# Patient Record
Sex: FEMALE | Race: WHITE | NOT HISPANIC OR LATINO | Employment: UNEMPLOYED | URBAN - METROPOLITAN AREA
[De-identification: names, ages, dates, MRNs, and addresses within clinical notes are randomized per-mention and may not be internally consistent; named-entity substitution may affect disease eponyms.]

---

## 2017-02-07 ENCOUNTER — ALLSCRIPTS OFFICE VISIT (OUTPATIENT)
Dept: OTHER | Facility: OTHER | Age: 32
End: 2017-02-07

## 2017-02-10 ENCOUNTER — GENERIC CONVERSION - ENCOUNTER (OUTPATIENT)
Dept: OTHER | Facility: OTHER | Age: 32
End: 2017-02-10

## 2017-02-10 LAB — AMBIG ABBREV CMP14 DEFAULT (HISTORICAL): NORMAL

## 2017-02-11 LAB
A/G RATIO (HISTORICAL): 1.8 (ref 1.1–2.5)
ALBUMIN SERPL BCP-MCNC: 4.2 G/DL (ref 3.5–5.5)
ALP SERPL-CCNC: 84 IU/L (ref 39–117)
ALT SERPL W P-5'-P-CCNC: 18 IU/L (ref 0–32)
AST SERPL W P-5'-P-CCNC: 18 IU/L (ref 0–40)
BILIRUB SERPL-MCNC: <0.2 MG/DL (ref 0–1.2)
BUN SERPL-MCNC: 13 MG/DL (ref 6–20)
BUN/CREA RATIO (HISTORICAL): 15 (ref 8–20)
CALCIUM SERPL-MCNC: 9.2 MG/DL (ref 8.7–10.2)
CHLORIDE SERPL-SCNC: 100 MMOL/L (ref 96–106)
CO2 SERPL-SCNC: 21 MMOL/L (ref 18–29)
CREAT SERPL-MCNC: 0.89 MG/DL (ref 0.57–1)
EGFR AFRICAN AMERICAN (HISTORICAL): 101 ML/MIN/1.73
EGFR-AMERICAN CALC (HISTORICAL): 87 ML/MIN/1.73
GLUCOSE SERPL-MCNC: 95 MG/DL (ref 65–99)
POTASSIUM SERPL-SCNC: 4.5 MMOL/L (ref 3.5–5.2)
SODIUM SERPL-SCNC: 140 MMOL/L (ref 134–144)
TOT. GLOBULIN, SERUM (HISTORICAL): 2.3 G/DL (ref 1.5–4.5)
TOTAL PROTEIN (HISTORICAL): 6.5 G/DL (ref 6–8.5)

## 2017-02-13 ENCOUNTER — GENERIC CONVERSION - ENCOUNTER (OUTPATIENT)
Dept: OTHER | Facility: OTHER | Age: 32
End: 2017-02-13

## 2017-02-14 ENCOUNTER — GENERIC CONVERSION - ENCOUNTER (OUTPATIENT)
Dept: OTHER | Facility: OTHER | Age: 32
End: 2017-02-14

## 2017-02-14 LAB
HEPATITIS C ANTIBODY (HISTORICAL): >11 S/CO RATIO (ref 0–0.9)
WRITTEN AUTHORIZATION (HISTORICAL): NORMAL

## 2017-02-22 ENCOUNTER — GENERIC CONVERSION - ENCOUNTER (OUTPATIENT)
Dept: OTHER | Facility: OTHER | Age: 32
End: 2017-02-22

## 2017-03-21 ENCOUNTER — GENERIC CONVERSION - ENCOUNTER (OUTPATIENT)
Dept: OTHER | Facility: OTHER | Age: 32
End: 2017-03-21

## 2017-04-23 ENCOUNTER — HOSPITAL ENCOUNTER (EMERGENCY)
Facility: HOSPITAL | Age: 32
Discharge: HOME | End: 2017-04-23
Attending: EMERGENCY MEDICINE
Payer: COMMERCIAL

## 2017-04-23 VITALS
OXYGEN SATURATION: 97 % | SYSTOLIC BLOOD PRESSURE: 124 MMHG | HEART RATE: 89 BPM | TEMPERATURE: 98.5 F | BODY MASS INDEX: 34.15 KG/M2 | RESPIRATION RATE: 20 BRPM | DIASTOLIC BLOOD PRESSURE: 68 MMHG | WEIGHT: 200 LBS | HEIGHT: 64 IN

## 2017-04-23 DIAGNOSIS — F41.9 ANXIETY: Primary | ICD-10-CM

## 2017-04-23 PROCEDURE — 99281 EMR DPT VST MAYX REQ PHY/QHP: CPT

## 2017-04-23 RX ORDER — PHENTERMINE HYDROCHLORIDE 37.5 MG/1
CAPSULE ORAL
COMMUNITY
Start: 2016-06-17 | End: 2018-05-14 | Stop reason: SDUPTHER

## 2017-04-23 RX ORDER — ALPRAZOLAM 1 MG/1
1 TABLET ORAL
COMMUNITY
End: 2018-02-01

## 2017-04-23 RX ORDER — ALPRAZOLAM 0.25 MG/1
1 TABLET ORAL ONCE
Status: COMPLETED | OUTPATIENT
Start: 2017-04-23 | End: 2017-04-23

## 2017-04-23 RX ORDER — RISPERIDONE 0.5 MG/1
0.5 TABLET, FILM COATED ORAL 2 TIMES DAILY
COMMUNITY
End: 2018-02-01

## 2017-04-23 RX ORDER — TRAZODONE HYDROCHLORIDE 50 MG/1
TABLET ORAL
COMMUNITY
Start: 2017-03-21 | End: 2017-04-23 | Stop reason: SDUPTHER

## 2017-04-23 RX ADMIN — ALPRAZOLAM 1 MG: 0.25 TABLET ORAL at 13:19

## 2017-04-25 ENCOUNTER — ALLSCRIPTS OFFICE VISIT (OUTPATIENT)
Dept: OTHER | Facility: OTHER | Age: 32
End: 2017-04-25

## 2017-05-09 ENCOUNTER — GENERIC CONVERSION - ENCOUNTER (OUTPATIENT)
Dept: OTHER | Facility: OTHER | Age: 32
End: 2017-05-09

## 2017-05-09 ENCOUNTER — ALLSCRIPTS OFFICE VISIT (OUTPATIENT)
Dept: OTHER | Facility: OTHER | Age: 32
End: 2017-05-09

## 2017-05-09 LAB — ERYTHROCYTE SEDIMENTATION RATE (HISTORICAL): 12 MM/HR (ref 0–32)

## 2017-05-10 ENCOUNTER — GENERIC CONVERSION - ENCOUNTER (OUTPATIENT)
Dept: OTHER | Facility: OTHER | Age: 32
End: 2017-05-10

## 2017-05-10 LAB
ANTINUCLEAR ANTIBODIES, IFA (HISTORICAL): NEGATIVE
RA LATEX TURBID (HISTORICAL): <10 IU/ML (ref 0–13.9)

## 2017-05-11 ENCOUNTER — GENERIC CONVERSION - ENCOUNTER (OUTPATIENT)
Dept: OTHER | Facility: OTHER | Age: 32
End: 2017-05-11

## 2017-06-05 ENCOUNTER — ALLSCRIPTS OFFICE VISIT (OUTPATIENT)
Dept: OTHER | Facility: OTHER | Age: 32
End: 2017-06-05

## 2017-06-09 ENCOUNTER — ALLSCRIPTS OFFICE VISIT (OUTPATIENT)
Dept: OTHER | Facility: OTHER | Age: 32
End: 2017-06-09

## 2017-06-09 DIAGNOSIS — R10.9 ABDOMINAL PAIN: ICD-10-CM

## 2017-09-21 ENCOUNTER — ALLSCRIPTS OFFICE VISIT (OUTPATIENT)
Dept: OTHER | Facility: OTHER | Age: 32
End: 2017-09-21

## 2017-09-21 ENCOUNTER — TRANSCRIBE ORDERS (OUTPATIENT)
Dept: ADMINISTRATIVE | Facility: HOSPITAL | Age: 32
End: 2017-09-21

## 2017-09-21 DIAGNOSIS — R76.8 HEPATITIS C ANTIBODY POSITIVE IN BLOOD: Primary | ICD-10-CM

## 2017-10-02 ENCOUNTER — HOSPITAL ENCOUNTER (OUTPATIENT)
Dept: ULTRASOUND IMAGING | Facility: HOSPITAL | Age: 32
Discharge: HOME | End: 2017-10-02
Attending: INTERNAL MEDICINE
Payer: COMMERCIAL

## 2017-10-02 DIAGNOSIS — R76.8 HEPATITIS C ANTIBODY POSITIVE IN BLOOD: ICD-10-CM

## 2017-10-02 PROCEDURE — 76705 ECHO EXAM OF ABDOMEN: CPT

## 2017-10-04 ENCOUNTER — ALLSCRIPTS OFFICE VISIT (OUTPATIENT)
Dept: OTHER | Facility: OTHER | Age: 32
End: 2017-10-04

## 2017-11-12 ENCOUNTER — OFFICE VISIT (OUTPATIENT)
Dept: URGENT CARE | Facility: CLINIC | Age: 32
End: 2017-11-12
Payer: COMMERCIAL

## 2017-11-12 PROCEDURE — G0382 LEV 3 HOSP TYPE B ED VISIT: HCPCS

## 2017-11-12 PROCEDURE — 99283 EMERGENCY DEPT VISIT LOW MDM: CPT

## 2017-11-13 ENCOUNTER — GENERIC CONVERSION - ENCOUNTER (OUTPATIENT)
Dept: OTHER | Facility: OTHER | Age: 32
End: 2017-11-13

## 2017-11-15 NOTE — PROGRESS NOTES
Assessment    1  Migraine headache (346 90) (G43 909)    Plan  Migraine headache    · Promethazine HCl - 12 5 MG Oral Tablet; TAKE 1 TABLET EVERY 4 HOURS ASNEEDED   · Ketorolac Tromethamine 60 MG/2ML Injection Solution   · MethylPREDNISolone Sodium Succ 125 MG Injection SolutionReconstituted  Unlinked    · ALPRAZolam 0 5 MG Oral Tablet    Discussion/Summary  Discussion Summary:   Ketoralac and methylprednisone given during office visit  fluid intake and get plenty of rest promethazine as needed for nausea  Follow up with Dr Giuseppe Post as planned  Medication Side Effects Reviewed: Possible side effects of new medications were reviewed with the patient/guardian today  Understands and agrees with treatment plan: The treatment plan was reviewed with the patient/guardian  The patient/guardian understands and agrees with the treatment plan   Counseling Documentation With Imm: The patient was counseled regarding instructions for management,-- risk factor reductions,-- prognosis,-- impressions  Chief Complaint    1  Headache  Chief Complaint Free Text Note Form: Tension headache for 1 week  Meds switched from Tanvir Stockton a few medication  Under a lot of stress  Has a hx of headaches  History of Present Illness  HPI: 29 yo female presents with complaint of severe headache x 1 week  Pt complains of photophobia, phonophobia, nausea, decreased appetite, and fatigue  Pt reports her anxiety medications were recently changed, and she has had a hard time managing stress  Mild lightheadedness  No improvement with ibuprofen 800 mg or excedrin  [facility] Based Practices Required Assessment:   Prefered Language is  english  Primary Language is  english  Headache: Dede Govea presents with complaints of headache    Associated symptoms include typical headache features,-- nausea,-- photophobia-- and-- phonophobia, but-- no new onset headache,-- no vomiting,-- no aura,-- no scotoma,-- no numbness,-- no tingling,-- no weakness,-- no fever,-- no chills,-- no scalp tenderness,-- no vertigo,-- no ataxia,-- no dysarthria,-- no aphasia,-- no diplopia,-- no vision loss,-- no confusion-- and-- no tinnitus  Review of Systems  Focused-Female:  Constitutional: feeling poorly-- and-- feeling tired  ENT: as noted in HPI  Cardiovascular: no complaints of slow or fast heart rate, no chest pain, no palpitations, no leg claudication or lower extremity edema  Respiratory: no complaints of shortness of breath, no wheezing, no dyspnea on exertion, no orthopnea or PND  Gastrointestinal: as noted in HPI  Musculoskeletal: no complaints of arthralgia, no myalgia, no joint swelling or stiffness, no limb pain or swelling  Integumentary: no complaints of skin rash or lesion, no itching or dry skin, no skin wounds  Neurological: as noted in HPI  Active Problems     1  Abdominal hernia (553 9) (K46 9)   2  Abdominal pain of multiple sites (789 09) (R10 9)   3  Adult hypothyroidism (244 9) (E03 9)   4  Arthritis of hand (716 94) (M19 049)   5  Asthma, mild (493 90) (J45 998)   6  Back pain (724 5) (M54 9)   7  Birth control (V25 9) (Z30 9)   8  Dependent edema (782 3) (R60 9)   9  Hand swelling (729 81) (M79 89)   10  History of drug abuse in remission (305 93) (Z87 898)   11  Insomnia (780 52) (G47 00)   12  Malaise and fatigue (780 79) (R53 81,R53 83)   13  Methadone use (304 00) (F11 20)   14  Neuropathy (355 9) (G62 9)   15  Obesity (278 00) (E66 9)   16  Oral contraceptive prescribed (V25 01) (Z30 011)   17  Pain at surgical site (338 18) (G89 18)   18  Paranoia (psychosis) (297 1) (F22)   19  Postop check (V67 00) (Z09)   20  PTSD (post-traumatic stress disorder) (309 81) (F43 10)   21  Skin infection, bacterial (686 9,041 9) (L08 9,B96 89)   22  Smoking addiction (305 1) (F17 200)   23  Viral hepatitis C without hepatic coma (070 70) (B19 20)  Anxiety (300 00) (F41 9)       Past Medical History  1   History of body piercing (V45 89) (Z98 890)   2  History of drug dependence/abuse (304 63) (F19 21)   3  History of vaginal delivery (V13 29)   4  History of Umbilical hernia (009 1) (K42 9)    Family History  Mother    1  No pertinent family history  Family History    2  Family history of mental disorder (V17 0) (Z81 8)   3  Family history of substance abuse (V17 0) (Z81 4)    Social History   · Current every day smoker (305 1) (F17 200)   · Smoking addiction (305 1) (F17 200)    Surgical History    1  History of Umbilical Hernia Repair - Over Age 6    Current Meds   1  ALPRAZolam 0 5 MG Oral Tablet; Therapy: 93WSX6112 to Recorded   2  ClonazePAM 1 MG Oral Tablet; 1 tid; Therapy: 69Zze6038 to (Last Rx:04Oct2017) Ordered   3  Furosemide 20 MG Oral Tablet; TAKE 1 TABLET DAILY; Therapy: 90RLC9691 to (243 5423)  Requested for: 06MBQ9701; Last Rx:05Jun2017 Ordered   4  Gabapentin 100 MG Oral Capsule; take 1 capsule by mouth three times a day; Therapy: 51PVB9920 to (Evaluate:00Jts8157)  Requested for: 01FQL2467; Last Rx:04Oct2017 Ordered   5  Levothyroxine Sodium 25 MCG Oral Tablet; take one tablet by mouth every day; Therapy: 01Apr2016 to (Evaluate:69Ass5387)  Requested for: 24Oct2017; Last Rx:24Oct2017; Status: ACTIVE - Retrospective By Protocol Authorization Ordered   6  Methadone HCl - 10 MG Oral Tablet; 75MG daily; Therapy: (Recorded:08Apr2016) to Recorded   7  Phentermine HCl - 37 5 MG Oral Tablet; take one tablet by mouth every day; Therapy: 32Vfh9502 to ([phone])  Requested for: 28Jir5532; Last Rx:80Pyl7043 Ordered   8  Potassium Chloride Heaven ER 10 MEQ Oral Tablet Extended Release; TAKE 1 TABLET DAILY; Therapy: 26AOY0845 to (123 2332)  Requested for: 51EOP9717; Last Rx:05Jun2017 Ordered   9  Topiramate 100 MG Oral Tablet; TAKE 1 TABLET 3 TIMES DAILY; Therapy: 01Apr2016 to (Evaluate:05Qwt9523)  Requested for: 14Zxa3386; Last Rx:16Sir0489; Status: ACTIVE - Retrospective By Protocol Authorization Ordered   10  Ventolin  (90 Base) MCG/ACT Inhalation Aerosol Solution; INHALE 1 PUFF  EVERY 4 HOURS AS NEEDED; Therapy: 57VXR2558 to (Last Donis Espinal)  Requested for: 73Uav6960 Ordered   11  Ziprasidone HCl - 40 MG Oral Capsule; TAKE 1 CAPSULE TWICE DAILY; Therapy: 72PSG3188 to [phone])  Requested for: 04Oct2017; Last  Rx:04Oct2017 Ordered    Allergies    1  No Known Drug Allergies    Vitals  Signs   Recorded: 82PZR6194 12:49PM   Temperature: 97 2 F  Heart Rate: 77  Respiration: 16  Systolic: 838  Diastolic: 72  Height: 5 ft 4 in  O2 Saturation: 99    Physical Exam   Constitutional  General appearance: Abnormal  -- (Kept eyes closed during entire office visit except when instructed to open for exam  ) acutely ill,-- uncomfortable,-- disheveled clothing,-- unkempt appearance,-- appears tired-- and-- acutely exhausted  Eyes  Conjunctiva and lids: No swelling, erythema or discharge  Pupils and irises: Equal, round and reactive to light  Ears, Nose, Mouth, and Throat  External inspection of ears and nose: Normal    Otoscopic examination: Tympanic membranes translucent with normal light reflex  Canals patent without erythema  Nasal mucosa, septum, and turbinates: Normal without edema or erythema  Oropharynx: Normal with no erythema, edema, exudate or lesions  Pulmonary  Respiratory effort: No increased work of breathing or signs of respiratory distress  Auscultation of lungs: Clear to auscultation  Cardiovascular  Auscultation of heart: Normal rate and rhythm, normal S1 and S2, without murmurs  Neurologic  Cranial nerves: Cranial nerves 2-12 intact  Reflexes: 2+ and symmetric  Sensation: No sensory loss  Psychiatric  Orientation to person, place, and time: Normal    Mood and affect: Abnormal   Mood and Affect: depressed,-- despairing,-- frustrated-- and-- in pain        Future Appointments    Date/Time Provider Specialty Site   12/04/2017 10:15 AM Christine Augustin MD Family Medicine [facility] [address]       Signatures   Electronically signed by : Piero [address]resp[address]; Nov 12 2017  7:01PM EST                       (Author)    Electronically signed by : Leonel Jackson DO; Nov 14 2017  8:17AM EST                       (Co-author)

## 2017-12-04 ENCOUNTER — GENERIC CONVERSION - ENCOUNTER (OUTPATIENT)
Dept: OTHER | Facility: OTHER | Age: 32
End: 2017-12-04

## 2018-01-10 NOTE — MISCELLANEOUS
Message  Due to a diagnosis of Anxiety, it is hard for our patient to complete a supervised urine test  Please make other arrangements to collect sample from her  Thank you  Active Problems    1  Abdominal hernia (553 9) (K46 9)   2  Adult hypothyroidism (244 9) (E03 9)   3  Anxiety (300 00) (F41 9)   4  Back pain (724 5) (M54 9)   5  Birth control (V25 9) (Z30 9)   6  History of drug abuse in remission (305 93) (O12 898)   7  Insomnia (780 52) (G47 00)   8  Methadone use (304 00) (F11 20)   9  Neuropathy (355 9) (G62 9)   10  Obesity (278 00) (E66 9)   11  Oral contraceptive prescribed (V25 01) (Z30 011)   12  Pain at surgical site (338 18) (G89 18)   13  Postop check (V67 00) (Z09)   14  PTSD (post-traumatic stress disorder) (309 81) (F43 10)   15  Smoking addiction (305 1) (F17 200)   16  Viral hepatitis C without hepatic coma (070 70) (B19 20)    Current Meds   1  Flexeril TABS (Cyclobenzaprine HCl); Therapy: (Recorded:07Apr2016) to Recorded   2  Gabapentin 300 MG Oral Capsule (Neurontin); TAKE 1 CAPSULE 3 TIMES DAILY; Therapy: 03EZA0094 to (Evaluate:51Jku1805)  Requested for: 45PXP1721; Last   Rx:15Ywp8700 Ordered   3  Ibuprofen 600 MG Oral Tablet; TAKE ONE TABLET BY MOUTH THREE TIMES DAILY   WITH FOOD; Therapy: 98UOR9365 to (Evaluate:52Tlz3012)  Requested for: 60Wbt0098; Last   Rx:98Kzn6762 Ordered   4  Levothyroxine Sodium 25 MCG Oral Tablet (Synthroid); take one tablet by mouth every   day; Therapy: 01Apr2016 to (Evaluate:72Ebs7837)  Requested for: 09VLQ3326; Last   Rx:04Dmg5495 Ordered   5  Methadone HCl - 10 MG Oral Tablet; 75MG daily; Therapy: (Recorded:08Apr2016) to Recorded   6  Norgestim-Eth Estrad Triphasic 0 18/0 215/0 25 MG-35 MCG Oral Tablet (Ortho   Tri-Cyclen (28)); take 1 tablet every day; Therapy: 01Apr2016 to (Last Rx:01Apr2016)  Requested for: 01Apr2016 Ordered   7  Phentermine HCl - 37 5 MG Oral Tablet; take one tablet by mouth every day;    Therapy: 08Aug2016 to (BSJNVJWN:46LXN3398)  Requested for: 32QGN0879; Last   Rx:07Feb2017 Ordered   8  Topiramate 100 MG Oral Tablet; TAKE 1 TABLET 3 TIMES DAILY; Therapy: 01Apr2016 to (Evaluate:07Jun2017)  Requested for: 85MDQ7239; Last   Rx:07Feb2017 Ordered   9  TraZODone HCl - 50 MG Oral Tablet; TAKE ONE TABLET BY MOUTH AT BEDTIME AS   NEEDED FOR SLEEP; Therapy: 42VYJ4036 to (Evaluate:22Mar2017)  Requested for: 68Opy4692; Last   Rx:20Feb2017 Ordered   10  Tri-Linyah 0 18/0 215/0 25 MG-35 MCG Oral Tablet; take 1 tablet every day; Therapy: 60Bqs7188 to (Evaluate:23Sep2016)  Requested for: 98Bjz0369; Last    Rx:09Lma2272 Ordered   11  Xanax 1 MG Oral Tablet (ALPRAZolam); TAKE 1 TABLET 3 TIMES DAILY; Therapy: 36QOU4942 to Recorded    Allergies    1   No Known Drug Allergies    Signatures   Electronically signed by : Anastasiya Pretty, ; Mar 21 2017 10:23AM EST                       (Author)

## 2018-01-11 NOTE — RESULT NOTES
Verified Results  ([facility]) Antinuclear Antibodies, IFA 30KHZ0033 10:20AM Jose Win     Test Name Result Flag Reference   Antinuclear Antibodies, IFA Negative     Negative   <1:80                                                      Borderline  1:80                                                      Positive   >1:80

## 2018-01-12 VITALS
DIASTOLIC BLOOD PRESSURE: 70 MMHG | HEART RATE: 96 BPM | TEMPERATURE: 98.9 F | BODY MASS INDEX: 35.52 KG/M2 | RESPIRATION RATE: 20 BRPM | HEIGHT: 64 IN | SYSTOLIC BLOOD PRESSURE: 112 MMHG | WEIGHT: 208.03 LBS

## 2018-01-12 VITALS
WEIGHT: 193 LBS | BODY MASS INDEX: 32.95 KG/M2 | HEIGHT: 64 IN | OXYGEN SATURATION: 98 % | SYSTOLIC BLOOD PRESSURE: 126 MMHG | HEART RATE: 88 BPM | DIASTOLIC BLOOD PRESSURE: 76 MMHG

## 2018-01-12 VITALS
HEART RATE: 88 BPM | OXYGEN SATURATION: 96 % | HEIGHT: 64 IN | SYSTOLIC BLOOD PRESSURE: 126 MMHG | WEIGHT: 200 LBS | DIASTOLIC BLOOD PRESSURE: 84 MMHG | BODY MASS INDEX: 34.15 KG/M2

## 2018-01-12 NOTE — RESULT NOTES
Verified Results  [facility]) CMP14+eGFR 05Apr2016 08:58AM [facility]     Test Name Result Flag Reference   Glucose, Serum 94 mg/dL  65-99   BUN 20 mg/dL  6-20   Creatinine, Serum 0 93 mg/dL  0 57-1 00   eGFR If NonAfricn Am 83 mL/min/1 73  >59   eGFR If Africn Am 96 mL/min/1 73  >59   BUN/Creatinine Ratio 22 H 8-20   Sodium, Serum 144 mmol/L  134-144   Potassium, Serum 3 9 mmol/L  3 5-5 2   Chloride, Serum 108 mmol/L     Carbon Dioxide, Total 20 mmol/L  18-29   Calcium, Serum 9 1 mg/dL  8 7-10 2   Protein, Total, Serum 6 5 g/dL  6 0-8 5   Albumin, Serum 4 0 g/dL  3 5-5 5   Globulin, Total 2 5 g/dL  1 5-4 5   A/G Ratio 1 6  1 1-2 5   Bilirubin, Total <0 2 mg/dL  0 0-1 2   Alkaline Phosphatase, S 68 IU/L     AST (SGOT) 15 IU/L  0-40   ALT (SGPT) 19 IU/L  0-32     (1) TSH 05Apr2016 08:58AM [facility]     Test Name Result Flag Reference   TSH 2 130 uIU/mL  0 450-4 500

## 2018-01-13 VITALS
HEART RATE: 98 BPM | DIASTOLIC BLOOD PRESSURE: 80 MMHG | OXYGEN SATURATION: 98 % | WEIGHT: 204 LBS | BODY MASS INDEX: 34.83 KG/M2 | SYSTOLIC BLOOD PRESSURE: 122 MMHG | HEIGHT: 64 IN

## 2018-01-13 NOTE — RESULT NOTES
Verified Results  ([facility]) Thyroid Panel With TSH 11KSJ9081 12:22PM [facility]     Test Name Result Flag Reference   TSH 1 720 uIU/mL  0 450-4 500   Thyroxine (T4) 5 7 ug/dL  4 5-12 0   T3 Uptake 29 %  24-39   Free Thyroxine Index 1 7  1 2-4 9     (1) COMPREHENSIVE METABOLIC PANEL 33PIM6346 73:24MB [facility]     Test Name Result Flag Reference   Glucose, Serum 95 mg/dL  65-99   BUN 13 mg/dL  6-20   Creatinine, Serum 0 89 mg/dL  0 57-1 00   eGFR If NonAfricn Am 87 mL/min/1 73  >59   eGFR If Africn Am 101 mL/min/1 73  >59   BUN/Creatinine Ratio 15  8-20   Sodium, Serum 140 mmol/L  134-144   Potassium, Serum 4 5 mmol/L  3 5-5 2   Chloride, Serum 100 mmol/L     Carbon Dioxide, Total 21 mmol/L  18-29   Calcium, Serum 9 2 mg/dL  8 7-10 2   Protein, Total, Serum 6 5 g/dL  6 0-8 5   Albumin, Serum 4 2 g/dL  3 5-5 5   Globulin, Total 2 3 g/dL  1 5-4 5   A/G Ratio 1 8  1 1-2 5   Bilirubin, Total <0 2 mg/dL  0 0-1 2   Alkaline Phosphatase, S 84 IU/L     AST (SGOT) 18 IU/L  0-40   ALT (SGPT) 18 IU/L  0-32     Ira Rowland CMP14 Default [id number] 12:22PM [facility]     Test Name Result Flag Reference   Keenan Sicard [facility]14 Default Comment     A hand-written panel/profile was received from your office  In  accordance with the [facility] Ambiguous Test Code Policy dated July 6869, we have completed your order by using the closest currently  or formerly recognized AMA panel  We have assigned Comprehensive  Metabolic Panel (14), Test Code #786133 to this request   If this  is not the testing you wished to receive on this specimen, please  contact the [address] [place] [address] Client Inquiry/Technical Services Department  to clarify the test order  We appreciate your business

## 2018-01-13 NOTE — PROGRESS NOTES
Assessment    1  Encounter for preventive health examination (V70 0) (Z00 00)   2  Anxiety (300 00) (F41 9)   3  Hand swelling (729 81) (M79 89)   4  Paranoia (psychosis) (297 1) (F22)    Plan  Anxiety    · Ziprasidone HCl - 40 MG Oral Capsule; TAKE 1 CAPSULE TWICE DAILY   · ClonazePAM 1 MG Oral Tablet; 1 tid  Insomnia    · TraZODone HCl - 50 MG Oral Tablet  Malaise and fatigue    · (1) CBC/PLT/DIFF; Status:Active - Retrospective Authorization; Requested  for:04Oct2017;   Neuropathy    · Gabapentin 100 MG Oral Capsule; take 1 capsule by mouth three times a day  Skin infection, bacterial    · Sulfamethoxazole-Trimethoprim 800-160 MG Oral Tablet (Bactrim DS)  Unlinked    · CloNIDine HCl - 0 1 MG Oral Tablet    Discussion/Summary  Advice and education were given regarding nutrition, aerobic exercise and weight loss  History of Present Illness  HM, Adult Female: The last health maintenance visit was 1 year(s) ago  Social History: Household members include son(s)  She is unmarried  Work status: not currently employed  The patient is a current cigarette smoker  She reports occasional alcohol use  The patient has no concerns about alcohol abuse  She has never used illicit drugs  She reports the use of marijuana  General Health: The patient's health since the last visit is described as good  She has regular dental visits  She denies vision problems  She denies hearing loss  Immunizations status: up to date  Lifestyle:  She consumes a diverse and healthy diet  She has weight concerns  She does not exercise regularly  She uses tobacco  She denies alcohol use  She denies drug use  Reproductive health: the patient is premenopausal    Screening: Active Problems    1  Abdominal hernia (553 9) (K46 9)   2  Abdominal pain of multiple sites (789 09) (R10 9)   3  Adult hypothyroidism (244 9) (E03 9)   4  Anxiety (300 00) (F41 9)   5  Arthritis of hand (716 94) (M19 049)   6  Asthma, mild (868 90) (J45 998)   7  Back pain (724 5) (M54 9)   8  Birth control (V25 9) (Z30 9)   9  Dependent edema (782 3) (R60 9)   10  Hand swelling (729 81) (M79 89)   11  History of drug abuse in remission (305 93) (C86 753)   12  Insomnia (780 52) (G47 00)   13  Malaise and fatigue (780 79) (R53 81,R53 83)   14  Methadone use (304 00) (F11 20)   15  Neuropathy (355 9) (G62 9)   16  Obesity (278 00) (E66 9)   17  Oral contraceptive prescribed (V25 01) (Z30 011)   18  Pain at surgical site (338 18) (G89 18)   19  Postop check (V67 00) (Z09)   20  PTSD (post-traumatic stress disorder) (309 81) (F43 10)   21  Skin infection, bacterial (686 9,041 9) (L08 9,B96 89)   22  Smoking addiction (305 1) (F17 200)   23  Viral hepatitis C without hepatic coma (070 70) (B19 20)    Past Medical History    · History of body piercing (V45 89) (Z98 890)   · History of drug dependence/abuse (304 63) (F19 21)   · History of vaginal delivery (V13 29)   · History of Umbilical hernia (746 9) (K42 9)    Surgical History    · History of Umbilical Hernia Repair - Over Age 12    Family History  Mother    · No pertinent family history  Family History    · Family history of mental disorder (V17 0) (Z81 8)   · Family history of substance abuse (V17 0) (Z81 4)    Social History    · Current every day smoker (305 1) (F17 200)   · Smoking addiction (305 1) (F17 200)    Current Meds   1  ALPRAZolam 0 5 MG Oral Tablet; Therapy: 35IND4413 to Recorded   2  ClonazePAM 1 MG Oral Tablet; 1 tid; Therapy: 21Udo7040 to (Last Rx:25Apr2017) Ordered   3  CloNIDine HCl - 0 1 MG Oral Tablet; Therapy: 40DWP4619 to Recorded   4  Furosemide 20 MG Oral Tablet; TAKE 1 TABLET DAILY; Therapy: 42RRW3050 to (Danii Watters)  Requested for: 46GBD1100; Last   Rx:05Jun2017 Ordered   5  Gabapentin 300 MG Oral Capsule; take one capsule by mouth three times per day; Therapy: 40PGM4770 to (03 17 74 30 53)  Requested for: 29Cnw8261; Last   Rx:52Cij6967 Ordered   6   Levothyroxine Sodium 25 MCG Oral Tablet; take one tablet by mouth every day; Therapy: 01Apr2016 to (03 17 74 30 53)  Requested for: 70Cey7403; Last   Rx:70Xvn3513 Ordered   7  Methadone HCl - 10 MG Oral Tablet; 75MG daily; Therapy: (Recorded:08Apr2016) to Recorded   8  Phentermine HCl - 37 5 MG Oral Tablet; take one tablet by mouth every day; Therapy: 08Aug2016 to (03 17 74 30 53)  Requested for: 36Jdf5379; Last   Rx:33Udf6091 Ordered   9  Potassium Chloride Heaven ER 10 MEQ Oral Tablet Extended Release; TAKE 1 TABLET   DAILY; Therapy: 58ZQE7447 to (937 9950)  Requested for: 18ISD4744; Last   Rx:05Jun2017 Ordered   10  Sulfamethoxazole-Trimethoprim 800-160 MG Oral Tablet; TAKE 1 TABLET TWICE DAILY    UNTIL FINISHED; Therapy: 41HKT0251 to (Annette Tipton)  Requested for: 57Aoo1263; Last    Rx:97Xdg1503 Ordered   11  Topiramate 100 MG Oral Tablet; TAKE 1 TABLET 3 TIMES DAILY; Therapy: 01Apr2016 to (Evaluate:35Aey0869)  Requested for: 79Pqa8900; Last    Rx:80Vde7437; Status: ACTIVE - Retrospective By Protocol Authorization Ordered   12  TraZODone HCl - 50 MG Oral Tablet; TAKE ONE TABLET BY MOUTH AT BEDTIME AS    NEEDED FOR SLEEP; Therapy: 78QUP7876 to (03 17 74 30 53)  Requested for: 40Sjs7778; Last    Rx:84Erl0734 Ordered   13  Ventolin  (90 Base) MCG/ACT Inhalation Aerosol Solution; INHALE 1 PUFF    EVERY 4 HOURS AS NEEDED; Therapy: 81NHA2101 to (Scott Sigala)  Requested for: 31Ezl2026 Ordered    Allergies    1  No Known Drug Allergies    Physical Exam    Constitutional   General appearance: No acute distress, well appearing and well nourished  Neck   Neck: Supple, symmetric, trachea midline, no masses  Thyroid: Normal, no thyromegaly  Pulmonary   Auscultation of lungs: Clear to auscultation  Cardiovascular   Auscultation of heart: Normal rate and rhythm, normal S1 and S2, no murmurs  Pedal pulses: 2+ bilaterally  Abdomen   Abdomen: Non-tender, no masses      Liver and spleen: No hepatomegaly or splenomegaly         Future Appointments    Date/Time Provider Specialty Site   10/12/2017 10:00 AM CHRISTOPHER Gallegos Family Medicine [facility]   10/20/2017 10:00 AM Page Hua MD [address] [place] [address]     Signatures   Electronically signed by : Raman Cortes MD; Oct  4 2017 12:41PM EST                       (Author)

## 2018-01-14 VITALS
HEIGHT: 64 IN | WEIGHT: 189 LBS | RESPIRATION RATE: 16 BRPM | OXYGEN SATURATION: 99 % | SYSTOLIC BLOOD PRESSURE: 120 MMHG | BODY MASS INDEX: 32.27 KG/M2 | HEART RATE: 104 BPM | DIASTOLIC BLOOD PRESSURE: 70 MMHG

## 2018-01-14 VITALS
DIASTOLIC BLOOD PRESSURE: 70 MMHG | SYSTOLIC BLOOD PRESSURE: 98 MMHG | WEIGHT: 199 LBS | BODY MASS INDEX: 33.97 KG/M2 | HEIGHT: 64 IN | HEART RATE: 76 BPM | RESPIRATION RATE: 16 BRPM

## 2018-01-14 NOTE — RESULT NOTES
Verified Results  ([address] [facility]) HCV Antibody 85GUY4044 12:22PM [facility]     Test Name Result Flag Reference   Hep C Virus Ab >11 0 s/co ratio H 0 0-0 9   Negative:     < 0 8                                              Indeterminate: 0 8 - 0 9                                                   Positive:     > 0 9                  The CDC recommends that a positive HCV antibody result                  be followed up with a HCV Nucleic Acid Amplification                  test ([id number])

## 2018-01-14 NOTE — PROGRESS NOTES
Assessment   1  Anxiety (300 00) (F41 9)  2  Viral hepatitis C without hepatic coma (070 70) (B19 20)  3  Encounter for preventive health examination (V70 0) (Z00 00)  4  Obesity (278 00) (E66 9)  5  Adult hypothyroidism (244 9) (E03 9)    Discussion/Summary    Will start phentermine in 3mos1        1 Amended By: Den Cano; Oct 17 2016 9:00 AM EST    Chief Complaint  annual pe      History of Present Illness  HM, Adult Female: The patient is being seen for a health maintenance evaluation  General Health: The patient's health since the last visit is described as good  She denies vision problems  She denies hearing loss  Immunizations status: up to date  Lifestyle:  She consumes a diverse and healthy diet  She has weight concerns  She does not exercise regularly  She uses tobacco    Screening: cancer screening reviewed and current  metabolic screening reviewed and current  risk screening reviewed and current  Active Problems   1  Abdominal hernia (553 9) (K46 9)  2  Adult hypothyroidism (244 9) (E03 9)  3  Anxiety (300 00) (F41 9)  4  Back pain (724 5) (M54 9)  5  Birth control (V25 9) (Z30 9)  6  History of drug abuse in remission (305 93) (R28 441)  7  Insomnia (780 52) (G47 00)  8  Methadone use (304 00) (F11 20)  9  Neuropathy (355 9) (G62 9)  10  Obesity (278 00) (E66 9)  11  Oral contraceptive prescribed (V25 01) (Z30 011)  12  Pain at surgical site (338 18) (G89 18)  13  Postop check (V67 00) (Z09)  14  PTSD (post-traumatic stress disorder) (309 81) (F43 10)  15  Smoking addiction (305 1) (F17 200)  16   Viral hepatitis C without hepatic coma (070 70) (B19 20)    Past Medical History    · History of body piercing (V45 89) (Z98 890)   · History of drug dependence/abuse (304 63) (F19 21)   · History of vaginal delivery (V13 29)   · History of Umbilical hernia (675 5) (K42 9)    Surgical History    · History of Umbilical Hernia Repair - Over Age 12    Family History  Mother    · No pertinent family history    Social History    · Current every day smoker (305 1) (F17 200)   · Smoking addiction (305 1) (F17 200)    Current Meds  1  Flexeril TABS; Therapy: (Recorded:07Apr2016) to Recorded  2  Gabapentin 300 MG Oral Capsule; TAKE ONE TABLET BY MOUTH AT BEDTIME FOR   THREE DAYS THEN ONE TWICE DAILY FOR THREE DAYS THEN THREE TIMES   DAILY; Therapy: 28UOQ2254 to ([facility])  Requested for: 21Sep2016; Last   Rx:21Sep2016 Ordered  3  Ibuprofen 600 MG Oral Tablet; TAKE ONE TABLET BY MOUTH THREE TIMES DAILY   WITH FOOD; Therapy: 39THF5714 to (Evaluate:07Sep2016)  Requested for: 76Qmc4178; Last   Rx:55Iup5552 Ordered  4  Levothyroxine Sodium 25 MCG Oral Tablet; take one tablet by mouth every day; Therapy: 01Apr2016 to (Evaluate:03Jan2017)  Requested for: 05Oct2016; Last   Rx:05Oct2016; Status: ACTIVE - Retrospective By Protocol Authorization Ordered  5  Methadone HCl - 10 MG Oral Tablet; 75MG daily; Therapy: (Recorded:08Apr2016) to Recorded  6  Norgestim-Eth Estrad Triphasic 0 18/0 215/0 25 MG-35 MCG Oral Tablet; take 1 tablet   every day; Therapy: 01Apr2016 to (Last Rx:01Apr2016)  Requested for: 01Apr2016 Ordered  7  Phentermine HCl - 37 5 MG Oral Capsule; TAKE 1 CAPSULE DAILY; Therapy: 63ISP5249 to (Evaluate:05Nov2016); Last Rx:33Plz2169 Ordered  8  Phentermine HCl - 37 5 MG Oral Tablet; take one tablet by mouth every day; Therapy: 85Txj3076 to (Evaluate:20Oct2016)  Requested for: 05Oct2016; Last   Rx:05Oct2016; Status: ACTIVE - Retrospective By Protocol Authorization Ordered  9  Topiramate 100 MG Oral Tablet; 1 BID; Therapy: 01Apr2016 to (Last Rx:36Sys7020)  Requested for: 63DPF6742 Ordered  10  TraZODone HCl - 50 MG Oral Tablet; TAKE ONE TABLET BY MOUTH AT BEDTIME AS    NEEDED FOR SLEEP; Therapy: 50NAE5105 to ([facility])  Requested for: 05Oct2016; Last    Rx:05Oct2016; Status: ACTIVE - Retrospective By Protocol Authorization Ordered  11   Tri-Linyah 0 18/0 215/0 25 MG-35 MCG Oral Tablet; take 1 tablet every day; Therapy: 87Lna7154 to (Evaluate:53Ohi5631)  Requested for: 52Igc9077; Last    Rx:10Efn0864 Ordered  12  Xanax 1 MG Oral Tablet; TAKE 1 TABLET 3 TIMES DAILY; Therapy: 32YQX3338 to Recorded    Allergies   1  No Known Drug Allergies    Vitals   Recorded: 47BQU0860 16:32SN   Systolic 175   Diastolic 78   Heart Rate 001   Height 5 ft 4 in   Weight 182 lb    BMI Calculated 31 24   BSA Calculated 1 88     Physical Exam    Constitutional   General appearance: No acute distress, well appearing and well nourished  Neck   Neck: Supple, symmetric, trachea midline, no masses  Thyroid: Normal, no thyromegaly  Cardiovascular   Auscultation of heart: Normal rate and rhythm, normal S1 and S2, no murmurs  Pedal pulses: 2+ bilaterally         Signatures   Electronically signed by : Nathalia Garcia MD; Oct 17 2016  9:00AM EST                       (Author)

## 2018-01-16 ENCOUNTER — HOSPITAL ENCOUNTER (INPATIENT)
Facility: HOSPITAL | Age: 33
LOS: 7 days | Discharge: HOME | DRG: 982 | End: 2018-01-23
Attending: EMERGENCY MEDICINE | Admitting: SURGERY
Payer: COMMERCIAL

## 2018-01-16 ENCOUNTER — APPOINTMENT (INPATIENT)
Dept: RADIOLOGY | Facility: HOSPITAL | Age: 33
DRG: 982 | End: 2018-01-16
Payer: COMMERCIAL

## 2018-01-16 ENCOUNTER — APPOINTMENT (EMERGENCY)
Dept: RADIOLOGY | Facility: HOSPITAL | Age: 33
DRG: 982 | End: 2018-01-16
Payer: COMMERCIAL

## 2018-01-16 ENCOUNTER — APPOINTMENT (EMERGENCY)
Dept: RADIOLOGY | Facility: HOSPITAL | Age: 33
DRG: 982 | End: 2018-01-16
Attending: EMERGENCY MEDICINE
Payer: COMMERCIAL

## 2018-01-16 DIAGNOSIS — S36.039A: ICD-10-CM

## 2018-01-16 DIAGNOSIS — S82.041C: Primary | ICD-10-CM

## 2018-01-16 DIAGNOSIS — R45.851 PASSIVE SUICIDAL IDEATIONS: ICD-10-CM

## 2018-01-16 DIAGNOSIS — S22.43XA MULTIPLE FRACTURES OF RIBS, BILATERAL, INITIAL ENCOUNTER FOR CLOSED FRACTURE: ICD-10-CM

## 2018-01-16 PROBLEM — F11.90 METHADONE USE: Status: ACTIVE | Noted: 2018-01-16
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
## 2018-01-16 PROBLEM — K66.1 HEMOPERITONEUM: Status: ACTIVE | Noted: 2018-01-16

## 2018-01-16 PROBLEM — R91.1 PULMONARY NODULE: Status: ACTIVE | Noted: 2018-01-16

## 2018-01-16 PROBLEM — S82.044C: Status: ACTIVE | Noted: 2018-01-16

## 2018-01-16 PROBLEM — F11.10 HEROIN ABUSE (HCC): Status: ACTIVE | Noted: 2018-01-16

## 2018-01-16 LAB
ABO GROUP BLD: NORMAL
ANION GAP SERPL CALCULATED.3IONS-SCNC: 7 MMOL/L (ref 4–13)
BASE EX.OXY STD BLDV CALC-SCNC: 78.4 % (ref 60–80)
BASE EXCESS BLDA CALC-SCNC: -4 MMOL/L (ref -2–3)
BASE EXCESS BLDV CALC-SCNC: -4.8 MMOL/L
BASOPHILS # BLD AUTO: 0.02 THOUSANDS/ΜL (ref 0–0.1)
BASOPHILS NFR BLD AUTO: 0 % (ref 0–1)
BLD GP AB SCN SERPL QL: NEGATIVE
BUN SERPL-MCNC: 15 MG/DL (ref 5–25)
CA-I BLD-SCNC: 1.15 MMOL/L (ref 1.12–1.32)
CALCIUM SERPL-MCNC: 8.1 MG/DL (ref 8.3–10.1)
CHLORIDE SERPL-SCNC: 112 MMOL/L (ref 100–108)
CK MB SERPL-MCNC: 1.1 NG/ML (ref 0–5)
CK MB SERPL-MCNC: <1 % (ref 0–2.5)
CK SERPL-CCNC: 147 U/L (ref 26–192)
CO2 SERPL-SCNC: 24 MMOL/L (ref 21–32)
CREAT SERPL-MCNC: 0.94 MG/DL (ref 0.6–1.3)
EOSINOPHIL # BLD AUTO: 0.15 THOUSAND/ΜL (ref 0–0.61)
EOSINOPHIL NFR BLD AUTO: 1 % (ref 0–6)
ERYTHROCYTE [DISTWIDTH] IN BLOOD BY AUTOMATED COUNT: 14.4 % (ref 11.6–15.1)
ETHANOL SERPL-MCNC: <3 MG/DL (ref 0–3)
GFR SERPL CREATININE-BSD FRML MDRD: 81 ML/MIN/1.73SQ M
GLUCOSE SERPL-MCNC: 143 MG/DL (ref 65–140)
GLUCOSE SERPL-MCNC: 143 MG/DL (ref 65–140)
GLUCOSE SERPL-MCNC: 99 MG/DL (ref 65–140)
HCO3 BLDA-SCNC: 22 MMOL/L (ref 24–30)
HCO3 BLDV-SCNC: 21.5 MMOL/L (ref 24–30)
HCT VFR BLD AUTO: 31.2 % (ref 34.8–46.1)
HCT VFR BLD AUTO: 31.4 % (ref 34.8–46.1)
HCT VFR BLD CALC: 28 % (ref 34.8–46.1)
HGB BLD-MCNC: 10.4 G/DL (ref 11.5–15.4)
HGB BLD-MCNC: 10.6 G/DL (ref 11.5–15.4)
HGB BLDA-MCNC: 9.5 G/DL (ref 11.5–15.4)
LACTATE SERPL-SCNC: 1.6 MMOL/L (ref 0.5–2)
LACTATE SERPL-SCNC: 2.4 MMOL/L (ref 0.5–2)
LYMPHOCYTES # BLD AUTO: 3.01 THOUSANDS/ΜL (ref 0.6–4.47)
LYMPHOCYTES NFR BLD AUTO: 27 % (ref 14–44)
MCH RBC QN AUTO: 30.5 PG (ref 26.8–34.3)
MCHC RBC AUTO-ENTMCNC: 33.3 G/DL (ref 31.4–37.4)
MCV RBC AUTO: 92 FL (ref 82–98)
MONOCYTES # BLD AUTO: 0.67 THOUSAND/ΜL (ref 0.17–1.22)
MONOCYTES NFR BLD AUTO: 6 % (ref 4–12)
NEUTROPHILS # BLD AUTO: 7.09 THOUSANDS/ΜL (ref 1.85–7.62)
NEUTS SEG NFR BLD AUTO: 66 % (ref 43–75)
NRBC BLD AUTO-RTO: 0 /100 WBCS
O2 CT BLDV-SCNC: 12.3 ML/DL
PCO2 BLD: 23 MMOL/L (ref 21–32)
PCO2 BLD: 45.4 MM HG (ref 42–50)
PCO2 BLDV: 44.5 MM HG (ref 42–50)
PH BLD: 7.29 [PH] (ref 7.3–7.4)
PH BLDV: 7.3 [PH] (ref 7.3–7.4)
PLATELET # BLD AUTO: 234 THOUSANDS/UL (ref 149–390)
PMV BLD AUTO: 10.4 FL (ref 8.9–12.7)
PO2 BLD: 33 MM HG (ref 35–45)
PO2 BLDV: 44.7 MM HG (ref 35–45)
POTASSIUM BLD-SCNC: 3.4 MMOL/L (ref 3.5–5.3)
POTASSIUM SERPL-SCNC: 3.2 MMOL/L (ref 3.5–5.3)
RBC # BLD AUTO: 3.41 MILLION/UL (ref 3.81–5.12)
RH BLD: POSITIVE
SAO2 % BLD FROM PO2: 56 % (ref 95–98)
SODIUM BLD-SCNC: 142 MMOL/L (ref 136–145)
SODIUM SERPL-SCNC: 143 MMOL/L (ref 136–145)
SPECIMEN EXPIRATION DATE: NORMAL
SPECIMEN SOURCE: ABNORMAL
WBC # BLD AUTO: 11.03 THOUSAND/UL (ref 4.31–10.16)

## 2018-01-16 PROCEDURE — 86920 COMPATIBILITY TEST SPIN: CPT

## 2018-01-16 PROCEDURE — 36247 INS CATH ABD/L-EXT ART 3RD: CPT

## 2018-01-16 PROCEDURE — 73080 X-RAY EXAM OF ELBOW: CPT

## 2018-01-16 PROCEDURE — 74177 CT ABD & PELVIS W/CONTRAST: CPT

## 2018-01-16 PROCEDURE — 84295 ASSAY OF SERUM SODIUM: CPT

## 2018-01-16 PROCEDURE — 37244 VASC EMBOLIZE/OCCLUDE BLEED: CPT

## 2018-01-16 PROCEDURE — 71260 CT THORAX DX C+: CPT

## 2018-01-16 PROCEDURE — 86850 RBC ANTIBODY SCREEN: CPT | Performed by: EMERGENCY MEDICINE

## 2018-01-16 PROCEDURE — 84132 ASSAY OF SERUM POTASSIUM: CPT

## 2018-01-16 PROCEDURE — 82550 ASSAY OF CK (CPK): CPT | Performed by: EMERGENCY MEDICINE

## 2018-01-16 PROCEDURE — 82330 ASSAY OF CALCIUM: CPT

## 2018-01-16 PROCEDURE — 90715 TDAP VACCINE 7 YRS/> IM: CPT | Performed by: EMERGENCY MEDICINE

## 2018-01-16 PROCEDURE — 99285 EMERGENCY DEPT VISIT HI MDM: CPT

## 2018-01-16 PROCEDURE — 80320 DRUG SCREEN QUANTALCOHOLS: CPT | Performed by: EMERGENCY MEDICINE

## 2018-01-16 PROCEDURE — C1874 STENT, COATED/COV W/DEL SYS: HCPCS

## 2018-01-16 PROCEDURE — 85018 HEMOGLOBIN: CPT | Performed by: NURSE PRACTITIONER

## 2018-01-16 PROCEDURE — 73060 X-RAY EXAM OF HUMERUS: CPT

## 2018-01-16 PROCEDURE — 77001 FLUOROGUIDE FOR VEIN DEVICE: CPT

## 2018-01-16 PROCEDURE — C1887 CATHETER, GUIDING: HCPCS

## 2018-01-16 PROCEDURE — 70450 CT HEAD/BRAIN W/O DYE: CPT

## 2018-01-16 PROCEDURE — 85025 COMPLETE CBC W/AUTO DIFF WBC: CPT | Performed by: EMERGENCY MEDICINE

## 2018-01-16 PROCEDURE — 76937 US GUIDE VASCULAR ACCESS: CPT

## 2018-01-16 PROCEDURE — 36556 INSERT NON-TUNNEL CV CATH: CPT

## 2018-01-16 PROCEDURE — 83605 ASSAY OF LACTIC ACID: CPT | Performed by: EMERGENCY MEDICINE

## 2018-01-16 PROCEDURE — 73130 X-RAY EXAM OF HAND: CPT

## 2018-01-16 PROCEDURE — 80048 BASIC METABOLIC PNL TOTAL CA: CPT | Performed by: EMERGENCY MEDICINE

## 2018-01-16 PROCEDURE — 73552 X-RAY EXAM OF FEMUR 2/>: CPT

## 2018-01-16 PROCEDURE — 86900 BLOOD TYPING SEROLOGIC ABO: CPT | Performed by: EMERGENCY MEDICINE

## 2018-01-16 PROCEDURE — 82947 ASSAY GLUCOSE BLOOD QUANT: CPT

## 2018-01-16 PROCEDURE — 71045 X-RAY EXAM CHEST 1 VIEW: CPT

## 2018-01-16 PROCEDURE — 82948 REAGENT STRIP/BLOOD GLUCOSE: CPT

## 2018-01-16 PROCEDURE — 94760 N-INVAS EAR/PLS OXIMETRY 1: CPT

## 2018-01-16 PROCEDURE — 99152 MOD SED SAME PHYS/QHP 5/>YRS: CPT

## 2018-01-16 PROCEDURE — C1894 INTRO/SHEATH, NON-LASER: HCPCS

## 2018-01-16 PROCEDURE — 82553 CREATINE MB FRACTION: CPT | Performed by: EMERGENCY MEDICINE

## 2018-01-16 PROCEDURE — 85014 HEMATOCRIT: CPT

## 2018-01-16 PROCEDURE — C1760 CLOSURE DEV, VASC: HCPCS

## 2018-01-16 PROCEDURE — C1769 GUIDE WIRE: HCPCS

## 2018-01-16 PROCEDURE — 86901 BLOOD TYPING SEROLOGIC RH(D): CPT | Performed by: EMERGENCY MEDICINE

## 2018-01-16 PROCEDURE — 75726 ARTERY X-RAYS ABDOMEN: CPT

## 2018-01-16 PROCEDURE — C1751 CATH, INF, PER/CENT/MIDLINE: HCPCS

## 2018-01-16 PROCEDURE — 30233N1 TRANSFUSION OF NONAUTOLOGOUS RED BLOOD CELLS INTO PERIPHERAL VEIN, PERCUTANEOUS APPROACH: ICD-10-PCS | Performed by: SURGERY

## 2018-01-16 PROCEDURE — 73110 X-RAY EXAM OF WRIST: CPT

## 2018-01-16 PROCEDURE — 83605 ASSAY OF LACTIC ACID: CPT | Performed by: NURSE PRACTITIONER

## 2018-01-16 PROCEDURE — 99153 MOD SED SAME PHYS/QHP EA: CPT

## 2018-01-16 PROCEDURE — 96365 THER/PROPH/DIAG IV INF INIT: CPT

## 2018-01-16 PROCEDURE — 82805 BLOOD GASES W/O2 SATURATION: CPT | Performed by: NURSE PRACTITIONER

## 2018-01-16 PROCEDURE — 96375 TX/PRO/DX INJ NEW DRUG ADDON: CPT

## 2018-01-16 PROCEDURE — 04L43DZ OCCLUSION OF SPLENIC ARTERY WITH INTRALUMINAL DEVICE, PERCUTANEOUS APPROACH: ICD-10-PCS | Performed by: RADIOLOGY

## 2018-01-16 PROCEDURE — 36415 COLL VENOUS BLD VENIPUNCTURE: CPT | Performed by: EMERGENCY MEDICINE

## 2018-01-16 PROCEDURE — 85014 HEMATOCRIT: CPT | Performed by: NURSE PRACTITIONER

## 2018-01-16 PROCEDURE — 72125 CT NECK SPINE W/O DYE: CPT

## 2018-01-16 PROCEDURE — P9016 RBC LEUKOCYTES REDUCED: HCPCS

## 2018-01-16 PROCEDURE — 90471 IMMUNIZATION ADMIN: CPT

## 2018-01-16 PROCEDURE — 02HV33Z INSERTION OF INFUSION DEVICE INTO SUPERIOR VENA CAVA, PERCUTANEOUS APPROACH: ICD-10-PCS | Performed by: SURGERY

## 2018-01-16 PROCEDURE — 73590 X-RAY EXAM OF LOWER LEG: CPT

## 2018-01-16 PROCEDURE — 82803 BLOOD GASES ANY COMBINATION: CPT

## 2018-01-16 RX ORDER — LIDOCAINE 50 MG/G
2 PATCH TOPICAL DAILY
Status: DISCONTINUED | OUTPATIENT
Start: 2018-01-17 | End: 2018-01-23 | Stop reason: HOSPADM

## 2018-01-16 RX ORDER — FENTANYL CITRATE 50 UG/ML
INJECTION, SOLUTION INTRAMUSCULAR; INTRAVENOUS CODE/TRAUMA/SEDATION MEDICATION
Status: COMPLETED | OUTPATIENT
Start: 2018-01-16 | End: 2018-01-16

## 2018-01-16 RX ORDER — FENTANYL CITRATE 50 UG/ML
100 INJECTION, SOLUTION INTRAMUSCULAR; INTRAVENOUS ONCE
Status: COMPLETED | OUTPATIENT
Start: 2018-01-16 | End: 2018-01-16

## 2018-01-16 RX ORDER — SODIUM CHLORIDE, SODIUM GLUCONATE, SODIUM ACETATE, POTASSIUM CHLORIDE, MAGNESIUM CHLORIDE, SODIUM PHOSPHATE, DIBASIC, AND POTASSIUM PHOSPHATE .53; .5; .37; .037; .03; .012; .00082 G/100ML; G/100ML; G/100ML; G/100ML; G/100ML; G/100ML; G/100ML
75 INJECTION, SOLUTION INTRAVENOUS CONTINUOUS
Status: DISCONTINUED | OUTPATIENT
Start: 2018-01-16 | End: 2018-01-18

## 2018-01-16 RX ORDER — POTASSIUM CHLORIDE 29.8 MG/ML
40 INJECTION INTRAVENOUS
Status: COMPLETED | OUTPATIENT
Start: 2018-01-16 | End: 2018-01-17

## 2018-01-16 RX ORDER — MIDAZOLAM HYDROCHLORIDE 1 MG/ML
INJECTION INTRAMUSCULAR; INTRAVENOUS CODE/TRAUMA/SEDATION MEDICATION
Status: COMPLETED | OUTPATIENT
Start: 2018-01-16 | End: 2018-01-16

## 2018-01-16 RX ORDER — LIDOCAINE HYDROCHLORIDE 10 MG/ML
INJECTION, SOLUTION EPIDURAL; INFILTRATION; INTRACAUDAL; PERINEURAL
Status: DISPENSED
Start: 2018-01-16 | End: 2018-01-17

## 2018-01-16 RX ORDER — ALBUTEROL SULFATE 2.5 MG/3ML
2.5 SOLUTION RESPIRATORY (INHALATION) EVERY 4 HOURS PRN
Status: DISCONTINUED | OUTPATIENT
Start: 2018-01-16 | End: 2018-01-22

## 2018-01-16 RX ORDER — LIDOCAINE HYDROCHLORIDE 10 MG/ML
20 INJECTION, SOLUTION EPIDURAL; INFILTRATION; INTRACAUDAL; PERINEURAL ONCE
Status: DISCONTINUED | OUTPATIENT
Start: 2018-01-16 | End: 2018-01-17

## 2018-01-16 RX ORDER — HYDROMORPHONE HYDROCHLORIDE 4 MG/ML
INJECTION, SOLUTION INTRAMUSCULAR; INTRAVENOUS; SUBCUTANEOUS CODE/TRAUMA/SEDATION MEDICATION
Status: COMPLETED | OUTPATIENT
Start: 2018-01-16 | End: 2018-01-16

## 2018-01-16 RX ADMIN — POTASSIUM CHLORIDE 40 MEQ: 400 INJECTION, SOLUTION INTRAVENOUS at 19:51

## 2018-01-16 RX ADMIN — SODIUM CHLORIDE 1000 ML: 0.9 INJECTION, SOLUTION INTRAVENOUS at 18:12

## 2018-01-16 RX ADMIN — MIDAZOLAM 1 MG: 1 INJECTION INTRAMUSCULAR; INTRAVENOUS at 17:13

## 2018-01-16 RX ADMIN — IODIXANOL 130 ML: 320 INJECTION, SOLUTION INTRAVASCULAR at 19:04

## 2018-01-16 RX ADMIN — HYDROMORPHONE HYDROCHLORIDE 1 MG: 1 INJECTION, SOLUTION INTRAMUSCULAR; INTRAVENOUS; SUBCUTANEOUS at 19:07

## 2018-01-16 RX ADMIN — HYDROMORPHONE HYDROCHLORIDE 1 MG: 4 INJECTION, SOLUTION INTRAMUSCULAR; INTRAVENOUS; SUBCUTANEOUS at 18:36

## 2018-01-16 RX ADMIN — FENTANYL CITRATE 50 MCG: 50 INJECTION, SOLUTION INTRAMUSCULAR; INTRAVENOUS at 17:04

## 2018-01-16 RX ADMIN — MIDAZOLAM 1 MG: 1 INJECTION INTRAMUSCULAR; INTRAVENOUS at 16:55

## 2018-01-16 RX ADMIN — HYDROMORPHONE HYDROCHLORIDE 1 MG: 1 INJECTION, SOLUTION INTRAMUSCULAR; INTRAVENOUS; SUBCUTANEOUS at 22:26

## 2018-01-16 RX ADMIN — FENTANYL CITRATE 50 MCG: 50 INJECTION, SOLUTION INTRAMUSCULAR; INTRAVENOUS at 17:50

## 2018-01-16 RX ADMIN — MIDAZOLAM 1 MG: 1 INJECTION INTRAMUSCULAR; INTRAVENOUS at 17:05

## 2018-01-16 RX ADMIN — MIDAZOLAM 1 MG: 1 INJECTION INTRAMUSCULAR; INTRAVENOUS at 17:56

## 2018-01-16 RX ADMIN — FENTANYL CITRATE 50 MCG: 50 INJECTION, SOLUTION INTRAMUSCULAR; INTRAVENOUS at 16:52

## 2018-01-16 RX ADMIN — FENTANYL CITRATE 50 MCG: 50 INJECTION, SOLUTION INTRAMUSCULAR; INTRAVENOUS at 17:15

## 2018-01-16 RX ADMIN — CEFAZOLIN SODIUM 2000 MG: 1 SOLUTION INTRAVENOUS at 15:44

## 2018-01-16 RX ADMIN — IOHEXOL 100 ML: 350 INJECTION, SOLUTION INTRAVENOUS at 16:07

## 2018-01-16 RX ADMIN — TETANUS TOXOID, REDUCED DIPHTHERIA TOXOID AND ACELLULAR PERTUSSIS VACCINE, ADSORBED 0.5 ML: 5; 2.5; 8; 8; 2.5 SUSPENSION INTRAMUSCULAR at 15:46

## 2018-01-16 RX ADMIN — SODIUM CHLORIDE 1000 ML: 0.9 INJECTION, SOLUTION INTRAVENOUS at 16:50

## 2018-01-16 RX ADMIN — POTASSIUM CHLORIDE 40 MEQ: 400 INJECTION, SOLUTION INTRAVENOUS at 22:28

## 2018-01-16 RX ADMIN — MIDAZOLAM 1 MG: 1 INJECTION INTRAMUSCULAR; INTRAVENOUS at 17:04

## 2018-01-16 RX ADMIN — MIDAZOLAM 1 MG: 1 INJECTION INTRAMUSCULAR; INTRAVENOUS at 17:33

## 2018-01-16 RX ADMIN — FENTANYL CITRATE 50 MCG: 50 INJECTION, SOLUTION INTRAMUSCULAR; INTRAVENOUS at 16:45

## 2018-01-16 RX ADMIN — FENTANYL CITRATE 50 MCG: 50 INJECTION, SOLUTION INTRAMUSCULAR; INTRAVENOUS at 18:12

## 2018-01-16 RX ADMIN — FENTANYL CITRATE 100 MCG: 50 INJECTION INTRAMUSCULAR; INTRAVENOUS at 19:40
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
## 2018-01-16 NOTE — TRAUMA DOCUMENTATION
Patient crying out upon arrival to the [facility],  "put me to sleep, I don't want this pain"   Patient having difficulty keeping her eyes open

## 2018-01-16 NOTE — BRIEF OP NOTE (RAD/CATH)
Splenic angiography and embolization and triple lumen central venous catheter Procedure Note    PATIENT NAME: Ana Cruz  : 1985  MRN: [id number]     Pre-op Diagnosis:   1   Splenic laceration  Post-op Diagnosis: Splenic laceration      Surgeon:   Jami Castillo DO  Assistants:     No qualified resident was available, Resident is only observing    Estimated Blood Loss: minimal  Findings: no overt active extravasation; main splenic artery embolization    Specimens: none    Complications:  None apparent    Anesthesia: Conscious sedation and Local    Jami Castillo DO     Date: 2018  Time: 6:46 PM

## 2018-01-16 NOTE — PROGRESS NOTES
Patient arrived to IR for splenic angiography  The procedure and risks were discussed with the patient  All questions were answered  Informed consent was obtained

## 2018-01-16 NOTE — RESULT NOTES
Verified Results  [facility]) Rheumatoid Arthritis Factor [id number] 10:20AM Sergio Laureano     Test Name Result Flag Reference   RA Latex Turbid   <10 0 IU/mL  0 0-13 9     () Sedimentation Rate-Westergren 62ZFG0974 10:20AM Sergio Laureano     Test Name Result Flag Reference   Sedimentation Rate-Westergren 12 mm/hr  0-32
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
## 2018-01-17 ENCOUNTER — APPOINTMENT (INPATIENT)
Dept: RADIOLOGY | Facility: HOSPITAL | Age: 33
DRG: 982 | End: 2018-01-17
Payer: COMMERCIAL

## 2018-01-17 ENCOUNTER — ANESTHESIA (INPATIENT)
Dept: PERIOP | Facility: HOSPITAL | Age: 33
DRG: 982 | End: 2018-01-17
Payer: COMMERCIAL

## 2018-01-17 ENCOUNTER — ANESTHESIA EVENT (INPATIENT)
Dept: PERIOP | Facility: HOSPITAL | Age: 33
DRG: 982 | End: 2018-01-17
Payer: COMMERCIAL

## 2018-01-17 PROBLEM — R45.851 PASSIVE SUICIDAL IDEATIONS: Status: ACTIVE | Noted: 2018-01-17

## 2018-01-17 LAB
ANION GAP SERPL CALCULATED.3IONS-SCNC: 6 MMOL/L (ref 4–13)
ANION GAP SERPL CALCULATED.3IONS-SCNC: 8 MMOL/L (ref 4–13)
APTT PPP: 28 SECONDS (ref 23–35)
BASE EX.OXY STD BLDV CALC-SCNC: 70.4 % (ref 60–80)
BASE EXCESS BLDV CALC-SCNC: -3.8 MMOL/L
BASOPHILS # BLD AUTO: 0.01 THOUSANDS/ΜL (ref 0–0.1)
BASOPHILS # BLD AUTO: 0.01 THOUSANDS/ΜL (ref 0–0.1)
BASOPHILS NFR BLD AUTO: 0 % (ref 0–1)
BASOPHILS NFR BLD AUTO: 0 % (ref 0–1)
BUN SERPL-MCNC: 12 MG/DL (ref 5–25)
BUN SERPL-MCNC: 9 MG/DL (ref 5–25)
CALCIUM SERPL-MCNC: 7.6 MG/DL (ref 8.3–10.1)
CALCIUM SERPL-MCNC: 8 MG/DL (ref 8.3–10.1)
CHLORIDE SERPL-SCNC: 108 MMOL/L (ref 100–108)
CHLORIDE SERPL-SCNC: 111 MMOL/L (ref 100–108)
CO2 SERPL-SCNC: 22 MMOL/L (ref 21–32)
CO2 SERPL-SCNC: 24 MMOL/L (ref 21–32)
CREAT SERPL-MCNC: 0.72 MG/DL (ref 0.6–1.3)
CREAT SERPL-MCNC: 0.74 MG/DL (ref 0.6–1.3)
EOSINOPHIL # BLD AUTO: 0.02 THOUSAND/ΜL (ref 0–0.61)
EOSINOPHIL # BLD AUTO: 0.13 THOUSAND/ΜL (ref 0–0.61)
EOSINOPHIL NFR BLD AUTO: 0 % (ref 0–6)
EOSINOPHIL NFR BLD AUTO: 2 % (ref 0–6)
ERYTHROCYTE [DISTWIDTH] IN BLOOD BY AUTOMATED COUNT: 14.5 % (ref 11.6–15.1)
ERYTHROCYTE [DISTWIDTH] IN BLOOD BY AUTOMATED COUNT: 14.6 % (ref 11.6–15.1)
GFR SERPL CREATININE-BSD FRML MDRD: 108 ML/MIN/1.73SQ M
GFR SERPL CREATININE-BSD FRML MDRD: 112 ML/MIN/1.73SQ M
GLUCOSE SERPL-MCNC: 102 MG/DL (ref 65–140)
GLUCOSE SERPL-MCNC: 114 MG/DL (ref 65–140)
GLUCOSE SERPL-MCNC: 131 MG/DL (ref 65–140)
GLUCOSE SERPL-MCNC: 97 MG/DL (ref 65–140)
HCG SERPL QL: NEGATIVE
HCO3 BLDV-SCNC: 22.2 MMOL/L (ref 24–30)
HCT VFR BLD AUTO: 31.5 % (ref 34.8–46.1)
HCT VFR BLD AUTO: 31.6 % (ref 34.8–46.1)
HCT VFR BLD AUTO: 31.8 % (ref 34.8–46.1)
HCT VFR BLD AUTO: 32.3 % (ref 34.8–46.1)
HCT VFR BLD AUTO: 33.2 % (ref 34.8–46.1)
HGB BLD-MCNC: 10.2 G/DL (ref 11.5–15.4)
HGB BLD-MCNC: 10.7 G/DL (ref 11.5–15.4)
HGB BLD-MCNC: 10.7 G/DL (ref 11.5–15.4)
HGB BLD-MCNC: 10.8 G/DL (ref 11.5–15.4)
HGB BLD-MCNC: 11.1 G/DL (ref 11.5–15.4)
INR PPP: 1.12 (ref 0.86–1.16)
LACTATE SERPL-SCNC: 1.2 MMOL/L (ref 0.5–2)
LYMPHOCYTES # BLD AUTO: 1.08 THOUSANDS/ΜL (ref 0.6–4.47)
LYMPHOCYTES # BLD AUTO: 1.89 THOUSANDS/ΜL (ref 0.6–4.47)
LYMPHOCYTES NFR BLD AUTO: 23 % (ref 14–44)
LYMPHOCYTES NFR BLD AUTO: 9 % (ref 14–44)
MAGNESIUM SERPL-MCNC: 2.3 MG/DL (ref 1.6–2.6)
MCH RBC QN AUTO: 30.3 PG (ref 26.8–34.3)
MCH RBC QN AUTO: 30.7 PG (ref 26.8–34.3)
MCHC RBC AUTO-ENTMCNC: 33.4 G/DL (ref 31.4–37.4)
MCHC RBC AUTO-ENTMCNC: 33.6 G/DL (ref 31.4–37.4)
MCV RBC AUTO: 91 FL (ref 82–98)
MCV RBC AUTO: 91 FL (ref 82–98)
MONOCYTES # BLD AUTO: 0.51 THOUSAND/ΜL (ref 0.17–1.22)
MONOCYTES # BLD AUTO: 0.63 THOUSAND/ΜL (ref 0.17–1.22)
MONOCYTES NFR BLD AUTO: 4 % (ref 4–12)
MONOCYTES NFR BLD AUTO: 8 % (ref 4–12)
NASAL CANNULA: 2
NEUTROPHILS # BLD AUTO: 10.32 THOUSANDS/ΜL (ref 1.85–7.62)
NEUTROPHILS # BLD AUTO: 5.58 THOUSANDS/ΜL (ref 1.85–7.62)
NEUTS SEG NFR BLD AUTO: 67 % (ref 43–75)
NEUTS SEG NFR BLD AUTO: 87 % (ref 43–75)
NRBC BLD AUTO-RTO: 0 /100 WBCS
NRBC BLD AUTO-RTO: 0 /100 WBCS
O2 CT BLDV-SCNC: 11.3 ML/DL
PCO2 BLDV: 43.9 MM HG (ref 42–50)
PH BLDV: 7.32 [PH] (ref 7.3–7.4)
PHOSPHATE SERPL-MCNC: 3.3 MG/DL (ref 2.7–4.5)
PLATELET # BLD AUTO: 274 THOUSANDS/UL (ref 149–390)
PLATELET # BLD AUTO: 285 THOUSANDS/UL (ref 149–390)
PMV BLD AUTO: 10.4 FL (ref 8.9–12.7)
PMV BLD AUTO: 10.6 FL (ref 8.9–12.7)
PO2 BLDV: 38.1 MM HG (ref 35–45)
POTASSIUM SERPL-SCNC: 3.8 MMOL/L (ref 3.5–5.3)
POTASSIUM SERPL-SCNC: 4 MMOL/L (ref 3.5–5.3)
PROTHROMBIN TIME: 14.4 SECONDS (ref 12.1–14.4)
RBC # BLD AUTO: 3.48 MILLION/UL (ref 3.81–5.12)
RBC # BLD AUTO: 3.66 MILLION/UL (ref 3.81–5.12)
SODIUM SERPL-SCNC: 138 MMOL/L (ref 136–145)
SODIUM SERPL-SCNC: 141 MMOL/L (ref 136–145)
WBC # BLD AUTO: 12.02 THOUSAND/UL (ref 4.31–10.16)
WBC # BLD AUTO: 8.3 THOUSAND/UL (ref 4.31–10.16)

## 2018-01-17 PROCEDURE — 85014 HEMATOCRIT: CPT | Performed by: NURSE PRACTITIONER

## 2018-01-17 PROCEDURE — 85018 HEMOGLOBIN: CPT | Performed by: NURSE PRACTITIONER

## 2018-01-17 PROCEDURE — 85014 HEMATOCRIT: CPT | Performed by: PHYSICIAN ASSISTANT

## 2018-01-17 PROCEDURE — 0JQN0ZZ REPAIR RIGHT LOWER LEG SUBCUTANEOUS TISSUE AND FASCIA, OPEN APPROACH: ICD-10-PCS | Performed by: ORTHOPAEDIC SURGERY

## 2018-01-17 PROCEDURE — 85610 PROTHROMBIN TIME: CPT | Performed by: ORTHOPAEDIC SURGERY

## 2018-01-17 PROCEDURE — 82805 BLOOD GASES W/O2 SATURATION: CPT | Performed by: EMERGENCY MEDICINE

## 2018-01-17 PROCEDURE — 80048 BASIC METABOLIC PNL TOTAL CA: CPT | Performed by: NURSE PRACTITIONER

## 2018-01-17 PROCEDURE — 83605 ASSAY OF LACTIC ACID: CPT | Performed by: NURSE PRACTITIONER

## 2018-01-17 PROCEDURE — 0JDN0ZZ EXTRACTION OF RIGHT LOWER LEG SUBCUTANEOUS TISSUE AND FASCIA, OPEN APPROACH: ICD-10-PCS | Performed by: ORTHOPAEDIC SURGERY

## 2018-01-17 PROCEDURE — 82948 REAGENT STRIP/BLOOD GLUCOSE: CPT

## 2018-01-17 PROCEDURE — 85018 HEMOGLOBIN: CPT | Performed by: PHYSICIAN ASSISTANT

## 2018-01-17 PROCEDURE — 85730 THROMBOPLASTIN TIME PARTIAL: CPT | Performed by: ORTHOPAEDIC SURGERY

## 2018-01-17 PROCEDURE — 84100 ASSAY OF PHOSPHORUS: CPT | Performed by: NURSE PRACTITIONER

## 2018-01-17 PROCEDURE — 85025 COMPLETE CBC W/AUTO DIFF WBC: CPT | Performed by: NURSE PRACTITIONER

## 2018-01-17 PROCEDURE — 83735 ASSAY OF MAGNESIUM: CPT | Performed by: NURSE PRACTITIONER

## 2018-01-17 PROCEDURE — 71045 X-RAY EXAM CHEST 1 VIEW: CPT

## 2018-01-17 PROCEDURE — 0SJC0ZZ INSPECTION OF RIGHT KNEE JOINT, OPEN APPROACH: ICD-10-PCS | Performed by: ORTHOPAEDIC SURGERY

## 2018-01-17 PROCEDURE — 94760 N-INVAS EAR/PLS OXIMETRY 1: CPT

## 2018-01-17 PROCEDURE — 94640 AIRWAY INHALATION TREATMENT: CPT

## 2018-01-17 PROCEDURE — 84703 CHORIONIC GONADOTROPIN ASSAY: CPT | Performed by: EMERGENCY MEDICINE

## 2018-01-17 RX ORDER — DIAZEPAM 5 MG/1
5 TABLET ORAL DAILY
Status: DISCONTINUED | OUTPATIENT
Start: 2018-01-18 | End: 2018-01-23 | Stop reason: HOSPADM

## 2018-01-17 RX ORDER — OXYCODONE HYDROCHLORIDE 5 MG/1
TABLET ORAL
Qty: 30 TABLET | Refills: 0 | Status: SHIPPED | OUTPATIENT
Start: 2018-01-17 | End: 2018-01-30 | Stop reason: SDUPTHER

## 2018-01-17 RX ORDER — FENTANYL CITRATE 50 UG/ML
INJECTION, SOLUTION INTRAMUSCULAR; INTRAVENOUS AS NEEDED
Status: DISCONTINUED | OUTPATIENT
Start: 2018-01-17 | End: 2018-01-17 | Stop reason: SURG

## 2018-01-17 RX ORDER — LORAZEPAM 2 MG/ML
INJECTION INTRAMUSCULAR
Status: COMPLETED
Start: 2018-01-17 | End: 2018-01-17

## 2018-01-17 RX ORDER — KETAMINE HYDROCHLORIDE 50 MG/ML
INJECTION, SOLUTION, CONCENTRATE INTRAMUSCULAR; INTRAVENOUS AS NEEDED
Status: DISCONTINUED | OUTPATIENT
Start: 2018-01-17 | End: 2018-01-17 | Stop reason: SURG

## 2018-01-17 RX ORDER — LIDOCAINE HYDROCHLORIDE 10 MG/ML
INJECTION, SOLUTION INFILTRATION; PERINEURAL AS NEEDED
Status: DISCONTINUED | OUTPATIENT
Start: 2018-01-17 | End: 2018-01-17 | Stop reason: SURG

## 2018-01-17 RX ORDER — POTASSIUM CHLORIDE 20MEQ/15ML
40 LIQUID (ML) ORAL ONCE
Status: COMPLETED | OUTPATIENT
Start: 2018-01-17 | End: 2018-01-17

## 2018-01-17 RX ORDER — ACETAMINOPHEN 325 MG/1
975 TABLET ORAL EVERY 8 HOURS PRN
Status: DISCONTINUED | OUTPATIENT
Start: 2018-01-17 | End: 2018-01-23 | Stop reason: HOSPADM

## 2018-01-17 RX ORDER — ONDANSETRON 2 MG/ML
4 INJECTION INTRAMUSCULAR; INTRAVENOUS ONCE
Status: DISCONTINUED | OUTPATIENT
Start: 2018-01-17 | End: 2018-01-17 | Stop reason: HOSPADM

## 2018-01-17 RX ORDER — MIDAZOLAM HYDROCHLORIDE 1 MG/ML
INJECTION INTRAMUSCULAR; INTRAVENOUS AS NEEDED
Status: DISCONTINUED | OUTPATIENT
Start: 2018-01-17 | End: 2018-01-17 | Stop reason: SURG

## 2018-01-17 RX ORDER — MAGNESIUM HYDROXIDE 1200 MG/15ML
LIQUID ORAL AS NEEDED
Status: DISCONTINUED | OUTPATIENT
Start: 2018-01-17 | End: 2018-01-17 | Stop reason: HOSPADM

## 2018-01-17 RX ORDER — PROPOFOL 10 MG/ML
INJECTION, EMULSION INTRAVENOUS AS NEEDED
Status: DISCONTINUED | OUTPATIENT
Start: 2018-01-17 | End: 2018-01-17 | Stop reason: SURG

## 2018-01-17 RX ORDER — LORAZEPAM 2 MG/ML
0.5 INJECTION INTRAMUSCULAR
Status: COMPLETED | OUTPATIENT
Start: 2018-01-17 | End: 2018-01-17

## 2018-01-17 RX ORDER — ONDANSETRON 2 MG/ML
INJECTION INTRAMUSCULAR; INTRAVENOUS AS NEEDED
Status: DISCONTINUED | OUTPATIENT
Start: 2018-01-17 | End: 2018-01-17 | Stop reason: SURG

## 2018-01-17 RX ORDER — HYDROMORPHONE HYDROCHLORIDE 2 MG/ML
INJECTION, SOLUTION INTRAMUSCULAR; INTRAVENOUS; SUBCUTANEOUS AS NEEDED
Status: DISCONTINUED | OUTPATIENT
Start: 2018-01-17 | End: 2018-01-17 | Stop reason: SURG

## 2018-01-17 RX ORDER — OXYCODONE HYDROCHLORIDE 10 MG/1
10 TABLET ORAL EVERY 4 HOURS PRN
Status: DISCONTINUED | OUTPATIENT
Start: 2018-01-17 | End: 2018-01-23 | Stop reason: HOSPADM

## 2018-01-17 RX ORDER — CLONAZEPAM 0.5 MG/1
1 TABLET ORAL 3 TIMES DAILY
Status: DISCONTINUED | OUTPATIENT
Start: 2018-01-17 | End: 2018-01-23 | Stop reason: HOSPADM

## 2018-01-17 RX ORDER — POTASSIUM CHLORIDE 20 MEQ/1
40 TABLET, EXTENDED RELEASE ORAL ONCE
Status: DISCONTINUED | OUTPATIENT
Start: 2018-01-17 | End: 2018-01-18

## 2018-01-17 RX ORDER — FENTANYL CITRATE/PF 50 MCG/ML
50 SYRINGE (ML) INJECTION
Status: COMPLETED | OUTPATIENT
Start: 2018-01-17 | End: 2018-01-17

## 2018-01-17 RX ORDER — SUCRALFATE ORAL 1 G/10ML
1000 SUSPENSION ORAL EVERY 6 HOURS PRN
Status: DISCONTINUED | OUTPATIENT
Start: 2018-01-17 | End: 2018-01-23 | Stop reason: HOSPADM

## 2018-01-17 RX ORDER — NICOTINE 21 MG/24HR
1 PATCH, TRANSDERMAL 24 HOURS TRANSDERMAL DAILY
Status: DISCONTINUED | OUTPATIENT
Start: 2018-01-17 | End: 2018-01-23 | Stop reason: HOSPADM

## 2018-01-17 RX ORDER — SODIUM CHLORIDE, SODIUM LACTATE, POTASSIUM CHLORIDE, CALCIUM CHLORIDE 600; 310; 30; 20 MG/100ML; MG/100ML; MG/100ML; MG/100ML
INJECTION, SOLUTION INTRAVENOUS CONTINUOUS PRN
Status: DISCONTINUED | OUTPATIENT
Start: 2018-01-17 | End: 2018-01-17 | Stop reason: SURG

## 2018-01-17 RX ORDER — SUCCINYLCHOLINE CHLORIDE 20 MG/ML
INJECTION INTRAMUSCULAR; INTRAVENOUS AS NEEDED
Status: DISCONTINUED | OUTPATIENT
Start: 2018-01-17 | End: 2018-01-17 | Stop reason: SURG

## 2018-01-17 RX ORDER — GLYCOPYRROLATE 0.2 MG/ML
INJECTION INTRAMUSCULAR; INTRAVENOUS AS NEEDED
Status: DISCONTINUED | OUTPATIENT
Start: 2018-01-17 | End: 2018-01-17 | Stop reason: SURG

## 2018-01-17 RX ORDER — LORAZEPAM 2 MG/ML
1 INJECTION INTRAMUSCULAR
Status: COMPLETED | OUTPATIENT
Start: 2018-01-17 | End: 2018-01-18

## 2018-01-17 RX ORDER — CALCIUM CARBONATE 200(500)MG
500 TABLET,CHEWABLE ORAL DAILY PRN
Status: DISCONTINUED | OUTPATIENT
Start: 2018-01-17 | End: 2018-01-23 | Stop reason: HOSPADM

## 2018-01-17 RX ORDER — DIAZEPAM 5 MG/ML
5 INJECTION, SOLUTION INTRAMUSCULAR; INTRAVENOUS EVERY 6 HOURS PRN
Status: DISCONTINUED | OUTPATIENT
Start: 2018-01-17 | End: 2018-01-17

## 2018-01-17 RX ORDER — GLYCOPYRROLATE 0.2 MG/ML
0.2 INJECTION INTRAMUSCULAR; INTRAVENOUS EVERY 4 HOURS PRN
Status: DISCONTINUED | OUTPATIENT
Start: 2018-01-17 | End: 2018-01-20

## 2018-01-17 RX ADMIN — POTASSIUM CHLORIDE 40 MEQ: 20 SOLUTION ORAL at 20:39

## 2018-01-17 RX ADMIN — LIDOCAINE 2 PATCH: 50 PATCH TOPICAL at 09:05

## 2018-01-17 RX ADMIN — ONDANSETRON 4 MG: 2 INJECTION INTRAMUSCULAR; INTRAVENOUS at 14:09

## 2018-01-17 RX ADMIN — ALBUTEROL SULFATE 2.5 MG: 2.5 SOLUTION RESPIRATORY (INHALATION) at 07:27

## 2018-01-17 RX ADMIN — DEXAMETHASONE SODIUM PHOSPHATE 10 MG: 10 INJECTION INTRAMUSCULAR; INTRAVENOUS at 14:55

## 2018-01-17 RX ADMIN — KETAMINE HYDROCHLORIDE 10 MG: 50 INJECTION, SOLUTION INTRAMUSCULAR; INTRAVENOUS at 15:40

## 2018-01-17 RX ADMIN — KETAMINE HYDROCHLORIDE 10 MG: 50 INJECTION, SOLUTION INTRAMUSCULAR; INTRAVENOUS at 15:50

## 2018-01-17 RX ADMIN — GLYCOPYRROLATE 0.1 MG: 0.2 INJECTION, SOLUTION INTRAMUSCULAR; INTRAVENOUS at 14:09

## 2018-01-17 RX ADMIN — SUCCINYLCHOLINE CHLORIDE 100 MG: 20 INJECTION, SOLUTION INTRAMUSCULAR; INTRAVENOUS at 14:31

## 2018-01-17 RX ADMIN — FENTANYL CITRATE 50 MCG: 50 INJECTION, SOLUTION INTRAMUSCULAR; INTRAVENOUS at 14:07

## 2018-01-17 RX ADMIN — HYDROMORPHONE HYDROCHLORIDE 1 MG: 1 INJECTION, SOLUTION INTRAMUSCULAR; INTRAVENOUS; SUBCUTANEOUS at 09:15

## 2018-01-17 RX ADMIN — KETAMINE HYDROCHLORIDE 0.1 MG/KG/HR: 50 INJECTION INTRAMUSCULAR; INTRAVENOUS at 12:05

## 2018-01-17 RX ADMIN — FENTANYL CITRATE 50 MCG: 50 INJECTION INTRAMUSCULAR; INTRAVENOUS at 16:40

## 2018-01-17 RX ADMIN — HYDROMORPHONE HYDROCHLORIDE 0.5 MG: 1 INJECTION, SOLUTION INTRAMUSCULAR; INTRAVENOUS; SUBCUTANEOUS at 07:21

## 2018-01-17 RX ADMIN — KETAMINE HYDROCHLORIDE 10 MG: 50 INJECTION, SOLUTION INTRAMUSCULAR; INTRAVENOUS at 15:35

## 2018-01-17 RX ADMIN — FENTANYL CITRATE 50 MCG: 50 INJECTION INTRAMUSCULAR; INTRAVENOUS at 16:30

## 2018-01-17 RX ADMIN — ACETAMINOPHEN 975 MG: 325 TABLET, FILM COATED ORAL at 20:39

## 2018-01-17 RX ADMIN — KETAMINE HYDROCHLORIDE 10 MG: 50 INJECTION, SOLUTION INTRAMUSCULAR; INTRAVENOUS at 15:03

## 2018-01-17 RX ADMIN — FENTANYL CITRATE 50 MCG: 50 INJECTION, SOLUTION INTRAMUSCULAR; INTRAVENOUS at 14:20

## 2018-01-17 RX ADMIN — HYDROMORPHONE HYDROCHLORIDE 1 MG: 1 INJECTION, SOLUTION INTRAMUSCULAR; INTRAVENOUS; SUBCUTANEOUS at 01:02

## 2018-01-17 RX ADMIN — FENTANYL CITRATE 50 MCG: 50 INJECTION INTRAMUSCULAR; INTRAVENOUS at 16:20

## 2018-01-17 RX ADMIN — HYDROMORPHONE HYDROCHLORIDE 1 MG: 1 INJECTION, SOLUTION INTRAMUSCULAR; INTRAVENOUS; SUBCUTANEOUS at 20:37

## 2018-01-17 RX ADMIN — LORAZEPAM 1 MG: 2 INJECTION INTRAMUSCULAR; INTRAVENOUS at 11:29

## 2018-01-17 RX ADMIN — FENTANYL CITRATE 50 MCG: 50 INJECTION, SOLUTION INTRAMUSCULAR; INTRAVENOUS at 14:10

## 2018-01-17 RX ADMIN — Medication 500 MG: at 20:39

## 2018-01-17 RX ADMIN — OXYCODONE HYDROCHLORIDE 10 MG: 5 TABLET ORAL at 22:44

## 2018-01-17 RX ADMIN — CLONAZEPAM 1 MG: 1 TABLET ORAL at 22:23

## 2018-01-17 RX ADMIN — FENTANYL CITRATE 50 MCG: 50 INJECTION, SOLUTION INTRAMUSCULAR; INTRAVENOUS at 14:14

## 2018-01-17 RX ADMIN — SODIUM CHLORIDE, SODIUM LACTATE, POTASSIUM CHLORIDE, AND CALCIUM CHLORIDE: .6; .31; .03; .02 INJECTION, SOLUTION INTRAVENOUS at 14:05

## 2018-01-17 RX ADMIN — SODIUM CHLORIDE, SODIUM GLUCONATE, SODIUM ACETATE, POTASSIUM CHLORIDE, MAGNESIUM CHLORIDE, SODIUM PHOSPHATE, DIBASIC, AND POTASSIUM PHOSPHATE 125 ML/HR: .53; .5; .37; .037; .03; .012; .00082 INJECTION, SOLUTION INTRAVENOUS at 12:07

## 2018-01-17 RX ADMIN — MIDAZOLAM HYDROCHLORIDE 2 MG: 1 INJECTION, SOLUTION INTRAMUSCULAR; INTRAVENOUS at 14:09

## 2018-01-17 RX ADMIN — LORAZEPAM 0.5 MG: 2 INJECTION INTRAMUSCULAR; INTRAVENOUS at 16:40

## 2018-01-17 RX ADMIN — HYDROMORPHONE HYDROCHLORIDE 0.4 MG: 1 INJECTION, SOLUTION INTRAMUSCULAR; INTRAVENOUS; SUBCUTANEOUS at 16:50

## 2018-01-17 RX ADMIN — SUCRALFATE 1000 MG: 1 SUSPENSION ORAL at 22:54

## 2018-01-17 RX ADMIN — CEFAZOLIN SODIUM 2000 MG: 2 SOLUTION INTRAVENOUS at 09:06

## 2018-01-17 RX ADMIN — PROPOFOL 200 MG: 10 INJECTION, EMULSION INTRAVENOUS at 14:31

## 2018-01-17 RX ADMIN — HYDROMORPHONE HYDROCHLORIDE 1 MG: 1 INJECTION, SOLUTION INTRAMUSCULAR; INTRAVENOUS; SUBCUTANEOUS at 04:13

## 2018-01-17 RX ADMIN — HYDROMORPHONE HYDROCHLORIDE 1 MG: 2 INJECTION, SOLUTION INTRAMUSCULAR; INTRAVENOUS; SUBCUTANEOUS at 15:00

## 2018-01-17 RX ADMIN — FENTANYL CITRATE 50 MCG: 50 INJECTION INTRAMUSCULAR; INTRAVENOUS at 16:12

## 2018-01-17 RX ADMIN — CEFAZOLIN SODIUM 2000 MG: 2 SOLUTION INTRAVENOUS at 00:14

## 2018-01-17 RX ADMIN — LIDOCAINE HYDROCHLORIDE 100 MG: 10 INJECTION, SOLUTION INFILTRATION; PERINEURAL at 14:31

## 2018-01-17 RX ADMIN — DIAZEPAM 5 MG: 5 INJECTION, SOLUTION INTRAMUSCULAR; INTRAVENOUS at 07:38

## 2018-01-17 RX ADMIN — OXYCODONE HYDROCHLORIDE 10 MG: 5 TABLET ORAL at 17:35

## 2018-01-17 RX ADMIN — KETAMINE HYDROCHLORIDE 10 MG: 50 INJECTION, SOLUTION INTRAMUSCULAR; INTRAVENOUS at 15:44

## 2018-01-17 RX ADMIN — CLONAZEPAM 1 MG: 1 TABLET ORAL at 17:35

## 2018-01-17 RX ADMIN — LORAZEPAM 0.5 MG: 2 INJECTION INTRAMUSCULAR; INTRAVENOUS at 16:30

## 2018-01-17 RX ADMIN — SODIUM CHLORIDE, SODIUM GLUCONATE, SODIUM ACETATE, POTASSIUM CHLORIDE, MAGNESIUM CHLORIDE, SODIUM PHOSPHATE, DIBASIC, AND POTASSIUM PHOSPHATE 125 ML/HR: .53; .5; .37; .037; .03; .012; .00082 INJECTION, SOLUTION INTRAVENOUS at 04:23
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
## 2018-01-17 NOTE — PROGRESS NOTES
Patient unsatisfied with pain, demands more medication  States "If I don't get more medication, just kill me"  CCS notified, patient will be a 1:1 do to concern for suicidal intentions

## 2018-01-17 NOTE — INCIDENTAL FINDINGS
The following findings require follow up:  Radiographic finding   Finding: multiple pulmonary nodules    Follow up required: repeat CT scan recommended in 6-12 months for surveillance and PCP follow-up post hospitalization    Follow up should be done within with PCP and repeat imaging within 6-12 months     Please notify the following clinician to assist with the follow up:   Dr Tito Helton (PCP)  Pt aware of follow-up recommendations and repat imaging recommendations
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
## 2018-01-17 NOTE — ANESTHESIA POSTPROCEDURE EVALUATION
Post-Op Assessment Note      CV Status:  Stable    Mental Status:  Awake    Hydration Status:  Stable    PONV Controlled:  None    Airway Patency:  Patent    Post Op Vitals Reviewed: Yes          Staff: CRNA       Comments: Pt  to Pac[facility]  Report given  Course uneventful  VSS  Ketamine infusion continued thru OR to Pacu at 0 2 mg/kg/hr            /95 (01/17/18 1549)    Temp 98 8 °F (37 1 °C) (01/17/18 1549)    Pulse (!) 127 (01/17/18 1549)   Resp 20 (01/17/18 1549)    SpO2 98 % (01/17/18 1549)
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
## 2018-01-17 NOTE — ANESTHESIA PREPROCEDURE EVALUATION
Review of Systems/Medical History  Patient summary reviewed        Cardiovascular  Negative cardio ROS    Pulmonary  Negative pulmonary ROS        GI/Hepatic  Negative GI/hepatic ROS          Negative  ROS        Endo/Other  Negative endo/other ROS      GYN       Hematology   Musculoskeletal  Negative musculoskeletal ROS        Neurology  Negative neurology ROS      Psychology           Physical Exam    Airway    Mallampati score: II  TM Distance: <3 FB  Neck ROM: full     Dental   upper dentures and lower dentures,     Cardiovascular  Comment: Negative ROS, Rhythm: regular, Rate: normal, Cardiovascular exam normal    Pulmonary  Comment: Pt  Demonstrates inability to take deep breaths due rib/ chest trauma ,     Other Findings        Anesthesia Plan  ASA Score- 2     Anesthesia Type- general with ASA Monitors  Additional Monitors:   Airway Plan: ETT  Comment: NPO > 8 hrs  33 yowf presents for Surgery upon her right knee  PLAN: GETA with statandard ASA monitors  Consent obtained        Plan Factors- Patient instructed to abstain from smoking on day of procedure  Patient did not smoke on day of surgery  Induction- intravenous  Postoperative Plan- Plan for postoperative opioid use  Informed Consent- Anesthetic plan and risks discussed with patient
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
## 2018-01-17 NOTE — SOCIAL WORK
KATHRINE spoke to [facility] [phone] from [facility] and [facility]  They obtained the court order and has custody of the pt's child  [facility] allowed the pt's child to go home with Krysta Serafin last evening until the court order was obtained bc [facility] feels the pt's child will be placed for an extended period of time  Vernell faxed KATHRINE the court order, which CM will need to provide to pt  Medical Team requesting CM serve the paper to pt post-op  Pt and her fiancee also have a court hearing on Friday, in which they will participate via phone  This hearing is regarding long term placement of their child
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
## 2018-01-18 ENCOUNTER — APPOINTMENT (INPATIENT)
Dept: RADIOLOGY | Facility: HOSPITAL | Age: 33
DRG: 982 | End: 2018-01-18
Payer: COMMERCIAL

## 2018-01-18 LAB
ANION GAP SERPL CALCULATED.3IONS-SCNC: 7 MMOL/L (ref 4–13)
ANION GAP SERPL CALCULATED.3IONS-SCNC: 9 MMOL/L (ref 4–13)
BASOPHILS # BLD AUTO: 0.02 THOUSANDS/ΜL (ref 0–0.1)
BASOPHILS NFR BLD AUTO: 0 % (ref 0–1)
BUN SERPL-MCNC: 14 MG/DL (ref 5–25)
BUN SERPL-MCNC: 9 MG/DL (ref 5–25)
CALCIUM SERPL-MCNC: 8.3 MG/DL (ref 8.3–10.1)
CALCIUM SERPL-MCNC: 8.3 MG/DL (ref 8.3–10.1)
CHLORIDE SERPL-SCNC: 107 MMOL/L (ref 100–108)
CHLORIDE SERPL-SCNC: 108 MMOL/L (ref 100–108)
CO2 SERPL-SCNC: 22 MMOL/L (ref 21–32)
CO2 SERPL-SCNC: 23 MMOL/L (ref 21–32)
CREAT SERPL-MCNC: 0.64 MG/DL (ref 0.6–1.3)
CREAT SERPL-MCNC: 0.68 MG/DL (ref 0.6–1.3)
EOSINOPHIL # BLD AUTO: 0.09 THOUSAND/ΜL (ref 0–0.61)
EOSINOPHIL NFR BLD AUTO: 1 % (ref 0–6)
ERYTHROCYTE [DISTWIDTH] IN BLOOD BY AUTOMATED COUNT: 14.3 % (ref 11.6–15.1)
GFR SERPL CREATININE-BSD FRML MDRD: 117 ML/MIN/1.73SQ M
GFR SERPL CREATININE-BSD FRML MDRD: 119 ML/MIN/1.73SQ M
GLUCOSE SERPL-MCNC: 102 MG/DL (ref 65–140)
GLUCOSE SERPL-MCNC: 111 MG/DL (ref 65–140)
GLUCOSE SERPL-MCNC: 111 MG/DL (ref 65–140)
GLUCOSE SERPL-MCNC: 125 MG/DL (ref 65–140)
HCT VFR BLD AUTO: 31.6 % (ref 34.8–46.1)
HCT VFR BLD AUTO: 31.8 % (ref 34.8–46.1)
HCT VFR BLD AUTO: 32.5 % (ref 34.8–46.1)
HGB BLD-MCNC: 10.3 G/DL (ref 11.5–15.4)
HGB BLD-MCNC: 10.6 G/DL (ref 11.5–15.4)
HGB BLD-MCNC: 10.8 G/DL (ref 11.5–15.4)
LYMPHOCYTES # BLD AUTO: 1.85 THOUSANDS/ΜL (ref 0.6–4.47)
LYMPHOCYTES NFR BLD AUTO: 15 % (ref 14–44)
MAGNESIUM SERPL-MCNC: 2.4 MG/DL (ref 1.6–2.6)
MAGNESIUM SERPL-MCNC: 2.4 MG/DL (ref 1.6–2.6)
MCH RBC QN AUTO: 29.8 PG (ref 26.8–34.3)
MCHC RBC AUTO-ENTMCNC: 32.6 G/DL (ref 31.4–37.4)
MCV RBC AUTO: 91 FL (ref 82–98)
MONOCYTES # BLD AUTO: 1.2 THOUSAND/ΜL (ref 0.17–1.22)
MONOCYTES NFR BLD AUTO: 10 % (ref 4–12)
NEUTROPHILS # BLD AUTO: 9.02 THOUSANDS/ΜL (ref 1.85–7.62)
NEUTS SEG NFR BLD AUTO: 74 % (ref 43–75)
NRBC BLD AUTO-RTO: 0 /100 WBCS
PLATELET # BLD AUTO: 267 THOUSANDS/UL (ref 149–390)
PMV BLD AUTO: 10.5 FL (ref 8.9–12.7)
POTASSIUM SERPL-SCNC: 4 MMOL/L (ref 3.5–5.3)
POTASSIUM SERPL-SCNC: 4.5 MMOL/L (ref 3.5–5.3)
RBC # BLD AUTO: 3.46 MILLION/UL (ref 3.81–5.12)
SODIUM SERPL-SCNC: 137 MMOL/L (ref 136–145)
SODIUM SERPL-SCNC: 139 MMOL/L (ref 136–145)
WBC # BLD AUTO: 12.27 THOUSAND/UL (ref 4.31–10.16)

## 2018-01-18 PROCEDURE — 83735 ASSAY OF MAGNESIUM: CPT | Performed by: PHYSICIAN ASSISTANT

## 2018-01-18 PROCEDURE — 85018 HEMOGLOBIN: CPT | Performed by: PHYSICIAN ASSISTANT

## 2018-01-18 PROCEDURE — 82948 REAGENT STRIP/BLOOD GLUCOSE: CPT

## 2018-01-18 PROCEDURE — 85025 COMPLETE CBC W/AUTO DIFF WBC: CPT | Performed by: NURSE PRACTITIONER

## 2018-01-18 PROCEDURE — 80048 BASIC METABOLIC PNL TOTAL CA: CPT | Performed by: PHYSICIAN ASSISTANT

## 2018-01-18 PROCEDURE — 85014 HEMATOCRIT: CPT | Performed by: PHYSICIAN ASSISTANT

## 2018-01-18 PROCEDURE — 71045 X-RAY EXAM CHEST 1 VIEW: CPT

## 2018-01-18 PROCEDURE — 80048 BASIC METABOLIC PNL TOTAL CA: CPT | Performed by: NURSE PRACTITIONER

## 2018-01-18 RX ORDER — AMOXICILLIN 250 MG
1 CAPSULE ORAL
Status: DISCONTINUED | OUTPATIENT
Start: 2018-01-18 | End: 2018-01-23 | Stop reason: HOSPADM

## 2018-01-18 RX ORDER — METHADONE HYDROCHLORIDE 10 MG/ML
67 CONCENTRATE ORAL DAILY
Status: DISCONTINUED | OUTPATIENT
Start: 2018-01-18 | End: 2018-01-23 | Stop reason: HOSPADM

## 2018-01-18 RX ORDER — DIAZEPAM 5 MG/1
5 TABLET ORAL DAILY
COMMUNITY
End: 2018-02-07 | Stop reason: ALTCHOICE

## 2018-01-18 RX ORDER — FUROSEMIDE 10 MG/ML
20 INJECTION INTRAMUSCULAR; INTRAVENOUS ONCE
Status: COMPLETED | OUTPATIENT
Start: 2018-01-18 | End: 2018-01-18

## 2018-01-18 RX ORDER — FUROSEMIDE 10 MG/ML
20 INJECTION INTRAMUSCULAR; INTRAVENOUS ONCE
Status: DISCONTINUED | OUTPATIENT
Start: 2018-01-18 | End: 2018-01-18

## 2018-01-18 RX ORDER — CLONAZEPAM 1 MG/1
1 TABLET ORAL 2 TIMES DAILY
COMMUNITY
End: 2018-02-07 | Stop reason: SDUPTHER

## 2018-01-18 RX ORDER — ONDANSETRON 2 MG/ML
INJECTION INTRAMUSCULAR; INTRAVENOUS
Status: DISPENSED
Start: 2018-01-18 | End: 2018-01-19

## 2018-01-18 RX ORDER — ONDANSETRON 2 MG/ML
4 INJECTION INTRAMUSCULAR; INTRAVENOUS EVERY 4 HOURS PRN
Status: DISCONTINUED | OUTPATIENT
Start: 2018-01-18 | End: 2018-01-23 | Stop reason: HOSPADM

## 2018-01-18 RX ADMIN — LORAZEPAM 1 MG: 2 INJECTION INTRAMUSCULAR; INTRAVENOUS at 00:05

## 2018-01-18 RX ADMIN — CEFAZOLIN SODIUM 2000 MG: 2 SOLUTION INTRAVENOUS at 08:08

## 2018-01-18 RX ADMIN — ENOXAPARIN SODIUM 30 MG: 30 INJECTION SUBCUTANEOUS at 21:07

## 2018-01-18 RX ADMIN — LIDOCAINE 2 PATCH: 50 PATCH TOPICAL at 08:08

## 2018-01-18 RX ADMIN — KETAMINE HYDROCHLORIDE 0.2 MG/KG/HR: 50 INJECTION INTRAMUSCULAR; INTRAVENOUS at 05:45

## 2018-01-18 RX ADMIN — ONDANSETRON 4 MG: 2 INJECTION INTRAMUSCULAR; INTRAVENOUS at 15:25

## 2018-01-18 RX ADMIN — HYDROMORPHONE HYDROCHLORIDE 1 MG: 1 INJECTION, SOLUTION INTRAMUSCULAR; INTRAVENOUS; SUBCUTANEOUS at 02:11

## 2018-01-18 RX ADMIN — CLONAZEPAM 1 MG: 1 TABLET ORAL at 15:59

## 2018-01-18 RX ADMIN — OXYCODONE HYDROCHLORIDE 10 MG: 5 TABLET ORAL at 21:14

## 2018-01-18 RX ADMIN — ONDANSETRON 4 MG: 2 INJECTION INTRAMUSCULAR; INTRAVENOUS at 13:05

## 2018-01-18 RX ADMIN — CLONAZEPAM 1 MG: 1 TABLET ORAL at 21:07

## 2018-01-18 RX ADMIN — OXYCODONE HYDROCHLORIDE 10 MG: 5 TABLET ORAL at 03:30

## 2018-01-18 RX ADMIN — DIAZEPAM 5 MG: 5 TABLET ORAL at 08:08

## 2018-01-18 RX ADMIN — HYDROMORPHONE HYDROCHLORIDE 1 MG: 1 INJECTION, SOLUTION INTRAMUSCULAR; INTRAVENOUS; SUBCUTANEOUS at 10:05

## 2018-01-18 RX ADMIN — CEFAZOLIN SODIUM 2000 MG: 2 SOLUTION INTRAVENOUS at 00:06

## 2018-01-18 RX ADMIN — ENOXAPARIN SODIUM 30 MG: 30 INJECTION SUBCUTANEOUS at 11:32

## 2018-01-18 RX ADMIN — SODIUM CHLORIDE, SODIUM GLUCONATE, SODIUM ACETATE, POTASSIUM CHLORIDE, MAGNESIUM CHLORIDE, SODIUM PHOSPHATE, DIBASIC, AND POTASSIUM PHOSPHATE 75 ML/HR: .53; .5; .37; .037; .03; .012; .00082 INJECTION, SOLUTION INTRAVENOUS at 08:21

## 2018-01-18 RX ADMIN — ACETAMINOPHEN 975 MG: 325 TABLET, FILM COATED ORAL at 21:51

## 2018-01-18 RX ADMIN — FUROSEMIDE 20 MG: 10 INJECTION, SOLUTION INTRAMUSCULAR; INTRAVENOUS at 11:32

## 2018-01-18 RX ADMIN — METHADONE HYDROCHLORIDE 67 MG: 10 CONCENTRATE ORAL at 11:32

## 2018-01-18 RX ADMIN — CLONAZEPAM 1 MG: 1 TABLET ORAL at 08:08

## 2018-01-18 RX ADMIN — OXYCODONE HYDROCHLORIDE 10 MG: 5 TABLET ORAL at 08:07

## 2018-01-18 RX ADMIN — CEFAZOLIN SODIUM 2000 MG: 2 SOLUTION INTRAVENOUS at 20:55

## 2018-01-18 RX ADMIN — ONDANSETRON 4 MG: 2 INJECTION INTRAMUSCULAR; INTRAVENOUS at 21:50

## 2018-01-18 RX ADMIN — Medication 1 TABLET: at 21:07

## 2018-01-18 RX ADMIN — HYDROMORPHONE HYDROCHLORIDE 1 MG: 1 INJECTION, SOLUTION INTRAMUSCULAR; INTRAVENOUS; SUBCUTANEOUS at 06:19
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
## 2018-01-18 NOTE — OP NOTE
OPERATIVE REPORT  PATIENT NAMESeth Deal    :  1985  MRN: [id number]  Pt Location: BE OR ROOM 15    SURGERY DATE: 2018    Surgeon(s) and Role:     * Samreen Nassar MD - Mandi Aleman MD - Primary    Preop Diagnosis:  Type III open displaced comminuted fracture of right patella, initial encounter [S82 075C]    Post-Op Diagnosis Codes: * Type III open displaced comminuted fracture of right patella, initial encounter [S83 775Z]    Procedure(s) (LRB):  RIGHT KNEE DEBRIDEMENT; [address] OUT; AND LACERATION REPAIR  INTRAOPERATIVE ASSESSMENT OF PATELLA TENDON (Right)    Specimen(s):  * No specimens in log *    Estimated Blood Loss:   25 mL    Drains:  Urethral Catheter Temperature probe 16 Fr  (Active)   Amt returned on insertion(mL) 100 mL 2018  6:40 PM   Reasons to continue Urinary Catheter  Accurate I&O assessment in critically ill patients (48 hr  max) 2018 10:02 AM   Site Assessment Clean;Skin intact 2018  5:00 PM   Collection Container Standard drainage bag 2018  5:00 PM   Securement Method Securing device (Describe) 2018  5:00 PM   Output (mL) 100 mL 2018  5:00 PM   Number of days: 1       Anesthesia Type:   General    Operative Indications:  Right knee traumatic arthrotomy    Operative Findings:  Traumatic arthrotomy of the right knee with intact patella tendon  Complications:   None    Procedure and Technique:  After sterile preparation and draping of the right lower extremity in normal fashion identification of the 4 cm long by 4 cm wide laceration noted over the anterior medial aspect of the knee was performed laceration was noted to traverse down through the medial parapatellar retinaculum into the joint  Identification of necrotic tissue was then performed in the suprapatellar bursa and all necrotic tissue was removed   Following laceration into the articular surface was then slightly expanded so that better visualization any irrigation be possible  At this point identification and evaluation of the patella tendon and all other possible structures were identified and evaluated  The patella tendon was noted to be in normal fashion without any damage noted  With strong anchor points at both the tibial tubercle and the inferior pole the patella same goes for the MCL and medial meniscus  Following this copious irrigation with Pulsavac was performed using 3 L  No gross contaminant was noted to be removed during this process  After copious irrigation a 2nd visualization inspection was done on all structures including the patella tendon as well as necrotic tissue removal   Following 1  PDS was then used to close the joint capsule and traumatic arthrotomy  Following 2 0 PDS was used for subcutaneous inverted sutures  2 0 nylon were then placed in skin  Adaptic 4 x 4 ABD Webril and Ace with an awl used for dressings    Dr Nydia Simpson was available for the entire the case    Patient Disposition:  PACU     SIGNATURE: Rita Arias MD  DATE: January 17, 2018  TIME: 7:25 PM
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
## 2018-01-18 NOTE — SOCIAL WORK
CM met with pt this morning to inform her that [address] has custody of her child  I explained that there will be a courthearing tomorrow in which she can participate via phone

## 2018-01-18 NOTE — SOCIAL WORK
CM spoke to [facility] [phone] from [facility] and [place] o[place]   She would like the pt to participate via phone conference regarding the custody hearing
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
## 2018-01-18 NOTE — PROGRESS NOTES
Patient given to P9 RN and patient transferred with all of belongings  Patient mother updated in person at bedside prior to transfer   Ketamine drip continues, verified order with P9 RN Hemant Balderrama
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
## 2018-01-18 NOTE — PROGRESS NOTES
Orthopedics   Allen Demedio 33 y o  female MRN: [id number]  Unit/Bed#: ICU 6-01      Subjective:  33 y  o female post operative day 1 right right traumatic arthrotomy I and D complaining of continued pain in right knee  Complaining of chest pain this morning  Denies numbness or tingling      Labs:    0  Lab Value Date/Time   HCT 31 6 (L) 01/18/2018 0502   HCT 31 8 (L) 01/18/2018 0017   HCT 33 2 (L) 01/17/2018 1831   HGB 10 3 (L) 01/18/2018 0502   HGB 10 6 (L) 01/18/2018 0017   HGB 11 1 (L) 01/17/2018 1831   INR 1 12 01/17/2018 0420   WBC 12 27 (H) 01/18/2018 0502   WBC 12 02 (H) 01/17/2018 1831   WBC 8 30 01/17/2018 0448       Meds:    Current Facility-Administered Medications:     acetaminophen (TYLENOL) tablet 975 mg, 975 mg, Oral, Q8H PRN, Gwen Cabral MD, 975 mg at 01/17/18 2039    albuterol inhalation solution 2 5 mg, 2 5 mg, Nebulization, Q4H PRN, Geronimo Srivastava MD, 2 5 mg at 01/17/18 4320    calcium carbonate (TUMS) chewable tablet 500 mg, 500 mg, Oral, Daily PRN, Wiliam Stephen MD, 500 mg at 01/17/18 2039    ceFAZolin (ANCEF) IVPB (premix) 2,000 mg, 2,000 mg, Intravenous, Q8H, CHRISTOPHER Wiggins, Stopped at 01/18/18 0036    clonazePAM (KlonoPIN) tablet 1 mg, 1 mg, Oral, TID, Gwen Cabral MD, 1 mg at 01/17/18 2223    diazepam (VALIUM) tablet 5 mg, 5 mg, Oral, Daily, Gwen Cabral MD    glycopyrrolate (ROBINUL) injection 0 2 mg, 0 2 mg, Intravenous, Q4H PRN, Gary Boothe MD    HYDROmorphone (DILAUDID) injection 1 mg, 1 mg, Intravenous, Q4H PRN, Gwen Cabral MD, 1 mg at 01/18/18 1187    ketamine 250 mg (STANDARD CONCENTRATION) IV in sodium chloride 0 9% 250 mL, 0 2 mg/kg/hr (Adjusted), Intravenous, Continuous, Ashia Chang MD, Last Rate: 13 6 mL/hr at 01/18/18 0545, 0 2 mg/kg/hr at 01/18/18 0545    lidocaine (LIDODERM) 5 % patch 2 patch, 2 patch, Transdermal, Daily, CHRISTOPHER Wiggins, 2 patch at 01/17/18 0905    multi-electrolyte (ISOLYTE-S PH 7 4 equivalent) IV solution, 125 mL/hr, Intravenous, Continuous, CHRISTOPHER Galvez, Last Rate: 125 mL/hr at 01/17/18 1207, 125 mL/hr at 01/17/18 1207    nicotine (NICODERM CQ) 21 mg/24 hr TD 24 hr patch 1 patch, 1 patch, Transdermal, Daily, Tra Castillo MD    oxyCODONE (ROXICODONE) IR tablet 10 mg, 10 mg, Oral, Q4H PRN, Jackie Cerna MD, 10 mg at 01/18/18 0330    potassium chloride (K-DUR,KLOR-CON) CR tablet 40 mEq, 40 mEq, Oral, Once, Michael Love MD    sucralfate (CARAFATE) oral suspension 1,000 mg, 1,000 mg, Oral, Q6H PRN, Michael Love MD, 1,000 mg at 01/17/18 2254    Blood Culture:   No results found for: BLOODCX    Wound Culture:   No results found for: WOUNDCULT    Ins and Outs:  I/O last 24 hours: In: 6990 3 [P O :480; I V :5810 3; Blood:350; IV Piggyback:350]  Out: 8600 [Urine:4600; Blood:25]          Physical Exam:  Vitals:    01/18/18 0600   BP: 150/81   Pulse: (!) 110   Resp: (!) 24   Temp: 100 4 °F (38 °C)   SpO2: 94%     right Upper Extremity extremity:  · Dressings C/D/I  · SILT s/s/sp/dp/t distally  · Motor intact ehl/fhl, ankle df/pfj  · Patient refusing to straight leg raise 2/2 to rib pain  · Toes WWP    _*_*_*_*_*_*_*_*_*_*_*_*_*_*_*_*_*_*_*_*_*_*_*_*_*_*_*_*_*_*_*_*_*_*_*_*_*_*_*_*_*    Assessment: 33 y  o female post operative day 1 right traumatic arthrotomy I an D with continued pain    Plan:  · WBAT RLE  · Antibiotics for 24 hrs post op  · DVT prophylaxis per primary  · Analgesics  · PT/OT  · Dispo: Ortho will follow  · Will continue to assess for acute blood loss anemia    Bony Castillo MD

## 2018-01-18 NOTE — PLAN OF CARE
Problem: DISCHARGE PLANNING - CARE MANAGEMENT  Goal: Discharge to post-acute care or home with appropriate resources  INTERVENTIONS:  - Conduct assessment to determine patient/family and health care team treatment goals, and need for post-acute services based on payer coverage, community resources, and patient preferences, and barriers to discharge  - Address psychosocial, clinical, and financial barriers to discharge as identified in assessment in conjunction with the patient/family and health care team  - Arrange appropriate level of post-acute services according to patient's   needs and preference and payer coverage in collaboration with the physician and health care team  - Communicate with and update the patient/family, physician, and health care team regarding progress on the discharge plan  - Arrange appropriate transportation to post-acute venues  - Patient to be evaluated  Outcome: Progressing

## 2018-01-19 PROBLEM — N17.9 ACUTE KIDNEY INJURY (HCC): Status: ACTIVE | Noted: 2018-01-19

## 2018-01-19 LAB
ANION GAP SERPL CALCULATED.3IONS-SCNC: 13 MMOL/L (ref 4–13)
ANION GAP SERPL CALCULATED.3IONS-SCNC: 8 MMOL/L (ref 4–13)
BASOPHILS # BLD AUTO: 0.01 THOUSANDS/ΜL (ref 0–0.1)
BASOPHILS # BLD AUTO: 0.02 THOUSANDS/ΜL (ref 0–0.1)
BASOPHILS NFR BLD AUTO: 0 % (ref 0–1)
BASOPHILS NFR BLD AUTO: 0 % (ref 0–1)
BUN SERPL-MCNC: 16 MG/DL (ref 5–25)
BUN SERPL-MCNC: 18 MG/DL (ref 5–25)
CALCIUM SERPL-MCNC: 7.9 MG/DL (ref 8.3–10.1)
CALCIUM SERPL-MCNC: 8.7 MG/DL (ref 8.3–10.1)
CHLORIDE SERPL-SCNC: 108 MMOL/L (ref 100–108)
CHLORIDE SERPL-SCNC: 99 MMOL/L (ref 100–108)
CO2 SERPL-SCNC: 23 MMOL/L (ref 21–32)
CO2 SERPL-SCNC: 24 MMOL/L (ref 21–32)
CREAT SERPL-MCNC: 0.69 MG/DL (ref 0.6–1.3)
CREAT SERPL-MCNC: 2.18 MG/DL (ref 0.6–1.3)
EOSINOPHIL # BLD AUTO: 0.04 THOUSAND/ΜL (ref 0–0.61)
EOSINOPHIL # BLD AUTO: 0.08 THOUSAND/ΜL (ref 0–0.61)
EOSINOPHIL NFR BLD AUTO: 0 % (ref 0–6)
EOSINOPHIL NFR BLD AUTO: 1 % (ref 0–6)
ERYTHROCYTE [DISTWIDTH] IN BLOOD BY AUTOMATED COUNT: 14.4 % (ref 11.6–15.1)
ERYTHROCYTE [DISTWIDTH] IN BLOOD BY AUTOMATED COUNT: 14.4 % (ref 11.6–15.1)
GFR SERPL CREATININE-BSD FRML MDRD: 116 ML/MIN/1.73SQ M
GFR SERPL CREATININE-BSD FRML MDRD: 29 ML/MIN/1.73SQ M
GLUCOSE SERPL-MCNC: 113 MG/DL (ref 65–140)
GLUCOSE SERPL-MCNC: 257 MG/DL (ref 65–140)
GLUCOSE SERPL-MCNC: 74 MG/DL (ref 65–140)
GLUCOSE SERPL-MCNC: 75 MG/DL (ref 65–140)
HCT VFR BLD AUTO: 27.4 % (ref 34.8–46.1)
HCT VFR BLD AUTO: 33.2 % (ref 34.8–46.1)
HGB BLD-MCNC: 11 G/DL (ref 11.5–15.4)
HGB BLD-MCNC: 9 G/DL (ref 11.5–15.4)
LYMPHOCYTES # BLD AUTO: 1.5 THOUSANDS/ΜL (ref 0.6–4.47)
LYMPHOCYTES # BLD AUTO: 1.74 THOUSANDS/ΜL (ref 0.6–4.47)
LYMPHOCYTES NFR BLD AUTO: 12 % (ref 14–44)
LYMPHOCYTES NFR BLD AUTO: 13 % (ref 14–44)
MCH RBC QN AUTO: 30.3 PG (ref 26.8–34.3)
MCH RBC QN AUTO: 30.3 PG (ref 26.8–34.3)
MCHC RBC AUTO-ENTMCNC: 32.8 G/DL (ref 31.4–37.4)
MCHC RBC AUTO-ENTMCNC: 33.1 G/DL (ref 31.4–37.4)
MCV RBC AUTO: 92 FL (ref 82–98)
MCV RBC AUTO: 92 FL (ref 82–98)
MONOCYTES # BLD AUTO: 0.91 THOUSAND/ΜL (ref 0.17–1.22)
MONOCYTES # BLD AUTO: 1.14 THOUSAND/ΜL (ref 0.17–1.22)
MONOCYTES NFR BLD AUTO: 8 % (ref 4–12)
MONOCYTES NFR BLD AUTO: 8 % (ref 4–12)
NEUTROPHILS # BLD AUTO: 11.94 THOUSANDS/ΜL (ref 1.85–7.62)
NEUTROPHILS # BLD AUTO: 9.25 THOUSANDS/ΜL (ref 1.85–7.62)
NEUTS SEG NFR BLD AUTO: 79 % (ref 43–75)
NEUTS SEG NFR BLD AUTO: 79 % (ref 43–75)
NRBC BLD AUTO-RTO: 0 /100 WBCS
NRBC BLD AUTO-RTO: 1 /100 WBCS
PLATELET # BLD AUTO: 247 THOUSANDS/UL (ref 149–390)
PLATELET # BLD AUTO: 292 THOUSANDS/UL (ref 149–390)
PMV BLD AUTO: 10.3 FL (ref 8.9–12.7)
PMV BLD AUTO: 10.5 FL (ref 8.9–12.7)
POTASSIUM SERPL-SCNC: 3.2 MMOL/L (ref 3.5–5.3)
POTASSIUM SERPL-SCNC: 3.4 MMOL/L (ref 3.5–5.3)
RBC # BLD AUTO: 2.97 MILLION/UL (ref 3.81–5.12)
RBC # BLD AUTO: 3.63 MILLION/UL (ref 3.81–5.12)
SODIUM SERPL-SCNC: 135 MMOL/L (ref 136–145)
SODIUM SERPL-SCNC: 140 MMOL/L (ref 136–145)
WBC # BLD AUTO: 11.81 THOUSAND/UL (ref 4.31–10.16)
WBC # BLD AUTO: 15.12 THOUSAND/UL (ref 4.31–10.16)

## 2018-01-19 PROCEDURE — 94668 MNPJ CHEST WALL SBSQ: CPT

## 2018-01-19 PROCEDURE — 94760 N-INVAS EAR/PLS OXIMETRY 1: CPT

## 2018-01-19 PROCEDURE — 82948 REAGENT STRIP/BLOOD GLUCOSE: CPT

## 2018-01-19 PROCEDURE — G8988 SELF CARE GOAL STATUS: HCPCS

## 2018-01-19 PROCEDURE — 85025 COMPLETE CBC W/AUTO DIFF WBC: CPT | Performed by: PHYSICIAN ASSISTANT

## 2018-01-19 PROCEDURE — 97167 OT EVAL HIGH COMPLEX 60 MIN: CPT

## 2018-01-19 PROCEDURE — 85025 COMPLETE CBC W/AUTO DIFF WBC: CPT | Performed by: SURGERY

## 2018-01-19 PROCEDURE — 80048 BASIC METABOLIC PNL TOTAL CA: CPT | Performed by: SURGERY

## 2018-01-19 PROCEDURE — G8978 MOBILITY CURRENT STATUS: HCPCS

## 2018-01-19 PROCEDURE — 97163 PT EVAL HIGH COMPLEX 45 MIN: CPT

## 2018-01-19 PROCEDURE — 94640 AIRWAY INHALATION TREATMENT: CPT

## 2018-01-19 PROCEDURE — G8987 SELF CARE CURRENT STATUS: HCPCS

## 2018-01-19 PROCEDURE — G8979 MOBILITY GOAL STATUS: HCPCS

## 2018-01-19 PROCEDURE — 80048 BASIC METABOLIC PNL TOTAL CA: CPT | Performed by: PHYSICIAN ASSISTANT

## 2018-01-19 RX ORDER — POTASSIUM CHLORIDE 20 MEQ/1
20 TABLET, EXTENDED RELEASE ORAL ONCE
Status: COMPLETED | OUTPATIENT
Start: 2018-01-19 | End: 2018-01-19

## 2018-01-19 RX ORDER — ONDANSETRON 2 MG/ML
4 INJECTION INTRAMUSCULAR; INTRAVENOUS ONCE
Status: COMPLETED | OUTPATIENT
Start: 2018-01-19 | End: 2018-01-19

## 2018-01-19 RX ORDER — SODIUM CHLORIDE, SODIUM GLUCONATE, SODIUM ACETATE, POTASSIUM CHLORIDE, MAGNESIUM CHLORIDE, SODIUM PHOSPHATE, DIBASIC, AND POTASSIUM PHOSPHATE .53; .5; .37; .037; .03; .012; .00082 G/100ML; G/100ML; G/100ML; G/100ML; G/100ML; G/100ML; G/100ML
100 INJECTION, SOLUTION INTRAVENOUS CONTINUOUS
Status: DISCONTINUED | OUTPATIENT
Start: 2018-01-19 | End: 2018-01-19

## 2018-01-19 RX ADMIN — HYDROMORPHONE HYDROCHLORIDE 1 MG: 1 INJECTION, SOLUTION INTRAMUSCULAR; INTRAVENOUS; SUBCUTANEOUS at 10:37

## 2018-01-19 RX ADMIN — ENOXAPARIN SODIUM 30 MG: 30 INJECTION SUBCUTANEOUS at 08:55

## 2018-01-19 RX ADMIN — ONDANSETRON 4 MG: 2 INJECTION INTRAMUSCULAR; INTRAVENOUS at 13:19

## 2018-01-19 RX ADMIN — Medication 500 MG: at 23:54

## 2018-01-19 RX ADMIN — OXYCODONE HYDROCHLORIDE 10 MG: 5 TABLET ORAL at 06:12

## 2018-01-19 RX ADMIN — ONDANSETRON 4 MG: 2 INJECTION INTRAMUSCULAR; INTRAVENOUS at 18:14

## 2018-01-19 RX ADMIN — OXYCODONE HYDROCHLORIDE 10 MG: 5 TABLET ORAL at 12:51

## 2018-01-19 RX ADMIN — DIAZEPAM 5 MG: 5 TABLET ORAL at 08:56

## 2018-01-19 RX ADMIN — KETAMINE HYDROCHLORIDE 0.1 MG/KG/HR: 50 INJECTION, SOLUTION INTRAMUSCULAR; INTRAVENOUS at 16:59

## 2018-01-19 RX ADMIN — KETAMINE HYDROCHLORIDE 0.2 MG/KG/HR: 50 INJECTION INTRAMUSCULAR; INTRAVENOUS at 01:39

## 2018-01-19 RX ADMIN — ALBUTEROL SULFATE 2.5 MG: 2.5 SOLUTION RESPIRATORY (INHALATION) at 17:21

## 2018-01-19 RX ADMIN — OXYCODONE HYDROCHLORIDE 10 MG: 5 TABLET ORAL at 23:54

## 2018-01-19 RX ADMIN — CLONAZEPAM 1 MG: 1 TABLET ORAL at 17:00

## 2018-01-19 RX ADMIN — CLONAZEPAM 1 MG: 1 TABLET ORAL at 21:30

## 2018-01-19 RX ADMIN — METHADONE HYDROCHLORIDE 67 MG: 10 CONCENTRATE ORAL at 11:28

## 2018-01-19 RX ADMIN — Medication 500 MG: at 03:54

## 2018-01-19 RX ADMIN — ALBUTEROL SULFATE 2.5 MG: 2.5 SOLUTION RESPIRATORY (INHALATION) at 07:40

## 2018-01-19 RX ADMIN — SUCRALFATE 1000 MG: 1 SUSPENSION ORAL at 06:12

## 2018-01-19 RX ADMIN — SODIUM CHLORIDE, SODIUM GLUCONATE, SODIUM ACETATE, POTASSIUM CHLORIDE, MAGNESIUM CHLORIDE, SODIUM PHOSPHATE, DIBASIC, AND POTASSIUM PHOSPHATE 100 ML/HR: .53; .5; .37; .037; .03; .012; .00082 INJECTION, SOLUTION INTRAVENOUS at 13:27

## 2018-01-19 RX ADMIN — HYDROMORPHONE HYDROCHLORIDE 1 MG: 1 INJECTION, SOLUTION INTRAMUSCULAR; INTRAVENOUS; SUBCUTANEOUS at 20:51

## 2018-01-19 RX ADMIN — ENOXAPARIN SODIUM 30 MG: 30 INJECTION SUBCUTANEOUS at 20:51

## 2018-01-19 RX ADMIN — CEFAZOLIN SODIUM 2000 MG: 2 SOLUTION INTRAVENOUS at 05:17

## 2018-01-19 RX ADMIN — OXYCODONE HYDROCHLORIDE 10 MG: 5 TABLET ORAL at 16:57

## 2018-01-19 RX ADMIN — ONDANSETRON 4 MG: 2 INJECTION INTRAMUSCULAR; INTRAVENOUS at 04:08

## 2018-01-19 RX ADMIN — Medication 500 MG: at 11:30

## 2018-01-19 RX ADMIN — POTASSIUM CHLORIDE 20 MEQ: 1500 TABLET, EXTENDED RELEASE ORAL at 08:56

## 2018-01-19 RX ADMIN — CLONAZEPAM 1 MG: 1 TABLET ORAL at 08:56

## 2018-01-19 RX ADMIN — ONDANSETRON 4 MG: 2 INJECTION INTRAMUSCULAR; INTRAVENOUS at 01:40

## 2018-01-19 RX ADMIN — ONDANSETRON 4 MG: 2 INJECTION INTRAMUSCULAR; INTRAVENOUS at 08:57

## 2018-01-19 RX ADMIN — LIDOCAINE 2 PATCH: 50 PATCH TOPICAL at 08:56

## 2018-01-19 NOTE — PROGRESS NOTES
Progress Note Kelly Miller 1985, 33 y o  female MRN: [id number]    Unit/Bed#: [facility] 921-01 Encounter: [id number]    Primary Care Provider: No primary care provider on file  Date and time admitted to hospital: 1/16/2018  3:32 PM        Acute kidney injury [facility])   Assessment & Plan    Likely to be multifactorial secondary to dye load of CT scan and arteriogram, as well as lasix   Hold nephrogeic medications   Will restart IVF  No toradol         Open displaced comminuted fracture of right patella, type IIIA, IIIB, or IIIC   Assessment & Plan    S/P operative repair by ortho 1/17   Compartments soft   Hemoglobin dropped this am   Continue to observe  NWB  Knee immobilizer in place   Distal neurovascularly intact         Type III open nondisplaced comminuted fracture of right patella   Assessment & Plan    S/P operative repair with ortho on 1/17   NWB          Hemoperitoneum   Assessment & Plan    Hemoglobin 9 0 from 10 8   Restarted on dvt pharmacologic therapy yesterday   Will recheck at noon         Methadone use   Assessment & Plan    Currently on home dose of methadone        Heroin abuse   Assessment & Plan    Acute pain following   On methadone        Multiple fractures of ribs, bilateral, initial encounter for closed fracture   Assessment & Plan    Most recent CXR reviewed  No evidence of pneumothorax   Evolving right lower lobe infiltrate noted   Continue to encourage IS and pulm toilet         * Spleen laceration   Assessment & Plan    S/P splenic embolization   Vaccinations ordered not yet given   Will confirm that vaccinations are given   Hemoglobin dropped today   Will check noon hemoglobin   Abdomen soft, benign   Continue regular diet                     Subjective/Objective   Chief Complaint:     Subjective: no overnight events, no nausea/vomiting   Still complaining of pain     Objective:     Blood pressure 143/74, pulse 105, temperature 99 4 °F (37 4 °C), temperature source Oral, resp  rate 18, height 5' 4" (1 626 m), weight 87 8 kg (193 lb 9 oz), SpO2 92 %  ,Body mass index is 33 23 kg/m²  Intake/Output Summary (Last 24 hours) at 01/19/18 1006  Last data filed at 01/19/18 0900   Gross per 24 hour   Intake            986 9 ml   Output              910 ml   Net             76 9 ml       Invasive Devices     Peripheral Intravenous Line            Peripheral IV 01/18/18 Left Antecubital less than 1 day    Peripheral IV 01/18/18 Right Arm less than 1 day                Physical Exam: General: AAOx3  Respiratory: BS b/l  Abdomen: Soft, NT, no rebound/guarding  Heart: RRR, S1s2  Ext: Warm no cyanosis   RLE sensation and movement intact distally   Palpable DP and PT pulses       Lab, Imaging and other studies:I have personally reviewed pertinent lab results      VTE Pharmacologic Prophylaxis: Sequential compression device (Venodyne)   VTE Mechanical Prophylaxis: sequential compression device  lovenox

## 2018-01-19 NOTE — ASSESSMENT & PLAN NOTE
S/P operative repair by ortho 1/17   Compartments soft   Hemoglobin dropped this am   Continue to observe  NWB  Knee immobilizer in place   Distal neurovascularly intact

## 2018-01-19 NOTE — SOCIAL WORK
CM assisted pt in participating in a court hearing regarding the custody of her child  Pt's child is in the custody of her mother in law as per [facility] and [place]

## 2018-01-19 NOTE — ASSESSMENT & PLAN NOTE
S/P splenic embolization   Vaccinations ordered not yet given   Will confirm that vaccinations are given   Hemoglobin dropped today   Will check noon hemoglobin   Abdomen soft, benign   Continue regular diet

## 2018-01-19 NOTE — ASSESSMENT & PLAN NOTE
Most recent CXR reviewed  No evidence of pneumothorax   Evolving right lower lobe infiltrate noted   Continue to encourage IS and pulm toilet
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
## 2018-01-19 NOTE — PHYSICAL THERAPY NOTE
PT Note    PT eval attempted, pt reports she was OOB for 1/2 hr, now declines, agreeable for PT in an hr; PT will f/u

## 2018-01-19 NOTE — ASSESSMENT & PLAN NOTE
Likely to be multifactorial secondary to dye load of CT scan and arteriogram, as well as lasix   Hold nephrogeic medications   Will restart IVF  No toradol
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
## 2018-01-20 LAB
ABO GROUP BLD BPU: NORMAL
ABO GROUP BLD BPU: NORMAL
ANION GAP SERPL CALCULATED.3IONS-SCNC: 7 MMOL/L (ref 4–13)
BASOPHILS # BLD AUTO: 0.02 THOUSANDS/ΜL (ref 0–0.1)
BASOPHILS NFR BLD AUTO: 0 % (ref 0–1)
BPU ID: NORMAL
BPU ID: NORMAL
BUN SERPL-MCNC: 17 MG/DL (ref 5–25)
CALCIUM SERPL-MCNC: 8.6 MG/DL (ref 8.3–10.1)
CHLORIDE SERPL-SCNC: 107 MMOL/L (ref 100–108)
CO2 SERPL-SCNC: 27 MMOL/L (ref 21–32)
CREAT SERPL-MCNC: 0.63 MG/DL (ref 0.6–1.3)
CROSSMATCH: NORMAL
CROSSMATCH: NORMAL
EOSINOPHIL # BLD AUTO: 0.37 THOUSAND/ΜL (ref 0–0.61)
EOSINOPHIL NFR BLD AUTO: 3 % (ref 0–6)
ERYTHROCYTE [DISTWIDTH] IN BLOOD BY AUTOMATED COUNT: 14.8 % (ref 11.6–15.1)
GFR SERPL CREATININE-BSD FRML MDRD: 120 ML/MIN/1.73SQ M
GLUCOSE SERPL-MCNC: 103 MG/DL (ref 65–140)
GLUCOSE SERPL-MCNC: 110 MG/DL (ref 65–140)
GLUCOSE SERPL-MCNC: 71 MG/DL (ref 65–140)
GLUCOSE SERPL-MCNC: 83 MG/DL (ref 65–140)
HCT VFR BLD AUTO: 32.4 % (ref 34.8–46.1)
HGB BLD-MCNC: 10.5 G/DL (ref 11.5–15.4)
LYMPHOCYTES # BLD AUTO: 2.33 THOUSANDS/ΜL (ref 0.6–4.47)
LYMPHOCYTES NFR BLD AUTO: 21 % (ref 14–44)
MCH RBC QN AUTO: 30.2 PG (ref 26.8–34.3)
MCHC RBC AUTO-ENTMCNC: 32.4 G/DL (ref 31.4–37.4)
MCV RBC AUTO: 93 FL (ref 82–98)
MONOCYTES # BLD AUTO: 0.75 THOUSAND/ΜL (ref 0.17–1.22)
MONOCYTES NFR BLD AUTO: 7 % (ref 4–12)
NEUTROPHILS # BLD AUTO: 7.64 THOUSANDS/ΜL (ref 1.85–7.62)
NEUTS SEG NFR BLD AUTO: 69 % (ref 43–75)
NRBC BLD AUTO-RTO: 0 /100 WBCS
PLATELET # BLD AUTO: 300 THOUSANDS/UL (ref 149–390)
PMV BLD AUTO: 10.6 FL (ref 8.9–12.7)
POTASSIUM SERPL-SCNC: 3.1 MMOL/L (ref 3.5–5.3)
RBC # BLD AUTO: 3.48 MILLION/UL (ref 3.81–5.12)
SODIUM SERPL-SCNC: 141 MMOL/L (ref 136–145)
UNIT DISPENSE STATUS: NORMAL
UNIT DISPENSE STATUS: NORMAL
UNIT PRODUCT CODE: NORMAL
UNIT PRODUCT CODE: NORMAL
UNIT RH: NORMAL
UNIT RH: NORMAL
WBC # BLD AUTO: 11.26 THOUSAND/UL (ref 4.31–10.16)

## 2018-01-20 PROCEDURE — 94668 MNPJ CHEST WALL SBSQ: CPT

## 2018-01-20 PROCEDURE — 94760 N-INVAS EAR/PLS OXIMETRY 1: CPT

## 2018-01-20 PROCEDURE — 94640 AIRWAY INHALATION TREATMENT: CPT

## 2018-01-20 PROCEDURE — 97530 THERAPEUTIC ACTIVITIES: CPT

## 2018-01-20 PROCEDURE — 85025 COMPLETE CBC W/AUTO DIFF WBC: CPT | Performed by: SURGERY

## 2018-01-20 PROCEDURE — 82948 REAGENT STRIP/BLOOD GLUCOSE: CPT

## 2018-01-20 PROCEDURE — 80048 BASIC METABOLIC PNL TOTAL CA: CPT | Performed by: SURGERY

## 2018-01-20 PROCEDURE — 97116 GAIT TRAINING THERAPY: CPT

## 2018-01-20 RX ORDER — ALBUTEROL SULFATE 90 UG/1
2 AEROSOL, METERED RESPIRATORY (INHALATION)
Status: DISCONTINUED | OUTPATIENT
Start: 2018-01-20 | End: 2018-01-22

## 2018-01-20 RX ORDER — POTASSIUM CHLORIDE 20 MEQ/1
40 TABLET, EXTENDED RELEASE ORAL 2 TIMES DAILY
Status: DISPENSED | OUTPATIENT
Start: 2018-01-20 | End: 2018-01-21

## 2018-01-20 RX ADMIN — HYDROMORPHONE HYDROCHLORIDE 1 MG: 1 INJECTION, SOLUTION INTRAMUSCULAR; INTRAVENOUS; SUBCUTANEOUS at 06:06

## 2018-01-20 RX ADMIN — HYDROMORPHONE HYDROCHLORIDE 1 MG: 1 INJECTION, SOLUTION INTRAMUSCULAR; INTRAVENOUS; SUBCUTANEOUS at 16:12

## 2018-01-20 RX ADMIN — LIDOCAINE 2 PATCH: 50 PATCH TOPICAL at 09:21

## 2018-01-20 RX ADMIN — ONDANSETRON 4 MG: 2 INJECTION INTRAMUSCULAR; INTRAVENOUS at 16:12

## 2018-01-20 RX ADMIN — CLONAZEPAM 1 MG: 1 TABLET ORAL at 09:21

## 2018-01-20 RX ADMIN — OXYCODONE HYDROCHLORIDE 10 MG: 5 TABLET ORAL at 09:24

## 2018-01-20 RX ADMIN — ALBUTEROL SULFATE 2.5 MG: 2.5 SOLUTION RESPIRATORY (INHALATION) at 06:14

## 2018-01-20 RX ADMIN — ALBUTEROL SULFATE 2 PUFF: 90 AEROSOL, METERED RESPIRATORY (INHALATION) at 11:56

## 2018-01-20 RX ADMIN — CLONAZEPAM 1 MG: 1 TABLET ORAL at 22:37

## 2018-01-20 RX ADMIN — OXYCODONE HYDROCHLORIDE 10 MG: 5 TABLET ORAL at 22:38

## 2018-01-20 RX ADMIN — ALBUTEROL SULFATE 2 PUFF: 90 AEROSOL, METERED RESPIRATORY (INHALATION) at 22:38

## 2018-01-20 RX ADMIN — ALBUTEROL SULFATE 2 PUFF: 90 AEROSOL, METERED RESPIRATORY (INHALATION) at 16:43

## 2018-01-20 RX ADMIN — KETAMINE HYDROCHLORIDE 0.1 MG/KG/HR: 50 INJECTION, SOLUTION INTRAMUSCULAR; INTRAVENOUS at 01:28

## 2018-01-20 RX ADMIN — Medication 500 MG: at 22:49

## 2018-01-20 RX ADMIN — HYDROMORPHONE HYDROCHLORIDE 1 MG: 1 INJECTION, SOLUTION INTRAMUSCULAR; INTRAVENOUS; SUBCUTANEOUS at 11:56

## 2018-01-20 RX ADMIN — OXYCODONE HYDROCHLORIDE 10 MG: 5 TABLET ORAL at 18:28

## 2018-01-20 RX ADMIN — OXYCODONE HYDROCHLORIDE 10 MG: 5 TABLET ORAL at 05:14

## 2018-01-20 RX ADMIN — ENOXAPARIN SODIUM 30 MG: 30 INJECTION SUBCUTANEOUS at 09:20

## 2018-01-20 RX ADMIN — ONDANSETRON 4 MG: 2 INJECTION INTRAMUSCULAR; INTRAVENOUS at 05:14

## 2018-01-20 RX ADMIN — HYDROMORPHONE HYDROCHLORIDE 1 MG: 1 INJECTION, SOLUTION INTRAMUSCULAR; INTRAVENOUS; SUBCUTANEOUS at 20:43

## 2018-01-20 RX ADMIN — HYDROMORPHONE HYDROCHLORIDE 1 MG: 1 INJECTION, SOLUTION INTRAMUSCULAR; INTRAVENOUS; SUBCUTANEOUS at 01:22

## 2018-01-20 RX ADMIN — Medication 1 TABLET: at 22:37

## 2018-01-20 RX ADMIN — OXYCODONE HYDROCHLORIDE 10 MG: 5 TABLET ORAL at 14:22

## 2018-01-20 RX ADMIN — METHADONE HYDROCHLORIDE 67 MG: 10 CONCENTRATE ORAL at 09:20

## 2018-01-20 RX ADMIN — DIAZEPAM 5 MG: 5 TABLET ORAL at 09:21

## 2018-01-20 RX ADMIN — CLONAZEPAM 1 MG: 1 TABLET ORAL at 16:12

## 2018-01-20 RX ADMIN — ONDANSETRON 4 MG: 2 INJECTION INTRAMUSCULAR; INTRAVENOUS at 09:24

## 2018-01-20 RX ADMIN — ALBUTEROL SULFATE 2.5 MG: 2.5 SOLUTION RESPIRATORY (INHALATION) at 20:57

## 2018-01-20 RX ADMIN — POTASSIUM CHLORIDE 40 MEQ: 1500 TABLET, EXTENDED RELEASE ORAL at 18:28

## 2018-01-20 NOTE — PROGRESS NOTES
Progress Note Nicki Floor 1985, 33 y o  female MRN: [id number]    Unit/Bed#: [facility] 921-01 Encounter: [id number]    Primary Care Provider: No primary care provider on file  Date and time admitted to hospital: 1/16/2018  3:32 PM        Acute kidney injury [facility])   Assessment & Plan    - Likely to be multifactorial secondary to dye load of CT scan and arteriogram, as well as lasix   - Hold nephrogeic medications   - Will restart IVF  - No toradol   - creatinine is stable        Open displaced comminuted fracture of right patella, type IIIA, IIIB, or IIIC   Assessment & Plan    S/P operative repair by ortho 1/17   Compartments soft   Hemoglobin stable   Continue to observe  NWB  Knee immobilizer in place   Distal neurovascularly intact         Type III open nondisplaced comminuted fracture of right patella   Assessment & Plan    - S/P operative repair with ortho on 1/17   - NWB  - continue neurovascular checks  - continue splint        Hemoperitoneum   Assessment & Plan    - hemoglobin stable  - Restarted on dvt pharmacologic therapy yesterday   - a m  hemoglobin        Methadone use   Assessment & Plan    - Currently on home dose of methadone        Heroin abuse   Assessment & Plan    - Acute pain following   - On methadone        Multiple fractures of ribs, bilateral, initial encounter for closed fracture   Assessment & Plan    - Most recent CXR reviewed  - No evidence of pneumothorax   - Evolving right lower lobe infiltrate noted   - Continue to encourage IS and pulm toilet   - monitor for worsening respiratory status        * Spleen laceration   Assessment & Plan    - S/P splenic embolization   - Vaccinations ordered not yet given   - Will confirm that vaccinations are given   - hemoglobin stable, a m  hemoglobin pending  - Abdomen soft, benign   - Continue regular diet           DVT prophylaxis:  SCDs and Lovenox  PT and OT:  Eval and treat    Disposition:  Will follow up with a m  hemoglobin  Hemoglobin today is stable  Continue to monitor exam   Abdomen is soft nondistended  Continue current diet  Continue PT/OT evals  Subjective/Objective   Chief Complaint: "Pain in my knee "    Subjective:  Patient offers no new complaints today other than the pain in her right knee  She reports no new numbness or tingling  She reports that it stays focalized over the knee where she has the fracture  She reports that she is comfortable in bed and doing well at the current pain regimen  Denies any nausea or vomiting  She denies any issues with p  o  intake  She reports that her abdominal pain is improving  She reports she continues to use her incentive spirometer as instructed  Objective:     Meds/Allergies   Prescriptions Prior to Admission   Medication Sig Dispense Refill Last Dose    clonazePAM (KlonoPIN) 1 mg tablet Take 1 mg by mouth 2 (two) times a day       diazepam (VALIUM) 5 mg tablet Take 5 mg by mouth daily       METHADONE HCL PO Take 67 mg by mouth          Vitals: Blood pressure 108/61, pulse 86, temperature 98 9 °F (37 2 °C), temperature source Oral, resp  rate 14, height 5' 4" (1 626 m), weight 87 8 kg (193 lb 9 oz), SpO2 94 %  Body mass index is 33 23 kg/m²   SpO2: SpO2: 94 %, SpO2 Device: O2 Device: None (Room air)    ABG: No results found for: PHART, USL0TSL, PO2ART, FIH1PNV, K5VATUGX, BEART, SOURCE      Intake/Output Summary (Last 24 hours) at 01/20/18 1626  Last data filed at 01/20/18 1300   Gross per 24 hour   Intake              920 ml   Output                0 ml   Net              920 ml       Invasive Devices     Peripheral Intravenous Line            Peripheral IV 01/18/18 Left Antecubital 2 days    Peripheral IV 01/18/18 Right Arm 1 day                Nutrition/GI Proph/Bowel Reg: continue current diet    Physical Exam:   GENERAL APPEARANCE: NAD, well-appearing  HEENT:  Normocephalic, PERRLA, EOMs intact  CV:  Regular rate rhythm, no split S1-S2  LUNGS:  CTA bilaterally, no rales or rhonchi  ABD:  Bowel sounds x4, nontender, nondistended  EXT:  +2 pulses on extremities, neurovascularly intact, splint in place on the right knee, compartments are soft  NEURO:  Cranial nerves 2-12 intact, no focal deficits  SKIN:  Warm, dry, intact    Lab Results:   Results: I have personally reviewed pertinent reports   , BMP/CMP:   Lab Results   Component Value Date     01/20/2018    K 3 1 (L) 01/20/2018     01/20/2018    CO2 27 01/20/2018    ANIONGAP 7 01/20/2018    BUN 17 01/20/2018    CREATININE 0 63 01/20/2018    GLUCOSE 83 01/20/2018    CALCIUM 8 6 01/20/2018    EGFR 120 01/20/2018    and CBC:   Lab Results   Component Value Date    WBC 11 26 (H) 01/20/2018    HGB 10 5 (L) 01/20/2018    HCT 32 4 (L) 01/20/2018    MCV 93 01/20/2018     01/20/2018    MCH 30 2 01/20/2018    MCHC 32 4 01/20/2018    RDW 14 8 01/20/2018    MPV 10 6 01/20/2018    NRBC 0 01/20/2018     Imaging/EKG Studies: Results: I have personally reviewed pertinent reports      Other Studies:  No other studies  VTE Prophylaxis:  SCDs and Lovenox

## 2018-01-20 NOTE — ASSESSMENT & PLAN NOTE
S/P operative repair by ortho 1/17   Compartments soft   Hemoglobin stable   Continue to observe  NWB  Knee immobilizer in place   Distal neurovascularly intact

## 2018-01-20 NOTE — PHYSICAL THERAPY NOTE
Physical Therapy Cancellation Note      Pt adamently declined any PT intervention    "I have a bad headache"            Mojgan Peña, PTA

## 2018-01-20 NOTE — ASSESSMENT & PLAN NOTE
- Likely to be multifactorial secondary to dye load of CT scan and arteriogram, as well as lasix   - Hold nephrogeic medications   - Will restart IVF  - No toradol   - creatinine is stable
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
## 2018-01-20 NOTE — PHYSICAL THERAPY NOTE
Physical Therapy Progress Note     01/20/18 1600   Pain Assessment   Pain Assessment 0-10   Pain Score 8   Pain Type Acute pain   Pain Location Generalized   Pain Orientation (all over  would not specify)   Hospital Pain Intervention(s) Ambulation/increased activity   Response to Interventions unchanged   Restrictions/Precautions   RLE Weight Bearing Per Order WBAT   Other Precautions Bed Alarm; Fall Risk;Pain   General   Response to Previous Treatment Patient with no complaints from previous session  Family/Caregiver Present No   Cognition   Arousal/Participation Responsive; Cooperative   Following Commands Follows one step commands without difficulty   Bed Mobility   Supine to Sit 4  Minimal assistance   Additional items Assist x 1; Increased time required;Verbal cues;LE management   Sit to Supine 4  Minimal assistance   Additional items Assist x 1; Increased time required;Verbal cues;LE management   Transfers   Sit to Stand 4  Minimal assistance   Additional items Assist x 1; Increased time required;Verbal cues   Stand to Sit 4  Minimal assistance   Additional items Assist x 1; Increased time required;Verbal cues   Ambulation/Elevation   Gait pattern Decreased R stance; Foward flexed; Short stride; Step to   Gait Assistance 4  Minimal assist   Additional items Assist x 1;Verbal cues   Assistive Device Rolling walker   Distance 15ftx2   Balance   Static Sitting Fair +   Static Standing Fair -   Ambulatory Poor +   Endurance Deficit   Endurance Deficit Yes   Endurance Deficit Description pain   Activity Tolerance   Activity Tolerance Patient limited by pain   Nurse Made Aware rosie romero    Assessment   Prognosis Good   Problem List Decreased strength;Decreased endurance; Impaired balance;Decreased mobility; Decreased safety awareness;Pain   Assessment pt  continues to  c/o  generalized pain      she  would not specify  site only that she hurts all  over      pt  requires  min A  for all phases  of  mob     Yenifer Diamond  assist needed for leg management   she did amb  to BR  and  return      pt requires   assist  for kandi care   Andrew Matson pt  was  made comfortable in  bed   she  declined any other   mobilty   Andrew Matson     Barriers to Discharge Inaccessible home environment;Decreased caregiver support   Goals   Patient Goals go to BR   STG Expiration Date 01/29/18   Treatment Day 1   Plan   Treatment/Interventions Functional transfer training;Bed mobility;Gait training;Spoke to nursing   Progress Slow progress, decreased activity tolerance   PT Frequency (6x wk)   Recommendation   Recommendation Post acute IP rehab   Equipment Recommended [employer] Cr[employer]er   PT - OK to Discharge Yes     Cindy Brink, PTA

## 2018-01-20 NOTE — ASSESSMENT & PLAN NOTE
- S/P splenic embolization   - Vaccinations ordered not yet given   - Will confirm that vaccinations are given   - hemoglobin stable, a m  hemoglobin pending  - Abdomen soft, benign   - Continue regular diet

## 2018-01-20 NOTE — PLAN OF CARE
Problem: PAIN - ADULT  Goal: Verbalizes/displays adequate comfort level or baseline comfort level  Interventions:  - Encourage patient to monitor pain and request assistance  - Assess pain using appropriate pain scale  - Administer analgesics based on type and severity of pain and evaluate response  - Implement non-pharmacological measures as appropriate and evaluate response  - Consider cultural and social influences on pain and pain management  - Notify physician/advanced practitioner if interventions unsuccessful or patient reports new pain   Outcome: Progressing      Problem: INFECTION - ADULT  Goal: Absence or prevention of progression during hospitalization  INTERVENTIONS:  - Assess and monitor for signs and symptoms of infection  - Monitor lab/diagnostic results  - Monitor all insertion sites, i e  indwelling lines, tubes, and drains  - Monitor endotracheal (as able) and nasal secretions for changes in amount and color  - Oriska appropriate cooling/warming therapies per order  - Administer medications as ordered  - Instruct and encourage patient and family to use good hand hygiene technique  - Identify and instruct in appropriate isolation precautions for identified infection/condition   Outcome: Progressing      Problem: SAFETY ADULT  Goal: Maintain or return to baseline ADL function  INTERVENTIONS:  -  Assess patient's ability to carry out ADLs; assess patient's baseline for ADL function and identify physical deficits which impact ability to perform ADLs (bathing, care of mouth/teeth, toileting, grooming, dressing, etc )  - Assess/evaluate cause of self-care deficits   - Assess range of motion  - Assess patient's mobility; develop plan if impaired  - Assess patient's need for assistive devices and provide as appropriate  - Encourage maximum independence but intervene and supervise when necessary  ¯ Involve family in performance of ADLs  ¯ Assess for home care needs following discharge   ¯ Request OT consult to assist with ADL evaluation and planning for discharge  ¯ Provide patient education as appropriate   Outcome: Progressing      Problem: DISCHARGE PLANNING  Goal: Discharge to home or other facility with appropriate resources  INTERVENTIONS:  - Identify barriers to discharge w/patient and caregiver  - Arrange for needed discharge resources and transportation as appropriate  - Identify discharge learning needs (meds, wound care, etc )  - Arrange for interpretive services to assist at discharge as needed  - Refer to Case Management Department for coordinating discharge planning if the patient needs post-hospital services based on physician/advanced practitioner order or complex needs related to functional status, cognitive ability, or social support system   Outcome: Progressing      Problem: Knowledge Deficit  Goal: Patient/family/caregiver demonstrates understanding of disease process, treatment plan, medications, and discharge instructions  Complete learning assessment and assess knowledge base    Interventions:  - Provide teaching at level of understanding  - Provide teaching via preferred learning methods   Outcome: Progressing      Problem: Prexisting or High Potential for Compromised Skin Integrity  Goal: Skin integrity is maintained or improved  INTERVENTIONS:  - Identify patients at risk for skin breakdown  - Assess and monitor skin integrity  - Assess and monitor nutrition and hydration status  - Monitor labs (i e  albumin)  - Assess for incontinence   - Turn and reposition patient  - Assist with mobility/ambulation  - Relieve pressure over bony prominences  - Avoid friction and shearing  - Provide appropriate hygiene as needed including keeping skin clean and dry  - Evaluate need for skin moisturizer/barrier cream  - Collaborate with interdisciplinary team (i e  Nutrition, Rehabilitation, etc )   - Patient/family teaching   Outcome: Progressing      Problem: Potential for Falls  Goal: Patient will remain free of falls  INTERVENTIONS:  - Assess patient frequently for physical needs  -  Identify cognitive and physical deficits and behaviors that affect risk of falls  -  Mitchell fall precautions as indicated by assessment   - Educate patient/family on patient safety including physical limitations  - Instruct patient to call for assistance with activity based on assessment  - Modify environment to reduce risk of injury  - Consider OT/PT consult to assist with strengthening/mobility   Outcome: Progressing      Problem: Nutrition/Hydration-ADULT  Goal: Nutrient/Hydration intake appropriate for improving, restoring or maintaining nutritional needs  Monitor and assess patient's nutrition/hydration status for malnutrition (ex- brittle hair, bruises, dry skin, pale skin and conjunctiva, muscle wasting, smooth red tongue, and disorientation)  Collaborate with interdisciplinary team and initiate plan and interventions as ordered  Monitor patient's weight and dietary intake as ordered or per policy  Utilize nutrition screening tool and intervene per policy  Determine patient's food preferences and provide high-protein, high-caloric foods as appropriate       INTERVENTIONS:  - Monitor oral intake, urinary output, labs, and treatment plans  - Assess nutrition and hydration status and recommend course of action  - Evaluate amount of meals eaten  - Assist patient with eating if necessary   - Allow adequate time for meals  - Recommend/ encourage appropriate diets, oral nutritional supplements, and vitamin/mineral supplements  - Order, calculate, and assess calorie counts as needed  - Recommend, monitor, and adjust tube feedings and TPN/PPN based on assessed needs  - Assess need for intravenous fluids  - Provide specific nutrition/hydration education as appropriate  - Include patient/family/caregiver in decisions related to nutrition   Outcome: Progressing      Problem: DISCHARGE PLANNING - CARE MANAGEMENT  Goal: Discharge to post-acute care or home with appropriate resources  INTERVENTIONS:  - Conduct assessment to determine patient/family and health care team treatment goals, and need for post-acute services based on payer coverage, community resources, and patient preferences, and barriers to discharge  - Address psychosocial, clinical, and financial barriers to discharge as identified in assessment in conjunction with the patient/family and health care team  - Arrange appropriate level of post-acute services according to patient's   needs and preference and payer coverage in collaboration with the physician and health care team  - Communicate with and update the patient/family, physician, and health care team regarding progress on the discharge plan  - Arrange appropriate transportation to post-acute venues  - Patient to be evaluated   Outcome: Progressing

## 2018-01-20 NOTE — ASSESSMENT & PLAN NOTE
- Most recent CXR reviewed  - No evidence of pneumothorax   - Evolving right lower lobe infiltrate noted   - Continue to encourage IS and pulm toilet   - monitor for worsening respiratory status

## 2018-01-20 NOTE — PLAN OF CARE
Problem: PHYSICAL THERAPY ADULT  Goal: Performs mobility at highest level of function for planned discharge setting  See evaluation for individualized goals  Treatment/Interventions: Functional transfer training, LE strengthening/ROM, Elevations, Therapeutic exercise, Endurance training, Patient/family training, Equipment eval/education, Bed mobility, Gait training, Compensatory technique education, Continued evaluation, Spoke to nursing, Spoke to case management  Equipment Recommended: Viola Rayo       See flowsheet documentation for full assessment, interventions and recommendations  Outcome: Progressing  Prognosis: Good  Problem List: Decreased strength, Decreased endurance, Impaired balance, Decreased mobility, Decreased safety awareness, Pain  Assessment: pt  continues to  c/o  generalized pain      she  would not specify  site only that she hurts all  over      pt  requires  min A  for all phases  of  mob     Lajean Cassette assist needed  for leg management   she did amb  to BR  and  return      pt requires   assist  for kandi care   Lajean Cassette pt  was  made comfortable in  bed   she  declined any other   mobilty   Lajean Cassette Barriers to Discharge: Inaccessible home environment, Decreased caregiver support     Recommendation: Post acute IP rehab     PT - OK to Discharge: Yes    See flowsheet documentation for full assessment

## 2018-01-20 NOTE — ASSESSMENT & PLAN NOTE
- S/P operative repair with ortho on 1/17   - NWB  - continue neurovascular checks  - continue splint

## 2018-01-21 LAB
ANION GAP SERPL CALCULATED.3IONS-SCNC: 6 MMOL/L (ref 4–13)
BUN SERPL-MCNC: 14 MG/DL (ref 5–25)
CALCIUM SERPL-MCNC: 8.7 MG/DL (ref 8.3–10.1)
CHLORIDE SERPL-SCNC: 107 MMOL/L (ref 100–108)
CO2 SERPL-SCNC: 25 MMOL/L (ref 21–32)
CREAT SERPL-MCNC: 0.53 MG/DL (ref 0.6–1.3)
GFR SERPL CREATININE-BSD FRML MDRD: 127 ML/MIN/1.73SQ M
GLUCOSE SERPL-MCNC: 90 MG/DL (ref 65–140)
HCT VFR BLD AUTO: 31.3 % (ref 34.8–46.1)
HGB BLD-MCNC: 10.1 G/DL (ref 11.5–15.4)
POTASSIUM SERPL-SCNC: 3.7 MMOL/L (ref 3.5–5.3)
SODIUM SERPL-SCNC: 138 MMOL/L (ref 136–145)

## 2018-01-21 PROCEDURE — 85018 HEMOGLOBIN: CPT | Performed by: PHYSICIAN ASSISTANT

## 2018-01-21 PROCEDURE — 85014 HEMATOCRIT: CPT | Performed by: PHYSICIAN ASSISTANT

## 2018-01-21 PROCEDURE — 94760 N-INVAS EAR/PLS OXIMETRY 1: CPT

## 2018-01-21 PROCEDURE — G0515 COGNITIVE SKILLS DEVELOPMENT: HCPCS | Performed by: STUDENT IN AN ORGANIZED HEALTH CARE EDUCATION/TRAINING PROGRAM

## 2018-01-21 PROCEDURE — 97127 HB COGNITIVE SKILLS DEVELOPMENT, EACH 15 MUNUTES: CPT | Performed by: STUDENT IN AN ORGANIZED HEALTH CARE EDUCATION/TRAINING PROGRAM

## 2018-01-21 PROCEDURE — 80048 BASIC METABOLIC PNL TOTAL CA: CPT | Performed by: PHYSICIAN ASSISTANT

## 2018-01-21 PROCEDURE — 94668 MNPJ CHEST WALL SBSQ: CPT

## 2018-01-21 RX ADMIN — CLONAZEPAM 1 MG: 1 TABLET ORAL at 08:01

## 2018-01-21 RX ADMIN — HYDROMORPHONE HYDROCHLORIDE 1 MG: 1 INJECTION, SOLUTION INTRAMUSCULAR; INTRAVENOUS; SUBCUTANEOUS at 00:58

## 2018-01-21 RX ADMIN — ONDANSETRON 4 MG: 2 INJECTION INTRAMUSCULAR; INTRAVENOUS at 14:05

## 2018-01-21 RX ADMIN — METHADONE HYDROCHLORIDE 67 MG: 10 CONCENTRATE ORAL at 08:02

## 2018-01-21 RX ADMIN — HYDROMORPHONE HYDROCHLORIDE 1 MG: 1 INJECTION, SOLUTION INTRAMUSCULAR; INTRAVENOUS; SUBCUTANEOUS at 05:26

## 2018-01-21 RX ADMIN — LIDOCAINE 2 PATCH: 50 PATCH TOPICAL at 08:02

## 2018-01-21 RX ADMIN — ALBUTEROL SULFATE 2 PUFF: 90 AEROSOL, METERED RESPIRATORY (INHALATION) at 12:35

## 2018-01-21 RX ADMIN — DIAZEPAM 5 MG: 5 TABLET ORAL at 08:01

## 2018-01-21 RX ADMIN — OXYCODONE HYDROCHLORIDE 10 MG: 5 TABLET ORAL at 12:35

## 2018-01-21 RX ADMIN — HYDROMORPHONE HYDROCHLORIDE 1 MG: 1 INJECTION, SOLUTION INTRAMUSCULAR; INTRAVENOUS; SUBCUTANEOUS at 22:08

## 2018-01-21 RX ADMIN — ONDANSETRON 4 MG: 2 INJECTION INTRAMUSCULAR; INTRAVENOUS at 01:01

## 2018-01-21 RX ADMIN — OXYCODONE HYDROCHLORIDE 10 MG: 5 TABLET ORAL at 16:41

## 2018-01-21 RX ADMIN — ALBUTEROL SULFATE 2 PUFF: 90 AEROSOL, METERED RESPIRATORY (INHALATION) at 08:01

## 2018-01-21 RX ADMIN — OXYCODONE HYDROCHLORIDE 10 MG: 5 TABLET ORAL at 07:54

## 2018-01-21 RX ADMIN — HYDROMORPHONE HYDROCHLORIDE 1 MG: 1 INJECTION, SOLUTION INTRAMUSCULAR; INTRAVENOUS; SUBCUTANEOUS at 14:05

## 2018-01-21 RX ADMIN — Medication 1 TABLET: at 21:00

## 2018-01-21 RX ADMIN — OXYCODONE HYDROCHLORIDE 10 MG: 5 TABLET ORAL at 03:05

## 2018-01-21 RX ADMIN — HYDROMORPHONE HYDROCHLORIDE 1 MG: 1 INJECTION, SOLUTION INTRAMUSCULAR; INTRAVENOUS; SUBCUTANEOUS at 18:05

## 2018-01-21 RX ADMIN — HYDROMORPHONE HYDROCHLORIDE 1 MG: 1 INJECTION, SOLUTION INTRAMUSCULAR; INTRAVENOUS; SUBCUTANEOUS at 09:35

## 2018-01-21 RX ADMIN — ALBUTEROL SULFATE 2 PUFF: 90 AEROSOL, METERED RESPIRATORY (INHALATION) at 20:52

## 2018-01-21 RX ADMIN — OXYCODONE HYDROCHLORIDE 10 MG: 5 TABLET ORAL at 20:59

## 2018-01-21 RX ADMIN — CLONAZEPAM 1 MG: 1 TABLET ORAL at 20:59

## 2018-01-21 RX ADMIN — ONDANSETRON 4 MG: 2 INJECTION INTRAMUSCULAR; INTRAVENOUS at 22:08

## 2018-01-21 RX ADMIN — ONDANSETRON 4 MG: 2 INJECTION INTRAMUSCULAR; INTRAVENOUS at 09:35

## 2018-01-21 RX ADMIN — ONDANSETRON 4 MG: 2 INJECTION INTRAMUSCULAR; INTRAVENOUS at 05:29

## 2018-01-21 RX ADMIN — ONDANSETRON 4 MG: 2 INJECTION INTRAMUSCULAR; INTRAVENOUS at 18:05

## 2018-01-21 RX ADMIN — CLONAZEPAM 1 MG: 1 TABLET ORAL at 16:41

## 2018-01-21 NOTE — ASSESSMENT & PLAN NOTE
Saturating well on RA  - Most recent CXR reviewed  - No evidence of pneumothorax   - Evolving right lower lobe infiltrate noted   - Continue to encourage IS and pulm toilet   - monitor for worsening respiratory status

## 2018-01-21 NOTE — PLAN OF CARE
Problem: OCCUPATIONAL THERAPY ADULT  Goal: Performs self-care activities at highest level of function for planned discharge setting  See evaluation for individualized goals  Treatment Interventions: ADL retraining, Functional transfer training, Endurance training, Cognitive reorientation, Patient/family training, Equipment evaluation/education, Compensatory technique education, Energy conservation, Activityengagement          See flowsheet documentation for full assessment, interventions and recommendations  Outcome: Progressing  Limitation: Decreased ADL status, Decreased Safe judgement during ADL, Decreased cognition, Decreased endurance, Decreased self-care trans, Decreased high-level ADLs  Prognosis: Good  Assessment: Pt participates in OT session with focus on cognitive reorientation to increase I for d/c  Pt scored 27/30 on MOCA, indicating within normal range for cognition  Please see below for details  Pt refused to engage in bed mobility 2* pain  Notified nurse about pt desire for pain medication at end of session  Pt will continue to benefit from activity tolerance and adls       OT Discharge Recommendation: Short Term Rehab  OT - OK to Discharge: Yes (TO INPT [address] )

## 2018-01-21 NOTE — PROGRESS NOTES
Patient refused lovenox during med pass  Pt stated they haven't been giving it to her since she has been moving around  I showed the pt that it was still ordered for her and it was documented she received it this morning and last night  Pt stated "well then someone isn't doing their job because that isn't right" I explained the importance of preventing blood clots  Patient refused  She also states she will be writing a letter about this place because "her fiance is on the 9th floor and he is getting the best treatment, they bring him his pain meds without him even asking" I explained to the patient that she is on the 9th floor and that his medication may be scheduled as opposed to PRN  She then asked me if "you all are having fun here?" I asked her what she meant by that and she stated it sounds like you guys are having a lot of fun out there and we should be more quiet  I told her that we do try to enjoy our work and that because this type of unit can get tense/sad etc, sometimes we need to be able to decompress  I apologized that it was too loud and assured her I would please ask the staff to be more mindful about any extra noise and that we will make sure to shut her door between care  I asked if there was anything else I could do to improve her stay, she said she just wants to sleep through the night, bring me my IV meds in 1 hour  I explained that she wasn't going to be due for them for 2 more hours and she said I can take them one hour apart  I explained to her that yes that is correct but only if it has been more than 4 hours since her previous dose and that isn't the case at this time  I told her we would come in now to assess her and do vitals so that she could go to sleep and then could call me when needed   Before leaving the room I asked the patient if I could apply her "leg pumps" and she said "I cant wear it on the right leg I had surgery, don't you read the chart?!" I assured her that yes I was aware of her condition and have read her chart and that saying "pumps" was a habit  Left sleeve applied  No further needs at this time

## 2018-01-21 NOTE — ASSESSMENT & PLAN NOTE
- S/P splenic embolization   - Vaccinations ordered not yet given  - hemoglobin stable, 10 1 today  - Abdomen soft, benign   - Continue regular diet

## 2018-01-21 NOTE — PROGRESS NOTES
Progress Note Haydee Morales 1985, 33 y o  female MRN: [id number]    Unit/Bed#: [facility] 921-01 Encounter: [id number]    Primary Care Provider: No primary care provider on file  Date and time admitted to hospital: 1/16/2018  3:32 PM      Acute kidney injury [facility])   Assessment & Plan    Resolved  - Likely to be multifactorial secondary to dye load of CT scan and arteriogram, as well as lasix   - Hold nephrogeic medications   - No toradol   - creatinine is stable        Open displaced comminuted fracture of right patella, type IIIA, IIIB, or IIIC   Assessment & Plan    S/P I+D by ortho 1/17   Compartments soft   Hemoglobin stable   Continue to observe  Distal neurovascularly intact         Type III open nondisplaced comminuted fracture of right patella   Assessment & Plan    - S/P operative repair with ortho on 1/17   - WBAT  - continue neurovascular checks        Hemoperitoneum   Assessment & Plan    - hemoglobin stable  - Restarted on dvt pharmacologic therapy yesterday   - f/u H/H        Methadone use   Assessment & Plan    - Currently on home dose of methadone        Heroin abuse   Assessment & Plan    - Acute pain following   - On methadone        Multiple fractures of ribs, bilateral, initial encounter for closed fracture   Assessment & Plan    Saturating well on RA  - Most recent CXR reviewed  - No evidence of pneumothorax   - Evolving right lower lobe infiltrate noted   - Continue to encourage IS and pulm toilet   - monitor for worsening respiratory status        * Spleen laceration   Assessment & Plan    - S/P splenic embolization   - Vaccinations ordered not yet given  - hemoglobin stable, 10 1 today  - Abdomen soft, benign   - Continue regular diet           Subjective/Objective   Chief Complaint: I'm tired    Subjective: No acute events  OOB to chair this morning  Was walking with PT yesterday  Recommended for rehab  Tolerating diet, passing gas but no BM   Complaining of right leg and rib pain     Objective:     Meds/Allergies   Prescriptions Prior to Admission   Medication Sig Dispense Refill Last Dose    clonazePAM (KlonoPIN) 1 mg tablet Take 1 mg by mouth 2 (two) times a day       diazepam (VALIUM) 5 mg tablet Take 5 mg by mouth daily       METHADONE HCL PO Take 67 mg by mouth          Vitals: Blood pressure 115/60, pulse 99, temperature 99 3 °F (37 4 °C), temperature source Oral, resp  rate 16, height 5' 4" (1 626 m), weight 87 8 kg (193 lb 9 oz), SpO2 95 %  Body mass index is 33 23 kg/m²  SpO2 Device: O2 Device: None (Room air)    ABG: No results found for: PHART, WGB8MDI, PO2ART, LMU6SBT, M0XCDPHT, BEART, SOURCE      Intake/Output Summary (Last 24 hours) at 01/21/18 1043  Last data filed at 01/21/18 0935   Gross per 24 hour   Intake              830 ml   Output              400 ml   Net              430 ml       Invasive Devices     Peripheral Intravenous Line            Peripheral IV 01/18/18 Left Antecubital 2 days    Peripheral IV 01/18/18 Right Arm 2 days                Nutrition/GI Proph/Bowel Reg: Regular    Physical Exam:   GENERAL APPEARANCE: No acute distress, awake and alert  HEENT: NCAT  CV: regular rate  LUNGS: no respiratory distress, right chest wall tenderness  ABD: soft NT ND, mild epigastric ecchymosis  EXT: right knee mildly tender, wrapped  Motor/sensation intact  NEURO: non-focal  SKIN: intact    Lab Results:   BMP/CMP:   Lab Results   Component Value Date     01/21/2018    K 3 7 01/21/2018     01/21/2018    CO2 25 01/21/2018    ANIONGAP 6 01/21/2018    BUN 14 01/21/2018    CREATININE 0 53 (L) 01/21/2018    GLUCOSE 90 01/21/2018    CALCIUM 8 7 01/21/2018    EGFR 127 01/21/2018    and CBC:   Lab Results   Component Value Date    HGB 10 1 (L) 01/21/2018    HCT 31 3 (L) 01/21/2018     Imaging/EKG Studies: Results: I have personally reviewed pertinent reports      Other Studies: n/a  VTE Prophylaxis: Lovenox

## 2018-01-21 NOTE — ASSESSMENT & PLAN NOTE
S/P I+D by ortho 1/17   Compartments soft   Hemoglobin stable   Continue to observe  Distal neurovascularly intact

## 2018-01-21 NOTE — OCCUPATIONAL THERAPY NOTE
01/21/18 1526   Restrictions/Precautions   Weight Bearing Precautions Per Order Yes   RLE Weight Bearing Per Order WBAT   Other Precautions Fall Risk;Pain   Pain Assessment   Pain Assessment 0-10   Pain Score 9   ADL   Where Assessed Supine, bed  (HOB elevated)   Cognition   Overall Cognitive Status WFL   Arousal/Participation Responsive   Attention Within functional limits   Orientation Level Oriented X4   Memory Within functional limits   Following Commands Follows one step commands without difficulty   Cognition Assessment Tools MOCA   Score 27   Activity Tolerance   Activity Tolerance Patient tolerated treatment well   Assessment   Assessment Pt participates in OT session with focus on cognitive reorientation to increase I for d/c  Pt scored 27/30 on MOCA, indicating within normal range for cognition  Please see below for details  Pt refused to engage in bed mobility 2* pain  Notified nurse about pt desire for pain medication at end of session  Pt will continue to benefit from activity tolerance and adls  Plan   Treatment Interventions Cognitive reorientation   Goal Expiration Date 02/02/18   Treatment Day 1   OT Frequency 3-5x/wk   Recommendation   OT Discharge Recommendation Short Term Rehab     PT SEEN FOR SOFIA COGNITIVE ASSESSMENT  SCORED  27/30 INDICATING NO COGNITIVE IMPAIRMENT FOR AGE/EDUCATION  SCORES ARE AS FOLLOWS:    VISUOSPATIAL/EXECUTIVE FUNCTION: 5/5    NAMING: 3/3    ATTENTION: 5/6  Pt made an error with serial subtractions  LANGUAGE: 3/3    ABSTRACTION: 2/2    DELAYED RECALL: 3/5  Pt did not recall the word face or red      ORIENTATION: 6/6

## 2018-01-21 NOTE — PLAN OF CARE
Problem: PAIN - ADULT  Goal: Verbalizes/displays adequate comfort level or baseline comfort level  Interventions:  - Encourage patient to monitor pain and request assistance  - Assess pain using appropriate pain scale  - Administer analgesics based on type and severity of pain and evaluate response  - Implement non-pharmacological measures as appropriate and evaluate response  - Consider cultural and social influences on pain and pain management  - Notify physician/advanced practitioner if interventions unsuccessful or patient reports new pain   Outcome: Progressing      Problem: INFECTION - ADULT  Goal: Absence or prevention of progression during hospitalization  INTERVENTIONS:  - Assess and monitor for signs and symptoms of infection  - Monitor lab/diagnostic results  - Monitor all insertion sites, i e  indwelling lines, tubes, and drains  - Monitor endotracheal (as able) and nasal secretions for changes in amount and color  - [place] appropriate cooling/warming therapies per order  - Administer medications as ordered  - Instruct and encourage patient and family to use good hand hygiene technique  - Identify and instruct in appropriate isolation precautions for identified infection/condition   Outcome: Progressing      Problem: SAFETY ADULT  Goal: Maintain or return to baseline ADL function  INTERVENTIONS:  -  Assess patient's ability to carry out ADLs; assess patient's baseline for ADL function and identify physical deficits which impact ability to perform ADLs (bathing, care of mouth/teeth, toileting, grooming, dressing, etc )  - Assess/evaluate cause of self-care deficits   - Assess range of motion  - Assess patient's mobility; develop plan if impaired  - Assess patient's need for assistive devices and provide as appropriate  - Encourage maximum independence but intervene and supervise when necessary  ¯ Involve family in performance of ADLs  ¯ Assess for home care needs following discharge   ¯ Request OT consult to assist with ADL evaluation and planning for discharge  ¯ Provide patient education as appropriate   Outcome: Progressing      Problem: DISCHARGE PLANNING  Goal: Discharge to home or other facility with appropriate resources  INTERVENTIONS:  - Identify barriers to discharge w/patient and caregiver  - Arrange for needed discharge resources and transportation as appropriate  - Identify discharge learning needs (meds, wound care, etc )  - Arrange for interpretive services to assist at discharge as needed  - Refer to Case Management Department for coordinating discharge planning if the patient needs post-hospital services based on physician/advanced practitioner order or complex needs related to functional status, cognitive ability, or social support system   Outcome: Progressing      Problem: Knowledge Deficit  Goal: Patient/family/caregiver demonstrates understanding of disease process, treatment plan, medications, and discharge instructions  Complete learning assessment and assess knowledge base    Interventions:  - Provide teaching at level of understanding  - Provide teaching via preferred learning methods   Outcome: Progressing      Problem: Prexisting or High Potential for Compromised Skin Integrity  Goal: Skin integrity is maintained or improved  INTERVENTIONS:  - Identify patients at risk for skin breakdown  - Assess and monitor skin integrity  - Assess and monitor nutrition and hydration status  - Monitor labs (i e  albumin)  - Assess for incontinence   - Turn and reposition patient  - Assist with mobility/ambulation  - Relieve pressure over bony prominences  - Avoid friction and shearing  - Provide appropriate hygiene as needed including keeping skin clean and dry  - Evaluate need for skin moisturizer/barrier cream  - Collaborate with interdisciplinary team (i e  Nutrition, Rehabilitation, etc )   - Patient/family teaching   Outcome: Progressing      Problem: Potential for Falls  Goal: Patient will remain free of falls  INTERVENTIONS:  - Assess patient frequently for physical needs  -  Identify cognitive and physical deficits and behaviors that affect risk of falls  -  [place] fall precautions as indicated by assessment   - Educate patient/family on patient safety including physical limitations  - Instruct patient to call for assistance with activity based on assessment  - Modify environment to reduce risk of injury  - Consider OT/PT consult to assist with strengthening/mobility   Outcome: Progressing      Problem: Nutrition/Hydration-ADULT  Goal: Nutrient/Hydration intake appropriate for improving, restoring or maintaining nutritional needs  Monitor and assess patient's nutrition/hydration status for malnutrition (ex- brittle hair, bruises, dry skin, pale skin and conjunctiva, muscle wasting, smooth red tongue, and disorientation)  Collaborate with interdisciplinary team and initiate plan and interventions as ordered  Monitor patient's weight and dietary intake as ordered or per policy  Utilize nutrition screening tool and intervene per policy  Determine patient's food preferences and provide high-protein, high-caloric foods as appropriate       INTERVENTIONS:  - Monitor oral intake, urinary output, labs, and treatment plans  - Assess nutrition and hydration status and recommend course of action  - Evaluate amount of meals eaten  - Assist patient with eating if necessary   - Allow adequate time for meals  - Recommend/ encourage appropriate diets, oral nutritional supplements, and vitamin/mineral supplements  - Order, calculate, and assess calorie counts as needed  - Recommend, monitor, and adjust tube feedings and TPN/PPN based on assessed needs  - Assess need for intravenous fluids  - Provide specific nutrition/hydration education as appropriate  - Include patient/family/caregiver in decisions related to nutrition   Outcome: Progressing      Problem: DISCHARGE PLANNING - CARE MANAGEMENT  Goal: Discharge to post-acute care or home with appropriate resources  INTERVENTIONS:  - Conduct assessment to determine patient/family and health care team treatment goals, and need for post-acute services based on payer coverage, community resources, and patient preferences, and barriers to discharge  - Address psychosocial, clinical, and financial barriers to discharge as identified in assessment in conjunction with the patient/family and health care team  - Arrange appropriate level of post-acute services according to patient's   needs and preference and payer coverage in collaboration with the physician and health care team  - Communicate with and update the patient/family, physician, and health care team regarding progress on the discharge plan  - Arrange appropriate transportation to post-acute venues  - Patient to be evaluated   Outcome: Progressing

## 2018-01-21 NOTE — SOCIAL WORK
KATHRINE met with Pt to discuss the recommendation of rehab at which time the Pt stated " I don't care, life sucks, I'm going home"  KATHRINE left a SNF list in Pt's room for review in the event she should change her mind  KATHRINE will continue to follow

## 2018-01-21 NOTE — ASSESSMENT & PLAN NOTE
Resolved  - Likely to be multifactorial secondary to dye load of CT scan and arteriogram, as well as lasix   - Hold nephrogeic medications   - No toradol   - creatinine is stable

## 2018-01-22 VITALS
DIASTOLIC BLOOD PRESSURE: 70 MMHG | SYSTOLIC BLOOD PRESSURE: 118 MMHG | HEIGHT: 64 IN | HEART RATE: 92 BPM | BODY MASS INDEX: 31.41 KG/M2 | WEIGHT: 184 LBS | OXYGEN SATURATION: 98 %

## 2018-01-22 PROCEDURE — 90648 HIB PRP-T VACCINE 4 DOSE IM: CPT | Performed by: PHYSICIAN ASSISTANT

## 2018-01-22 PROCEDURE — 90670 PCV13 VACCINE IM: CPT | Performed by: PHYSICIAN ASSISTANT

## 2018-01-22 PROCEDURE — 90734 MENACWYD/MENACWYCRM VACC IM: CPT | Performed by: PHYSICIAN ASSISTANT

## 2018-01-22 PROCEDURE — 94760 N-INVAS EAR/PLS OXIMETRY 1: CPT | Performed by: SOCIAL WORKER

## 2018-01-22 RX ORDER — ALBUTEROL SULFATE 90 UG/1
2 AEROSOL, METERED RESPIRATORY (INHALATION) EVERY 4 HOURS PRN
Status: DISCONTINUED | OUTPATIENT
Start: 2018-01-22 | End: 2018-01-23 | Stop reason: HOSPADM

## 2018-01-22 RX ORDER — METHOCARBAMOL 500 MG/1
500 TABLET, FILM COATED ORAL EVERY 6 HOURS PRN
Status: DISCONTINUED | OUTPATIENT
Start: 2018-01-22 | End: 2018-01-23 | Stop reason: HOSPADM

## 2018-01-22 RX ADMIN — ONDANSETRON 4 MG: 2 INJECTION INTRAMUSCULAR; INTRAVENOUS at 12:01

## 2018-01-22 RX ADMIN — DIAZEPAM 5 MG: 5 TABLET ORAL at 09:32

## 2018-01-22 RX ADMIN — ONDANSETRON 4 MG: 2 INJECTION INTRAMUSCULAR; INTRAVENOUS at 17:33

## 2018-01-22 RX ADMIN — ENOXAPARIN SODIUM 30 MG: 30 INJECTION SUBCUTANEOUS at 09:33

## 2018-01-22 RX ADMIN — OXYCODONE HYDROCHLORIDE 10 MG: 5 TABLET ORAL at 06:33

## 2018-01-22 RX ADMIN — CLONAZEPAM 1 MG: 1 TABLET ORAL at 22:02

## 2018-01-22 RX ADMIN — HYDROMORPHONE HYDROCHLORIDE 1 MG: 1 INJECTION, SOLUTION INTRAMUSCULAR; INTRAVENOUS; SUBCUTANEOUS at 02:15

## 2018-01-22 RX ADMIN — OXYCODONE HYDROCHLORIDE 10 MG: 5 TABLET ORAL at 01:01

## 2018-01-22 RX ADMIN — CLONAZEPAM 1 MG: 1 TABLET ORAL at 09:32

## 2018-01-22 RX ADMIN — METHOCARBAMOL 500 MG: 500 TABLET ORAL at 07:42

## 2018-01-22 RX ADMIN — OXYCODONE HYDROCHLORIDE 10 MG: 5 TABLET ORAL at 10:47

## 2018-01-22 RX ADMIN — LIDOCAINE 2 PATCH: 50 PATCH TOPICAL at 09:40

## 2018-01-22 RX ADMIN — CLONAZEPAM 1 MG: 1 TABLET ORAL at 16:34

## 2018-01-22 RX ADMIN — NEISSERIA MENINGITIDIS GROUP A CAPSULAR POLYSACCHARIDE DIPHTHERIA TOXOID CONJUGATE ANTIGEN, NEISSERIA MENINGITIDIS GROUP C CAPSULAR POLYSACCHARIDE DIPHTHERIA TOXOID CONJUGATE ANTIGEN, NEISSERIA MENINGITIDIS GROUP Y CAPSULAR POLYSACCHARIDE DIPHTHERIA TOXOID CONJUGATE ANTIGEN, AND NEISSERIA MENINGITIDIS GROUP W-135 CAPSULAR POLYSACCHARIDE DIPHTHERIA TOXOID CONJUGATE ANTIGEN 0.5 ML: 4; 4; 4; 4 INJECTION, SOLUTION INTRAMUSCULAR at 10:48

## 2018-01-22 RX ADMIN — ONDANSETRON 4 MG: 2 INJECTION INTRAMUSCULAR; INTRAVENOUS at 02:15

## 2018-01-22 RX ADMIN — METHADONE HYDROCHLORIDE 67 MG: 10 CONCENTRATE ORAL at 09:33

## 2018-01-22 RX ADMIN — OXYCODONE HYDROCHLORIDE 10 MG: 5 TABLET ORAL at 16:34

## 2018-01-22 RX ADMIN — PNEUMOCOCCAL 13-VALENT CONJUGATE VACCINE 0.5 ML: 2.2; 2.2; 2.2; 2.2; 2.2; 4.4; 2.2; 2.2; 2.2; 2.2; 2.2; 2.2; 2.2 INJECTION, SUSPENSION INTRAMUSCULAR at 10:55

## 2018-01-22 RX ADMIN — Medication 500 MG: at 01:01

## 2018-01-22 RX ADMIN — METHOCARBAMOL 500 MG: 500 TABLET ORAL at 16:34

## 2018-01-22 RX ADMIN — HYDROMORPHONE HYDROCHLORIDE 0.5 MG: 1 INJECTION, SOLUTION INTRAMUSCULAR; INTRAVENOUS; SUBCUTANEOUS at 12:01

## 2018-01-22 RX ADMIN — HYDROMORPHONE HYDROCHLORIDE 0.5 MG: 1 INJECTION, SOLUTION INTRAMUSCULAR; INTRAVENOUS; SUBCUTANEOUS at 07:41

## 2018-01-22 RX ADMIN — HYDROMORPHONE HYDROCHLORIDE 0.5 MG: 1 INJECTION, SOLUTION INTRAMUSCULAR; INTRAVENOUS; SUBCUTANEOUS at 17:33

## 2018-01-22 RX ADMIN — HAEMOPHILUS B POLYSACCHARIDE CONJUGATE VACCINE FOR INJ 0.5 ML: RECON SOLN at 10:52

## 2018-01-22 RX ADMIN — ONDANSETRON 4 MG: 2 INJECTION INTRAMUSCULAR; INTRAVENOUS at 07:42

## 2018-01-22 NOTE — PROGRESS NOTES
Progress Note Susy Rhodes 1985, 33 y o  female MRN: [id number]    Unit/Bed#: [facility] 921-01 Encounter: [id number]    Primary Care Provider: No primary care provider on file     Date and time admitted to hospital: 1/16/2018  3:32 PM        Acute kidney injury [facility])   Assessment & Plan    Resolved - last creatinine 0 53, may have been a laboratory error   - Likely to be multifactorial secondary to dye load of CT scan and arteriogram, as well as lasix   - Hold nephrogeic medications   - No toradol   - creatinine is stable        Open displaced comminuted fracture of right patella, type IIIA, IIIB, or IIIC   Assessment & Plan    S/P I+D by ortho 1/17   Compartments soft   Hemoglobin stable   Continue to observe  Distal neurovascularly intact   WBAT RLE        Type III open nondisplaced comminuted fracture of right patella   Assessment & Plan    - S/P operative repair with ortho on 1/17   - WBAT  - continue neurovascular checks        Hemoperitoneum   Assessment & Plan    - hemoglobin stable  - Restarted on dvt pharmacologic therapy yesterday  Patient had refused lovenox, explained risks of DVT/PE and possible death, accepted lovenox this morning   - f/u H/H        Methadone use   Assessment & Plan    - Currently on home dose of methadone        Heroin abuse   Assessment & Plan    - Acute pain following   - On methadone        Multiple fractures of ribs, bilateral, initial encounter for closed fracture   Assessment & Plan    Saturating well on RA  - Most recent CXR reviewed  - No evidence of pneumothorax   - Evolving right lower lobe infiltrate noted   - Continue to encourage IS and pulm toilet   - monitor for worsening respiratory status        * Spleen laceration   Assessment & Plan    - S/P splenic embolization   - Vaccinations ordered, discussed with nurse to give today   - hemoglobin stable, 10 1 today  - Abdomen soft, benign   - Continue regular diet                     Subjective/Objective   Chief Complaint:     Subjective: no overnight events  No nausea/vomiting  Had been refusing lovenox, explained to patient risks and benefits, agreed  Patient also had originally refused splenic vaccinations  Now agreeable     Objective:     Blood pressure 107/59, pulse 89, temperature 99 3 °F (37 4 °C), temperature source Oral, resp  rate 18, height 5' 4" (1 626 m), weight 87 8 kg (193 lb 9 oz), SpO2 97 %  ,Body mass index is 33 23 kg/m²  Intake/Output Summary (Last 24 hours) at 01/22/18 1024  Last data filed at 01/22/18 0932   Gross per 24 hour   Intake              880 ml   Output              500 ml   Net              380 ml       Invasive Devices     Peripheral Intravenous Line            Peripheral IV 01/18/18 Left Antecubital 3 days    Peripheral IV 01/18/18 Right Arm 3 days                Physical Exam: General: AAOx3  Respiratory: BS b/l  Abdomen: Soft, NT, no rebound/guarding  Heart: RRR, S1s2  Ext: Warm no cyanosis   RLE compartments soft  Palpable DP/PT movement sensation intact of foot     Lab, Imaging and other studies:I have personally reviewed pertinent lab results    , CBC: No results found for: WBC, HGB, HCT, MCV, PLT, ADJUSTEDWBC, MCH, MCHC, RDW, MPV, NRBC, CMP: No results found for: NA, K, CL, CO2, ANIONGAP, BUN, CREATININE, GLUCOSE, CALCIUM, AST, ALT, ALKPHOS, PROT, ALBUMIN, BILITOT, EGFR  VTE Pharmacologic Prophylaxis: Sequential compression device (Venodyne)   VTE Mechanical Prophylaxis: sequential compression device

## 2018-01-22 NOTE — RESPIRATORY THERAPY NOTE
protocol      01/22/18 0836   Respiratory Assessment   Assessment Type Assess only   General Appearance Awake;Drowsy   Respiratory Pattern Normal   Chest Assessment Chest expansion symmetrical   Bilateral Breath Sounds Clear   Resp Comments pt sleepy  has no resp c/o  bs clear  Lungs clear on last cxr  states she is using IS and flutter on her own  pt has dry np cough  will change mdi to prn for wheezing/sob and dc pt from resp protocol  pt encouraged to use flutter and IS on her own      Additional Assessments   SpO2 97 %
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
## 2018-01-22 NOTE — PLAN OF CARE
DISCHARGE PLANNING     Discharge to home or other facility with appropriate resources Progressing        DISCHARGE PLANNING - CARE MANAGEMENT     Discharge to post-acute care or home with appropriate resources Progressing        INFECTION - ADULT     Absence or prevention of progression during hospitalization Progressing        Knowledge Deficit     Patient/family/caregiver demonstrates understanding of disease process, treatment plan, medications, and discharge instructions Progressing        Nutrition/Hydration-ADULT     Nutrient/Hydration intake appropriate for improving, restoring or maintaining nutritional needs Progressing        PAIN - ADULT     Verbalizes/displays adequate comfort level or baseline comfort level Progressing        Potential for Falls     Patient will remain free of falls Progressing        Prexisting or High Potential for Compromised Skin Integrity     Skin integrity is maintained or improved Progressing        SAFETY ADULT     Maintain or return to baseline ADL function Progressing

## 2018-01-22 NOTE — ASSESSMENT & PLAN NOTE
- S/P splenic embolization   - Vaccinations ordered, discussed with nurse to give today   - hemoglobin stable, 10 1 today  - Abdomen soft, benign   - Continue regular diet

## 2018-01-22 NOTE — PROGRESS NOTES
Orthopedics   Allen Courtney 33 y o  female MRN: [id number]  Unit/Bed#: [facility] 921-01      Subjective:  33 y  o female post operative day 6 right knee traumatic arthrotomy s/p I&D  Resting comfortably this AM, denies numbness or tingling      Labs:    0  Lab Value Date/Time   HCT 31 3 (L) 01/21/2018 0631   HCT 32 4 (L) 01/20/2018 0512   HCT 33 2 (L) 01/19/2018 1042   HGB 10 1 (L) 01/21/2018 0631   HGB 10 5 (L) 01/20/2018 0512   HGB 11 0 (L) 01/19/2018 1042   INR 1 12 01/17/2018 0420   WBC 11 26 (H) 01/20/2018 0512   WBC 15 12 (H) 01/19/2018 1042   WBC 11 81 (H) 01/19/2018 0527       Meds:    Current Facility-Administered Medications:     acetaminophen (TYLENOL) tablet 975 mg, 975 mg, Oral, Q8H PRN, Juanjo Madrid MD, 975 mg at 01/18/18 2151    albuterol (PROVENTIL HFA,VENTOLIN HFA) inhaler 2 puff, 2 puff, Inhalation, 4x Daily, Daljit Taveras MD, 2 puff at 01/21/18 2052    albuterol inhalation solution 2 5 mg, 2 5 mg, Nebulization, Q4H PRN, Mirtha Kimbrough MD, 2 5 mg at 01/20/18 2057    calcium carbonate (TUMS) chewable tablet 500 mg, 500 mg, Oral, Daily PRN, Bre Tay MD, 500 mg at 01/22/18 0101    clonazePAM (KlonoPIN) tablet 1 mg, 1 mg, Oral, TID, Juanjo Madrid MD, 1 mg at 01/21/18 2059    diazepam (VALIUM) tablet 5 mg, 5 mg, Oral, Daily, Juanjo Madrid MD, 5 mg at 01/21/18 0801    enoxaparin (LOVENOX) subcutaneous injection 30 mg, 30 mg, Subcutaneous, Q12H [address], Duran Calvin PA-C, 30 mg at 01/20/18 0920    haemophilus B vaccine, tetanus toxoid conjugate (ActHIB) IM injection 0 5 mL, 0 5 mL, Intramuscular, Once, Duran Calvin PA-C    HYDROmorphone (DILAUDID) injection 1 mg, 1 mg, Intravenous, Q4H PRN, Juanjo Madrid MD, 1 mg at 01/22/18 0215    lidocaine (LIDODERM) 5 % patch 2 patch, 2 patch, Transdermal, Daily, CHRISTOPHER Wiggins, 2 patch at 01/21/18 0802    meningococcal quadrivalent conjugated vaccine (MENACTRA) IM injection 0 5 mL, 0 5 mL, Intramuscular, Once, Duran Calvin PA-C   methadone (DOLOPHINE) 10 mg/mL oral concentrated solution 67 mg, 67 mg, Oral, Daily, Kathy Musa PA-C, 67 mg at 01/21/18 0802    nicotine (NICODERM CQ) 21 mg/24 hr TD 24 hr patch 1 patch, 1 patch, Transdermal, Daily, Randa Mancia MD    ondansetron [facility]) injection 4 mg, 4 mg, Intravenous, Q4H PRN, Talia Fernandez MD, 4 mg at 01/22/18 0215    oxyCODONE (ROXICODONE) IR tablet 10 mg, 10 mg, Oral, Q4H PRN, Ellyn Holbrook MD, 10 mg at 01/22/18 8836    pneumococcal 13-valent conjugate vaccine (PREVNAR-13) IM injection 0 5 mL, 0 5 mL, Intramuscular, Prior to discharge, Kathy Musa PA-C    senna-docusate sodium (SENOKOT S) 8 6-50 mg per tablet 1 tablet, 1 tablet, Oral, HS, Kathy Musa PA-C, 1 tablet at 01/21/18 2100    sucralfate (CARAFATE) oral suspension 1,000 mg, 1,000 mg, Oral, Q6H PRN, Huang Trujillo MD, 1,000 mg at 01/19/18 2121    Blood Culture:   No results found for: BLOODCX    Wound Culture:   No results found for: WOUNDCULT    Ins and Outs:  I/O last 24 hours: In: 439 [P O :870]  Out: 400 [Urine:400]          Physical Exam:  Vitals:    01/21/18 2356   BP: 106/57   Pulse: 90   Resp: 16   Temp: 99 °F (37 2 °C)   SpO2: 93%     right Upper Extremity extremity:  · Dressings C/D/I  · SILT s/s/sp/dp/t distally  · Motor intact ehl/fhl, ankle df/pfj  · Patient refusing to straight leg raise 2/2 to rib pain  · Calf non-tender to palpation    _*_*_*_*_*_*_*_*_*_*_*_*_*_*_*_*_*_*_*_*_*_*_*_*_*_*_*_*_*_*_*_*_*_*_*_*_*_*_*_*_*    Assessment: 32 y  o female post operative day 6 right knee traumatic arthrotomy s/p I&D    Plan:  · WBAT RLE  · DVT prophylaxis per primary  · Analgesics  · PT/OT  · Dispo: Ortho will follow  · Will continue to assess for acute blood loss anemia    Bhavin Meng MD

## 2018-01-22 NOTE — SOCIAL WORK
Pt is filing her auto claim   Once sent, cm will pass along to the [facility] agencies following for potential pt/ot/sn

## 2018-01-22 NOTE — ASSESSMENT & PLAN NOTE
- hemoglobin stable  - Restarted on dvt pharmacologic therapy yesterday  Patient had refused lovenox, explained risks of DVT/PE and possible death, accepted lovenox this morning   - f/u H/H

## 2018-01-22 NOTE — ASSESSMENT & PLAN NOTE
S/P I+D by ortho 1/17   Compartments soft   Hemoglobin stable   Continue to observe  Distal neurovascularly intact   WBAT RLE

## 2018-01-22 NOTE — ASSESSMENT & PLAN NOTE
Resolved - last creatinine 0 53, may have been a laboratory error   - Likely to be multifactorial secondary to dye load of CT scan and arteriogram, as well as lasix   - Hold nephrogeic medications   - No toradol   - creatinine is stable

## 2018-01-23 VITALS
HEART RATE: 89 BPM | WEIGHT: 193.56 LBS | DIASTOLIC BLOOD PRESSURE: 66 MMHG | TEMPERATURE: 99.3 F | BODY MASS INDEX: 33.05 KG/M2 | OXYGEN SATURATION: 95 % | HEIGHT: 64 IN | SYSTOLIC BLOOD PRESSURE: 122 MMHG | RESPIRATION RATE: 18 BRPM

## 2018-01-23 PROCEDURE — 97116 GAIT TRAINING THERAPY: CPT

## 2018-01-23 PROCEDURE — 97535 SELF CARE MNGMENT TRAINING: CPT

## 2018-01-23 PROCEDURE — 97530 THERAPEUTIC ACTIVITIES: CPT

## 2018-01-23 RX ORDER — AMOXICILLIN 250 MG
1 CAPSULE ORAL DAILY
Qty: 30 TABLET | Refills: 0 | Status: SHIPPED | OUTPATIENT
Start: 2018-01-23 | End: 2018-02-01

## 2018-01-23 RX ORDER — ACETAMINOPHEN 325 MG/1
650 TABLET ORAL EVERY 6 HOURS PRN
Qty: 30 TABLET | Refills: 0 | Status: SHIPPED | OUTPATIENT
Start: 2018-01-23 | End: 2018-02-01

## 2018-01-23 RX ORDER — OXYCODONE HYDROCHLORIDE 10 MG/1
10 TABLET ORAL EVERY 4 HOURS PRN
Qty: 25 TABLET | Refills: 0 | Status: SHIPPED | OUTPATIENT
Start: 2018-01-23 | End: 2018-01-23 | Stop reason: HOSPADM

## 2018-01-23 RX ORDER — METHOCARBAMOL 500 MG/1
500 TABLET, FILM COATED ORAL EVERY 6 HOURS PRN
Qty: 16 TABLET | Refills: 0 | Status: SHIPPED | OUTPATIENT
Start: 2018-01-23 | End: 2018-02-01

## 2018-01-23 RX ADMIN — METHOCARBAMOL 500 MG: 500 TABLET ORAL at 13:44

## 2018-01-23 RX ADMIN — OXYCODONE HYDROCHLORIDE 10 MG: 5 TABLET ORAL at 09:57

## 2018-01-23 RX ADMIN — METHADONE HYDROCHLORIDE 67 MG: 10 CONCENTRATE ORAL at 09:53

## 2018-01-23 RX ADMIN — OXYCODONE HYDROCHLORIDE 10 MG: 5 TABLET ORAL at 13:44

## 2018-01-23 RX ADMIN — CLONAZEPAM 1 MG: 1 TABLET ORAL at 09:52

## 2018-01-23 RX ADMIN — HYDROMORPHONE HYDROCHLORIDE 0.5 MG: 1 INJECTION, SOLUTION INTRAMUSCULAR; INTRAVENOUS; SUBCUTANEOUS at 01:27

## 2018-01-23 RX ADMIN — ONDANSETRON 4 MG: 2 INJECTION INTRAMUSCULAR; INTRAVENOUS at 01:29

## 2018-01-23 RX ADMIN — OXYCODONE HYDROCHLORIDE 10 MG: 5 TABLET ORAL at 00:12

## 2018-01-23 RX ADMIN — DIAZEPAM 5 MG: 5 TABLET ORAL at 09:55

## 2018-01-23 NOTE — PHYSICAL THERAPY NOTE
Physical Therapy Progress Note     01/23/18 8665   Pain Assessment   Pain Assessment 0-10   Pain Score 7   Pain Type Acute pain   Pain Location Rib cage   Pain Orientation Bilateral   Hospital Pain Intervention(s) Repositioned; Ambulation/increased activity; Emotional support   Response to Interventions Tolerated  Restrictions/Precautions   RLE Weight Bearing Per Order WBAT   Other Precautions Fall Risk;Pain   Subjective   Subjective The pt  states that she is frustrated with her situation, but she is agreeable to ambulate with therapy  She was pleased with the progress that she made today  Bed Mobility   Supine to Sit 4  Minimal assistance   Additional items Increased time required;Verbal cues;LE management   Transfers   Sit to Stand 4  Minimal assistance   Additional items Assist x 1; Increased time required;Verbal cues   Stand to Sit 5  Supervision   Additional items Verbal cues   Ambulation/Elevation   Gait pattern Excessively slow; Step to;Short stride; Foward flexed; Inconsistent genevieve; Shuffling;Decreased foot clearance; Forward Flexion;Narrow DEVIKA; Antalgic   Gait Assistance 5  Supervision   Additional items Verbal cues   Assistive Device Rolling walker   Distance 270 feet with several standing rests  Balance   Static Sitting Good   Dynamic Sitting Fair +   Static Standing Fair   Ambulatory Fair -   Activity Tolerance   Activity Tolerance Patient tolerated treatment well;Patient limited by pain   Nurse [address] [place], RN  Exercises   Knee AROM Long Arc Quad Sitting;Bilateral;AROM;5 reps   Assessment   Prognosis Good   Problem List Decreased strength;Decreased endurance; Impaired balance;Decreased mobility; Decreased safety awareness;Pain   Assessment The pt  has improved mobility today as she is ambulating beyond household distances, but she requires frequent stops  She also requires extensive time to ambulate (60 mins to complete the total ambulatory distance) as she has very short strides   These did improve with increased step length, but it remained significantly limited with two-to-three inches per stride  She also required increased time for any dynamic turns or manuevering around obstacles  She was also educated in how to bump up the stairs and onto a chair as well as proper home setup  She will benefit from continued physical therapy and inpatient rehab is recommended  However, the pt  declines rehab  Barriers to Discharge Inaccessible home environment;Decreased caregiver support   Goals   Patient Goals To feel better  [facility] Expiration Date 01/29/18   Treatment Day 2   Plan   Treatment/Interventions Functional transfer training;LE strengthening/ROM; Elevations; Therapeutic exercise; Endurance training;Patient/family training;Bed mobility;Gait training   Progress Progressing toward goals   PT Frequency Other (Comment)  (6x a week )   Recommendation   Recommendation Post acute IP rehab   Equipment Recommended Kaveh Gambino PTA

## 2018-01-23 NOTE — PLAN OF CARE
Problem: PHYSICAL THERAPY ADULT  Goal: Performs mobility at highest level of function for planned discharge setting  See evaluation for individualized goals  Treatment/Interventions: Functional transfer training, LE strengthening/ROM, Elevations, Therapeutic exercise, Endurance training, Patient/family training, Equipment eval/education, Bed mobility, Gait training, Compensatory technique education, Continued evaluation, Spoke to nursing, Spoke to case management  Equipment Recommended: Obie Castleman       See flowsheet documentation for full assessment, interventions and recommendations  Outcome: Progressing  Prognosis: Good  Problem List: Decreased strength, Decreased endurance, Impaired balance, Decreased mobility, Decreased safety awareness, Pain  Assessment: The pt  has improved mobility today as she is ambulating beyond household distances, but she requires frequent stops  She also requires extensive time to ambulate (60 mins to complete the total ambulatory distance) as she has very short strides  These did improve with increased step length, but it remained significantly limited with two-to-three inches per stride  She also required increased time for any dynamic turns or manuevering around obstacles  She was also educated in how to bump up the stairs and onto a chair as well as proper home setup  She will benefit from continued physical therapy and inpatient rehab is recommended  However, the pt  declines rehab  Barriers to Discharge: Inaccessible home environment, Decreased caregiver support     Recommendation: Post acute IP rehab     PT - OK to Discharge: Yes    See flowsheet documentation for full assessment

## 2018-01-23 NOTE — PLAN OF CARE
Problem: PAIN - ADULT  Goal: Verbalizes/displays adequate comfort level or baseline comfort level  Interventions:  - Encourage patient to monitor pain and request assistance  - Assess pain using appropriate pain scale  - Administer analgesics based on type and severity of pain and evaluate response  - Implement non-pharmacological measures as appropriate and evaluate response  - Consider cultural and social influences on pain and pain management  - Notify physician/advanced practitioner if interventions unsuccessful or patient reports new pain   Outcome: Progressing      Problem: INFECTION - ADULT  Goal: Absence or prevention of progression during hospitalization  INTERVENTIONS:  - Assess and monitor for signs and symptoms of infection  - Monitor lab/diagnostic results  - Monitor all insertion sites, i e  indwelling lines, tubes, and drains  - Monitor endotracheal (as able) and nasal secretions for changes in amount and color  - [place] appropriate cooling/warming therapies per order  - Administer medications as ordered  - Instruct and encourage patient and family to use good hand hygiene technique  - Identify and instruct in appropriate isolation precautions for identified infection/condition   Outcome: Progressing    Goal: Absence of fever/infection during neutropenic period  INTERVENTIONS:  - Monitor WBC  - Implement neutropenic guidelines  Outcome: Progressing      Problem: SAFETY ADULT  Goal: Maintain or return to baseline ADL function  INTERVENTIONS:  -  Assess patient's ability to carry out ADLs; assess patient's baseline for ADL function and identify physical deficits which impact ability to perform ADLs (bathing, care of mouth/teeth, toileting, grooming, dressing, etc )  - Assess/evaluate cause of self-care deficits   - Assess range of motion  - Assess patient's mobility; develop plan if impaired  - Assess patient's need for assistive devices and provide as appropriate  - Encourage maximum independence but intervene and supervise when necessary  ¯ Involve family in performance of ADLs  ¯ Assess for home care needs following discharge   ¯ Request OT consult to assist with ADL evaluation and planning for discharge  ¯ Provide patient education as appropriate   Outcome: Progressing    Goal: Maintain or return mobility status to optimal level  INTERVENTIONS:  - Assess patient's baseline mobility status (ambulation, transfers, stairs, etc )    - Identify cognitive and physical deficits and behaviors that affect mobility  - Identify mobility aids required to assist with transfers and/or ambulation (gait belt, sit-to-stand, lift, walker, cane, etc )  - [place] fall precautions as indicated by assessment  - Record patient progress and toleration of activity level on Mobility SBAR; progress patient to next Phase/Stage  - Instruct patient to call for assistance with activity based on assessment  - Request Rehabilitation consult to assist with strengthening/weightbearing, etc   Outcome: Progressing      Problem: DISCHARGE PLANNING  Goal: Discharge to home or other facility with appropriate resources  INTERVENTIONS:  - Identify barriers to discharge w/patient and caregiver  - Arrange for needed discharge resources and transportation as appropriate  - Identify discharge learning needs (meds, wound care, etc )  - Arrange for interpretive services to assist at discharge as needed  - Refer to Case Management Department for coordinating discharge planning if the patient needs post-hospital services based on physician/advanced practitioner order or complex needs related to functional status, cognitive ability, or social support system   Outcome: Progressing      Problem: Knowledge Deficit  Goal: Patient/family/caregiver demonstrates understanding of disease process, treatment plan, medications, and discharge instructions  Complete learning assessment and assess knowledge base    Interventions:  - Provide teaching at level of understanding  - Provide teaching via preferred learning methods   Outcome: Progressing      Problem: Prexisting or High Potential for Compromised Skin Integrity  Goal: Skin integrity is maintained or improved  INTERVENTIONS:  - Identify patients at risk for skin breakdown  - Assess and monitor skin integrity  - Assess and monitor nutrition and hydration status  - Monitor labs (i e  albumin)  - Assess for incontinence   - Turn and reposition patient  - Assist with mobility/ambulation  - Relieve pressure over bony prominences  - Avoid friction and shearing  - Provide appropriate hygiene as needed including keeping skin clean and dry  - Evaluate need for skin moisturizer/barrier cream  - Collaborate with interdisciplinary team (i e  Nutrition, Rehabilitation, etc )   - Patient/family teaching   Outcome: Progressing      Problem: Potential for Falls  Goal: Patient will remain free of falls  INTERVENTIONS:  - Assess patient frequently for physical needs  -  Identify cognitive and physical deficits and behaviors that affect risk of falls  -  [place] fall precautions as indicated by assessment   - Educate patient/family on patient safety including physical limitations  - Instruct patient to call for assistance with activity based on assessment  - Modify environment to reduce risk of injury  - Consider OT/PT consult to assist with strengthening/mobility   Outcome: Progressing      Problem: Nutrition/Hydration-ADULT  Goal: Nutrient/Hydration intake appropriate for improving, restoring or maintaining nutritional needs  Monitor and assess patient's nutrition/hydration status for malnutrition (ex- brittle hair, bruises, dry skin, pale skin and conjunctiva, muscle wasting, smooth red tongue, and disorientation)  Collaborate with interdisciplinary team and initiate plan and interventions as ordered  Monitor patient's weight and dietary intake as ordered or per policy   Utilize nutrition screening tool and intervene per policy  Determine patient's food preferences and provide high-protein, high-caloric foods as appropriate       INTERVENTIONS:  - Monitor oral intake, urinary output, labs, and treatment plans  - Assess nutrition and hydration status and recommend course of action  - Evaluate amount of meals eaten  - Assist patient with eating if necessary   - Allow adequate time for meals  - Recommend/ encourage appropriate diets, oral nutritional supplements, and vitamin/mineral supplements  - Order, calculate, and assess calorie counts as needed  - Recommend, monitor, and adjust tube feedings and TPN/PPN based on assessed needs  - Assess need for intravenous fluids  - Provide specific nutrition/hydration education as appropriate  - Include patient/family/caregiver in decisions related to nutrition   Outcome: Progressing      Problem: DISCHARGE PLANNING - CARE MANAGEMENT  Goal: Discharge to post-acute care or home with appropriate resources  INTERVENTIONS:  - Conduct assessment to determine patient/family and health care team treatment goals, and need for post-acute services based on payer coverage, community resources, and patient preferences, and barriers to discharge  - Address psychosocial, clinical, and financial barriers to discharge as identified in assessment in conjunction with the patient/family and health care team  - Arrange appropriate level of post-acute services according to patient's   needs and preference and payer coverage in collaboration with the physician and health care team  - Communicate with and update the patient/family, physician, and health care team regarding progress on the discharge plan  - Arrange appropriate transportation to post-acute venues  - Patient to be evaluated   Outcome: Progressing

## 2018-01-23 NOTE — PLAN OF CARE
Problem: OCCUPATIONAL THERAPY ADULT  Goal: Performs self-care activities at highest level of function for planned discharge setting  See evaluation for individualized goals  Treatment Interventions: ADL retraining, Functional transfer training, Endurance training, Cognitive reorientation, Patient/family training, Equipment evaluation/education, Compensatory technique education, Energy conservation, Activityengagement          See flowsheet documentation for full assessment, interventions and recommendations  Outcome: Progressing  Limitation: Decreased ADL status, Decreased Safe judgement during ADL, Decreased cognition, Decreased endurance, Decreased self-care trans, Decreased high-level ADLs  Prognosis: Good  Assessment: pt was educated on side stepping, clothing and item transportation, home safety with rw to reduce fall risks  when asked to participate in lb adls seated eob pt reports i"ll figure it out"  pt was noted to keep eyes closed most of this session  but was communicating verbally with ot  pt reports she lives alone and wont need to get dressed  pt states if she needs asst she will call a friend      pt verbalizes understanding        OT Discharge Recommendation: Short Term Rehab (pt refusing)  OT - OK to Discharge: Yes (TO INPT [address] )  Kirsten Finn

## 2018-01-23 NOTE — ASSESSMENT & PLAN NOTE
- hemoglobin stable  - Restarted on dvt pharmacologic therapy yesterday  Patient had refused lovenox, explained risks of DVT/PE and possible death  Patient received lovenox yesterday

## 2018-01-23 NOTE — OCCUPATIONAL THERAPY NOTE
Occupational Therapy Treatment Note:       01/23/18 1420   Restrictions/Precautions   RLE Weight Bearing Per Order WBAT   Activity Tolerance   Activity Tolerance Patient tolerated treatment well   Assessment   Assessment pt was educated on side stepping, clothing and item transportation, home safety with rw to reduce fall risks  when asked to participate in lb adls seated eob pt reports i"ll figure it out"  pt was noted to keep eyes closed most of this session  but was communicating verbally with ot  pt reports she lives alone and wont need to get dressed  pt states if she needs asst she will call a friend      pt verbalizes understanding      Plan   Goal Expiration Date 02/02/18   Treatment Day 2   OT Frequency 3-5x/wk   Recommendation   OT Discharge Recommendation Short Term Rehab  (pt refusing)   Kirsten Muller

## 2018-01-23 NOTE — DISCHARGE INSTRUCTIONS
ARTERIOGRAM    WHAT YOU SHOULD KNOW:   An angiogram is a procedure to look at arteries in your body  Arteries are the blood vessels that carry blood from your heart to your body  AFTER YOU LEAVE:     Self-care:   · Limit activity: Rest for the remainder of the day of your procedure  Have some one with you until the next morning  Keep your arm or leg straight as much as possible  Rest as much as possible, sitting lying or reclining  Walk only to go to the bathroom, to bed or to eat  If the angiogram catheter was put in your leg, use the stairs as little as possible  No driving  · Keep your wound clean and dry  You may shower 24 hours after your procedure  The bandage you have on should fall off in 2-3 days  If there is any drainage from the puncture site, you should put on a clean bandage  · Watch for bleeding and bruising: It is normal to have a bruise and soreness where the angiogram catheter went in  · Diet:   · You may resume your regular diet, Sips of flat soda will help with mild nausea  · Drink more liquids than usual for the next 24 hours      · IMMEDIATELY Contact Interventional Radiology at [phone] Brenda PATIENTS: Contact Interventional Radiology at [phone]) Orie Dancer PATIENTS: Contact Interventional Radiology at [phone]) if any of the following occur:  · If your bruise gets larger or if you notice any active bleeding  APPLY DIRECT PRESSURE TO THE BLEEDING SITE  · If you notice increased swelling or have increased pain at the puncture site   · If you have any numbness or pain in the extremity of the puncture site   · If that extremity seems cold or pale      · You have fever greater than 101  · Persistent nausea or vomitting    Follow up with your primary healthcare provider  as directed: Write down your questions so you remember to ask them during your visits          Discharge Instructions - Orthopedics  Andrew Cochran 33 y o  female MRN: [id number]  Unit/Bed#: Operating Room    Weight Bearing Status:                                           WEIGHT BEARING AS TOLERATED TO RIGHT LEG    DVT prophylaxis:  Complete course of Lovenox as directed    Pain:  Continue analgesics as directed    Dressing Instructions:   Keep dressing clean, dry and intact until follow up appointment  Do not shower until     PT/OT:  Continue PT/OT on outpatient basis as directed    Appt Instructions: If you do not have your appointment, please call the clinic at [phone] to f/u with Dr Corky Hill in 2 weeks  Otherwise followup as scheduled below:    Contact the office sooner if you experience any increased numbness/tingling in the extremities  Miscellaneous:            Liver or Spleen Laceration   WHAT YOU NEED TO KNOW:   A liver or spleen laceration is a cut, tear, or puncture in your liver or spleen  These injuries may or may not happen at the same time  A liver or spleen laceration may be caused by a sports injury, car accident, fall, gunshot, or stab wound  DISCHARGE INSTRUCTIONS:   Call 911 for any of the following:   · You feel lightheaded, short of breath, and have chest pain  · You cough up blood  · You have trouble breathing  Seek care immediately if:   · Your arm or leg feels warm, tender, and painful  It may look swollen and red  · Your skin or eyes are yellow  · Your abdomen is larger than normal, firm, and painful  · You look pale or feel weak, dizzy, or faint  · You have new or worsening pain  · Blood soaks through your bandage  · Your wound comes apart  · You are vomiting blood or material that looks like coffee grounds  · You have blood in your bowel movements  · You have pain in your left shoulder  Contact your healthcare provider if:   · You have a fever  · Your wound is red, swollen, or draining pus  · Your pain does not get better after you take your pain medicine  · You have nausea or are vomiting       · You have questions or concerns about your condition or care  Medicines: You may need any of the following:  · Prescription pain medicine  may be given  Ask your healthcare provider how to take this medicine safely  · Antibiotics  help treat or prevent a bacterial infection  · Take your medicine as directed  Contact your healthcare provider if you think your medicine is not helping or if you have side effects  Tell him or her if you are allergic to any medicine  Keep a list of the medicines, vitamins, and herbs you take  Include the amounts, and when and why you take them  Bring the list or the pill bottles to follow-up visits  Carry your medicine list with you in case of an emergency  Activity:  Take a short walk 2 to 3 times each day  This may prevent blood clots and help you heal faster  Do not play contact sports such as football or soccer  These activities can increase your risk for bleeding from your liver or spleen  Do not drive until your healthcare provider says it is okay  Ask your healthcare provider when you can return to your regular activities and work or school  Do not take aspirin or NSAIDs:  These medicines may increase your risk for bleeding  Care for your wound as directed:  Do not remove your bandage for 24 hours or as directed  When your healthcare provider says you can shower, carefully wash around the wound with soap and water  It is okay to let soap and water gently run over your wound  Do not scrub your wound  Dry the area and put on new, clean bandages as directed  Change your bandages when they get wet or dirty  Check your wound every day for signs of infection, such as redness, swelling, or pus  Do not take a bath or swim until your healthcare provider says it is okay  These actions may cause an infection  Follow up with your healthcare provider as directed:  Write down your questions so you remember to ask them during your visits     © 2017 [facility] Information is for End User's use only and may not be sold, redistributed or otherwise used for commercial purposes  All illustrations and images included in CareNotes® are the copyrighted property of A D A M [employer] Inc  or Matt Lopez  The above information is an  only  It is not intended as medical advice for individual conditions or treatments  Talk to your doctor, nurse or pharmacist before following any medical regimen to see if it is safe and effective for you  Patellar Fracture   WHAT YOU NEED TO KNOW:   What is a patellar fracture? A patellar fracture is a break in your kneecap  What are the types of patellar fractures? · Nondisplaced fractures are broken pieces of bone that stay in line  · Displaced fractures  are broken pieces of bone that move out of line  · Open fractures  involve a break in the skin that covers the kneecap  · Closed fractures  do not involve a break in the skin  What causes a patellar fracture? · Direct trauma  such as a car accident or a sports injury can cause a patellar fracture  A direct blow to your knee or a hard fall on your knee are also examples of direct trauma  · Indirect trauma  can cause a patellar fracture  A strong contraction (tightening) of the thigh muscles when the knee is bent is an example of indirect trauma  This contraction pulls the tendon connected to the kneecap, which breaks the kneecap  What increases my risk for a patellar fracture? If you had total knee replacement surgery, you are more likely to have a patellar fracture  This is because your knee is less stable after knee replacement surgery  Osteoporosis (brittle bones) or arthritis also increases your risk  What are the signs and symptoms of a patellar fracture? · You have pain when your knee is touched or when you move your leg  · You have swelling and bruising around your knee  · You are able to straighten your leg but you cannot bend it      · You cannot stand up or put weight on your injured leg  How is a patellar fracture diagnosed? Your healthcare provider will ask you about the injury and examine you  He may check if the bone pieces are in their correct places by feeling your knee  You may need any of the following tests:  · An x-ray  is a picture of your knee to see what kind of fracture you have  More than one picture may be taken  Healthcare providers may also x-ray your other leg  · A CT scan or an MRI  is a type of x-ray that is taken of your knee  You may be given a dye before the pictures are taken to help healthcare providers see your knee better  Tell the healthcare provider if you have ever had an allergic reaction to contrast dye  Do not enter the MRI room with anything metal  Metal can cause serious injury  Tell the healthcare provider if you have any metal in or on your body  · A bone scan  is a test to look at your patellar fracture and check for infection  You will get a radioactive liquid, called a tracer, through a vein in your arm  The tracer collects in your bones and pictures are taken  How is a patellar fracture treated? · A brace, cast, or splint  may be needed  These are supportive devices that stop the kneecap from moving and help it heal  They often extend from the groin to the ankle  You may also need to use crutches to help you move around while your knee heals  · Medicines  can help decrease pain or help fight or prevent an infection  You may also need a Td vaccine  This vaccine is a booster shot used to help prevent diphtheria and tetanus  You may need the Td vaccine if you have an open patellar fracture  · Surgery:      ¨ Irrigation and debridement  is a procedure to clean and remove objects, dirt, or dead tissue from the fracture area  ¨ Open reduction and internal fixation  surgery may be needed  Healthcare providers make a large incision over your kneecap   The broken pieces of bone and ligaments are moved back to their correct places  Bone pieces and ligaments may be secured using wires, pins, screws, or bands  ¨ Closed reduction  surgery may be needed  Healthcare providers move the broken pieces of bone and ligaments back to their correct places without a large incision  External fixation may be used to hold your kneecap in place, and then later removed  ¨ Patellectomy  is surgery to remove part or all of your kneecap  · Physical therapy  may be needed  A physical therapist teaches you exercises to help improve movement and strength, and to decrease pain  What can I do to help my patellar fracture heal?   · Elevate  your knee above the level of your heart as often as you can  This will help decrease swelling and pain  Prop your knee on pillows or blankets to keep it elevated comfortably  · Apply ice  on your knee for 15 to 20 minutes every hour or as directed  Use an ice pack, or put crushed ice in a plastic bag  Cover it with a towel  Ice helps prevent tissue damage and decreases swelling and pain  When should I contact my healthcare provider? · You have a fever  · Your wound is red, swollen, and feels warm  · You have pus coming from your wound  · Your knee pain is getting worse, even after treatment  · You have questions or concerns about your condition or care  When should I seek immediate care? · Your cast or splint breaks or gets damaged  · Your foot or toes are swollen, cold, numb, or they turn white or blue  · Blood soaks through your bandage  · Your leg feels warm, tender, and painful  It may look swollen and red  · You feel lightheaded, short of breath, and have chest pain  · You cough up blood  CARE AGREEMENT:   You have the right to help plan your care  Learn about your health condition and how it may be treated  Discuss treatment options with your caregivers to decide what care you want to receive  You always have the right to refuse treatment  The above information is an  only  It is not intended as medical advice for individual conditions or treatments  Talk to your doctor, nurse or pharmacist before following any medical regimen to see if it is safe and effective for you  © 2017 [address] Information is for End User's use only and may not be sold, redistributed or otherwise used for commercial purposes  All illustrations and images included in CareNotes® are the copyrighted property of A D A M , Inc  or Matt Lopez

## 2018-01-23 NOTE — ASSESSMENT & PLAN NOTE
- S/P splenic embolization   - Vaccinations given 1/22   All three noted with dose administered times in chart   - hemoglobin stable, 10 1 today  - Abdomen soft, benign   - Continue regular diet

## 2018-01-23 NOTE — PROGRESS NOTES
Progress Note Charles Sanches 1985, 33 y o  female MRN: [id number]    [facility] Encounter: [id number]    Primary Care Provider: No primary care provider on file  Date and time admitted to hospital: 1/16/2018  3:32 PM        Acute kidney injury [facility])   Assessment & Plan    Resolved - last creatinine 0 53, may have been a laboratory error   - Likely to be multifactorial secondary to dye load of CT scan and arteriogram, as well as lasix   - Hold nephrogeic medications   - No toradol   - creatinine is stable        Open displaced comminuted fracture of right patella, type IIIA, IIIB, or IIIC   Assessment & Plan    S/P I+D by ortho 1/17   Compartments soft   Hemoglobin stable   Continue to observe  Distal neurovascularly intact   WBAT RLE        Type III open nondisplaced comminuted fracture of right patella   Assessment & Plan    - S/P operative repair with ortho on 1/17   - WBAT  - continue neurovascular checks        Hemoperitoneum   Assessment & Plan    - hemoglobin stable  - Restarted on dvt pharmacologic therapy yesterday  Patient had refused lovenox, explained risks of DVT/PE and possible death  Patient received lovenox yesterday          Methadone use   Assessment & Plan    - Currently on home dose of methadone        Heroin abuse   Assessment & Plan    - Acute pain following   - On methadone        Multiple fractures of ribs, bilateral, initial encounter for closed fracture   Assessment & Plan    Saturating well on RA  - Most recent CXR reviewed  - No evidence of pneumothorax   - Evolving right lower lobe infiltrate noted   - Continue to encourage IS and pulm toilet   - monitor for worsening respiratory status        * Spleen laceration   Assessment & Plan    - S/P splenic embolization   - Vaccinations given 1/22   All three noted with dose administered times in chart   - hemoglobin stable, 10 1 today  - Abdomen soft, benign   - Continue regular diet             Dispo: plan for discharge today with home VNA        Subjective/Objective   Chief Complaint:     Subjective: no overnigth events  Patient teary this morning stating she just wants to go home  Upon direct questioning she states she will be okay whens he retruns home, she is refusing rehab  She just continues to state " I just want to go home"    Objective:     Blood pressure 110/55, pulse 89, temperature 99 1 °F (37 3 °C), temperature source Oral, resp  rate 18, height 5' 4" (1 626 m), weight 87 8 kg (193 lb 9 oz), SpO2 93 %  ,Body mass index is 33 23 kg/m²  Intake/Output Summary (Last 24 hours) at 01/23/18 0644  Last data filed at 01/23/18 4219   Gross per 24 hour   Intake             1460 ml   Output             1000 ml   Net              460 ml       Invasive Devices     Peripheral Intravenous Line            Peripheral IV 01/18/18 Left Antecubital 4 days    Peripheral IV 01/18/18 Right Arm 4 days              Physical Exam: General: AAOx3  Respiratory: BS b/l  Abdomen: Soft, NT, no rebound/guarding  RLE: palpable DP/PT movement and sensation intact   Heart: RRR, S1s2  Ext: Warm no cyanosis       Lab, Imaging and other studies:I have personally reviewed pertinent lab results    , CBC: No results found for: WBC, HGB, HCT, MCV, PLT, ADJUSTEDWBC, MCH, MCHC, RDW, MPV, NRBC, CMP: No results found for: NA, K, CL, CO2, ANIONGAP, BUN, CREATININE, GLUCOSE, CALCIUM, AST, ALT, ALKPHOS, PROT, ALBUMIN, BILITOT, EGFR  VTE Pharmacologic Prophylaxis: Sequential compression device (Venodyne)   VTE Mechanical Prophylaxis: sequential compression device

## 2018-01-24 VITALS
OXYGEN SATURATION: 97 % | DIASTOLIC BLOOD PRESSURE: 70 MMHG | SYSTOLIC BLOOD PRESSURE: 122 MMHG | WEIGHT: 182 LBS | BODY MASS INDEX: 31.07 KG/M2 | HEIGHT: 64 IN | HEART RATE: 84 BPM
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
## 2018-01-29 DIAGNOSIS — F41.9 ANXIETY: Primary | ICD-10-CM

## 2018-01-29 RX ORDER — QUETIAPINE FUMARATE 100 MG/1
TABLET, FILM COATED ORAL
Qty: 60 TABLET | Refills: 10 | Status: SHIPPED | OUTPATIENT
Start: 2018-01-29 | End: 2018-02-07 | Stop reason: ALTCHOICE

## 2018-01-30 ENCOUNTER — TELEPHONE (OUTPATIENT)
Dept: OBGYN CLINIC | Facility: HOSPITAL | Age: 33
End: 2018-01-30

## 2018-01-30 DIAGNOSIS — Z48.89 AFTERCARE FOLLOWING SURGERY FOR INJURY OR TRAUMA: Primary | ICD-10-CM

## 2018-01-30 RX ORDER — OXYCODONE HYDROCHLORIDE 5 MG/1
TABLET ORAL
Qty: 30 TABLET | Refills: 0 | Status: SHIPPED | OUTPATIENT
Start: 2018-01-30 | End: 2018-02-06 | Stop reason: SDUPTHER

## 2018-01-30 RX ORDER — CYCLOBENZAPRINE HCL 10 MG
10 TABLET ORAL 3 TIMES DAILY PRN
Qty: 30 TABLET | Refills: 0 | Status: SHIPPED | OUTPATIENT
Start: 2018-01-30 | End: 2018-02-06 | Stop reason: SDUPTHER

## 2018-01-30 NOTE — PROGRESS NOTES
This medication was not discontinued, a new order was placed and printed out for the patient to come

## 2018-01-30 NOTE — TELEPHONE ENCOUNTER
She would like the prescription for Flexeril  I did also offer her an appt for this Thursday at 9:00am  Thanks!
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
## 2018-01-30 NOTE — TELEPHONE ENCOUNTER
Called in medication to [facility] as requested on pt's last phone message

## 2018-01-30 NOTE — TELEPHONE ENCOUNTER
Oxycodone rx is available to   I have her on Flexeril not robaxin    Is this correct?  Also, she should already have scheduled a post op appt and should be seen this week, next week at the latest

## 2018-01-31 NOTE — CASE MANAGEMENT
Notification of Discharge  This is a Notification of Discharge from our facility [address]  Please be advised that this patient has been discharge from our facility  Below you will find the admission and discharge date and time including the patients disposition  PRESENTATION DATE: 1/16/2018  3:32 PM  IP ADMISSION DATE: 1/16/18 1710  DISCHARGE DATE: 1/23/2018  7:39 PM  DISPOSITION: [address] [place] [address] in the [facility] by Matt Lopez for 2017  Network Utilization Review Department  Phone: [phone]; Fax [phone]  ATTENTION: The Network Utilization Review Department is now centralized for our 7 Facilities  Make a note that we have a new phone and fax numbers for our Department  Please call with any questions or concerns to [phone] and carefully follow the prompts so that you are directed to the right person  All voicemails are confidential  Fax any determinations, approvals, denials, and requests for initial or continue stay review clinical to [phone]  Due to HIGH CALL volume, it would be easier if you could please send faxed requests to expedite your requests and in part, help us provide discharge notifications faster

## 2018-02-01 ENCOUNTER — HOSPITAL ENCOUNTER (OUTPATIENT)
Dept: RADIOLOGY | Facility: HOSPITAL | Age: 33
Discharge: HOME | End: 2018-02-01
Attending: ORTHOPAEDIC SURGERY
Payer: COMMERCIAL

## 2018-02-01 ENCOUNTER — OFFICE VISIT (OUTPATIENT)
Dept: OBGYN CLINIC | Facility: HOSPITAL | Age: 33
End: 2018-02-01

## 2018-02-01 VITALS
WEIGHT: 189 LBS | HEART RATE: 90 BPM | BODY MASS INDEX: 32.27 KG/M2 | SYSTOLIC BLOOD PRESSURE: 108 MMHG | DIASTOLIC BLOOD PRESSURE: 70 MMHG | HEIGHT: 64 IN

## 2018-02-01 DIAGNOSIS — M25.551 PAIN IN RIGHT HIP: Primary | ICD-10-CM

## 2018-02-01 DIAGNOSIS — S81.011S: ICD-10-CM

## 2018-02-01 PROCEDURE — 73502 X-RAY EXAM HIP UNI 2-3 VIEWS: CPT

## 2018-02-01 PROCEDURE — 99024 POSTOP FOLLOW-UP VISIT: CPT | Performed by: ORTHOPAEDIC SURGERY

## 2018-02-01 RX ORDER — METHADONE HYDROCHLORIDE 10 MG/5ML
80 SOLUTION ORAL EVERY 4 HOURS PRN
COMMUNITY
End: 2018-10-20
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
## 2018-02-06 ENCOUNTER — TELEPHONE (OUTPATIENT)
Dept: OBGYN CLINIC | Facility: HOSPITAL | Age: 33
End: 2018-02-06

## 2018-02-06 ENCOUNTER — OFFICE VISIT (OUTPATIENT)
Dept: SURGERY | Facility: CLINIC | Age: 33
End: 2018-02-06
Payer: COMMERCIAL

## 2018-02-06 VITALS
HEART RATE: 76 BPM | RESPIRATION RATE: 12 BRPM | TEMPERATURE: 96.9 F | WEIGHT: 195 LBS | BODY MASS INDEX: 38.28 KG/M2 | DIASTOLIC BLOOD PRESSURE: 78 MMHG | HEIGHT: 60 IN | SYSTOLIC BLOOD PRESSURE: 122 MMHG

## 2018-02-06 DIAGNOSIS — S36.039D LACERATION OF SPLEEN, SUBSEQUENT ENCOUNTER: ICD-10-CM

## 2018-02-06 DIAGNOSIS — F43.10 PTSD (POST-TRAUMATIC STRESS DISORDER): ICD-10-CM

## 2018-02-06 DIAGNOSIS — Z48.89 AFTERCARE FOLLOWING SURGERY FOR INJURY OR TRAUMA: ICD-10-CM

## 2018-02-06 DIAGNOSIS — S22.43XA MULTIPLE FRACTURES OF RIBS, BILATERAL, INITIAL ENCOUNTER FOR CLOSED FRACTURE: Primary | ICD-10-CM
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
## 2018-02-06 PROCEDURE — 99214 OFFICE O/P EST MOD 30 MIN: CPT | Performed by: NURSE PRACTITIONER

## 2018-02-06 RX ORDER — CYCLOBENZAPRINE HCL 10 MG
10 TABLET ORAL 3 TIMES DAILY PRN
Qty: 30 TABLET | Refills: 0 | Status: SHIPPED | OUTPATIENT
Start: 2018-02-06 | End: 2018-02-06 | Stop reason: SDUPTHER

## 2018-02-06 RX ORDER — OXYCODONE HYDROCHLORIDE 5 MG/1
TABLET ORAL
Qty: 15 TABLET | Refills: 0 | Status: SHIPPED | OUTPATIENT
Start: 2018-02-06 | End: 2018-02-16 | Stop reason: SDUPTHER

## 2018-02-06 RX ORDER — CYCLOBENZAPRINE HCL 10 MG
10 TABLET ORAL 3 TIMES DAILY PRN
Qty: 30 TABLET | Refills: 0 | Status: SHIPPED | OUTPATIENT
Start: 2018-02-06 | End: 2018-02-16 | Stop reason: SDUPTHER

## 2018-02-06 RX ORDER — OXYCODONE HYDROCHLORIDE 5 MG/1
TABLET ORAL
Qty: 15 TABLET | Refills: 0 | Status: SHIPPED | OUTPATIENT
Start: 2018-02-06 | End: 2018-02-06 | Stop reason: SDUPTHER
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
## 2018-02-06 NOTE — ASSESSMENT & PLAN NOTE
- continues to have intermittent pain in rib cage, mostly posterior  - denies any SOB or HOGAN  - States she is working with IS machine multiple times a day  - reports she has been taking flexeril and oxycodone for pain  I refilled her flexeril today as she reports she only has enough for 2 more days left and I refilled 15 tabs of oxycodone for her rib pain, only 2 weeks out from injury  She is aware that she will not get additional oxycodone after this refill for her rib fractures as after 2-3 weeks of recovery it is not standard to continue to require narcotic medications for pain control  She verbalizes understanding and agreement with this plan  I checked the [facility] website prior to providing a prescription for the oxycodone and it did not show any prescriptions for narcotic medications  I provided her 15 tablets and e-prescribed it to [facility] pharmacy today  She was instructed to cont to limit the use of oxycodone and to decrease the flexeril from TID to BID dosing over the next week and continue to titrate down the flexeril from there
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
## 2018-02-06 NOTE — ASSESSMENT & PLAN NOTE
- grade 4 s/p embolization during hospital admission  - no nausea, vomiting, pain, dizziness or fatigue  She admits to mild bloating sensation but states this is chronic for her  She reports she has been adhering to her activity restrictions as described in the hospital  I reviewed these restrictions with her today including weight limit of < 10 pounds and no strenuous activity  - I reviewed symptoms to return to the ER with including nausea, vomiting, abdominal pain, dizziness, or extreme fatigue  - Will have patient follow-up 10-12 weeks post injury, consider repeat CT scan at that visit

## 2018-02-07 ENCOUNTER — OFFICE VISIT (OUTPATIENT)
Dept: FAMILY MEDICINE CLINIC | Facility: CLINIC | Age: 33
End: 2018-02-07
Payer: COMMERCIAL

## 2018-02-07 VITALS
WEIGHT: 195 LBS | SYSTOLIC BLOOD PRESSURE: 126 MMHG | HEIGHT: 64 IN | DIASTOLIC BLOOD PRESSURE: 88 MMHG | BODY MASS INDEX: 33.29 KG/M2

## 2018-02-07 DIAGNOSIS — F11.90 METHADONE USE: ICD-10-CM

## 2018-02-07 DIAGNOSIS — S36.039D LACERATION OF SPLEEN, SUBSEQUENT ENCOUNTER: ICD-10-CM

## 2018-02-07 DIAGNOSIS — F43.10 PTSD (POST-TRAUMATIC STRESS DISORDER): ICD-10-CM

## 2018-02-07 DIAGNOSIS — S82.044F: ICD-10-CM

## 2018-02-07 DIAGNOSIS — F31.9 BIPOLAR 1 DISORDER (HCC): ICD-10-CM

## 2018-02-07 DIAGNOSIS — S22.43XA MULTIPLE FRACTURES OF RIBS, BILATERAL, INITIAL ENCOUNTER FOR CLOSED FRACTURE: Primary | ICD-10-CM

## 2018-02-07 DIAGNOSIS — F41.9 ANXIETY: ICD-10-CM
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
## 2018-02-07 DIAGNOSIS — F11.10 HEROIN ABUSE (HCC): ICD-10-CM

## 2018-02-07 PROBLEM — S82.041C: Status: RESOLVED | Noted: 2018-01-16 | Resolved: 2018-02-07

## 2018-02-07 RX ORDER — QUETIAPINE FUMARATE 100 MG/1
100 TABLET, FILM COATED ORAL 2 TIMES DAILY
Qty: 180 TABLET | Refills: 3 | Status: SHIPPED | OUTPATIENT
Start: 2018-02-07 | End: 2018-05-14 | Stop reason: ALTCHOICE

## 2018-02-07 RX ORDER — CLONAZEPAM 1 MG/1
1 TABLET ORAL 3 TIMES DAILY
Qty: 90 TABLET | Refills: 5 | Status: SHIPPED | OUTPATIENT
Start: 2018-02-07 | End: 2018-07-28 | Stop reason: SDUPTHER

## 2018-02-07 RX ORDER — GABAPENTIN 300 MG/1
300 CAPSULE ORAL 3 TIMES DAILY
Qty: 90 CAPSULE | Refills: 5 | Status: SHIPPED | OUTPATIENT
Start: 2018-02-07 | End: 2018-02-07 | Stop reason: SDUPTHER

## 2018-02-07 RX ORDER — FUROSEMIDE 20 MG/1
20 TABLET ORAL DAILY
Qty: 90 TABLET | Refills: 2 | Status: SHIPPED | OUTPATIENT
Start: 2018-02-07 | End: 2018-10-24 | Stop reason: SDUPTHER

## 2018-02-07 RX ORDER — LEVOTHYROXINE SODIUM 0.03 MG/1
25 TABLET ORAL DAILY
Qty: 90 TABLET | Refills: 3 | Status: SHIPPED | OUTPATIENT
Start: 2018-02-07 | End: 2018-12-22 | Stop reason: SDUPTHER

## 2018-02-07 RX ORDER — TOPIRAMATE 50 MG/1
100 TABLET, FILM COATED ORAL 2 TIMES DAILY
Qty: 180 TABLET | Refills: 3 | Status: SHIPPED | OUTPATIENT
Start: 2018-02-07 | End: 2018-09-20 | Stop reason: HOSPADM

## 2018-02-07 RX ORDER — GABAPENTIN 300 MG/1
300 CAPSULE ORAL 3 TIMES DAILY
Qty: 90 CAPSULE | Refills: 0 | Status: SHIPPED | OUTPATIENT
Start: 2018-02-07 | End: 2018-03-05 | Stop reason: SDUPTHER

## 2018-02-07 RX ORDER — AMLODIPINE BESYLATE 5 MG/1
5 TABLET ORAL DAILY
Qty: 90 TABLET | Refills: 3 | Status: SHIPPED | OUTPATIENT
Start: 2018-02-07 | End: 2018-06-11
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
## 2018-02-13 NOTE — TELEPHONE ENCOUNTER
Patient is calling back stating that she has run out of these pills she does admit they were filled on the 6th for her but she has run out again  Patient seems a bit confused as to what is going on as she says that it was filled that day but that she never got a call back from us last week when she put the request for more in

## 2018-02-13 NOTE — TELEPHONE ENCOUNTER
Can we find out who she is getting refills from? She will need to ONLY refill them from a single provider

## 2018-02-15 DIAGNOSIS — F43.10 PTSD (POST-TRAUMATIC STRESS DISORDER): ICD-10-CM

## 2018-02-15 RX ORDER — CLONAZEPAM 1 MG/1
TABLET ORAL
Qty: 90 TABLET | OUTPATIENT
Start: 2018-02-15

## 2018-02-15 NOTE — TELEPHONE ENCOUNTER
Patient called in again requesting Flexeril and Oxycodone  It looks like the medication was refilled by Dr Suzanne Delaney from [facility]  Patient stated she has no more medication and would like a call back

## 2018-02-16 ENCOUNTER — OFFICE VISIT (OUTPATIENT)
Dept: OBGYN CLINIC | Facility: HOSPITAL | Age: 33
End: 2018-02-16

## 2018-02-16 ENCOUNTER — TELEPHONE (OUTPATIENT)
Dept: OBGYN CLINIC | Facility: HOSPITAL | Age: 33
End: 2018-02-16

## 2018-02-16 VITALS
DIASTOLIC BLOOD PRESSURE: 72 MMHG | HEART RATE: 100 BPM | WEIGHT: 195.11 LBS | BODY MASS INDEX: 33.31 KG/M2 | SYSTOLIC BLOOD PRESSURE: 105 MMHG | HEIGHT: 64 IN

## 2018-02-16 DIAGNOSIS — Z48.89 AFTERCARE FOLLOWING SURGERY FOR INJURY OR TRAUMA: ICD-10-CM

## 2018-02-16 PROCEDURE — 99024 POSTOP FOLLOW-UP VISIT: CPT | Performed by: PHYSICIAN ASSISTANT

## 2018-02-16 RX ORDER — OXYCODONE HYDROCHLORIDE 5 MG/1
TABLET ORAL
Qty: 40 TABLET | Refills: 0 | Status: SHIPPED | OUTPATIENT
Start: 2018-02-16 | End: 2018-06-11

## 2018-02-16 RX ORDER — CYCLOBENZAPRINE HCL 10 MG
10 TABLET ORAL 3 TIMES DAILY PRN
Qty: 45 TABLET | Refills: 0 | Status: SHIPPED | OUTPATIENT
Start: 2018-02-16 | End: 2018-02-16 | Stop reason: SDUPTHER

## 2018-02-16 RX ORDER — CEPHALEXIN 250 MG/1
250 CAPSULE ORAL EVERY 6 HOURS SCHEDULED
Qty: 20 CAPSULE | Refills: 0 | Status: SHIPPED | OUTPATIENT
Start: 2018-02-16 | End: 2018-02-16 | Stop reason: SDUPTHER

## 2018-02-16 RX ORDER — CEPHALEXIN 250 MG/1
250 CAPSULE ORAL EVERY 6 HOURS SCHEDULED
Qty: 20 CAPSULE | Refills: 0 | Status: SHIPPED | OUTPATIENT
Start: 2018-02-16 | End: 2018-02-21

## 2018-02-16 RX ORDER — CYCLOBENZAPRINE HCL 10 MG
10 TABLET ORAL 3 TIMES DAILY PRN
Qty: 45 TABLET | Refills: 0 | Status: SHIPPED | OUTPATIENT
Start: 2018-02-16 | End: 2018-03-06 | Stop reason: SDUPTHER

## 2018-02-16 NOTE — TELEPHONE ENCOUNTER
Caller: [facility]  Callback# [phone]  Pt# [phone]  Dr Jozef Dawson stated patient need to be seen today 02/16/18 she thinks patient is showing signs of cellulitis where cast is located  Also she is requesting an order for home care please advise thanks   Order for home care need to be dated for 02/13/18

## 2018-02-19 PROBLEM — Z48.89 AFTERCARE FOLLOWING SURGERY FOR INJURY OR TRAUMA: Status: ACTIVE | Noted: 2018-02-19
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
## 2018-03-02 DIAGNOSIS — Z48.89 AFTERCARE FOLLOWING SURGERY FOR INJURY OR TRAUMA: ICD-10-CM

## 2018-03-02 RX ORDER — CYCLOBENZAPRINE HCL 10 MG
10 TABLET ORAL 3 TIMES DAILY PRN
Qty: 45 TABLET | Refills: 0 | OUTPATIENT
Start: 2018-03-02

## 2018-03-02 NOTE — TELEPHONE ENCOUNTER
Patient was weaned off the flexeril and we will not prescribe at this time  She can follow-up with her PCP

## 2018-03-05 DIAGNOSIS — Z48.89 AFTERCARE FOLLOWING SURGERY FOR INJURY OR TRAUMA: ICD-10-CM

## 2018-03-05 DIAGNOSIS — F43.10 PTSD (POST-TRAUMATIC STRESS DISORDER): ICD-10-CM

## 2018-03-05 RX ORDER — GABAPENTIN 300 MG/1
CAPSULE ORAL
Qty: 90 CAPSULE | Refills: 5 | Status: SHIPPED | OUTPATIENT
Start: 2018-03-05 | End: 2018-05-14 | Stop reason: ALTCHOICE

## 2018-03-05 NOTE — TELEPHONE ENCOUNTER
[address]    ALEJANDRO WILBURN IN [place] WAS PRESCRIBING It             [facility]

## 2018-03-06 RX ORDER — CYCLOBENZAPRINE HCL 10 MG
10 TABLET ORAL 3 TIMES DAILY PRN
Qty: 45 TABLET | Refills: 2 | Status: SHIPPED | OUTPATIENT
Start: 2018-03-06 | End: 2018-04-13 | Stop reason: SDUPTHER

## 2018-03-15 ENCOUNTER — HOSPITAL ENCOUNTER (OUTPATIENT)
Dept: RADIOLOGY | Facility: HOSPITAL | Age: 33
Discharge: HOME | End: 2018-03-15
Attending: ORTHOPAEDIC SURGERY
Payer: COMMERCIAL

## 2018-03-15 ENCOUNTER — OFFICE VISIT (OUTPATIENT)
Dept: OBGYN CLINIC | Facility: HOSPITAL | Age: 33
End: 2018-03-15

## 2018-03-15 VITALS
HEART RATE: 96 BPM | WEIGHT: 205.8 LBS | HEIGHT: 64 IN | DIASTOLIC BLOOD PRESSURE: 83 MMHG | SYSTOLIC BLOOD PRESSURE: 119 MMHG | BODY MASS INDEX: 35.13 KG/M2

## 2018-03-15 DIAGNOSIS — Z48.89 AFTERCARE FOLLOWING SURGERY: ICD-10-CM

## 2018-03-15 DIAGNOSIS — M54.50 LUMBAR SPINE PAIN: ICD-10-CM

## 2018-03-15 DIAGNOSIS — Z48.89 AFTERCARE FOLLOWING SURGERY: Primary | ICD-10-CM

## 2018-03-15 PROCEDURE — 99024 POSTOP FOLLOW-UP VISIT: CPT | Performed by: ORTHOPAEDIC SURGERY

## 2018-03-15 PROCEDURE — 73562 X-RAY EXAM OF KNEE 3: CPT
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
## 2018-03-22 DIAGNOSIS — F32.A DEPRESSION, UNSPECIFIED DEPRESSION TYPE: Primary | ICD-10-CM

## 2018-03-24 RX ORDER — QUETIAPINE FUMARATE 25 MG/1
TABLET, FILM COATED ORAL
Qty: 60 TABLET | Refills: 1 | Status: SHIPPED | OUTPATIENT
Start: 2018-03-24 | End: 2018-05-14 | Stop reason: ALTCHOICE
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
## 2018-04-10 ENCOUNTER — OFFICE VISIT (OUTPATIENT)
Dept: SURGERY | Facility: CLINIC | Age: 33
End: 2018-04-10
Payer: COMMERCIAL

## 2018-04-10 VITALS
SYSTOLIC BLOOD PRESSURE: 120 MMHG | WEIGHT: 210.4 LBS | BODY MASS INDEX: 35.92 KG/M2 | HEIGHT: 64 IN | TEMPERATURE: 97.1 F | DIASTOLIC BLOOD PRESSURE: 78 MMHG

## 2018-04-10 DIAGNOSIS — S36.039D LACERATION OF SPLEEN, SUBSEQUENT ENCOUNTER: Primary | ICD-10-CM

## 2018-04-10 DIAGNOSIS — S22.43XA MULTIPLE FRACTURES OF RIBS, BILATERAL, INITIAL ENCOUNTER FOR CLOSED FRACTURE: ICD-10-CM

## 2018-04-10 DIAGNOSIS — F43.10 PTSD (POST-TRAUMATIC STRESS DISORDER): ICD-10-CM

## 2018-04-10 DIAGNOSIS — S39.012D STRAIN OF LUMBAR REGION, SUBSEQUENT ENCOUNTER: ICD-10-CM

## 2018-04-10 PROBLEM — S39.012A LUMBAR STRAIN: Status: ACTIVE | Noted: 2018-04-10

## 2018-04-10 PROCEDURE — 99213 OFFICE O/P EST LOW 20 MIN: CPT | Performed by: SURGERY

## 2018-04-10 NOTE — ASSESSMENT & PLAN NOTE
Mild lumbar and thoracic paraspinal tenderness/tightness likely related to strict activity restrictions from spleenic laceration  No evidence of traumatic injury  Recommend heat/OTC icy hot/christine neville and gentle exercise     Outpatient PT referral
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
## 2018-04-10 NOTE — ASSESSMENT & PLAN NOTE
Pt denies any chest pain, shortness of breath or difficulty with deep breathing     Appears to be well recovered from her rib fracture
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
## 2018-04-10 NOTE — ASSESSMENT & PLAN NOTE
S/p grade 4 spllen laceration now 12 weeks from injury  Describes mild bloating sensation but thinks she may have gained weight from being so inactive due to her activity restrictions  Will order CT ab/pelvis with IV contrast to evaluate spleen - if appears to be healed can resume all previous activities without restrictions at this time   Pt aware that we will call her with the results once we receive them   BMP prior to CT scan to ensure adequate renal function for IV contrast

## 2018-04-13 DIAGNOSIS — Z48.89 AFTERCARE FOLLOWING SURGERY FOR INJURY OR TRAUMA: ICD-10-CM

## 2018-04-13 RX ORDER — CYCLOBENZAPRINE HCL 10 MG
TABLET ORAL
Qty: 90 TABLET | Refills: 1 | Status: SHIPPED | OUTPATIENT
Start: 2018-04-13 | End: 2018-06-11

## 2018-05-14 ENCOUNTER — OFFICE VISIT (OUTPATIENT)
Dept: FAMILY MEDICINE CLINIC | Facility: CLINIC | Age: 33
End: 2018-05-14
Payer: COMMERCIAL

## 2018-05-14 VITALS
WEIGHT: 224 LBS | BODY MASS INDEX: 38.24 KG/M2 | HEIGHT: 64 IN | DIASTOLIC BLOOD PRESSURE: 74 MMHG | HEART RATE: 101 BPM | SYSTOLIC BLOOD PRESSURE: 126 MMHG | OXYGEN SATURATION: 98 %

## 2018-05-14 DIAGNOSIS — S81.011S: ICD-10-CM

## 2018-05-14 DIAGNOSIS — F11.90 METHADONE USE: ICD-10-CM

## 2018-05-14 DIAGNOSIS — F43.10 PTSD (POST-TRAUMATIC STRESS DISORDER): ICD-10-CM

## 2018-05-14 DIAGNOSIS — G62.9 NEUROPATHY: ICD-10-CM

## 2018-05-14 DIAGNOSIS — F41.9 ANXIETY: ICD-10-CM

## 2018-05-14 DIAGNOSIS — E66.09 OBESITY DUE TO EXCESS CALORIES WITHOUT SERIOUS COMORBIDITY, UNSPECIFIED CLASSIFICATION: Primary | ICD-10-CM

## 2018-05-14 PROBLEM — F31.9 BIPOLAR DEPRESSION (HCC): Status: ACTIVE | Noted: 2017-12-04

## 2018-05-14 PROCEDURE — 99214 OFFICE O/P EST MOD 30 MIN: CPT | Performed by: FAMILY MEDICINE

## 2018-05-14 RX ORDER — GABAPENTIN 600 MG/1
600 TABLET ORAL 3 TIMES DAILY
Qty: 270 TABLET | Refills: 3 | Status: SHIPPED | OUTPATIENT
Start: 2018-05-14 | End: 2018-11-06 | Stop reason: ALTCHOICE

## 2018-05-14 RX ORDER — BUPROPION HYDROCHLORIDE 150 MG/1
150 TABLET ORAL DAILY
Qty: 90 TABLET | Refills: 3 | Status: SHIPPED | OUTPATIENT
Start: 2018-05-14 | End: 2018-05-14 | Stop reason: SDUPTHER

## 2018-05-14 RX ORDER — BUPROPION HYDROCHLORIDE 150 MG/1
150 TABLET ORAL DAILY
Qty: 90 TABLET | Refills: 3 | Status: SHIPPED | OUTPATIENT
Start: 2018-05-14 | End: 2018-06-12 | Stop reason: SDUPTHER

## 2018-05-14 RX ORDER — PHENTERMINE HYDROCHLORIDE 37.5 MG/1
37.5 CAPSULE ORAL EVERY MORNING
Qty: 30 CAPSULE | Refills: 2 | Status: SHIPPED | OUTPATIENT
Start: 2018-05-14 | End: 2018-06-11

## 2018-05-14 RX ORDER — GABAPENTIN 600 MG/1
600 TABLET ORAL 3 TIMES DAILY
Qty: 270 TABLET | Refills: 3 | Status: SHIPPED | OUTPATIENT
Start: 2018-05-14 | End: 2018-05-14 | Stop reason: SDUPTHER

## 2018-05-14 RX ORDER — PHENTERMINE HYDROCHLORIDE 37.5 MG/1
37.5 CAPSULE ORAL EVERY MORNING
Qty: 30 CAPSULE | Refills: 2 | Status: SHIPPED | OUTPATIENT
Start: 2018-05-14 | End: 2018-05-14 | Stop reason: SDUPTHER

## 2018-05-14 NOTE — PROGRESS NOTES
[address] [facility]        NAME: Jeanna Tong is a 33 y o  female  : 1985    MRN: [id number]  DATE: May 14, 2018  TIME: 10:34 AM    Assessment and Plan   Obesity due to excess calories without serious comorbidity, unspecified classification [E66 09]  1  Obesity due to excess calories without serious comorbidity, unspecified classification  phentermine 37 5 MG capsule    TSH, 3rd generation    TSH, 3rd generation    DISCONTINUED: phentermine 37 5 MG capsule   2  Anxiety  buPROPion (WELLBUTRIN XL) 150 mg 24 hr tablet    TSH, 3rd generation    TSH, 3rd generation    DISCONTINUED: buPROPion (WELLBUTRIN XL) 150 mg 24 hr tablet   3  Methadone use     4  Laceration of knee, complicated, right, sequela     5  Neuropathy  gabapentin (NEURONTIN) 600 MG tablet    DISCONTINUED: gabapentin (NEURONTIN) 600 MG tablet   6  PTSD (post-traumatic stress disorder)           Patient Instructions     Patient Instructions   25 min face-face----stop seroquel due to wt gain--add wellbutrin--? traz in [facility]          Chief Complaint     Chief Complaint   Patient presents with    Follow-up     discuss medications/ other concerns         History of Present Illness       c/o wt gain on seroquel        Review of Systems   Review of Systems   Constitutional: Negative for appetite change, chills, diaphoresis and fever  HENT: Negative for ear pain, rhinorrhea, sinus pressure and sore throat  Eyes: Negative for discharge, redness and itching  Respiratory: Negative for cough, shortness of breath and wheezing  Cardiovascular: Negative for chest pain and palpitations  Gastrointestinal: Negative for abdominal pain, diarrhea, nausea and vomiting           Current Medications       Current Outpatient Prescriptions:     clonazePAM (KlonoPIN) 1 mg tablet, Take 1 tablet (1 mg total) by mouth 3 (three) times a day, Disp: 90 tablet, Rfl: 5    cyclobenzaprine (FLEXERIL) 10 mg tablet, TAKE ONE TABLET BY MOUTH THREE TIMES DAILY AS NEEDED MUSCLE SPASM, Disp: 90 tablet, Rfl: 1    furosemide (LASIX) 20 mg tablet, Take 1 tablet (20 mg total) by mouth daily, Disp: 90 tablet, Rfl: 2    levothyroxine 25 mcg tablet, Take 1 tablet (25 mcg total) by mouth daily, Disp: 90 tablet, Rfl: 3    methadone (DOLOPHINE) 10 MG/5ML solution, Take 60 mg by mouth every 4 (four) hours as needed, Disp: , Rfl:     phentermine 37 5 MG capsule, Take 1 capsule (37 5 mg total) by mouth every morning, Disp: 30 capsule, Rfl: 2    topiramate (TOPAMAX) 50 MG tablet, Take 2 tablets (100 mg total) by mouth 2 (two) times a day, Disp: 180 tablet, Rfl: 3    VENTOLIN  (90 Base) MCG/ACT inhaler, , Disp: , Rfl:     amLODIPine (NORVASC) 5 mg tablet, Take 1 tablet (5 mg total) by mouth daily, Disp: 90 tablet, Rfl: 3    buPROPion (WELLBUTRIN XL) 150 mg 24 hr tablet, Take 1 tablet (150 mg total) by mouth daily, Disp: 90 tablet, Rfl: 3    gabapentin (NEURONTIN) 600 MG tablet, Take 1 tablet (600 mg total) by mouth 3 (three) times a day, Disp: 270 tablet, Rfl: 3    oxyCODONE (ROXICODONE) 5 mg immediate release tablet, Take 1-2 tab Q4H PRN pain, Disp: 40 tablet, Rfl: 0    Current Allergies     Allergies as of 05/14/2018    (No Known Allergies)            The following portions of the patient's history were reviewed and updated as appropriate: allergies, current medications, past family history, past medical history, past social history, past surgical history and problem list      Past Medical History:   Diagnosis Date    Anxiety     "severe"    B[facility] disorder ([facility] [address] )     Cigarette smoker     Disease of thyroid gland     Drug dependence, in remission ([facility] [address] )     H/O: whooping cough     while pregnant    Hepatitis C, chronic (HCC)     Insomnia disorder     Liver disease     Hepatitis C    Migraine     PTSD (post-traumatic stress disorder)        Past Surgical History:   Procedure Laterality Date    OTHER SURGICAL HISTORY      body piercing    IA REPAIR UMBILICAL UZZE,4+A/H,TOWOR N/A 4/19/2016    Procedure: REPAIR HERNIA UMBILICAL WITH MESH;  Surgeon: Adrian Sherwood MD;  [facility]: [facility];  Service: General    VAGINAL DELIVERY      X 6    WISDOM TOOTH EXTRACTION      X 4    WOUND DEBRIDEMENT Right 1/17/2018    Procedure: RIGHT KNEE DEBRIDEMENT; [address]; AND LACERATION REPAIR  INTRAOPERATIVE ASSESSMENT OF PATELLA TENDON;  Surgeon: nAnika Escudero MD;  [facility]: [facility];  Service: Orthopedics       Family History   Problem Relation Age of Onset    Hypertension Father          Medications have been verified  Objective   /74   Pulse 101   Ht 5' 4" (1 626 m)   Wt 102 kg (224 lb)   LMP 05/14/2018 (Exact Date)   SpO2 98%   BMI 38 45 kg/m²        Physical Exam     Physical Exam   Constitutional: She appears well-developed and well-nourished  HENT:   Right Ear: Tympanic membrane, external ear and ear canal normal  Tympanic membrane is not injected  Left Ear: Tympanic membrane, external ear and ear canal normal  Tympanic membrane is not injected  Nose: Nose normal    Mouth/Throat: Oropharynx is clear and moist and mucous membranes are normal    Eyes: Conjunctivae are normal  Pupils are equal, round, and reactive to light  Neck: Normal range of motion  Neck supple  No thyromegaly present  Cardiovascular: Normal rate, regular rhythm, normal heart sounds and intact distal pulses  Pulmonary/Chest: Effort normal and breath sounds normal  She has no wheezes  Nursing note and vitals reviewed

## 2018-05-15 ENCOUNTER — TELEPHONE (OUTPATIENT)
Dept: FAMILY MEDICINE CLINIC | Facility: CLINIC | Age: 33
End: 2018-05-15

## 2018-05-15 LAB — TSH SERPL DL<=0.005 MIU/L-ACNC: 1.12 UIU/ML (ref 0.45–4.5)

## 2018-05-15 NOTE — TELEPHONE ENCOUNTER
----- Message from Elisa Peters MD sent at 5/15/2018  6:33 AM EDT -----  NL TSH--not reason for wt gain

## 2018-06-11 ENCOUNTER — APPOINTMENT (EMERGENCY)
Dept: CT IMAGING | Facility: HOSPITAL | Age: 33
End: 2018-06-11
Payer: COMMERCIAL

## 2018-06-11 ENCOUNTER — HOSPITAL ENCOUNTER (EMERGENCY)
Facility: HOSPITAL | Age: 33
Discharge: HOME | End: 2018-06-11
Payer: COMMERCIAL

## 2018-06-11 VITALS
WEIGHT: 230 LBS | BODY MASS INDEX: 39.27 KG/M2 | OXYGEN SATURATION: 96 % | RESPIRATION RATE: 20 BRPM | SYSTOLIC BLOOD PRESSURE: 115 MMHG | HEIGHT: 64 IN | DIASTOLIC BLOOD PRESSURE: 61 MMHG | TEMPERATURE: 96.8 F | HEART RATE: 82 BPM

## 2018-06-11 DIAGNOSIS — Z48.89 AFTERCARE FOLLOWING SURGERY FOR INJURY OR TRAUMA: ICD-10-CM

## 2018-06-11 DIAGNOSIS — J45.909 ASTHMA, UNSPECIFIED ASTHMA SEVERITY, UNSPECIFIED WHETHER COMPLICATED, UNSPECIFIED WHETHER PERSISTENT: Primary | ICD-10-CM

## 2018-06-11 DIAGNOSIS — N39.0 UTI (URINARY TRACT INFECTION): Primary | ICD-10-CM

## 2018-06-11 DIAGNOSIS — R10.9 ABDOMINAL PAIN: ICD-10-CM

## 2018-06-11 DIAGNOSIS — F41.9 ANXIETY: ICD-10-CM

## 2018-06-11 LAB
ALBUMIN SERPL BCP-MCNC: 3.5 G/DL (ref 3.5–5)
ALP SERPL-CCNC: 58 U/L (ref 46–116)
ALT SERPL W P-5'-P-CCNC: 32 U/L (ref 12–78)
ANION GAP SERPL CALCULATED.3IONS-SCNC: 10 MMOL/L (ref 4–13)
AST SERPL W P-5'-P-CCNC: 15 U/L (ref 5–45)
BACTERIA UR QL AUTO: ABNORMAL /HPF
BASOPHILS # BLD AUTO: 0.03 THOUSANDS/ΜL (ref 0–0.1)
BASOPHILS NFR BLD AUTO: 0 % (ref 0–1)
BILIRUB SERPL-MCNC: 0.3 MG/DL (ref 0.2–1)
BILIRUB UR QL STRIP: NEGATIVE
BUN SERPL-MCNC: 10 MG/DL (ref 5–25)
CALCIUM SERPL-MCNC: 9 MG/DL (ref 8.3–10.1)
CHLORIDE SERPL-SCNC: 107 MMOL/L (ref 100–108)
CLARITY UR: ABNORMAL
CLARITY, POC: NORMAL
CO2 SERPL-SCNC: 26 MMOL/L (ref 21–32)
COLOR UR: YELLOW
COLOR, POC: YELLOW
CREAT SERPL-MCNC: 1.09 MG/DL (ref 0.6–1.3)
EOSINOPHIL # BLD AUTO: 0.14 THOUSAND/ΜL (ref 0–0.61)
EOSINOPHIL NFR BLD AUTO: 2 % (ref 0–6)
ERYTHROCYTE [DISTWIDTH] IN BLOOD BY AUTOMATED COUNT: 14.4 % (ref 11.6–15.1)
EXT BILIRUBIN, UA: NEGATIVE
EXT BLOOD URINE: NEGATIVE
EXT GLUCOSE, UA: NEGATIVE
EXT KETONES: NEGATIVE
EXT NITRITE, UA: NEGATIVE
EXT PH, UA: 7.5
EXT PREG TEST URINE: NEGATIVE
EXT PROTEIN, UA: NORMAL
EXT SPECIFIC GRAVITY, UA: 1.01
EXT UROBILINOGEN: 0.2
GFR SERPL CREATININE-BSD FRML MDRD: 68 ML/MIN/1.73SQ M
GLUCOSE SERPL-MCNC: 117 MG/DL (ref 65–140)
GLUCOSE UR STRIP-MCNC: NEGATIVE MG/DL
HCT VFR BLD AUTO: 40.9 % (ref 34.8–46.1)
HGB BLD-MCNC: 13.1 G/DL (ref 11.5–15.4)
HGB UR QL STRIP.AUTO: NEGATIVE
IMM GRANULOCYTES # BLD AUTO: 0.07 THOUSAND/UL (ref 0–0.2)
IMM GRANULOCYTES NFR BLD AUTO: 1 % (ref 0–2)
KETONES UR STRIP-MCNC: NEGATIVE MG/DL
LEUKOCYTE ESTERASE UR QL STRIP: ABNORMAL
LYMPHOCYTES # BLD AUTO: 1.92 THOUSANDS/ΜL (ref 0.6–4.47)
LYMPHOCYTES NFR BLD AUTO: 28 % (ref 14–44)
MCH RBC QN AUTO: 30.4 PG (ref 26.8–34.3)
MCHC RBC AUTO-ENTMCNC: 32 G/DL (ref 31.4–37.4)
MCV RBC AUTO: 95 FL (ref 82–98)
MONOCYTES # BLD AUTO: 0.34 THOUSAND/ΜL (ref 0.17–1.22)
MONOCYTES NFR BLD AUTO: 5 % (ref 4–12)
MUCOUS THREADS UR QL AUTO: ABNORMAL
NEUTROPHILS # BLD AUTO: 4.46 THOUSANDS/ΜL (ref 1.85–7.62)
NEUTS SEG NFR BLD AUTO: 64 % (ref 43–75)
NITRITE UR QL STRIP: NEGATIVE
NON-SQ EPI CELLS URNS QL MICRO: ABNORMAL /HPF
NRBC BLD AUTO-RTO: 0 /100 WBCS
PH UR STRIP.AUTO: 7 [PH] (ref 4.5–8)
PLATELET # BLD AUTO: 222 THOUSANDS/UL (ref 149–390)
PMV BLD AUTO: 10.4 FL (ref 8.9–12.7)
POTASSIUM SERPL-SCNC: 3.9 MMOL/L (ref 3.5–5.3)
PROT SERPL-MCNC: 7.3 G/DL (ref 6.4–8.2)
PROT UR STRIP-MCNC: NEGATIVE MG/DL
RBC # BLD AUTO: 4.31 MILLION/UL (ref 3.81–5.12)
RBC #/AREA URNS AUTO: ABNORMAL /HPF
SODIUM SERPL-SCNC: 143 MMOL/L (ref 136–145)
SP GR UR STRIP.AUTO: 1.01 (ref 1–1.03)
UROBILINOGEN UR QL STRIP.AUTO: 1 E.U./DL
WBC # BLD AUTO: 6.96 THOUSAND/UL (ref 4.31–10.16)
WBC # BLD EST: NORMAL 10*3/UL
WBC #/AREA URNS AUTO: ABNORMAL /HPF

## 2018-06-11 PROCEDURE — 74177 CT ABD & PELVIS W/CONTRAST: CPT

## 2018-06-11 PROCEDURE — 80053 COMPREHEN METABOLIC PANEL: CPT | Performed by: PHYSICIAN ASSISTANT

## 2018-06-11 PROCEDURE — 36415 COLL VENOUS BLD VENIPUNCTURE: CPT | Performed by: PHYSICIAN ASSISTANT

## 2018-06-11 PROCEDURE — 81001 URINALYSIS AUTO W/SCOPE: CPT | Performed by: PHYSICIAN ASSISTANT

## 2018-06-11 PROCEDURE — 99284 EMERGENCY DEPT VISIT MOD MDM: CPT

## 2018-06-11 PROCEDURE — 87086 URINE CULTURE/COLONY COUNT: CPT | Performed by: PHYSICIAN ASSISTANT

## 2018-06-11 PROCEDURE — 81002 URINALYSIS NONAUTO W/O SCOPE: CPT | Performed by: PHYSICIAN ASSISTANT

## 2018-06-11 PROCEDURE — 81025 URINE PREGNANCY TEST: CPT | Performed by: PHYSICIAN ASSISTANT

## 2018-06-11 PROCEDURE — 85025 COMPLETE CBC W/AUTO DIFF WBC: CPT | Performed by: PHYSICIAN ASSISTANT

## 2018-06-11 RX ORDER — CEPHALEXIN 500 MG/1
500 CAPSULE ORAL EVERY 12 HOURS SCHEDULED
Qty: 14 CAPSULE | Refills: 0 | Status: SHIPPED | OUTPATIENT
Start: 2018-06-11 | End: 2018-06-18

## 2018-06-11 RX ORDER — CYCLOBENZAPRINE HCL 10 MG
TABLET ORAL
Qty: 90 TABLET | Refills: 3 | Status: SHIPPED | OUTPATIENT
Start: 2018-06-11 | End: 2018-09-20

## 2018-06-11 RX ADMIN — IOHEXOL 100 ML: 350 INJECTION, SOLUTION INTRAVENOUS at 13:46

## 2018-06-11 NOTE — DISCHARGE INSTRUCTIONS
Drink plenty of fluids  Follow up with family doctor if symptoms not improving  Urinary Tract Infection in Women   WHAT YOU NEED TO KNOW:   A urinary tract infection (UTI) is caused by bacteria that get inside your urinary tract  Most bacteria that enter your urinary tract come out when you urinate  If the bacteria stay in your urinary tract, you may get an infection  Your urinary tract includes your kidneys, ureters, bladder, and urethra  Urine is made in your kidneys, and it flows from the ureters to the bladder  Urine leaves the bladder through the urethra  A UTI is more common in your lower urinary tract, which includes your bladder and urethra  DISCHARGE INSTRUCTIONS:   Return to the emergency department if:   · You are urinating very little or not at all  · You have a high fever with shaking chills  · You have side or back pain that gets worse  Contact your healthcare provider if:   · You have a fever  · You do not feel better after 2 days of taking antibiotics  · You are vomiting  · You have questions or concerns about your condition or care  Medicines:   · Antibiotics  help fight a bacterial infection  · Medicines  may be given to decrease pain and burning when you urinate  They will also help decrease the feeling that you need to urinate often  These medicines will make your urine orange or red  · Take your medicine as directed  Contact your healthcare provider if you think your medicine is not helping or if you have side effects  Tell him or her if you are allergic to any medicine  Keep a list of the medicines, vitamins, and herbs you take  Include the amounts, and when and why you take them  Bring the list or the pill bottles to follow-up visits  Carry your medicine list with you in case of an emergency  Follow up with your healthcare provider as directed:  Write down your questions so you remember to ask them during your visits     Prevent another UTI:   · Empty your bladder often  Urinate and empty your bladder as soon as you feel the need  Do not hold your urine for long periods of time  · Wipe from front to back after you urinate or have a bowel movement  This will help prevent germs from getting into your urinary tract through your urethra  · Drink liquids as directed  Ask how much liquid to drink each day and which liquids are best for you  You may need to drink more liquids than usual to help flush out the bacteria  Do not drink alcohol, caffeine, or citrus juices  These can irritate your bladder and increase your symptoms  Your healthcare provider may recommend cranberry juice to help prevent a UTI  · Urinate after you have sex  This can help flush out bacteria passed during sex  · Do not douche or use feminine deodorants  These can change the chemical balance in your vagina  · Change sanitary pads or tampons often  This will help prevent germs from getting into your urinary tract  · Do pelvic muscle exercises often  Pelvic muscle exercises may help you start and stop urinating  Strong pelvic muscles may help you empty your bladder easier  Squeeze these muscles tightly for 5 seconds like you are trying to hold back urine  Then relax for 5 seconds  Gradually work up to squeezing for 10 seconds  Do 3 sets of 15 repetitions a day, or as directed  © 2017 [address] [employer]  Information is for End User's use only and may not be sold, redistributed or otherwise used for commercial purposes  All illustrations and images included in CareNotes® are the copyrighted property of A D [employer] M [employer] Inc  or [employer]  The above information is an  only  It is not intended as medical advice for individual conditions or treatments  Talk to your doctor, nurse or pharmacist before following any medical regimen to see if it is safe and effective for you

## 2018-06-11 NOTE — ED PROVIDER NOTES
History  Chief Complaint   Patient presents with    Abdominal Pain     Patient states she was getting off a chair and twisted a couple of days ago  Pain started after that  Patient has increased pain with movement     81-year-old female presents the ER with abdominal pain that started a couple days ago  Patient states that she was getting off the chair and she was twisting and after that she started feeling pain to lower abdomen  Patient has had increased pain with movement  Patient denies nausea, vomiting, diarrhea, constipation, chest pain, chest tightness, shortness of breath, palpitations, wheezing, back pain, headaches, dizziness, lightheadedness  Patient states that she has noticed some frequency in urination but has not noticed dysuria or hematuria  Prior to Admission Medications   Prescriptions Last Dose Informant Patient Reported? Taking?    VENTOLIN  (90 Base) MCG/ACT inhaler   No No   Sig: inhale ONE puff by MOUTH every FOUR HOURS AS NEEDED   clonazePAM (KlonoPIN) 1 mg tablet   No Yes   Sig: Take 1 tablet (1 mg total) by mouth 3 (three) times a day   cyclobenzaprine (FLEXERIL) 10 mg tablet   No No   Sig: TAKE ONE TABLET BY MOUTH THREE TIMES DAILY AS NEEDED FOR MUSCLE SPASMS   furosemide (LASIX) 20 mg tablet   No Yes   Sig: Take 1 tablet (20 mg total) by mouth daily   gabapentin (NEURONTIN) 600 MG tablet   No Yes   Sig: Take 1 tablet (600 mg total) by mouth 3 (three) times a day   Patient taking differently: Take 300 mg by mouth 3 (three) times a day     levothyroxine 25 mcg tablet   No Yes   Sig: Take 1 tablet (25 mcg total) by mouth daily   methadone (DOLOPHINE) 10 MG/5ML solution   Yes Yes   Sig: Take 80 mg by mouth every 4 (four) hours as needed     topiramate (TOPAMAX) 50 MG tablet   No Yes   Sig: Take 2 tablets (100 mg total) by mouth 2 (two) times a day      Facility-Administered Medications: None       Past Medical History:   Diagnosis Date    Anxiety     "severe"    [facility] )     Cigarette smoker     Disease of thyroid gland     Drug dependence, in remission ([facility] )     H/O: whooping cough     while pregnant    Hepatitis C, chronic (HCC)     Insomnia disorder     Liver disease     Hepatitis C    Migraine     PTSD (post-traumatic stress disorder)        Past Surgical History:   Procedure Laterality Date    OTHER SURGICAL HISTORY      body piercing    [place] REPAIR UMBILICAL FVVK,2+T/D,FIHPK N/A 4/19/2016    Procedure: REPAIR HERNIA UMBILICAL WITH MESH;  Surgeon: Baird Councilman, MD;  Location:  MAIN OR;  Service: General    VAGINAL DELIVERY      X 6    WISDOM TOOTH EXTRACTION      X 4    WOUND DEBRIDEMENT Right 1/17/2018    Procedure: RIGHT KNEE DEBRIDEMENT; [address] OUT; AND LACERATION REPAIR  INTRAOPERATIVE ASSESSMENT OF PATELLA TENDON;  Surgeon: Bianca Richards MD;  Location:  MAIN OR;  Service: Orthopedics       Family History   Problem Relation Age of Onset    Hypertension Father      I have reviewed and agree with the history as documented  Social History   Substance Use Topics    Smoking status: Current Every Day Smoker     Packs/day: 0 50     Years: 12 00     Types: Cigarettes    Smokeless tobacco: Never Used      Comment: X 14 yrs   Alcohol use No        Review of Systems   Constitutional: Negative  HENT: Negative  Respiratory: Negative  Cardiovascular: Negative  Gastrointestinal: Positive for abdominal pain  Negative for blood in stool, constipation, diarrhea, nausea and vomiting  Genitourinary: Positive for frequency  Negative for dysuria, flank pain and hematuria  Musculoskeletal: Negative  Skin: Negative  Neurological: Negative for dizziness, syncope, weakness, light-headedness, numbness and headaches  All other systems reviewed and are negative  Physical Exam  Physical Exam   Constitutional: She is oriented to person, place, and time  Vital signs are normal  She appears well-developed and well-nourished  HENT:   Head: Normocephalic and atraumatic  Mouth/Throat: Uvula is midline, oropharynx is clear and moist and mucous membranes are normal    Eyes: Conjunctivae and EOM are normal  Pupils are equal, round, and reactive to light  Neck: Normal range of motion  Neck supple  Cardiovascular: Normal rate, regular rhythm and normal heart sounds  Pulmonary/Chest: Effort normal and breath sounds normal    Abdominal: Soft  Bowel sounds are normal  There is tenderness in the suprapubic area and left lower quadrant  There is no CVA tenderness  Musculoskeletal: Normal range of motion  Neurological: She is alert and oriented to person, place, and time  Skin: Skin is warm, dry and intact  Capillary refill takes less than 2 seconds  Psychiatric: She has a normal mood and affect  Her speech is normal and behavior is normal  Judgment and thought content normal    Nursing note and vitals reviewed        Vital Signs  ED Triage Vitals [06/11/18 1149]   Temperature Pulse Respirations Blood Pressure SpO2   97 8 °F (36 6 °C) (!) 107 20 124/76 98 %      Temp src Heart Rate Source Patient Position - Orthostatic VS BP Location FiO2 (%)   -- -- -- -- --      Pain Score       --           Vitals:    06/11/18 1149 06/11/18 1408 06/11/18 1415 06/11/18 1430   BP: 124/76 113/59 115/59 115/61   Pulse: (!) 107 80 81 82       Visual Acuity      ED Medications  Medications   iohexol (OMNIPAQUE) 350 MG/ML injection (SINGLE-DOSE) 100 mL (100 mL Intravenous Given 6/11/18 1346)       Diagnostic Studies  Results Reviewed     Procedure Component Value Units Date/Time    Urine culture [[id number]] Collected:  06/11/18 1247    Lab Status:  Final result Specimen:  Urine from Urine, Clean Catch Updated:  06/13/18 0830     Urine Culture 70,000-79,000 cfu/ml     Urine Microscopic [[id number]]  (Abnormal) Collected:  06/11/18 1247    Lab Status:  Final result Specimen:  Urine from Urine, Clean Catch Updated:  06/11/18 1322     RBC, UA None Seen /hpf      WBC, UA       Field obscured, unable to enumerate (A)     /hpf     Epithelial Cells       Field obscured, unable to enumerate (A)     /hpf     Bacteria, UA Innumerable (A) /hpf      MUCOUS THREADS Occasional (A)    UA w Reflex to Microscopic w Reflex to Culture [[id number]]  (Abnormal) Collected:  06/11/18 1247    Lab Status:  Final result Specimen:  Urine from Urine, Clean Catch Updated:  06/11/18 1303     Color, UA Yellow     Clarity, UA Cloudy     Specific Gravity, UA 1 015     pH, UA 7 0     Leukocytes, UA Small (A)     Nitrite, UA Negative     Protein, UA Negative mg/dl      Glucose, UA Negative mg/dl      Ketones, UA Negative mg/dl      Urobilinogen, UA 1 0 E U /dl      Bilirubin, UA Negative     Blood, UA Negative    Comprehensive metabolic panel [[id number]] Collected:  06/11/18 1231    Lab Status:  Final result Specimen:  Blood from Arm, Left Updated:  06/11/18 1300     Sodium 143 mmol/L      Potassium 3 9 mmol/L      Chloride 107 mmol/L      CO2 26 mmol/L      Anion Gap 10 mmol/L      BUN 10 mg/dL      Creatinine 1 09 mg/dL      Glucose 117 mg/dL      Calcium 9 0 mg/dL      AST 15 U/L      ALT 32 U/L      Alkaline Phosphatase 58 U/L      Total Protein 7 3 g/dL      Albumin 3 5 g/dL      Total Bilirubin 0 30 mg/dL      eGFR 68 ml/min/1 73sq m     Narrative:         National Kidney Disease Education Program recommendations are as follows:  GFR calculation is accurate only with a steady state creatinine  Chronic Kidney disease less than 60 ml/min/1 73 sq  meters  Kidney failure less than 15 ml/min/1 73 sq  meters      CBC and differential [[id number]] Collected:  06/11/18 1231    Lab Status:  Final result Specimen:  Blood from Arm, Left Updated:  06/11/18 1240     WBC 6 96 Thousand/uL      RBC 4 31 Million/uL      Hemoglobin 13 1 g/dL      Hematocrit 40 9 %      MCV 95 fL      MCH 30 4 pg      MCHC 32 0 g/dL      RDW 14 4 %      MPV 10 4 fL      Platelets 529 Thousands/uL      nRBC 0 /100 WBCs Neutrophils Relative 64 %      Immat GRANS % 1 %      Lymphocytes Relative 28 %      Monocytes Relative 5 %      Eosinophils Relative 2 %      Basophils Relative 0 %      Neutrophils Absolute 4 46 Thousands/µL      Immature Grans Absolute 0 07 Thousand/uL      Lymphocytes Absolute 1 92 Thousands/µL      Monocytes Absolute 0 34 Thousand/µL      Eosinophils Absolute 0 14 Thousand/µL      Basophils Absolute 0 03 Thousands/µL     POCT urinalysis dipstick [[id number]]  (Normal) Resulted:  06/11/18 1220    Lab Status:  Final result Specimen:  Urine Updated:  06/11/18 1231     Color, UA YELLOW     Clarity, UA HAZY     EXT Glucose, UA (Ref: Negative) NEGATIVE     EXT Bilirubin, UA (Ref: Negative) NEGATIVE     EXT Ketones, UA (Ref: Negative) NEGATIVE     EXT Spec Grav, UA 1 010     EXT Blood, UA (Ref: Negative) NEGATIVE     EXT pH, UA 7 5     EXT Protein, UA (Ref: Negative) TRACE     EXT Urobilinogen, UA (Ref: 0 2- 1 0) 0 2     EXT Leukocytes, UA (Ref: Negative) SMALL     EXT Nitrite, UA (Ref: Negative) NEGATIVE    POCT pregnancy, urine [[id number]]  (Normal) Resulted:  06/11/18 1220    Lab Status:  Final result Updated:  06/11/18 1230     EXT PREG TEST UR (Ref: Negative) NEGATIVE                 CT abdomen pelvis with contrast   Final Result by Lois Lind MD (06/11 1417)      1  No acute abnormality in the abdomen or pelvis  2   Small lung nodules, stable from the most recent study in 2018  Based on current Fleischner Society 2017 Guidelines on incidental pulmonary nodule, in this patient who is less than 39years of age, management decisions should be made on a case by case    basis, keeping in mind that infectious causes are more likely than cancer and that use of serial CT should be minimized              Workstation performed: [id number]                    Procedures  Procedures       Phone Contacts  ED Phone Contact    ED Course                               MDM  Number of Diagnoses or Management Options  Abdominal pain: new and requires workup  UTI (urinary tract infection): new and requires workup     Amount and/or Complexity of Data Reviewed  Clinical lab tests: ordered and reviewed  Tests in the radiology section of CPT®: ordered and reviewed    Patient Progress  Patient progress: stable    CritCare Time    Disposition  Final diagnoses:   UTI (urinary tract infection)   Abdominal pain     Time reflects when diagnosis was documented in both MDM as applicable and the Disposition within this note     Time User Action Codes Description Comment    6/11/2018  2:42 PM Rohan Sober Add [N39 0] UTI (urinary tract infection)     6/11/2018  2:42 PM Rohan Sober Add [R10 9] Abdominal pain       ED Disposition     ED Disposition Condition Comment    Discharge  METROPLYOU Courtney discharge to home/self care      Condition at discharge: Stable        Follow-up Information     Follow up With Specialties Details Why Contact Info    June Little MD Family Medicine Call For Recheck, If symptoms worsen [address]  [address]  [facility] [address]  [phone]            Discharge Medication List as of 6/11/2018  2:45 PM      START taking these medications    Details   cephalexin (KEFLEX) 500 mg capsule Take 1 capsule (500 mg total) by mouth every 12 (twelve) hours for 7 days, Starting Mon 6/11/2018, Until Mon 6/18/2018, Print      cyclobenzaprine (FLEXERIL) 10 mg tablet TAKE ONE TABLET BY MOUTH THREE TIMES DAILY AS NEEDED FOR MUSCLE SPASMS, Normal      VENTOLIN  (90 Base) MCG/ACT inhaler inhale ONE puff by MOUTH every FOUR HOURS AS NEEDED, Normal         CONTINUE these medications which have NOT CHANGED    Details   clonazePAM (KlonoPIN) 1 mg tablet Take 1 tablet (1 mg total) by mouth 3 (three) times a day, Starting Wed 2/7/2018, Print      furosemide (LASIX) 20 mg tablet Take 1 tablet (20 mg total) by mouth daily, Starting Wed 2/7/2018, Normal      gabapentin (NEURONTIN) 600 MG tablet Take 1 tablet (600 mg total) by mouth 3 (three) times a day, Starting Mon 5/14/2018, Normal      levothyroxine 25 mcg tablet Take 1 tablet (25 mcg total) by mouth daily, Starting Wed 2/7/2018, Normal      methadone (DOLOPHINE) 10 MG/5ML solution Take 80 mg by mouth every 4 (four) hours as needed  , Historical Med      topiramate (TOPAMAX) 50 MG tablet Take 2 tablets (100 mg total) by mouth 2 (two) times a day, Starting Wed 2/7/2018, Normal      buPROPion (WELLBUTRIN XL) 150 mg 24 hr tablet Take 1 tablet (150 mg total) by mouth daily, Starting Mon 5/14/2018, Normal           No discharge procedures on file      ED Provider  Electronically Signed by           Pavan Antonio PA-C  06/20/18 0126
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
## 2018-06-11 NOTE — ED NOTES
Patient complains of pain in her RLQ  Pt has positive bowel sounds       Essence Barron RN  06/11/18 7944

## 2018-06-12 RX ORDER — TRAZODONE HYDROCHLORIDE 50 MG/1
TABLET ORAL
Qty: 60 TABLET | Refills: 5 | Status: SHIPPED | OUTPATIENT
Start: 2018-06-12 | End: 2018-09-29

## 2018-06-12 RX ORDER — BUPROPION HYDROCHLORIDE 300 MG/1
300 TABLET ORAL DAILY
Qty: 90 TABLET | Refills: 1 | Status: SHIPPED | OUTPATIENT
Start: 2018-06-12 | End: 2018-09-20 | Stop reason: DRUGHIGH

## 2018-06-13 LAB — BACTERIA UR CULT: NORMAL

## 2018-07-07 DIAGNOSIS — R52 PAIN: Primary | ICD-10-CM

## 2018-07-07 RX ORDER — GABAPENTIN 100 MG/1
CAPSULE ORAL
Qty: 270 CAPSULE | Refills: 3 | Status: SHIPPED | OUTPATIENT
Start: 2018-07-07 | End: 2018-09-20 | Stop reason: HOSPADM

## 2018-07-14 DIAGNOSIS — F31.81 BIPOLAR 2 DISORDER (HCC): Primary | ICD-10-CM

## 2018-07-16 RX ORDER — TOPIRAMATE 100 MG/1
TABLET, FILM COATED ORAL
Qty: 270 TABLET | Refills: 0 | Status: SHIPPED | OUTPATIENT
Start: 2018-07-16 | End: 2018-10-15 | Stop reason: SDUPTHER

## 2018-07-28 DIAGNOSIS — F43.10 PTSD (POST-TRAUMATIC STRESS DISORDER): ICD-10-CM

## 2018-07-28 NOTE — TELEPHONE ENCOUNTER
Pt called stating that all her scripts were at the pharmacy except just this one (clonazepam) and that she needed me to put it though immediately cause she was there waiting  I informed pt that the doctor had already left for the day and that he would not be in til Monday  I told pt that we usually require 48-72 hours for all refills  She then got very nasty with me  I informed her that the information was in the computer waiting for tw approval and that if he goes on the computer this weekend it may go through earlier but could not guarantee that would happen    Pt hung up on me as I was trying to explain all this to her

## 2018-07-30 RX ORDER — CLONAZEPAM 1 MG/1
TABLET ORAL
Qty: 90 TABLET | Refills: 5 | Status: SHIPPED | OUTPATIENT
Start: 2018-07-30 | End: 2018-12-22 | Stop reason: SDUPTHER

## 2018-08-09 DIAGNOSIS — E66.09 OBESITY DUE TO EXCESS CALORIES WITHOUT SERIOUS COMORBIDITY, UNSPECIFIED CLASSIFICATION: ICD-10-CM

## 2018-08-09 RX ORDER — PHENTERMINE HYDROCHLORIDE 37.5 MG/1
TABLET ORAL
Qty: 30 TABLET | Refills: 2 | Status: SHIPPED | OUTPATIENT
Start: 2018-08-09 | End: 2019-03-04

## 2018-08-18 DIAGNOSIS — F43.10 PTSD (POST-TRAUMATIC STRESS DISORDER): ICD-10-CM

## 2018-08-18 RX ORDER — GABAPENTIN 300 MG/1
CAPSULE ORAL
Qty: 90 CAPSULE | Refills: 10 | Status: SHIPPED | OUTPATIENT
Start: 2018-08-18 | End: 2018-09-20 | Stop reason: HOSPADM

## 2018-09-20 ENCOUNTER — ANNUAL EXAM (OUTPATIENT)
Dept: FAMILY MEDICINE CLINIC | Facility: CLINIC | Age: 33
End: 2018-09-20
Payer: COMMERCIAL

## 2018-09-20 ENCOUNTER — OFFICE VISIT (OUTPATIENT)
Dept: FAMILY MEDICINE CLINIC | Facility: CLINIC | Age: 33
End: 2018-09-20
Payer: COMMERCIAL

## 2018-09-20 VITALS
WEIGHT: 208 LBS | DIASTOLIC BLOOD PRESSURE: 76 MMHG | BODY MASS INDEX: 35.51 KG/M2 | SYSTOLIC BLOOD PRESSURE: 122 MMHG | HEART RATE: 95 BPM | OXYGEN SATURATION: 98 % | HEIGHT: 64 IN

## 2018-09-20 VITALS
HEIGHT: 64 IN | WEIGHT: 208 LBS | OXYGEN SATURATION: 98 % | DIASTOLIC BLOOD PRESSURE: 76 MMHG | BODY MASS INDEX: 35.51 KG/M2 | SYSTOLIC BLOOD PRESSURE: 122 MMHG | HEART RATE: 95 BPM

## 2018-09-20 DIAGNOSIS — E03.9 ADULT HYPOTHYROIDISM: ICD-10-CM

## 2018-09-20 DIAGNOSIS — F41.9 ANXIETY: ICD-10-CM

## 2018-09-20 DIAGNOSIS — Z01.419 ENCOUNTER FOR ANNUAL ROUTINE GYNECOLOGICAL EXAMINATION: Primary | ICD-10-CM

## 2018-09-20 DIAGNOSIS — F43.10 PTSD (POST-TRAUMATIC STRESS DISORDER): ICD-10-CM

## 2018-09-20 DIAGNOSIS — F31.9 BIPOLAR DEPRESSION (HCC): Primary | ICD-10-CM

## 2018-09-20 DIAGNOSIS — N89.8 LEUKORRHEA: ICD-10-CM

## 2018-09-20 PROBLEM — B18.2 HEPATITIS C, CHRONIC (HCC): Status: ACTIVE | Noted: 2018-09-20

## 2018-09-20 PROCEDURE — 99214 OFFICE O/P EST MOD 30 MIN: CPT | Performed by: FAMILY MEDICINE

## 2018-09-20 PROCEDURE — 3008F BODY MASS INDEX DOCD: CPT | Performed by: FAMILY MEDICINE

## 2018-09-20 PROCEDURE — 99395 PREV VISIT EST AGE 18-39: CPT | Performed by: NURSE PRACTITIONER

## 2018-09-20 RX ORDER — CARISOPRODOL 350 MG/1
350 TABLET ORAL 2 TIMES DAILY
Qty: 60 TABLET | Refills: 5 | Status: SHIPPED | OUTPATIENT
Start: 2018-09-20 | End: 2018-12-26 | Stop reason: SDUPTHER

## 2018-09-20 RX ORDER — BUPROPION HYDROCHLORIDE 150 MG/1
150 TABLET ORAL EVERY MORNING
COMMUNITY
End: 2018-12-26
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
## 2018-09-20 NOTE — PROGRESS NOTES
[id number]         NAME: Marilyn Razo is a 33 y o  female  : 1985    MRN: [id number]  DATE: 2018  TIME: 10:02 AM    Assessment and Plan   Bipolar depression ([facility] [address] ) [F31 30]  1  Bipolar depression (HCC)  carisoprodol (SOMA) 350 mg tablet   2  Anxiety     3  PTSD (post-traumatic stress disorder)     4  Adult hypothyroidism           Patient Instructions     Patient Instructions   Trial soma in place of flexeril--25 min face-face consult          Chief Complaint     Chief Complaint   Patient presents with    Follow-up     6mos med-check         History of Present Illness       Stable on current meds        Review of Systems   Review of Systems   Constitutional: Negative for fatigue, fever and unexpected weight change  HENT: Negative for congestion, sinus pain and sore throat  Eyes: Negative for visual disturbance  Respiratory: Negative for shortness of breath and wheezing  Cardiovascular: Negative for chest pain and palpitations  Gastrointestinal: Negative for abdominal pain, nausea and vomiting  Musculoskeletal: Negative  Negative for arthralgias and myalgias  Neurological: Negative for syncope, weakness and numbness  Psychiatric/Behavioral: Negative  Negative for confusion, dysphoric mood and suicidal ideas           Current Medications       Current Outpatient Prescriptions:     buPROPion (WELLBUTRIN XL) 150 mg 24 hr tablet, Take 150 mg by mouth every morning, Disp: , Rfl:     clonazePAM (KlonoPIN) 1 mg tablet, TAKE ONE TABLET BY MOUTH THREE TIMES DAILY, Disp: 90 tablet, Rfl: 5    furosemide (LASIX) 20 mg tablet, Take 1 tablet (20 mg total) by mouth daily, Disp: 90 tablet, Rfl: 2    gabapentin (NEURONTIN) 600 MG tablet, Take 1 tablet (600 mg total) by mouth 3 (three) times a day (Patient taking differently: Take 300 mg by mouth 3 (three) times a day  ), Disp: 270 tablet, Rfl: 3    levothyroxine 25 mcg tablet, Take 1 tablet (25 mcg total) by mouth daily, Disp: 90 tablet, Rfl: 3    phentermine (ADIPEX-P) 37 5 MG tablet, TAKE ONE TABLET BY MOUTH EVERY MORNING, Disp: 30 tablet, Rfl: 2    topiramate (TOPAMAX) 100 mg tablet, TAKE ONE TABLET BY MOUTH THREE TIMES DAILY, Disp: 270 tablet, Rfl: 0    VENTOLIN  (90 Base) MCG/ACT inhaler, inhale ONE puff by MOUTH every FOUR HOURS AS NEEDED, Disp: 18 g, Rfl: 3    carisoprodol (SOMA) 350 mg tablet, Take 1 tablet (350 mg total) by mouth 2 (two) times a day, Disp: 60 tablet, Rfl: 5    methadone (DOLOPHINE) 10 MG/5ML solution, Take 80 mg by mouth every 4 (four) hours as needed  , Disp: , Rfl:     traZODone (DESYREL) 50 mg tablet, Take 1 to 2 tabs HS (Patient not taking: Reported on 9/20/2018 ), Disp: 60 tablet, Rfl: 5    Current Allergies     Allergies as of 09/20/2018    (No Known Allergies)            The following portions of the patient's history were reviewed and updated as appropriate: allergies, current medications, past family history, past medical history, past social history, past surgical history and problem list      Past Medical History:   Diagnosis Date    Anxiety     "severe"    Bipolar disorder ([facility] [address] )     Cigarette smoker     Disease of thyroid gland     Drug dependence, in remission ([facility] [address] )     H/O: whooping cough     while pregnant    Hepatitis C, chronic (HCC)     Insomnia disorder     Liver disease     Hepatitis C    Migraine     PTSD (post-traumatic stress disorder)        Past Surgical History:   Procedure Laterality Date    OTHER SURGICAL HISTORY      body piercing    MD REPAIR UMBILICAL TIXI,8+V/Q,TREMB N/A 4/19/2016    Procedure: REPAIR HERNIA UMBILICAL WITH MESH;  Surgeon: Markel Chapin MD;  Location:  MAIN OR;  Service: General    VAGINAL DELIVERY      X 6    WISDOM TOOTH EXTRACTION      X 4    WOUND DEBRIDEMENT Right 1/17/2018    Procedure: RIGHT KNEE DEBRIDEMENT; [address]; AND LACERATION REPAIR   INTRAOPERATIVE ASSESSMENT OF PATELLA TENDON;  Surgeon: Maynor Boles MD;  [facility]: [facility];  Service: Orthopedics       Family History   Problem Relation Age of Onset    Hypertension Father          Medications have been verified  Objective   /76   Pulse 95   Ht 5' 4" (1 626 m)   Wt 94 3 kg (208 lb)   LMP 08/20/2018 (Approximate)   SpO2 98%   BMI 35 70 kg/m²        Physical Exam     Physical Exam   Constitutional: She is oriented to person, place, and time  Vital signs are normal  She appears well-developed and well-nourished  HENT:   Right Ear: Ear canal normal  Tympanic membrane is not injected  Left Ear: Ear canal normal  Tympanic membrane is not injected  Nose: Nose normal    Mouth/Throat: Oropharynx is clear and moist    Eyes: Conjunctivae and EOM are normal  Pupils are equal, round, and reactive to light  Right eye exhibits no discharge  Left eye exhibits no discharge  Neck: Normal range of motion  Neck supple  No thyromegaly present  Cardiovascular: Normal rate, regular rhythm and normal heart sounds  No murmur heard  Pulmonary/Chest: Effort normal and breath sounds normal  No respiratory distress  She has no wheezes  Abdominal: Soft  Bowel sounds are normal  She exhibits no distension  There is no tenderness  Musculoskeletal: Normal range of motion  Lymphadenopathy:     She has no cervical adenopathy  Neurological: She is alert and oriented to person, place, and time  She has normal strength and normal reflexes  She is not disoriented  No sensory deficit  Gait normal    Skin: Skin is warm and dry  Psychiatric: She has a normal mood and affect   Her speech is normal and behavior is normal  Judgment and thought content normal  Cognition and memory are normal

## 2018-09-24 LAB
CYTOLOGIST CVX/VAG CYTO: NORMAL
DX ICD CODE: NORMAL
HPV I/H RISK 1 DNA CVX QL PROBE+SIG AMP: NEGATIVE
OTHER STN SPEC: NORMAL
PATH REPORT.FINAL DX SPEC: NORMAL
SL AMB NOTE:: NORMAL
SL AMB SPECIMEN ADEQUACY: NORMAL
SL AMB TEST METHODOLOGY: NORMAL

## 2018-09-26 DIAGNOSIS — J45.909 ASTHMA, UNSPECIFIED ASTHMA SEVERITY, UNSPECIFIED WHETHER COMPLICATED, UNSPECIFIED WHETHER PERSISTENT: ICD-10-CM

## 2018-09-26 LAB
A VAGINAE DNA VAG QL NAA+PROBE: NORMAL SCORE
BVAB2 DNA VAG QL NAA+PROBE: NORMAL SCORE
C ALBICANS DNA VAG QL NAA+PROBE: NEGATIVE
C GLABRATA DNA VAG QL NAA+PROBE: NEGATIVE
C TRACH RRNA SPEC QL NAA+PROBE: NEGATIVE
MEGA1 DNA VAG QL NAA+PROBE: NORMAL SCORE
N GONORRHOEA RRNA SPEC QL NAA+PROBE: NEGATIVE
T VAGINALIS RRNA SPEC QL NAA+PROBE: NEGATIVE

## 2018-09-26 NOTE — TELEPHONE ENCOUNTER
RX WAS D/C ON 9/20 BY TW - TRIED CALLING PT TO VERIFY IF SHE REQ THIS MED - WAS TOLD I HAD THE WRONG NUMBER    VM ON EMERG CONT # FOR PT TO CB

## 2018-09-27 ENCOUNTER — TELEPHONE (OUTPATIENT)
Dept: FAMILY MEDICINE CLINIC | Facility: CLINIC | Age: 33
End: 2018-09-27

## 2018-09-27 NOTE — TELEPHONE ENCOUNTER
Phone # on chart answered by man who said wrong #, # was confirmed      Will notify Ro Ribeiromin that we will await call from patient since all normal NO letter sent       ----- Message from Piero Bernstein sent at 9/27/2018  8:17 AM EDT -----  Vaginal culture normal, pap smear normal, HPV negative

## 2018-09-28 RX ORDER — CYCLOBENZAPRINE HCL 10 MG
TABLET ORAL
Qty: 90 TABLET | OUTPATIENT
Start: 2018-09-28

## 2018-09-29 ENCOUNTER — APPOINTMENT (EMERGENCY)
Dept: RADIOLOGY | Facility: HOSPITAL | Age: 33
End: 2018-09-29
Payer: COMMERCIAL

## 2018-09-29 ENCOUNTER — HOSPITAL ENCOUNTER (EMERGENCY)
Facility: HOSPITAL | Age: 33
Discharge: HOME | End: 2018-09-29
Attending: EMERGENCY MEDICINE | Admitting: EMERGENCY MEDICINE
Payer: COMMERCIAL

## 2018-09-29 VITALS
HEIGHT: 64 IN | WEIGHT: 208 LBS | TEMPERATURE: 98.7 F | HEART RATE: 87 BPM | BODY MASS INDEX: 35.51 KG/M2 | RESPIRATION RATE: 18 BRPM | DIASTOLIC BLOOD PRESSURE: 56 MMHG | OXYGEN SATURATION: 98 % | SYSTOLIC BLOOD PRESSURE: 117 MMHG

## 2018-09-29 DIAGNOSIS — J45.909 ASTHMA, UNSPECIFIED ASTHMA SEVERITY, UNSPECIFIED WHETHER COMPLICATED, UNSPECIFIED WHETHER PERSISTENT: ICD-10-CM

## 2018-09-29 DIAGNOSIS — S80.01XA CONTUSION OF RIGHT KNEE: ICD-10-CM
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
## 2018-09-29 DIAGNOSIS — S40.012A CONTUSION OF LEFT SHOULDER: ICD-10-CM

## 2018-09-29 PROCEDURE — 71046 X-RAY EXAM CHEST 2 VIEWS: CPT

## 2018-09-29 PROCEDURE — 73564 X-RAY EXAM KNEE 4 OR MORE: CPT

## 2018-09-29 PROCEDURE — 99283 EMERGENCY DEPT VISIT LOW MDM: CPT

## 2018-09-29 PROCEDURE — 73030 X-RAY EXAM OF SHOULDER: CPT
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
## 2018-09-29 NOTE — ED NOTES
Pt states onset of menses today, denies any risk of pregnancy, pt declines offer of pregnancy test and states, "you can't get pregnant if you're not doing it"        Tonya Montgomery RN  09/29/18 8062

## 2018-09-29 NOTE — DISCHARGE INSTRUCTIONS
Contusion in Adults   WHAT YOU NEED TO KNOW:   A contusion is a bruise that appears on your skin after an injury  A bruise happens when small blood vessels tear but skin does not  When blood vessels tear, blood leaks into nearby tissue, such as soft tissue or muscle  DISCHARGE INSTRUCTIONS:   Return to the emergency department if:   · You have new trouble moving the injured area  · You have tingling or numbness in or near the injured area  · Your hand or foot below the bruise gets cold or turns pale  Contact your healthcare provider if:   · You find a new lump in the injured area  · Your symptoms do not improve with treatment after 4 to 5 days  · You have questions or concerns about your condition or care  Medicines: You may need any of the following:  · NSAIDs  help decrease swelling and pain or fever  This medicine is available with or without a doctor's order  NSAIDs can cause stomach bleeding or kidney problems in certain people  If you take blood thinner medicine, always ask your healthcare provider if NSAIDs are safe for you  Always read the medicine label and follow directions  · Prescription pain medicine  may be given  Do not wait until the pain is severe before you take your medicine  · Take your medicine as directed  Contact your healthcare provider if you think your medicine is not helping or if you have side effects  Tell him of her if you are allergic to any medicine  Keep a list of the medicines, vitamins, and herbs you take  Include the amounts, and when and why you take them  Bring the list or the pill bottles to follow-up visits  Carry your medicine list with you in case of an emergency  Follow up with your healthcare provider as directed: You may need to return within a week to check your injury again  Write down your questions so you remember to ask them during your visits    Help a contusion heal:   · Rest the injured area  or use it less than usual  If you bruised your leg or foot, you may need crutches or a cane to help you walk  This will help you keep weight off your injured body part  · Apply ice  to decrease swelling and pain  Ice may also help prevent tissue damage  Use an ice pack, or put crushed ice in a plastic bag  Cover it with a towel and place it on your bruise for 15 to 20 minutes every hour or as directed  · Use compression  to support the area and decrease swelling  Wrap an elastic bandage around the area over the bruised muscle  Make sure the bandage is not too tight  You should be able to fit 1 finger between the bandage and your skin  · Elevate (raise) your injured body part  above the level of your heart to help decrease pain and swelling  Use pillows, blankets, or rolled towels to elevate the area as often as you can  · Do not drink alcohol  as directed  Alcohol may slow healing  · Do not stretch injured muscles  right after your injury  Ask your healthcare provider when and how you may safely stretch after your injury  Gentle stretches can help increase your flexibility  · Do not massage the area or put heating pads  on the bruise right after your injury  Heat and massage may slow healing  Your healthcare provider may tell you to apply heat after several days  At that time, heat will start to help the injury heal   Prevent another contusion:   · Stretch and warm up before you play sports or exercise  · Wear protective gear when you play sports  Examples are shin guards and padding  · If you begin a new physical activity, start slowly to give your body a chance to adjust   © 2017 [address] Information is for End User's use only and may not be sold, redistributed or otherwise used for commercial purposes  All illustrations and images included in CareNotes® are the copyrighted property of [employer]  or [employer]  The above information is an  only   It is not intended as medical advice for individual conditions or treatments  Talk to your doctor, nurse or pharmacist before following any medical regimen to see if it is safe and effective for you  Shoulder Sprain   WHAT YOU NEED TO KNOW:   A shoulder sprain happens when a ligament in your shoulder is stretched or torn  Ligaments are the tough tissues that connect bones  Ligaments allow you to lift, lower, and rotate your arm  DISCHARGE INSTRUCTIONS:   Return to the emergency department if:   · You are short of breath  · Your throat feels tight, or you have trouble swallowing  · You feel sudden, sharp chest pain on the same side as your injury  · Your skin feels cold or clammy  Contact your healthcare provider if:   · The skin on your injured shoulder looks blue or pale  · You have new or increased swelling and pain in your shoulder  · You have new or increased stiffness when you move your injured shoulder  · You have questions or concerns about your condition or care  Medicines:   · Prescription pain medicine  may be given  Ask how to take this medicine safely  · Take your medicine as directed  Contact your healthcare provider if you think your medicine is not helping or if you have side effects  Tell him or her if you are allergic to any medicine  Keep a list of the medicines, vitamins, and herbs you take  Include the amounts, and when and why you take them  Bring the list or the pill bottles to follow-up visits  Carry your medicine list with you in case of an emergency  Follow up with your healthcare provider as directed:  Write down your questions so you remember to ask them during your visits  Self-care:   · Rest  your shoulder so it can heal  Avoid moving your shoulder as your injury heals  This will help decrease the risk of more damage to your shoulder  · Apply ice  on your shoulder for 15 to 20 minutes every hour or as directed  Use an ice pack, or put crushed ice in a plastic bag   Cover it with a towel  Ice helps prevent tissue damage and decreases swelling and pain  · Compress your shoulder as directed  Compression provides support and helps decrease swelling and movement so your shoulder can heal  For mild sprains, you may be given a sling to support your arm  You may need a padded brace or a plaster cast to hold your shoulder in place if the sprain is more serious  How to wear a brace, sling, or splint:  A brace, sling, or splint may be needed to limit your movement and protect your injured shoulder  · Wear your brace, sling, or splint as directed  You may need to wear it all the time and take it off only to bathe or do exercises  Ask your healthcare provider how long you should wear it  · Keep your skin clean and dry  Padding under your armpit will help absorb sweat and prevent sores on your skin  · Do not hunch your shoulders  This may cause pain  Keep your shoulders relaxed  · Position the sling over your arm and hand so that it also covers your knuckles  This will help the sling support your wrist and hand  Position your wrist higher than your elbow  Your wrist may start to hurt or go numb if your sling is too short  Exercise your shoulder:  After you rest your shoulder for 3 to 7 days, you will need to do light exercises to decrease shoulder stiffness  Check with your healthcare provider before you return to your normal activities or sports  Prevent another injury:  You can hurt your shoulder again if you stop treatment too soon  The following may decrease your risk for sprains:  · Do not exercise when you are tired or in pain  Warm up and stretch before you exercise  · Wear equipment to protect yourself when you play sports  · Wear shoes that fit well and run on flat surfaces to prevent falls  © 2017 [address] [employer] Information is for End User's use only and may not be sold, redistributed or otherwise used for commercial purposes   All illustrations and images included in [address] are the copyrighted property of [employer]  or Matt Lopez  The above information is an  only  It is not intended as medical advice for individual conditions or treatments  Talk to your doctor, nurse or pharmacist before following any medical regimen to see if it is safe and effective for you

## 2018-10-01 RX ORDER — CYCLOBENZAPRINE HCL 10 MG
TABLET ORAL
Qty: 90 TABLET | Refills: 5 | Status: SHIPPED | OUTPATIENT
Start: 2018-10-01 | End: 2018-12-26

## 2018-10-03 ENCOUNTER — TELEPHONE (OUTPATIENT)
Dept: FAMILY MEDICINE CLINIC | Facility: CLINIC | Age: 33
End: 2018-10-03

## 2018-10-03 DIAGNOSIS — R52 PAIN: Primary | ICD-10-CM

## 2018-10-03 RX ORDER — CYCLOBENZAPRINE HCL 10 MG
10 TABLET ORAL 3 TIMES DAILY PRN
Qty: 90 TABLET | Refills: 5 | Status: SHIPPED | OUTPATIENT
Start: 2018-10-03 | End: 2019-04-26 | Stop reason: SDUPTHER

## 2018-10-03 NOTE — TELEPHONE ENCOUNTER
Started Soma and does not like the side effects- c/o headaches-- liked Flexeril better  Wants to know if she can have it again      If ok send to [facility]

## 2018-10-15 DIAGNOSIS — F31.81 BIPOLAR 2 DISORDER (HCC): ICD-10-CM

## 2018-10-16 RX ORDER — TOPIRAMATE 100 MG/1
TABLET, FILM COATED ORAL
Qty: 270 TABLET | Refills: 1 | Status: SHIPPED | OUTPATIENT
Start: 2018-10-16 | End: 2019-04-09 | Stop reason: SDUPTHER

## 2018-10-20 DIAGNOSIS — F11.20 METHADONE MAINTENANCE THERAPY PATIENT (HCC): Primary | ICD-10-CM

## 2018-10-20 RX ORDER — METHADONE HYDROCHLORIDE 10 MG/1
80 TABLET ORAL DAILY
Qty: 16 TABLET | Refills: 0 | Status: SHIPPED | OUTPATIENT
Start: 2018-10-20 | End: 2019-03-04

## 2018-10-20 NOTE — PROGRESS NOTES
Pt presented at office today- missed Appt at clinic- states takes 80 mg daily- I will agree to replace at 80mg X 2 days  F/U with Clinic on Monday  Pt understands may violate any contracts with clinic protocol

## 2018-10-22 ENCOUNTER — TELEPHONE (OUTPATIENT)
Dept: FAMILY MEDICINE CLINIC | Facility: CLINIC | Age: 33
End: 2018-10-22

## 2018-10-23 NOTE — TELEPHONE ENCOUNTER
Surely they did a UD upon admission--if only taking Rxed meds then would not Rx soma/phentermine in future--rachel White Found

## 2018-10-24 DIAGNOSIS — F43.10 PTSD (POST-TRAUMATIC STRESS DISORDER): ICD-10-CM

## 2018-10-24 RX ORDER — FUROSEMIDE 20 MG/1
TABLET ORAL
Qty: 90 TABLET | Refills: 1 | Status: SHIPPED | OUTPATIENT
Start: 2018-10-24 | End: 2019-03-04

## 2018-10-27 DIAGNOSIS — J45.909 ASTHMA, UNSPECIFIED ASTHMA SEVERITY, UNSPECIFIED WHETHER COMPLICATED, UNSPECIFIED WHETHER PERSISTENT: ICD-10-CM

## 2018-11-06 ENCOUNTER — TELEPHONE (OUTPATIENT)
Dept: FAMILY MEDICINE CLINIC | Facility: CLINIC | Age: 33
End: 2018-11-06

## 2018-11-06 ENCOUNTER — HOSPITAL ENCOUNTER (OUTPATIENT)
Dept: RADIOLOGY | Facility: HOSPITAL | Age: 33
Discharge: HOME | End: 2018-11-06
Payer: COMMERCIAL

## 2018-11-06 ENCOUNTER — OFFICE VISIT (OUTPATIENT)
Dept: FAMILY MEDICINE CLINIC | Facility: CLINIC | Age: 33
End: 2018-11-06
Payer: COMMERCIAL

## 2018-11-06 VITALS
BODY MASS INDEX: 35.68 KG/M2 | RESPIRATION RATE: 16 BRPM | HEIGHT: 64 IN | HEART RATE: 106 BPM | DIASTOLIC BLOOD PRESSURE: 60 MMHG | WEIGHT: 209 LBS | SYSTOLIC BLOOD PRESSURE: 120 MMHG | OXYGEN SATURATION: 98 %

## 2018-11-06 DIAGNOSIS — G89.29 CHRONIC PAIN OF RIGHT KNEE: ICD-10-CM

## 2018-11-06 DIAGNOSIS — M25.561 ACUTE PAIN OF BOTH KNEES: ICD-10-CM

## 2018-11-06 DIAGNOSIS — M25.561 CHRONIC PAIN OF RIGHT KNEE: ICD-10-CM

## 2018-11-06 DIAGNOSIS — M25.562 ACUTE PAIN OF BOTH KNEES: ICD-10-CM

## 2018-11-06 DIAGNOSIS — M25.562 ACUTE PAIN OF BOTH KNEES: Primary | ICD-10-CM

## 2018-11-06 DIAGNOSIS — M25.561 ACUTE PAIN OF BOTH KNEES: Primary | ICD-10-CM

## 2018-11-06 PROCEDURE — 99214 OFFICE O/P EST MOD 30 MIN: CPT | Performed by: FAMILY MEDICINE

## 2018-11-06 PROCEDURE — 3008F BODY MASS INDEX DOCD: CPT | Performed by: FAMILY MEDICINE

## 2018-11-06 PROCEDURE — 4004F PT TOBACCO SCREEN RCVD TLK: CPT | Performed by: FAMILY MEDICINE

## 2018-11-06 PROCEDURE — 73564 X-RAY EXAM KNEE 4 OR MORE: CPT

## 2018-11-06 RX ORDER — HALOPERIDOL 5 MG
TABLET ORAL
Refills: 0 | COMMUNITY
Start: 2018-11-01 | End: 2018-11-27 | Stop reason: SDUPTHER
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
## 2018-11-06 NOTE — TELEPHONE ENCOUNTER
----- Message from Tyrone Acosta MD sent at 11/6/2018 11:31 AM EST -----  Xray neg both knees-----f/u ortho if Sx cont

## 2018-11-07 ENCOUNTER — TELEPHONE (OUTPATIENT)
Dept: FAMILY MEDICINE CLINIC | Facility: CLINIC | Age: 33
End: 2018-11-07
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
## 2018-11-19 ENCOUNTER — TELEPHONE (OUTPATIENT)
Dept: FAMILY MEDICINE CLINIC | Facility: CLINIC | Age: 33
End: 2018-11-19

## 2018-11-19 DIAGNOSIS — N30.00 ACUTE CYSTITIS WITHOUT HEMATURIA: Primary | ICD-10-CM

## 2018-11-19 RX ORDER — SULFAMETHOXAZOLE AND TRIMETHOPRIM 800; 160 MG/1; MG/1
1 TABLET ORAL EVERY 12 HOURS SCHEDULED
Qty: 10 TABLET | Refills: 0 | Status: SHIPPED | OUTPATIENT
Start: 2018-11-19 | End: 2018-11-24

## 2018-11-19 NOTE — TELEPHONE ENCOUNTER
HAS UTI WOULD LIKE ANTIBOTIC NO RIDE    [facility]  THE TIM DONT WORK FOR UTI FOR PATIENT

## 2018-11-24 DIAGNOSIS — F41.9 ANXIETY: ICD-10-CM

## 2018-11-26 RX ORDER — HALOPERIDOL 5 MG
TABLET ORAL
Refills: 0 | Status: CANCELLED | OUTPATIENT
Start: 2018-11-26

## 2018-11-26 RX ORDER — TRAZODONE HYDROCHLORIDE 50 MG/1
TABLET ORAL
Qty: 60 TABLET | Refills: 5 | Status: SHIPPED | OUTPATIENT
Start: 2018-11-26 | End: 2019-04-18

## 2018-11-26 NOTE — TELEPHONE ENCOUNTER
SAYS SHE IS ALL OUT  VALIUM AND HALDOL TO [facility]  [phone]

## 2018-11-26 NOTE — TELEPHONE ENCOUNTER
Called and spoke to pt - per pt she takes valium for migraines and tw prescribes it for her - tw to advise on sig and qt

## 2018-11-27 DIAGNOSIS — F41.9 ANXIETY: Primary | ICD-10-CM

## 2018-11-27 RX ORDER — DIAZEPAM 5 MG/1
TABLET ORAL
Qty: 30 TABLET | Refills: 3 | Status: SHIPPED | OUTPATIENT
Start: 2018-11-27 | End: 2018-12-22 | Stop reason: SDUPTHER

## 2018-11-27 RX ORDER — HALOPERIDOL 5 MG
5 TABLET ORAL 2 TIMES DAILY
Qty: 60 TABLET | Refills: 3 | Status: SHIPPED | OUTPATIENT
Start: 2018-11-27 | End: 2018-12-22 | Stop reason: SDUPTHER

## 2018-12-18 DIAGNOSIS — R52 PAIN: Primary | ICD-10-CM

## 2018-12-18 RX ORDER — GABAPENTIN 600 MG/1
TABLET ORAL
Qty: 90 TABLET | Refills: 0 | Status: SHIPPED | OUTPATIENT
Start: 2018-12-18 | End: 2019-02-04 | Stop reason: SDUPTHER

## 2018-12-22 DIAGNOSIS — F41.9 ANXIETY: ICD-10-CM

## 2018-12-22 DIAGNOSIS — F43.10 PTSD (POST-TRAUMATIC STRESS DISORDER): ICD-10-CM

## 2018-12-26 ENCOUNTER — OFFICE VISIT (OUTPATIENT)
Dept: FAMILY MEDICINE CLINIC | Facility: CLINIC | Age: 33
End: 2018-12-26
Payer: COMMERCIAL

## 2018-12-26 VITALS
HEIGHT: 64 IN | DIASTOLIC BLOOD PRESSURE: 74 MMHG | TEMPERATURE: 98 F | OXYGEN SATURATION: 98 % | WEIGHT: 204 LBS | HEART RATE: 114 BPM | SYSTOLIC BLOOD PRESSURE: 124 MMHG | BODY MASS INDEX: 34.83 KG/M2

## 2018-12-26 DIAGNOSIS — F41.9 ANXIETY: ICD-10-CM

## 2018-12-26 DIAGNOSIS — J01.00 ACUTE NON-RECURRENT MAXILLARY SINUSITIS: Primary | ICD-10-CM

## 2018-12-26 DIAGNOSIS — F31.9 BIPOLAR DEPRESSION (HCC): ICD-10-CM

## 2018-12-26 DIAGNOSIS — J45.909 ASTHMA, UNSPECIFIED ASTHMA SEVERITY, UNSPECIFIED WHETHER COMPLICATED, UNSPECIFIED WHETHER PERSISTENT: ICD-10-CM

## 2018-12-26 PROCEDURE — 99214 OFFICE O/P EST MOD 30 MIN: CPT | Performed by: FAMILY MEDICINE

## 2018-12-26 RX ORDER — ALPRAZOLAM 1 MG/1
1 TABLET ORAL 3 TIMES DAILY PRN
Qty: 90 TABLET | Refills: 3 | Status: SHIPPED | OUTPATIENT
Start: 2018-12-26 | End: 2019-03-04

## 2018-12-26 RX ORDER — CLONAZEPAM 1 MG/1
TABLET ORAL
Qty: 90 TABLET | Refills: 1 | Status: SHIPPED | OUTPATIENT
Start: 2018-12-26 | End: 2018-12-26

## 2018-12-26 RX ORDER — AMOXICILLIN 500 MG/1
500 CAPSULE ORAL EVERY 8 HOURS SCHEDULED
Qty: 30 CAPSULE | Refills: 0 | Status: SHIPPED | OUTPATIENT
Start: 2018-12-26 | End: 2019-01-05

## 2018-12-26 RX ORDER — DIAZEPAM 5 MG/1
TABLET ORAL
Qty: 30 TABLET | Refills: 1 | Status: SHIPPED | OUTPATIENT
Start: 2018-12-26 | End: 2018-12-26

## 2018-12-26 RX ORDER — CARISOPRODOL 350 MG/1
350 TABLET ORAL 3 TIMES DAILY
Qty: 90 TABLET | Refills: 2 | Status: SHIPPED | OUTPATIENT
Start: 2018-12-26 | End: 2019-03-12 | Stop reason: SDUPTHER

## 2018-12-26 RX ORDER — HALOPERIDOL 5 MG
TABLET ORAL
Qty: 30 TABLET | Refills: 1 | Status: SHIPPED | OUTPATIENT
Start: 2018-12-26 | End: 2019-03-04

## 2018-12-26 RX ORDER — LEVOTHYROXINE SODIUM 0.03 MG/1
TABLET ORAL
Qty: 30 TABLET | Refills: 1 | Status: SHIPPED | OUTPATIENT
Start: 2018-12-26 | End: 2019-03-12 | Stop reason: SDUPTHER

## 2018-12-26 NOTE — PROGRESS NOTES
[facility]        NAME: Javi Kaplan is a 33 y o  female  : 1985    MRN: [id number]  DATE: 2018  TIME: 2:56 PM    Assessment and Plan   Acute non-recurrent maxillary sinusitis [J01 00]  1  Acute non-recurrent maxillary sinusitis  amoxicillin (AMOXIL) 500 mg capsule   2  Asthma, unspecified asthma severity, unspecified whether complicated, unspecified whether persistent     3  Bipolar depression ([facility] [address] )  carisoprodol (SOMA) 350 mg tablet         Patient Instructions     Patient Instructions   Pt requests stop klonopin and restart Xanax---did well in past          Chief Complaint     Chief Complaint   Patient presents with    URI     x 2days    Sore Throat     x 2days         History of Present Illness       C/o sinus Sx 10 days        Review of Systems   Review of Systems   HENT: Positive for congestion, ear pain, postnasal drip, sinus pain and sinus pressure  Psychiatric/Behavioral: The patient is nervous/anxious            Current Medications       Current Outpatient Prescriptions:     amoxicillin (AMOXIL) 500 mg capsule, Take 1 capsule (500 mg total) by mouth every 8 (eight) hours for 10 days, Disp: 30 capsule, Rfl: 0    carisoprodol (SOMA) 350 mg tablet, Take 1 tablet (350 mg total) by mouth 3 (three) times a day, Disp: 90 tablet, Rfl: 2    clonazePAM (KlonoPIN) 1 mg tablet, TAKE ONE TABLET BY MOUTH THREE TIMES DAILY, Disp: 90 tablet, Rfl: 1    cyclobenzaprine (FLEXERIL) 10 mg tablet, Take 1 tablet (10 mg total) by mouth 3 (three) times a day as needed for muscle spasms, Disp: 90 tablet, Rfl: 05    furosemide (LASIX) 20 mg tablet, TAKE ONE TABLET BY MOUTH EVERY DAY, Disp: 90 tablet, Rfl: 1    gabapentin (NEURONTIN) 600 MG tablet, TAKE 1 TABLET BY MOUTH THREE TIMES DAILY, Disp: 90 tablet, Rfl: 0    haloperidol (HALDOL) 5 mg tablet, TAKE ONE-HALF TABLET BY MOUTH EVERY MORNING AND every night at bedtime, Disp: 30 tablet, Rfl: 1    levothyroxine 25 mcg tablet, TAKE ONE TABLET BY MOUTH EVERY DAY, Disp: 30 tablet, Rfl: 1    methadone (DOLOPHINE) 10 mg tablet, Take 8 tablets by mouth daily 8 tabs daily X 2 days- Missed appt at Clinic,, Disp: 16 tablet, Rfl: 0    phentermine (ADIPEX-P) 37 5 MG tablet, TAKE ONE TABLET BY MOUTH EVERY MORNING, Disp: 30 tablet, Rfl: 2    topiramate (TOPAMAX) 100 mg tablet, TAKE ONE TABLET BY MOUTH THREE TIMES DAILY, Disp: 270 tablet, Rfl: 1    traZODone (DESYREL) 50 mg tablet, TAKE 1 OR 2 TABLETS BY MOUTH AT BEDTIME, Disp: 60 tablet, Rfl: 5    VENTOLIN  (90 Base) MCG/ACT inhaler, inhale ONE puff by MOUTH every FOUR HOURS AS NEEDED, Disp: 18 g, Rfl: 0    Current Allergies     Allergies as of 12/26/2018    (No Known Allergies)            The following portions of the patient's history were reviewed and updated as appropriate: allergies, current medications, past family history, past medical history, past social history, past surgical history and problem list      Past Medical History:   Diagnosis Date    Anxiety     "severe"    Bipolar disorder ([facility] [address] )     Cigarette smoker     Disease of thyroid gland     Drug dependence, in remission ([facility] [address] )     H/O: whooping cough     while pregnant    Hepatitis C, chronic ([facility] [address] )     Insomnia disorder     Liver disease     Hepatitis C    Migraine     PTSD (post-traumatic stress disorder)        Past Surgical History:   Procedure Laterality Date    OTHER SURGICAL HISTORY      body piercing    VT REPAIR UMBILICAL OACI,8+W/C,MYKUE N/A 4/19/2016    Procedure: REPAIR HERNIA UMBILICAL WITH MESH;  Surgeon: Marjorie Nettles MD;  Location:  MAIN OR;  Service: General    VAGINAL DELIVERY      X 6    WISDOM TOOTH EXTRACTION      X 4    WOUND DEBRIDEMENT Right 1/17/2018    Procedure: RIGHT KNEE DEBRIDEMENT; [address]; AND LACERATION REPAIR   INTRAOPERATIVE ASSESSMENT OF PATELLA TENDON;  Surgeon: Ayaan Gill MD;  Location:  MAIN OR;  Service: Orthopedics       Family History   Problem Relation Age of Onset    Hypertension Father          Medications have been verified  Objective   /74   Pulse (!) 114   Temp 98 °F (36 7 °C)   Ht 5' 4" (1 626 m)   Wt 92 5 kg (204 lb)   SpO2 98%   BMI 35 02 kg/m²        Physical Exam     Physical Exam   Constitutional: She is oriented to person, place, and time  She appears well-developed and well-nourished  No distress  HENT:   Head: Normocephalic and atraumatic  Right Ear: Tympanic membrane, external ear and ear canal normal    Left Ear: Tympanic membrane, external ear and ear canal normal    Nose: Rhinorrhea present  No epistaxis  Right sinus exhibits maxillary sinus tenderness  Left sinus exhibits maxillary sinus tenderness  Mouth/Throat: Uvula is midline, oropharynx is clear and moist and mucous membranes are normal    Cardiovascular: Normal rate, regular rhythm and normal heart sounds  Exam reveals no gallop and no friction rub  No murmur heard  Pulmonary/Chest: Effort normal and breath sounds normal  No respiratory distress  She has no wheezes  She has no rales  Abdominal: She exhibits no distension  Musculoskeletal: Normal range of motion  Neurological: She is alert and oriented to person, place, and time  Skin: She is not diaphoretic  Psychiatric: She has a normal mood and affect  Her behavior is normal  Judgment and thought content normal    Nursing note and vitals reviewed

## 2019-01-01 ENCOUNTER — TELEPHONE (OUTPATIENT)
Dept: OTHER | Facility: OTHER | Age: 34
End: 2019-01-01

## 2019-01-01 NOTE — TELEPHONE ENCOUNTER
Alonso Preston, called for a second time since patient was advised to go to the ED per triage disposition  I explained to him again that I need permission from the patient before I speak with him due to HIPAA  Patient came on the phone and identified herself  She provided permission to speak with Amy Levy stated that he observed patient having a seizure and he has seen seizures by other people, so he knows what they look like  He denies being a physician or that patient has a history of seizure disorder  I explained to him that patient was advised to go to the ED to have her symptoms evaluated  He stated that patient has gone to the ED for similar symptoms before and was just given medication for her anxiety  He is insistent that he needs to speak with the PCP, Dr Irasema Robins  I paged Dr Irasema Robins via AP at [phone]  MD did not return the call and he was paged again via AP at [phone]  Dr Irasema Robins returned the call  He stated he will call the patient  He said if patient of elie call back, we should advise patient to call the office tomorrow or go to the ED

## 2019-01-01 NOTE — TELEPHONE ENCOUNTER
Geno Courtney 1985  CONFIDENTIALTY NOTICE: This fax transmission is intended only for the addressee  It contains information that is legally privileged,  confidential or otherwise protected from use or disclosure  If you are not the intended recipient, you are strictly prohibited from reviewing,  disclosing, copying using or disseminating any of this information or taking any action in reliance on or regarding this information  If you have  received this fax in error, please notify us immediately by telephone so that we can arrange for its return to us  Page: 1  2  Call Id: [id number]  Health Call  Standard Call Report  Health Call  Patient Name: Raya Cunningham  Gender: Female  : 1985  Age: 29 Y 2 D  Return Phone  Number: [phone] (Cell)  Address: [address] [place] [address] 1  City/State/Zip: [address] [place] [address]  Practice Name: [address] [place] [address]  Practice Charged:  Physician:  [facility] Name:  Relationship To  Patient: Self  Return Phone Number: [phone] (Cell)  Presenting Problem: "I am having constant tremors today  and last night fell down and couldn't  get back up "  Service Type: Triage  Charged Service 1: N/A  Pharmacy Name and  Number:  Nurse Assessment  Nurse: Skyler Urbano RN, Brooklyn Sanchez Date/Time: 2019 2:11:26 PM  Type of assessment required:  ---General (Adult or Child)  Duration of Current S/S  ---2 days of increased tremors, anxiety and feeling shaky  Location/Radiation  ---Neurologic  Temperature (F) and route:  ---Feels warm  99 4 (temporal) @ 1415  Symptom Specific Meds (Dose/Time):  ---Daily doses of Xanax, Haldol and Gabapentin @ 1200  Other S/S  ---Severe shaking and tremors for a couple days  Lilliam Reynolds found her on the bathroom  floor unconscious last night at 0230  He said she was unconscious 4-5 minutes  Lilliam Reynolds  said it looked like she was having a mild seizure  He put her bed and she continued  shaking, but fell asleep  She doesn't remember anything from the episode   Still feels  shaky and has tremors today  Intermittent cough  Denies symptoms of a UTI, but had  difficulty starting urine stream this morning  Sounds anxious and a little disoriented, as  evidenced by her repeating what she is saying several times  Pain Scale on scale of 1-10, 10 being the worst:  ---Denies pain  Geno Courtney 1985  CONFIDENTIALTY NOTICE: This fax transmission is intended only for the addressee  It contains information that is legally privileged,  confidential or otherwise protected from use or disclosure  If you are not the intended recipient, you are strictly prohibited from reviewing,  disclosing, copying using or disseminating any of this information or taking any action in reliance on or regarding this information  If you have  received this fax in error, please notify us immediately by telephone so that we can arrange for its return to us  Page: 2 of 2  Call Id: [id number]  Nurse Assessment  Symptom progression:  ---worse  Intake and Output  ---Normal appetite and fluid intake  Urinated this morning, but small amount  LMP/ Pregnancy:  ---LMP was 2-3 weeks  Breastfeeding  ---No  Last Exam/Treatment:  ---12/24/18 / sinus and ear infection  Protocols  Protocol Title Nurse Date/Time  Seizure RAMY Hope Katina Saras 1/1/2019 2:28:18 PM  Question Caller Affirmed  Disp  Time Disposition Final User  1/1/2019 2:35:23 PM Go to ED Now RAMY Hope Katina Saras  1/1/2019 2:38:08 PM RN Triaged Yes RAMY Hope, CHANTELL ALEXIS  [facility] Advice Given Per Protocol  GO TO ED NOW: You need to be seen in the Emergency Department  Go to the ER at ___________ [facility] now  Drive  carefully  NOTE TO TRIAGER - DRIVING: * Another adult should drive  * If the seizure victim remains significantly confused  or if there are any other problems with automobile transport, then the caller should be instructed to call [phone]  FIRST AID FOR  SEIZURE  DO PROTECT THE VICTIM: * Lay the seizing person on the ground, preferably on his/her side   * Place a pillow or soft item  under the head * Remove glasses * Move lamps, chairs, etc  away from person to prevent injury  * Stay with victim until help arrives  CALL  if another seizure occurs  CARE ADVICE given per Seizure (Adult) guideline  Patient states that her fiancee told her  he thinks she had a seizure, but she denies history of seizures  Patient is prescribed Metahdone  She denies missing her daily dose, but  she took it later than she normally does  I advised patient to go to the ED  Patient states, "they don't do anything for you there and I don't  have a ride " She asked if there is anything she can take for the tremors and if I think she had a seizure  I told her she would need to be  evaluated in the ED to determine if she may have had a seizure  I advised patient to call EMS if she doesn't have transportation to ED  She  was unsure of what she is going to do    Caller Understands: Yes  Caller Disagree/Comply: Unsure  PreDisposition: Unsure

## 2019-02-04 DIAGNOSIS — R52 PAIN: ICD-10-CM

## 2019-02-04 RX ORDER — GABAPENTIN 600 MG/1
TABLET ORAL
Qty: 90 TABLET | Refills: 0 | Status: SHIPPED | OUTPATIENT
Start: 2019-02-04 | End: 2019-07-22 | Stop reason: SDUPTHER

## 2019-02-07 ENCOUNTER — OFFICE VISIT (OUTPATIENT)
Dept: SURGERY | Facility: HOSPITAL | Age: 34
End: 2019-02-07
Payer: COMMERCIAL

## 2019-02-07 VITALS
HEIGHT: 64 IN | BODY MASS INDEX: 34.93 KG/M2 | SYSTOLIC BLOOD PRESSURE: 116 MMHG | TEMPERATURE: 100.8 F | DIASTOLIC BLOOD PRESSURE: 80 MMHG | HEART RATE: 121 BPM | WEIGHT: 204.6 LBS

## 2019-02-07 DIAGNOSIS — E66.9 OBESITY, UNSPECIFIED CLASSIFICATION, UNSPECIFIED OBESITY TYPE, UNSPECIFIED WHETHER SERIOUS COMORBIDITY PRESENT: ICD-10-CM

## 2019-02-07 DIAGNOSIS — R10.33 PERIUMBILICAL ABDOMINAL PAIN: Primary | ICD-10-CM

## 2019-02-07 PROCEDURE — 99212 OFFICE O/P EST SF 10 MIN: CPT | Performed by: SURGERY

## 2019-02-07 NOTE — PROGRESS NOTES
Assessment/Plan: Florence Gandara is a 29year old female who presents today regarding abdominal pain and swelling  Physical exam revealed no obvious recurrent umbilical hernia  Her abdominal wall appears to be very attenuated and she is likely feeling scar tissue in her abdomen  Explained that if she would like surgery to strengthen her abdominal wall, she will need to go through Plastics  If she notices a bulge that she is able to push in and out, she should contact the office  She may follow up as needed  She knows to contact the office if any questions or concerns arise  Obesity- Discussed diet and exercise in the context of weight loss  Explained that if she does need a recurrent hernia repair in the future, she will need to lose weight  She should talk to bariatric medicine about a prescription for her diet pill  No problem-specific Assessment & Plan notes found for this encounter  There are no diagnoses linked to this encounter  Subjective:      Patient ID: Florence Gandara is a 29 y o  female  Florence Gandara is a 29year old female who presents today regarding abdominal pain and swelling  She feels pain at her umbilicus and believes that her umbilical hernia has recurred  She describes the pain as gas bubbles in her lower abdomen  She is obese with a BMI of 35  12  She says she used to take weight loss medications but finished her prescription  She says she is no longer smoking cigarettes, but is vaping  The following portions of the patient's history were reviewed and updated as appropriate: allergies, current medications, past family history, past medical history, past social history, past surgical history and problem list     Review of Systems   Constitutional: Negative  HENT: Negative  Eyes: Negative  Respiratory: Negative  Cardiovascular: Negative  Gastrointestinal: Positive for abdominal pain  Endocrine: Negative  Genitourinary: Negative  Musculoskeletal: Negative  Skin: Negative  Allergic/Immunologic: Negative  Neurological: Negative  Hematological: Negative  Psychiatric/Behavioral: Negative  All other systems reviewed and are negative  Objective:      /80   Pulse (!) 121   Temp (!) 100 8 °F (38 2 °C) (Tympanic)   Ht 5' 4" (1 626 m)   Wt 92 8 kg (204 lb 9 6 oz)   BMI 35 12 kg/m²          Physical Exam   Constitutional: She appears well-developed and well-nourished  No distress  Obese  Cardiovascular: Normal rate, regular rhythm, normal heart sounds and intact distal pulses  Exam reveals no gallop and no friction rub  No murmur heard  Pulmonary/Chest: Effort normal and breath sounds normal  No respiratory distress  She has no wheezes  She has no rales  She exhibits no tenderness  Abdominal: Soft  Bowel sounds are normal  She exhibits no distension and no mass  There is tenderness (mild, umbilicus)  There is no rebound and no guarding  No obvious recurrent umbilical hernia  Skin: She is not diaphoretic  By signing my name below, I, Cici Triplett, attest that this documentation has been prepared under the direction and in the presence of Sam Cassidy MD  Electronically Signed: Fredis Guadarramae  2/7/19  Rory Alvarez, personally performed the services described in this documentation  All medical record entries made by the scribe were at my direction and in my presence  I have reviewed the chart and discharge instructions and agree that the record reflects my personal performance and is accurate and complete  Sam Cassidy MD  2/7/19

## 2019-02-07 NOTE — LETTER
February 11, 2019     Jetty Duverney, MD  [address]  [place] [address]    Patient: Geno Courtney   YOB: 1985   Date of Visit: 2/7/2019       Dear Dr Wilbert Robison: Thank you for referring Alison Shrestha to me for evaluation  Below are my notes for this consultation  If you have questions, please do not hesitate to call me  I look forward to following your patient along with you  Sincerely,        Maria Isabel Parks MD        CC: No Recipients  Dortha Sicard  2/7/2019  2:36 PM  Incomplete  Assessment/Plan: Alison Shrestha is a 29year old female who presents today regarding abdominal pain and swelling  Physical exam revealed no obvious recurrent umbilical hernia  Her abdominal wall appears to be very attenuated  The hard areas she feels is likely scar tissue  If she notices a bulge that she is able to push in and out, she should contact the office  She may follow up as needed  She knows to contact the office if any questions or concerns arise  Obesity- Discussed diet and exercise in the context of weight loss  Explained that if she does need a recurrent hernia repair in the future, she will need to lose weight  She should talk to bariatric medicine about a prescription for her diet pill  No problem-specific Assessment & Plan notes found for this encounter  There are no diagnoses linked to this encounter  Subjective:      Patient ID: Alison Shrestha is a 29 y o  female  Alison Shrestha is a 29year old female who presents today regarding abdominal pain and swelling  She feels pain at her umbilicus and believes that her umbilical hernia has recurred  She describes the pain as gas bubbles in her lower abdomen  She is obese with a BMI of 35  12  She says she used to take   She says she is no longer smoking cigarettes, but is vaping          The following portions of the patient's history were reviewed and updated as appropriate: allergies, current medications, past family history, past medical history, past social history, past surgical history and problem list     Review of Systems   Constitutional: Negative  HENT: Negative  Eyes: Negative  Respiratory: Negative  Cardiovascular: Negative  Gastrointestinal: Positive for abdominal pain  Endocrine: Negative  Genitourinary: Negative  Musculoskeletal: Negative  Skin: Negative  Allergic/Immunologic: Negative  Neurological: Negative  Hematological: Negative  Psychiatric/Behavioral: Negative  All other systems reviewed and are negative  Objective:      /80   Pulse (!) 121   Temp (!) 100 8 °F (38 2 °C) (Tympanic)   Ht 5' 4" (1 626 m)   Wt 92 8 kg (204 lb 9 6 oz)   BMI 35 12 kg/m²           Physical Exam      By signing my name below, I, Ryan Richard, attest that this documentation has been prepared under the direction and in the presence of Julieta Matthews MD  Electronically Signed: Janett Vasquez  2/7/19  Fanta Heredia, personally performed the services described in this documentation  All medical record entries made by the scribe were at my direction and in my presence  I have reviewed the chart and discharge instructions and agree that the record reflects my personal performance and is accurate and complete  Julieta Matthews MD  2/7/19

## 2019-02-10 DIAGNOSIS — F41.9 ANXIETY: ICD-10-CM

## 2019-02-11 RX ORDER — AMLODIPINE BESYLATE 5 MG/1
TABLET ORAL
Qty: 90 TABLET | Refills: 3 | Status: SHIPPED | OUTPATIENT
Start: 2019-02-11 | End: 2019-03-04

## 2019-03-04 ENCOUNTER — OFFICE VISIT (OUTPATIENT)
Dept: FAMILY MEDICINE CLINIC | Facility: CLINIC | Age: 34
End: 2019-03-04
Payer: COMMERCIAL

## 2019-03-04 VITALS
BODY MASS INDEX: 35.34 KG/M2 | SYSTOLIC BLOOD PRESSURE: 124 MMHG | DIASTOLIC BLOOD PRESSURE: 74 MMHG | WEIGHT: 207 LBS | OXYGEN SATURATION: 97 % | HEIGHT: 64 IN | HEART RATE: 112 BPM

## 2019-03-04 DIAGNOSIS — G89.4 CHRONIC PAIN SYNDROME: ICD-10-CM

## 2019-03-04 DIAGNOSIS — F41.9 ANXIETY: ICD-10-CM

## 2019-03-04 DIAGNOSIS — F31.9 BIPOLAR DEPRESSION (HCC): Primary | ICD-10-CM

## 2019-03-04 PROCEDURE — 3008F BODY MASS INDEX DOCD: CPT | Performed by: FAMILY MEDICINE

## 2019-03-04 PROCEDURE — 99214 OFFICE O/P EST MOD 30 MIN: CPT | Performed by: FAMILY MEDICINE

## 2019-03-04 RX ORDER — CLONAZEPAM 1 MG/1
1 TABLET ORAL 3 TIMES DAILY
Qty: 90 TABLET | Refills: 3 | Status: SHIPPED | OUTPATIENT
Start: 2019-03-04 | End: 2019-04-18

## 2019-03-04 RX ORDER — TRAMADOL HYDROCHLORIDE 50 MG/1
50 TABLET ORAL EVERY 6 HOURS PRN
Qty: 120 TABLET | Refills: 3 | Status: SHIPPED | OUTPATIENT
Start: 2019-03-04 | End: 2019-03-29

## 2019-03-04 RX ORDER — MEPROBAMATE 200 MG/1
200 TABLET ORAL 3 TIMES DAILY
Qty: 90 TABLET | Refills: 3 | Status: SHIPPED | OUTPATIENT
Start: 2019-03-04 | End: 2019-03-04 | Stop reason: SDUPTHER

## 2019-03-04 RX ORDER — MEPROBAMATE 200 MG/1
200 TABLET ORAL 3 TIMES DAILY
Qty: 90 TABLET | Refills: 3 | Status: SHIPPED | OUTPATIENT
Start: 2019-03-04 | End: 2019-04-18 | Stop reason: SDUPTHER

## 2019-03-04 NOTE — PATIENT INSTRUCTIONS
Trial tramadol for pain---insur would not cover [place] Rxed by pain MD--add meprobamate for incr anxiety ---no opioids for 6 wks

## 2019-03-04 NOTE — PROGRESS NOTES
[facility]        NAME: Javi Kaplan is a 29 y o  female  : 1985    MRN: [id number]  DATE: 2019  TIME: 2:04 PM    Assessment and Plan   Bipolar depression ([facility] [address] ) [F31 30]  1  [facility] ([facility] )     2  Anxiety  clonazePAM (KlonoPIN) 1 mg tablet    meprobamate (EQUANIL) 200 MG tablet   3  Chronic pain syndrome  traMADol (ULTRAM) 50 mg tablet         Patient Instructions     Patient Instructions   Trial tramadol for pain---insur would not cover [place] Rxed by pain MD--add meprobamate for incr anxiety ---no opioids for 6 wks          Chief Complaint     Chief Complaint   Patient presents with    Follow-up     discuss medications (off methadone now x 1month)         History of Present Illness       incr anx since off methadone      Review of Systems   Review of Systems   Constitutional: Negative for appetite change, chills, diaphoresis and fever  HENT: Negative for ear pain, rhinorrhea, sinus pressure and sore throat  Eyes: Negative for discharge, redness and itching  Respiratory: Negative for cough, shortness of breath and wheezing  Cardiovascular: Negative for chest pain and palpitations  Gastrointestinal: Negative for abdominal pain, diarrhea, nausea and vomiting  Musculoskeletal:        C/o R knee/L/S pain          Psychiatric/Behavioral: Positive for agitation  The patient is nervous/anxious            Current Medications       Current Outpatient Medications:     carisoprodol (SOMA) 350 mg tablet, Take 1 tablet (350 mg total) by mouth 3 (three) times a day, Disp: 90 tablet, Rfl: 2    gabapentin (NEURONTIN) 600 MG tablet, TAKE 1 TABLET BY MOUTH THREE TIMES DAILY, Disp: 90 tablet, Rfl: 0    levothyroxine 25 mcg tablet, TAKE ONE TABLET BY MOUTH EVERY DAY, Disp: 30 tablet, Rfl: 1    topiramate (TOPAMAX) 100 mg tablet, TAKE ONE TABLET BY MOUTH THREE TIMES DAILY, Disp: 270 tablet, Rfl: 1    traZODone (DESYREL) 50 mg tablet, TAKE 1 OR 2 TABLETS BY MOUTH AT BEDTIME, Disp: 60 tablet, Rfl: 5    VENTOLIN  (90 Base) MCG/ACT inhaler, inhale ONE puff by MOUTH every FOUR HOURS AS NEEDED, Disp: 18 g, Rfl: 0    clonazePAM (KlonoPIN) 1 mg tablet, Take 1 tablet (1 mg total) by mouth 3 (three) times a day, Disp: 90 tablet, Rfl: 3    cyclobenzaprine (FLEXERIL) 10 mg tablet, Take 1 tablet (10 mg total) by mouth 3 (three) times a day as needed for muscle spasms (Patient not taking: Reported on 3/4/2019), Disp: 90 tablet, Rfl: 05    meprobamate (EQUANIL) 200 MG tablet, Take 1 tablet (200 mg total) by mouth 3 (three) times a day, Disp: 90 tablet, Rfl: 3    traMADol (ULTRAM) 50 mg tablet, Take 1 tablet (50 mg total) by mouth every 6 (six) hours as needed for moderate pain, Disp: 120 tablet, Rfl: 3    Current Allergies     Allergies as of 03/04/2019    (No Known Allergies)            The following portions of the patient's history were reviewed and updated as appropriate: allergies, current medications, past family history, past medical history, past social history, past surgical history and problem list      Past Medical History:   Diagnosis Date    Anxiety     "severe"    Bipolar disorder ([facility] [address] )     Cigarette smoker     Disease of thyroid gland     Drug dependence, in remission ([facility] [address] )     H/O: whooping cough     while pregnant    Hepatitis C, chronic ([facility] [address] )     Insomnia disorder     Liver disease     Hepatitis C    Migraine     PTSD (post-traumatic stress disorder)        Past Surgical History:   Procedure Laterality Date    OTHER SURGICAL HISTORY      body piercing    [facility] REPAIR UMBILICAL JAIZ,1+J/T,DERSV N/A 4/19/2016    Procedure: REPAIR HERNIA UMBILICAL WITH MESH;  Surgeon: Dayton Merritt MD;  Location:  MAIN OR;  Service: General    VAGINAL DELIVERY      X 6    WISDOM TOOTH EXTRACTION      X 4    WOUND DEBRIDEMENT Right 1/17/2018    Procedure: RIGHT KNEE DEBRIDEMENT; [address]; AND LACERATION REPAIR   INTRAOPERATIVE ASSESSMENT OF PATELLA TENDON;  Surgeon: Kendall Jacobsen MD;  Location: [facility] MAIN [facility];  Service: Orthopedics       Family History   Problem Relation Age of Onset    Hypertension Father          Medications have been verified  Objective   /74   Pulse (!) 112   Ht 5' 4" (1 626 m)   Wt 93 9 kg (207 lb)   LMP 02/18/2019 (Approximate)   SpO2 97%   BMI 35 53 kg/m²        Physical Exam     Physical Exam   Constitutional: She appears well-developed and well-nourished  No distress  HENT:   Right Ear: Tympanic membrane, external ear and ear canal normal  Tympanic membrane is not injected  Left Ear: Tympanic membrane, external ear and ear canal normal  Tympanic membrane is not injected  Nose: Nose normal    Mouth/Throat: Oropharynx is clear and moist and mucous membranes are normal    Eyes: Pupils are equal, round, and reactive to light  Conjunctivae and EOM are normal  Right eye exhibits no discharge  Left eye exhibits no discharge  Neck: Normal range of motion  Neck supple  No thyromegaly present  Cardiovascular: Normal rate, regular rhythm and normal heart sounds  No murmur heard  Pulmonary/Chest: Effort normal and breath sounds normal  No respiratory distress  She has no wheezes  Lymphadenopathy:     She has no cervical adenopathy  Skin: She is not diaphoretic  Nursing note and vitals reviewed

## 2019-03-09 ENCOUNTER — HOSPITAL ENCOUNTER (EMERGENCY)
Facility: HOSPITAL | Age: 34
Discharge: HOME | End: 2019-03-10
Attending: EMERGENCY MEDICINE | Admitting: EMERGENCY MEDICINE
Payer: COMMERCIAL

## 2019-03-09 DIAGNOSIS — J20.9 BRONCHITIS, ACUTE: Primary | ICD-10-CM

## 2019-03-09 LAB
ANION GAP SERPL CALCULATED.3IONS-SCNC: 12 MMOL/L (ref 4–13)
APTT PPP: 34 SECONDS (ref 26–38)
BASOPHILS # BLD AUTO: 0.02 THOUSANDS/ΜL (ref 0–0.1)
BASOPHILS NFR BLD AUTO: 0 % (ref 0–1)
BUN SERPL-MCNC: 9 MG/DL (ref 5–25)
CALCIUM SERPL-MCNC: 8.1 MG/DL (ref 8.3–10.1)
CHLORIDE SERPL-SCNC: 105 MMOL/L (ref 100–108)
CO2 SERPL-SCNC: 26 MMOL/L (ref 21–32)
CREAT SERPL-MCNC: 1.17 MG/DL (ref 0.6–1.3)
DEPRECATED D DIMER PPP: 595 NG/ML (FEU)
EOSINOPHIL # BLD AUTO: 0.12 THOUSAND/ΜL (ref 0–0.61)
EOSINOPHIL NFR BLD AUTO: 2 % (ref 0–6)
ERYTHROCYTE [DISTWIDTH] IN BLOOD BY AUTOMATED COUNT: 13.4 % (ref 11.6–15.1)
EXT PREG TEST URINE: NORMAL
GFR SERPL CREATININE-BSD FRML MDRD: 62 ML/MIN/1.73SQ M
GLUCOSE SERPL-MCNC: 81 MG/DL (ref 65–140)
HCT VFR BLD AUTO: 37.8 % (ref 34.8–46.1)
HGB BLD-MCNC: 12.2 G/DL (ref 11.5–15.4)
IMM GRANULOCYTES # BLD AUTO: 0.04 THOUSAND/UL (ref 0–0.2)
IMM GRANULOCYTES NFR BLD AUTO: 1 % (ref 0–2)
INR PPP: 1.04 (ref 0.86–1.17)
LYMPHOCYTES # BLD AUTO: 2.02 THOUSANDS/ΜL (ref 0.6–4.47)
LYMPHOCYTES NFR BLD AUTO: 31 % (ref 14–44)
MCH RBC QN AUTO: 30 PG (ref 26.8–34.3)
MCHC RBC AUTO-ENTMCNC: 32.3 G/DL (ref 31.4–37.4)
MCV RBC AUTO: 93 FL (ref 82–98)
MONOCYTES # BLD AUTO: 0.44 THOUSAND/ΜL (ref 0.17–1.22)
MONOCYTES NFR BLD AUTO: 7 % (ref 4–12)
NEUTROPHILS # BLD AUTO: 3.94 THOUSANDS/ΜL (ref 1.85–7.62)
NEUTS SEG NFR BLD AUTO: 59 % (ref 43–75)
NRBC BLD AUTO-RTO: 0 /100 WBCS
PLATELET # BLD AUTO: 184 THOUSANDS/UL (ref 149–390)
PMV BLD AUTO: 11.6 FL (ref 8.9–12.7)
POTASSIUM SERPL-SCNC: 3.2 MMOL/L (ref 3.5–5.3)
PROTHROMBIN TIME: 13 SECONDS (ref 11.8–14.2)
RBC # BLD AUTO: 4.06 MILLION/UL (ref 3.81–5.12)
SODIUM SERPL-SCNC: 143 MMOL/L (ref 136–145)
WBC # BLD AUTO: 6.58 THOUSAND/UL (ref 4.31–10.16)

## 2019-03-09 PROCEDURE — 85379 FIBRIN DEGRADATION QUANT: CPT | Performed by: EMERGENCY MEDICINE

## 2019-03-09 PROCEDURE — 99285 EMERGENCY DEPT VISIT HI MDM: CPT

## 2019-03-09 PROCEDURE — 85610 PROTHROMBIN TIME: CPT | Performed by: EMERGENCY MEDICINE

## 2019-03-09 PROCEDURE — 36415 COLL VENOUS BLD VENIPUNCTURE: CPT | Performed by: EMERGENCY MEDICINE

## 2019-03-09 PROCEDURE — 85025 COMPLETE CBC W/AUTO DIFF WBC: CPT | Performed by: EMERGENCY MEDICINE

## 2019-03-09 PROCEDURE — 93005 ELECTROCARDIOGRAM TRACING: CPT

## 2019-03-09 PROCEDURE — 85730 THROMBOPLASTIN TIME PARTIAL: CPT | Performed by: EMERGENCY MEDICINE

## 2019-03-09 PROCEDURE — 80048 BASIC METABOLIC PNL TOTAL CA: CPT | Performed by: EMERGENCY MEDICINE

## 2019-03-09 PROCEDURE — 81025 URINE PREGNANCY TEST: CPT | Performed by: EMERGENCY MEDICINE

## 2019-03-09 RX ORDER — MAGNESIUM HYDROXIDE/ALUMINUM HYDROXICE/SIMETHICONE 120; 1200; 1200 MG/30ML; MG/30ML; MG/30ML
30 SUSPENSION ORAL ONCE
Status: COMPLETED | OUTPATIENT
Start: 2019-03-09 | End: 2019-03-10

## 2019-03-09 RX ORDER — BENZONATATE 100 MG/1
100 CAPSULE ORAL ONCE
Status: COMPLETED | OUTPATIENT
Start: 2019-03-09 | End: 2019-03-09

## 2019-03-09 RX ORDER — IPRATROPIUM BROMIDE AND ALBUTEROL SULFATE 2.5; .5 MG/3ML; MG/3ML
3 SOLUTION RESPIRATORY (INHALATION) ONCE
Status: COMPLETED | OUTPATIENT
Start: 2019-03-09 | End: 2019-03-09

## 2019-03-09 RX ADMIN — IPRATROPIUM BROMIDE AND ALBUTEROL SULFATE 3 ML: 2.5; .5 SOLUTION RESPIRATORY (INHALATION) at 20:27

## 2019-03-09 RX ADMIN — BENZONATATE 100 MG: 100 CAPSULE ORAL at 20:27

## 2019-03-10 ENCOUNTER — APPOINTMENT (EMERGENCY)
Dept: CT IMAGING | Facility: HOSPITAL | Age: 34
End: 2019-03-10
Payer: COMMERCIAL

## 2019-03-10 VITALS
DIASTOLIC BLOOD PRESSURE: 53 MMHG | TEMPERATURE: 98.8 F | HEART RATE: 90 BPM | BODY MASS INDEX: 35.34 KG/M2 | HEIGHT: 64 IN | RESPIRATION RATE: 16 BRPM | WEIGHT: 207 LBS | OXYGEN SATURATION: 99 % | SYSTOLIC BLOOD PRESSURE: 92 MMHG

## 2019-03-10 LAB
ATRIAL RATE: 107 BPM
P AXIS: 53 DEGREES
PR INTERVAL: 142 MS
QRS AXIS: 66 DEGREES
QRSD INTERVAL: 90 MS
QT INTERVAL: 346 MS
QTC INTERVAL: 461 MS
T WAVE AXIS: 41 DEGREES
VENTRICULAR RATE: 107 BPM

## 2019-03-10 PROCEDURE — 71275 CT ANGIOGRAPHY CHEST: CPT

## 2019-03-10 PROCEDURE — 93010 ELECTROCARDIOGRAM REPORT: CPT | Performed by: INTERNAL MEDICINE

## 2019-03-10 RX ORDER — BENZONATATE 100 MG/1
100 CAPSULE ORAL EVERY 8 HOURS
Qty: 21 CAPSULE | Refills: 0 | Status: SHIPPED | OUTPATIENT
Start: 2019-03-10 | End: 2019-03-29

## 2019-03-10 RX ADMIN — ALUMINUM HYDROXIDE, MAGNESIUM HYDROXIDE, AND SIMETHICONE 30 ML: 200; 200; 20 SUSPENSION ORAL at 00:10

## 2019-03-10 RX ADMIN — IOHEXOL 90 ML: 350 INJECTION, SOLUTION INTRAVENOUS at 01:26

## 2019-03-10 RX ADMIN — LIDOCAINE HYDROCHLORIDE 10 ML: 20 SOLUTION ORAL; TOPICAL at 00:10

## 2019-03-10 NOTE — ED PROVIDER NOTES
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 Taking?    VENTOLIN  (90 Base) MCG/ACT inhaler   No Yes   Sig: inhale ONE puff by MOUTH every FOUR HOURS AS NEEDED   carisoprodol (SOMA) 350 mg tablet   No Yes   Sig: Take 1 tablet (350 mg total) by mouth 3 (three) times a day   clonazePAM (KlonoPIN) 1 mg tablet   No Yes   Sig: Take 1 tablet (1 mg total) by mouth 3 (three) times a day   cyclobenzaprine (FLEXERIL) 10 mg tablet   No Yes   Sig: Take 1 tablet (10 mg total) by mouth 3 (three) times a day as needed for muscle spasms   gabapentin (NEURONTIN) 600 MG tablet   No Yes   Sig: TAKE 1 TABLET BY MOUTH THREE TIMES DAILY   levothyroxine 25 mcg tablet   No Yes   Sig: TAKE ONE TABLET BY MOUTH EVERY DAY   meprobamate (EQUANIL) 200 MG tablet   No Yes   Sig: Take 1 tablet (200 mg total) by mouth 3 (three) times a day   topiramate (TOPAMAX) 100 mg tablet   No Yes   Sig: TAKE ONE TABLET BY MOUTH THREE TIMES DAILY   traMADol (ULTRAM) 50 mg tablet   No Yes   Sig: Take 1 tablet (50 mg total) by mouth every 6 (six) hours as needed for moderate pain traZODone (DESYREL) 50 mg tablet   No Yes   Sig: TAKE 1 OR 2 TABLETS BY MOUTH AT BEDTIME      Facility-Administered Medications: None       Past Medical History:   Diagnosis Date    Anxiety     "severe"    [facility] disorder ([facility] )     Cigarette smoker     Disease of thyroid gland     Drug dependence, in remission ([facility] )     H/O: whooping cough     while pregnant    Hepatitis C, chronic (HCC)     Insomnia disorder     Liver disease     Hepatitis C    Migraine     PTSD (post-traumatic stress disorder)        Past Surgical History:   Procedure Laterality Date    OTHER SURGICAL HISTORY      body piercing    MA REPAIR UMBILICAL VRYO,7+Z/L,LFWED N/A 4/19/2016    Procedure: REPAIR HERNIA UMBILICAL WITH MESH;  Surgeon: Janette Ledbetter MD;  Location:  MAIN OR;  Service: General    VAGINAL DELIVERY      X 6    WISDOM TOOTH EXTRACTION      X 4    WOUND DEBRIDEMENT Right 1/17/2018    Procedure: RIGHT KNEE DEBRIDEMENT; [address] OUT; AND LACERATION REPAIR  INTRAOPERATIVE ASSESSMENT OF PATELLA TENDON;  Surgeon: Ezio Ortiz MD;  Location: [facility];  Service: Orthopedics       Family History   Problem Relation Age of Onset    Hypertension Father      I have reviewed and agree with the history as documented  Social History     Tobacco Use    Smoking status: Current Every Day Smoker     Packs/day: 5 00     Years: 12 00     Pack years: 60 00    Smokeless tobacco: Never Used    Tobacco comment: Vaping   Substance Use Topics    Alcohol use: Not on file    Drug use: Not on file     Comment: patient in recovery         Review of Systems   Constitutional: Negative  HENT: Negative  Eyes: Negative  Respiratory: Positive for cough and wheezing  Cardiovascular: Negative  Gastrointestinal: Negative  Endocrine: Negative  Genitourinary: Negative  Musculoskeletal: Negative  Skin: Negative  Allergic/Immunologic: Negative  Neurological: Negative  Hematological: Negative  Psychiatric/Behavioral: The patient is nervous/anxious  All other systems reviewed and are negative  Physical Exam  Physical Exam   Constitutional: She is oriented to person, place, and time  She appears well-developed and well-nourished  Non-toxic appearance  She does not appear ill  No distress  HENT:   Head: Normocephalic and atraumatic  Eyes: Pupils are equal, round, and reactive to light  Conjunctivae and EOM are normal    Neck: Normal range of motion  Neck supple  Cardiovascular: Normal rate and regular rhythm  No murmur heard  Pulmonary/Chest: Effort normal  She has no decreased breath sounds  She has wheezes  She has no rhonchi  She has no rales  Abdominal: Soft  Bowel sounds are normal    Musculoskeletal: Normal range of motion  She exhibits no edema  Neurological: She is alert and oriented to person, place, and time  She has normal reflexes  No cranial nerve deficit  Coordination normal    Skin: Skin is warm and dry  No rash noted  Psychiatric: She has a normal mood and affect  Nursing note and vitals reviewed        Vital Signs  ED Triage Vitals [03/09/19 1959]   Temperature Pulse Respirations Blood Pressure SpO2   98 8 °F (37 1 °C) (!) 109 19 126/71 99 %      Temp Source Heart Rate Source Patient Position - Orthostatic VS BP Location FiO2 (%)   Tympanic Monitor Lying Right arm --      Pain Score       7           Vitals:    03/09/19 1959 03/09/19 2015 03/09/19 2030 03/10/19 0100   BP: 126/71 123/57 114/57 92/53   Pulse: (!) 109 101 104 90   Patient Position - Orthostatic VS: Lying          Visual Acuity      ED Medications  Medications   ipratropium-albuterol (DUO-NEB) 0 5-2 5 mg/3 mL inhalation solution 3 mL (3 mL Nebulization Given 3/9/19 2027)   benzonatate (TESSALON PERLES) capsule 100 mg (100 mg Oral Given 3/9/19 2027)   aluminum-magnesium hydroxide-simethicone (MYLANTA) 200-200-20 mg/5 mL oral suspension 30 mL (30 mL Oral Given 3/10/19 0010)   lidocaine viscous (XYLOCAINE) 2 % mucosal solution 10 mL (10 mL Oral Given 3/10/19 0010)   iohexol (OMNIPAQUE) 350 MG/ML injection (SINGLE-DOSE) 90 mL (90 mL Intravenous Given 3/10/19 0126)       Diagnostic Studies  Results Reviewed     Procedure Component Value Units Date/Time    D-Dimer [[id number]]  (Abnormal) Collected:  03/09/19 2206    Lab Status:  Final result Specimen:  Blood from Line, Venous Updated:  03/09/19 2253     D-Dimer, Quant 595 ng/ml (FEU)     Basic metabolic panel [[id number]]  (Abnormal) Collected:  03/09/19 2206    Lab Status:  Final result Specimen:  Blood from Line, Venous Updated:  03/09/19 2253     Sodium 143 mmol/L      Potassium 3 2 mmol/L      Chloride 105 mmol/L      CO2 26 mmol/L      ANION GAP 12 mmol/L      BUN 9 mg/dL      Creatinine 1 17 mg/dL      Glucose 81 mg/dL      Calcium 8 1 mg/dL      eGFR 62 ml/min/1 73sq m     Narrative:       National Kidney Disease Education Program recommendations are as follows:  GFR calculation is accurate only with a steady state creatinine  Chronic Kidney disease less than 60 ml/min/1 73 sq  meters  Kidney failure less than 15 ml/min/1 73 sq  meters      Protime-INR [[id number]]  (Normal) Collected:  03/09/19 2206    Lab Status:  Final result Specimen:  Blood from Line, Venous Updated:  03/09/19 2251     Protime 13 0 seconds      INR 1 04    APTT [[id number]]  (Normal) Collected:  03/09/19 2206    Lab Status:  Final result Specimen:  Blood from Line, Venous Updated:  03/09/19 2251     PTT 34 seconds     CBC and differential [[id number]] Collected:  03/09/19 2206    Lab Status:  Final result Specimen:  Blood from Line, Venous Updated:  03/09/19 2242     WBC 6 58 Thousand/uL      RBC 4 06 Million/uL      Hemoglobin 12 2 g/dL      Hematocrit 37 8 %      MCV 93 fL      MCH 30 0 pg      MCHC 32 3 g/dL      RDW 13 4 %      MPV 11 6 fL      Platelets 844 Thousands/uL      nRBC 0 /100 WBCs      Neutrophils Relative 59 %      Immat GRANS % 1 %      Lymphocytes Relative 31 % Monocytes Relative 7 %      Eosinophils Relative 2 %      Basophils Relative 0 %      Neutrophils Absolute 3 94 Thousands/µL      Immature Grans Absolute 0 04 Thousand/uL      Lymphocytes Absolute 2 02 Thousands/µL      Monocytes Absolute 0 44 Thousand/µL      Eosinophils Absolute 0 12 Thousand/µL      Basophils Absolute 0 02 Thousands/µL     POCT pregnancy, urine [[id number]]  (Normal) Resulted:  03/09/19 2159    Lab Status:  Final result Updated:  03/09/19 2159     EXT PREG TEST UR (Ref: Negative) neg                 CTA ED chest PE study   Final Result by Rebeca Johnson MD (03/10 8971)      No evidence of pulmonary embolus        Stable sized right lower lobe pulmonary nodules for which further follow-up is no longer required            Workstation performed: [id number]                    Procedures  ECG 12 Lead Documentation  Date/Time: 3/9/2019 7:59 PM  Performed by: Nannette Townsend DO  Authorized by: Nannette Townsend DO     ECG reviewed by me, the ED Provider: yes    Patient location:  ED  Previous ECG:     Previous ECG:  Unavailable  Rhythm:     Rhythm: sinus tachycardia    Ectopy:     Ectopy: none    QRS:     QRS axis:  Normal    QRS intervals:  Normal  Conduction:     Conduction: normal    ST segments:     ST segments:  Non-specific  T waves:     T waves: normal             Phone Contacts  ED Phone Contact    ED Course                         Wells' Criteria for PE      Most Recent Value   Wells' Criteria for PE   Clinical signs and symptoms of DVT  0 Filed at: 03/09/2019 2131   PE is primary diagnosis or equally likely  0 Filed at: 03/09/2019 2131   HR >100  1 5 Filed at: 03/09/2019 2131   Immobilization at least 3 days or Surgery in the previous 4 weeks  0 Filed at: 03/09/2019 2131   Previous, objectively diagnosed PE or DVT  0 Filed at: 03/09/2019 2131   Hemoptysis  0 Filed at: 03/09/2019 2131   Malignancy with treatment within 6 months or palliative  0 Filed at: 03/09/2019 2131   Wells' Criteria Total 1 5 Filed at: 03/09/2019 2131            [facility]  Number of Diagnoses or Management Options  Bronchitis, acute: new and requires workup     Amount and/or Complexity of Data Reviewed  Clinical lab tests: ordered and reviewed  Tests in the radiology section of CPT®: ordered and reviewed  Independent visualization of images, tracings, or specimens: yes        Disposition  Final diagnoses:   Bronchitis, acute     Time reflects when diagnosis was documented in both MDM as applicable and the Disposition within this note     Time User Action Codes Description Comment    3/10/2019  1:55 AM Robbi Monzon Add [J20 9] Bronchitis, acute       ED Disposition     ED Disposition Condition Date/Time Comment    Discharge Stable Sun Mar 10, 2019  1:55 AM METROPLEX PRADEEP Courtney discharge to home/self care              Follow-up Information     Follow up With Specialties Details Why Contact Info    Kamila Shepard MD Family Medicine Call  As needed [address]  [address]  [facility] [address]  [phone]            Discharge Medication List as of 3/10/2019  1:57 AM      START taking these medications    Details   benzonatate (TESSALON PERLES) 100 mg capsule Take 1 capsule (100 mg total) by mouth every 8 (eight) hours, Starting Sun 3/10/2019, Normal         CONTINUE these medications which have NOT CHANGED    Details   carisoprodol (SOMA) 350 mg tablet Take 1 tablet (350 mg total) by mouth 3 (three) times a day, Starting Wed 12/26/2018, Normal      clonazePAM (KlonoPIN) 1 mg tablet Take 1 tablet (1 mg total) by mouth 3 (three) times a day, Starting Mon 3/4/2019, Normal      cyclobenzaprine (FLEXERIL) 10 mg tablet Take 1 tablet (10 mg total) by mouth 3 (three) times a day as needed for muscle spasms, Starting Wed 10/3/2018, Normal      gabapentin (NEURONTIN) 600 MG tablet TAKE 1 TABLET BY MOUTH THREE TIMES DAILY, Normal      levothyroxine 25 mcg tablet TAKE ONE TABLET BY MOUTH EVERY DAY, Normal      meprobamate (EQUANIL) 200 MG tablet Take 1 tablet (200 mg total) by mouth 3 (three) times a day, Starting Mon 3/4/2019, Normal      topiramate (TOPAMAX) 100 mg tablet TAKE ONE TABLET BY MOUTH THREE TIMES DAILY, Normal      traMADol (ULTRAM) 50 mg tablet Take 1 tablet (50 mg total) by mouth every 6 (six) hours as needed for moderate pain, Starting Mon 3/4/2019, Normal      traZODone (DESYREL) 50 mg tablet TAKE 1 OR 2 TABLETS BY MOUTH AT BEDTIME, Normal      VENTOLIN  (90 Base) MCG/ACT inhaler inhale ONE puff by MOUTH every FOUR HOURS AS NEEDED, Normal           No discharge procedures on file      ED Provider  Electronically Signed by           Erin Eugene, DO  03/10/19 [address] [place] [address],   03/10/19 5559

## 2019-03-12 DIAGNOSIS — F43.10 PTSD (POST-TRAUMATIC STRESS DISORDER): ICD-10-CM

## 2019-03-12 DIAGNOSIS — F31.9 BIPOLAR DEPRESSION (HCC): ICD-10-CM

## 2019-03-12 DIAGNOSIS — F41.9 ANXIETY: ICD-10-CM

## 2019-03-12 RX ORDER — CARISOPRODOL 350 MG/1
350 TABLET ORAL 3 TIMES DAILY
Qty: 90 TABLET | Refills: 3 | Status: SHIPPED | OUTPATIENT
Start: 2019-03-12 | End: 2019-03-29

## 2019-03-12 RX ORDER — DIAZEPAM 5 MG/1
TABLET ORAL
Qty: 30 TABLET | Refills: 2 | Status: SHIPPED | OUTPATIENT
Start: 2019-03-12 | End: 2019-05-30 | Stop reason: SDUPTHER

## 2019-03-12 RX ORDER — LEVOTHYROXINE SODIUM 0.03 MG/1
TABLET ORAL
Qty: 30 TABLET | Refills: 1 | Status: SHIPPED | OUTPATIENT
Start: 2019-03-12 | End: 2019-05-11 | Stop reason: SDUPTHER

## 2019-03-13 ENCOUNTER — VBI (OUTPATIENT)
Dept: ADMINISTRATIVE | Facility: OTHER | Age: 34
End: 2019-03-13

## 2019-03-13 NOTE — TELEPHONE ENCOUNTER
Rolf Cobb    ED Visit Information     Ed visit date: 3/9/2019  Diagnosis Description: Bronchitis, acute  In Network? Yes [facility]  Discharge status: Home  Discharged with meds ? Yes  Number of ED visits to date: 1  ED Severity:n/a     Outreach Information    Outreach successful: Yes 1  Date letter mailed:n/a  Date Finalized:3/13/2019    Care Coordination    Follow up appointment with pcp: no No ED f/u appt scheduled  Transportation issues ? NA    Value [employer] type:  7 Day Outreach  Emergent necessity warranted by diagnosis:  No  [facility] PCP:  Yes  Transportation:  Self Transport  Called PCP first?:  No  Feels able to call PCP for urgent problems ?:  Yes  Understands what emergencies can be handled by PCP ?:  Yes  Ever any problems getting appointment with PCP for minor emergency/urgency problems?:  No  Practice Contacted Patient ?:  No  Pt had ED follow up with pcp/staff ?:  No    Seen for follow-up out of network ?:  No  Reason Patient went to ED instead of Urgent Care or PCP?:  Perceived Severity of Illness, Proximity  Urgent care Education?:  Yes  03/13/2019 03:49 PM Phone (Liss) [address] [facility] ([facility]) [phone] (H)   Call Complete    Personal communication with patient regarding recent ED visit on 3/9 for Bronchitis, acute  Geno stated that she is still sick  She was discharged with medication (benzonatate) and advised to follow up with her PCP, as needed  Geno declined to schedule a f/u appt at this itme stating that she will call to schedule if she does not continue to improve  Patient does not meet OPCM criteria and is aware of her nearest [place] [address] urgent care facility

## 2019-03-17 ENCOUNTER — DOCUMENTATION (OUTPATIENT)
Dept: FAMILY MEDICINE CLINIC | Facility: CLINIC | Age: 34
End: 2019-03-17

## 2019-03-17 ENCOUNTER — TELEPHONE (OUTPATIENT)
Dept: FAMILY MEDICINE CLINIC | Facility: CLINIC | Age: 34
End: 2019-03-17

## 2019-03-17 ENCOUNTER — TELEPHONE (OUTPATIENT)
Dept: OTHER | Facility: OTHER | Age: 34
End: 2019-03-17

## 2019-03-17 NOTE — TELEPHONE ENCOUNTER
Geno Courtney 1985  CONFIDENTIALTY NOTICE: This fax transmission is intended only for the addressee  It contains information that is legally privileged,  confidential or otherwise protected from use or disclosure  If you are not the intended recipient, you are strictly prohibited from reviewing,  disclosing, copying using or disseminating any of this information or taking any action in reliance on or regarding this information  If you have  received this fax in error, please notify us immediately by telephone so that we can arrange for its return to us  Page: 1  2  Call Id: [id number]  Health Call  Standard Call Report  Health Call  Patient Name: Teddy Catalan  Gender: Female  : 1985  Age: 29 Y 2 M 17 D  Return Phone  Number: [phone] (Cell)  Address: [address] [place] [address] 1  City/State/Zip: [address] [place] [address]  Practice Name: [address] [place] [address]  Practice Charged:  Physician:  0 [facility] Name: nicole  Relationship To  Patient: Other  Return Phone Number: [phone] (Cell)  Presenting Problem: "Tramadol is making my fiancé have  seizures "  Service Type: Triage  Charged Service 1: N/A  Pharmacy Name and  Number:  Nurse Assessment  Nurse: Bette Horn Date/Time: 3/17/2019 12:18:26 PM  Type of assessment required:  ---General (Adult or Child)  Duration of Current S/S  ---Two days  Location/Radiation  ---Head  Temperature (F) and route:  ---Denies fever  Symptom Specific Meds (Dose/Time):  ---Tramadol  Other S/S  ---Seizure activity this morning and last night as per ene  Pt is very tired and  disoriented  Symptom progression:  ---better  Intake and Output  ---not obtained  LMP/ Pregnancy:  ---not obtained  Geno Courtney 1985  CONFIDENTIALTY NOTICE: This fax transmission is intended only for the addressee  It contains information that is legally privileged,  confidential or otherwise protected from use or disclosure   If you are not the intended recipient, you are strictly prohibited from reviewing,  disclosing, copying using or disseminating any of this information or taking any action in reliance on or regarding this information  If you have  received this fax in error, please notify us immediately by telephone so that we can arrange for its return to us  Page: 2 of 2  Call Id: [id number]  Nurse Assessment  Breastfeeding  ---No  Last Exam/Treatment:  ---Was seen 3/4/2019  Protocols  Protocol Title Nurse Date/Time  Seizure Alisa Courtney RN, Ketan Castillo 3/17/2019 12:27:01 PM  Question Caller [address] [place] [address]  Time Disposition Final User  3/17/2019 11:39:57 AM Send to Follow Up Del Sun RN, Mauro Smoke  3/17/2019 11:59:33 AM Send to Follow Up Del Sun RN, Mauro Smoke  3/17/2019 12:36:11 PM Go to ED Now Yes Alisa Courtney RN, [facility] Advice Given Per Protocol  GO TO ED NOW: You need to be seen in the Emergency Department  Go to the ER at ___________ [facility] now  Drive  carefully  CALL  if another seizure occurs  CARE ADVICE given per Seizure (Adult) guideline  Caller Understands: Yes  Caller Disagree/Comply: Disagree  PreDisposition: Unsure  Comments  User: Perla Wallace RN Date/Time: 3/17/2019 11:39:49 AM  No answer  will call back in 20 minutes  User: Perla Wallace RN Date/Time: 3/17/2019 11:59:25 AM  No answer  Will try again in 20 minutes  User: Perla Wallace RN Date/Time: 3/17/2019 12:36:01 PM  Obtained permission to speak with Torrey Adam from Katiana Foreman was advised to have pt seen in ER if having seizures   Sheldonance and Pt refuse to go to the ER at this time and ended the call  Due to shailesh insisting on speaking with the on call I Spoke with  Tati Kelly [facility]) and she will call the patient back
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
## 2019-03-29 ENCOUNTER — OFFICE VISIT (OUTPATIENT)
Dept: BARIATRICS | Facility: CLINIC | Age: 34
End: 2019-03-29
Payer: COMMERCIAL

## 2019-03-29 VITALS
TEMPERATURE: 97.9 F | WEIGHT: 209.1 LBS | HEART RATE: 88 BPM | RESPIRATION RATE: 16 BRPM | DIASTOLIC BLOOD PRESSURE: 80 MMHG | SYSTOLIC BLOOD PRESSURE: 120 MMHG | HEIGHT: 64 IN | BODY MASS INDEX: 35.7 KG/M2

## 2019-03-29 DIAGNOSIS — E66.1 CLASS 2 DRUG-INDUCED OBESITY WITH SERIOUS COMORBIDITY AND BODY MASS INDEX (BMI) OF 35.0 TO 35.9 IN ADULT: Primary | ICD-10-CM

## 2019-03-29 DIAGNOSIS — F31.9 BIPOLAR DEPRESSION (HCC): ICD-10-CM

## 2019-03-29 DIAGNOSIS — R63.5 ABNORMAL WEIGHT GAIN: ICD-10-CM

## 2019-03-29 DIAGNOSIS — G62.9 NEUROPATHY: ICD-10-CM

## 2019-03-29 DIAGNOSIS — E03.9 ADULT HYPOTHYROIDISM: ICD-10-CM

## 2019-03-29 PROCEDURE — 99243 OFF/OP CNSLTJ NEW/EST LOW 30: CPT | Performed by: FAMILY MEDICINE

## 2019-03-29 RX ORDER — DULOXETIN HYDROCHLORIDE 30 MG/1
20 CAPSULE, DELAYED RELEASE ORAL DAILY
COMMUNITY
End: 2019-04-18 | Stop reason: SDUPTHER

## 2019-04-09 DIAGNOSIS — F31.81 BIPOLAR 2 DISORDER (HCC): ICD-10-CM

## 2019-04-09 RX ORDER — TOPIRAMATE 100 MG/1
TABLET, FILM COATED ORAL
Qty: 270 TABLET | Refills: 1 | Status: SHIPPED | OUTPATIENT
Start: 2019-04-09 | End: 2019-09-24 | Stop reason: SDUPTHER

## 2019-04-18 ENCOUNTER — OFFICE VISIT (OUTPATIENT)
Dept: FAMILY MEDICINE CLINIC | Facility: CLINIC | Age: 34
End: 2019-04-18
Payer: COMMERCIAL

## 2019-04-18 VITALS
BODY MASS INDEX: 35.17 KG/M2 | OXYGEN SATURATION: 98 % | WEIGHT: 206 LBS | SYSTOLIC BLOOD PRESSURE: 114 MMHG | HEIGHT: 64 IN | HEART RATE: 96 BPM | DIASTOLIC BLOOD PRESSURE: 70 MMHG

## 2019-04-18 DIAGNOSIS — B18.2 CHRONIC HEPATITIS C WITHOUT HEPATIC COMA (HCC): ICD-10-CM

## 2019-04-18 DIAGNOSIS — F31.9 BIPOLAR DEPRESSION (HCC): Primary | ICD-10-CM

## 2019-04-18 DIAGNOSIS — F41.9 ANXIETY: ICD-10-CM

## 2019-04-18 DIAGNOSIS — F43.10 PTSD (POST-TRAUMATIC STRESS DISORDER): ICD-10-CM

## 2019-04-18 PROBLEM — F11.90 METHADONE USE: Status: RESOLVED | Noted: 2018-01-16 | Resolved: 2019-04-18

## 2019-04-18 PROCEDURE — 99214 OFFICE O/P EST MOD 30 MIN: CPT | Performed by: FAMILY MEDICINE

## 2019-04-18 PROCEDURE — 4004F PT TOBACCO SCREEN RCVD TLK: CPT | Performed by: FAMILY MEDICINE

## 2019-04-18 RX ORDER — CLONAZEPAM 2 MG/1
2 TABLET ORAL 2 TIMES DAILY
Qty: 60 TABLET | Refills: 3 | Status: SHIPPED | OUTPATIENT
Start: 2019-04-18 | End: 2019-08-01 | Stop reason: SDUPTHER

## 2019-04-18 RX ORDER — FLUOXETINE 10 MG/1
10 TABLET, FILM COATED ORAL DAILY
Qty: 90 TABLET | Refills: 3 | Status: SHIPPED | OUTPATIENT
Start: 2019-04-18 | End: 2019-12-10 | Stop reason: SDUPTHER

## 2019-04-18 RX ORDER — MEPROBAMATE 200 MG/1
200 TABLET ORAL 4 TIMES DAILY
Qty: 120 TABLET | Refills: 3 | Status: SHIPPED | OUTPATIENT
Start: 2019-04-18 | End: 2019-08-08 | Stop reason: SDUPTHER

## 2019-04-18 RX ORDER — DULOXETIN HYDROCHLORIDE 30 MG/1
30 CAPSULE, DELAYED RELEASE ORAL DAILY
Qty: 90 CAPSULE | Refills: 1 | Status: SHIPPED | OUTPATIENT
Start: 2019-04-18 | End: 2019-09-24 | Stop reason: SDUPTHER

## 2019-04-25 DIAGNOSIS — J45.909 ASTHMA, UNSPECIFIED ASTHMA SEVERITY, UNSPECIFIED WHETHER COMPLICATED, UNSPECIFIED WHETHER PERSISTENT: ICD-10-CM

## 2019-04-26 RX ORDER — CYCLOBENZAPRINE HCL 10 MG
TABLET ORAL
Qty: 90 TABLET | Refills: 0 | Status: SHIPPED | OUTPATIENT
Start: 2019-04-26 | End: 2019-05-30 | Stop reason: SDUPTHER

## 2019-05-11 DIAGNOSIS — F43.10 PTSD (POST-TRAUMATIC STRESS DISORDER): ICD-10-CM

## 2019-05-13 RX ORDER — AMLODIPINE BESYLATE 5 MG/1
TABLET ORAL
COMMUNITY
Start: 2019-05-06 | End: 2019-12-11 | Stop reason: HOSPADM

## 2019-05-13 RX ORDER — LEVOTHYROXINE SODIUM 0.03 MG/1
TABLET ORAL
Qty: 30 TABLET | Refills: 1 | Status: SHIPPED | OUTPATIENT
Start: 2019-05-13 | End: 2019-07-06 | Stop reason: SDUPTHER

## 2019-05-30 DIAGNOSIS — F41.9 ANXIETY: ICD-10-CM

## 2019-05-30 DIAGNOSIS — J45.909 ASTHMA, UNSPECIFIED ASTHMA SEVERITY, UNSPECIFIED WHETHER COMPLICATED, UNSPECIFIED WHETHER PERSISTENT: ICD-10-CM

## 2019-05-30 RX ORDER — CYCLOBENZAPRINE HCL 10 MG
TABLET ORAL
Qty: 90 TABLET | Refills: 0 | Status: SHIPPED | OUTPATIENT
Start: 2019-06-12 | End: 2019-06-22 | Stop reason: SDUPTHER

## 2019-05-30 RX ORDER — DIAZEPAM 5 MG/1
TABLET ORAL
Qty: 30 TABLET | Refills: 0 | Status: SHIPPED | OUTPATIENT
Start: 2019-06-04 | End: 2019-06-22 | Stop reason: SDUPTHER

## 2019-05-30 RX ORDER — NAPROXEN 500 MG/1
TABLET ORAL
Qty: 1 TABLET | Refills: 0 | Status: SHIPPED | OUTPATIENT
Start: 2019-05-30 | End: 2019-06-04 | Stop reason: ALTCHOICE

## 2019-06-04 ENCOUNTER — OFFICE VISIT (OUTPATIENT)
Dept: FAMILY MEDICINE CLINIC | Facility: CLINIC | Age: 34
End: 2019-06-04
Payer: COMMERCIAL

## 2019-06-04 VITALS
HEIGHT: 64 IN | BODY MASS INDEX: 34.15 KG/M2 | TEMPERATURE: 98.2 F | DIASTOLIC BLOOD PRESSURE: 70 MMHG | HEART RATE: 103 BPM | SYSTOLIC BLOOD PRESSURE: 116 MMHG | OXYGEN SATURATION: 99 % | WEIGHT: 200 LBS

## 2019-06-04 DIAGNOSIS — E03.9 ADULT HYPOTHYROIDISM: ICD-10-CM

## 2019-06-04 DIAGNOSIS — F43.10 PTSD (POST-TRAUMATIC STRESS DISORDER): ICD-10-CM

## 2019-06-04 DIAGNOSIS — B18.2 CHRONIC HEPATITIS C WITHOUT HEPATIC COMA (HCC): ICD-10-CM

## 2019-06-04 DIAGNOSIS — F31.9 BIPOLAR DEPRESSION (HCC): Primary | ICD-10-CM

## 2019-06-04 PROCEDURE — 99214 OFFICE O/P EST MOD 30 MIN: CPT | Performed by: FAMILY MEDICINE

## 2019-06-04 PROCEDURE — 3008F BODY MASS INDEX DOCD: CPT | Performed by: FAMILY MEDICINE

## 2019-06-22 DIAGNOSIS — J45.909 ASTHMA, UNSPECIFIED ASTHMA SEVERITY, UNSPECIFIED WHETHER COMPLICATED, UNSPECIFIED WHETHER PERSISTENT: ICD-10-CM

## 2019-06-22 DIAGNOSIS — F41.9 ANXIETY: ICD-10-CM

## 2019-06-24 RX ORDER — CYCLOBENZAPRINE HCL 10 MG
TABLET ORAL
Qty: 90 TABLET | Refills: 0 | Status: SHIPPED | OUTPATIENT
Start: 2019-06-24 | End: 2019-07-22 | Stop reason: SDUPTHER

## 2019-06-24 RX ORDER — DIAZEPAM 5 MG/1
TABLET ORAL
Qty: 30 TABLET | Refills: 1 | Status: SHIPPED | OUTPATIENT
Start: 2019-06-24 | End: 2019-08-17 | Stop reason: SDUPTHER

## 2019-07-01 ENCOUNTER — TRANSCRIBE ORDERS (OUTPATIENT)
Dept: ADMINISTRATIVE | Facility: HOSPITAL | Age: 34
End: 2019-07-01

## 2019-07-01 ENCOUNTER — HOSPITAL ENCOUNTER (OUTPATIENT)
Dept: RADIOLOGY | Facility: HOSPITAL | Age: 34
Discharge: HOME | End: 2019-07-01
Payer: COMMERCIAL

## 2019-07-01 DIAGNOSIS — M54.41 LOW BACK PAIN WITH BILATERAL SCIATICA, UNSPECIFIED BACK PAIN LATERALITY, UNSPECIFIED CHRONICITY: ICD-10-CM

## 2019-07-01 DIAGNOSIS — M54.42 LOW BACK PAIN WITH BILATERAL SCIATICA, UNSPECIFIED BACK PAIN LATERALITY, UNSPECIFIED CHRONICITY: Primary | ICD-10-CM

## 2019-07-01 DIAGNOSIS — M54.42 LOW BACK PAIN WITH BILATERAL SCIATICA, UNSPECIFIED BACK PAIN LATERALITY, UNSPECIFIED CHRONICITY: ICD-10-CM

## 2019-07-01 DIAGNOSIS — M54.41 LOW BACK PAIN WITH BILATERAL SCIATICA, UNSPECIFIED BACK PAIN LATERALITY, UNSPECIFIED CHRONICITY: Primary | ICD-10-CM

## 2019-07-01 PROCEDURE — 72110 X-RAY EXAM L-2 SPINE 4/>VWS: CPT

## 2019-07-06 DIAGNOSIS — G89.4 CHRONIC PAIN SYNDROME: ICD-10-CM

## 2019-07-06 DIAGNOSIS — F43.10 PTSD (POST-TRAUMATIC STRESS DISORDER): ICD-10-CM

## 2019-07-08 RX ORDER — TRAMADOL HYDROCHLORIDE 50 MG/1
50 TABLET ORAL EVERY 6 HOURS PRN
Qty: 120 TABLET | Refills: 3 | Status: SHIPPED | OUTPATIENT
Start: 2019-07-08 | End: 2019-12-11 | Stop reason: HOSPADM

## 2019-07-08 RX ORDER — LEVOTHYROXINE SODIUM 0.03 MG/1
TABLET ORAL
Qty: 30 TABLET | Refills: 1 | Status: SHIPPED | OUTPATIENT
Start: 2019-07-08 | End: 2019-09-25 | Stop reason: SDUPTHER

## 2019-07-18 ENCOUNTER — TELEPHONE (OUTPATIENT)
Dept: FAMILY MEDICINE CLINIC | Facility: CLINIC | Age: 34
End: 2019-07-18

## 2019-07-18 NOTE — TELEPHONE ENCOUNTER
I called patient to notified her that Myranda Cee already got her certification number for medical marijuana ([id number])

## 2019-07-22 DIAGNOSIS — J45.909 ASTHMA, UNSPECIFIED ASTHMA SEVERITY, UNSPECIFIED WHETHER COMPLICATED, UNSPECIFIED WHETHER PERSISTENT: ICD-10-CM

## 2019-07-22 DIAGNOSIS — R52 PAIN: ICD-10-CM

## 2019-07-22 RX ORDER — CYCLOBENZAPRINE HCL 10 MG
TABLET ORAL
Qty: 90 TABLET | Refills: 0 | Status: SHIPPED | OUTPATIENT
Start: 2019-07-22 | End: 2019-08-19 | Stop reason: SDUPTHER

## 2019-07-23 DIAGNOSIS — F31.9 BIPOLAR DEPRESSION (HCC): Primary | ICD-10-CM

## 2019-07-23 RX ORDER — GABAPENTIN 600 MG/1
600 TABLET ORAL 3 TIMES DAILY
Qty: 90 TABLET | Refills: 0 | Status: SHIPPED | OUTPATIENT
Start: 2019-07-23 | End: 2019-08-19 | Stop reason: SDUPTHER

## 2019-07-24 RX ORDER — CARISOPRODOL 350 MG/1
350 TABLET ORAL 3 TIMES DAILY
Qty: 90 TABLET | Refills: 0 | Status: SHIPPED | OUTPATIENT
Start: 2019-07-24 | End: 2019-08-19 | Stop reason: SDUPTHER
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
## 2019-07-24 NOTE — TELEPHONE ENCOUNTER
What would you like to do about this? Pt called states needs new Rx--wasn't in her med list--when I called pt she states pharm wouldn't fill because she has multi RX's from different doctors  Spoke to pharm they said she had filled soma RX in June but it was not from this office  check pdmp-coming from Marcin Lerma MD --who is prescribing her oxy 10 also  When I spoke to pt she said she forgot and filled old script from him  Please advise?

## 2019-08-01 DIAGNOSIS — F41.9 ANXIETY: ICD-10-CM

## 2019-08-01 NOTE — TELEPHONE ENCOUNTER
PT  Called in regarding her clozenpam medication that is still pending   PT would like a call back regarding this matter

## 2019-08-02 RX ORDER — CLONAZEPAM 2 MG/1
TABLET ORAL
Qty: 60 TABLET | Refills: 1 | Status: SHIPPED | OUTPATIENT
Start: 2019-08-02 | End: 2019-09-25 | Stop reason: SDUPTHER

## 2019-08-08 DIAGNOSIS — F41.9 ANXIETY: ICD-10-CM

## 2019-08-08 RX ORDER — MEPROBAMATE 200 MG/1
TABLET ORAL
Qty: 120 TABLET | Refills: 1 | Status: SHIPPED | OUTPATIENT
Start: 2019-08-08 | End: 2019-09-26 | Stop reason: SDUPTHER

## 2019-08-17 DIAGNOSIS — F41.9 ANXIETY: ICD-10-CM

## 2019-08-17 RX ORDER — DIAZEPAM 5 MG/1
TABLET ORAL
Qty: 30 TABLET | Refills: 5 | Status: SHIPPED | OUTPATIENT
Start: 2019-08-17 | End: 2019-12-11 | Stop reason: HOSPADM

## 2019-08-19 DIAGNOSIS — J45.909 ASTHMA, UNSPECIFIED ASTHMA SEVERITY, UNSPECIFIED WHETHER COMPLICATED, UNSPECIFIED WHETHER PERSISTENT: ICD-10-CM

## 2019-08-19 DIAGNOSIS — F31.9 BIPOLAR DEPRESSION (HCC): ICD-10-CM

## 2019-08-19 DIAGNOSIS — R52 PAIN: ICD-10-CM

## 2019-08-20 DIAGNOSIS — F43.10 PTSD (POST-TRAUMATIC STRESS DISORDER): ICD-10-CM

## 2019-08-20 RX ORDER — CYCLOBENZAPRINE HCL 10 MG
TABLET ORAL
Qty: 90 TABLET | Refills: 0 | Status: SHIPPED | OUTPATIENT
Start: 2019-08-20 | End: 2019-09-16 | Stop reason: SDUPTHER

## 2019-08-20 RX ORDER — GABAPENTIN 600 MG/1
600 TABLET ORAL 3 TIMES DAILY
Qty: 90 TABLET | Refills: 0 | Status: SHIPPED | OUTPATIENT
Start: 2019-08-21 | End: 2019-09-16 | Stop reason: SDUPTHER

## 2019-08-20 RX ORDER — CARISOPRODOL 350 MG/1
350 TABLET ORAL 3 TIMES DAILY
Qty: 90 TABLET | Refills: 0 | Status: SHIPPED | OUTPATIENT
Start: 2019-08-22 | End: 2019-09-16 | Stop reason: SDUPTHER

## 2019-08-20 RX ORDER — LEVOTHYROXINE SODIUM 0.03 MG/1
TABLET ORAL
Qty: 30 TABLET | Refills: 1 | OUTPATIENT
Start: 2019-08-20

## 2019-09-16 DIAGNOSIS — F31.9 BIPOLAR DEPRESSION (HCC): ICD-10-CM

## 2019-09-16 DIAGNOSIS — J45.909 ASTHMA, UNSPECIFIED ASTHMA SEVERITY, UNSPECIFIED WHETHER COMPLICATED, UNSPECIFIED WHETHER PERSISTENT: ICD-10-CM

## 2019-09-16 DIAGNOSIS — R52 PAIN: ICD-10-CM

## 2019-09-16 RX ORDER — CYCLOBENZAPRINE HCL 10 MG
TABLET ORAL
Qty: 90 TABLET | Refills: 0 | Status: SHIPPED | OUTPATIENT
Start: 2019-09-16 | End: 2019-10-14 | Stop reason: SDUPTHER

## 2019-09-16 RX ORDER — CARISOPRODOL 350 MG/1
350 TABLET ORAL 3 TIMES DAILY
Qty: 90 TABLET | Refills: 3 | Status: SHIPPED | OUTPATIENT
Start: 2019-09-16 | End: 2019-12-10 | Stop reason: SDUPTHER

## 2019-09-16 RX ORDER — GABAPENTIN 600 MG/1
600 TABLET ORAL 3 TIMES DAILY
Qty: 90 TABLET | Refills: 0 | Status: SHIPPED | OUTPATIENT
Start: 2019-09-16 | End: 2019-12-10 | Stop reason: SDUPTHER

## 2019-09-24 DIAGNOSIS — F31.9 BIPOLAR DEPRESSION (HCC): ICD-10-CM

## 2019-09-24 DIAGNOSIS — F31.81 BIPOLAR 2 DISORDER (HCC): ICD-10-CM

## 2019-09-24 RX ORDER — TOPIRAMATE 100 MG/1
TABLET, FILM COATED ORAL
Qty: 270 TABLET | Refills: 1 | Status: SHIPPED | OUTPATIENT
Start: 2019-09-24 | End: 2019-12-10 | Stop reason: SDUPTHER

## 2019-09-24 RX ORDER — DULOXETIN HYDROCHLORIDE 30 MG/1
CAPSULE, DELAYED RELEASE ORAL
Qty: 90 CAPSULE | Refills: 1 | Status: SHIPPED | OUTPATIENT
Start: 2019-09-24 | End: 2020-01-14 | Stop reason: SDUPTHER

## 2019-09-25 DIAGNOSIS — F43.10 PTSD (POST-TRAUMATIC STRESS DISORDER): ICD-10-CM

## 2019-09-25 DIAGNOSIS — F41.9 ANXIETY: ICD-10-CM

## 2019-09-25 RX ORDER — CLONAZEPAM 2 MG/1
TABLET ORAL
Qty: 60 TABLET | Refills: 0 | Status: SHIPPED | OUTPATIENT
Start: 2019-09-26 | End: 2019-10-21 | Stop reason: SDUPTHER

## 2019-09-25 RX ORDER — LEVOTHYROXINE SODIUM 0.03 MG/1
25 TABLET ORAL DAILY
Qty: 30 TABLET | Refills: 0 | Status: SHIPPED | OUTPATIENT
Start: 2019-09-25 | End: 2020-01-14 | Stop reason: SDUPTHER
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
## 2019-09-26 DIAGNOSIS — F41.9 ANXIETY: ICD-10-CM

## 2019-09-26 RX ORDER — MEPROBAMATE 200 MG/1
TABLET ORAL
Qty: 120 TABLET | Refills: 1 | Status: SHIPPED | OUTPATIENT
Start: 2019-09-26 | End: 2019-12-09 | Stop reason: SDUPTHER

## 2019-10-14 DIAGNOSIS — J45.909 ASTHMA, UNSPECIFIED ASTHMA SEVERITY, UNSPECIFIED WHETHER COMPLICATED, UNSPECIFIED WHETHER PERSISTENT: ICD-10-CM

## 2019-10-14 RX ORDER — CYCLOBENZAPRINE HCL 10 MG
TABLET ORAL
Qty: 90 TABLET | Refills: 0 | Status: SHIPPED | OUTPATIENT
Start: 2019-10-14 | End: 2020-01-14

## 2019-10-15 LAB
EXTERNAL CHLAMYDIA RESULT: NEGATIVE
N GONORRHOEA RRNA SPEC QL PROBE: NEGATIVE

## 2019-10-21 DIAGNOSIS — F41.9 ANXIETY: ICD-10-CM

## 2019-10-22 RX ORDER — CLONAZEPAM 2 MG/1
TABLET ORAL
Qty: 60 TABLET | Refills: 5 | Status: SHIPPED | OUTPATIENT
Start: 2019-10-22 | End: 2019-12-11 | Stop reason: HOSPADM

## 2019-10-31 DIAGNOSIS — L30.9 ECZEMA, UNSPECIFIED TYPE: Primary | ICD-10-CM

## 2019-10-31 RX ORDER — HYDROCORTISONE 1% IN ABSORBASE 10 MG/G
OINTMENT TOPICAL
Qty: 110 G | Refills: 4 | Status: SHIPPED | OUTPATIENT
Start: 2019-10-31 | End: 2019-12-11 | Stop reason: HOSPADM

## 2019-11-19 ENCOUNTER — OFFICE VISIT (OUTPATIENT)
Dept: URGENT CARE | Facility: CLINIC | Age: 34
End: 2019-11-19
Payer: COMMERCIAL

## 2019-11-19 VITALS
SYSTOLIC BLOOD PRESSURE: 124 MMHG | HEIGHT: 64 IN | TEMPERATURE: 97.6 F | RESPIRATION RATE: 20 BRPM | WEIGHT: 193 LBS | OXYGEN SATURATION: 95 % | DIASTOLIC BLOOD PRESSURE: 86 MMHG | HEART RATE: 100 BPM | BODY MASS INDEX: 32.95 KG/M2

## 2019-11-19 DIAGNOSIS — J01.10 ACUTE FRONTAL SINUSITIS, RECURRENCE NOT SPECIFIED: Primary | ICD-10-CM

## 2019-11-19 PROCEDURE — 99283 EMERGENCY DEPT VISIT LOW MDM: CPT | Performed by: PHYSICIAN ASSISTANT

## 2019-11-19 PROCEDURE — G0382 LEV 3 HOSP TYPE B ED VISIT: HCPCS | Performed by: PHYSICIAN ASSISTANT

## 2019-11-19 RX ORDER — CHLORPROMAZINE HYDROCHLORIDE 50 MG/1
50 TABLET, FILM COATED ORAL 3 TIMES DAILY
Refills: 0 | COMMUNITY
Start: 2019-11-15 | End: 2019-12-23 | Stop reason: SDUPTHER

## 2019-11-19 RX ORDER — AMOXICILLIN AND CLAVULANATE POTASSIUM 875; 125 MG/1; MG/1
1 TABLET, FILM COATED ORAL EVERY 12 HOURS SCHEDULED
Qty: 20 TABLET | Refills: 0 | Status: SHIPPED | OUTPATIENT
Start: 2019-11-19 | End: 2019-11-29

## 2019-11-19 RX ORDER — OMEPRAZOLE 20 MG/1
20 CAPSULE, DELAYED RELEASE ORAL DAILY
Refills: 0 | COMMUNITY
Start: 2019-11-14 | End: 2020-01-14 | Stop reason: SDUPTHER

## 2019-11-19 RX ORDER — DOXEPIN HYDROCHLORIDE 50 MG/1
CAPSULE ORAL
Refills: 0 | COMMUNITY
Start: 2019-11-14 | End: 2020-04-13 | Stop reason: SDUPTHER

## 2019-11-19 RX ORDER — NALOXONE HYDROCHLORIDE 4 MG/.1ML
SPRAY NASAL
Refills: 0 | COMMUNITY
Start: 2019-09-08

## 2019-11-19 NOTE — PROGRESS NOTES
NAME: David Camilo is a 29 y o  female  : 1985    MRN: [id number]      Assessment and Plan   Acute frontal sinusitis, recurrence not specified [J01 10]  1  Acute frontal sinusitis, recurrence not specified  amoxicillin-clavulanate (AUGMENTIN) 875-125 mg per tablet       Patient states she is not allergic to amoxicillin but rather "it never works for me " She states she has no hx of allergy to amoxicillin  States she has taken augmentin before without any problems and reports it works for her  Patient Instructions   Patient Instructions   Augmentin as directed  Continue over-the-counter medications  Increased fluids and rest  If no improvement in 3-4 days follow-up with PCP  If anything changes or worsens follow-up sooner    Proceed to ER if symptoms worsen  Chief Complaint     Chief Complaint   Patient presents with    Cough     Pt states she has had a cough x 2 weeks  Pt has tried OTC meds without relief  History of Present Illness   Patient with history of asthma, bipolar disorder, hepatitis-C and anxiety presents complaining of 2 week history of cough and congestion  States 2 weeks ago she started with cold symptoms but states she continues to have a cough, frontal headache with sinus pressure and has coughing fits  She reports some chest tightness associated with the coughing but denies any chest pain, palpitations, shortness of breath or dyspnea  Reports the cough is nonproductive and denies any fevers or chills, sore throat  Reports some ear pain and fullness along with the sinus pressure  States she tried over-the-counter medications like DayQuil without any relief  She is current everyday smoker  Review of Systems   Review of Systems   Constitutional: Negative for chills and fever  HENT: Positive for congestion, ear pain, rhinorrhea, sinus pressure and sinus pain  Negative for sore throat  Respiratory: Positive for cough and chest tightness   Negative for shortness of breath, wheezing and stridor  Cardiovascular: Negative for chest pain, palpitations and leg swelling           Current Medications       Current Outpatient Medications:     carisoprodol (SOMA) 350 mg tablet, Take 1 tablet (350 mg total) by mouth 3 (three) times a day, Disp: 90 tablet, Rfl: 3    chlorproMAZINE (THORAZINE) 50 mg tablet, Take 50 mg by mouth 3 (three) times a day, Disp: , Rfl: 0    cyclobenzaprine (FLEXERIL) 10 mg tablet, TAKE ONE TABLET BY MOUTH THREE TIMES DAILY AS NEEDED FOR MUSCLE SPASMS, Disp: 90 tablet, Rfl: 0    DULoxetine (CYMBALTA) 30 mg delayed release capsule, TAKE ONE CAPSULE BY MOUTH DAILY, Disp: 90 capsule, Rfl: 1    gabapentin (NEURONTIN) 600 MG tablet, Take 1 tablet (600 mg total) by mouth 3 (three) times a day, Disp: 90 tablet, Rfl: 0    levothyroxine 25 mcg tablet, Take 1 tablet (25 mcg total) by mouth daily, Disp: 30 tablet, Rfl: 0    meprobamate (EQUANIL) 200 MG tablet, TAKE 1 TABLET BY MOUTH 4 TIMES A DAY , Disp: 120 tablet, Rfl: 1    omeprazole (PriLOSEC) 20 mg delayed release capsule, Take 20 mg by mouth daily, Disp: , Rfl: 0    topiramate (TOPAMAX) 100 mg tablet, TAKE ONE TABLET BY MOUTH THREE TIMES DAILY, Disp: 270 tablet, Rfl: 1    VENTOLIN  (90 Base) MCG/ACT inhaler, inhale ONE puff by MOUTH every FOUR HOURS AS NEEDED, Disp: 18 g, Rfl: 0    amLODIPine (NORVASC) 5 mg tablet, , Disp: , Rfl:     amoxicillin-clavulanate (AUGMENTIN) 875-125 mg per tablet, Take 1 tablet by mouth every 12 (twelve) hours for 10 days, Disp: 20 tablet, Rfl: 0    clonazePAM (KlonoPIN) 2 mg tablet, TAKE ONE TABLET BY MOUTH TWICE DAILY (Patient not taking: Reported on 11/19/2019), Disp: 60 tablet, Rfl: 5    diazepam (VALIUM) 5 mg tablet, TAKE ONE TABLET BY MOUTH AT BEDTIME (Patient not taking: Reported on 11/19/2019), Disp: 30 tablet, Rfl: 5    doxepin (SINEquan) 50 mg capsule, TAKE ONE CAPSULE BY MOUTH AS NEEDED FOR INSOMNIA, Disp: , Rfl: 0    FLUoxetine (PROzac) 10 MG tablet, Take 1 tablet (10 mg total) by mouth daily (Patient not taking: Reported on 11/19/2019), Disp: 90 tablet, Rfl: 3    HYDROCORTISONE IN ABSORBASE 1 % ointment, APPLY TO THE AFFECTED AREA TWICE DAILY AS NEEDED FOR ITCHING (Patient not taking: Reported on 11/19/2019), Disp: 110 g, Rfl: 4    NARCAN 4 MG/0 1ML LIQD, ADMINISTER A SINGLE spray INTO ONE NOSTRIL CALL 911 MAY REPEAT ONCE, Disp: , Rfl: 0    traMADol (ULTRAM) 50 mg tablet, Take 1 tablet (50 mg total) by mouth every 6 (six) hours as needed for moderate pain (Patient not taking: Reported on 11/19/2019), Disp: 120 tablet, Rfl: 3    Current Allergies     Allergies as of 11/19/2019 - Reviewed 11/19/2019   Allergen Reaction Noted    Amoxicillin  11/19/2019              Past Medical History:   Diagnosis Date    Anxiety     "severe"    Asthma     [facility] disorder ([facility] )     Cigarette smoker     Disease of thyroid gland     [facility]     GERD (gastroesophageal reflux disease)     H/O: whooping cough     while pregnant    Hepatitis C, chronic (HCC)     Insomnia disorder     Liver disease     Hepatitis C    Migraine     PTSD (post-traumatic stress disorder)        Past Surgical History:   Procedure Laterality Date    OTHER SURGICAL HISTORY      body piercing    CO REPAIR UMBILICAL QXGY,7+G/H,EEQKD N/A 4/19/2016    Procedure: REPAIR HERNIA UMBILICAL WITH MESH;  Surgeon: Lary Shanks MD;  Location:  MAIN OR;  Service: General    VAGINAL DELIVERY      X 6    WISDOM TOOTH EXTRACTION      X 4    WOUND DEBRIDEMENT Right 1/17/2018    Procedure: RIGHT KNEE DEBRIDEMENT; [address]; AND LACERATION REPAIR   INTRAOPERATIVE ASSESSMENT OF PATELLA TENDON;  Surgeon: Callie Lemon MD;  Location: [facility];  Service: Orthopedics       Family History   Problem Relation Age of Onset    Hypertension Mother     Thyroid disease Mother     Hypertension Father     Prostate cancer Father     Anxiety disorder Sister Medications have been verified  The following portions of the patient's history were reviewed and updated as appropriate: allergies, current medications, past family history, past medical history, past social history, past surgical history and problem list     Objective   /86   Pulse 100   Temp 97 6 °F (36 4 °C)   Resp 20   Ht 5' 4" (1 626 m)   Wt 87 5 kg (193 lb)   SpO2 95%   BMI 33 13 kg/m²      Physical Exam     Physical Exam   Constitutional: She appears well-developed and well-nourished  No distress  HENT:   TMs erythematous bilaterally without injection or bulging  Purulent fluid behind both  Nasal mucosa is erythematous and edematous  Posterior oropharynx is clear without erythema, edema, tonsillar hypertrophy or exudates  +PND   Cardiovascular: Normal rate, regular rhythm and normal heart sounds  Pulmonary/Chest: Effort normal and breath sounds normal  No stridor  No respiratory distress  She has no wheezes  She has no rales  Lymphadenopathy:     She has no cervical adenopathy  Skin: She is not diaphoretic  Nursing note and vitals reviewed

## 2019-11-19 NOTE — PATIENT INSTRUCTIONS
Augmentin as directed  Continue over-the-counter medications  Increased fluids and rest  If no improvement in 3-4 days follow-up with PCP  If anything changes or worsens follow-up sooner

## 2019-11-19 NOTE — LETTER
November 19, 2019     Patient: Geno Courtney   YOB: 1985   Date of Visit: 11/19/2019       To Whom it May Concern:    Cirilo Jlailyn was seen in my clinic on 11/19/2019  If you have any questions or concerns, please don't hesitate to call           Sincerely,          Marilyn Crane PA-C        CC: No Recipients

## 2019-12-09 DIAGNOSIS — F41.9 ANXIETY: ICD-10-CM

## 2019-12-09 RX ORDER — MEPROBAMATE 200 MG/1
TABLET ORAL
Qty: 120 TABLET | Refills: 0 | Status: SHIPPED | OUTPATIENT
Start: 2019-12-09 | End: 2020-01-08

## 2019-12-10 DIAGNOSIS — R52 PAIN: ICD-10-CM

## 2019-12-10 DIAGNOSIS — F31.9 BIPOLAR DEPRESSION (HCC): ICD-10-CM

## 2019-12-10 DIAGNOSIS — F31.81 BIPOLAR 2 DISORDER (HCC): ICD-10-CM

## 2019-12-10 RX ORDER — CARISOPRODOL 350 MG/1
350 TABLET ORAL 3 TIMES DAILY
Qty: 90 TABLET | Refills: 3 | Status: SHIPPED | OUTPATIENT
Start: 2019-12-10 | End: 2020-01-14

## 2019-12-10 RX ORDER — FLUOXETINE 10 MG/1
TABLET, FILM COATED ORAL
Qty: 90 TABLET | Refills: 3 | Status: SHIPPED | OUTPATIENT
Start: 2019-12-10 | End: 2019-12-23

## 2019-12-10 RX ORDER — TOPIRAMATE 100 MG/1
TABLET, FILM COATED ORAL
Qty: 270 TABLET | Refills: 1 | Status: SHIPPED | OUTPATIENT
Start: 2019-12-10 | End: 2020-01-14 | Stop reason: SDUPTHER

## 2019-12-10 RX ORDER — GABAPENTIN 600 MG/1
600 TABLET ORAL 3 TIMES DAILY
Qty: 90 TABLET | Refills: 0 | Status: SHIPPED | OUTPATIENT
Start: 2019-12-10 | End: 2021-08-19 | Stop reason: SDUPTHER

## 2019-12-11 ENCOUNTER — TELEPHONE (OUTPATIENT)
Dept: FAMILY MEDICINE CLINIC | Facility: CLINIC | Age: 34
End: 2019-12-11

## 2019-12-11 DIAGNOSIS — L30.9 ECZEMA, UNSPECIFIED TYPE: ICD-10-CM

## 2019-12-14 ENCOUNTER — HOSPITAL ENCOUNTER (EMERGENCY)
Facility: HOSPITAL | Age: 34
Discharge: HOME | End: 2019-12-14
Attending: EMERGENCY MEDICINE | Admitting: EMERGENCY MEDICINE
Payer: COMMERCIAL

## 2019-12-14 VITALS
HEIGHT: 64 IN | OXYGEN SATURATION: 100 % | DIASTOLIC BLOOD PRESSURE: 58 MMHG | RESPIRATION RATE: 18 BRPM | WEIGHT: 180.19 LBS | HEART RATE: 94 BPM | SYSTOLIC BLOOD PRESSURE: 98 MMHG | BODY MASS INDEX: 30.76 KG/M2

## 2019-12-14 DIAGNOSIS — N39.0 URINARY TRACT INFECTION: ICD-10-CM

## 2019-12-14 DIAGNOSIS — T50.905A DRUG REACTION: ICD-10-CM

## 2019-12-14 LAB
ALBUMIN SERPL BCP-MCNC: 4 G/DL (ref 3.5–5)
ALP SERPL-CCNC: 62 U/L (ref 46–116)
ALT SERPL W P-5'-P-CCNC: 20 U/L (ref 12–78)
AMPHETAMINES SERPL QL SCN: NEGATIVE
ANION GAP SERPL CALCULATED.3IONS-SCNC: 11 MMOL/L (ref 4–13)
AST SERPL W P-5'-P-CCNC: 10 U/L (ref 5–45)
BACTERIA UR QL AUTO: ABNORMAL /HPF
BARBITURATES UR QL: NEGATIVE
BASOPHILS # BLD AUTO: 0.02 THOUSANDS/ΜL (ref 0–0.1)
BASOPHILS NFR BLD AUTO: 0 % (ref 0–1)
BENZODIAZ UR QL: NEGATIVE
BILIRUB SERPL-MCNC: 0.2 MG/DL (ref 0.2–1)
BILIRUB UR QL STRIP: NEGATIVE
BUN SERPL-MCNC: 9 MG/DL (ref 5–25)
CALCIUM SERPL-MCNC: 8.9 MG/DL (ref 8.3–10.1)
CHLORIDE SERPL-SCNC: 109 MMOL/L (ref 100–108)
CLARITY UR: ABNORMAL
CLARITY, POC: ABNORMAL
CO2 SERPL-SCNC: 23 MMOL/L (ref 21–32)
COCAINE UR QL: NEGATIVE
COLOR UR: YELLOW
COLOR, POC: YELLOW
CREAT SERPL-MCNC: 1.13 MG/DL (ref 0.6–1.3)
EOSINOPHIL # BLD AUTO: 0.11 THOUSAND/ΜL (ref 0–0.61)
EOSINOPHIL NFR BLD AUTO: 2 % (ref 0–6)
ERYTHROCYTE [DISTWIDTH] IN BLOOD BY AUTOMATED COUNT: 14 % (ref 11.6–15.1)
EXT BILIRUBIN, UA: ABNORMAL
EXT BLOOD URINE: ABNORMAL
EXT GLUCOSE, UA: ABNORMAL
EXT KETONES: ABNORMAL
EXT NITRITE, UA: ABNORMAL
EXT PH, UA: 8.5
EXT PROTEIN, UA: ABNORMAL
EXT SPECIFIC GRAVITY, UA: 1
EXT UROBILINOGEN: 0.2
GFR SERPL CREATININE-BSD FRML MDRD: 64 ML/MIN/1.73SQ M
GLUCOSE SERPL-MCNC: 89 MG/DL (ref 65–140)
GLUCOSE SERPL-MCNC: 91 MG/DL (ref 65–140)
GLUCOSE UR STRIP-MCNC: NEGATIVE MG/DL
HCT VFR BLD AUTO: 42.3 % (ref 34.8–46.1)
HGB BLD-MCNC: 13.6 G/DL (ref 11.5–15.4)
HGB UR QL STRIP.AUTO: NEGATIVE
IMM GRANULOCYTES # BLD AUTO: 0.02 THOUSAND/UL (ref 0–0.2)
IMM GRANULOCYTES NFR BLD AUTO: 0 % (ref 0–2)
KETONES UR STRIP-MCNC: NEGATIVE MG/DL
LEUKOCYTE ESTERASE UR QL STRIP: ABNORMAL
LYMPHOCYTES # BLD AUTO: 1.82 THOUSANDS/ΜL (ref 0.6–4.47)
LYMPHOCYTES NFR BLD AUTO: 36 % (ref 14–44)
MCH RBC QN AUTO: 31.3 PG (ref 26.8–34.3)
MCHC RBC AUTO-ENTMCNC: 32.2 G/DL (ref 31.4–37.4)
MCV RBC AUTO: 98 FL (ref 82–98)
METHADONE UR QL: NEGATIVE
MONOCYTES # BLD AUTO: 0.23 THOUSAND/ΜL (ref 0.17–1.22)
MONOCYTES NFR BLD AUTO: 5 % (ref 4–12)
NEUTROPHILS # BLD AUTO: 2.87 THOUSANDS/ΜL (ref 1.85–7.62)
NEUTS SEG NFR BLD AUTO: 57 % (ref 43–75)
NITRITE UR QL STRIP: NEGATIVE
NON-SQ EPI CELLS URNS QL MICRO: ABNORMAL /HPF
NRBC BLD AUTO-RTO: 0 /100 WBCS
OPIATES UR QL SCN: POSITIVE
PCP UR QL: NEGATIVE
PH UR STRIP.AUTO: 8 [PH]
PLATELET # BLD AUTO: 223 THOUSANDS/UL (ref 149–390)
PMV BLD AUTO: 11.4 FL (ref 8.9–12.7)
POTASSIUM SERPL-SCNC: 3.6 MMOL/L (ref 3.5–5.3)
PROT SERPL-MCNC: 7.5 G/DL (ref 6.4–8.2)
PROT UR STRIP-MCNC: NEGATIVE MG/DL
RBC # BLD AUTO: 4.34 MILLION/UL (ref 3.81–5.12)
RBC #/AREA URNS AUTO: ABNORMAL /HPF
SODIUM SERPL-SCNC: 143 MMOL/L (ref 136–145)
SP GR UR STRIP.AUTO: 1.01 (ref 1–1.03)
THC UR QL: POSITIVE
TRI-PHOS CRY URNS QL MICRO: ABNORMAL /HPF
UROBILINOGEN UR QL STRIP.AUTO: 0.2 E.U./DL
WBC # BLD AUTO: 5.07 THOUSAND/UL (ref 4.31–10.16)
WBC # BLD EST: ABNORMAL 10*3/UL
WBC #/AREA URNS AUTO: ABNORMAL /HPF

## 2019-12-14 PROCEDURE — 81001 URINALYSIS AUTO W/SCOPE: CPT | Performed by: EMERGENCY MEDICINE

## 2019-12-14 PROCEDURE — 85025 COMPLETE CBC W/AUTO DIFF WBC: CPT | Performed by: EMERGENCY MEDICINE

## 2019-12-14 PROCEDURE — 96360 HYDRATION IV INFUSION INIT: CPT

## 2019-12-14 PROCEDURE — 82948 REAGENT STRIP/BLOOD GLUCOSE: CPT

## 2019-12-14 PROCEDURE — 99284 EMERGENCY DEPT VISIT MOD MDM: CPT | Performed by: EMERGENCY MEDICINE

## 2019-12-14 PROCEDURE — 80307 DRUG TEST PRSMV CHEM ANLYZR: CPT | Performed by: EMERGENCY MEDICINE

## 2019-12-14 PROCEDURE — 36415 COLL VENOUS BLD VENIPUNCTURE: CPT | Performed by: EMERGENCY MEDICINE

## 2019-12-14 PROCEDURE — 80053 COMPREHEN METABOLIC PANEL: CPT | Performed by: EMERGENCY MEDICINE

## 2019-12-14 PROCEDURE — 99284 EMERGENCY DEPT VISIT MOD MDM: CPT

## 2019-12-14 RX ORDER — CEPHALEXIN 500 MG/1
500 CAPSULE ORAL EVERY 8 HOURS SCHEDULED
Qty: 15 CAPSULE | Refills: 0 | Status: SHIPPED | OUTPATIENT
Start: 2019-12-14 | End: 2019-12-19

## 2019-12-14 RX ADMIN — SODIUM CHLORIDE 1000 ML: 0.9 INJECTION, SOLUTION INTRAVENOUS at 14:29

## 2019-12-15 DIAGNOSIS — F31.9 BIPOLAR DEPRESSION (HCC): ICD-10-CM

## 2019-12-16 RX ORDER — DULOXETIN HYDROCHLORIDE 30 MG/1
CAPSULE, DELAYED RELEASE ORAL
Qty: 90 CAPSULE | Refills: 0 | OUTPATIENT
Start: 2019-12-16

## 2019-12-16 NOTE — ED PROVIDER NOTES
History  Chief Complaint   Patient presents with    Slurred Speech     pt presents to ER stating she woke up around 8am this morning with difficulty speaking, slurred speech, and shakiness all over  HPI     29 yof with previous hx of drug abuse, sober since oct of this year, p/w family friend  Family friend went to check on patient and found her to be altered  Pupils were dilated, slurring words,possibly hallucinatin  Patient tells matty just doesn't feel right  Feels like she is off  He has never felt this before  She denies illicit drug use other than medical marijuana which she used every day  Denies fevers chills other associated symptoms or modifying factors  Denies chest pain shortness of breath    On exam patient appears well  She is equal reactive pupils  She has slight tongue fasciculations  She has no clonus normal reflexes no ataxia  Lungs are clear abdomen is soft nontender    Assessment plan: Altered mental status that appears to be improving  Will check basic labs urine for infection and rapid urine drug screen although not entirely reliable  Suspect drug toxicity  Prior to Admission Medications   Prescriptions Last Dose Informant Patient Reported? Taking?    DULoxetine (CYMBALTA) 30 mg delayed release capsule   No Yes   Sig: TAKE ONE CAPSULE BY MOUTH DAILY   FLUoxetine (PROzac) 10 MG tablet   No Yes   Sig: TAKE ONE TABLET BY MOUTH DAILY   NARCAN 4 MG/0 1ML LIQD   Yes Yes   Sig: ADMINISTER A SINGLE spray INTO ONE NOSTRIL CALL 911 MAY REPEAT ONCE   VENTOLIN  (90 Base) MCG/ACT inhaler   No Yes   Sig: inhale ONE puff by MOUTH every FOUR HOURS AS NEEDED   carisoprodol (SOMA) 350 mg tablet   No Yes   Sig: Take 1 tablet (350 mg total) by mouth 3 (three) times a day   chlorproMAZINE (THORAZINE) 50 mg tablet   Yes Yes   Sig: Take 50 mg by mouth 3 (three) times a day   cyclobenzaprine (FLEXERIL) 10 mg tablet   No Yes   Sig: TAKE ONE TABLET BY MOUTH THREE TIMES DAILY AS NEEDED FOR MUSCLE SPASMS   doxepin (SINEquan) 50 mg capsule   Yes Yes   Sig: TAKE ONE CAPSULE BY MOUTH AS NEEDED FOR INSOMNIA   gabapentin (NEURONTIN) 600 MG tablet   No Yes   Sig: Take 1 tablet (600 mg total) by mouth 3 (three) times a day   levothyroxine 25 mcg tablet   No Yes   Sig: Take 1 tablet (25 mcg total) by mouth daily   meprobamate (EQUANIL) 200 MG tablet   No Yes   Sig: TAKE ONE TABLET BY MOUTH FOUR TIMES DAILY   omeprazole (PriLOSEC) 20 mg delayed release capsule   Yes Yes   Sig: Take 20 mg by mouth daily   topiramate (TOPAMAX) 100 mg tablet   No Yes   Sig: TAKE ONE TABLET BY MOUTH THREE TIMES DAILY      Facility-Administered Medications: None       Past Medical History:   Diagnosis Date    Anxiety     "severe"    Asthma     Bipolar disorder (HCC)     Cigarette smoker     Disease of thyroid gland     Drug dependence, in remission ([facility] [address] )     GERD (gastroesophageal reflux disease)     H/O: whooping cough     while pregnant    Hepatitis C, chronic (HCC)     Insomnia disorder     Liver disease     Hepatitis C    Migraine     PTSD (post-traumatic stress disorder)        Past Surgical History:   Procedure Laterality Date    OTHER SURGICAL HISTORY      body piercing    CT REPAIR UMBILICAL RIXP,7+K/P,UMFGN N/A 4/19/2016    Procedure: REPAIR HERNIA UMBILICAL WITH MESH;  Surgeon: Wei Rowan MD;  Location: [facility];  Service: General    VAGINAL DELIVERY      X 6    WISDOM TOOTH EXTRACTION      X 4    WOUND DEBRIDEMENT Right 1/17/2018    Procedure: RIGHT KNEE DEBRIDEMENT; [facility] OUT; AND LACERATION REPAIR  INTRAOPERATIVE ASSESSMENT OF PATELLA TENDON;  Surgeon: Mateo Munroe MD;  [facility];  Service: Orthopedics       Family History   Problem Relation Age of Onset    Hypertension Mother     Thyroid disease Mother     Hypertension Father     Prostate cancer Father     Anxiety disorder Sister      I have reviewed and agree with the history as documented      Social History     Tobacco Use    Smoking status: Current Every Day Smoker     Packs/day: 0 50     Years: 12 00     Pack years: 6 00     Types: Cigarettes    Smokeless tobacco: Never Used    Tobacco comment: Vaping   Substance Use Topics    Alcohol use: Not Currently    Drug use: Not Currently     Types: Methadone, Heroin, Cocaine, Marijuana, Methamphetamines, Prescription     Comment: 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        Review of Systems   Constitutional: Negative for chills, fatigue and fever  Eyes: Negative for photophobia and visual disturbance  Respiratory: Negative for cough and shortness of breath  Cardiovascular: Negative for chest pain, palpitations and leg swelling  Gastrointestinal: Negative for diarrhea, nausea and vomiting  Endocrine: Negative for polydipsia and polyuria  Genitourinary: Negative for decreased urine volume, difficulty urinating, dysuria and frequency  Musculoskeletal: Negative for back pain, neck pain and neck stiffness  Skin: Negative for color change and rash  Allergic/Immunologic: Negative for environmental allergies and immunocompromised state  Neurological: Negative for dizziness and headaches  Hematological: Negative for adenopathy  Does not bruise/bleed easily  Psychiatric/Behavioral: Negative for dysphoric mood  The patient is not nervous/anxious  Physical Exam  Physical Exam   Constitutional: She is oriented to person, place, and time  She appears well-developed and well-nourished  No distress  HENT:   Head: Normocephalic and atraumatic  Nose: Nose normal    Eyes: Pupils are equal, round, and reactive to light  Conjunctivae and EOM are normal  No scleral icterus  Neck: Normal range of motion  Neck supple  No JVD present  No tracheal deviation present  No thyromegaly present  Cardiovascular: Normal rate, regular rhythm, normal heart sounds and intact distal pulses  Exam reveals no gallop and no friction rub     Pulmonary/Chest: Effort normal and breath sounds normal  No respiratory distress  She has no wheezes  She has no rales  She exhibits no tenderness  Abdominal: Soft  Bowel sounds are normal  She exhibits no distension and no mass  There is no tenderness  There is no rebound and no guarding  No hernia  Musculoskeletal: Normal range of motion  She exhibits no edema, tenderness or deformity  Neurological: She is alert and oriented to person, place, and time  She has normal reflexes  No cranial nerve deficit  Coordination normal    Skin: Skin is warm and dry  She is not diaphoretic  No erythema  Psychiatric: She has a normal mood and affect  Her behavior is normal    Nursing note and vitals reviewed        Vital Signs  ED Triage Vitals [12/14/19 1354]   Temp Pulse Respirations Blood Pressure SpO2   -- (!) 115 19 101/64 100 %      Temp src Heart Rate Source Patient Position - Orthostatic VS BP Location FiO2 (%)   -- Monitor Lying Right arm --      Pain Score       No Pain           Vitals:    12/14/19 1354 12/14/19 1430 12/14/19 1530   BP: 101/64 105/61 98/58   Pulse: (!) 115 102 94   Patient Position - Orthostatic VS: Lying Lying Lying         Visual Acuity  Visual Acuity      Most Recent Value   L Pupil Size (mm)  3   R Pupil Size (mm)  3          ED Medications  Medications   sodium chloride 0 9 % bolus 1,000 mL (0 mL Intravenous Stopped 12/14/19 1551)       Diagnostic Studies  Results Reviewed     Procedure Component Value Units Date/Time    Urine Microscopic [[id number]]  (Abnormal) Collected:  12/14/19 1503    Lab Status:  Final result Specimen:  Urine, Clean Catch Updated:  12/14/19 1534     RBC, UA None Seen /hpf      WBC, UA 2-4 /hpf      Epithelial Cells Moderate /hpf      Bacteria, UA Occasional /hpf      Triplep Phos [place], UA Occasional /hpf     UA w Reflex to Microscopic w Reflex to Culture [[id number]]  (Abnormal) Collected:  12/14/19 1503    Lab Status:  Final result Specimen:  Urine, Clean Catch Updated:  12/14/19 1512     Color, UA Yellow     Clarity, UA Cloudy     Specific Gravity, UA 1 015     pH, UA 8 0     Leukocytes, UA Moderate     Nitrite, UA Negative     Protein, UA Negative mg/dl      Glucose, UA Negative mg/dl      Ketones, UA Negative mg/dl      Urobilinogen, UA 0 2 E U /dl      Bilirubin, UA Negative     Blood, UA Negative    Rapid drug screen, urine [[id number]]  (Abnormal) Collected:  12/14/19 1430    Lab Status:  Final result Specimen:  Urine, Clean Catch Updated:  12/14/19 1459     Amph/Meth UR Negative     Barbiturate Ur Negative     Benzodiazepine Urine Negative     Cocaine Urine Negative     Methadone Urine Negative     Opiate Urine Positive     PCP Ur Negative     THC Urine Positive    Narrative:       Presumptive report  If requested, specimen will be sent to reference lab for confirmation  FOR MEDICAL PURPOSES ONLY  IF CONFIRMATION NEEDED PLEASE CONTACT THE LAB WITHIN 5 DAYS      Drug Screen Cutoff Levels:  AMPHETAMINE/METHAMPHETAMINES  1000 ng/mL  BARBITURATES     200 ng/mL  BENZODIAZEPINES     200 ng/mL  COCAINE      300 ng/mL  METHADONE      300 ng/mL  OPIATES      300 ng/mL  PHENCYCLIDINE     25 ng/mL  THC       50 ng/mL      Comprehensive metabolic panel [[id number]]  (Abnormal) Collected:  12/14/19 1428    Lab Status:  Final result Specimen:  Blood from Arm, Right Updated:  12/14/19 1457     Sodium 143 mmol/L      Potassium 3 6 mmol/L      Chloride 109 mmol/L      CO2 23 mmol/L      ANION GAP 11 mmol/L      BUN 9 mg/dL      Creatinine 1 13 mg/dL      Glucose 89 mg/dL      Calcium 8 9 mg/dL      AST 10 U/L      ALT 20 U/L      Alkaline Phosphatase 62 U/L      Total Protein 7 5 g/dL      Albumin 4 0 g/dL      Total Bilirubin 0 20 mg/dL      eGFR 64 ml/min/1 73sq m     Narrative:       [place] guidelines for Chronic Kidney Disease (CKD):     Stage 1 with normal or high GFR (GFR > 90 mL/min/1 73 square meters)    Stage 2 Mild CKD (GFR = 60-89 mL/min/1 73 square meters)    Stage 3A Moderate CKD (GFR = 45-59 mL/min/1 73 square meters)    Stage 3B Moderate CKD (GFR = 30-44 mL/min/1 73 square meters)    Stage 4 Severe CKD (GFR = 15-29 mL/min/1 73 square meters)    Stage 5 End Stage CKD (GFR <15 mL/min/1 73 square meters)  Note: GFR calculation is accurate only with a steady state creatinine    POCT urinalysis dipstick [[id number]]  (Abnormal) Resulted:  12/14/19 1441    Lab Status:  Final result Specimen:  Urine Updated:  12/14/19 1442     Color, UA YELLOW     Clarity, UA HAZY     Glucose, UA (Ref: Negative) NEG     Bilirubin, UA (Ref: Negative) NEG     Ketones, UA (Ref: Negative) NEG     Spec Grav, UA (Ref:1 003-1 030) 1 005     Blood, UA (Ref: Negative) NEG     pH, UA (Ref: 4 5-8 0) 8 5     Protein, UA (Ref: Negative) TRACE     Urobilinogen, UA (Ref: 0 2- 1 0) 0 2      Leukocytes, UA (Ref: Negative) LARGE     Nitrite, UA (Ref: Negative) NEG    CBC and differential [[id number]] Collected:  12/14/19 1428    Lab Status:  Final result Specimen:  Blood from Arm, Right Updated:  12/14/19 1435     WBC 5 07 Thousand/uL      RBC 4 34 Million/uL      Hemoglobin 13 6 g/dL      Hematocrit 42 3 %      MCV 98 fL      MCH 31 3 pg      MCHC 32 2 g/dL      RDW 14 0 %      MPV 11 4 fL      Platelets 188 Thousands/uL      nRBC 0 /100 WBCs      Neutrophils Relative 57 %      Immat GRANS % 0 %      Lymphocytes Relative 36 %      Monocytes Relative 5 %      Eosinophils Relative 2 %      Basophils Relative 0 %      Neutrophils Absolute 2 87 Thousands/µL      Immature Grans Absolute 0 02 Thousand/uL      Lymphocytes Absolute 1 82 Thousands/µL      Monocytes Absolute 0 23 Thousand/µL      Eosinophils Absolute 0 11 Thousand/µL      Basophils Absolute 0 02 Thousands/µL     Fingerstick Glucose (POCT) [[id number]]  (Normal) Collected:  12/14/19 1433    Lab Status:  Final result Updated:  12/14/19 1434     POC Glucose 91 mg/dl                  No orders to display              Procedures  Procedures         ED Course MDM  Number of Diagnoses or Management Options  Drug reaction: new and requires workup  Urinary tract infection: new and requires workup  Diagnosis management comments: UTI  -unsure of symptoms, will tx    Patient eventually admitted to using [employer] from gas station  Recommended against this, likely cause of her symptoms  Amount and/or Complexity of Data Reviewed  Clinical lab tests: reviewed and ordered  Tests in the medicine section of CPT®: ordered and reviewed  Independent visualization of images, tracings, or specimens: yes    Patient Progress  Patient progress: stable        Disposition  Final diagnoses:   Drug reaction   Urinary tract infection     Time reflects when diagnosis was documented in both MDM as applicable and the Disposition within this note     Time User Action Codes Description Comment    12/14/2019  4:20 PM Courtney, [address] Drug reaction     12/14/2019  4:20 PM Tatiana AUGUSTE Add [N39 0] Urinary tract infection     12/14/2019  4:22 PM Danny Michael Modify [T50 905A] Drug reaction       ED Disposition     ED Disposition Condition Date/Time Comment    Discharge Stable Sat Dec 14, 2019  4:20 PM Geno Courtney discharge to home/self care              Follow-up Information     Follow up With Specialties Details Why Contact Info    Suzi Javier MD Family Medicine Schedule an appointment as soon as possible for a visit  As needed [address] [address]  [phone]            Discharge Medication List as of 12/14/2019  4:22 PM      START taking these medications    Details   cephalexin (KEFLEX) 500 mg capsule Take 1 capsule (500 mg total) by mouth every 8 (eight) hours for 5 days, Starting Sat 12/14/2019, Until Thu 12/19/2019, Normal         CONTINUE these medications which have NOT CHANGED    Details   carisoprodol (SOMA) 350 mg tablet Take 1 tablet (350 mg total) by mouth 3 (three) times a day, Starting Tue 12/10/2019, Normal      chlorproMAZINE (THORAZINE) 50 mg tablet Take 50 mg by mouth 3 (three) times a day, Starting Fri 11/15/2019, Historical Med      cyclobenzaprine (FLEXERIL) 10 mg tablet TAKE ONE TABLET BY MOUTH THREE TIMES DAILY AS NEEDED FOR MUSCLE SPASMS, Normal      doxepin (SINEquan) 50 mg capsule TAKE ONE CAPSULE BY MOUTH AS NEEDED FOR INSOMNIA, Historical Med      DULoxetine (CYMBALTA) 30 mg delayed release capsule TAKE ONE CAPSULE BY MOUTH DAILY, Normal      FLUoxetine (PROzac) 10 MG tablet TAKE ONE TABLET BY MOUTH DAILY, Normal      gabapentin (NEURONTIN) 600 MG tablet Take 1 tablet (600 mg total) by mouth 3 (three) times a day, Starting Tue 12/10/2019, Normal      levothyroxine 25 mcg tablet Take 1 tablet (25 mcg total) by mouth daily, Starting Wed 9/25/2019, Normal      meprobamate (EQUANIL) 200 MG tablet TAKE ONE TABLET BY MOUTH FOUR TIMES DAILY, Normal      NARCAN 4 MG/0 1ML LIQD ADMINISTER A SINGLE spray INTO ONE NOSTRIL CALL 911 MAY REPEAT ONCE, Historical Med      omeprazole (PriLOSEC) 20 mg delayed release capsule Take 20 mg by mouth daily, Starting Thu 11/14/2019, Historical Med      topiramate (TOPAMAX) 100 mg tablet TAKE ONE TABLET BY MOUTH THREE TIMES DAILY, Normal      VENTOLIN  (90 Base) MCG/ACT inhaler inhale ONE puff by MOUTH every FOUR HOURS AS NEEDED, Normal           No discharge procedures on file      ED Provider  Electronically Signed by           Chantelle Falcon DO  12/16/19 7005

## 2019-12-17 ENCOUNTER — VBI (OUTPATIENT)
Dept: FAMILY MEDICINE CLINIC | Facility: CLINIC | Age: 34
End: 2019-12-17

## 2019-12-17 NOTE — TELEPHONE ENCOUNTER
[facility]    ED Visit Information     Ed visit date: 12/14/2019  Diagnosis Description: Drug reaction; Urinary tract infection  In Network? Yes [facility] ED  Discharge status: Home  Discharged with meds ? Yes  Number of ED visits to date: 2  ED Severity:N/a     Outreach Information    Outreach successful: Yes 1  Date letter mailed:N/a  Date Finalized:12/17/2019    Care Coordination    Follow up appointment with pcp: yes 12/23/2019  Transportation issues ? Yes (occasionally) can usually find a ride    Value [employer] type:  7 Day Outreach  Emergent necessity warranted by diagnosis:  Yes  [facility] PCP:  Yes  Transportation:  Friend/Family Transport  Called PCP first?:  No  Feels able to call PCP for urgent problems ?:  Yes  Understands what emergencies can be handled by PCP ?:  Yes  Ever any problems getting appointment with PCP for minor emergency/urgency problems?:  No  Practice Contacted Patient ?:  No  Pt had ED follow up with pcp/staff ?:  No    Seen for follow-up out of network ?:  No  Reason Patient went to ED instead of Urgent Care or PCP?:  Perceived Severity of Illness  Urgent care Education?:  No  12/17/2019 11:04 AM Phone (Liss) [phone] [facility] ([facility]) [phone] ()   Call Complete  Personal communication with patient regarding recent ED visit on 12/14 for Drug reaction; Urinary tract infection  Patient was discharged with cephalexin and advised to follow up with PCP  Patient has a previously scheduled follow up appt with PCP on 12/23/2019  Patient stated that she is doing well and is still on antibiotics  Patient does not meet OPCm criteria  [facility] nearest [place] [address] urgent care facility and what conditions may be treated there

## 2019-12-23 ENCOUNTER — OFFICE VISIT (OUTPATIENT)
Dept: FAMILY MEDICINE CLINIC | Facility: CLINIC | Age: 34
End: 2019-12-23
Payer: COMMERCIAL

## 2019-12-23 VITALS
OXYGEN SATURATION: 97 % | BODY MASS INDEX: 31.72 KG/M2 | HEART RATE: 129 BPM | SYSTOLIC BLOOD PRESSURE: 110 MMHG | HEIGHT: 64 IN | DIASTOLIC BLOOD PRESSURE: 72 MMHG | WEIGHT: 185.8 LBS

## 2019-12-23 DIAGNOSIS — F41.9 ANXIETY: Primary | ICD-10-CM

## 2019-12-23 DIAGNOSIS — Z00.00 WELLNESS EXAMINATION: ICD-10-CM

## 2019-12-23 PROCEDURE — 4004F PT TOBACCO SCREEN RCVD TLK: CPT | Performed by: FAMILY MEDICINE

## 2019-12-23 PROCEDURE — 3008F BODY MASS INDEX DOCD: CPT | Performed by: FAMILY MEDICINE

## 2019-12-23 PROCEDURE — 99214 OFFICE O/P EST MOD 30 MIN: CPT | Performed by: FAMILY MEDICINE

## 2019-12-23 PROCEDURE — 99395 PREV VISIT EST AGE 18-39: CPT | Performed by: FAMILY MEDICINE

## 2019-12-23 RX ORDER — CHLORPROMAZINE HYDROCHLORIDE 25 MG/1
TABLET, FILM COATED ORAL
Qty: 90 TABLET | Refills: 3 | Status: SHIPPED | OUTPATIENT
Start: 2019-12-23 | End: 2021-11-01

## 2019-12-23 RX ORDER — CLONAZEPAM 1 MG/1
1 TABLET ORAL 3 TIMES DAILY
Qty: 90 TABLET | Refills: 3 | Status: SHIPPED | OUTPATIENT
Start: 2019-12-23 | End: 2020-01-14

## 2019-12-23 RX ORDER — FLUOXETINE 20 MG/1
20 TABLET, FILM COATED ORAL DAILY
Qty: 30 TABLET | Refills: 5 | Status: SHIPPED | OUTPATIENT
Start: 2019-12-23 | End: 2021-07-28

## 2019-12-23 RX ORDER — CLONIDINE HYDROCHLORIDE 0.2 MG/1
0.2 TABLET ORAL DAILY
Qty: 30 TABLET | Refills: 5 | Status: SHIPPED | OUTPATIENT
Start: 2019-12-23 | End: 2020-01-14 | Stop reason: SDUPTHER

## 2019-12-23 NOTE — PROGRESS NOTES
[facility]        NAME: Mouna Cueto is a 29 y o  female  : 1985    MRN: [id number]  DATE: 2019  TIME: 1:28 PM    Assessment and Plan   Anxiety [F41 9]  1  Anxiety  chlorproMAZINE (THORAZINE) 25 mg tablet    clonazePAM (KlonoPIN) 1 mg tablet    cloNIDine (CATAPRES) 0 2 mg tablet    FLUoxetine (PROzac) 20 MG tablet         Patient Instructions     There are no Patient Instructions on file for this visit  Chief Complaint     Chief Complaint   Patient presents with    Follow-up     Med-check    Annual Exam     HM         History of Present Illness       incr anxiety/depr      Review of Systems   Review of Systems   Constitutional: Negative for fatigue, fever and unexpected weight change  HENT: Negative for congestion, sinus pain and sore throat  Eyes: Negative for visual disturbance  Respiratory: Negative for shortness of breath and wheezing  Cardiovascular: Negative for chest pain and palpitations  Gastrointestinal: Negative for abdominal pain, nausea and vomiting  Musculoskeletal: Negative  Negative for arthralgias and myalgias  Neurological: Negative for syncope, weakness and numbness  Psychiatric/Behavioral: Positive for agitation, decreased concentration, dysphoric mood and sleep disturbance  Negative for confusion and suicidal ideas  The patient is nervous/anxious            Current Medications       Current Outpatient Medications:     carisoprodol (SOMA) 350 mg tablet, Take 1 tablet (350 mg total) by mouth 3 (three) times a day, Disp: 90 tablet, Rfl: 3    chlorproMAZINE (THORAZINE) 25 mg tablet, 1 tab 3x/day, Disp: 90 tablet, Rfl: 3    clonazePAM (KlonoPIN) 1 mg tablet, Take 1 tablet (1 mg total) by mouth 3 (three) times a day, Disp: 90 tablet, Rfl: 3    cloNIDine (CATAPRES) 0 2 mg tablet, Take 1 tablet (0 2 mg total) by mouth daily, Disp: 30 tablet, Rfl: 5    cyclobenzaprine (FLEXERIL) 10 mg tablet, TAKE ONE TABLET BY MOUTH THREE TIMES DAILY AS NEEDED FOR MUSCLE SPASMS, Disp: 90 tablet, Rfl: 0    doxepin (SINEquan) 50 mg capsule, TAKE ONE CAPSULE BY MOUTH AS NEEDED FOR INSOMNIA, Disp: , Rfl: 0    DULoxetine (CYMBALTA) 30 mg delayed release capsule, TAKE ONE CAPSULE BY MOUTH DAILY, Disp: 90 capsule, Rfl: 1    FLUoxetine (PROzac) 20 MG tablet, Take 1 tablet (20 mg total) by mouth daily, Disp: 30 tablet, Rfl: 5    gabapentin (NEURONTIN) 600 MG tablet, Take 1 tablet (600 mg total) by mouth 3 (three) times a day, Disp: 90 tablet, Rfl: 0    levothyroxine 25 mcg tablet, Take 1 tablet (25 mcg total) by mouth daily, Disp: 30 tablet, Rfl: 0    meprobamate (EQUANIL) 200 MG tablet, TAKE ONE TABLET BY MOUTH FOUR TIMES DAILY, Disp: 120 tablet, Rfl: 0    NARCAN 4 MG/0 1ML LIQD, ADMINISTER A SINGLE spray INTO ONE NOSTRIL CALL 911 MAY REPEAT ONCE, Disp: , Rfl: 0    omeprazole (PriLOSEC) 20 mg delayed release capsule, Take 20 mg by mouth daily, Disp: , Rfl: 0    topiramate (TOPAMAX) 100 mg tablet, TAKE ONE TABLET BY MOUTH THREE TIMES DAILY, Disp: 270 tablet, Rfl: 1    VENTOLIN  (90 Base) MCG/ACT inhaler, inhale ONE puff by MOUTH every FOUR HOURS AS NEEDED, Disp: 18 g, Rfl: 0    Current Allergies     Allergies as of 12/23/2019 - Reviewed 12/23/2019   Allergen Reaction Noted    Amoxicillin  11/19/2019            The following portions of the patient's history were reviewed and updated as appropriate: allergies, current medications, past family history, past medical history, past social history, past surgical history and problem list      Past Medical History:   Diagnosis Date    Anxiety     "severe"    Asthma     Bipolar disorder ([facility] [address] )     Cigarette smoker     Disease of thyroid gland     Drug dependence, in remission ([facility] [address] )     GERD (gastroesophageal reflux disease)     H/O: whooping cough     while pregnant    Hepatitis C, chronic (HCC)     Insomnia disorder     Liver disease     Hepatitis C    Migraine     PTSD (post-traumatic stress disorder)        Past Surgical History:   Procedure Laterality Date    OTHER SURGICAL HISTORY      body piercing    [place] REPAIR UMBILICAL OFGI,7+W/B,HLZJO N/A 4/19/2016    Procedure: REPAIR HERNIA UMBILICAL WITH MESH;  Surgeon: Mike Watts MD;  Location:  MAIN OR;  Service: General    VAGINAL DELIVERY      X 6    WISDOM TOOTH EXTRACTION      X 4    WOUND DEBRIDEMENT Right 1/17/2018    Procedure: RIGHT KNEE DEBRIDEMENT; [address] OUT; AND LACERATION REPAIR  INTRAOPERATIVE ASSESSMENT OF PATELLA TENDON;  Surgeon: Pam Watts MD;  Location: [facility];  Service: Orthopedics       Family History   Problem Relation Age of Onset    Hypertension Mother     Thyroid disease Mother     Hypertension Father     Prostate cancer Father     Anxiety disorder Sister          Medications have been verified  Objective   /72   Pulse (!) 129   Ht 5' 3 5" (1 613 m)   Wt 84 3 kg (185 lb 12 8 oz)   SpO2 97%   BMI 32 40 kg/m²        Physical Exam     Physical Exam   Constitutional: She is oriented to person, place, and time  Vital signs are normal  She appears well-developed and well-nourished  HENT:   Right Ear: Ear canal normal  Tympanic membrane is not injected  Left Ear: Ear canal normal  Tympanic membrane is not injected  Nose: Nose normal    Mouth/Throat: Oropharynx is clear and moist    Eyes: Pupils are equal, round, and reactive to light  Conjunctivae and EOM are normal  Right eye exhibits no discharge  Left eye exhibits no discharge  Neck: Normal range of motion  Neck supple  No thyromegaly present  Cardiovascular: Normal rate, regular rhythm and normal heart sounds  No murmur heard  Pulmonary/Chest: Effort normal and breath sounds normal  No respiratory distress  She has no wheezes  Abdominal: Soft  Bowel sounds are normal  She exhibits no distension  There is no tenderness  Musculoskeletal: Normal range of motion     Lymphadenopathy:     She has no cervical adenopathy  Neurological: She is alert and oriented to person, place, and time  She has normal strength and normal reflexes  She is not disoriented  No sensory deficit  Gait normal    Skin: Skin is warm and dry  Psychiatric: She has a normal mood and affect  Her speech is normal and behavior is normal  Judgment and thought content normal  Cognition and memory are normal      BMI Counseling: Body mass index is 32 4 kg/m²  The BMI is above normal  Nutrition recommendations include reducing portion sizes
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
## 2020-01-08 DIAGNOSIS — F41.9 ANXIETY: ICD-10-CM

## 2020-01-08 RX ORDER — MEPROBAMATE 200 MG/1
TABLET ORAL
Qty: 120 TABLET | Refills: 3 | Status: SHIPPED | OUTPATIENT
Start: 2020-01-08 | End: 2020-04-13

## 2020-01-14 ENCOUNTER — OFFICE VISIT (OUTPATIENT)
Dept: FAMILY MEDICINE CLINIC | Facility: CLINIC | Age: 35
End: 2020-01-14
Payer: COMMERCIAL

## 2020-01-14 VITALS
OXYGEN SATURATION: 98 % | HEART RATE: 102 BPM | DIASTOLIC BLOOD PRESSURE: 79 MMHG | SYSTOLIC BLOOD PRESSURE: 117 MMHG | TEMPERATURE: 98.4 F | BODY MASS INDEX: 32.43 KG/M2 | WEIGHT: 183 LBS | HEIGHT: 63 IN

## 2020-01-14 DIAGNOSIS — F43.10 PTSD (POST-TRAUMATIC STRESS DISORDER): ICD-10-CM

## 2020-01-14 DIAGNOSIS — F31.81 BIPOLAR 2 DISORDER (HCC): ICD-10-CM

## 2020-01-14 DIAGNOSIS — E03.9 ADULT HYPOTHYROIDISM: Primary | ICD-10-CM

## 2020-01-14 DIAGNOSIS — K21.00 GASTROESOPHAGEAL REFLUX DISEASE WITH ESOPHAGITIS: ICD-10-CM

## 2020-01-14 DIAGNOSIS — F41.9 ANXIETY: ICD-10-CM

## 2020-01-14 DIAGNOSIS — F31.9 BIPOLAR DEPRESSION (HCC): ICD-10-CM

## 2020-01-14 PROCEDURE — 4004F PT TOBACCO SCREEN RCVD TLK: CPT | Performed by: FAMILY MEDICINE

## 2020-01-14 PROCEDURE — 3008F BODY MASS INDEX DOCD: CPT | Performed by: FAMILY MEDICINE

## 2020-01-14 PROCEDURE — 99214 OFFICE O/P EST MOD 30 MIN: CPT | Performed by: FAMILY MEDICINE

## 2020-01-14 RX ORDER — LEVOTHYROXINE SODIUM 0.03 MG/1
25 TABLET ORAL DAILY
Qty: 90 TABLET | Refills: 3 | Status: SHIPPED | OUTPATIENT
Start: 2020-01-14 | End: 2020-05-30 | Stop reason: SDUPTHER

## 2020-01-14 RX ORDER — DULOXETIN HYDROCHLORIDE 60 MG/1
60 CAPSULE, DELAYED RELEASE ORAL DAILY
Qty: 90 CAPSULE | Refills: 5 | Status: SHIPPED | OUTPATIENT
Start: 2020-01-14 | End: 2021-11-01

## 2020-01-14 RX ORDER — TIZANIDINE 4 MG/1
4 TABLET ORAL EVERY 8 HOURS PRN
Qty: 90 TABLET | Refills: 5 | Status: SHIPPED | OUTPATIENT
Start: 2020-01-14 | End: 2020-06-27

## 2020-01-14 RX ORDER — DIAZEPAM 5 MG/1
5 TABLET ORAL EVERY 8 HOURS PRN
Qty: 90 TABLET | Refills: 2 | Status: SHIPPED | OUTPATIENT
Start: 2020-01-14 | End: 2020-04-13

## 2020-01-14 RX ORDER — OMEPRAZOLE 20 MG/1
20 CAPSULE, DELAYED RELEASE ORAL DAILY
Qty: 90 CAPSULE | Refills: 3 | Status: SHIPPED | OUTPATIENT
Start: 2020-01-14 | End: 2021-01-03

## 2020-01-14 RX ORDER — CLONIDINE HYDROCHLORIDE 0.2 MG/1
0.2 TABLET ORAL 2 TIMES DAILY
Qty: 180 TABLET | Refills: 3 | Status: SHIPPED | OUTPATIENT
Start: 2020-01-14 | End: 2020-04-13 | Stop reason: SDUPTHER

## 2020-01-14 RX ORDER — TOPIRAMATE 100 MG/1
100 TABLET, FILM COATED ORAL 3 TIMES DAILY
Qty: 270 TABLET | Refills: 1 | Status: SHIPPED | OUTPATIENT
Start: 2020-01-14 | End: 2020-07-07

## 2020-01-14 NOTE — PROGRESS NOTES
[facility]        NAME: Lai Avila is a 36 y o  female  : 1985    MRN: [id number]  DATE: 2020  TIME: 11:27 AM    Assessment and Plan   Adult hypothyroidism [E03 9]  1  Adult hypothyroidism     2  Bipolar depression (HCC)  tiZANidine (ZANAFLEX) 4 mg tablet    DULoxetine (CYMBALTA) 60 mg delayed release capsule   3  PTSD (post-traumatic stress disorder)  levothyroxine 25 mcg tablet   4  Bipolar 2 disorder (HCC)  topiramate (TOPAMAX) 100 mg tablet   5  Anxiety  diazepam (VALIUM) 5 mg tablet    cloNIDine (CATAPRES) 0 2 mg tablet   6  Gastroesophageal reflux disease with esophagitis  omeprazole (PriLOSEC) 20 mg delayed release capsule         Patient Instructions     Patient Instructions   See meds          Chief Complaint     Chief Complaint   Patient presents with    Follow-up         History of Present Illness       F/u for assessed med cond--see med changes      Review of Systems   Review of Systems   Constitutional: Negative for fatigue, fever and unexpected weight change  HENT: Negative for congestion, sinus pain and sore throat  Eyes: Negative for visual disturbance  Respiratory: Negative for shortness of breath and wheezing  Cardiovascular: Negative for chest pain and palpitations  Gastrointestinal: Negative for abdominal pain, nausea and vomiting  Musculoskeletal: Negative  Negative for arthralgias and myalgias  Neurological: Negative for syncope, weakness and numbness  Psychiatric/Behavioral: Negative  Negative for confusion, dysphoric mood and suicidal ideas           Current Medications       Current Outpatient Medications:     chlorproMAZINE (THORAZINE) 25 mg tablet, 1 tab 3x/day, Disp: 90 tablet, Rfl: 3    cloNIDine (CATAPRES) 0 2 mg tablet, Take 1 tablet (0 2 mg total) by mouth 2 (two) times a day, Disp: 180 tablet, Rfl: 3    DULoxetine (CYMBALTA) 60 mg delayed release capsule, Take 1 capsule (60 mg total) by mouth daily, Disp: 90 capsule, Rfl: 5    FLUoxetine (PROzac) 20 MG tablet, Take 1 tablet (20 mg total) by mouth daily, Disp: 30 tablet, Rfl: 5    gabapentin (NEURONTIN) 600 MG tablet, Take 1 tablet (600 mg total) by mouth 3 (three) times a day, Disp: 90 tablet, Rfl: 0    levothyroxine 25 mcg tablet, Take 1 tablet (25 mcg total) by mouth daily, Disp: 90 tablet, Rfl: 3    meprobamate (EQUANIL) 200 MG tablet, TAKE 1 TABLET BY MOUTH 4 TIMES A DAY , Disp: 120 tablet, Rfl: 3    NARCAN 4 MG/0 1ML LIQD, ADMINISTER A SINGLE spray INTO ONE NOSTRIL CALL 911 MAY REPEAT ONCE, Disp: , Rfl: 0    omeprazole (PriLOSEC) 20 mg delayed release capsule, Take 1 capsule (20 mg total) by mouth daily, Disp: 90 capsule, Rfl: 3    topiramate (TOPAMAX) 100 mg tablet, Take 1 tablet (100 mg total) by mouth 3 (three) times a day, Disp: 270 tablet, Rfl: 1    VENTOLIN  (90 Base) MCG/ACT inhaler, inhale ONE puff by MOUTH every FOUR HOURS AS NEEDED, Disp: 18 g, Rfl: 0    diazepam (VALIUM) 5 mg tablet, Take 1 tablet (5 mg total) by mouth every 8 (eight) hours as needed for anxiety, Disp: 90 tablet, Rfl: 2    doxepin (SINEquan) 50 mg capsule, TAKE ONE CAPSULE BY MOUTH AS NEEDED FOR INSOMNIA, Disp: , Rfl: 0    tiZANidine (ZANAFLEX) 4 mg tablet, Take 1 tablet (4 mg total) by mouth every 8 (eight) hours as needed for muscle spasms, Disp: 90 tablet, Rfl: 5    Current Allergies     Allergies as of 01/14/2020 - Reviewed 01/14/2020   Allergen Reaction Noted    Amoxicillin  11/19/2019            The following portions of the patient's history were reviewed and updated as appropriate: allergies, current medications, past family history, past medical history, past social history, past surgical history and problem list      Past Medical History:   Diagnosis Date    Anxiety     "severe"    Asthma     [facility] disorder [facility]     Cigarette smoker     Disease of thyroid gland     Drug dependence, in remission ([facility] )     GERD (gastroesophageal reflux disease)  H/O: whooping cough     while pregnant    Hepatitis C, chronic (HCC)     Insomnia disorder     Liver disease     Hepatitis C    Migraine     PTSD (post-traumatic stress disorder)        Past Surgical History:   Procedure Laterality Date    OTHER SURGICAL HISTORY      body piercing    [place] REPAIR UMBILICAL DQSM,5+F/H,LKANC N/A 4/19/2016    Procedure: REPAIR HERNIA UMBILICAL WITH MESH;  Surgeon: Herbie Vaz MD;  Location: [facility];  Service: General    VAGINAL DELIVERY      X 6    WISDOM TOOTH EXTRACTION      X 4    WOUND DEBRIDEMENT Right 1/17/2018    Procedure: RIGHT KNEE DEBRIDEMENT; [address] OUT; AND LACERATION REPAIR  INTRAOPERATIVE ASSESSMENT OF PATELLA TENDON;  Surgeon: Pramod Melton MD;  [facility];  Service: Orthopedics       Family History   Problem Relation Age of Onset    Hypertension Mother     Thyroid disease Mother     Hypertension Father     Prostate cancer Father     Anxiety disorder Sister          Medications have been verified  Objective   /79 (BP Location: Right arm, Patient Position: Sitting, Cuff Size: Standard)   Pulse 102   Temp 98 4 °F (36 9 °C) (Tympanic)   Ht 5' 3" (1 6 m)   Wt 83 kg (183 lb)   SpO2 98%   BMI 32 42 kg/m²        Physical Exam     Physical Exam   Constitutional: She is oriented to person, place, and time  Vital signs are normal  She appears well-developed and well-nourished  HENT:   Right Ear: Ear canal normal  Tympanic membrane is not injected  Left Ear: Ear canal normal  Tympanic membrane is not injected  Nose: Nose normal    Mouth/Throat: Oropharynx is clear and moist    Eyes: Pupils are equal, round, and reactive to light  Conjunctivae and EOM are normal  Right eye exhibits no discharge  Left eye exhibits no discharge  Neck: Normal range of motion  Neck supple  No thyromegaly present  Cardiovascular: Normal rate, regular rhythm and normal heart sounds  No murmur heard    Pulmonary/Chest: Effort normal and breath sounds normal  No respiratory distress  She has no wheezes  Abdominal: Soft  Bowel sounds are normal  She exhibits no distension  There is no tenderness  Musculoskeletal: Normal range of motion  Lymphadenopathy:     She has no cervical adenopathy  Neurological: She is alert and oriented to person, place, and time  She has normal strength and normal reflexes  She is not disoriented  No sensory deficit  Gait normal    Skin: Skin is warm and dry  Psychiatric: She has a normal mood and affect  Her speech is normal and behavior is normal  Judgment and thought content normal  Cognition and memory are normal        BMI Counseling: Body mass index is 32 42 kg/m²  The BMI is above normal  Nutrition recommendations include reducing portion sizes

## 2020-03-02 DIAGNOSIS — J45.909 ASTHMA, UNSPECIFIED ASTHMA SEVERITY, UNSPECIFIED WHETHER COMPLICATED, UNSPECIFIED WHETHER PERSISTENT: ICD-10-CM

## 2020-03-02 RX ORDER — CYCLOBENZAPRINE HCL 10 MG
TABLET ORAL
Qty: 90 TABLET | Refills: 0 | Status: SHIPPED | OUTPATIENT
Start: 2020-03-02 | End: 2020-04-01

## 2020-03-30 DIAGNOSIS — J45.909 ASTHMA, UNSPECIFIED ASTHMA SEVERITY, UNSPECIFIED WHETHER COMPLICATED, UNSPECIFIED WHETHER PERSISTENT: ICD-10-CM

## 2020-03-30 RX ORDER — CYCLOBENZAPRINE HCL 10 MG
10 TABLET ORAL 3 TIMES DAILY PRN
Qty: 90 TABLET | Refills: 0 | Status: CANCELLED | OUTPATIENT
Start: 2020-03-30

## 2020-03-30 NOTE — TELEPHONE ENCOUNTER
Refill    flexerill 10mg   [facility] pharmacy    patient advise 48-72 hours

## 2020-03-31 DIAGNOSIS — J45.909 ASTHMA, UNSPECIFIED ASTHMA SEVERITY, UNSPECIFIED WHETHER COMPLICATED, UNSPECIFIED WHETHER PERSISTENT: ICD-10-CM

## 2020-04-01 RX ORDER — CYCLOBENZAPRINE HCL 10 MG
TABLET ORAL
Qty: 90 TABLET | Refills: 0 | Status: SHIPPED | OUTPATIENT
Start: 2020-04-01 | End: 2020-04-27

## 2020-04-13 ENCOUNTER — TELEMEDICINE (OUTPATIENT)
Dept: FAMILY MEDICINE CLINIC | Facility: CLINIC | Age: 35
End: 2020-04-13
Payer: COMMERCIAL

## 2020-04-13 DIAGNOSIS — F43.10 PTSD (POST-TRAUMATIC STRESS DISORDER): ICD-10-CM

## 2020-04-13 DIAGNOSIS — F51.01 PRIMARY INSOMNIA: ICD-10-CM

## 2020-04-13 DIAGNOSIS — F41.9 ANXIETY: ICD-10-CM

## 2020-04-13 DIAGNOSIS — F31.71 BIPOLAR DISORDER, IN PARTIAL REMISSION, MOST RECENT EPISODE HYPOMANIC (HCC): Primary | ICD-10-CM

## 2020-04-13 PROCEDURE — 99214 OFFICE O/P EST MOD 30 MIN: CPT | Performed by: FAMILY MEDICINE

## 2020-04-13 RX ORDER — CLONIDINE HYDROCHLORIDE 0.2 MG/1
0.2 TABLET ORAL 2 TIMES DAILY
Qty: 180 TABLET | Refills: 3 | Status: SHIPPED | OUTPATIENT
Start: 2020-04-13 | End: 2021-02-28 | Stop reason: SDUPTHER

## 2020-04-13 RX ORDER — ARIPIPRAZOLE 5 MG/1
5 TABLET ORAL DAILY
Qty: 30 TABLET | Refills: 5 | Status: SHIPPED | OUTPATIENT
Start: 2020-04-13 | End: 2020-05-29 | Stop reason: SDUPTHER

## 2020-04-13 RX ORDER — DOXEPIN HYDROCHLORIDE 50 MG/1
CAPSULE ORAL
Qty: 60 CAPSULE | Refills: 5 | Status: SHIPPED | OUTPATIENT
Start: 2020-04-13 | End: 2020-07-15

## 2020-04-20 DIAGNOSIS — F31.9 BIPOLAR DEPRESSION (HCC): ICD-10-CM

## 2020-04-20 RX ORDER — DULOXETIN HYDROCHLORIDE 30 MG/1
CAPSULE, DELAYED RELEASE ORAL
Qty: 90 CAPSULE | Refills: 1 | Status: SHIPPED | OUTPATIENT
Start: 2020-04-20 | End: 2020-10-05

## 2020-04-27 DIAGNOSIS — J45.909 ASTHMA, UNSPECIFIED ASTHMA SEVERITY, UNSPECIFIED WHETHER COMPLICATED, UNSPECIFIED WHETHER PERSISTENT: ICD-10-CM

## 2020-04-27 RX ORDER — CYCLOBENZAPRINE HCL 10 MG
TABLET ORAL
Qty: 90 TABLET | Refills: 0 | Status: SHIPPED | OUTPATIENT
Start: 2020-04-27 | End: 2020-05-26

## 2020-05-25 DIAGNOSIS — J45.909 ASTHMA, UNSPECIFIED ASTHMA SEVERITY, UNSPECIFIED WHETHER COMPLICATED, UNSPECIFIED WHETHER PERSISTENT: ICD-10-CM

## 2020-05-26 RX ORDER — CYCLOBENZAPRINE HCL 10 MG
TABLET ORAL
Qty: 90 TABLET | Refills: 0 | Status: SHIPPED | OUTPATIENT
Start: 2020-05-26 | End: 2020-05-29 | Stop reason: SDUPTHER

## 2020-05-29 ENCOUNTER — TELEMEDICINE (OUTPATIENT)
Dept: FAMILY MEDICINE CLINIC | Facility: CLINIC | Age: 35
End: 2020-05-29
Payer: COMMERCIAL

## 2020-05-29 DIAGNOSIS — J45.909 ASTHMA, UNSPECIFIED ASTHMA SEVERITY, UNSPECIFIED WHETHER COMPLICATED, UNSPECIFIED WHETHER PERSISTENT: ICD-10-CM

## 2020-05-29 DIAGNOSIS — F31.71 BIPOLAR DISORDER, IN PARTIAL REMISSION, MOST RECENT EPISODE HYPOMANIC (HCC): ICD-10-CM

## 2020-05-29 DIAGNOSIS — E03.9 ADULT HYPOTHYROIDISM: Primary | ICD-10-CM

## 2020-05-29 DIAGNOSIS — F43.10 PTSD (POST-TRAUMATIC STRESS DISORDER): ICD-10-CM

## 2020-05-29 PROCEDURE — 99214 OFFICE O/P EST MOD 30 MIN: CPT | Performed by: FAMILY MEDICINE

## 2020-05-29 RX ORDER — ARIPIPRAZOLE 10 MG/1
5 TABLET ORAL DAILY
Qty: 30 TABLET | Refills: 5 | Status: SHIPPED | OUTPATIENT
Start: 2020-05-29 | End: 2021-07-28

## 2020-05-29 RX ORDER — CYCLOBENZAPRINE HCL 10 MG
10 TABLET ORAL 3 TIMES DAILY PRN
Qty: 90 TABLET | Refills: 3 | Status: SHIPPED | OUTPATIENT
Start: 2020-05-29 | End: 2020-10-12

## 2020-05-29 RX ORDER — DEXTROMETHORPHAN HYDROBROMIDE AND PROMETHAZINE HYDROCHLORIDE 15; 6.25 MG/5ML; MG/5ML
5 SOLUTION ORAL 4 TIMES DAILY PRN
Qty: 240 ML | Refills: 3 | Status: SHIPPED | OUTPATIENT
Start: 2020-05-29 | End: 2021-11-01

## 2020-05-30 LAB
ALBUMIN SERPL-MCNC: 4.7 G/DL (ref 3.8–4.8)
ALBUMIN/GLOB SERPL: 2.4 {RATIO} (ref 1.2–2.2)
ALP SERPL-CCNC: 61 IU/L (ref 39–117)
ALT SERPL-CCNC: 18 IU/L (ref 0–32)
AST SERPL-CCNC: 22 IU/L (ref 0–40)
BILIRUB SERPL-MCNC: 0.3 MG/DL (ref 0–1.2)
BUN SERPL-MCNC: 11 MG/DL (ref 6–20)
BUN/CREAT SERPL: 10 (ref 9–23)
CALCIUM SERPL-MCNC: 9.2 MG/DL (ref 8.7–10.2)
CHLORIDE SERPL-SCNC: 105 MMOL/L (ref 96–106)
CO2 SERPL-SCNC: 21 MMOL/L (ref 20–29)
CREAT SERPL-MCNC: 1.14 MG/DL (ref 0.57–1)
GLOBULIN SER-MCNC: 2 G/DL (ref 1.5–4.5)
GLUCOSE SERPL-MCNC: 92 MG/DL (ref 65–99)
POTASSIUM SERPL-SCNC: 3.7 MMOL/L (ref 3.5–5.2)
PROT SERPL-MCNC: 6.7 G/DL (ref 6–8.5)
SL AMB EGFR AFRICAN AMERICAN: 73 ML/MIN/1.73
SL AMB EGFR NON AFRICAN AMERICAN: 63 ML/MIN/1.73
SODIUM SERPL-SCNC: 140 MMOL/L (ref 134–144)
TSH SERPL DL<=0.005 MIU/L-ACNC: 1.24 UIU/ML (ref 0.45–4.5)

## 2020-05-30 RX ORDER — LEVOTHYROXINE SODIUM 0.05 MG/1
TABLET ORAL
Qty: 90 TABLET | Refills: 1 | Status: SHIPPED | OUTPATIENT
Start: 2020-05-30 | End: 2020-11-14

## 2020-06-01 ENCOUNTER — TELEPHONE (OUTPATIENT)
Dept: FAMILY MEDICINE CLINIC | Facility: CLINIC | Age: 35
End: 2020-06-01

## 2020-06-01 DIAGNOSIS — E03.9 ADULT HYPOTHYROIDISM: Primary | ICD-10-CM

## 2020-06-27 DIAGNOSIS — F31.9 BIPOLAR DEPRESSION (HCC): ICD-10-CM

## 2020-06-27 RX ORDER — TIZANIDINE 4 MG/1
4 TABLET ORAL EVERY 8 HOURS PRN
Qty: 90 TABLET | Refills: 5 | Status: SHIPPED | OUTPATIENT
Start: 2020-06-27 | End: 2020-12-07

## 2020-07-07 DIAGNOSIS — F31.81 BIPOLAR 2 DISORDER (HCC): ICD-10-CM

## 2020-07-07 RX ORDER — TOPIRAMATE 100 MG/1
TABLET, FILM COATED ORAL
Qty: 270 TABLET | Refills: 1 | Status: SHIPPED | OUTPATIENT
Start: 2020-07-07 | End: 2020-12-24

## 2020-07-15 ENCOUNTER — OFFICE VISIT (OUTPATIENT)
Dept: FAMILY MEDICINE CLINIC | Facility: CLINIC | Age: 35
End: 2020-07-15
Payer: COMMERCIAL

## 2020-07-15 VITALS
TEMPERATURE: 98.3 F | WEIGHT: 194 LBS | DIASTOLIC BLOOD PRESSURE: 74 MMHG | HEART RATE: 91 BPM | BODY MASS INDEX: 33.12 KG/M2 | SYSTOLIC BLOOD PRESSURE: 131 MMHG | HEIGHT: 64 IN

## 2020-07-15 DIAGNOSIS — F31.81 BIPOLAR 2 DISORDER (HCC): ICD-10-CM

## 2020-07-15 DIAGNOSIS — F43.10 PTSD (POST-TRAUMATIC STRESS DISORDER): Primary | ICD-10-CM

## 2020-07-15 DIAGNOSIS — F51.01 PRIMARY INSOMNIA: ICD-10-CM

## 2020-07-15 DIAGNOSIS — F41.9 ANXIETY: ICD-10-CM

## 2020-07-15 DIAGNOSIS — J45.909 ASTHMA, UNSPECIFIED ASTHMA SEVERITY, UNSPECIFIED WHETHER COMPLICATED, UNSPECIFIED WHETHER PERSISTENT: ICD-10-CM

## 2020-07-15 DIAGNOSIS — F31.9 BIPOLAR DEPRESSION (HCC): ICD-10-CM

## 2020-07-15 PROBLEM — F19.21 DRUG DEPENDENCE, IN REMISSION (HCC): Status: ACTIVE | Noted: 2020-07-15

## 2020-07-15 PROCEDURE — 99214 OFFICE O/P EST MOD 30 MIN: CPT | Performed by: FAMILY MEDICINE

## 2020-07-15 PROCEDURE — 3008F BODY MASS INDEX DOCD: CPT | Performed by: FAMILY MEDICINE

## 2020-07-15 RX ORDER — ALBUTEROL SULFATE 90 UG/1
2 AEROSOL, METERED RESPIRATORY (INHALATION) EVERY 4 HOURS PRN
Qty: 18 G | Refills: 5 | Status: SHIPPED | OUTPATIENT
Start: 2020-07-15 | End: 2021-03-01

## 2020-07-15 RX ORDER — TRAZODONE HYDROCHLORIDE 50 MG/1
TABLET ORAL
Qty: 180 TABLET | Refills: 3 | Status: SHIPPED | OUTPATIENT
Start: 2020-07-15 | End: 2021-10-13 | Stop reason: SDUPTHER

## 2020-07-15 RX ORDER — HYDROXYZINE PAMOATE 50 MG/1
CAPSULE ORAL
Qty: 180 CAPSULE | Refills: 3 | Status: SHIPPED | OUTPATIENT
Start: 2020-07-15 | End: 2021-07-28 | Stop reason: SDUPTHER

## 2020-07-15 NOTE — PROGRESS NOTES
[facility]        NAME: Siri Valdivia is a 36 y o  female  : 1985    MRN: [id number]  DATE: July 15, 2020  TIME: 10:42 AM    Assessment and Plan   PTSD (post-traumatic stress disorder) [F43 10]  1  PTSD (post-traumatic stress disorder)     2  Asthma, unspecified asthma severity, unspecified whether complicated, unspecified whether persistent     3  Bipolar 2 disorder ([facility] )     4  Bipolar depression ([facility] )     5  Anxiety           Patient Instructions     Patient Instructions   See additional meds for sleep          Chief Complaint     Chief Complaint   Patient presents with    Follow-up         History of Present Illness       F/u assessed med cond      Review of Systems   Review of Systems   Constitutional: Negative for fatigue, fever and unexpected weight change  HENT: Negative for congestion, sinus pain and sore throat  Eyes: Negative for visual disturbance  Respiratory: Negative for shortness of breath and wheezing  Cardiovascular: Negative for chest pain and palpitations  Gastrointestinal: Negative for abdominal pain, nausea and vomiting  Musculoskeletal: Negative  Negative for arthralgias and myalgias  Neurological: Negative for syncope, weakness and numbness  Psychiatric/Behavioral: Negative  Negative for confusion, dysphoric mood and suicidal ideas           Current Medications       Current Outpatient Medications:     ARIPiprazole (ABILIFY) 10 mg tablet, Take 0 5 tablets (5 mg total) by mouth daily, Disp: 30 tablet, Rfl: 5    chlorproMAZINE (THORAZINE) 25 mg tablet, 1 tab 3x/day, Disp: 90 tablet, Rfl: 3    cloNIDine (CATAPRES) 0 2 mg tablet, Take 1 tablet (0 2 mg total) by mouth 2 (two) times a day, Disp: 180 tablet, Rfl: 3    cyclobenzaprine (FLEXERIL) 10 mg tablet, Take 1 tablet (10 mg total) by mouth 3 (three) times a day as needed for muscle spasms, Disp: 90 tablet, Rfl: 3    DULoxetine (CYMBALTA) 60 mg delayed release capsule, Take 1 capsule (60 mg total) by mouth daily, Disp: 90 capsule, Rfl: 5    FLUoxetine (PROzac) 20 MG tablet, Take 1 tablet (20 mg total) by mouth daily, Disp: 30 tablet, Rfl: 5    gabapentin (NEURONTIN) 600 MG tablet, Take 1 tablet (600 mg total) by mouth 3 (three) times a day, Disp: 90 tablet, Rfl: 0    levothyroxine 50 mcg tablet, 1 daily, Disp: 90 tablet, Rfl: 1    omeprazole (PriLOSEC) 20 mg delayed release capsule, Take 1 capsule (20 mg total) by mouth daily, Disp: 90 capsule, Rfl: 3    Promethazine-DM (PHENERGAN-DM) 6 25-15 mg/5 mL oral syrup, Take 5 mL by mouth 4 (four) times a day as needed for cough, Disp: 240 mL, Rfl: 3    tiZANidine (ZANAFLEX) 4 mg tablet, Take 1 tablet (4 mg total) by mouth every 8 (eight) hours as needed for muscle spasms, Disp: 90 tablet, Rfl: 5    topiramate (TOPAMAX) 100 mg tablet, TAKE ONE TABLET BY MOUTH THREE TIMES DAILY, Disp: 270 tablet, Rfl: 1    VENTOLIN  (90 Base) MCG/ACT inhaler, inhale ONE puff by MOUTH every FOUR HOURS AS NEEDED, Disp: 18 g, Rfl: 0    DULoxetine (CYMBALTA) 30 mg delayed release capsule, TAKE ONE CAPSULE BY MOUTH DAILY (Patient not taking: Reported on 7/15/2020), Disp: 90 capsule, Rfl: 1    NARCAN 4 MG/0 1ML LIQD, ADMINISTER A SINGLE spray INTO ONE NOSTRIL CALL 911 MAY REPEAT ONCE, Disp: , Rfl: 0    Current Allergies     Allergies as of 07/15/2020 - Reviewed 07/15/2020   Allergen Reaction Noted    Amoxicillin  11/19/2019            The following portions of the patient's history were reviewed and updated as appropriate: allergies, current medications, past family history, past medical history, past social history, past surgical history and problem list      Past Medical History:   Diagnosis Date    Anxiety     "severe"    Asthma     [facility]     Cigarette smoker     Disease of thyroid gland     Drug dependence[facility] in [facility]     GERD (gastroesophageal reflux disease)     H/O: whooping cough     while pregnant    Hepatitis C, chronic (HCC)     Insomnia disorder     Liver disease     Hepatitis C    Migraine     PTSD (post-traumatic stress disorder)        Past Surgical History:   Procedure Laterality Date    OTHER SURGICAL HISTORY      body piercing    SD REPAIR UMBILICAL YDQD,1+C/K,QBXTU N/A 4/19/2016    Procedure: REPAIR HERNIA UMBILICAL WITH MESH;  Surgeon: Thomas Watkins MD;  Location:  MAIN OR;  Service: General    VAGINAL DELIVERY      X 6    WISDOM TOOTH EXTRACTION      X 4    WOUND DEBRIDEMENT Right 1/17/2018    Procedure: RIGHT KNEE DEBRIDEMENT; [address]; AND LACERATION REPAIR  INTRAOPERATIVE ASSESSMENT OF PATELLA TENDON;  Surgeon: Ishaan Layne MD;  [facility];  Service: Orthopedics       Family History   Problem Relation Age of Onset    Hypertension Mother     Thyroid disease Mother     Hypertension Father     Prostate cancer Father     Anxiety disorder Sister          Medications have been verified  Objective   /74 (BP Location: Left arm, Patient Position: Sitting, Cuff Size: Standard)   Pulse 91   Temp 98 3 °F (36 8 °C) (Tympanic)   Ht 5' 4" (1 626 m)   Wt 88 kg (194 lb)   BMI 33 30 kg/m²        Physical Exam     Physical Exam   Constitutional: She is oriented to person, place, and time  Vital signs are normal  She appears well-developed and well-nourished  HENT:   Right Ear: Ear canal normal  Tympanic membrane is not injected  Left Ear: Ear canal normal  Tympanic membrane is not injected  Nose: Nose normal    Mouth/Throat: Oropharynx is clear and moist    Eyes: Pupils are equal, round, and reactive to light  Conjunctivae and EOM are normal  Right eye exhibits no discharge  Left eye exhibits no discharge  Neck: Normal range of motion  Neck supple  No thyromegaly present  Cardiovascular: Normal rate, regular rhythm and normal heart sounds  No murmur heard  Pulmonary/Chest: Effort normal and breath sounds normal  No respiratory distress   She has no wheezes  Abdominal: Soft  Bowel sounds are normal  She exhibits no distension  There is no tenderness  Musculoskeletal: Normal range of motion  Lymphadenopathy:     She has no cervical adenopathy  Neurological: She is alert and oriented to person, place, and time  She has normal strength and normal reflexes  She is not disoriented  No sensory deficit  Gait normal    Skin: Skin is warm and dry  Psychiatric: She has a normal mood and affect   Her speech is normal and behavior is normal  Judgment and thought content normal  Cognition and memory are normal

## 2020-07-17 ENCOUNTER — TELEPHONE (OUTPATIENT)
Dept: FAMILY MEDICINE CLINIC | Facility: CLINIC | Age: 35
End: 2020-07-17

## 2020-07-17 NOTE — TELEPHONE ENCOUNTER
Addie Fung from [facility] call asking instruction for fluticasone-salmeterol (Kunal Mensah) 250-50 mcg/dose inhaler     They were given as it's Rx   Sig: Inhale 1 puff 2 (two) times a day Rinse mouth after use             Also confirm the fax number of our office thank you

## 2020-10-04 DIAGNOSIS — F31.9 BIPOLAR DEPRESSION (HCC): ICD-10-CM

## 2020-10-05 RX ORDER — DULOXETIN HYDROCHLORIDE 30 MG/1
CAPSULE, DELAYED RELEASE ORAL
Qty: 90 CAPSULE | Refills: 1 | Status: SHIPPED | OUTPATIENT
Start: 2020-10-05 | End: 2021-07-28

## 2020-10-11 DIAGNOSIS — J45.909 ASTHMA, UNSPECIFIED ASTHMA SEVERITY, UNSPECIFIED WHETHER COMPLICATED, UNSPECIFIED WHETHER PERSISTENT: ICD-10-CM

## 2020-10-12 RX ORDER — CYCLOBENZAPRINE HCL 10 MG
TABLET ORAL
Qty: 90 TABLET | Refills: 3 | Status: SHIPPED | OUTPATIENT
Start: 2020-10-12 | End: 2021-01-28

## 2020-11-14 DIAGNOSIS — F43.10 PTSD (POST-TRAUMATIC STRESS DISORDER): ICD-10-CM

## 2020-11-14 RX ORDER — LEVOTHYROXINE SODIUM 0.05 MG/1
TABLET ORAL
Qty: 90 TABLET | Refills: 1 | Status: SHIPPED | OUTPATIENT
Start: 2020-11-14 | End: 2021-05-24

## 2020-12-07 DIAGNOSIS — F31.9 BIPOLAR DEPRESSION (HCC): ICD-10-CM

## 2020-12-07 RX ORDER — TIZANIDINE 4 MG/1
TABLET ORAL
Qty: 90 TABLET | Refills: 5 | Status: SHIPPED | OUTPATIENT
Start: 2020-12-07 | End: 2021-05-15

## 2020-12-24 DIAGNOSIS — F31.81 BIPOLAR 2 DISORDER (HCC): ICD-10-CM

## 2020-12-24 RX ORDER — TOPIRAMATE 100 MG/1
TABLET, FILM COATED ORAL
Qty: 270 TABLET | Refills: 0 | Status: SHIPPED | OUTPATIENT
Start: 2020-12-24 | End: 2021-03-15

## 2021-01-02 DIAGNOSIS — K21.00 GASTROESOPHAGEAL REFLUX DISEASE WITH ESOPHAGITIS: ICD-10-CM

## 2021-01-03 RX ORDER — OMEPRAZOLE 20 MG/1
20 CAPSULE, DELAYED RELEASE ORAL DAILY
Qty: 90 CAPSULE | Refills: 3 | Status: SHIPPED | OUTPATIENT
Start: 2021-01-03 | End: 2021-11-01 | Stop reason: SDUPTHER

## 2021-01-28 DIAGNOSIS — J45.909 ASTHMA, UNSPECIFIED ASTHMA SEVERITY, UNSPECIFIED WHETHER COMPLICATED, UNSPECIFIED WHETHER PERSISTENT: ICD-10-CM

## 2021-01-28 RX ORDER — CYCLOBENZAPRINE HCL 10 MG
TABLET ORAL
Qty: 90 TABLET | Refills: 3 | Status: SHIPPED | OUTPATIENT
Start: 2021-01-28 | End: 2021-05-15

## 2021-02-09 DIAGNOSIS — F43.10 PTSD (POST-TRAUMATIC STRESS DISORDER): ICD-10-CM

## 2021-02-10 RX ORDER — LEVOTHYROXINE SODIUM 0.03 MG/1
25 TABLET ORAL DAILY
Qty: 90 TABLET | Refills: 3 | Status: SHIPPED | OUTPATIENT
Start: 2021-02-10 | End: 2021-08-19 | Stop reason: SDUPTHER

## 2021-02-27 ENCOUNTER — TELEPHONE (OUTPATIENT)
Dept: OTHER | Facility: OTHER | Age: 36
End: 2021-02-27

## 2021-02-27 DIAGNOSIS — F41.9 ANXIETY: ICD-10-CM

## 2021-02-27 NOTE — TELEPHONE ENCOUNTER
Pt called to follow up on a request that her pharmacy made to have her clonidine refilled  I let her know that these medications are not able to be refilled on the weekends, but told her I would put in a message for her PCP for Monday morning to ensure that this gets addressed  She is completely out of this medication

## 2021-02-28 DIAGNOSIS — J45.909 ASTHMA, UNSPECIFIED ASTHMA SEVERITY, UNSPECIFIED WHETHER COMPLICATED, UNSPECIFIED WHETHER PERSISTENT: ICD-10-CM

## 2021-02-28 RX ORDER — CLONIDINE HYDROCHLORIDE 0.2 MG/1
0.2 TABLET ORAL 2 TIMES DAILY
Qty: 180 TABLET | Refills: 3 | Status: SHIPPED | OUTPATIENT
Start: 2021-02-28 | End: 2021-07-28

## 2021-02-28 RX ORDER — CLONIDINE HYDROCHLORIDE 0.2 MG/1
0.2 TABLET ORAL 2 TIMES DAILY
Qty: 180 TABLET | Refills: 3 | Status: SHIPPED | OUTPATIENT
Start: 2021-02-28 | End: 2021-11-01

## 2021-03-01 RX ORDER — ALBUTEROL SULFATE 90 UG/1
2 AEROSOL, METERED RESPIRATORY (INHALATION) EVERY 4 HOURS PRN
Qty: 18 G | Refills: 5 | Status: SHIPPED | OUTPATIENT
Start: 2021-03-01

## 2021-03-15 DIAGNOSIS — F31.81 BIPOLAR 2 DISORDER (HCC): ICD-10-CM

## 2021-03-15 RX ORDER — TOPIRAMATE 100 MG/1
TABLET, FILM COATED ORAL
Qty: 270 TABLET | Refills: 0 | Status: SHIPPED | OUTPATIENT
Start: 2021-03-15 | End: 2021-06-15

## 2021-04-20 ENCOUNTER — IMMUNIZATIONS (OUTPATIENT)
Dept: FAMILY MEDICINE CLINIC | Facility: HOSPITAL | Age: 36
End: 2021-04-20

## 2021-04-20 DIAGNOSIS — Z23 ENCOUNTER FOR IMMUNIZATION: Primary | ICD-10-CM

## 2021-04-20 PROCEDURE — 91300 SARS-COV-2 / COVID-19 MRNA VACCINE (PFIZER-BIONTECH) 30 MCG: CPT

## 2021-04-20 PROCEDURE — 0001A SARS-COV-2 / COVID-19 MRNA VACCINE (PFIZER-BIONTECH) 30 MCG: CPT

## 2021-05-13 DIAGNOSIS — F43.10 PTSD (POST-TRAUMATIC STRESS DISORDER): ICD-10-CM

## 2021-05-15 DIAGNOSIS — F31.9 BIPOLAR DEPRESSION (HCC): ICD-10-CM

## 2021-05-15 DIAGNOSIS — J45.909 ASTHMA, UNSPECIFIED ASTHMA SEVERITY, UNSPECIFIED WHETHER COMPLICATED, UNSPECIFIED WHETHER PERSISTENT: ICD-10-CM

## 2021-05-15 RX ORDER — TIZANIDINE 4 MG/1
TABLET ORAL
Qty: 90 TABLET | Refills: 5 | Status: SHIPPED | OUTPATIENT
Start: 2021-05-15 | End: 2021-11-01

## 2021-05-15 RX ORDER — CYCLOBENZAPRINE HCL 10 MG
TABLET ORAL
Qty: 90 TABLET | Refills: 3 | Status: SHIPPED | OUTPATIENT
Start: 2021-05-15 | End: 2021-10-13 | Stop reason: SDUPTHER

## 2021-05-16 ENCOUNTER — IMMUNIZATIONS (OUTPATIENT)
Dept: FAMILY MEDICINE CLINIC | Facility: HOSPITAL | Age: 36
End: 2021-05-16

## 2021-05-16 DIAGNOSIS — Z23 ENCOUNTER FOR IMMUNIZATION: Primary | ICD-10-CM

## 2021-05-16 PROCEDURE — 91300 SARS-COV-2 / COVID-19 MRNA VACCINE (PFIZER-BIONTECH) 30 MCG: CPT

## 2021-05-16 PROCEDURE — 0002A SARS-COV-2 / COVID-19 MRNA VACCINE (PFIZER-BIONTECH) 30 MCG: CPT

## 2021-05-17 DIAGNOSIS — F51.01 PRIMARY INSOMNIA: ICD-10-CM

## 2021-05-24 NOTE — TELEPHONE ENCOUNTER
Med refill   levothyroxine 50 mcg tablet    Send to   [facility] [phone]      Pt is in the process of switching insurances because we do not take the one she currently has     Pt needs a Physical and lab work done    Pt was explained that until she gets her ov she was most likely not going to receive the prescription pt seemed very upset by this because she has been on it for a while plus she said if Dr Darshana Arrieta knew it was her he would do it         In the meantime, of her switch insurances would you be able to send in prescription? I told pt I would ask Dr Darshana Arrieta if anything could be done but I make no promises

## 2021-05-25 RX ORDER — LEVOTHYROXINE SODIUM 0.05 MG/1
TABLET ORAL
Qty: 90 TABLET | Refills: 0 | Status: SHIPPED | OUTPATIENT
Start: 2021-05-25 | End: 2021-06-22 | Stop reason: SDUPTHER

## 2021-06-14 DIAGNOSIS — F31.81 BIPOLAR 2 DISORDER (HCC): ICD-10-CM

## 2021-06-15 RX ORDER — TOPIRAMATE 100 MG/1
TABLET, FILM COATED ORAL
Qty: 270 TABLET | Refills: 0 | Status: SHIPPED | OUTPATIENT
Start: 2021-06-15 | End: 2021-06-22 | Stop reason: SDUPTHER

## 2021-06-22 ENCOUNTER — TELEPHONE (OUTPATIENT)
Dept: FAMILY MEDICINE CLINIC | Facility: CLINIC | Age: 36
End: 2021-06-22

## 2021-06-22 DIAGNOSIS — F43.10 PTSD (POST-TRAUMATIC STRESS DISORDER): ICD-10-CM

## 2021-06-22 DIAGNOSIS — F31.81 BIPOLAR 2 DISORDER (HCC): ICD-10-CM

## 2021-06-22 RX ORDER — TOPIRAMATE 100 MG/1
100 TABLET, FILM COATED ORAL 3 TIMES DAILY
Qty: 270 TABLET | Refills: 0 | Status: SHIPPED | OUTPATIENT
Start: 2021-06-22 | End: 2021-08-19 | Stop reason: SDUPTHER

## 2021-06-22 RX ORDER — LEVOTHYROXINE SODIUM 0.05 MG/1
50 TABLET ORAL DAILY
Qty: 90 TABLET | Refills: 0 | Status: SHIPPED | OUTPATIENT
Start: 2021-06-22 | End: 2021-08-19 | Stop reason: SDUPTHER

## 2021-06-22 RX ORDER — CLONAZEPAM 1 MG/1
1 TABLET ORAL 3 TIMES DAILY
Qty: 90 TABLET | Refills: 1 | Status: SHIPPED | OUTPATIENT
Start: 2021-06-22 | End: 2021-08-20

## 2021-06-22 NOTE — TELEPHONE ENCOUNTER
Pt call crying stated that she need to talk to you is very important no other information was giving

## 2021-07-28 ENCOUNTER — OFFICE VISIT (OUTPATIENT)
Dept: FAMILY MEDICINE CLINIC | Facility: CLINIC | Age: 36
End: 2021-07-28
Payer: MEDICARE

## 2021-07-28 VITALS
OXYGEN SATURATION: 98 % | WEIGHT: 209 LBS | HEIGHT: 64 IN | RESPIRATION RATE: 16 BRPM | SYSTOLIC BLOOD PRESSURE: 126 MMHG | HEART RATE: 86 BPM | DIASTOLIC BLOOD PRESSURE: 80 MMHG | BODY MASS INDEX: 35.68 KG/M2

## 2021-07-28 DIAGNOSIS — F41.9 ANXIETY: ICD-10-CM

## 2021-07-28 DIAGNOSIS — F51.01 PRIMARY INSOMNIA: ICD-10-CM

## 2021-07-28 PROCEDURE — 99213 OFFICE O/P EST LOW 20 MIN: CPT | Performed by: FAMILY MEDICINE

## 2021-07-28 PROCEDURE — 1036F TOBACCO NON-USER: CPT | Performed by: FAMILY MEDICINE

## 2021-07-28 PROCEDURE — 3008F BODY MASS INDEX DOCD: CPT | Performed by: FAMILY MEDICINE

## 2021-07-28 RX ORDER — BUPRENORPHINE HYDROCHLORIDE AND NALOXONE HYDROCHLORIDE DIHYDRATE 2; .5 MG/1; MG/1
TABLET SUBLINGUAL
COMMUNITY
Start: 2021-07-20 | End: 2022-01-28 | Stop reason: HOSPADM

## 2021-07-28 RX ORDER — HYDROXYZINE PAMOATE 50 MG/1
50 CAPSULE ORAL 3 TIMES DAILY PRN
Qty: 90 CAPSULE | Refills: 3 | Status: SHIPPED | OUTPATIENT
Start: 2021-07-28 | End: 2021-08-19 | Stop reason: SDUPTHER

## 2021-07-28 RX ORDER — BUSPIRONE HYDROCHLORIDE 10 MG/1
20 TABLET ORAL 3 TIMES DAILY
COMMUNITY
Start: 2021-07-19 | End: 2021-08-19 | Stop reason: SDUPTHER

## 2021-07-28 RX ORDER — LAMOTRIGINE 100 MG/1
100 TABLET ORAL 2 TIMES DAILY
Qty: 30 TABLET | Refills: 5 | Status: SHIPPED | OUTPATIENT
Start: 2021-07-28 | End: 2021-11-01

## 2021-07-28 RX ORDER — CARISOPRODOL 350 MG/1
350 TABLET ORAL 3 TIMES DAILY
Qty: 90 TABLET | Refills: 1 | Status: SHIPPED | OUTPATIENT
Start: 2021-07-28 | End: 2021-11-01

## 2021-07-28 NOTE — PROGRESS NOTES
[facility]        NAME: Vladimir Jewell is a 30 y o  female  : 1985    MRN: [id number]  DATE: 2021  TIME: 2:13 PM    Assessment and Plan   No primary diagnosis found  1  Anxiety  lamoTRIgine (LaMICtal) 100 mg tablet    carisoprodol (SOMA) 350 mg tablet   2  Primary insomnia  hydrOXYzine pamoate (VISTARIL) 50 mg capsule         Patient Instructions     Patient Instructions   Self pay---refer ER for impending suboxone withdrawal---our office cannot Rx          Chief Complaint     Chief Complaint   Patient presents with    Physical Exam     wants suboxone         History of Present Illness       Needs Rx for suboxone--was Rxed by Christopher Kessler      Review of Systems   Review of Systems   Constitutional: Negative for fatigue, fever and unexpected weight change  HENT: Negative for congestion, sinus pain and sore throat  Eyes: Negative for visual disturbance  Respiratory: Negative for shortness of breath and wheezing  Cardiovascular: Negative for chest pain and palpitations  Gastrointestinal: Negative for abdominal pain, nausea and vomiting  Musculoskeletal: Negative  Negative for arthralgias and myalgias  Neurological: Negative for syncope, weakness and numbness  Psychiatric/Behavioral: Negative  Negative for confusion, dysphoric mood and suicidal ideas           Current Medications       Current Outpatient Medications:     albuterol (PROVENTIL HFA,VENTOLIN HFA) 90 mcg/act inhaler, Inhale 2 puffs every 4 (four) hours as needed for wheezing, Disp: 18 g, Rfl: 5    buprenorphine-naloxone (SUBOXONE) 2-0 5 mg per SL tablet, place 1 tablet under the tongue and dissolve once daily, Disp: , Rfl:     busPIRone (BUSPAR) 10 mg tablet, Take 20 mg by mouth 3 (three) times a day, Disp: , Rfl:     clonazePAM (KlonoPIN) 1 mg tablet, Take 1 tablet (1 mg total) by mouth 3 (three) times a day, Disp: 90 tablet, Rfl: 1    cyclobenzaprine (FLEXERIL) 10 mg tablet, TAKE ONE TABLET BY MOUTH THREE TIMES DAILY AS NEEDED FOR MUSCLE SPASMS, Disp: 90 tablet, Rfl: 3    DULoxetine (CYMBALTA) 60 mg delayed release capsule, Take 1 capsule (60 mg total) by mouth daily, Disp: 90 capsule, Rfl: 5    fluticasone-salmeterol (ADVAIR, WIXELA) 250-50 mcg/dose inhaler, Inhale 1 puff 2 (two) times a day Rinse mouth after use , Disp: 1 each, Rfl: 5    gabapentin (NEURONTIN) 600 MG tablet, Take 1 tablet (600 mg total) by mouth 3 (three) times a day, Disp: 90 tablet, Rfl: 0    hydrOXYzine pamoate (VISTARIL) 50 mg capsule, Take 1 capsule (50 mg total) by mouth 3 (three) times a day as needed for itching 1-2 HS, Disp: 90 capsule, Rfl: 3    levothyroxine 25 mcg tablet, Take 1 tablet (25 mcg total) by mouth daily, Disp: 90 tablet, Rfl: 3    levothyroxine 50 mcg tablet, Take 1 tablet (50 mcg total) by mouth daily, Disp: 90 tablet, Rfl: 0    omeprazole (PriLOSEC) 20 mg delayed release capsule, Take 1 capsule (20 mg total) by mouth daily, Disp: 90 capsule, Rfl: 3    tiZANidine (ZANAFLEX) 4 mg tablet, TAKE ONE TABLET BY MOUTH EVERY 8 HOURS AS NEEDED FOR MUSCLE SPASMS, Disp: 90 tablet, Rfl: 5    topiramate (TOPAMAX) 100 mg tablet, Take 1 tablet (100 mg total) by mouth 3 (three) times a day, Disp: 270 tablet, Rfl: 0    traZODone (DESYREL) 50 mg tablet, 1-2 HS, Disp: 180 tablet, Rfl: 3    carisoprodol (SOMA) 350 mg tablet, Take 1 tablet (350 mg total) by mouth 3 (three) times a day, Disp: 90 tablet, Rfl: 1    chlorproMAZINE (THORAZINE) 25 mg tablet, 1 tab 3x/day (Patient not taking: Reported on 7/28/2021), Disp: 90 tablet, Rfl: 3    cloNIDine (CATAPRES) 0 2 mg tablet, Take 1 tablet (0 2 mg total) by mouth 2 (two) times a day, Disp: 180 tablet, Rfl: 3    lamoTRIgine (LaMICtal) 100 mg tablet, Take 1 tablet (100 mg total) by mouth 2 (two) times a day, Disp: 30 tablet, Rfl: 5    NARCAN 4 MG/0 1ML LIQD, ADMINISTER A SINGLE spray INTO ONE NOSTRIL CALL 911 MAY REPEAT ONCE (Patient not taking: Reported on 7/28/2021), Disp: , Rfl: 0    Promethazine-DM (PHENERGAN-DM) 6 25-15 mg/5 mL oral syrup, Take 5 mL by mouth 4 (four) times a day as needed for cough (Patient not taking: Reported on 7/28/2021), Disp: 240 mL, Rfl: 3    Current Allergies     Allergies as of 07/28/2021 - Reviewed 07/28/2021   Allergen Reaction Noted    Amoxicillin  11/19/2019            The following portions of the patient's history were reviewed and updated as appropriate: allergies, current medications, past family history, past medical history, past social history, past surgical history and problem list      Past Medical History:   Diagnosis Date    Anxiety     "severe"    Asthma     Bipolar disorder ([facility] [address] )     Cigarette smoker     Disease of thyroid gland     Drug dependence, in remission ([facility] [address] )     GERD (gastroesophageal reflux disease)     H/O: whooping cough     while pregnant    Hepatitis C, chronic ([facility] [address] )     Insomnia disorder     Liver disease     Hepatitis C    Migraine     PTSD (post-traumatic stress disorder)        Past Surgical History:   Procedure Laterality Date    OTHER SURGICAL HISTORY      body piercing    OH REPAIR UMBILICAL PDVC,0+Q/F,MCIJI N/A 4/19/2016    Procedure: REPAIR HERNIA UMBILICAL WITH MESH;  Surgeon: Marli Power MD;  Location:  MAIN OR;  Service: General    VAGINAL DELIVERY      X 6    WISDOM TOOTH EXTRACTION      X 4    WOUND DEBRIDEMENT Right 1/17/2018    Procedure: RIGHT KNEE DEBRIDEMENT; [address]; AND LACERATION REPAIR  INTRAOPERATIVE ASSESSMENT OF PATELLA TENDON;  Surgeon: Santy Armstrong MD;  Location:  MAIN OR;  Service: Orthopedics       Family History   Problem Relation Age of Onset    Hypertension Mother     Thyroid disease Mother     Hypertension Father     Prostate cancer Father     Anxiety disorder Sister          Medications have been verified          Objective   /80   Pulse 86   Resp 16   Ht 5' 3 75" (1 619 m)   Wt 94 8 kg (209 lb)   LMP 07/02/2021   SpO2 98% BMI 36 16 kg/m²        Physical Exam     Physical Exam  Constitutional:       Appearance: She is well-developed  HENT:      Right Ear: Ear canal normal  Tympanic membrane is not injected  Left Ear: Ear canal normal  Tympanic membrane is not injected  Nose: Nose normal    Eyes:      General:         Right eye: No discharge  Left eye: No discharge  Conjunctiva/sclera: Conjunctivae normal       Pupils: Pupils are equal, round, and reactive to light  Neck:      Thyroid: No thyromegaly  Cardiovascular:      Rate and Rhythm: Normal rate and regular rhythm  Heart sounds: Normal heart sounds  No murmur heard  Pulmonary:      Effort: Pulmonary effort is normal  No respiratory distress  Breath sounds: Normal breath sounds  No wheezing  Abdominal:      General: Bowel sounds are normal  There is no distension  Palpations: Abdomen is soft  Tenderness: There is no abdominal tenderness  Musculoskeletal:         General: Normal range of motion  Cervical back: Normal range of motion and neck supple  Lymphadenopathy:      Cervical: No cervical adenopathy  Skin:     General: Skin is warm and dry  Neurological:      Mental Status: She is alert and oriented to person, place, and time  She is not disoriented  Sensory: No sensory deficit  Gait: Gait normal       Deep Tendon Reflexes: Reflexes are normal and symmetric  Psychiatric:         Speech: Speech normal          Behavior: Behavior normal          Thought Content:  Thought content normal          Judgment: Judgment normal

## 2021-07-29 ENCOUNTER — TELEPHONE (OUTPATIENT)
Dept: FAMILY MEDICINE CLINIC | Facility: CLINIC | Age: 36
End: 2021-07-29

## 2021-08-02 ENCOUNTER — APPOINTMENT (EMERGENCY)
Dept: RADIOLOGY | Facility: HOSPITAL | Age: 36
End: 2021-08-02
Payer: MEDICARE

## 2021-08-02 ENCOUNTER — APPOINTMENT (EMERGENCY)
Dept: CT IMAGING | Facility: HOSPITAL | Age: 36
End: 2021-08-02
Payer: MEDICARE

## 2021-08-02 ENCOUNTER — HOSPITAL ENCOUNTER (EMERGENCY)
Facility: HOSPITAL | Age: 36
Discharge: HOME | End: 2021-08-02
Attending: EMERGENCY MEDICINE | Admitting: EMERGENCY MEDICINE
Payer: MEDICARE

## 2021-08-02 VITALS
SYSTOLIC BLOOD PRESSURE: 116 MMHG | WEIGHT: 211.42 LBS | TEMPERATURE: 98.6 F | HEART RATE: 115 BPM | OXYGEN SATURATION: 96 % | DIASTOLIC BLOOD PRESSURE: 62 MMHG | BODY MASS INDEX: 36.58 KG/M2 | RESPIRATION RATE: 16 BRPM

## 2021-08-02 DIAGNOSIS — M25.561 RIGHT KNEE PAIN: ICD-10-CM

## 2021-08-02 DIAGNOSIS — T42.4X1A BENZODIAZEPINE OVERDOSE: Primary | ICD-10-CM

## 2021-08-02 DIAGNOSIS — R41.82 ALTERED MENTAL STATUS: ICD-10-CM
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
## 2021-08-02 LAB
ALBUMIN SERPL BCP-MCNC: 4.1 G/DL (ref 3.5–5)
ALP SERPL-CCNC: 60 U/L (ref 46–116)
ALT SERPL W P-5'-P-CCNC: 26 U/L (ref 12–78)
ANION GAP SERPL CALCULATED.3IONS-SCNC: 10 MMOL/L (ref 4–13)
APAP SERPL-MCNC: <2 UG/ML (ref 10–20)
AST SERPL W P-5'-P-CCNC: 16 U/L (ref 5–45)
ATRIAL RATE: 106 BPM
BASOPHILS # BLD AUTO: 0.04 THOUSANDS/ΜL (ref 0–0.1)
BASOPHILS NFR BLD AUTO: 1 % (ref 0–1)
BILIRUB SERPL-MCNC: 0.2 MG/DL (ref 0.2–1)
BUN SERPL-MCNC: 12 MG/DL (ref 5–25)
CALCIUM SERPL-MCNC: 9.1 MG/DL (ref 8.3–10.1)
CHLORIDE SERPL-SCNC: 107 MMOL/L (ref 100–108)
CK SERPL-CCNC: 109 U/L (ref 26–192)
CO2 SERPL-SCNC: 25 MMOL/L (ref 21–32)
CREAT SERPL-MCNC: 1.28 MG/DL (ref 0.6–1.3)
EOSINOPHIL # BLD AUTO: 0.18 THOUSAND/ΜL (ref 0–0.61)
EOSINOPHIL NFR BLD AUTO: 2 % (ref 0–6)
ERYTHROCYTE [DISTWIDTH] IN BLOOD BY AUTOMATED COUNT: 13.5 % (ref 11.6–15.1)
ETHANOL SERPL-MCNC: <3 MG/DL (ref 0–3)
GFR SERPL CREATININE-BSD FRML MDRD: 55 ML/MIN/1.73SQ M
GLUCOSE SERPL-MCNC: 94 MG/DL (ref 65–140)
HCT VFR BLD AUTO: 42.1 % (ref 34.8–46.1)
HGB BLD-MCNC: 13.6 G/DL (ref 11.5–15.4)
IMM GRANULOCYTES # BLD AUTO: 0.03 THOUSAND/UL (ref 0–0.2)
IMM GRANULOCYTES NFR BLD AUTO: 0 % (ref 0–2)
LYMPHOCYTES # BLD AUTO: 3.16 THOUSANDS/ΜL (ref 0.6–4.47)
LYMPHOCYTES NFR BLD AUTO: 40 % (ref 14–44)
MCH RBC QN AUTO: 29.3 PG (ref 26.8–34.3)
MCHC RBC AUTO-ENTMCNC: 32.3 G/DL (ref 31.4–37.4)
MCV RBC AUTO: 91 FL (ref 82–98)
MONOCYTES # BLD AUTO: 0.42 THOUSAND/ΜL (ref 0.17–1.22)
MONOCYTES NFR BLD AUTO: 5 % (ref 4–12)
NEUTROPHILS # BLD AUTO: 4.01 THOUSANDS/ΜL (ref 1.85–7.62)
NEUTS SEG NFR BLD AUTO: 52 % (ref 43–75)
NRBC BLD AUTO-RTO: 0 /100 WBCS
P AXIS: 26 DEGREES
PLATELET # BLD AUTO: 236 THOUSANDS/UL (ref 149–390)
PMV BLD AUTO: 10.7 FL (ref 8.9–12.7)
POTASSIUM SERPL-SCNC: 4 MMOL/L (ref 3.5–5.3)
PR INTERVAL: 134 MS
PROT SERPL-MCNC: 7.1 G/DL (ref 6.4–8.2)
QRS AXIS: 71 DEGREES
QRSD INTERVAL: 76 MS
QT INTERVAL: 304 MS
QTC INTERVAL: 403 MS
RBC # BLD AUTO: 4.64 MILLION/UL (ref 3.81–5.12)
SALICYLATES SERPL-MCNC: 3.1 MG/DL (ref 3–20)
SODIUM SERPL-SCNC: 142 MMOL/L (ref 136–145)
T WAVE AXIS: -5 DEGREES
TROPONIN I SERPL-MCNC: <0.02 NG/ML
TSH SERPL DL<=0.05 MIU/L-ACNC: 0.66 UIU/ML (ref 0.36–3.74)
VENTRICULAR RATE: 106 BPM
WBC # BLD AUTO: 7.84 THOUSAND/UL (ref 4.31–10.16)

## 2021-08-02 PROCEDURE — 82550 ASSAY OF CK (CPK): CPT | Performed by: PHYSICIAN ASSISTANT

## 2021-08-02 PROCEDURE — 70450 CT HEAD/BRAIN W/O DYE: CPT

## 2021-08-02 PROCEDURE — 84443 ASSAY THYROID STIM HORMONE: CPT | Performed by: PHYSICIAN ASSISTANT

## 2021-08-02 PROCEDURE — 73564 X-RAY EXAM KNEE 4 OR MORE: CPT

## 2021-08-02 PROCEDURE — 71045 X-RAY EXAM CHEST 1 VIEW: CPT

## 2021-08-02 PROCEDURE — 36415 COLL VENOUS BLD VENIPUNCTURE: CPT | Performed by: PHYSICIAN ASSISTANT

## 2021-08-02 PROCEDURE — 80053 COMPREHEN METABOLIC PANEL: CPT | Performed by: PHYSICIAN ASSISTANT

## 2021-08-02 PROCEDURE — 93005 ELECTROCARDIOGRAM TRACING: CPT

## 2021-08-02 PROCEDURE — 80179 DRUG ASSAY SALICYLATE: CPT | Performed by: PHYSICIAN ASSISTANT

## 2021-08-02 PROCEDURE — 72125 CT NECK SPINE W/O DYE: CPT

## 2021-08-02 PROCEDURE — 96360 HYDRATION IV INFUSION INIT: CPT

## 2021-08-02 PROCEDURE — 93010 ELECTROCARDIOGRAM REPORT: CPT | Performed by: INTERNAL MEDICINE

## 2021-08-02 PROCEDURE — 80143 DRUG ASSAY ACETAMINOPHEN: CPT | Performed by: PHYSICIAN ASSISTANT

## 2021-08-02 PROCEDURE — 99285 EMERGENCY DEPT VISIT HI MDM: CPT

## 2021-08-02 PROCEDURE — 99285 EMERGENCY DEPT VISIT HI MDM: CPT | Performed by: PHYSICIAN ASSISTANT

## 2021-08-02 PROCEDURE — 82077 ASSAY SPEC XCP UR&BREATH IA: CPT | Performed by: PHYSICIAN ASSISTANT

## 2021-08-02 PROCEDURE — 84484 ASSAY OF TROPONIN QUANT: CPT | Performed by: PHYSICIAN ASSISTANT

## 2021-08-02 PROCEDURE — 85025 COMPLETE CBC W/AUTO DIFF WBC: CPT | Performed by: PHYSICIAN ASSISTANT

## 2021-08-02 PROCEDURE — G1004 CDSM NDSC: HCPCS

## 2021-08-02 PROCEDURE — 73502 X-RAY EXAM HIP UNI 2-3 VIEWS: CPT

## 2021-08-02 RX ORDER — NAPROXEN 500 MG/1
500 TABLET ORAL 2 TIMES DAILY PRN
Qty: 30 TABLET | Refills: 0 | Status: SHIPPED | OUTPATIENT
Start: 2021-08-02 | End: 2021-11-01

## 2021-08-02 RX ADMIN — SODIUM CHLORIDE 1000 ML: 0.9 INJECTION, SOLUTION INTRAVENOUS at 12:20

## 2021-08-02 NOTE — ED NOTES
Pt called her friend Quang Leary for ride  Pt will wait in waiting room        Kem Reardon RN  08/02/21 [id number]

## 2021-08-02 NOTE — ED NOTES
Pt demanding to leave unit  RN explained to pt her treatment plan and that she is unable to leave at this time due to her state   Provider is at bedside with  /      Genesis Childers RN  08/02/21 0377

## 2021-08-02 NOTE — ED NOTES
Pt removed her IV and id requesting to leave department  RN explained to pt that we are waiting to hear from toxicology  Pt given paper and pencil at this time for diversion  Provider made aware        Adiel Kingston RN  08/02/21 3419

## 2021-08-02 NOTE — DISCHARGE INSTRUCTIONS
Please refer to the attached information for strict return instructions  If symptoms worsen or new symptoms develop please return to the ER  Please follow up with your primary care physician as well as with orthopedics for re-evaluation  Do not take more than prescribed of your daily medications to avoid overdose and potential injury or death  Do not combine your mediations with alcohol or street drugs

## 2021-08-02 NOTE — ED NOTES
Pt attempting to elope again  Security and provider at bedside  Pt cooperated and went back to bed        Porschevirgilio Garcia RN  08/02/21 1200

## 2021-08-03 NOTE — ED PROVIDER NOTES
History  Chief Complaint   Patient presents with    Overdose - Accidental     Pt reports fell in shower 2 days ago and was having pain so she took 5 2mg klonopin to "sleep off the pain"  Rochelle Angel is a 29 yo F, history of anxiety, bipolar, hepatitis C, PTSD, presenting via EMS with altered mental status and suspected overdose  She reportedly lives with her partner, who reported she took more than her prescribed klonopin and soma this morning  At arrival she is somewhat disoriented, but does report taking "3 soma and 2 klonopin" today  She reports taking these medications as she had fallen in the shower two days ago and has been having R knee pain  She reportedly believed these medications would reduce her pain  She also notes striking her head during that fall, but reports no current headache or neck pain  Denies chest pain or dyspnea  Denies other illicit substance or alcohol use  History provided by:  Patient   used: No    Overdose - Accidental  Ingested substance:  Prescription medication  Ingestion amount:  2 1mg klonopin, 3 350 mg soma  Witnesses present: yes    Witnessed by:  Partner  Called poison control: no    Incident location:  Home  Context: intentional ingestion    Context: not depression and not suicide attempt    Associated symptoms: lethargy and slurred speech    Associated symptoms: no abdominal pain, no altered mental status, no chest pain, no cough, no nausea, no shortness of breath, no visual changes and no vomiting        Prior to Admission Medications   Prescriptions Last Dose Informant Patient Reported? Taking?    DULoxetine (CYMBALTA) 60 mg delayed release capsule   No No   Sig: Take 1 capsule (60 mg total) by mouth daily   NARCAN 4 MG/0 1ML LIQD   Yes No   Sig: ADMINISTER A SINGLE spray INTO ONE NOSTRIL CALL 911 MAY REPEAT ONCE   Patient not taking: Reported on 7/28/2021   Promethazine-DM (PHENERGAN-DM) 6 25-15 mg/5 mL oral syrup   No No   Sig: Take 5 mL by mouth 4 (four) times a day as needed for cough   Patient not taking: Reported on 2021   albuterol (PROVENTIL HFA,VENTOLIN HFA) 90 mcg/act inhaler   No No   Sig: Inhale 2 puffs every 4 (four) hours as needed for wheezing   buprenorphine-naloxone (SUBOXONE) 2-0 5 mg per SL tablet   Yes No   Sig: place 1 tablet under the tongue and dissolve once daily   busPIRone (BUSPAR) 10 mg tablet   Yes No   Sig: Take 20 mg by mouth 3 (three) times a day   carisoprodol (SOMA) 350 mg tablet   No No   Sig: Take 1 tablet (350 mg total) by mouth 3 (three) times a day   chlorproMAZINE (THORAZINE) 25 mg tablet   No No   Si tab 3x/day   Patient not taking: Reported on 2021   cloNIDine (CATAPRES) 0 2 mg tablet   No No   Sig: Take 1 tablet (0 2 mg total) by mouth 2 (two) times a day   clonazePAM (KlonoPIN) 1 mg tablet   No No   Sig: Take 1 tablet (1 mg total) by mouth 3 (three) times a day   cyclobenzaprine (FLEXERIL) 10 mg tablet   No No   Sig: TAKE ONE TABLET BY MOUTH THREE TIMES DAILY AS NEEDED FOR MUSCLE SPASMS   fluticasone-salmeterol (ADVAIR, [address]) 250-50 mcg/dose inhaler   No No   Sig: Inhale 1 puff 2 (two) times a day Rinse mouth after use    gabapentin (NEURONTIN) 600 MG tablet   No No   Sig: Take 1 tablet (600 mg total) by mouth 3 (three) times a day   hydrOXYzine pamoate (VISTARIL) 50 mg capsule   No No   Sig: Take 1 capsule (50 mg total) by mouth 3 (three) times a day as needed for itching 1-2 HS   lamoTRIgine (LaMICtal) 100 mg tablet   No No   Sig: Take 1 tablet (100 mg total) by mouth 2 (two) times a day   levothyroxine 25 mcg tablet   No No   Sig: Take 1 tablet (25 mcg total) by mouth daily   levothyroxine 50 mcg tablet   No No   Sig: Take 1 tablet (50 mcg total) by mouth daily   omeprazole (PriLOSEC) 20 mg delayed release capsule   No No   Sig: Take 1 capsule (20 mg total) by mouth daily   tiZANidine (ZANAFLEX) 4 mg tablet   No No   Sig: TAKE ONE TABLET BY MOUTH EVERY 8 HOURS AS NEEDED FOR MUSCLE SPASMS topiramate (TOPAMAX) 100 mg tablet   No No   Sig: Take 1 tablet (100 mg total) by mouth 3 (three) times a day   traZODone (DESYREL) 50 mg tablet   No No   Si-2 HS      Facility-Administered Medications: None       Past Medical History:   Diagnosis Date    Anxiety     "severe"    Asthma     Bipolar disorder ([facility] )     Cigarette smoker     Disease of thyroid gland     Drug dependence, in remission ([facility] )     GERD (gastroesophageal reflux disease)     H/O: whooping cough     while pregnant    Hepatitis C, chronic (HCC)     Insomnia disorder     Liver disease     Hepatitis C    Migraine     PTSD (post-traumatic stress disorder)        Past Surgical History:   Procedure Laterality Date    OTHER SURGICAL HISTORY      body piercing    OK REPAIR UMBILICAL QOLY,9+D/B,ACVMI N/A 2016    Procedure: REPAIR HERNIA UMBILICAL WITH MESH;  Surgeon: Lovette Ganser, MD;  Location:  MAIN OR;  Service: General    VAGINAL DELIVERY      X 6    WISDOM TOOTH EXTRACTION      X 4    WOUND DEBRIDEMENT Right 2018    Procedure: RIGHT KNEE DEBRIDEMENT; [facility] OUT; AND LACERATION REPAIR  INTRAOPERATIVE ASSESSMENT OF PATELLA TENDON;  Surgeon: Briana Claros MD;  Location:  MAIN OR;  Service: Orthopedics       Family History   Problem Relation Age of Onset    Hypertension Mother     Thyroid disease Mother     Hypertension Father     Prostate cancer Father     Anxiety disorder Sister      I have reviewed and agree with the history as documented  E-Cigarette/Vaping     E-Cigarette/Vaping Substances     Social History     Tobacco Use    Smoking status: Former Smoker     Packs/day: 0 25     Years: 12 00     Pack years: 3 00     Types: Cigarettes    Smokeless tobacco: Never Used    Tobacco comment: Vaping   Substance Use Topics    Alcohol use: Not Currently    Drug use: Not Currently     Types: Methadone, Heroin, Cocaine, Marijuana, Methamphetamines, Prescription     Comment: patient in recovery   clean since oct of 2019       Review of Systems   Unable to perform ROS: Mental status change   HENT: Negative for sore throat  Eyes: Negative for visual disturbance  Respiratory: Negative for cough and shortness of breath  Cardiovascular: Negative for chest pain  Gastrointestinal: Negative for abdominal pain, nausea and vomiting  Musculoskeletal: Positive for arthralgias  Negative for back pain and neck pain  Skin: Negative for rash  Neurological: Negative for light-headedness  Psychiatric/Behavioral: Positive for confusion  Negative for suicidal ideas  Physical Exam  Physical Exam  Constitutional:       General: She is not in acute distress  Appearance: She is well-developed  She is not diaphoretic  Comments: Patient appears disheveled, semi-alert   HENT:      Head: Normocephalic and atraumatic  Right Ear: External ear normal       Left Ear: External ear normal    Eyes:      Conjunctiva/sclera: Conjunctivae normal       Pupils: Pupils are equal, round, and reactive to light  Cardiovascular:      Rate and Rhythm: Normal rate and regular rhythm  Heart sounds: Normal heart sounds  No murmur heard  No friction rub  No gallop  Pulmonary:      Effort: Pulmonary effort is normal  No respiratory distress  Breath sounds: Normal breath sounds  No wheezing  Abdominal:      General: There is no distension  Palpations: Abdomen is soft  Tenderness: There is no abdominal tenderness  Musculoskeletal:         General: Tenderness present  Cervical back: Normal range of motion and neck supple  Comments: Diffuse TTP to L knee without edema or deformity  Slightly decreased ROM in flexion/extension due to pain  2+ dorsalis pedis, posterior tibial pulse with brisk cap refill and intact sensation to RLE distally  Lymphadenopathy:      Cervical: No cervical adenopathy  Skin:     General: Skin is warm and dry        Capillary Refill: Capillary refill takes less than 2 seconds  Findings: No erythema or rash  Neurological:      Motor: No abnormal muscle tone  Comments: Alert and oriented to person and place, disoriented to time  Slightly slurred speech  EOM's intact, no nystagmus/strabismus  Pupils 3-4 mm b/l, equal, round, responsive  Moving bilateral upper and lower extremities spontaneously  Able to ambulate but slightly ataxic gait  Psychiatric:         Speech: Speech is slurred  Behavior: Behavior is slowed  Thought Content: Thought content does not include homicidal or suicidal ideation  Vital Signs  ED Triage Vitals [08/02/21 1033]   Temperature Pulse Respirations Blood Pressure SpO2   98 6 °F (37 °C) (!) 111 14 123/68 94 %      Temp Source Heart Rate Source Patient Position - Orthostatic VS BP Location FiO2 (%)   Oral Monitor Lying Left arm --      Pain Score       --           Vitals:    08/02/21 1033 08/02/21 1220   BP: 123/68 116/62   Pulse: (!) 111 (!) 115   Patient Position - Orthostatic VS: Lying Sitting         Visual Acuity      ED Medications  Medications   sodium chloride 0 9 % bolus 1,000 mL (0 mL Intravenous Stopped 8/2/21 1300)       Diagnostic Studies  Results Reviewed     Procedure Component Value Units Date/Time    Acetaminophen level-If concentration is detectable, please discuss with medical  on call   [[id number]]  (Abnormal) Collected: 08/02/21 1111    Lab Status: Final result Specimen: Blood from Arm, Left Updated: 08/02/21 1239     Acetaminophen Level <2 ug/mL     Ethanol [[id number]]  (Normal) Collected: 08/02/21 1111    Lab Status: Final result Specimen: Blood from Arm, Left Updated: 08/02/21 1237     Ethanol Lvl <3 mg/dL     TSH, 3rd generation with Free T4 reflex [[id number]]  (Normal) Collected: 08/02/21 1111    Lab Status: Final result Specimen: Blood from Arm, Left Updated: 08/02/21 1214     TSH 3RD GENERATON 0 660 uIU/mL     Narrative:      Patients undergoing fluorescein dye angiography may retain small amounts of fluorescein in the body for 48-72 hours post procedure  Samples containing fluorescein can produce falsely depressed TSH values  If the patient had this procedure,a specimen should be resubmitted post fluorescein clearance        Salicylate level [[id number]]  (Normal) Collected: 08/02/21 1111    Lab Status: Final result Specimen: Blood from Arm, Left Updated: 05/52/81 1302     Salicylate Lvl 3 1 mg/dL     CK (with reflex to MB) [[id number]]  (Normal) Collected: 08/02/21 1111    Lab Status: Final result Specimen: Blood from Arm, Left Updated: 08/02/21 1155     Total  U/L     Troponin I [[id number]]  (Normal) Collected: 08/02/21 1111    Lab Status: Final result Specimen: Blood from Arm, Left Updated: 08/02/21 1153     Troponin I <0 02 ng/mL     Comprehensive metabolic panel [[id number]] Collected: 08/02/21 1111    Lab Status: Final result Specimen: Blood from Arm, Left Updated: 08/02/21 1146     Sodium 142 mmol/L      Potassium 4 0 mmol/L      Chloride 107 mmol/L      CO2 25 mmol/L      ANION GAP 10 mmol/L      BUN 12 mg/dL      Creatinine 1 28 mg/dL      Glucose 94 mg/dL      Calcium 9 1 mg/dL      AST 16 U/L      ALT 26 U/L      Alkaline Phosphatase 60 U/L      Total Protein 7 1 g/dL      Albumin 4 1 g/dL      Total Bilirubin 0 20 mg/dL      eGFR 55 ml/min/1 73sq m     Narrative:      Seema guidelines for Chronic Kidney Disease (CKD):     Stage 1 with normal or high GFR (GFR > 90 mL/min/1 73 square meters)    Stage 2 Mild CKD (GFR = 60-89 mL/min/1 73 square meters)    Stage 3A Moderate CKD (GFR = 45-59 mL/min/1 73 square meters)    Stage 3B Moderate CKD (GFR = 30-44 mL/min/1 73 square meters)    Stage 4 Severe CKD (GFR = 15-29 mL/min/1 73 square meters)    Stage 5 End Stage CKD (GFR <15 mL/min/1 73 square meters)  Note: GFR calculation is accurate only with a steady state creatinine    CBC and differential [[id number]] Collected: 08/02/21 1111    Lab Status: Final result Specimen: Blood from Arm, Left Updated: 08/02/21 1117     WBC 7 84 Thousand/uL      RBC 4 64 Million/uL      Hemoglobin 13 6 g/dL      Hematocrit 42 1 %      MCV 91 fL      MCH 29 3 pg      MCHC 32 3 g/dL      RDW 13 5 %      MPV 10 7 fL      Platelets 378 Thousands/uL      nRBC 0 /100 WBCs      Neutrophils Relative 52 %      Immat GRANS % 0 %      Lymphocytes Relative 40 %      Monocytes Relative 5 %      Eosinophils Relative 2 %      Basophils Relative 1 %      Neutrophils Absolute 4 01 Thousands/µL      Immature Grans Absolute 0 03 Thousand/uL      Lymphocytes Absolute 3 16 Thousands/µL      Monocytes Absolute 0 42 Thousand/µL      Eosinophils Absolute 0 18 Thousand/µL      Basophils Absolute 0 04 Thousands/µL                  XR knee 4+ vw right injury   Final Result by Geetha Jones MD (08/02 1619)      Mild arthritis  No fracture            Workstation performed: [id number]         XR hip/pelv 2-3 vws right if performed   Final Result by Geetha Jones MD (08/02 1618)      No acute osseous abnormality  Workstation performed: [id number]         XR chest 1 view   Final Result by Geetha Jones MD (08/02 1617)      No acute cardiopulmonary disease  Workstation performed: [id number]         CT head without contrast   Final Result by Ely Peters MD (08/02 1203)      No acute intracranial abnormality  Workstation performed: [id number]         CT spine cervical without contrast   Final Result by Ely Peters MD (08/02 1206)      1  No cervical spine fracture or traumatic malalignment  2   Frothy material within the subglottic airways, consider aspiration      The study was marked in [facility] for immediate notification                     Workstation performed: [id number]                    Procedures  ECG 12 Lead Documentation Only    Date/Time: 8/2/2021 10:40 AM  Performed by: Evens Carver PA-C  Authorized by: Jean Gomez Ted Fung PA-C     Indications / Diagnosis:  AMS  ECG reviewed by me, the ED Provider: yes    Patient location:  ED  Previous ECG:     Previous ECG:  Compared to current    Comparison ECG info:  QT shortened    Similarity:  Changes noted    Comparison to cardiac monitor: Yes    Interpretation:     Interpretation: non-specific    Rate:     ECG rate:  106    ECG rate assessment: tachycardic    Rhythm:     Rhythm: sinus tachycardia    Ectopy:     Ectopy: none    QRS:     QRS axis:  Normal    QRS intervals:  Normal  Conduction:     Conduction: normal    ST segments:     ST segments:  Normal  T waves:     T waves: non-specific    Comments:      QTc 403             ED Course  ED Course as of Aug 03 1508   Mon Aug 02, 2021   1155 Patient with increasing agitation, requesting to leave and attempting to elope on her own  Attempted to re-direct numerous times but patient refuses  Patient continues to slur her words, appears unsteady on gait and does not appear to have judgment to make decision about care  Will place in 4 point soft restraints  1205 Patient agrees to be cooperative at this time, is laying in bed now and agreeable to remainder of workup  Will d/c restraints for now  1245 Patient again becoming agitated, demanding she be allowed to leave  Explained to patient once we have discussed with toxicology we can make a decision regarding disposition  1300 Pt is now significantly more alert and is oriented x3 with intact insight/judgment  Improved coordination and gait  Patient again denies SI/HI and requests discharge home  Appears to have intact judgment and is oriented enough to make decisions about care at this time  Case discussed with Dr Edy Vernon, on call tox who reports current symptoms/presentation appears to be consistent with reported medication ingestion history  Will discharge patient home with ride to her partner                                 SBIRT 20yo+      Most Recent Value   SBIRT (22 yo +)   In order to provide better care to our patients, we are screening all of our patients for alcohol and drug use  Would it be okay to ask you these screening questions? No Filed at: 08/02/2021 1100                    MDM  Number of Diagnoses or Management Options  Altered mental status  Benzodiazepine overdose  Fall, initial encounter  Right knee pain  Diagnosis management comments: Altered mental status after reported ingestion of 2 1 mg klonopin and 3 soma tablets this am  Reported doses match with reports from patient's partner via EMS  Patient is somewhat slowed with slight slurring of speech and is alert x2 but disoriented to time  Patient denies suicidal ideations and reports took only to reduce her pain  Will check labs including CBC, CMP, CK given recent fall with drug use, EKG/troponin, TSH, coma panel, check CT head/c-spine given AMS with recent head injury, check XR R knee given R knee pain after fall  Will discuss case with on call toxicology  Amount and/or Complexity of Data Reviewed  Clinical lab tests: ordered and reviewed  Tests in the radiology section of CPT®: ordered and reviewed    Patient Progress  Patient progress: improved      Disposition  Final diagnoses:   Benzodiazepine overdose   Altered mental status   Right knee pain   Fall, initial encounter     Time reflects when diagnosis was documented in both MDM as applicable and the Disposition within this note     Time User Action Codes Description Comment    8/2/2021 12:59 PM Tonya Rey Add [T42 4X1A] Benzodiazepine overdose     8/2/2021 12:59 PM Tonya Yarbrough [R41 82] Altered mental status     8/2/2021 12:59 PM [place] Record Right knee pain     8/2/2021 12:59 PM Elene Snowman Add Cooper Maxon Macon Kanner, initial encounter       ED Disposition     ED Disposition Condition Date/Time Comment    Discharge Stable Mon Aug 2, 2021 12:58 PM [facility] Courtney discharge to home/self care  Follow-up Information     Follow up With Specialties Details Why Contact Info Additional Information    Ansel Cockayne, MD Family Medicine Schedule an appointment as soon as possible for a visit   [address]  [address] Emergency Department Emergency Medicine  If symptoms worsen [facility] [id number]  [address] [facility] Emergency Department, [address] [facility] [address]  , [facility] [address]          Discharge Medication List as of 8/2/2021  1:12 PM      START taking these medications    Details   naproxen (NAPROSYN) 500 mg tablet Take 1 tablet (500 mg total) by mouth 2 (two) times a day as needed for mild pain or moderate pain, Starting Mon 8/2/2021, Normal         CONTINUE these medications which have NOT CHANGED    Details   albuterol (PROVENTIL HFA,VENTOLIN HFA) 90 mcg/act inhaler Inhale 2 puffs every 4 (four) hours as needed for wheezing, Starting Mon 3/1/2021, Normal      buprenorphine-naloxone (SUBOXONE) 2-0 5 mg per SL tablet place 1 tablet under the tongue and dissolve once daily, Historical Med      busPIRone (BUSPAR) 10 mg tablet Take 20 mg by mouth 3 (three) times a day, Starting Mon 7/19/2021, Historical Med      carisoprodol (SOMA) 350 mg tablet Take 1 tablet (350 mg total) by mouth 3 (three) times a day, Starting Wed 7/28/2021, Normal      chlorproMAZINE (THORAZINE) 25 mg tablet 1 tab 3x/day, Normal      clonazePAM (KlonoPIN) 1 mg tablet Take 1 tablet (1 mg total) by mouth 3 (three) times a day, Starting Tue 6/22/2021, Normal      cloNIDine (CATAPRES) 0 2 mg tablet Take 1 tablet (0 2 mg total) by mouth 2 (two) times a day, Starting Sun 2/28/2021, Normal      cyclobenzaprine (FLEXERIL) 10 mg tablet TAKE ONE TABLET BY MOUTH THREE TIMES DAILY AS NEEDED FOR MUSCLE SPASMS, Normal      DULoxetine (CYMBALTA) 60 mg delayed release capsule Take 1 capsule (60 mg total) by mouth daily, Starting Tue 1/14/2020, Normal      fluticasone-salmeterol (ADVAIR, WIXELA) 250-50 mcg/dose inhaler Inhale 1 puff 2 (two) times a day Rinse mouth after use , Starting Tue 5/18/2021, Normal      gabapentin (NEURONTIN) 600 MG tablet Take 1 tablet (600 mg total) by mouth 3 (three) times a day, Starting Tue 12/10/2019, Normal      hydrOXYzine pamoate (VISTARIL) 50 mg capsule Take 1 capsule (50 mg total) by mouth 3 (three) times a day as needed for itching 1-2 HS, Starting Wed 7/28/2021, Normal      lamoTRIgine (LaMICtal) 100 mg tablet Take 1 tablet (100 mg total) by mouth 2 (two) times a day, Starting Wed 7/28/2021, Print      !! levothyroxine 25 mcg tablet Take 1 tablet (25 mcg total) by mouth daily, Starting Wed 2/10/2021, Normal      !! levothyroxine 50 mcg tablet Take 1 tablet (50 mcg total) by mouth daily, Starting Tue 6/22/2021, Normal      NARCAN 4 MG/0 1ML LIQD ADMINISTER A SINGLE spray INTO ONE NOSTRIL CALL 911 MAY REPEAT ONCE, Historical Med      omeprazole (PriLOSEC) 20 mg delayed release capsule Take 1 capsule (20 mg total) by mouth daily, Starting Sun 1/3/2021, Normal      Promethazine-DM (PHENERGAN-DM) 6 25-15 mg/5 mL oral syrup Take 5 mL by mouth 4 (four) times a day as needed for cough, Starting Fri 5/29/2020, Normal      tiZANidine (ZANAFLEX) 4 mg tablet TAKE ONE TABLET BY MOUTH EVERY 8 HOURS AS NEEDED FOR MUSCLE SPASMS, Normal      topiramate (TOPAMAX) 100 mg tablet Take 1 tablet (100 mg total) by mouth 3 (three) times a day, Starting Tue 6/22/2021, Normal      traZODone (DESYREL) 50 mg tablet 1-2 HS, Normal       !! - Potential duplicate medications found  Please discuss with provider  No discharge procedures on file      PDMP Review     None          ED Provider  Electronically Signed by           Cherylene Filbert, PA-C  08/03/21 7233

## 2021-08-19 ENCOUNTER — HOSPITAL ENCOUNTER (EMERGENCY)
Facility: HOSPITAL | Age: 36
Discharge: HOME | End: 2021-08-19
Attending: EMERGENCY MEDICINE
Payer: MEDICARE

## 2021-08-19 VITALS
SYSTOLIC BLOOD PRESSURE: 112 MMHG | HEART RATE: 95 BPM | OXYGEN SATURATION: 100 % | WEIGHT: 191.4 LBS | DIASTOLIC BLOOD PRESSURE: 78 MMHG | RESPIRATION RATE: 18 BRPM | TEMPERATURE: 99.3 F | BODY MASS INDEX: 33.11 KG/M2

## 2021-08-19 DIAGNOSIS — Z76.0 ENCOUNTER FOR MEDICATION REFILL: Primary | ICD-10-CM

## 2021-08-19 DIAGNOSIS — R52 PAIN: ICD-10-CM

## 2021-08-19 DIAGNOSIS — F31.81 BIPOLAR 2 DISORDER (HCC): ICD-10-CM

## 2021-08-19 DIAGNOSIS — F51.01 PRIMARY INSOMNIA: ICD-10-CM

## 2021-08-19 DIAGNOSIS — F43.10 PTSD (POST-TRAUMATIC STRESS DISORDER): ICD-10-CM

## 2021-08-19 PROCEDURE — 99281 EMR DPT VST MAYX REQ PHY/QHP: CPT

## 2021-08-19 PROCEDURE — 99284 EMERGENCY DEPT VISIT MOD MDM: CPT | Performed by: EMERGENCY MEDICINE

## 2021-08-19 RX ORDER — HYDROXYZINE PAMOATE 50 MG/1
50 CAPSULE ORAL 3 TIMES DAILY PRN
Qty: 30 CAPSULE | Refills: 0 | Status: SHIPPED | OUTPATIENT
Start: 2021-08-19 | End: 2021-10-13 | Stop reason: SDUPTHER

## 2021-08-19 RX ORDER — LEVOTHYROXINE SODIUM 0.03 MG/1
25 TABLET ORAL DAILY
Qty: 10 TABLET | Refills: 0 | Status: SHIPPED | OUTPATIENT
Start: 2021-08-19 | End: 2021-11-01 | Stop reason: DRUGHIGH

## 2021-08-19 RX ORDER — GABAPENTIN 600 MG/1
600 TABLET ORAL 3 TIMES DAILY
Qty: 30 TABLET | Refills: 0 | Status: SHIPPED | OUTPATIENT
Start: 2021-08-19 | End: 2021-11-01 | Stop reason: CLARIF

## 2021-08-19 RX ORDER — TOPIRAMATE 100 MG/1
100 TABLET, FILM COATED ORAL 3 TIMES DAILY
Qty: 30 TABLET | Refills: 0 | Status: SHIPPED | OUTPATIENT
Start: 2021-08-19 | End: 2021-10-13 | Stop reason: SDUPTHER

## 2021-08-19 RX ORDER — LEVOTHYROXINE SODIUM 0.05 MG/1
50 TABLET ORAL DAILY
Qty: 10 TABLET | Refills: 0 | Status: SHIPPED | OUTPATIENT
Start: 2021-08-19 | End: 2021-10-13 | Stop reason: SDUPTHER

## 2021-08-19 RX ORDER — BUSPIRONE HYDROCHLORIDE 10 MG/1
20 TABLET ORAL 3 TIMES DAILY
Qty: 60 TABLET | Refills: 0 | Status: SHIPPED | OUTPATIENT
Start: 2021-08-19 | End: 2021-11-01 | Stop reason: ALTCHOICE

## 2021-08-19 NOTE — ED PROVIDER NOTES
History  Chief Complaint   Patient presents with    Medication Refill     Pt came to ER after being brought by  for a "bridge prescription" for suboxone  Pt reports that she was being treated at [facility] but is now going to [facility]  Pt stated "I know you won't give me Suboxone but I will take any opoid " Pt c/o generalized body pain  80-year-old female presents requesting a bridging prescription for her Suboxone  She also states that she is out of all of her psychiatric medications and would like them filled including her Klonopin  Patient reports she is willing to accept any type of opiate prescription if we cannot provide Suboxone  Review of the medical records indicates that the patient overdosed on Klonopin earlier this month  She denies any acute psychiatric issues and is frustrated that she was told to come here for a bridging prescription for her Suboxone after being told that we are unable to provide this for her  Medication Refill  Medications/supplies requested:  Suboxone, klonopin, "all my psych meds"  Reason for request:  Medications ran out  Medications taken before: yes - see home medications    Medical Problem  Location:  Out of her medications   Severity:  Unable to specify  Onset quality:  Gradual  Timing:  Constant  Progression:  Unchanged  Chronicity:  Recurrent  Relieved by:  Nothing  Worsened by:  Nothing  Ineffective treatments:  None tried      Prior to Admission Medications   Prescriptions Last Dose Informant Patient Reported? Taking?    DULoxetine (CYMBALTA) 60 mg delayed release capsule 8/19/2021 at Unknown time  No Yes   Sig: Take 1 capsule (60 mg total) by mouth daily   NARCAN 4 MG/0 1ML LIQD   Yes No   Sig: ADMINISTER A SINGLE spray INTO ONE NOSTRIL CALL 911 MAY REPEAT ONCE   Patient not taking: Reported on 7/28/2021   Promethazine-DM (PHENERGAN-DM) 6 25-15 mg/5 mL oral syrup Not Taking at Unknown time  No No   Sig: Take 5 mL by mouth 4 (four) times a day as needed for cough   Patient not taking: Reported on 2021   albuterol (PROVENTIL HFA,VENTOLIN HFA) 90 mcg/act inhaler   No No   Sig: Inhale 2 puffs every 4 (four) hours as needed for wheezing   buprenorphine-naloxone (SUBOXONE) 2-0 5 mg per SL tablet   Yes No   Sig: place 1 tablet under the tongue and dissolve once daily   busPIRone (BUSPAR) 10 mg tablet   Yes No   Sig: Take 20 mg by mouth 3 (three) times a day   busPIRone (BUSPAR) 10 mg tablet   No No   Sig: Take 2 tablets (20 mg total) by mouth 3 (three) times a day for 10 days   carisoprodol (SOMA) 350 mg tablet Not Taking at Unknown time  No No   Sig: Take 1 tablet (350 mg total) by mouth 3 (three) times a day   Patient not taking: Reported on 2021   chlorproMAZINE (THORAZINE) 25 mg tablet Not Taking at Unknown time  No No   Si tab 3x/day   Patient not taking: Reported on 2021   cloNIDine (CATAPRES) 0 2 mg tablet 2021 at Unknown time  No Yes   Sig: Take 1 tablet (0 2 mg total) by mouth 2 (two) times a day   clonazePAM (KlonoPIN) 1 mg tablet 2021 at Unknown time  No Yes   Sig: Take 1 tablet (1 mg total) by mouth 3 (three) times a day   cyclobenzaprine (FLEXERIL) 10 mg tablet 2021 at Unknown time  No Yes   Sig: TAKE ONE TABLET BY MOUTH THREE TIMES DAILY AS NEEDED FOR MUSCLE SPASMS   fluticasone-salmeterol (ADVAIR, WIXELA) 250-50 mcg/dose inhaler Not Taking at Unknown time  No No   Sig: Inhale 1 puff 2 (two) times a day Rinse mouth after use     Patient not taking: Reported on 2021   gabapentin (NEURONTIN) 600 MG tablet 2021 at Unknown time  No Yes   Sig: Take 1 tablet (600 mg total) by mouth 3 (three) times a day   gabapentin (NEURONTIN) 600 MG tablet   No No   Sig: Take 1 tablet (600 mg total) by mouth 3 (three) times a day for 30 doses   hydrOXYzine pamoate (VISTARIL) 50 mg capsule 2021 at Unknown time  No Yes   Sig: Take 1 capsule (50 mg total) by mouth 3 (three) times a day as needed for itching 1-2 HS hydrOXYzine pamoate (VISTARIL) 50 mg capsule   No No   Sig: Take 1 capsule (50 mg total) by mouth 3 (three) times a day as needed for itching for up to 10 days 1-2 HS   lamoTRIgine (LaMICtal) 100 mg tablet Not Taking at Unknown time  No No   Sig: Take 1 tablet (100 mg total) by mouth 2 (two) times a day   Patient not taking: Reported on 2021   levothyroxine 25 mcg tablet   No No   Sig: Take 1 tablet (25 mcg total) by mouth daily   levothyroxine 25 mcg tablet   No No   Sig: Take 1 tablet (25 mcg total) by mouth daily for 10 days   levothyroxine 50 mcg tablet 2021 at Unknown time  No Yes   Sig: Take 1 tablet (50 mcg total) by mouth daily   levothyroxine 50 mcg tablet   No No   Sig: Take 1 tablet (50 mcg total) by mouth daily for 10 days   naproxen (NAPROSYN) 500 mg tablet Not Taking at Unknown time  No No   Sig: Take 1 tablet (500 mg total) by mouth 2 (two) times a day as needed for mild pain or moderate pain   Patient not taking: Reported on 2021   omeprazole (PriLOSEC) 20 mg delayed release capsule   No No   Sig: Take 1 capsule (20 mg total) by mouth daily   tiZANidine (ZANAFLEX) 4 mg tablet Past Week at Unknown time  No Yes   Sig: TAKE ONE TABLET BY MOUTH EVERY 8 HOURS AS NEEDED FOR MUSCLE SPASMS   topiramate (TOPAMAX) 100 mg tablet 2021 at Unknown time  No Yes   Sig: Take 1 tablet (100 mg total) by mouth 3 (three) times a day   topiramate (TOPAMAX) 100 mg tablet   No No   Sig: Take 1 tablet (100 mg total) by mouth 3 (three) times a day for 10 days   traZODone (DESYREL) 50 mg tablet 2021 at Unknown time  No Yes   Si-2 HS      Facility-Administered Medications: None       Past Medical History:   Diagnosis Date    Anxiety     "severe"    Asthma     Bipolar disorder (HCC)     Cigarette smoker     Disease of thyroid gland     Drug dependence[facility] in [facility] ([facility] )     GERD (gastroesophageal reflux disease)     H/O: whooping cough     while pregnant    Hepatitis C, chronic ([facility] )     Insomnia disorder     Liver disease     Hepatitis C    Migraine     PTSD (post-traumatic stress disorder)        Past Surgical History:   Procedure Laterality Date    OTHER SURGICAL HISTORY      body piercing    [place] REPAIR UMBILICAL RGKS,9+P/M,ZEPUO N/A 4/19/2016    Procedure: REPAIR HERNIA UMBILICAL WITH MESH;  Surgeon: Rachana Parra MD;  Location:  MAIN OR;  Service: General    VAGINAL DELIVERY      X 6    WISDOM TOOTH EXTRACTION      X 4    WOUND DEBRIDEMENT Right 1/17/2018    Procedure: RIGHT KNEE DEBRIDEMENT; [address] OUT; AND LACERATION REPAIR  INTRAOPERATIVE ASSESSMENT OF PATELLA TENDON;  Surgeon: Giovanny Shanks MD;  Location:  MAIN OR;  Service: Orthopedics       Family History   Problem Relation Age of Onset    Hypertension Mother     Thyroid disease Mother     Hypertension Father     Prostate cancer Father     Anxiety disorder Sister      I have reviewed and agree with the history as documented  E-Cigarette/Vaping     E-Cigarette/Vaping Substances     Social History     Tobacco Use    Smoking status: Former Smoker     Packs/day: 0 25     Years: 12 00     Pack years: 3 00     Types: Cigarettes    Smokeless tobacco: Never Used    Tobacco comment: Vaping   Substance Use Topics    Alcohol use: Not Currently    Drug use: Not Currently     Types: Methadone, Heroin, Cocaine, Marijuana, Methamphetamines, Prescription     Comment: 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       Review of Systems   All other systems reviewed and are negative  Physical Exam  Physical Exam  Vitals and nursing note reviewed  Constitutional:       General: She is not in acute distress  Appearance: Normal appearance  She is well-developed  She is not ill-appearing or toxic-appearing  HENT:      Head: Normocephalic and atraumatic  Right Ear: External ear normal       Left Ear: External ear normal       Nose: Nose normal  No congestion        Mouth/Throat:      Mouth: Mucous membranes are moist  Pharynx: Oropharynx is clear  Eyes:      Conjunctiva/sclera: Conjunctivae normal       Pupils: Pupils are equal, round, and reactive to light  Cardiovascular:      Rate and Rhythm: Normal rate and regular rhythm  Heart sounds: Normal heart sounds  Pulmonary:      Effort: Pulmonary effort is normal  No respiratory distress  Breath sounds: Normal breath sounds  No wheezing  Abdominal:      General: Bowel sounds are normal       Palpations: Abdomen is soft  Tenderness: There is no abdominal tenderness  There is no guarding  Musculoskeletal:         General: No tenderness or deformity  Cervical back: Normal range of motion and neck supple  Skin:     General: Skin is warm and dry  Capillary Refill: Capillary refill takes less than 2 seconds  Findings: No rash  Neurological:      General: No focal deficit present  Mental Status: She is alert and oriented to person, place, and time  Psychiatric:         Mood and Affect: Mood normal          Behavior: Behavior normal          Vital Signs  ED Triage Vitals [08/19/21 1334]   Temperature Pulse Respirations Blood Pressure SpO2   99 3 °F (37 4 °C) 95 18 112/78 100 %      Temp Source Heart Rate Source Patient Position - Orthostatic VS BP Location FiO2 (%)   Oral Monitor -- -- --      Pain Score       --           Vitals:    08/19/21 1334   BP: 112/78   Pulse: 95         Visual Acuity      ED Medications  Medications - No data to display    Diagnostic Studies  Results Reviewed     None                 No orders to display              Procedures  Procedures         ED Course                             SBIRT 22yo+      Most Recent Value   SBIRT (24 yo +)   In order to provide better care to our patients, we are screening all of our patients for alcohol and drug use  Would it be okay to ask you these screening questions?   No Filed at: 08/19/2021 1352                    [facility]    Disposition  Final diagnoses:   Encounter for medication refill     Time reflects when diagnosis was documented in both MDM as applicable and the Disposition within this note     Time User Action Codes Description Comment    8/19/2021  1:44 PM Lila Forth Add [Z76 0] Encounter for medication refill     8/19/2021  1:55 PM Lila Forth Add [R52] Pain     8/19/2021  1:56 PM Lila Forth Add [F43 10] PTSD (post-traumatic stress disorder)     8/19/2021  1:56 PM Lila Forth Modify [F43 10] PTSD (post-traumatic stress disorder)     8/19/2021  1:56 PM Lila Forth Modify [F43 10] PTSD (post-traumatic stress disorder)     8/19/2021  1:56 PM Lila Forth Modify [F43 10] PTSD (post-traumatic stress disorder)     8/19/2021  1:56 PM Lila Forth Add [F51 01] Primary insomnia     8/19/2021  1:57 PM Lila Forth Modify [F43 10] PTSD (post-traumatic stress disorder)     8/19/2021  1:57 PM Lila Forth Modify [F43 10] PTSD (post-traumatic stress disorder)     8/19/2021  1:57 PM Lila Forth Add [F31 81] Bipolar 2 disorder [facility])       ED Disposition     ED Disposition Condition Date/Time Comment    Discharge Stable Thu Aug 19, 2021  1:44 PM CHER Courtney discharge to home/self care              Follow-up Information    None         Discharge Medication List as of 8/19/2021  1:58 PM      CONTINUE these medications which have CHANGED    Details   gabapentin (NEURONTIN) 600 MG tablet Take 1 tablet (600 mg total) by mouth 3 (three) times a day for 30 doses, Starting u 8/19/2021, Until Sun 8/29/2021, Print      hydrOXYzine pamoate (VISTARIL) 50 mg capsule Take 1 capsule (50 mg total) by mouth 3 (three) times a day as needed for itching for up to 10 days 1-2 HS, Starting Thu 8/19/2021, Until Sun 8/29/2021 at 2359, Print      !! levothyroxine 25 mcg tablet Take 1 tablet (25 mcg total) by mouth daily for 10 days, Starting Thu 8/19/2021, Until Sun 8/29/2021, Print      !! levothyroxine 50 mcg tablet Take 1 tablet (50 mcg total) by mouth daily for 10 days, Starting Thu 8/19/2021, Until Sun 8/29/2021, Print      topiramate (TOPAMAX) 100 mg tablet Take 1 tablet (100 mg total) by mouth 3 (three) times a day for 10 days, Starting Thu 8/19/2021, Until Sun 8/29/2021, Print       !! - Potential duplicate medications found  Please discuss with provider        CONTINUE these medications which have NOT CHANGED    Details   albuterol (PROVENTIL HFA,VENTOLIN HFA) 90 mcg/act inhaler Inhale 2 puffs every 4 (four) hours as needed for wheezing, Starting Mon 3/1/2021, Normal      buprenorphine-naloxone (SUBOXONE) 2-0 5 mg per SL tablet place 1 tablet under the tongue and dissolve once daily, Historical Med      carisoprodol (SOMA) 350 mg tablet Take 1 tablet (350 mg total) by mouth 3 (three) times a day, Starting Wed 7/28/2021, Normal      chlorproMAZINE (THORAZINE) 25 mg tablet 1 tab 3x/day, Normal      clonazePAM (KlonoPIN) 1 mg tablet Take 1 tablet (1 mg total) by mouth 3 (three) times a day, Starting Tue 6/22/2021, Normal      cloNIDine (CATAPRES) 0 2 mg tablet Take 1 tablet (0 2 mg total) by mouth 2 (two) times a day, Starting Sun 2/28/2021, Normal      cyclobenzaprine (FLEXERIL) 10 mg tablet TAKE ONE TABLET BY MOUTH THREE TIMES DAILY AS NEEDED FOR MUSCLE SPASMS, Normal      DULoxetine (CYMBALTA) 60 mg delayed release capsule Take 1 capsule (60 mg total) by mouth daily, Starting Tue 1/14/2020, Normal      fluticasone-salmeterol (ADVAIR, WIXELA) 250-50 mcg/dose inhaler Inhale 1 puff 2 (two) times a day Rinse mouth after use , Starting Tue 5/18/2021, Normal      lamoTRIgine (LaMICtal) 100 mg tablet Take 1 tablet (100 mg total) by mouth 2 (two) times a day, Starting Wed 7/28/2021, Print      naproxen (NAPROSYN) 500 mg tablet Take 1 tablet (500 mg total) by mouth 2 (two) times a day as needed for mild pain or moderate pain, Starting Mon 8/2/2021, Normal      NARCAN 4 MG/0 1ML LIQD ADMINISTER A SINGLE spray INTO ONE NOSTRIL CALL 911 MAY REPEAT ONCE, Historical Med      omeprazole (PriLOSEC) 20 mg delayed release capsule Take 1 capsule (20 mg total) by mouth daily, Starting Sun 1/3/2021, Normal      Promethazine-DM (PHENERGAN-DM) 6 25-15 mg/5 mL oral syrup Take 5 mL by mouth 4 (four) times a day as needed for cough, Starting Fri 5/29/2020, Normal      tiZANidine (ZANAFLEX) 4 mg tablet TAKE ONE TABLET BY MOUTH EVERY 8 HOURS AS NEEDED FOR MUSCLE SPASMS, Normal      traZODone (DESYREL) 50 mg tablet 1-2 HS, Normal      busPIRone (BUSPAR) 10 mg tablet Take 20 mg by mouth 3 (three) times a day, Starting Mon 7/19/2021, Historical Med           No discharge procedures on file      PDMP Review     None          ED Provider  Electronically Signed by           Ifeanyi Townsend DO  08/19/21 7871

## 2021-08-30 ENCOUNTER — HOSPITAL ENCOUNTER (EMERGENCY)
Facility: HOSPITAL | Age: 36
Discharge: HOME | End: 2021-08-30
Attending: EMERGENCY MEDICINE | Admitting: EMERGENCY MEDICINE
Payer: MEDICARE

## 2021-08-30 VITALS
SYSTOLIC BLOOD PRESSURE: 120 MMHG | WEIGHT: 192.5 LBS | OXYGEN SATURATION: 98 % | RESPIRATION RATE: 18 BRPM | TEMPERATURE: 98.6 F | BODY MASS INDEX: 33.3 KG/M2 | HEART RATE: 103 BPM | DIASTOLIC BLOOD PRESSURE: 77 MMHG

## 2021-08-30 DIAGNOSIS — Z20.822 PERSON UNDER INVESTIGATION FOR COVID-19: Primary | ICD-10-CM

## 2021-08-30 LAB — SARS-COV-2 RNA RESP QL NAA+PROBE: NEGATIVE

## 2021-08-30 PROCEDURE — 99282 EMERGENCY DEPT VISIT SF MDM: CPT

## 2021-08-30 PROCEDURE — U0005 INFEC AGEN DETEC AMPLI PROBE: HCPCS | Performed by: EMERGENCY MEDICINE

## 2021-08-30 PROCEDURE — 99281 EMR DPT VST MAYX REQ PHY/QHP: CPT | Performed by: EMERGENCY MEDICINE

## 2021-08-30 PROCEDURE — U0003 INFECTIOUS AGENT DETECTION BY NUCLEIC ACID (DNA OR RNA); SEVERE ACUTE RESPIRATORY SYNDROME CORONAVIRUS 2 (SARS-COV-2) (CORONAVIRUS DISEASE [COVID-19]), AMPLIFIED PROBE TECHNIQUE, MAKING USE OF HIGH THROUGHPUT TECHNOLOGIES AS DESCRIBED BY CMS-2020-01-R: HCPCS | Performed by: EMERGENCY MEDICINE

## 2021-08-30 NOTE — ED PROVIDER NOTES
HPI: Patient is a 30 y o  female who presents with 0 days of no symptoms, requires screening which the patient describes at mild The patient has had contact with people with similar symptoms  The patient has not taken any medication  Allergies   Allergen Reactions    Amoxicillin      Pt states it never works for her        Past Medical History:   Diagnosis Date    Anxiety     "severe"    Asthma     Bipolar disorder ([facility] )     Cigarette smoker     Disease of thyroid gland     Drug dependence, in remission ([facility] )     GERD (gastroesophageal reflux disease)     H/O: whooping cough     while pregnant    Hepatitis C, chronic (HCC)     Insomnia disorder     Liver disease     Hepatitis C    Migraine     PTSD (post-traumatic stress disorder)       Past Surgical History:   Procedure Laterality Date    OTHER SURGICAL HISTORY      body piercing    [place] REPAIR UMBILICAL DBGX,7+Q/Y,STKJB N/A 4/19/2016    Procedure: REPAIR HERNIA UMBILICAL WITH MESH;  Surgeon: Syeda Steel MD;  Location:  MAIN OR;  Service: General    VAGINAL DELIVERY      X 6    WISDOM TOOTH EXTRACTION      X 4    WOUND DEBRIDEMENT Right 1/17/2018    Procedure: RIGHT KNEE DEBRIDEMENT; [address] OUT; AND LACERATION REPAIR  INTRAOPERATIVE ASSESSMENT OF PATELLA TENDON;  Surgeon: Adriano Coffman MD;  Location:  MAIN OR;  Service: Orthopedics     Social History     Tobacco Use    Smoking status: Former Smoker     Packs/day: 0 25     Years: 12 00     Pack years: 3 00     Types: Cigarettes    Smokeless tobacco: Never Used    Tobacco comment: Vaping   Substance Use Topics    Alcohol use: Not Currently    Drug use: Not Currently     Types: Methadone, Heroin, Cocaine, Marijuana, Methamphetamines, Prescription     Comment: patient in recovery   clean since oct of 2019       Nursing notes reviewed  Physical Exam:  ED Triage Vitals [08/30/21 1320]   Temperature Pulse Respirations Blood Pressure SpO2   98 6 °F (37 °C) 103 18 120/77 98 %      Temp Source Heart Rate Source Patient Position - Orthostatic VS BP Location FiO2 (%)   Tympanic Monitor Standing Left arm --      Pain Score       --           ROS: Positive for no symptoms, the remainder of a 10 organ system ROS was otherwise unremarkable  General: awake, alert, no acute distress    Head: normocephalic, atraumatic    Eyes: no scleral icterus  Ears: external ears normal, hearing grossly intact  Nose: external exam grossly normal, negative nasal discharge  Neck: symmetric, No JVD noted, trachea midline  Pulmonary: no respiratory distress, no tachypnea noted  Cardiovascular: appears well perfused  Abdomen: no distention noted  Musculoskeletal: no deformities noted, tone normal  Neuro: grossly non-focal  Psych: mood and affect appropriate    The patient is stable and has a history and physical exam consistent with a viral illness  COVID19 testing has been performed  I considered the patient's other medical conditions as applicable/noted above in my medical decision making  The patient is stable upon discharge  The plan is for supportive care at home  The patient (and any family present) verbalized understanding of the discharge instructions and warnings that would necessitate return to the Emergency Department  All questions were answered prior to discharge  Medications - No data to display  Final diagnoses:   Person under investigation for COVID-19     Time reflects when diagnosis was documented in both MDM as applicable and the Disposition within this note     Time User Action Codes Description Comment    8/30/2021  1:36 PM Paris Connolly Add [Z20 822] Person under investigation for COVID-19       ED Disposition     ED Disposition Condition Date/Time Comment    Discharge Stable Mon Aug 30, 2021  1:36 PM [facility] Courtney discharge to home/self care              Follow-up Information     Follow up With Specialties Details Why Contact Info    Bruno Hedrick MD Family Medicine   [address]  [facility]  [address] [place] [address] [address]          Discharge Medication List as of 8/30/2021  1:37 PM      CONTINUE these medications which have NOT CHANGED    Details   albuterol (PROVENTIL HFA,VENTOLIN HFA) 90 mcg/act inhaler Inhale 2 puffs every 4 (four) hours as needed for wheezing, Starting Mon 3/1/2021, Normal      buprenorphine-naloxone (SUBOXONE) 2-0 5 mg per SL tablet place 1 tablet under the tongue and dissolve once daily, Historical Med      busPIRone (BUSPAR) 10 mg tablet Take 2 tablets (20 mg total) by mouth 3 (three) times a day for 10 days, Starting Thu 8/19/2021, Until Sun 8/29/2021, Print      carisoprodol (SOMA) 350 mg tablet Take 1 tablet (350 mg total) by mouth 3 (three) times a day, Starting Wed 7/28/2021, Normal      chlorproMAZINE (THORAZINE) 25 mg tablet 1 tab 3x/day, Normal      clonazePAM (KlonoPIN) 1 mg tablet take 1 tablet by mouth three times a day, Normal      cloNIDine (CATAPRES) 0 2 mg tablet Take 1 tablet (0 2 mg total) by mouth 2 (two) times a day, Starting Sun 2/28/2021, Normal      cyclobenzaprine (FLEXERIL) 10 mg tablet TAKE ONE TABLET BY MOUTH THREE TIMES DAILY AS NEEDED FOR MUSCLE SPASMS, Normal      DULoxetine (CYMBALTA) 60 mg delayed release capsule Take 1 capsule (60 mg total) by mouth daily, Starting Tue 1/14/2020, Normal      fluticasone-salmeterol (ADVAIR, WIXELA) 250-50 mcg/dose inhaler Inhale 1 puff 2 (two) times a day Rinse mouth after use , Starting Tue 5/18/2021, Normal      gabapentin (NEURONTIN) 600 MG tablet Take 1 tablet (600 mg total) by mouth 3 (three) times a day for 30 doses, Starting Thu 8/19/2021, Until Sun 8/29/2021, Print      hydrOXYzine pamoate (VISTARIL) 50 mg capsule Take 1 capsule (50 mg total) by mouth 3 (three) times a day as needed for itching for up to 10 days 1-2 HS, Starting Thu 8/19/2021, Until Sun 8/29/2021 at 2359, Print      lamoTRIgine (LaMICtal) 100 mg tablet Take 1 tablet (100 mg total) by mouth 2 (two) times a day, Starting Wed 7/28/2021, Print      levothyroxine 25 mcg tablet Take 1 tablet (25 mcg total) by mouth daily for 10 days, Starting Thu 8/19/2021, Until Sun 8/29/2021, Print      levothyroxine 50 mcg tablet Take 1 tablet (50 mcg total) by mouth daily for 10 days, Starting Thu 8/19/2021, Until Sun 8/29/2021, Print      naproxen (NAPROSYN) 500 mg tablet Take 1 tablet (500 mg total) by mouth 2 (two) times a day as needed for mild pain or moderate pain, Starting Mon 8/2/2021, Normal      NARCAN 4 MG/0 1ML LIQD ADMINISTER A SINGLE spray INTO ONE NOSTRIL CALL 911 MAY REPEAT ONCE, Historical Med      omeprazole (PriLOSEC) 20 mg delayed release capsule Take 1 capsule (20 mg total) by mouth daily, Starting Sun 1/3/2021, Normal      Promethazine-DM (PHENERGAN-DM) 6 25-15 mg/5 mL oral syrup Take 5 mL by mouth 4 (four) times a day as needed for cough, Starting Fri 5/29/2020, Normal      tiZANidine (ZANAFLEX) 4 mg tablet TAKE ONE TABLET BY MOUTH EVERY 8 HOURS AS NEEDED FOR MUSCLE SPASMS, Normal      topiramate (TOPAMAX) 100 mg tablet Take 1 tablet (100 mg total) by mouth 3 (three) times a day for 10 days, Starting Thu 8/19/2021, Until Sun 8/29/2021, Print      traZODone (DESYREL) 50 mg tablet 1-2 HS, Normal           No discharge procedures on file      Electronically Signed by       Grace Garcia DO  08/30/21 0968

## 2021-08-30 NOTE — DISCHARGE INSTRUCTIONS

## 2021-08-31 ENCOUNTER — TELEPHONE (OUTPATIENT)
Dept: EMERGENCY DEPT | Facility: HOSPITAL | Age: 36
End: 2021-08-31

## 2021-10-13 DIAGNOSIS — F51.01 PRIMARY INSOMNIA: ICD-10-CM

## 2021-10-13 DIAGNOSIS — F31.81 BIPOLAR 2 DISORDER (HCC): ICD-10-CM

## 2021-10-13 DIAGNOSIS — F43.10 PTSD (POST-TRAUMATIC STRESS DISORDER): ICD-10-CM

## 2021-10-13 DIAGNOSIS — J45.909 ASTHMA, UNSPECIFIED ASTHMA SEVERITY, UNSPECIFIED WHETHER COMPLICATED, UNSPECIFIED WHETHER PERSISTENT: ICD-10-CM

## 2021-10-15 DIAGNOSIS — F43.10 PTSD (POST-TRAUMATIC STRESS DISORDER): ICD-10-CM

## 2021-10-15 DIAGNOSIS — F51.01 PRIMARY INSOMNIA: ICD-10-CM

## 2021-10-15 DIAGNOSIS — F31.81 BIPOLAR 2 DISORDER (HCC): ICD-10-CM

## 2021-10-15 RX ORDER — HYDROXYZINE PAMOATE 50 MG/1
50 CAPSULE ORAL 3 TIMES DAILY PRN
Qty: 30 CAPSULE | Refills: 0 | Status: SHIPPED | OUTPATIENT
Start: 2021-10-15 | End: 2021-10-15 | Stop reason: SDUPTHER

## 2021-10-15 RX ORDER — TRAZODONE HYDROCHLORIDE 50 MG/1
TABLET ORAL
Qty: 180 TABLET | Refills: 3 | Status: SHIPPED | OUTPATIENT
Start: 2021-10-15 | End: 2022-08-05 | Stop reason: SDUPTHER

## 2021-10-15 RX ORDER — LEVOTHYROXINE SODIUM 0.05 MG/1
TABLET ORAL
Qty: 30 TABLET | Refills: 5 | Status: SHIPPED | OUTPATIENT
Start: 2021-10-15 | End: 2022-05-23 | Stop reason: SDUPTHER

## 2021-10-15 RX ORDER — TOPIRAMATE 100 MG/1
100 TABLET, FILM COATED ORAL 3 TIMES DAILY
Qty: 90 TABLET | Refills: 5 | Status: SHIPPED | OUTPATIENT
Start: 2021-10-15 | End: 2021-10-15 | Stop reason: SDUPTHER

## 2021-10-15 RX ORDER — HYDROXYZINE PAMOATE 50 MG/1
CAPSULE ORAL
Qty: 60 CAPSULE | Refills: 5 | Status: SHIPPED | OUTPATIENT
Start: 2021-10-15 | End: 2022-01-28 | Stop reason: HOSPADM

## 2021-10-15 RX ORDER — CYCLOBENZAPRINE HCL 10 MG
10 TABLET ORAL 3 TIMES DAILY PRN
Qty: 90 TABLET | Refills: 3 | Status: SHIPPED | OUTPATIENT
Start: 2021-10-15 | End: 2022-01-31 | Stop reason: SDUPTHER

## 2021-10-15 RX ORDER — LEVOTHYROXINE SODIUM 0.05 MG/1
50 TABLET ORAL DAILY
Qty: 30 TABLET | Refills: 5 | Status: SHIPPED | OUTPATIENT
Start: 2021-10-15 | End: 2021-10-15 | Stop reason: SDUPTHER

## 2021-10-15 RX ORDER — TOPIRAMATE 100 MG/1
100 TABLET, FILM COATED ORAL 3 TIMES DAILY
Qty: 90 TABLET | Refills: 5 | Status: SHIPPED | OUTPATIENT
Start: 2021-10-15 | End: 2022-08-03

## 2021-11-01 ENCOUNTER — OFFICE VISIT (OUTPATIENT)
Dept: FAMILY MEDICINE CLINIC | Facility: CLINIC | Age: 36
End: 2021-11-01

## 2021-11-01 VITALS
SYSTOLIC BLOOD PRESSURE: 122 MMHG | DIASTOLIC BLOOD PRESSURE: 70 MMHG | HEART RATE: 88 BPM | WEIGHT: 176 LBS | BODY MASS INDEX: 30.05 KG/M2 | HEIGHT: 64 IN | RESPIRATION RATE: 16 BRPM | OXYGEN SATURATION: 98 %

## 2021-11-01 DIAGNOSIS — R52 PAIN: Primary | ICD-10-CM

## 2021-11-01 DIAGNOSIS — F41.9 ANXIETY: ICD-10-CM

## 2021-11-01 DIAGNOSIS — K21.00 GASTROESOPHAGEAL REFLUX DISEASE WITH ESOPHAGITIS: ICD-10-CM

## 2021-11-01 DIAGNOSIS — F19.21 DRUG DEPENDENCE, IN REMISSION (HCC): ICD-10-CM

## 2021-11-01 PROCEDURE — 99213 OFFICE O/P EST LOW 20 MIN: CPT | Performed by: FAMILY MEDICINE

## 2021-11-01 RX ORDER — OMEPRAZOLE 20 MG/1
20 CAPSULE, DELAYED RELEASE ORAL DAILY
Qty: 90 CAPSULE | Refills: 3 | Status: SHIPPED | OUTPATIENT
Start: 2021-11-01

## 2021-11-01 RX ORDER — IBUPROFEN 800 MG/1
800 TABLET ORAL EVERY 8 HOURS PRN
Qty: 90 TABLET | Refills: 3 | Status: SHIPPED | OUTPATIENT
Start: 2021-11-01

## 2022-01-25 ENCOUNTER — HOSPITAL ENCOUNTER (INPATIENT)
Facility: HOSPITAL | Age: 37
LOS: 3 days | Discharge: HOME | DRG: 751 | End: 2022-01-28
Attending: EMERGENCY MEDICINE | Admitting: STUDENT IN AN ORGANIZED HEALTH CARE EDUCATION/TRAINING PROGRAM
Payer: COMMERCIAL

## 2022-01-25 DIAGNOSIS — Z00.8 MEDICAL CLEARANCE FOR PSYCHIATRIC ADMISSION: ICD-10-CM

## 2022-01-25 DIAGNOSIS — F41.9 ANXIETY: Primary | ICD-10-CM

## 2022-01-25 DIAGNOSIS — F11.20 OPIOID USE DISORDER, MODERATE, ON MAINTENANCE THERAPY (HCC): ICD-10-CM

## 2022-01-25 DIAGNOSIS — F33.3 MDD (MAJOR DEPRESSIVE DISORDER), RECURRENT, SEVERE, WITH PSYCHOSIS (HCC): ICD-10-CM

## 2022-01-25 DIAGNOSIS — G62.9 NEUROPATHY: ICD-10-CM

## 2022-01-25 DIAGNOSIS — G43.909 MIGRAINES: ICD-10-CM

## 2022-01-25 DIAGNOSIS — F32.A DEPRESSION: ICD-10-CM

## 2022-01-25 PROBLEM — F29 PSYCHOSIS (HCC): Status: ACTIVE | Noted: 2022-01-25

## 2022-01-25 PROBLEM — K21.9 GERD (GASTROESOPHAGEAL REFLUX DISEASE): Status: ACTIVE | Noted: 2022-01-25

## 2022-01-25 LAB
AMPHETAMINES SERPL QL SCN: NEGATIVE
BARBITURATES UR QL: NEGATIVE
BENZODIAZ UR QL: NEGATIVE
COCAINE UR QL: NEGATIVE
ETHANOL EXG-MCNC: 0 MG/DL
EXT PREG TEST URINE: NEGATIVE
EXT. CONTROL ED NAV: NORMAL
FLUAV RNA RESP QL NAA+PROBE: NEGATIVE
FLUBV RNA RESP QL NAA+PROBE: NEGATIVE
METHADONE UR QL: NEGATIVE
OPIATES UR QL SCN: NEGATIVE
OXYCODONE+OXYMORPHONE UR QL SCN: NEGATIVE
PCP UR QL: NEGATIVE
RSV RNA RESP QL NAA+PROBE: NEGATIVE
SARS-COV-2 RNA RESP QL NAA+PROBE: NEGATIVE
THC UR QL: POSITIVE

## 2022-01-25 PROCEDURE — 0241U HB NFCT DS VIR RESP RNA 4 TRGT: CPT | Performed by: EMERGENCY MEDICINE

## 2022-01-25 PROCEDURE — 80307 DRUG TEST PRSMV CHEM ANLYZR: CPT | Performed by: EMERGENCY MEDICINE

## 2022-01-25 PROCEDURE — 99253 IP/OBS CNSLTJ NEW/EST LOW 45: CPT | Performed by: STUDENT IN AN ORGANIZED HEALTH CARE EDUCATION/TRAINING PROGRAM

## 2022-01-25 PROCEDURE — 82075 ASSAY OF BREATH ETHANOL: CPT | Performed by: EMERGENCY MEDICINE

## 2022-01-25 PROCEDURE — 99285 EMERGENCY DEPT VISIT HI MDM: CPT

## 2022-01-25 PROCEDURE — 81025 URINE PREGNANCY TEST: CPT | Performed by: EMERGENCY MEDICINE

## 2022-01-25 PROCEDURE — 99284 EMERGENCY DEPT VISIT MOD MDM: CPT | Performed by: EMERGENCY MEDICINE

## 2022-01-25 RX ORDER — BENZTROPINE MESYLATE 1 MG/1
1 TABLET ORAL
Status: DISCONTINUED | OUTPATIENT
Start: 2022-01-25 | End: 2022-01-28 | Stop reason: HOSPADM

## 2022-01-25 RX ORDER — OLANZAPINE 10 MG/1
5 INJECTION, POWDER, LYOPHILIZED, FOR SOLUTION INTRAMUSCULAR
Status: DISCONTINUED | OUTPATIENT
Start: 2022-01-25 | End: 2022-01-28 | Stop reason: HOSPADM

## 2022-01-25 RX ORDER — CYCLOBENZAPRINE HCL 10 MG
10 TABLET ORAL 3 TIMES DAILY PRN
Status: DISCONTINUED | OUTPATIENT
Start: 2022-01-25 | End: 2022-01-28 | Stop reason: HOSPADM

## 2022-01-25 RX ORDER — ACETAMINOPHEN 325 MG/1
650 TABLET ORAL EVERY 4 HOURS PRN
Status: DISCONTINUED | OUTPATIENT
Start: 2022-01-25 | End: 2022-01-28 | Stop reason: HOSPADM

## 2022-01-25 RX ORDER — LORAZEPAM 0.5 MG/1
0.5 TABLET ORAL EVERY 6 HOURS PRN
Status: DISCONTINUED | OUTPATIENT
Start: 2022-01-25 | End: 2022-01-28 | Stop reason: HOSPADM

## 2022-01-25 RX ORDER — POLYETHYLENE GLYCOL 3350 17 G/17G
17 POWDER, FOR SOLUTION ORAL DAILY PRN
Status: DISCONTINUED | OUTPATIENT
Start: 2022-01-25 | End: 2022-01-28 | Stop reason: HOSPADM

## 2022-01-25 RX ORDER — HYDROXYZINE 50 MG/1
50 TABLET, FILM COATED ORAL
Status: DISCONTINUED | OUTPATIENT
Start: 2022-01-25 | End: 2022-01-28 | Stop reason: HOSPADM

## 2022-01-25 RX ORDER — ACETAMINOPHEN 325 MG/1
975 TABLET ORAL EVERY 6 HOURS PRN
Status: DISCONTINUED | OUTPATIENT
Start: 2022-01-25 | End: 2022-01-28 | Stop reason: HOSPADM

## 2022-01-25 RX ORDER — MAGNESIUM HYDROXIDE/ALUMINUM HYDROXICE/SIMETHICONE 120; 1200; 1200 MG/30ML; MG/30ML; MG/30ML
30 SUSPENSION ORAL EVERY 4 HOURS PRN
Status: DISCONTINUED | OUTPATIENT
Start: 2022-01-25 | End: 2022-01-28 | Stop reason: HOSPADM

## 2022-01-25 RX ORDER — AMOXICILLIN 250 MG
1 CAPSULE ORAL DAILY PRN
Status: DISCONTINUED | OUTPATIENT
Start: 2022-01-25 | End: 2022-01-28 | Stop reason: HOSPADM

## 2022-01-25 RX ORDER — OLANZAPINE 2.5 MG/1
2.5 TABLET ORAL
Status: DISCONTINUED | OUTPATIENT
Start: 2022-01-25 | End: 2022-01-28 | Stop reason: HOSPADM

## 2022-01-25 RX ORDER — ALBUTEROL SULFATE 90 UG/1
2 AEROSOL, METERED RESPIRATORY (INHALATION) EVERY 4 HOURS PRN
Status: DISCONTINUED | OUTPATIENT
Start: 2022-01-25 | End: 2022-01-28 | Stop reason: HOSPADM

## 2022-01-25 RX ORDER — DIPHENHYDRAMINE HYDROCHLORIDE 50 MG/ML
50 INJECTION INTRAMUSCULAR; INTRAVENOUS EVERY 6 HOURS PRN
Status: DISCONTINUED | OUTPATIENT
Start: 2022-01-25 | End: 2022-01-28 | Stop reason: HOSPADM

## 2022-01-25 RX ORDER — ACETAMINOPHEN 325 MG/1
650 TABLET ORAL EVERY 6 HOURS PRN
Status: DISCONTINUED | OUTPATIENT
Start: 2022-01-25 | End: 2022-01-28 | Stop reason: HOSPADM

## 2022-01-25 RX ORDER — LORAZEPAM 1 MG/1
2 TABLET ORAL ONCE
Status: COMPLETED | OUTPATIENT
Start: 2022-01-25 | End: 2022-01-25

## 2022-01-25 RX ORDER — BENZTROPINE MESYLATE 1 MG/ML
1 INJECTION INTRAMUSCULAR; INTRAVENOUS
Status: DISCONTINUED | OUTPATIENT
Start: 2022-01-25 | End: 2022-01-28 | Stop reason: HOSPADM

## 2022-01-25 RX ORDER — NICOTINE 21 MG/24HR
14 PATCH, TRANSDERMAL 24 HOURS TRANSDERMAL DAILY PRN
Status: DISCONTINUED | OUTPATIENT
Start: 2022-01-25 | End: 2022-01-25

## 2022-01-25 RX ORDER — HYDROXYZINE HYDROCHLORIDE 25 MG/1
25 TABLET, FILM COATED ORAL
Status: DISCONTINUED | OUTPATIENT
Start: 2022-01-25 | End: 2022-01-28 | Stop reason: HOSPADM

## 2022-01-25 RX ORDER — OLANZAPINE 10 MG/1
10 INJECTION, POWDER, LYOPHILIZED, FOR SOLUTION INTRAMUSCULAR
Status: DISCONTINUED | OUTPATIENT
Start: 2022-01-25 | End: 2022-01-28 | Stop reason: HOSPADM

## 2022-01-25 RX ORDER — BUPRENORPHINE AND NALOXONE 8; 2 MG/1; MG/1
8 FILM, SOLUBLE BUCCAL; SUBLINGUAL DAILY
Status: DISCONTINUED | OUTPATIENT
Start: 2022-01-25 | End: 2022-01-25

## 2022-01-25 RX ORDER — OLANZAPINE 5 MG/1
5 TABLET ORAL
Status: DISCONTINUED | OUTPATIENT
Start: 2022-01-25 | End: 2022-01-28 | Stop reason: HOSPADM

## 2022-01-25 RX ORDER — TRAZODONE HYDROCHLORIDE 50 MG/1
50 TABLET ORAL
Status: DISCONTINUED | OUTPATIENT
Start: 2022-01-25 | End: 2022-01-28 | Stop reason: HOSPADM

## 2022-01-25 RX ORDER — MINERAL OIL AND PETROLATUM 150; 830 MG/G; MG/G
1 OINTMENT OPHTHALMIC
Status: DISCONTINUED | OUTPATIENT
Start: 2022-01-25 | End: 2022-01-28 | Stop reason: HOSPADM

## 2022-01-25 RX ORDER — LEVOTHYROXINE SODIUM 0.05 MG/1
50 TABLET ORAL
Status: DISCONTINUED | OUTPATIENT
Start: 2022-01-25 | End: 2022-01-28 | Stop reason: HOSPADM

## 2022-01-25 RX ORDER — OLANZAPINE 10 MG/1
10 TABLET ORAL
Status: DISCONTINUED | OUTPATIENT
Start: 2022-01-25 | End: 2022-01-28 | Stop reason: HOSPADM

## 2022-01-25 RX ORDER — HYDROXYZINE 50 MG/1
100 TABLET, FILM COATED ORAL
Status: DISCONTINUED | OUTPATIENT
Start: 2022-01-25 | End: 2022-01-28 | Stop reason: HOSPADM

## 2022-01-25 RX ORDER — LORAZEPAM 2 MG/ML
2 INJECTION INTRAMUSCULAR EVERY 6 HOURS PRN
Status: DISCONTINUED | OUTPATIENT
Start: 2022-01-25 | End: 2022-01-28 | Stop reason: HOSPADM

## 2022-01-25 RX ORDER — HALOPERIDOL 5 MG
10 TABLET ORAL ONCE
Status: COMPLETED | OUTPATIENT
Start: 2022-01-25 | End: 2022-01-25

## 2022-01-25 RX ORDER — BUPRENORPHINE AND NALOXONE 8; 2 MG/1; MG/1
8 FILM, SOLUBLE BUCCAL; SUBLINGUAL 2 TIMES DAILY
Status: DISCONTINUED | OUTPATIENT
Start: 2022-01-25 | End: 2022-01-28 | Stop reason: HOSPADM

## 2022-01-25 RX ORDER — HYDROXYZINE HYDROCHLORIDE 25 MG/1
25 TABLET, FILM COATED ORAL EVERY 8 HOURS PRN
Status: DISCONTINUED | OUTPATIENT
Start: 2022-01-25 | End: 2022-01-25

## 2022-01-25 RX ORDER — PANTOPRAZOLE SODIUM 20 MG/1
20 TABLET, DELAYED RELEASE ORAL
Status: DISCONTINUED | OUTPATIENT
Start: 2022-01-25 | End: 2022-01-28 | Stop reason: HOSPADM

## 2022-01-25 RX ADMIN — BUPRENORPHINE AND NALOXONE 8 MG: 8; 2 FILM BUCCAL; SUBLINGUAL at 10:19

## 2022-01-25 RX ADMIN — HALOPERIDOL 10 MG: 5 TABLET ORAL at 11:10

## 2022-01-25 RX ADMIN — NICOTINE 14 MG: 14 PATCH, EXTENDED RELEASE TRANSDERMAL at 11:07

## 2022-01-25 RX ADMIN — TRAZODONE HYDROCHLORIDE 50 MG: 50 TABLET ORAL at 21:38

## 2022-01-25 RX ADMIN — LEVOTHYROXINE SODIUM 50 MCG: 50 TABLET ORAL at 07:05

## 2022-01-25 RX ADMIN — LORAZEPAM 2 MG: 1 TABLET ORAL at 02:16

## 2022-01-25 RX ADMIN — PANTOPRAZOLE SODIUM 20 MG: 20 TABLET, DELAYED RELEASE ORAL at 07:05

## 2022-01-25 RX ADMIN — NICOTINE POLACRILEX 2 MG: 2 GUM, CHEWING BUCCAL at 11:10

## 2022-01-25 RX ADMIN — LORAZEPAM 2 MG: 1 TABLET ORAL at 11:06

## 2022-01-25 RX ADMIN — HYDROXYZINE HYDROCHLORIDE 25 MG: 25 TABLET ORAL at 11:06

## 2022-01-25 RX ADMIN — HYDROXYZINE HYDROCHLORIDE 50 MG: 50 TABLET, FILM COATED ORAL at 21:38

## 2022-01-25 RX ADMIN — BUPRENORPHINE AND NALOXONE 8 MG: 8; 2 FILM BUCCAL; SUBLINGUAL at 21:38

## 2022-01-25 RX ADMIN — ACETAMINOPHEN 975 MG: 325 TABLET ORAL at 19:41

## 2022-01-25 NOTE — ED NOTES
Patient is accepted at [facility] [place]  Patient is accepted by Imelda Saravia MD         Patient may go to the floor at [facility]  Nurse report is to be called at [phone] prior to patient transfer

## 2022-01-25 NOTE — ED NOTES
Insurance Authorization for admission:   Phone call placed to [facility]  Phone number: [phone]  Spoke to Sylvia Jarrell  3 days approved, LCD 1/27  Level of care: IP  Review on 1/27     Authorization # To be issued upon arrival

## 2022-01-25 NOTE — ED NOTES
Assumed care from previous shift  Patient is sleeping in room, no apparent distress  Q 15 minute checks in place        Issac Schreiber RN  01/25/22 2000 [facility]  01/25/22 [id number]

## 2022-01-25 NOTE — ASSESSMENT & PLAN NOTE
Continue pre hospital suboxone 8-2 mg BID  PDMP reviewed - Suboxone last filled on 01/04/22 for 14 days

## 2022-01-25 NOTE — ED NOTES
Thej patient presented reporting she needed her medication adjusted  She has a history of bipolar disorder, depression, anxiety, ADHD and PTSD  She is preoccupied with a tracker that she believes was implanted during previous surgeries  She stated he significant other and her parents are upset because she has been up all night and pacing  Patient presents as distressed and hypervigilant  She stated she is afraid to talk and believes someone is listening and that she is being watched  Patient stated that although she does not watch the news, she knows something bad is about to happen  She denied that this has occurred in the past  She stated that in addition to poor sleep, she has poor appetite  She is hearing what she believes to be other peoples' thoughts  Patient stated she feels very scared and paces throughout the night  She stated her parents and boyfriend are mad that she does this and told her to come to the ED  She was willing to sign for a voluntary admission

## 2022-01-25 NOTE — NURSING NOTE
Pt gave verbal consent to give fiance house keys that were in her belongings when she came to the hospital  Norma Moe came to [facility] and received keys

## 2022-01-25 NOTE — ED NOTES
Significant other, Parth, called and left his phone number: [phone]  Did not identify the patient as present

## 2022-01-25 NOTE — PLAN OF CARE
Problem: Risk for Self Injury/Neglect  Goal: Treatment Goal: Remain safe during length of stay, learn and adopt new coping skills, and be free of self-injurious ideation, impulses and acts at the time of discharge  Outcome: Progressing  Goal: Verbalize thoughts and feelings  Description: Interventions:  - Assess and re-assess patient's lethality and potential for self-injury  - Engage patient in 1:1 interactions, daily, for a minimum of 15 minutes  - Encourage patient to express feelings, fears, frustrations, hopes  - Establish rapport/trust with patient   Outcome: Progressing  Goal: Refrain from harming self  Description: Interventions:  - Monitor patient closely, per order  - Develop a trusting relationship  - Supervise medication ingestion, monitor effects and side effects   Outcome: Progressing     Problem: Depression  Goal: Treatment Goal: Demonstrate behavioral control of depressive symptoms, verbalize feelings of improved mood/affect, and adopt new coping skills prior to discharge  Outcome: Progressing  Goal: Refrain from isolation  Description: Interventions:  - Develop a trusting relationship   - Encourage socialization   Outcome: Progressing  Goal: Refrain from self-neglect  Outcome: Progressing     Problem: SELF HARM/SUICIDALITY  Goal: Will have no self-injury during hospital stay  Description: INTERVENTIONS:  - Q 15 MINUTES: Routine safety checks  - Q WAKING SHIFT & PRN: Assess risk to determine if routine checks are adequate to maintain patient safety  - Encourage patient to participate actively in care by formulating a plan to combat response to suicidal ideation, identify supports and resources  Outcome: Progressing     Problem: DEPRESSION  Goal: Will be euthymic at discharge  Description: INTERVENTIONS:  - Administer medication as ordered  - Provide emotional support via 1:1 interaction with staff  - Encourage involvement in milieu/groups/activities  - Monitor for social isolation  Outcome: Progressing     Problem: PSYCHOSIS  Goal: Will report no hallucinations or delusions  Description: Interventions:  - Administer medication as  ordered  - Every waking shifts and PRN assess for the presence of hallucinations and or delusions  - Assist with reality testing to support increasing orientation  - Assess if patient's hallucinations or delusions are encouraging self-harm or harm to others and intervene as appropriate  Outcome: Progressing     Problem: BEHAVIOR  Goal: Pt/Family maintain appropriate behavior and adhere to behavioral management agreement, if implemented  Description: INTERVENTIONS:  - Assess the family dynamic   - Encourage verbalization of thoughts and concerns in a socially appropriate manner  - Assess patient/family's coping skills and non-compliant behavior (including use of illegal substances)  - Utilize positive, consistent limit setting strategies supporting safety of patient, staff and others  - Initiate consult with Case Management, Spiritual Care or other ancillary services as appropriate  - If a patient's/visitor's behavior jeopardizes the safety of the patient, staff, or others, refer to organization procedure     - Notify Security of behavior or suspected illegal substances which indicate the need for search of the patient and/or belongings  - Encourage participation in the decision making process about a behavioral management agreement; implement if patient meets criteria  Outcome: Progressing     Problem: SLEEP DISTURBANCE  Goal: Will exhibit normal sleeping pattern  Description: Interventions:  -  Assess the patients sleep pattern, noting recent changes  - Administer medication as ordered  - Decrease environmental stimuli, including noise, as appropriate during the night  - Encourage the patient to actively participate in unit groups and or exercise during the day to enhance ability to achieve adequate sleep at night  - Assess the patient, in the morning, encouraging a description of sleep experience  Outcome: Progressing     Problem: SELF CARE DEFICIT  Goal: Return ADL status to a safe level of function  Description: INTERVENTIONS:  - Administer medication as ordered  - Assess ADL deficits and provide assistive devices as needed  - Obtain PT/OT consults as needed  - Assist and instruct patient to increase activity and self care as tolerated  Outcome: Progressing

## 2022-01-25 NOTE — CONSULTS
Psychiatric Evaluation - Behavioral Health   Geno Courtney 29 y o  female MRN: [id number]  Unit/Bed#: ED 05 Encounter: [id number]    Assessment   Active Problems:  Psychosis   Differential diagnosis: Schizoaffective disorder vs Schizophrenia, bipolar depression, current episode depressed with psychotic features, substance induced psychosis  Tobacco use, Marijuana use, History of opiate use disorder, History of polysubstance use    Plan       Patient is appropriate and agreeable for inpatient psychiatric hospitalization  Patient willing to and signed 201 with this writer   Patient is not safe to leave emergency room, if patient demanding to be discharged please contact psychiatry for re-evaluation   Recommend continuing patient's home medications of Atarax 50 mg QHS and Trazodone 50 mg QHS  o Patient reports she has not filled her Suboxone prescription recently, consider further evaluation and obtaining collateral information to determine patient's recent MAT prescriptions before restarting   Recommend Atarax 25 mg Q8H PRN for anxiety  Consider Zyprexa 5 mg PO or IM for agitation  Can increase to Zyprexa 10 mg PO or IM in single dose if 5 mg ineffective   o Would avoid benzodiazepines as possible as patient has history of benzodiazepines abuse and overdose   o Would avoid Haldol as patient had adverse reaction/over-sedation previously with medication   Admission labs reviewed  UDS positive for marijuana   Collaborate with collaterals for baseline assessment and disposition   Continue safety precautions per ED protocol   Discussed with primary team    Please contact Psychiatry service if there are any questions or concerns regarding this case    Risks, benefits and possible side effects of Medications:   Risks, benefits, and possible side effects of medications explained to patient and patient verbalizes understanding        Chief Complaint: "I was feeling overwhelmed, thinking bad things were going to happen "    History of Present Illness     Physician Requesting Consult: Dr Krysta Garcia, DO  Reason for Consult / Principal Problem: anxiety, paranoia    Iva Ordonez is a 29 y o  female with a past medical history significant for hepatitis, and a past psychiatric history significant for bipolar depression, PTSD, anxiety, and opiate use disorder/polysubstance use who presented for increased anxiety, depressive symptoms, and requesting a medication adjustment  On initial evaluation, patient presented as anxious displaying restlessness and psychomotor agitation throughout evaluation  Patient endorsed anxious mood and feelings of being overwhelmed    The patient reported she had feelings that "something bad" was going to happen to people    The patient reported this has been worsening since [place] time  The patient was tangential, disorganized, making delusional statements and displaying paranoia  The patient reported she could hear her neighbors in her thoughts  She reported thinking is just hearing other people's thoughts   She reports they make derogatory statements calling her a stupid pig, and ugly   She reports these voices are real," and that she "realized they are (my) neighbor's voices and thoughts    The patient reports feeling suspicious of most people, she is paranoid that people from prior hospitalizations and rehab stays are breaking into her apartment and putting white and blue speed,  on her medical marijuana  The patient reports that people are listening to her and tracking her  She feels that people may have implanted tracking devices in her during prior surgeries  Patient appear to be responding to internal stimuli at time of evaluation, frequently looking over this writer's shoulder and asking this writer to repeat things after doing so  She reports decreased sleep, decreased appetite, decreased energy    She reports she has had worsening anxious and depressed mood   She denies current or recent suicidal or homicidal ideations  She denies visual hallucinations  The patient denied symptoms of dennis including irritability, increased goal directed activities, elevated mood, impulsive behaviors, she did not display grandiose thinking  The patient reports that she has a history of bipolar depression and has been medicated for it previously  The patient was unwilling to discuss previous medications that she has been trialed on  Patient endorsed increasing anxiety over where abouts of her 10year-old son, she believes he is with his paternal grandmother and that she did reports she does have the number to contact said family members  The patient had been medicated with Haldol 10 mg p o  prior to re-evaluation with attending psychiatrist   At time of re-evaluation, patient's thoughts were organized, linear, and logical   The patient recognized need for inpatient psychiatric admission and possible medication stabilization  Patient was agreeable and willing to sign 201  Patient reported her son was in fact with his paternal grandmother and was able to stay with her  At the time of re-evaluation, the patient did not appear to be responding to internal stimuli or internally preoccupied      Stressors: increasing anxiety and paranoia symptoms, decreased daily functioning, care/custody of son    Medical Review Of Systems:  Constitutional: positive for difficulty sleeping, negative for anorexia, fatigue and fevers  Neurological: positive for memory problems, negative for headaches and seizures  Behavioral/Psych: positive for anxiety, sleep disturbance and tobacco use, negative for fatigue and illegal drug usage    Psychiatric Review Of Systems:  Sleep: yes, decreased  Appetite changes: yes, decreased  Weight changes: patient denies  Energy/anergy: patient denies changes  Interest/pleasure/anhedonia: patient denies  Somatic symptoms: yes, patient reports panic feeling of being trapped, palpitations, chest pain with anxiety  Anxiety/panic: yes, see above and feelings of impending doom/bad things will happen, excessive worry about "people"  Marlin: no, patient denies irritability, elevated mood, increased impulsive behaviors, increased goal directed behaviors, does not display grandiosity  Patient endorses decreased sleep  Guilty/hopeless: patient denies  Self injurious behavior/risky behavior: patient denies    Historical Information     Past Psychiatric History:   Past Psychiatric management: patient reports she is not currently on any psychotropic medications, history shows patient is currently prescribed Suboxone, Topamax, Vistaril, Trazodone  Patient reports multiple prior inpatient psychiatric admission and inpatient rehabilitation stays  Patient reports no current outpatient psychiatric or psychotherapy care  Patient reports prescriptions are filled from PCP  Past Suicide attempts: none reported  Past Violent behavior: none reported  Past Psychiatric medication trial: Abilify, Thorazine, Doxepin, Klonopin, Clonidine, Cymbalta, Prozac,  Lamictal, BuSpar, Gabapentin    Substance Abuse History:  I spent time with patient in counseling and education on risk of substance abuse  Social History     Tobacco History     Smoking Status  Current Smoking Frequency  0 25-1 packs/day for 12 years (3 pk yrs) Smoking Tobacco Type  Cigarettes    Smokeless Tobacco Use  Never Used    Tobacco Comment  Vaping          Alcohol History     Alcohol Use Status  Not Currently          Drug Use     Drug Use Status  Current Types  Cocaine, Heroin, Marijuana, Methadone, Methamphetamines, Prescription,  Kratum Comment  patient in recovery  Clean of opiates since Oct of 2019  Current everyday marijuana use             I have assessed this patient for substance use within the past 12 months  Patient endorses everyday medical marijuana use via smoking and/or ingestion of edibles   Patient reports everyday tobacco use via smoking and/or vaping  Family Psychiatric History:   Unknown    Social History:  Education: high school diploma/GED  Learning Disabilities: patient denies  Marital history: co-habitating  Living arrangement, social support: The patient lives in home with significant other  Support systems: mother and sometimes boyfriend Family relationship issues: patient has been in frequent arguments/verbal fights with boyfriend who was at one time her fiance  Occupational History: on [employer]SI disability  Functioning Relationships: strained with spouse or significant others, good relationship with parents and fearful & suspicious of most people    Other Pertinent History: patient denied financial or legal issues, patient did not report  history      Traumatic History:   Abuse: patient would not elaborate on past traumatic events  Other Traumatic Events: patient reported death of her son's biological father - did not elaborate on how or when    Past Medical History:   Diagnosis Date    Anxiety     "severe"    Asthma     Bipolar disorder ([place] [address] )     Cigarette smoker     Disease of thyroid gland     Drug dependence, in remission ([place] [address] )     GERD (gastroesophageal reflux disease)     H/O: whooping cough     while pregnant    Hepatitis C, chronic ([place] [address] )     Insomnia disorder     Liver disease     Hepatitis C    Migraine     PTSD (post-traumatic stress disorder)        Meds/Allergies   all current active meds have been reviewed and current meds:   Current Facility-Administered Medications   Medication Dose Route Frequency    albuterol (PROVENTIL HFA,VENTOLIN HFA) inhaler 2 puff  2 puff Inhalation Q4H PRN    buprenorphine-naloxone (Suboxone) film 8 mg  8 mg Sublingual Daily    cyclobenzaprine (FLEXERIL) tablet 10 mg  10 mg Oral TID PRN    haloperidol (HALDOL) tablet 10 mg  10 mg Oral Once    hydrOXYzine HCL (ATARAX) tablet 25 mg  25 mg Oral Q8H PRN    hydrOXYzine HCL (ATARAX) tablet 50 mg  50 mg Oral HS    levothyroxine tablet 50 mcg  50 mcg Oral Early Morning    LORazepam (ATIVAN) tablet 2 mg  2 mg Oral Once    nicotine (NICODERM CQ) 14 mg/24hr TD 24 hr patch 14 mg  14 mg Transdermal Daily PRN    nicotine polacrilex (NICORETTE) gum 2 mg  2 mg Oral Q2H PRN    pantoprazole (PROTONIX) EC tablet 20 mg  20 mg Oral Early Morning    traZODone (DESYREL) tablet 50 mg  50 mg Oral HS     Allergies   Allergen Reactions    Amoxicillin      Pt states it never works for her        Objective   Vital signs in last 24 hours:  Temp:  [96 8 °F (36 °C)] 96 8 °F (36 °C)  HR:  [] 97  Resp:  [16-18] 16  BP: (128-142)/(84-91) 128/85    No intake or output data in the 24 hours ending 01/25/22 1032      Mental Status Exam:  Appearance:  alert, intermittent eye contact, appears stated age, disheveled, tattooed and dressed in hospital paper scrubs   Behavior:  calm, limited cooperativity, evasive and sitting in bed   Motor: restless and fidgety and unable to assess gait and balance at time of evaluation   Speech:  spontaneous, clear, not pressured, soft and coherent   Mood:  "overwhelmed"   Affect:  mood-congruent, constricted, dysphoric and anxious   Thought Process:  disorganized, racing of thoughts, generally linear   Thought Content: paranoid ideation, negative thoughts, ideas of reference, patient reports feeling supicious of most people, feels people were breaking into her apartment and putting "speed" in her medical marijuana   Perceptual disturbances: denies current hallucinations, appears to be responding to internal stimuli and reports recently hearing "other people's thoughts," that were making derogatory statements towards her   Risk Potential: No active or passive suicidal or homicidal ideation was verbalized during interview   Cognition: appears to be of average intelligence, attention span appeared shorter than expected for age, cognition not formally tested and oriented to self, place, and situation   Insight: Limited   Judgment: Limited     Laboratory results:    I have personally reviewed all pertinent laboratory/tests results  Most Recent Labs:   Lab Results   Component Value Date    WBC 7 84 08/02/2021    RBC 4 64 08/02/2021    HGB 13 6 08/02/2021    HCT 42 1 08/02/2021     08/02/2021    RDW 13 5 08/02/2021    NEUTROABS 4 01 08/02/2021    SODIUM 142 08/02/2021    K 4 0 08/02/2021     08/02/2021    CO2 25 08/02/2021    BUN 12 08/02/2021    CREATININE 1 28 08/02/2021    GLUC 94 08/02/2021    CALCIUM 9 1 08/02/2021    AST 16 08/02/2021    ALT 26 08/02/2021    ALKPHOS 60 08/02/2021    TP 7 1 08/02/2021    ALB 4 1 08/02/2021    TBILI 0 20 08/02/2021    QCH1NNNTOPAC 0 660 08/02/2021    FREET4 5 7 02/10/2017    PREGUR negative 01/25/2022    PREGSERUM Negative 01/17/2018     Pregnancy:   Lab Results   Component Value Date    PREGUR negative 01/25/2022    PREGSERUM Negative 01/17/2018     Drug Screen:   Lab Results   Component Value Date    AMPMETHUR Negative 01/25/2022    BARBTUR Negative 01/25/2022    BDZUR Negative 01/25/2022    THCUR Positive (A) 01/25/2022    COCAINEUR Negative 01/25/2022    METHADONEUR Negative 01/25/2022    OPIATEUR Negative 01/25/2022    PCPUR Negative 01/25/2022     Medical alcohol level   Lab Results   Component Value Date    ETOH <3 08/02/2021       Imaging Studies: None  EKG, Pathology, and Other Studies: Reviewed prior EKG from August 2021, sinus tachycardia, no acute ischemic changes, QT/QTc 304/403 ms    This note has been constructed using a voice recognition system  There may be translation, syntax, or grammatical errors  If you have an questions, please contact the dictating provider

## 2022-01-25 NOTE — NURSING NOTE
Per ED report, The patient presented reporting she needed her medication adjusted  She has a history of bipolar disorder, depression, anxiety, ADHD and PTSD  She is preoccupied with a tracker that she believes was implanted during previous surgeries  She stated he significant other and her parents are upset because she has been up all night and pacing  Patient presents as distressed and hypervigilant  She stated she is afraid to talk and believes someone is listening and that she is being watched  Patient stated that although she does not watch the news, she knows something bad is about to happen  She denied that this has occurred in the past  She stated that in addition to poor sleep, she has poor appetite  She is hearing what she believes to be other peoples' thoughts  Patient stated she feels very scared and paces throughout the night  She stated her parents and boyfriend are mad that she does this and told her to come to the ED  She was willing to sign for a voluntary admission  Pt received into the unit cooperative/pleasant and calm, skin assessment done by RN/writer  PT reported "I am afraid that I couldn't sleep and that affect my appetite too"  PT admission was done,  PT oriented to the unit, reassured of her safety while here and provided education to let nursing staff known when depressed, feeling of anxiety or hurting herself, PT verbalized understanding  Settled down at this time in the unit  PT SO was given the home keys and PT aware by given consent to hand the wood over to him Cliff Alba

## 2022-01-25 NOTE — ASSESSMENT & PLAN NOTE
Vitals reviewed and stable  Admission labs pending will follow  EKG pending  UDS (+) for THC, consistent with report of medical marijuana use   Patient is medically cleared for admission to the [facility] for treatment of the underlying psychiatric illness

## 2022-01-25 NOTE — ED PROVIDER NOTES
History  Chief Complaint   Patient presents with    Psychiatric Evaluation     Pt reports she is here because she "wants everything done" because she doesn't feel right  Pt states she feels as if she has "trackers" inside her from previous surgeries  Denies SI/HI/AH/VH  70-year-old female presents for psychiatric evaluation  The patient reports that she is having increased anxiety depression and believes that she hit her medications adjusted  She states that she is having difficulty with processing things and answering questions appropriately  She believes that she could have trackers in her from previous surgeries  She denies any thoughts of self-harm or suicide denies any recent drug or alcohol use  Psychiatric Evaluation  Presenting symptoms: depression    Degree of incapacity (severity):  Severe  Onset quality:  Gradual  Timing:  Intermittent  Progression:  Waxing and waning  Chronicity:  Recurrent  Relieved by:  Nothing  Worsened by:  Nothing  Associated symptoms: anxiety    Associated symptoms: no abdominal pain, no appetite change, no chest pain, no fatigue and no headaches        Prior to Admission Medications   Prescriptions Last Dose Informant Patient Reported? Taking?    NARCAN 4 MG/0 1ML LIQD   Yes No   Sig: ADMINISTER A SINGLE spray INTO ONE NOSTRIL CALL 911 MAY REPEAT ONCE   Patient not taking: Reported on 2021   albuterol (PROVENTIL HFA,VENTOLIN HFA) 90 mcg/act inhaler   No No   Sig: Inhale 2 puffs every 4 (four) hours as needed for wheezing   buprenorphine-naloxone (SUBOXONE) 2-0 5 mg per SL tablet   Yes No   Sig: place 1 tablet under the tongue and dissolve once daily   cyclobenzaprine (FLEXERIL) 10 mg tablet   No No   Sig: Take 1 tablet (10 mg total) by mouth 3 (three) times a day as needed for muscle spasms   hydrOXYzine pamoate (VISTARIL) 50 mg capsule   No No   Si-2 HS   ibuprofen (MOTRIN) 800 mg tablet   No No   Sig: Take 1 tablet (800 mg total) by mouth every 8 (eight) hours as needed for mild pain   levothyroxine 50 mcg tablet   No No   Si daily   omeprazole (PriLOSEC) 20 mg delayed release capsule   No No   Sig: Take 1 capsule (20 mg total) by mouth daily   topiramate (TOPAMAX) 100 mg tablet   No No   Sig: Take 1 tablet (100 mg total) by mouth 3 (three) times a day   traZODone (DESYREL) 50 mg tablet   No No   Si-2 HS      Facility-Administered Medications: None       Past Medical History:   Diagnosis Date    Anxiety     "severe"    Asthma     [facility]     Cigarette smoker     Disease of thyroid gland     Drug dependence, in remission ([facility] )     GERD (gastroesophageal reflux disease)     H/O: whooping cough     while pregnant    Hepatitis C, chronic (HCC)     Insomnia disorder     Liver disease     Hepatitis C    Migraine     PTSD (post-traumatic stress disorder)        Past Surgical History:   Procedure Laterality Date    OTHER SURGICAL HISTORY      body piercing    ME REPAIR UMBILICAL RALD,4+G/N,AFSEA N/A 2016    Procedure: REPAIR HERNIA UMBILICAL WITH MESH;  Surgeon: David Jessica MD;  Location:  MAIN OR;  Service: General    VAGINAL DELIVERY      X 6    WISDOM TOOTH EXTRACTION      X 4    WOUND DEBRIDEMENT Right 2018    Procedure: RIGHT KNEE DEBRIDEMENT; [address] OUT; AND LACERATION REPAIR  INTRAOPERATIVE ASSESSMENT OF PATELLA TENDON;  Surgeon: Isabella Sanders MD;  Location:  MAIN OR;  Service: Orthopedics       Family History   Problem Relation Age of Onset    Hypertension Mother     Thyroid disease Mother     Hypertension Father     Prostate cancer Father     Anxiety disorder Sister      I have reviewed and agree with the history as documented      E-Cigarette/Vaping     E-Cigarette/Vaping Substances     Social History     Tobacco Use    Smoking status: Former Smoker     Packs/day: 0 25     Years: 12 00     Pack years: 3 00     Types: Cigarettes    Smokeless tobacco: Never Used    Tobacco comment: Vaping Substance Use Topics    Alcohol use: Not Currently    Drug use: Not Currently     Types: Methadone, Heroin, Cocaine, Marijuana, Methamphetamines, Prescription     Comment: 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       Review of Systems   Constitutional: Negative for appetite change, chills, fatigue and fever  HENT: Negative for postnasal drip, sinus pain and trouble swallowing  Eyes: Negative for redness and itching  Respiratory: Negative for chest tightness, shortness of breath and wheezing  Cardiovascular: Negative for chest pain and leg swelling  Gastrointestinal: Negative for abdominal pain, constipation, diarrhea, nausea and vomiting  Endocrine: Negative  Genitourinary: Negative for difficulty urinating and dysuria  Musculoskeletal: Negative for back pain and myalgias  Skin: Negative for rash  Allergic/Immunologic: Negative  Neurological: Negative for dizziness, numbness and headaches  Hematological: Negative  Psychiatric/Behavioral: The patient is nervous/anxious  All other systems reviewed and are negative  Physical Exam  Physical Exam  Vitals and nursing note reviewed  Constitutional:       General: She is not in acute distress  Appearance: Normal appearance  She is well-developed  She is not ill-appearing or toxic-appearing  HENT:      Head: Normocephalic and atraumatic  Right Ear: External ear normal       Left Ear: External ear normal       Nose: Nose normal  No congestion  Mouth/Throat:      Mouth: Mucous membranes are moist       Pharynx: Oropharynx is clear  Eyes:      Conjunctiva/sclera: Conjunctivae normal       Pupils: Pupils are equal, round, and reactive to light  Cardiovascular:      Rate and Rhythm: Normal rate and regular rhythm  Heart sounds: Normal heart sounds  Pulmonary:      Effort: Pulmonary effort is normal  No respiratory distress  Breath sounds: Normal breath sounds  No wheezing     Abdominal: General: Bowel sounds are normal       Palpations: Abdomen is soft  Tenderness: There is no abdominal tenderness  There is no guarding  Musculoskeletal:         General: No tenderness or deformity  Cervical back: Normal range of motion and neck supple  No rigidity  Skin:     General: Skin is warm and dry  Capillary Refill: Capillary refill takes less than 2 seconds  Findings: No rash  Neurological:      General: No focal deficit present  Mental Status: She is alert and oriented to person, place, and time  Psychiatric:         Attention and Perception: Attention normal          Mood and Affect: Mood is depressed  Speech: Speech is delayed  Behavior: Behavior is withdrawn  Behavior is cooperative           Cognition and Memory: Memory normal          Vital Signs  ED Triage Vitals [01/25/22 0116]   Temperature Pulse Respirations Blood Pressure SpO2   (!) 96 8 °F (36 °C) 102 18 142/91 97 %      Temp Source Heart Rate Source Patient Position - Orthostatic VS BP Location FiO2 (%)   Tympanic Monitor Sitting Left arm --      Pain Score       --           Vitals:    01/25/22 0116   BP: 142/91   Pulse: 102   Patient Position - Orthostatic VS: Sitting         Visual Acuity      ED Medications  Medications   albuterol (PROVENTIL HFA,VENTOLIN HFA) inhaler 2 puff (has no administration in time range)   cyclobenzaprine (FLEXERIL) tablet 10 mg (has no administration in time range)   hydrOXYzine HCL (ATARAX) tablet 50 mg (has no administration in time range)   levothyroxine tablet 50 mcg (has no administration in time range)   pantoprazole (PROTONIX) EC tablet 20 mg (has no administration in time range)   traZODone (DESYREL) tablet 50 mg (has no administration in time range)   LORazepam (ATIVAN) tablet 2 mg (2 mg Oral Given 1/25/22 0216)       Diagnostic Studies  Results Reviewed     Procedure Component Value Units Date/Time    COVID/FLU/RSV - 2 hour TAT [[id number]]  (Normal) Collected: 01/25/22 0135    Lab Status: Final result Specimen: Nares from Nose Updated: 01/25/22 0221     SARS-CoV-2 Negative     INFLUENZA A PCR Negative     INFLUENZA B PCR Negative     RSV PCR Negative    Narrative:      FOR PEDIATRIC PATIENTS - copy/paste COVID Guidelines URL to browser: [email or web address]  [email or web address]    SARS-CoV-2 assay is a Nucleic Acid Amplification assay intended for the  qualitative detection of nucleic acid from SARS-CoV-2 in nasopharyngeal  swabs  Results are for the presumptive identification of SARS-CoV-2 RNA  Positive results are indicative of infection with SARS-CoV-2, the virus  causing COVID-19, but do not rule out bacterial infection or co-infection  with other viruses  Laboratories within the [place] and its  territories are required to report all positive results to the appropriate  public health authorities  Negative results do not preclude SARS-CoV-2  infection and should not be used as the sole basis for treatment or other  patient management decisions  Negative results must be combined with  clinical observations, patient history, and epidemiological information  This test has not been FDA cleared or approved  This test has been authorized by FDA under an Emergency Use Authorization  (EUA)  This test is only authorized for the duration of time the  declaration that circumstances exist justifying the authorization of the  emergency use of an in vitro diagnostic tests for detection of SARS-CoV-2  virus and/or diagnosis of COVID-19 infection under section 564(b)(1) of  the Act, 21 U  S C  076YPC-4(A)(6), unless the authorization is terminated  or revoked sooner  The test has been validated but independent review by FDA  and CLIA is pending  Test performed using [employer] Gene[employer]pert: This RT-PCR assay targets N2,  a region unique to SARS-CoV-2   A conserved region in the E-gene was chosen  for pan-Sarbecovirus detection which includes SARS-CoV-2  Rapid drug screen, urine [[id number]]  (Abnormal) Collected: 01/25/22 0135    Lab Status: Final result Specimen: Urine, Other Updated: 01/25/22 0204     Amph/Meth UR Negative     Barbiturate Ur Negative     Benzodiazepine Urine Negative     Cocaine Urine Negative     Methadone Urine Negative     Opiate Urine Negative     PCP Ur Negative     THC Urine Positive     Oxycodone Urine Negative    Narrative:      Presumptive report  If requested, specimen will be sent to reference lab for confirmation  FOR MEDICAL PURPOSES ONLY  IF CONFIRMATION NEEDED PLEASE CONTACT THE LAB WITHIN 5 DAYS  Drug Screen Cutoff Levels:  AMPHETAMINE/METHAMPHETAMINES  1000 ng/mL  BARBITURATES     200 ng/mL  BENZODIAZEPINES     200 ng/mL  COCAINE      300 ng/mL  METHADONE      300 ng/mL  OPIATES      300 ng/mL  PHENCYCLIDINE     25 ng/mL  THC       50 ng/mL  OXYCODONE      100 ng/mL    POCT pregnancy, urine [[id number]]  (Normal) Resulted: 01/25/22 0136    Lab Status: Final result Updated: 01/25/22 0137     EXT PREG TEST UR (Ref: Negative) negative     Control valid    POCT alcohol breath test [[id number]]  (Normal) Resulted: 01/25/22 0134    Lab Status: Final result Updated: 01/25/22 0134     EXTBreath Alcohol 0 000                 No orders to display              Procedures  Procedures         ED Course                                             MDM    Disposition  Final diagnoses:   Anxiety   Depression     Time reflects when diagnosis was documented in both MDM as applicable and the Disposition within this note     Time User Action Codes Description Comment    1/25/2022  1:39 AM Rey Cohn Add [F41 9] Anxiety     1/25/2022  1:48 AM Rey Kind Add Aris Lis  A] Depression       ED Disposition     ED Disposition Condition Date/Time Comment    Transfer to [address] [place] [address] Jan 25, 2022  1:48 AM Darshan Ascencio Sherwin should be transferred out to behavioral health and has been medically cleared          Follow-up Information    None         Patient's Medications   Discharge Prescriptions    No medications on file       No discharge procedures on file      PDMP Review     None          ED Provider  Electronically Signed by           Chirag Paige DO  01/25/22 3044

## 2022-01-26 PROBLEM — G43.909 MIGRAINES: Status: ACTIVE | Noted: 2022-01-26

## 2022-01-26 PROBLEM — F33.3 MDD (MAJOR DEPRESSIVE DISORDER), RECURRENT, SEVERE, WITH PSYCHOSIS (HCC): Status: ACTIVE | Noted: 2022-01-25

## 2022-01-26 PROBLEM — Z72.0 TOBACCO USE: Status: ACTIVE | Noted: 2022-01-26

## 2022-01-26 PROBLEM — E87.6 HYPOKALEMIA: Status: ACTIVE | Noted: 2022-01-26

## 2022-01-26 LAB
25(OH)D3 SERPL-MCNC: 23 NG/ML (ref 30–100)
ALBUMIN SERPL BCP-MCNC: 4 G/DL (ref 3–5.2)
ALP SERPL-CCNC: 51 U/L (ref 43–122)
ALT SERPL W P-5'-P-CCNC: 8 U/L
ANION GAP SERPL CALCULATED.3IONS-SCNC: 5 MMOL/L (ref 5–14)
AST SERPL W P-5'-P-CCNC: 14 U/L (ref 14–36)
BASOPHILS # BLD AUTO: 0 THOUSANDS/ΜL (ref 0–0.1)
BASOPHILS NFR BLD AUTO: 1 % (ref 0–1)
BILIRUB SERPL-MCNC: 0.59 MG/DL
BUN SERPL-MCNC: 19 MG/DL (ref 5–25)
CALCIUM SERPL-MCNC: 8.9 MG/DL (ref 8.4–10.2)
CHLORIDE SERPL-SCNC: 105 MMOL/L (ref 97–108)
CHOLEST SERPL-MCNC: 173 MG/DL
CO2 SERPL-SCNC: 30 MMOL/L (ref 22–30)
CREAT SERPL-MCNC: 0.83 MG/DL (ref 0.6–1.2)
EOSINOPHIL # BLD AUTO: 0.1 THOUSAND/ΜL (ref 0–0.4)
EOSINOPHIL NFR BLD AUTO: 2 % (ref 0–6)
ERYTHROCYTE [DISTWIDTH] IN BLOOD BY AUTOMATED COUNT: 13.4 %
GFR SERPL CREATININE-BSD FRML MDRD: 91 ML/MIN/1.73SQ M
GLUCOSE P FAST SERPL-MCNC: 94 MG/DL (ref 70–99)
GLUCOSE SERPL-MCNC: 94 MG/DL (ref 70–99)
HCG SERPL QL: NEGATIVE
HCT VFR BLD AUTO: 41.9 % (ref 36–46)
HDLC SERPL-MCNC: 19 MG/DL
HGB BLD-MCNC: 13.7 G/DL (ref 12–16)
LDLC SERPL CALC-MCNC: 132 MG/DL
LYMPHOCYTES # BLD AUTO: 3.1 THOUSANDS/ΜL (ref 0.5–4)
LYMPHOCYTES NFR BLD AUTO: 51 % (ref 25–45)
MAGNESIUM SERPL-MCNC: 2.1 MG/DL (ref 1.6–2.3)
MCH RBC QN AUTO: 27.7 PG (ref 26–34)
MCHC RBC AUTO-ENTMCNC: 32.7 G/DL (ref 31–36)
MCV RBC AUTO: 85 FL (ref 80–100)
MONOCYTES # BLD AUTO: 0.3 THOUSAND/ΜL (ref 0.2–0.9)
MONOCYTES NFR BLD AUTO: 6 % (ref 1–10)
NEUTROPHILS # BLD AUTO: 2.5 THOUSANDS/ΜL (ref 1.8–7.8)
NEUTS SEG NFR BLD AUTO: 41 % (ref 45–65)
NONHDLC SERPL-MCNC: 154 MG/DL
PLATELET # BLD AUTO: 242 THOUSANDS/UL (ref 150–450)
PMV BLD AUTO: 10.3 FL (ref 8.9–12.7)
POTASSIUM SERPL-SCNC: 2.9 MMOL/L (ref 3.6–5)
PROT SERPL-MCNC: 6.7 G/DL (ref 5.9–8.4)
RBC # BLD AUTO: 4.94 MILLION/UL (ref 4–5.2)
SODIUM SERPL-SCNC: 140 MMOL/L (ref 137–147)
TRIGL SERPL-MCNC: 108 MG/DL
TSH SERPL DL<=0.05 MIU/L-ACNC: 2.74 UIU/ML (ref 0.47–4.68)
WBC # BLD AUTO: 6.1 THOUSAND/UL (ref 4.5–11)

## 2022-01-26 PROCEDURE — 80061 LIPID PANEL: CPT | Performed by: STUDENT IN AN ORGANIZED HEALTH CARE EDUCATION/TRAINING PROGRAM

## 2022-01-26 PROCEDURE — 84443 ASSAY THYROID STIM HORMONE: CPT | Performed by: STUDENT IN AN ORGANIZED HEALTH CARE EDUCATION/TRAINING PROGRAM

## 2022-01-26 PROCEDURE — 80053 COMPREHEN METABOLIC PANEL: CPT | Performed by: STUDENT IN AN ORGANIZED HEALTH CARE EDUCATION/TRAINING PROGRAM

## 2022-01-26 PROCEDURE — 83735 ASSAY OF MAGNESIUM: CPT

## 2022-01-26 PROCEDURE — 82306 VITAMIN D 25 HYDROXY: CPT | Performed by: STUDENT IN AN ORGANIZED HEALTH CARE EDUCATION/TRAINING PROGRAM

## 2022-01-26 PROCEDURE — 99253 IP/OBS CNSLTJ NEW/EST LOW 45: CPT

## 2022-01-26 PROCEDURE — 82746 ASSAY OF FOLIC ACID SERUM: CPT | Performed by: STUDENT IN AN ORGANIZED HEALTH CARE EDUCATION/TRAINING PROGRAM

## 2022-01-26 PROCEDURE — 85025 COMPLETE CBC W/AUTO DIFF WBC: CPT | Performed by: STUDENT IN AN ORGANIZED HEALTH CARE EDUCATION/TRAINING PROGRAM

## 2022-01-26 PROCEDURE — 82607 VITAMIN B-12: CPT | Performed by: STUDENT IN AN ORGANIZED HEALTH CARE EDUCATION/TRAINING PROGRAM

## 2022-01-26 PROCEDURE — 84703 CHORIONIC GONADOTROPIN ASSAY: CPT | Performed by: STUDENT IN AN ORGANIZED HEALTH CARE EDUCATION/TRAINING PROGRAM

## 2022-01-26 PROCEDURE — 86592 SYPHILIS TEST NON-TREP QUAL: CPT | Performed by: STUDENT IN AN ORGANIZED HEALTH CARE EDUCATION/TRAINING PROGRAM

## 2022-01-26 PROCEDURE — 99232 SBSQ HOSP IP/OBS MODERATE 35: CPT | Performed by: STUDENT IN AN ORGANIZED HEALTH CARE EDUCATION/TRAINING PROGRAM

## 2022-01-26 RX ORDER — DULOXETIN HYDROCHLORIDE 30 MG/1
30 CAPSULE, DELAYED RELEASE ORAL DAILY
Status: DISCONTINUED | OUTPATIENT
Start: 2022-01-26 | End: 2022-01-28 | Stop reason: HOSPADM

## 2022-01-26 RX ORDER — IBUPROFEN 400 MG/1
800 TABLET ORAL EVERY 8 HOURS PRN
Status: DISCONTINUED | OUTPATIENT
Start: 2022-01-26 | End: 2022-01-27

## 2022-01-26 RX ORDER — IBUPROFEN 600 MG/1
600 TABLET ORAL EVERY 6 HOURS PRN
Status: DISCONTINUED | OUTPATIENT
Start: 2022-01-26 | End: 2022-01-27

## 2022-01-26 RX ORDER — OLANZAPINE 5 MG/1
5 TABLET ORAL
Status: DISCONTINUED | OUTPATIENT
Start: 2022-01-26 | End: 2022-01-27

## 2022-01-26 RX ORDER — NICOTINE 21 MG/24HR
1 PATCH, TRANSDERMAL 24 HOURS TRANSDERMAL DAILY
Status: DISCONTINUED | OUTPATIENT
Start: 2022-01-26 | End: 2022-01-28 | Stop reason: HOSPADM

## 2022-01-26 RX ORDER — TOPIRAMATE 100 MG/1
100 TABLET, FILM COATED ORAL 3 TIMES DAILY
Status: DISCONTINUED | OUTPATIENT
Start: 2022-01-26 | End: 2022-01-28 | Stop reason: HOSPADM

## 2022-01-26 RX ORDER — IBUPROFEN 400 MG/1
400 TABLET ORAL EVERY 6 HOURS PRN
Status: DISCONTINUED | OUTPATIENT
Start: 2022-01-26 | End: 2022-01-27

## 2022-01-26 RX ORDER — POTASSIUM CHLORIDE 20 MEQ/1
40 TABLET, EXTENDED RELEASE ORAL ONCE
Status: COMPLETED | OUTPATIENT
Start: 2022-01-26 | End: 2022-01-26

## 2022-01-26 RX ADMIN — ACETAMINOPHEN 975 MG: 325 TABLET ORAL at 03:06

## 2022-01-26 RX ADMIN — ALBUTEROL SULFATE 2 PUFF: 90 AEROSOL, METERED RESPIRATORY (INHALATION) at 22:29

## 2022-01-26 RX ADMIN — TRAZODONE HYDROCHLORIDE 50 MG: 50 TABLET ORAL at 21:32

## 2022-01-26 RX ADMIN — CYCLOBENZAPRINE HYDROCHLORIDE 10 MG: 10 TABLET, FILM COATED ORAL at 08:30

## 2022-01-26 RX ADMIN — BUPRENORPHINE AND NALOXONE 8 MG: 8; 2 FILM BUCCAL; SUBLINGUAL at 10:04

## 2022-01-26 RX ADMIN — LEVOTHYROXINE SODIUM 50 MCG: 50 TABLET ORAL at 06:40

## 2022-01-26 RX ADMIN — TRAZODONE HYDROCHLORIDE 50 MG: 50 TABLET ORAL at 03:07

## 2022-01-26 RX ADMIN — BUPRENORPHINE AND NALOXONE 8 MG: 8; 2 FILM BUCCAL; SUBLINGUAL at 21:31

## 2022-01-26 RX ADMIN — POTASSIUM CHLORIDE 40 MEQ: 1500 TABLET, EXTENDED RELEASE ORAL at 16:45

## 2022-01-26 RX ADMIN — IBUPROFEN 800 MG: 400 TABLET ORAL at 16:43

## 2022-01-26 RX ADMIN — OLANZAPINE 5 MG: 5 TABLET, FILM COATED ORAL at 21:33

## 2022-01-26 RX ADMIN — LORAZEPAM 0.5 MG: 0.5 TABLET ORAL at 13:59

## 2022-01-26 RX ADMIN — TOPIRAMATE 100 MG: 100 TABLET, FILM COATED ORAL at 21:33

## 2022-01-26 RX ADMIN — DULOXETINE 30 MG: 30 CAPSULE, DELAYED RELEASE ORAL at 13:59

## 2022-01-26 RX ADMIN — PANTOPRAZOLE SODIUM 20 MG: 20 TABLET, DELAYED RELEASE ORAL at 06:40

## 2022-01-26 RX ADMIN — CYCLOBENZAPRINE HYDROCHLORIDE 10 MG: 10 TABLET, FILM COATED ORAL at 03:07

## 2022-01-26 RX ADMIN — NICOTINE 1 PATCH: 21 PATCH, EXTENDED RELEASE TRANSDERMAL at 13:59

## 2022-01-26 RX ADMIN — HYDROXYZINE HYDROCHLORIDE 50 MG: 50 TABLET, FILM COATED ORAL at 21:34

## 2022-01-26 NOTE — ASSESSMENT & PLAN NOTE
· Patient reports control on home topamax (dose per chart, 100 mg TID) and ibuprofen prn   · Currently complains of L sided HA following typical migraine pattern, will treat supportively

## 2022-01-26 NOTE — TREATMENT PLAN
TREATMENT PLAN REVIEW - Behavioral Health Chloe Courtney 29 y o  1985 female MRN: [id number]    [address] [address] Room / Bed: [address] [id number] Encounter: [id number]          Admit Date/Time:  1/25/2022  1:10 AM    Treatment Team: Attending Provider: Selene Chavez MD; Consulting Physician: Jamin Morales PA-C; Registered Nurse: Isobel Carrel, RN; : RAFIQ Vivar; Patient Care Assistant: Emy Mosley; Care Manager: Jessi Moon RN; Patient Care Technician: Vadim Epperson; Patient Care Assistant: Marshall Christiansen; Registered Nurse: Nickolas Jiménez RN; Patient Care Assistant: Hitesh Pulido; Licensed Practical Nurse: Hermelindo Crowley LPN    Diagnosis: Principal Problem:    MDD (major depressive disorder), recurrent, severe, with psychosis ([facility] )  Active Problems:    Hypothyroidism    Opioid use disorder, moderate, on maintenance therapy [facility])    Medical clearance for psychiatric admission    GERD (gastroesophageal reflux disease)      Patient Strengths/Assets: ability for insight, cooperative, communication skills, interpersonal skills, motivation for treatment/growth, negotiates basic needs, patient is on a voluntary commitment, supportive family/friends    Patient Barriers/Limitations: lack of stable employment, marital/family conflict, no/few hobbies or interests, noncompliant with medication, self-care deficit, substance abuse, limited insight    Short Term Goals: decrease in depressive symptoms, decrease in anxiety symptoms, decrease in paranoid thoughts, ability to stay safe on the unit, improvement in insight, improvement in self care, sleep improvement, increase in group attendance, acceptance of need for psychiatric treatment, acceptance of psychiatric medications    Long Term Goals: improvement in depression, improvement in anxiety, improvement in reality testing, improved insight, acceptance of need for psychiatric medications, acceptance of need for psychiatric treatment, acceptance of need for psychiatric follow up after discharge, adequate self care, adequate sleep, adequate appetite, appropriate interaction with peers, appropriate interaction with family    Progress Towards Goals: starting psychiatric medications as prescribed, improving, less paranoid    Recommended Treatment: medication management, patient medication education, group therapy, milieu therapy, continued Behavioral Health psychiatric evaluation/assessment process    Treatment Frequency: daily medication monitoring, group and milieu therapy daily, monitoring through interdisciplinary rounds, monitoring through weekly patient care conferences    Expected Discharge Date:  2/2/22    Discharge Plan: discharge to home, referral for outpatient medication management with a psychiatrist, referral for outpatient psychotherapy    Treatment Plan Created/Updated By: Germain García MD

## 2022-01-26 NOTE — CONSULTS
Yoel Mills [address] 1985, 29 y o  female MRN: [id number]  Unit/Bed#: [facility]-79 Encounter: [id number]  Primary Care Provider: Chary Zhang MD   Date and time admitted to hospital: 1/25/2022  1:10 AM    Inpatient consult for Medical Clearance for [facility] patient  Consult performed by: Belinda Acosta PA-C  Consult ordered by: Vania Washington MD          Medical clearance for psychiatric admission  Assessment & Plan  Vitals reviewed and stable  Admission labs pending will follow  EKG pending  UDS (+) for THC, consistent with report of medical marijuana use   Patient is medically cleared for [facility] for treatment of the underlying psychiatric illness    Hypothyroidism  Assessment & Plan  Continue pre hospital levothyroxine 50mcg    Opioid use disorder, moderate, on maintenance therapy [facility])  Assessment & Plan  Continue pre hospital suboxone 8-2 mg BID  PDMP reviewed - Suboxone last filled on 01/04/22 for 14 days    GERD (gastroesophageal reflux disease)  Assessment & Plan  Continue home meds    Hypokalemia  Assessment & Plan  · K+ 3 2 on admission   · Replete with 40 meq KCL and check in AM   · Check Mag    Tobacco use  Assessment & Plan  · Reports 1/2 pack daily x 20 years   ·  on cessation  · Nicotine patch     Migraines  Assessment & Plan  · Patient reports control on home topamax (dose per chart, 100 mg TID) and ibuprofen prn   · Currently complains of L sided HA following typical migraine pattern, will treat supportively     * MDD (major depressive disorder), recurrent, severe, with psychosis ([facility] [address] )  Assessment & Plan  Management per primary team - psychiatry       ECT Clearance:   History of recent seizure or stroke:  Patient endorses history of shaking spells, unsure of proper seizure diagnosis  If ECT recommended, consider neurology consult     History of pheochromocytoma: No   History of active bleeding (Intracranial hemorrhage, aneurysm or AVM):  No   History of metallic implants in the head or neck:   No   History of increased intracranial pressure with mass effect:  No   Does the patient have a current arrhythmia? No baseline EKG obtained     Based on above criteria, Patient is NOT medically cleared for ECT at this time  Should it be recommended, patient should be further evaluated via EKG and neurology consult  Counseling / Coordination of Care Time: 30 minutes  Greater than 50% of total time spent on patient counseling and coordination of care  Collaboration of Care: Were Recommendations Directly Discussed with Primary Treatment Team? - No     History of Present Illness:    Tien Adams is a 29 y o  female with PMH of migraines, GERD, opioid use disorder, hypothyroidism, and asthma who is originally admitted to the psychiatry service due to worsening anxiety  We are consulted for medical clearance for admission to [facility] and treatment of underlying psychiatric illness  Patient reports smoking tobacco (1/2 pack daily x 20 years) as well as medical marijuana use consistent with UDS + for THC, but denies additional alcohol/drug use  Available admission lab work and vitals are acceptable  Patient feels a baseline physical health  She endorses chest pain and SOB during anxiety attacks but otherwise denies fevers/chills, chest pain, SOB, cough, wheeze, N/V/D, pain or injury  Patient appears medically stable for inpatient psychiatric treatment at this time  Please contact [facility] with any medical questions or concerns if issues should arise  Review of Systems:    Review of Systems   Constitutional: Negative for chills, fatigue and fever  HENT: Negative for congestion, hearing loss and rhinorrhea  Eyes: Negative for visual disturbance  Respiratory: Positive for cough, chest tightness and shortness of breath  Negative for wheezing           Patient endorses these symptoms during anxiety spells frequently but currently denies chest pain/SOB   Cardiovascular: Positive for palpitations  Negative for chest pain and leg swelling  Gastrointestinal: Negative for abdominal pain, constipation, diarrhea, nausea and vomiting  Genitourinary: Negative for difficulty urinating, dysuria, frequency and urgency  Musculoskeletal: Positive for back pain  History of LBP, no acute changes   Skin: Negative for rash and wound  Neurological: Positive for headaches  Negative for dizziness, weakness and light-headedness  Psychiatric/Behavioral: The patient is nervous/anxious  Past Medical and Surgical History:     Past Medical History:   Diagnosis Date    Anxiety     "severe"    Asthma     Bipolar disorder ([place] [address] )     Cigarette smoker     Depression     Disease of thyroid gland     Drug dependence, in remission ([place] [address] )     GERD (gastroesophageal reflux disease)     H/O: whooping cough     while pregnant    Hepatitis C, chronic (HCC)     Insomnia disorder     Liver disease     Hepatitis C    Migraine     Psychiatric illness     PTSD (post-traumatic stress disorder)     Substance abuse ([place] [address] )        Past Surgical History:   Procedure Laterality Date    OTHER SURGICAL HISTORY      body piercing    MS REPAIR UMBILICAL BAMH,5+H/R,RIZKD N/A 4/19/2016    Procedure: REPAIR HERNIA UMBILICAL WITH MESH;  Surgeon: Jennifer Atkins MD;  Location:  MAIN OR;  Service: General    VAGINAL DELIVERY      X 6    WISDOM TOOTH EXTRACTION      X 4    WOUND DEBRIDEMENT Right 1/17/2018    Procedure: RIGHT KNEE DEBRIDEMENT; [address]; AND LACERATION REPAIR  INTRAOPERATIVE ASSESSMENT OF PATELLA TENDON;  Surgeon: Hema Christie MD;  Location:  MAIN OR;  Service: Orthopedics       Meds/Allergies:    PTA meds:   Prior to Admission Medications   Prescriptions Last Dose Informant Patient Reported? Taking?    NARCAN 4 MG/0 1ML LIQD Not Taking at Unknown time Self Yes No   Sig: ADMINISTER A SINGLE spray INTO ONE NOSTRIL CALL 911 MAY REPEAT ONCE   Patient not taking: Reported on 2021   albuterol (PROVENTIL HFA,VENTOLIN HFA) 90 mcg/act inhaler Past Month at Unknown time Self No Yes   Sig: Inhale 2 puffs every 4 (four) hours as needed for wheezing   buprenorphine-naloxone (SUBOXONE) 2-0 5 mg per SL tablet 2022 at Unknown time Self Yes Yes   Sig: place 1 tablet under the tongue and dissolve once daily   cyclobenzaprine (FLEXERIL) 10 mg tablet 2022 at Unknown time Self No Yes   Sig: Take 1 tablet (10 mg total) by mouth 3 (three) times a day as needed for muscle spasms   hydrOXYzine pamoate (VISTARIL) 50 mg capsule Past Week at Unknown time Self No Yes   Si-2 HS   ibuprofen (MOTRIN) 800 mg tablet Past Week at Unknown time Self No Yes   Sig: Take 1 tablet (800 mg total) by mouth every 8 (eight) hours as needed for mild pain   levothyroxine 50 mcg tablet Past Week at Unknown time Self No Yes   Si daily   Patient taking differently: Take 25 mcg by mouth daily 1 daily    omeprazole (PriLOSEC) 20 mg delayed release capsule Past Week at Unknown time Self No Yes   Sig: Take 1 capsule (20 mg total) by mouth daily   topiramate (TOPAMAX) 100 mg tablet   No No   Sig: Take 1 tablet (100 mg total) by mouth 3 (three) times a day   traZODone (DESYREL) 50 mg tablet Past Week at Unknown time Self No Yes   Si-2 HS      Facility-Administered Medications: None       Allergies:    Allergies   Allergen Reactions    Amoxicillin      Pt states it never works for her        Social History:     Marital Status: Single    Substance Use History:   Social History     Substance and Sexual Activity   Alcohol Use Not Currently     Social History     Tobacco Use   Smoking Status Former Smoker    Packs/day: 1 00    Years: 12 00    Pack years: 12 00    Types: Cigarettes   Smokeless Tobacco Never Used   Tobacco Comment    Vaping     Social History     Substance and Sexual Activity   Drug Use Not Currently    Types: Methadone, Heroin, Cocaine, Marijuana, Prescription, Methamphetamines    Comment: 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       Family History:    Family History   Problem Relation Age of Onset    Hypertension Mother     Thyroid disease Mother     Hypertension Father     Prostate cancer Father     Anxiety disorder Sister        Physical Exam:     Vitals:   Blood Pressure: 118/75 (01/26/22 1500)  Pulse: 104 (01/26/22 1500)  Temperature: 97 9 °F (36 6 °C) (01/26/22 1500)  Temp Source: Temporal (01/26/22 1500)  Respirations: 16 (01/26/22 1500)  Height: 5' 4" (162 6 cm) (01/25/22 1653)  Weight - Scale: 64 kg (141 lb) (01/25/22 1653)  SpO2: 100 % (01/26/22 1500)    Physical Exam  Vitals and nursing note reviewed  Constitutional:       General: She is not in acute distress  Appearance: She is not toxic-appearing  HENT:      Head: Normocephalic and atraumatic  Nose: Nose normal    Eyes:      Extraocular Movements: Extraocular movements intact  Pupils: Pupils are equal, round, and reactive to light  Cardiovascular:      Rate and Rhythm: Normal rate and regular rhythm  Heart sounds: Normal heart sounds  No murmur heard  Pulmonary:      Effort: Pulmonary effort is normal  No respiratory distress  Breath sounds: Normal breath sounds  No wheezing, rhonchi or rales  Abdominal:      General: Abdomen is flat  Bowel sounds are normal       Palpations: Abdomen is soft  Tenderness: There is no abdominal tenderness  Musculoskeletal:      Right lower leg: No edema  Left lower leg: No edema  Skin:     General: Skin is warm and dry  Neurological:      Mental Status: She is alert and oriented to person, place, and time  Psychiatric:         Mood and Affect: Mood normal          Behavior: Behavior normal          Additional Data:     Lab Results: I have personally reviewed pertinent reports        Results from last 7 days   Lab Units 01/26/22  0636   WBC Thousand/uL 6 10   HEMOGLOBIN g/dL 13 7 HEMATOCRIT % 41 9   PLATELETS Thousands/uL 242   NEUTROS PCT % 41*   LYMPHS PCT % 51*   MONOS PCT % 6   EOS PCT % 2     Results from last 7 days   Lab Units 01/26/22  0636   SODIUM mmol/L 140   POTASSIUM mmol/L 2 9*   CHLORIDE mmol/L 105   CO2 mmol/L 30   BUN mg/dL 19   CREATININE mg/dL 0 83   ANION GAP mmol/L 5   CALCIUM mg/dL 8 9   ALBUMIN g/dL 4 0   TOTAL BILIRUBIN mg/dL 0 59   ALK PHOS U/L 51   ALT U/L 8   AST U/L 14   GLUCOSE RANDOM mg/dL 94             No results found for: HGBA1C        EKG, Pathology, and Other Studies Reviewed on Admission:   · EKG not obtained on admission     ** Please Note: This note has been constructed using a voice recognition system   **

## 2022-01-26 NOTE — PROGRESS NOTES
Met with Geno to complete her admission assessment  She states she moved to [facility] a year and half when she met a man on line  She described him as controlling  She has 6 children who live outside of [place]  She states selvin stressors are: Lying, cheating and stealing which she did not identify whom the person is that she is referring to  What she likes about her life is that she has money in the bank  What she does not like about her life is people who stand around and act as if they are not listening  She graduate high school and has taken a college class  She currently is unemployed and worked at [employer]   She enjoys reading, music, writing and exercise  She wants to learn life skills, coping skills to deal with her symptom, help her family to understand her illness and community supports and resources  She believes she will be ready for discharge when her medications are working  She appeared guarded  Therapist will give patient a journal and stress ball  Encouraged her to attend groups

## 2022-01-26 NOTE — PLAN OF CARE
AYALA Group Note  Pt sat away from the group and when asked name stated that it didn't matter, that she was just a person  AYALA/L reassured her that the unit was full of people and that was what we all are  Pt did not participate, but benefited from the social presence of the group      Problem: Ineffective Coping  Goal: Participates in unit activities  Description: Interventions:  - Provide therapeutic environment   - Provide required programming   - Redirect inappropriate behaviors   Outcome: Progressing

## 2022-01-26 NOTE — NURSING NOTE
Patient c/o difficulty sleeping and PRN Trazodone 50mg was administered and effective  Pt also c/o generalized pain and back spasms  PRN Tylenol 975mg was administered for 8/10 pain and Flexeril 10mg for back spasms  Both were effective and patient is asleep

## 2022-01-26 NOTE — PLAN OF CARE
Problem: Depression  Goal: Treatment Goal: Demonstrate behavioral control of depressive symptoms, verbalize feelings of improved mood/affect, and adopt new coping skills prior to discharge  Outcome: Progressing  Goal: Refrain from isolation  Description: Interventions:  - Develop a trusting relationship   - Encourage socialization   Outcome: Progressing  Goal: Refrain from self-neglect  Outcome: Progressing     Problem: SELF HARM/SUICIDALITY  Goal: Will have no self-injury during hospital stay  Description: INTERVENTIONS:  - Q 15 MINUTES: Routine safety checks  - Q WAKING SHIFT & PRN: Assess risk to determine if routine checks are adequate to maintain patient safety  - Encourage patient to participate actively in care by formulating a plan to combat response to suicidal ideation, identify supports and resources  Outcome: Progressing     Problem: DEPRESSION  Goal: Will be euthymic at discharge  Description: INTERVENTIONS:  - Administer medication as ordered  - Provide emotional support via 1:1 interaction with staff  - Encourage involvement in milieu/groups/activities  - Monitor for social isolation  Outcome: Progressing     Problem: PSYCHOSIS  Goal: Will report no hallucinations or delusions  Description: Interventions:  - Administer medication as  ordered  - Every waking shifts and PRN assess for the presence of hallucinations and or delusions  - Assist with reality testing to support increasing orientation  - Assess if patient's hallucinations or delusions are encouraging self-harm or harm to others and intervene as appropriate  Outcome: Progressing     Problem: BEHAVIOR  Goal: Pt/Family maintain appropriate behavior and adhere to behavioral management agreement, if implemented  Description: INTERVENTIONS:  - Assess the family dynamic   - Encourage verbalization of thoughts and concerns in a socially appropriate manner  - Assess patient/family's coping skills and non-compliant behavior (including use of illegal substances)  - Utilize positive, consistent limit setting strategies supporting safety of patient, staff and others  - Initiate consult with Case Management, Spiritual Care or other ancillary services as appropriate  - If a patient's/visitor's behavior jeopardizes the safety of the patient, staff, or others, refer to organization procedure     - Notify Security of behavior or suspected illegal substances which indicate the need for search of the patient and/or belongings  - Encourage participation in the decision making process about a behavioral management agreement; implement if patient meets criteria  Outcome: Progressing     Problem: SLEEP DISTURBANCE  Goal: Will exhibit normal sleeping pattern  Description: Interventions:  -  Assess the patients sleep pattern, noting recent changes  - Administer medication as ordered  - Decrease environmental stimuli, including noise, as appropriate during the night  - Encourage the patient to actively participate in unit groups and or exercise during the day to enhance ability to achieve adequate sleep at night  - Assess the patient, in the morning, encouraging a description of sleep experience  Outcome: Progressing     Problem: SELF CARE DEFICIT  Goal: Return ADL status to a safe level of function  Description: INTERVENTIONS:  - Administer medication as ordered  - Assess ADL deficits and provide assistive devices as needed  - Obtain PT/OT consults as needed  - Assist and instruct patient to increase activity and self care as tolerated  Outcome: Progressing     Problem: DISCHARGE PLANNING  Goal: Discharge to home or other facility with appropriate resources  Description: INTERVENTIONS:  - Identify barriers to discharge w/patient and caregiver  - Arrange for needed discharge resources and transportation as appropriate  - Identify discharge learning needs (meds, wound care, etc )  - Arrange for interpretive services to assist at discharge as needed  - Refer to Case Management Department for coordinating discharge planning if the patient needs post-hospital services based on physician/advanced practitioner order or complex needs related to functional status, cognitive ability, or social support system  Outcome: Progressing     Problem: Ineffective Coping  Goal: Participates in unit activities  Description: Interventions:  - Provide therapeutic environment   - Provide required programming   - Redirect inappropriate behaviors   Outcome: Progressing

## 2022-01-26 NOTE — DISCHARGE INSTR - APPOINTMENTS
Emerald Agrawal RN, our [employer], will be calling you after your discharge, on the phone number that you provided  She will be available as an additional support, if needed  If you wish to speak with her, you may contact [facility] at [phone]

## 2022-01-26 NOTE — H&P
Psychiatric Evaluation - Behavioral Health   Geno Courtney 29 y o  female MRN: [id number]  Unit/Bed#: [facility] 342-02 Encounter: [id number]    Assessment/Plan   Principal Problem:    MDD (major depressive disorder), recurrent, severe, with psychosis ([facility] )  Active Problems:    Hypothyroidism    Opioid use disorder, moderate, on maintenance therapy ([facility] )    Medical clearance for psychiatric admission    GERD (gastroesophageal reflux disease)    Plan:   Concern for MDD with psychotic features  History of Bipolar disorder is lower on differential due to lack of clear manic episodes in past given prior episodes were reported to all be in the context of substance use  Will start Cymbalta 30 mg daily  Will start Zyprexa 5 mg QHS  All current active medications have been reviewed  Encourage group therapy, milieu therapy and occupational therapy  Behavioral Health checks every 15 minutes  Signed 72 hour notice    ------------------------------------------    Chief Complaint: Depression    History of Present Illness     Nehemias Bahclary Brookskrishan Galindo is a 29 y o  female with a history of depression versus bipolar disorder, anxiety, ADHD and substance use who was admitted to the inpatient psychiatric unit on a voluntary 201 commitment basis due to depression, anxiety, auditory hallucinations, delusional thoughts and disorganized behavior  Symptoms prior to admission included worsening depression, poor concentration, difficulty sleeping, auditory hallucinations, delusional thoughts, disorganized behavior, poor self-care and noncompliance with medications  Stressors preceding admission included family problems, relationship problems, chronic pain, limited support, social difficulties and chronic mental illness  Records reviewed  Patient presented to the ED due to above symptoms, primarily increased depression and anxiety   While in the ED she was initially found to be restless, disorganized, and demonstrating concern for auditory hallucinations and delusional and paranoid thoughts regarding being tracked  She did receive 10 mg of Haldol and on assessment afterwards was notably more organized and less distressed, though did not like the way that Haldol made her feel  She did sign a 201 for voluntary admission in the ED  Geno is somewhat disorganized initially during interview, having difficulty answering questions directly and often tangential in her responses  However, she does become a bit more organized and able to answer questions more directly over the course of the interview  She reports that she has been struggling with depression on and off since childhood and has been in and out of treatment  She states that most recently she has been off of all medications except for Suboxone for opioid use disorder MAT for approximately one year  She endorses that she started having worsening of her depression since Christmas, after a visit to her mother's  She has difficulty elaborating on any specific stressors or events that occurred during this visit, though does feel that it did not go well  She reports that she has been hearing people speak negatively about her, though will not find anyone when she goes to investigate  When discussing this further, she reports that she feels she must "hear better than other people"  She does feel that others are out to get her but does not discuss any concerns of being tracked during today's assessment  She reports a prior diagnosis of bipolar disorder but states that her only periods of time where she was going without need for sleep for several days with excessively elevated mood was while she was on substances  She reports currently that she is feeling depressed with fluctuations in sleep and low energy, decreased interest in things, guilt, and poor concentration  She denies any SI or HI  She denies any visual hallucinations   Initially she denies auditory hallucinations but does report what appear to be hallucinations as above  Psychiatric Review Of Systems:  sleep: yes, fluctuations in sleep  appetite changes: yes  weight changes: yes  energy/anergy: yes  interest/pleasure/anhedonia: yes  somatic symptoms: yes  anxiety/panic: yes  dennis: reports history of periods of time with decreased need for sleep for up to 4-7 days but reports this has only occurred during times of substance use  guilty/hopeless: yes  self injurious behavior/risky behavior: no    Historical Information     Past Psychiatric History:   Current outpatient providers: None, sees [facility] for Suboxone but no psychiatrist or therapist currently  Inpatient Admissions: Has had two admissions, most recently [facility], believing this was in 69 but uncertain, and states this was for detox primarily  Past Suicide attempts: Denies  Past Violent behavior: Denies  Past Psychiatric medication trial: Uncertain of all medications, reports Zoloft, Prozac, Cymbalta, Topamax, Gabapentin, Klonopin  Per chart, was also on Abilify, Thorazine, Doxepin, Clonidine, Lamictal, Buspar  Substance Abuse History:  E-Cigarette/Vaping    E-Cigarette Use Never User       E-Cigarette/Vaping Substances       Social History     Tobacco History     Smoking Status  Former Smoker Smoking Frequency  1 pack/day for 12 years (12 pk yrs) Smoking Tobacco Type  Cigarettes    Smokeless Tobacco Use  Never Used    Tobacco Comment  Vaping          Alcohol History     Alcohol Use Status  Not Currently          Drug Use     Drug Use Status  Not Currently Types  Cocaine, Heroin, Marijuana, Methadone, Methamphetamines, Prescription Comment  patient in recovery   clean since oct of 2019          Sexual Activity     Sexually Active  Yes Partners  Male Birth Control/Protection  Other          Activities of Daily Living    Not Asked                 I have assessed this patient for substance use within the past 12 months    Alcohol use: denies current use  Recreational drug use:   Cocaine:  denies use  Heroin:  prior history of heroin use including IV  Reports no use in past 2 years since being on Suboxone  Marijuana:  uses occasionally  Other drugs: Suboxone maintenance: uses as prescribed 8 mg BID  History of Inpatient/Outpatient rehabilitation program: yes  Smoking history: 1 pack per day       Family Psychiatric History:   Unknown history of psychiatric illness in family  Reports that her father struggles with drug and alcohol use  She denies any known history of attempted suicides in the family  Social History:  Education: some college  Learning Disabilities: none  Marital History: co-habitating  Children: 5 children, currently does not live with any  Had been most recently been in custody of her youngest child, her 10year old son, but his paternal grandmother is currently taking care of him  Living Arrangement: lives in home with significant other  Occupational History: unemployed, on permanent disability  Functioning Relationships: limited support system  Legal History: past incarceration due to DUI      Traumatic History:   Abuse: none verbalized    Past Medical History:  Past Medical History:   Diagnosis Date    Anxiety     "severe"    Asthma     Bipolar disorder ([place] [address] )     Cigarette smoker     Depression     Disease of thyroid gland     Drug dependence, in remission ([place] [address] )     GERD (gastroesophageal reflux disease)     H/O: whooping cough     while pregnant    Hepatitis C, chronic ([place] [address] )     Insomnia disorder     Liver disease     Hepatitis C    Migraine     Psychiatric illness     PTSD (post-traumatic stress disorder)     Substance abuse ([place] [address] )      History of Seizures: no  History of Head injury with loss of consciousness: no    Medical Review Of Systems:  Pertinent items are noted in HPI      Meds/Allergies   Allergies   Allergen Reactions    Amoxicillin      Pt states it never works for her        Objective   Vital signs in last 25 hours:   Temp:  [97 9 °F (36 6 °C)-98 1 °F (36 7 °C)] 98 1 °F (36 7 °C)  HR:  [] 99  Resp:  [16] 16  BP: ()/(63-71) 111/71    No intake or output data in the 24 hours ending 01/26/22 1348    Mental Status Evaluation:  Appearance:  casually dressed, dressed appropriately, adequate grooming   Behavior:  pleasant, cooperative, somewhat guarded, limited eye contact   Speech:  normal rate and volume   Mood:  depressed   Affect:  constricted, still at times reactive   Thought Process:  initially disorganized and tangential but becomes more organized and linear over course of interview   Associations: tangential associations   Thought Content:  paranoid ideation, negative thinking   Perceptual Disturbances: denies auditory or visual hallucinations when asked, does not appear to be actively responding to internal stimuli, though concern based on history for hallucinations of negative comments about her   Risk Potential: Suicidal ideation - None  Homicidal ideation - None  Potential for aggression - Not at present   Sensorium:  oriented to person, place and time/date   Memory:  recent and remote memory grossly intact   Consciousness:  alert and awake   Attention/Concentration: attention span and concentration are age appropriate   Insight:  limited   Judgment: limited   Gait/Station: normal gait/station   Motor Activity: no abnormal movements     Laboratory Results: I have personally reviewed all pertinent laboratory/tests results    Most Recent Labs:   Lab Results   Component Value Date    WBC 6 10 01/26/2022    RBC 4 94 01/26/2022    HGB 13 7 01/26/2022    HCT 41 9 01/26/2022     01/26/2022    RDW 13 4 01/26/2022    NEUTROABS 2 50 01/26/2022    SODIUM 140 01/26/2022    K 2 9 (L) 01/26/2022     01/26/2022    CO2 30 01/26/2022    BUN 19 01/26/2022    CREATININE 0 83 01/26/2022    GLUC 94 01/26/2022    CALCIUM 8 9 01/26/2022    AST 14 01/26/2022    ALT 8 01/26/2022    ALKPHOS 51 01/26/2022    TP 6 7 01/26/2022    ALB 4 0 01/26/2022    TBILI 0 59 01/26/2022    CHOLESTEROL 173 01/26/2022    HDL 19 (L) 01/26/2022    TRIG 108 01/26/2022    LDLCALC 132 (H) 01/26/2022    NONHDLC 154 01/26/2022    [id number] 2 740 01/26/2022    FREET4 5 7 02/10/2017    PREGUR negative 01/25/2022    PREGSERUM Negative 01/26/2022       Imaging Studies: No results found  Code Status: Level 1 - Full Code  Advance Directive and Living Will: <no information>      Patient Strengths/Assets: ability for insight, cooperative, communication skills, motivated, negotiates basic needs, patient is on a voluntary commitment, supportive family/friends    Patient Barriers/Limitations: lack of stable employment, marital/family conflict, noncompliant with medication, self-care deficit, substance abuse    Risks / Benefits of Treatment:    Risks, benefits, and possible side effects of medications explained to patient and patient verbalizes understanding and agreement for treatment  Counseling / Coordination of Care:    Patient's presentation on admission and proposed treatment plan discussed with treatment team   Diagnosis, medication changes and treatment plan reviewed with patient  Inpatient Psychiatric Certification:    Estimated length of stay: 7 midnights    Based upon physical, mental and social evaluations, I certify that inpatient psychiatric services are medically necessary for this patient for a duration of 7 midnights for the treatment of MDD (major depressive disorder), recurrent, severe, with psychosis ([facility] )    Available alternative community resources do not meet the patient's mental health care needs  I further attest that an established written individualized plan of care has been implemented and is outlined in the patient's medical records      Rodolph Dandy, MD 01/26/22

## 2022-01-26 NOTE — DISCHARGE INSTR - OTHER ORDERS
Crisis Information  If you are experiencing a mental health emergency, you may call the [id number] [facility] 24 hours a day, 7 days per week at [phone]  In [facility], call [phone]  When you need someone to listen, the [facility] is available for 16 hours a day, 7 days a week, from the time of 7-10am and 2pm-2am   It is not available from the hours of 2am-6am and 10am-2pm  A representative can be reached at [phone]

## 2022-01-26 NOTE — SOCIAL WORK
Patient Intake   Living Arrangement Pt resides with her boyfriend   Can patient return home Yes   Address to discharge to [facility], [address]   Patient's Telephone Number [phone]   Access to firearms No   Type of work Pt receives SSI $749 a month   School grade/year HS diploma   Marital Status/Children In a relationship, has a 10year old son, paternal grandmother is caring for the child  Pt reports having 6 children total, but the older children were adopted out   Admission Status    Status of admission [facility]    Patient History   Stressor/Trigger Pt has been noncompliant with her medications prior to admission    Treatment History Pt reports multiple inpatient psychiatric hospitalizations, last was [facility] in 2017   Current psychiatrist/therapist None, pt reports her PCP was prescribing her medications    Suicide Attempts Denies   Family History of Mental Health Pt reports a family hx of mental health but did not provide specifics  ACT/ICM None   Legal Issues Denies   Substance Abuse UDS was positive for THC, pt reports using medical marijuana  Pt is on Suboxone, reports she was going to [facility] but switched to [facility] in [facility], pt reports she gave them a verbal discharge as it is difficult to get there    Pt reports she has been clean from opiates since 2020   Trauma/Losses Pt did not wish to disclose trauma hx, pt's son's biological father is        Releases of Information  Boyfriend Parth, [facility] [employer], and PCP

## 2022-01-26 NOTE — SOCIAL WORK
Worker spoke with pt's boyfriend Lester Amaya at [phone] regarding pt  Worker provided him with update and pt signing 72 hour notice form  Worker informed him that if pt does not rescind 67 hour notice that pt would be discharged Friday, depending upon her symptoms, he verbalized understanding  He reports that he did speak with the pt and she sounded better  He reports that for the last 1 1/2 months pt has been paranoid and making bizarre statements  He reports that the doctor pt was seeing, on an outpatient basis took her off the medication about 2 months ago  Worker asked if pt did well on medications, he reported yes, at baseline pt does not have any paranoia  Pt's boyfriend did inform worker that pt's 10year old son is currently with the paternal grandmother, pt's son is set to be dropped off on Sunday, the son's paternal grandmother will not drop off pt's son if pt is not there  Pt's boyfriend is supportive and worker will continue to follow-up as needed

## 2022-01-26 NOTE — NURSING NOTE
Patient has been about the unit briefly  Pleasant and cooperative  Soft spoken  Denies SI, denies AH  " I only hear my own thoughts"  Paranoid and tangential  Disorganized  Patient began talking about wanting to "keep her family safe" then began talking about her fiance telling her that the [employer] and [employer] were watching her  Said that she does feel that she needs her medications adjusted  No behavioral issues  Will monitor

## 2022-01-27 LAB
ANION GAP SERPL CALCULATED.3IONS-SCNC: 4 MMOL/L (ref 5–14)
BUN SERPL-MCNC: 15 MG/DL (ref 5–25)
CALCIUM SERPL-MCNC: 8.8 MG/DL (ref 8.4–10.2)
CHLORIDE SERPL-SCNC: 105 MMOL/L (ref 97–108)
CO2 SERPL-SCNC: 29 MMOL/L (ref 22–30)
CREAT SERPL-MCNC: 0.82 MG/DL (ref 0.6–1.2)
FOLATE SERPL-MCNC: 8.9 NG/ML (ref 3.1–17.5)
GFR SERPL CREATININE-BSD FRML MDRD: 93 ML/MIN/1.73SQ M
GLUCOSE P FAST SERPL-MCNC: 97 MG/DL (ref 70–99)
GLUCOSE SERPL-MCNC: 97 MG/DL (ref 70–99)
POTASSIUM SERPL-SCNC: 3.3 MMOL/L (ref 3.6–5)
RPR SER QL: NORMAL
SODIUM SERPL-SCNC: 138 MMOL/L (ref 137–147)
VIT B12 SERPL-MCNC: 572 PG/ML (ref 100–900)

## 2022-01-27 PROCEDURE — 99232 SBSQ HOSP IP/OBS MODERATE 35: CPT | Performed by: STUDENT IN AN ORGANIZED HEALTH CARE EDUCATION/TRAINING PROGRAM

## 2022-01-27 PROCEDURE — 80048 BASIC METABOLIC PNL TOTAL CA: CPT

## 2022-01-27 RX ORDER — IBUPROFEN 400 MG/1
400 TABLET ORAL EVERY 6 HOURS PRN
Status: DISCONTINUED | OUTPATIENT
Start: 2022-01-27 | End: 2022-01-28 | Stop reason: HOSPADM

## 2022-01-27 RX ORDER — IBUPROFEN 400 MG/1
800 TABLET ORAL EVERY 8 HOURS PRN
Status: DISCONTINUED | OUTPATIENT
Start: 2022-01-27 | End: 2022-01-28 | Stop reason: HOSPADM

## 2022-01-27 RX ORDER — IBUPROFEN 600 MG/1
600 TABLET ORAL EVERY 6 HOURS PRN
Status: DISCONTINUED | OUTPATIENT
Start: 2022-01-27 | End: 2022-01-28 | Stop reason: HOSPADM

## 2022-01-27 RX ORDER — POTASSIUM CHLORIDE 20 MEQ/1
40 TABLET, EXTENDED RELEASE ORAL ONCE
Status: DISCONTINUED | OUTPATIENT
Start: 2022-01-27 | End: 2022-01-28 | Stop reason: HOSPADM

## 2022-01-27 RX ORDER — GABAPENTIN 100 MG/1
100 CAPSULE ORAL 3 TIMES DAILY
Status: DISCONTINUED | OUTPATIENT
Start: 2022-01-27 | End: 2022-01-28 | Stop reason: HOSPADM

## 2022-01-27 RX ORDER — OLANZAPINE 10 MG/1
10 TABLET ORAL
Status: DISCONTINUED | OUTPATIENT
Start: 2022-01-27 | End: 2022-01-28 | Stop reason: HOSPADM

## 2022-01-27 RX ADMIN — CYCLOBENZAPRINE HYDROCHLORIDE 10 MG: 10 TABLET, FILM COATED ORAL at 16:37

## 2022-01-27 RX ADMIN — IBUPROFEN 800 MG: 400 TABLET ORAL at 08:07

## 2022-01-27 RX ADMIN — BUPRENORPHINE AND NALOXONE 8 MG: 8; 2 FILM BUCCAL; SUBLINGUAL at 08:07

## 2022-01-27 RX ADMIN — BUPRENORPHINE AND NALOXONE 8 MG: 8; 2 FILM BUCCAL; SUBLINGUAL at 21:06

## 2022-01-27 RX ADMIN — HYDROXYZINE HYDROCHLORIDE 50 MG: 50 TABLET, FILM COATED ORAL at 21:05

## 2022-01-27 RX ADMIN — DULOXETINE 30 MG: 30 CAPSULE, DELAYED RELEASE ORAL at 08:07

## 2022-01-27 RX ADMIN — TOPIRAMATE 100 MG: 100 TABLET, FILM COATED ORAL at 08:07

## 2022-01-27 RX ADMIN — GABAPENTIN 100 MG: 100 CAPSULE ORAL at 16:37

## 2022-01-27 RX ADMIN — GABAPENTIN 100 MG: 100 CAPSULE ORAL at 21:05

## 2022-01-27 RX ADMIN — ACETAMINOPHEN 975 MG: 325 TABLET ORAL at 13:36

## 2022-01-27 RX ADMIN — TOPIRAMATE 100 MG: 100 TABLET, FILM COATED ORAL at 16:37

## 2022-01-27 RX ADMIN — CYCLOBENZAPRINE HYDROCHLORIDE 10 MG: 10 TABLET, FILM COATED ORAL at 21:09

## 2022-01-27 RX ADMIN — TOPIRAMATE 100 MG: 100 TABLET, FILM COATED ORAL at 21:05

## 2022-01-27 RX ADMIN — NICOTINE 1 PATCH: 21 PATCH, EXTENDED RELEASE TRANSDERMAL at 08:05

## 2022-01-27 RX ADMIN — LEVOTHYROXINE SODIUM 50 MCG: 50 TABLET ORAL at 05:59

## 2022-01-27 RX ADMIN — TRAZODONE HYDROCHLORIDE 50 MG: 50 TABLET ORAL at 21:05

## 2022-01-27 RX ADMIN — OLANZAPINE 10 MG: 10 TABLET, FILM COATED ORAL at 21:05

## 2022-01-27 RX ADMIN — ALBUTEROL SULFATE 2 PUFF: 90 AEROSOL, METERED RESPIRATORY (INHALATION) at 05:59

## 2022-01-27 RX ADMIN — PANTOPRAZOLE SODIUM 20 MG: 20 TABLET, DELAYED RELEASE ORAL at 05:59

## 2022-01-27 RX ADMIN — LORAZEPAM 0.5 MG: 0.5 TABLET ORAL at 13:36

## 2022-01-27 NOTE — PROGRESS NOTES
Progress Note - Behavioral Health   Geno Courtney 29 y o  female MRN: [id number]  Unit/Bed#: [facility] 342-02 Encounter: [id number]    Assessment/Plan   Principal Problem:    MDD (major depressive disorder), recurrent, severe, with psychosis ([facility] )  Active Problems:    Hypothyroidism    Opioid use disorder, moderate, on maintenance therapy ([facility])    Medical clearance for psychiatric admission    GERD (gastroesophageal reflux disease)    Hypokalemia    Tobacco use    Migraines    Recommended Treatment:   Increase Zyprexa to 10 mg QHS  Continue Cymbalta 30 mg daily  Start Gabapentin 100 mg TID for anxiety  Continue all other medications as below  Encourage group therapy, milieu therapy and occupational therapy  Behavioral Health checks every 15 minutes  All current active medications have been reviewed  Encourage group therapy, milieu therapy and occupational therapy  Behavioral Health checks every 15 minutes  Signed 72 hour notice    ----------------------------------------      Subjective:  Per nursing staff, patient continues to be anxious and at times disorganized and tangential   No acute events overnight, however  Patient reports today that she does continue to feel anxious and does feel stressed by being on the psychiatric unit due to the acuity of other patients on the unit  Patient reports she does feel that she is well enough to go home and wishes to be able to take care of her son when his grandmother drops him off, which her  is supposed to occur this weekend  Patient does become somewhat more disorganized and irritable over the course of interview, though seen later and is more calm and appropriate  Spoke with patient's significant other who did feel the patient sounds much better on the phone  Her significant other reports that, prior to coming to the hospital she was still able to function well with her duties and take care of her son very well    He states that all of her son's needs were being met and the patient never demonstrated any aggression or agitation  He does feel that she is currently doing better  He reports that there is a gun in the home but this is locked away and she does not have access to it  Patient today denies any SI, HI, or AVH  She is somewhat paranoid about other people trying to DAYBREAK OF ML her with regard to decisions made about her, though reality testing does appear to be improved from prior assessment  Behavior over the last 24 hours:  improved  Sleep: normal  Appetite: normal  Medication side effects: No  ROS: all other systems are negative    Mental Status Evaluation:  Appearance:  casually dressed, dressed appropriately, adequate grooming   Behavior:  cooperative, at times agitated, fair eye contact   Speech:  normal rate and volume   Mood:  anxious   Affect:  normal range and intensity   Thought Process:  generally linear, becomes more disorganized and tangential as she becomes more anxious   Associations: tangential associations   Thought Content:  some paranoia   Perceptual Disturbances: denies auditory or visual hallucinations when asked, does not appear responding to internal stimuli   Risk Potential: Suicidal ideation - None  Homicidal ideation - None  Potential for aggression - Not at present   Sensorium:  oriented to person, place and time/date   Memory:  recent and remote memory grossly intact   Consciousness:  alert and awake   Attention/Concentration: attention span and concentration are age appropriate   Insight:  limited   Judgment: limited   Gait/Station: normal gait/station   Motor Activity: no abnormal movements     Medications: all current active meds have been reviewed and planned medication changes: Increase Zyprexa to 10 mg QHS, add gabapentin 100 mg TID    Current Facility-Administered Medications   Medication Dose Route Frequency Provider Last Rate    acetaminophen  650 mg Oral Q6H PRN Waleska Rowland MD      acetaminophen  650 mg Oral Q4H PRN Emma Swenson MD      acetaminophen  975 mg Oral Q6H PRN Emma Swenson MD      albuterol  2 puff Inhalation Q4H PRN Emma Swenson MD      aluminum-magnesium hydroxide-simethicone  30 mL Oral Q4H PRN Emma Swenson, MD      artificial tear  1 application Both Eyes N1S PRN Emma Swenson MD      benztropine  1 mg Intramuscular Q4H PRN Max 6/day Emma Swenson MD      benztropine  1 mg Oral Q4H PRN Max 6/day Emma MD Leela      buprenorphine-naloxone  8 mg Sublingual BID Kinsey Reza PA-C      cyclobenzaprine  10 mg Oral TID PRN Emma Swenson MD      hydrOXYzine HCL  50 mg Oral Q6H PRN Max 4/day Emma Swenson MD      Or    diphenhydrAMINE  50 mg Intramuscular Q6H PRN Emma Swenson, MD      DULoxetine  30 mg Oral Daily Emma Swenson MD      gabapentin  100 mg Oral TID Emma Swenson MD      hydrOXYzine HCL  100 mg Oral Q6H PRN Max 4/day Emma Swenson MD      Or    LORazepam  2 mg Intramuscular Q6H PRN Emma Swenson MD      hydrOXYzine HCL  25 mg Oral Q6H PRN Max 4/day Emma Swenson MD      hydrOXYzine HCL  50 mg Oral HS Emma Swenson MD      ibuprofen  400 mg Oral Q6H PRN Emma Swenson MD      ibuprofen  600 mg Oral Q6H PRN Emma Swenson MD      ibuprofen  800 mg Oral Q8H PRN Emma Swenson MD      levothyroxine  50 mcg Oral Early Morning Emma Swenson MD      LORazepam  0 5 mg Oral Q6H PRN Emma Swenson MD      nicotine  1 patch Transdermal Daily Emma Swenson MD      nicotine polacrilex  2 mg Oral Q2H PRN Emma Swenson MD      OLANZapine  10 mg Oral Q3H PRN Max 3/day Emma Swenson MD      Or    OLANZapine  10 mg Intramuscular Q3H PRN Max 3/day Emma Swenson MD      OLANZapine  5 mg Oral Q3H PRN Max 6/day Emma Swenson MD      Or    OLANZapine  5 mg Intramuscular Q3H PRN Max 6/day Emma Swenson MD      OLANZapine  10 mg Oral HS Emma Swenson MD      OLANZapine  2 5 mg Oral Q3H PRN Max 8/day Santos Billy Patricia Arreola MD      pantoprazole  20 mg Oral Early Morning Rj Valdes MD      polyethylene glycol  17 g Oral Daily PRN Rj Valdes MD      potassium chloride  40 mEq Oral Once [place], [place]      senna-docusate sodium  1 tablet Oral Daily PRN Rj Valdes MD      topiramate  100 mg Oral TID Jose Eduardo [place], PA-C      traZODone  50 mg Oral HS Rj Valdes MD      traZODone  50 mg Oral HS PRN Rj Valdes MD         Labs: I have personally reviewed all pertinent laboratory/tests results  Most Recent Labs:   Lab Results   Component Value Date    WBC 6 10 01/26/2022    RBC 4 94 01/26/2022    HGB 13 7 01/26/2022    HCT 41 9 01/26/2022     01/26/2022    RDW 13 4 01/26/2022    NEUTROABS 2 50 01/26/2022    SODIUM 138 01/27/2022    K 3 3 (L) 01/27/2022     01/27/2022    CO2 29 01/27/2022    BUN 15 01/27/2022    CREATININE 0 82 01/27/2022    GLUC 97 01/27/2022    CALCIUM 8 8 01/27/2022    AST 14 01/26/2022    ALT 8 01/26/2022    ALKPHOS 51 01/26/2022    TP 6 7 01/26/2022    ALB 4 0 01/26/2022    TBILI 0 59 01/26/2022    CHOLESTEROL 173 01/26/2022    HDL 19 (L) 01/26/2022    TRIG 108 01/26/2022    LDLCALC 132 (H) 01/26/2022    NONHDLC 154 01/26/2022    TBN0PFVDDWEL 2 740 01/26/2022    FREET4 5 7 02/10/2017    PREGUR negative 01/25/2022    PREGSERUM Negative 01/26/2022    RPR Non-Reactive 01/26/2022       Progress Toward Goals: improving    Risks / Benefits of Treatment:    Risks, benefits, and possible side effects of medications explained to patient and patient verbalizes understanding and agreement for treatment  Counseling / Coordination of Care:    Patient's progress discussed with staff in treatment team meeting  Medications, treatment progress and treatment plan reviewed with patient      Rj Valdes MD 01/27/22

## 2022-01-27 NOTE — PROGRESS NOTES
01/27/22 1244   Team Meeting   Meeting Type Tx Team Meeting   Initial Conference Date 01/27/22   Team Members Present   Team Members Present Physician;Nurse;   Physician Team Member Dr Claudell  Team Member [address] Management Team Member Apurva   Patient/Family Present   Patient Present Yes   Patient's Family Present No   Treatment plan was reviewed and discussed  Medications were discussed  Pt has been attending group therapy  Pt upset regarding 72 hour and declined to sign treatment plan, stated "I'm not signing anymore papers"

## 2022-01-27 NOTE — PROGRESS NOTES
01/27/22 0807   Team Meeting   Meeting Type Daily Rounds   Team Members Present   Team Members Present Physician;Nurse;   Physician Team Member Dr Nayeli Marcelino Team Member [facility],[address] [place] [address] Management Team Member Apurva   Patient/Family Present   Patient Present No   Patient's Family Present No   Pt medication compliant  Pt with paranoia  Pt denies symptoms  Pt has 72 hour notice that expires on 1/29, will ask to rescind, consider 302 depending upon symptoms  Discharge to be determined

## 2022-01-27 NOTE — NURSING NOTE
Pt requested and given PRN albuterol inhaler with am meds  Pt asked about discharge as she signed a 72 hour notice to withdraw from treatment and wanted to know who would sign her discharge papers  Directed to speak with psychiatrist; agreeable

## 2022-01-27 NOTE — PLAN OF CARE
Pt has an intake at [facility] on 2/3 at 11:00am via phone       Problem: DISCHARGE PLANNING  Goal: Discharge to home or other facility with appropriate resources  Description: INTERVENTIONS:  - Identify barriers to discharge w/patient and caregiver  - Arrange for needed discharge resources and transportation as appropriate  - Identify discharge learning needs (meds, wound care, etc )  - Arrange for interpretive services to assist at discharge as needed  - Refer to Case Management Department for coordinating discharge planning if the patient needs post-hospital services based on physician/advanced practitioner order or complex needs related to functional status, cognitive ability, or social support system  Outcome: Progressing

## 2022-01-27 NOTE — NURSING NOTE
Patient visible in the milieu in brief intervals  Quiet and to herself  Appears depressed and anxious  Preoccupied on discharge  States that she needs to be discharged because she has a court hearing that she needs to go to  Compliant with morning medications  Will monitor

## 2022-01-27 NOTE — PROGRESS NOTES
Patient signed her 72 hour notice and is scheduled for discharge tomorrow, She identified her symptoms that got her into the hospital are not sleeping, walking around, being paranoid and depression  Her coping skills are her medications, writing and having a positive support sytem  She has two people she believes are supportive one being her boyfriend  Her warning signs that she needs help are: mood change, lack of energy, and major anxiety  After we completed her form she came back to this writer paranoid that she will not be discharged tomorrow  Informed her that this writer has not heard that and that she would benefit from going to group vs worrying about her discharge; she went to group

## 2022-01-27 NOTE — PROGRESS NOTES
01/27/22 1000   Activity/Group Checklist   Group Other (Comment)  (Group Art Therapy/Psychodynamic, Identifying Potential)   Attendance Attended   Attendance Duration (min) Greater than 60   Interactions Interacted appropriately   Affect/Mood Appropriate   Goals Achieved Able to listen to others; Able to engage in interactions; Increased hopefulness; Able to recieve feedback; Able to give feedback to another  (Able to engage materials; full participation/gained insight)

## 2022-01-27 NOTE — NURSING NOTE
Patient calm, cooperative but appears depressed/sad  No behaviors, PRN Motrin 800mg for pain with slight effectiveness; PT requested  Albuterol for wheezing  Denies SI/HI/AH/VH  Complaint with med and will continue to monitor

## 2022-01-27 NOTE — SOCIAL WORK
Worker spoke with pt's boyfriend Kellieshauna Estrada along with psychiatrist to discuss pt's care and discharge plan  Pt's boyfriend reports he spoke with the pt earlier and pt sounded good  Pt's boyfriend did report that pt continues to have ongoing anxiety regarding custody case with her 10year old son  Pt's boyfriend verified that pt takes care of the child and has no issues, even while paranoid and tangential   Pt's boyfriend did report that he has a gun but it is locked and secured and pt does not have access to it  Pt's boyfriend is supportive, reports that he can pick pt up tomorrow at 1700  Worker informed pt's boyfriend that pt does have an intake scheduled for next week for outpatient psychiatry and therapy, he was in agreement with such

## 2022-01-27 NOTE — NURSING NOTE
Pt denies SI/Hi/AH/VH but appears internally preoccupied  Pt appears paranoid  Pt calm and pleasant during conversation  Pt c/o BL knee pain but denies need for pain medication  Present in dayroom and milieu  1637 Pt c/o muscle spasms in lower back going down into legs  PRN Flexeril 10 mg Po given  1737 Pt states Medication was effective  Medication and meal compliant  Social with select peers  Will continue to monitor

## 2022-01-28 VITALS
RESPIRATION RATE: 16 BRPM | TEMPERATURE: 97.9 F | SYSTOLIC BLOOD PRESSURE: 138 MMHG | HEART RATE: 92 BPM | WEIGHT: 141 LBS | HEIGHT: 64 IN | BODY MASS INDEX: 24.07 KG/M2 | DIASTOLIC BLOOD PRESSURE: 84 MMHG | OXYGEN SATURATION: 98 %

## 2022-01-28 PROBLEM — Z00.8 MEDICAL CLEARANCE FOR PSYCHIATRIC ADMISSION: Status: RESOLVED | Noted: 2022-01-25 | Resolved: 2022-01-28

## 2022-01-28 PROBLEM — E87.6 HYPOKALEMIA: Status: RESOLVED | Noted: 2022-01-26 | Resolved: 2022-01-28

## 2022-01-28 LAB
ANION GAP SERPL CALCULATED.3IONS-SCNC: 5 MMOL/L (ref 5–14)
BUN SERPL-MCNC: 11 MG/DL (ref 5–25)
CALCIUM SERPL-MCNC: 9 MG/DL (ref 8.4–10.2)
CHLORIDE SERPL-SCNC: 107 MMOL/L (ref 97–108)
CO2 SERPL-SCNC: 30 MMOL/L (ref 22–30)
CREAT SERPL-MCNC: 0.96 MG/DL (ref 0.6–1.2)
GFR SERPL CREATININE-BSD FRML MDRD: 76 ML/MIN/1.73SQ M
GLUCOSE P FAST SERPL-MCNC: 108 MG/DL (ref 70–99)
GLUCOSE SERPL-MCNC: 108 MG/DL (ref 70–99)
MAGNESIUM SERPL-MCNC: 2 MG/DL (ref 1.6–2.3)
POTASSIUM SERPL-SCNC: 3.9 MMOL/L (ref 3.6–5)
SODIUM SERPL-SCNC: 142 MMOL/L (ref 137–147)

## 2022-01-28 PROCEDURE — 99238 HOSP IP/OBS DSCHRG MGMT 30/<: CPT | Performed by: STUDENT IN AN ORGANIZED HEALTH CARE EDUCATION/TRAINING PROGRAM

## 2022-01-28 PROCEDURE — 83735 ASSAY OF MAGNESIUM: CPT | Performed by: STUDENT IN AN ORGANIZED HEALTH CARE EDUCATION/TRAINING PROGRAM

## 2022-01-28 PROCEDURE — 80048 BASIC METABOLIC PNL TOTAL CA: CPT | Performed by: STUDENT IN AN ORGANIZED HEALTH CARE EDUCATION/TRAINING PROGRAM

## 2022-01-28 RX ORDER — BUPRENORPHINE AND NALOXONE 8; 2 MG/1; MG/1
8 FILM, SOLUBLE BUCCAL; SUBLINGUAL 2 TIMES DAILY
Qty: 14 FILM | Refills: 0 | Status: SHIPPED | OUTPATIENT
Start: 2022-01-28 | End: 2022-02-04

## 2022-01-28 RX ORDER — GABAPENTIN 100 MG/1
100 CAPSULE ORAL 3 TIMES DAILY
Qty: 90 CAPSULE | Refills: 1 | Status: SHIPPED | OUTPATIENT
Start: 2022-01-28 | End: 2022-07-14 | Stop reason: SDUPTHER

## 2022-01-28 RX ORDER — OLANZAPINE 10 MG/1
10 TABLET ORAL
Qty: 30 TABLET | Refills: 1 | Status: SHIPPED | OUTPATIENT
Start: 2022-01-28

## 2022-01-28 RX ORDER — DULOXETIN HYDROCHLORIDE 30 MG/1
30 CAPSULE, DELAYED RELEASE ORAL DAILY
Qty: 30 CAPSULE | Refills: 1 | Status: SHIPPED | OUTPATIENT
Start: 2022-01-29 | End: 2022-07-14 | Stop reason: SDUPTHER

## 2022-01-28 RX ADMIN — IBUPROFEN 800 MG: 400 TABLET ORAL at 13:01

## 2022-01-28 RX ADMIN — LEVOTHYROXINE SODIUM 50 MCG: 50 TABLET ORAL at 06:15

## 2022-01-28 RX ADMIN — DULOXETINE 30 MG: 30 CAPSULE, DELAYED RELEASE ORAL at 08:07

## 2022-01-28 RX ADMIN — GABAPENTIN 100 MG: 100 CAPSULE ORAL at 08:07

## 2022-01-28 RX ADMIN — BUPRENORPHINE AND NALOXONE 8 MG: 8; 2 FILM BUCCAL; SUBLINGUAL at 08:07

## 2022-01-28 RX ADMIN — GABAPENTIN 100 MG: 100 CAPSULE ORAL at 15:06

## 2022-01-28 RX ADMIN — CYCLOBENZAPRINE HYDROCHLORIDE 10 MG: 10 TABLET, FILM COATED ORAL at 06:20

## 2022-01-28 RX ADMIN — TOPIRAMATE 100 MG: 100 TABLET, FILM COATED ORAL at 08:07

## 2022-01-28 RX ADMIN — NICOTINE 1 PATCH: 21 PATCH, EXTENDED RELEASE TRANSDERMAL at 08:05

## 2022-01-28 RX ADMIN — PANTOPRAZOLE SODIUM 20 MG: 20 TABLET, DELAYED RELEASE ORAL at 06:15

## 2022-01-28 RX ADMIN — ACETAMINOPHEN 650 MG: 325 TABLET ORAL at 06:20

## 2022-01-28 RX ADMIN — TOPIRAMATE 100 MG: 100 TABLET, FILM COATED ORAL at 15:05

## 2022-01-28 NOTE — DISCHARGE INSTR - LAB
If you smoke, use tobacco or nicotine, and/or are exposed to second hand smoke, you are encouraged to stop to improve your health    If you need help quitting, please talk to your health care provider or call:  Anitha Whittington ([phone])  [facility] [phone] Jareth Gloria ([phone])

## 2022-01-28 NOTE — PROGRESS NOTES
01/28/22 1000   Activity/Group Checklist   Group Other (Comment)  (Group Art Therapy/Psychodynamic, Bilateral Stimulation)   Attendance Attended   Attendance Duration (min) Greater than 60   Interactions Interacted appropriately   Affect/Mood Appropriate   Goals Achieved Discussed coping strategies; Increased hopefulness; Able to listen to others; Able to engage in interactions; Able to recieve feedback; Able to give feedback to another  (Able to engage materials; full participation/gained insight)

## 2022-01-28 NOTE — NURSING NOTE
Patient has been mainly seclusive to her room  Out briefly  Quiet and to herself  Continues to appear anxious but declining prn medication  Denies symptoms at this time  Compliant with medication and is looking forward to discharge today

## 2022-01-28 NOTE — PROGRESS NOTES
01/28/22 1300   Activity/Group Checklist   Group   (recovery group)   Attendance Attended   Attendance Duration (min) 46-60   Interactions Interacted appropriately   Affect/Mood Appropriate   Goals Achieved Able to listen to others; Able to engage in interactions; Discussed self-esteem issues; Discussed coping strategies; Able to reflect/comment on own behavior;Able to self-disclose; Able to recieve feedback

## 2022-01-28 NOTE — NURSING NOTE
Pt mostly isolative to her room but came out for meds  Appears paranoid  Given PRN flexeril for knee pain  Denied any other unmet needs

## 2022-01-28 NOTE — DISCHARGE SUMMARY
Discharge Summary - [address] [facility] 29 y o  female MRN: [id number]  Unit/Bed#: Addie Rain 359-88 Encounter: [id number]     Admission Date: 1/25/2022         Discharge Date: 1/28/2022  Attending Psychiatrist: Vega Olea MD    Reason for Admission/HPI:   Please see H&P written by this writer on 1/26/2022, reproduced in italics below: Michael Joseph is a 29 y o  female with a history of depression versus bipolar disorder, anxiety, ADHD and substance use who was admitted to the inpatient psychiatric unit on a voluntary 201 commitment basis due to depression, anxiety, auditory hallucinations, delusional thoughts and disorganized behavior      Symptoms prior to admission included worsening depression, poor concentration, difficulty sleeping, auditory hallucinations, delusional thoughts, disorganized behavior, poor self-care and noncompliance with medications  Stressors preceding admission included family problems, relationship problems, chronic pain, limited support, social difficulties and chronic mental illness  Records reviewed  Patient presented to the ED due to above symptoms, primarily increased depression and anxiety  While in the ED she was initially found to be restless, disorganized, and demonstrating concern for auditory hallucinations and delusional and paranoid thoughts regarding being tracked  She did receive 10 mg of Haldol and on assessment afterwards was notably more organized and less distressed, though did not like the way that Haldol made her feel  She did sign a 201 for voluntary admission in the ED      Geno is somewhat disorganized initially during interview, having difficulty answering questions directly and often tangential in her responses  However, she does become a bit more organized and able to answer questions more directly over the course of the interview   She reports that she has been struggling with depression on and off since childhood and has been in and out of treatment  She states that most recently she has been off of all medications except for Suboxone for opioid use disorder MAT for approximately one year  She endorses that she started having worsening of her depression since [place], after a visit to her mother's  She has difficulty elaborating on any specific stressors or events that occurred during this visit, though does feel that it did not go well  She reports that she has been hearing people speak negatively about her, though will not find anyone when she goes to investigate  When discussing this further, she reports that she feels she must "hear better than other people"  She does feel that others are out to get her but does not discuss any concerns of being tracked during today's assessment  She reports a prior diagnosis of bipolar disorder but states that her only periods of time where she was going without need for sleep for several days with excessively elevated mood was while she was on substances  She reports currently that she is feeling depressed with fluctuations in sleep and low energy, decreased interest in things, guilt, and poor concentration  She denies any SI or HI  She denies any visual hallucinations   Initially she denies auditory hallucinations but does report what appear to be hallucinations as above      Psychiatric Review Of Systems:  sleep: yes, fluctuations in sleep  appetite changes: yes  weight changes: yes  energy/anergy: yes  interest/pleasure/anhedonia: yes  somatic symptoms: yes  anxiety/panic: yes  dennis: reports history of periods of time with decreased need for sleep for up to 4-7 days but reports this has only occurred during times of substance use  guilty/hopeless: yes  self injurious behavior/risky behavior: no        Past Medical History:   Diagnosis Date    Anxiety     "severe"    Asthma     Bipolar disorder ([address] )     Cigarette smoker     Depression     Disease of thyroid gland     Drug dependence, in remission ([facility] )     GERD (gastroesophageal reflux disease)     H/O: whooping cough     while pregnant    Hepatitis C, chronic (HCC)     Insomnia disorder     Liver disease     Hepatitis C    Migraine     Psychiatric illness     PTSD (post-traumatic stress disorder)     Substance abuse ([place] [address] )      Past Surgical History:   Procedure Laterality Date    OTHER SURGICAL HISTORY      body piercing    MD REPAIR UMBILICAL ZNEG,3+A/Z,NGRXD N/A 2016    Procedure: REPAIR HERNIA UMBILICAL WITH MESH;  Surgeon: Thomas Watkins MD;  Location:  MAIN OR;  Service: General    VAGINAL DELIVERY      X 6    WISDOM TOOTH EXTRACTION      X 4    WOUND DEBRIDEMENT Right 2018    Procedure: RIGHT KNEE DEBRIDEMENT; [address] OUT; AND LACERATION REPAIR  INTRAOPERATIVE ASSESSMENT OF PATELLA TENDON;  Surgeon: Ishaan Layne MD;  Location:  MAIN OR;  Service: Orthopedics       Medications: All current active medications have been reviewed  Medications prior to admission:    Prior to Admission Medications   Prescriptions Last Dose Informant Patient Reported? Taking?    NARCAN 4 MG/0 1ML LIQD Not Taking at Unknown time Self Yes No   Sig: ADMINISTER A SINGLE spray INTO ONE NOSTRIL CALL 911 MAY REPEAT ONCE   Patient not taking: Reported on 2021   albuterol (PROVENTIL HFA,VENTOLIN HFA) 90 mcg/act inhaler Past Month at Unknown time Self No Yes   Sig: Inhale 2 puffs every 4 (four) hours as needed for wheezing   buprenorphine-naloxone (SUBOXONE) 2-0 5 mg per SL tablet 2022 at Unknown time Self Yes Yes   Sig: place 1 tablet under the tongue and dissolve once daily   cyclobenzaprine (FLEXERIL) 10 mg tablet 2022 at Unknown time Self No Yes   Sig: Take 1 tablet (10 mg total) by mouth 3 (three) times a day as needed for muscle spasms   hydrOXYzine pamoate (VISTARIL) 50 mg capsule Past Week at Unknown time Self No Yes   Si-2 HS   ibuprofen (MOTRIN) 800 mg tablet Past Week at Unknown time Self No Yes   Sig: Take 1 tablet (800 mg total) by mouth every 8 (eight) hours as needed for mild pain   levothyroxine 50 mcg tablet Past Week at Unknown time Self No Yes   Si daily   Patient taking differently: Take 25 mcg by mouth daily 1 daily    omeprazole (PriLOSEC) 20 mg delayed release capsule Past Week at Unknown time Self No Yes   Sig: Take 1 capsule (20 mg total) by mouth daily   topiramate (TOPAMAX) 100 mg tablet   No No   Sig: Take 1 tablet (100 mg total) by mouth 3 (three) times a day   traZODone (DESYREL) 50 mg tablet Past Week at Unknown time Self No Yes   Si-2 HS      Facility-Administered Medications: None       Allergies: Allergies   Allergen Reactions    Amoxicillin      Pt states it never works for her        Objective     Vital signs in last 24 hours:    Temp:  [97 5 °F (36 4 °C)-97 7 °F (36 5 °C)] 97 7 °F (36 5 °C)  HR:  [93-99] 99  Resp:  [16] 16  BP: (115-129)/(73-78) 115/73    No intake or output data in the 24 hours ending 22 [address] [place] [address] Course: Geno was admitted to the inpatient psychiatric unit and started on Behavioral Health checks every 15 minutes  During the hospitalization she was encouraged to attend individual therapy, group therapy, milieu therapy and occupational therapy  Psychiatric medications were adjusted over the hospital stay  To address depression, mood instability, delusional thoughts, auditory hallucinations, disorganized behavior and anxiety symptoms, Geno was treated with antidepressant Cymbalta, antipsychotic medication Zyprexa and anxiolytic medication Neurontin  She was also continued on home Topamax, Trazodone, and Atarax  Medication doses were adjusted during the hospital course  Cymbalta was added at the dose of 30 mg daily  Zyprexa was added and titrated to 10 mg QHS  Neurontin was added at the dose of 100 mg TID  Topamax was continued at 100 mg TID  Trazodone was continued at 50 mg QHS  Atarax was continued at 50 mg QHS   Prior to beginning of treatment medications risks and benefits and possible side effects including risk of parkinsonian symptoms, Tardive Dyskinesia and metabolic syndrome related to treatment with antipsychotic medications and risk of suicidality and serotonin syndrome related to treatment with antidepressants were reviewed with Geno  She verbalized understanding and agreement for treatment  Upon admission Geno was seen by medical service for medical clearance for inpatient treatment and medical follow up  Geno's symptoms improved over the hospital course  Initially after admission she was still feeling depressed, anxious, frustrated, overwhelmed and paranoid  With adjustment of medications and therapeutic milieu her symptoms gradually improved  At the end of treatment Geno was doing well  Her mood was improved at the time of discharge  Geno denied suicidal ideation, intent or plan at the time of discharge and denied homicidal ideation, intent or plan at the time of discharge  There was no overt psychosis at the time of discharge  Behavior was appropriate on the unit at the time of discharge  Sleep and appetite were improved  Since Geno was doing well at the end of the hospitalization, treatment team felt that she could be safely discharged to outpatient care  Geno signed 72 hour notice and requested discharge  At the time of the 72 hour notice expiration she had no criteria for involuntary commitment and denied any suicidal or homicidal ideation  Patient's significant other was additionally contacted who did feel that she had sounded significantly improved  He expressed that prior to presenting to the hospital she was still able to ensure good care of her son and was attending to all of his needs  He additionally reported that firearms were locked away with no access  On day of discharge she was notably improved as well, significantly more organized and appropriate in interview   She was anxious about upcoming court case for her son's custody but felt ready for discharge  She had not been demonstrating any unsafe or inappropriate behaviors and was compliant with medications  The outpatient follow up with intake at [facility] was arranged by the unit  upon discharge      Mental Status at Time of Discharge:     Appearance:  casually dressed, dressed appropriately, adequate grooming   Behavior:  pleasant, cooperative, improved eye contact   Speech:  normal rate and volume   Mood:  anxious   Affect:  brighter, still somewhat constricted   Thought Process:  goal directed, linear   Associations: intact associations   Thought Content:  no overt delusions   Perceptual Disturbances: denies auditory or visual hallucinations when asked, does not appear responding to internal stimuli   Risk Potential: Suicidal ideation - None  Homicidal ideation - None  Potential for aggression - Not at present   Sensorium:  oriented to person, place and time/date   Memory:  recent and remote memory grossly intact   Consciousness:  alert and awake   Attention/Concentration: attention span and concentration are age appropriate   Insight:  improved   Judgment: improved   Gait/Station: normal gait/station   Motor Activity: no abnormal movements       Suicide/Homicide Risk Assessment:    Risk of Harm to Self:   The following ratings are based on assessment at the time of discharge, review of the hospital stay progress, assessment at the time of the interview and review of records  Demographic risk factors include:   Historical Risk Factors include: chronic psychiatric problems, chronic depressive symptoms, chronic anxiety symptoms, history of psychosis, history of substance use  Current Specific Risk Factors include: recent inpatient psychiatric admission - being discharged today, diagnosis of depression, chronic depressive symptoms, chronic anxiety symptoms  Protective Factors: no current suicidal ideation, improved mood, improved anxiety symptoms, improved psychotic symptoms, improved impulse control, ability to adapt to change, ability to make plans for the future, outpatient psychiatric follow up established, family support established, being a parent, having a desire to live, personal beliefs, resiliency, supportive family  Weapons/Firearms: gun  The following steps have been taken to ensure weapons are properly secured: locked, no access, confirmed by boyfriend  Based on today's assessment, Geno presents the following risk of harm to self: low    Risk of Harm to Others: The following ratings are based on assessment at the time of discharge, review of the hospital stay progress, assessment at the time of the interview and review of records  Demographic Risk Factors include: unemployed, under age 36  Historical Risk Factors include: drug abuse  Current Specific Risk Factors include: behavior suggesting impulsivity, multiple stressors, social difficulties  Protective Factors: no current homicidal ideation, improved impulse control, stable mood, improved mood, no current psychotic symptoms, compliant with medications, willing to continue psychiatric treatment, willing to remain free from substance use, outpatient follow up established, ability to adapt to change, effective coping skills, stable living environment, supportive family, personal beliefs, sense of personal control, access to mental health treatment  Weapons/Firearms: gun   The following steps have been taken to ensure weapons are properly secured: locked, no access, confirmed by boyfriend  Based on today's assessment, Geno presents the following risk of harm to others: low    The following interventions are recommended: outpatient follow up with a psychiatrist, outpatient follow up with a therapist, outpatient follow up with Drug and Alcohol counseling    Admission Diagnosis:    Principal Problem:    MDD (major depressive disorder), recurrent, severe, with psychosis ([place] [address] )  Active Problems:    Hypothyroidism    Opioid use disorder, moderate, on maintenance therapy ([facility])    GERD (gastroesophageal reflux disease)    Tobacco use    Migraines      Discharge Diagnosis:     Principal Problem:    MDD (major depressive disorder), recurrent, severe, with psychosis ([facility] )  Active Problems:    Hypothyroidism    Opioid use disorder, moderate, on maintenance therapy ([facility] )    GERD (gastroesophageal reflux disease)    Tobacco use    Migraines  Resolved Problems:    Medical clearance for psychiatric admission    Hypokalemia      Laboratory Results: I have personally reviewed all pertinent laboratory/tests results    Most Recent Labs:   Lab Results   Component Value Date    WBC 6 10 01/26/2022    RBC 4 94 01/26/2022    HGB 13 7 01/26/2022    HCT 41 9 01/26/2022     01/26/2022    RDW 13 4 01/26/2022    NEUTROABS 2 50 01/26/2022    SODIUM 142 01/28/2022    K 3 9 01/28/2022     01/28/2022    CO2 30 01/28/2022    BUN 11 01/28/2022    CREATININE 0 96 01/28/2022    GLUC 108 (H) 01/28/2022    CALCIUM 9 0 01/28/2022    AST 14 01/26/2022    ALT 8 01/26/2022    ALKPHOS 51 01/26/2022    TP 6 7 01/26/2022    ALB 4 0 01/26/2022    TBILI 0 59 01/26/2022    CHOLESTEROL 173 01/26/2022    HDL 19 (L) 01/26/2022    TRIG 108 01/26/2022    LDLCALC 132 (H) 01/26/2022    [place] 154 01/26/2022    ODI3OLWMUDDB 2 740 01/26/2022    FREET4 5 7 02/10/2017    PREGUR negative 01/25/2022    PREGSERUM Negative 01/26/2022    RPR Non-Reactive 01/26/2022       Discharge Medications:    See after visit summary for all reconciled discharge medications provided to patient and family  Discharge instructions/Information to patient and family:     See after visit summary for information provided to patient and family  Provisions for Follow-Up Care:    See after visit summary for information related to follow-up care and any pertinent home health orders        Discharge Statement:    I spent 25 minutes discharging the patient  This time was spent on the day of discharge  I had direct contact with the patient on the day of discharge  Additional documentation is required if more than 30 minutes were spent on discharge:    I reviewed with Geno importance of compliance with medications and outpatient treatment after discharge  I discussed the medication regimen and possible side effects of the medications with Geno prior to discharge  At the time of discharge she was tolerating psychiatric medications  I discussed outpatient follow up with Geno  I reviewed with Geno crisis plan and safety plan upon discharge  I discussed with Geno recommendation to follow up with outpatient drug and alcohol counseling and AA meetings  Geno signed 72 hour notice and requested discharge  At the time of the 72 hour notice expiration she had no criteria for involuntary commitment and denied any suicidal or homicidal ideation      Discharge on Two Antipsychotic Medications: Rhonda Harvey MD 01/28/22

## 2022-01-28 NOTE — PROGRESS NOTES
01/28/22 1101   Team Meeting   Meeting Type Daily Rounds   Team Members Present   Team Members Present Physician;Nurse;   Physician Team Member [facility]   Nursing Team Member [facility] Management Team Member [place]   Patient/Family Present   Patient Present No   Patient's Family Present No   Pt with some anxiety  Denies all other symptoms   DC today

## 2022-01-28 NOTE — PLAN OF CARE
Problem: Depression  Goal: Treatment Goal: Demonstrate behavioral control of depressive symptoms, verbalize feelings of improved mood/affect, and adopt new coping skills prior to discharge  Outcome: Completed  Goal: Refrain from isolation  Description: Interventions:  - Develop a trusting relationship   - Encourage socialization   Outcome: Completed  Goal: Refrain from self-neglect  Outcome: Completed     Problem: SELF HARM/SUICIDALITY  Goal: Will have no self-injury during hospital stay  Description: INTERVENTIONS:  - Q 15 MINUTES: Routine safety checks  - Q WAKING SHIFT & PRN: Assess risk to determine if routine checks are adequate to maintain patient safety  - Encourage patient to participate actively in care by formulating a plan to combat response to suicidal ideation, identify supports and resources  Outcome: Completed     Problem: DEPRESSION  Goal: Will be euthymic at discharge  Description: INTERVENTIONS:  - Administer medication as ordered  - Provide emotional support via 1:1 interaction with staff  - Encourage involvement in milieu/groups/activities  - Monitor for social isolation  Outcome: Completed     Problem: PSYCHOSIS  Goal: Will report no hallucinations or delusions  Description: Interventions:  - Administer medication as  ordered  - Every waking shifts and PRN assess for the presence of hallucinations and or delusions  - Assist with reality testing to support increasing orientation  - Assess if patient's hallucinations or delusions are encouraging self-harm or harm to others and intervene as appropriate  Outcome: Completed     Problem: BEHAVIOR  Goal: Pt/Family maintain appropriate behavior and adhere to behavioral management agreement, if implemented  Description: INTERVENTIONS:  - Assess the family dynamic   - Encourage verbalization of thoughts and concerns in a socially appropriate manner  - Assess patient/family's coping skills and non-compliant behavior (including use of illegal substances)  - Utilize positive, consistent limit setting strategies supporting safety of patient, staff and others  - Initiate consult with Case Management, Spiritual Care or other ancillary services as appropriate  - If a patient's/visitor's behavior jeopardizes the safety of the patient, staff, or others, refer to organization procedure     - Notify Security of behavior or suspected illegal substances which indicate the need for search of the patient and/or belongings  - Encourage participation in the decision making process about a behavioral management agreement; implement if patient meets criteria  Outcome: Completed     Problem: SLEEP DISTURBANCE  Goal: Will exhibit normal sleeping pattern  Description: Interventions:  -  Assess the patients sleep pattern, noting recent changes  - Administer medication as ordered  - Decrease environmental stimuli, including noise, as appropriate during the night  - Encourage the patient to actively participate in unit groups and or exercise during the day to enhance ability to achieve adequate sleep at night  - Assess the patient, in the morning, encouraging a description of sleep experience  Outcome: Completed     Problem: SELF CARE DEFICIT  Goal: Return ADL status to a safe level of function  Description: INTERVENTIONS:  - Administer medication as ordered  - Assess ADL deficits and provide assistive devices as needed  - Obtain PT/OT consults as needed  - Assist and instruct patient to increase activity and self care as tolerated  Outcome: Completed     Problem: DISCHARGE PLANNING  Goal: Discharge to home or other facility with appropriate resources  Description: INTERVENTIONS:  - Identify barriers to discharge w/patient and caregiver  - Arrange for needed discharge resources and transportation as appropriate  - Identify discharge learning needs (meds, wound care, etc )  - Arrange for interpretive services to assist at discharge as needed  - Refer to Case Management Department for coordinating discharge planning if the patient needs post-hospital services based on physician/advanced practitioner order or complex needs related to functional status, cognitive ability, or social support system  Outcome: Completed     Problem: Ineffective Coping  Goal: Participates in unit activities  Description: Interventions:  - Provide therapeutic environment   - Provide required programming   - Redirect inappropriate behaviors   Outcome: Completed

## 2022-01-28 NOTE — NURSING NOTE
Discharge paperwork and medications explained to patient prior to discharge  All belongings received at admission were returned to patient prior to being escorted off unit  Patient escorted off unit by MARISA at 1710 to jie

## 2022-01-28 NOTE — BH TRANSITION RECORD
Contact Information: If you have any questions, concerns, pended studies, tests and/or procedures, or emergencies regarding your inpatient behavioral health visit  Please contact [facility] behavioral health unit 3B [phone]  and ask to speak to a , nurse or physician  A contact is available 24 hours/ 7 days a week at this number  Summary of Procedures Performed During your Stay:  Below is a list of major procedures performed during your hospital stay and a summary of results:  - No major procedures performed  Pending Studies (From admission, onward)    None        If studies are pending at discharge, follow up with your PCP and/or referring provider      Enoc Harvey MD 01/28/22

## 2022-03-14 ENCOUNTER — OFFICE VISIT (OUTPATIENT)
Dept: FAMILY MEDICINE CLINIC | Facility: CLINIC | Age: 37
End: 2022-03-14

## 2022-03-14 VITALS
WEIGHT: 163 LBS | RESPIRATION RATE: 16 BRPM | SYSTOLIC BLOOD PRESSURE: 124 MMHG | DIASTOLIC BLOOD PRESSURE: 76 MMHG | OXYGEN SATURATION: 96 % | HEART RATE: 95 BPM | BODY MASS INDEX: 27.83 KG/M2 | HEIGHT: 64 IN

## 2022-03-14 DIAGNOSIS — M54.31 SCIATICA OF RIGHT SIDE: ICD-10-CM

## 2022-03-14 DIAGNOSIS — F19.21 DRUG DEPENDENCE, IN REMISSION (HCC): ICD-10-CM

## 2022-03-14 DIAGNOSIS — F11.20 OPIOID USE DISORDER, MODERATE, ON MAINTENANCE THERAPY (HCC): ICD-10-CM

## 2022-03-14 DIAGNOSIS — F41.9 ANXIETY: ICD-10-CM

## 2022-03-14 DIAGNOSIS — F31.0 BIPOLAR AFFECTIVE DISORDER, CURRENT EPISODE HYPOMANIC (HCC): Primary | ICD-10-CM

## 2022-03-14 PROCEDURE — 99213 OFFICE O/P EST LOW 20 MIN: CPT | Performed by: FAMILY MEDICINE

## 2022-03-14 RX ORDER — CLONAZEPAM 1 MG/1
1 TABLET ORAL 3 TIMES DAILY
Qty: 90 TABLET | Refills: 5 | Status: CANCELLED | OUTPATIENT
Start: 2022-03-14

## 2022-03-14 RX ORDER — OXYCODONE HYDROCHLORIDE AND ACETAMINOPHEN 5; 325 MG/1; MG/1
1 TABLET ORAL EVERY 4 HOURS PRN
Qty: 20 TABLET | Refills: 0 | Status: SHIPPED | OUTPATIENT
Start: 2022-03-14

## 2022-03-14 RX ORDER — CLONAZEPAM 1 MG/1
1 TABLET ORAL 3 TIMES DAILY
Qty: 90 TABLET | Refills: 5 | Status: SHIPPED | OUTPATIENT
Start: 2022-03-14 | End: 2022-04-18 | Stop reason: SDUPTHER

## 2022-03-14 NOTE — PROGRESS NOTES
Assessment/Plan:    No problem-specific Assessment & Plan notes found for this encounter  Diagnoses and all orders for this visit:    Bipolar affective disorder, current episode hypomanic ([facility] [address] )    Anxiety    Drug dependence, in remission ([facility] [address] )    Opioid use disorder, moderate, on maintenance therapy ([facility] [address] )    Sciatica of right side          Subjective:   Chief Complaint   Patient presents with    Follow-up     FILL OUT FORMS- DMV        Patient ID: Bobby Sims is a 29 y o  female  C/o sciatica      The following portions of the patient's history were reviewed and updated as appropriate: allergies, current medications, past family history, past medical history, past social history, past surgical history and problem list     Review of Systems   Constitutional: Negative for fatigue, fever and unexpected weight change  HENT: Negative for congestion, sinus pain and sore throat  Eyes: Negative for visual disturbance  Respiratory: Negative for shortness of breath and wheezing  Cardiovascular: Negative for chest pain and palpitations  Gastrointestinal: Negative for abdominal pain, nausea and vomiting  Musculoskeletal: Negative  Negative for arthralgias and myalgias  Neurological: Negative for syncope, weakness and numbness  Psychiatric/Behavioral: Negative  Negative for confusion, dysphoric mood and suicidal ideas  Objective:  Vitals:    03/14/22 1051   BP: 124/76   Pulse: 95   Resp: 16   SpO2: 96%   Weight: 73 9 kg (163 lb)   Height: 5' 3 75" (1 619 m)      Physical Exam  Constitutional:       Appearance: She is well-developed  HENT:      Right Ear: Ear canal normal  Tympanic membrane is not injected  Left Ear: Ear canal normal  Tympanic membrane is not injected  Nose: Nose normal    Eyes:      General:         Right eye: No discharge  Left eye: No discharge        Conjunctiva/sclera: Conjunctivae normal       Pupils: Pupils are equal, round, and reactive to light    Neck:      Thyroid: No thyromegaly  Cardiovascular:      Rate and Rhythm: Normal rate and regular rhythm  Heart sounds: Normal heart sounds  No murmur heard  Pulmonary:      Effort: Pulmonary effort is normal  No respiratory distress  Breath sounds: Normal breath sounds  No wheezing  Abdominal:      General: Bowel sounds are normal  There is no distension  Palpations: Abdomen is soft  Tenderness: There is no abdominal tenderness  Musculoskeletal:         General: Normal range of motion  Cervical back: Normal range of motion and neck supple  Lymphadenopathy:      Cervical: No cervical adenopathy  Skin:     General: Skin is warm and dry  Neurological:      Mental Status: She is alert and oriented to person, place, and time  She is not disoriented  Sensory: No sensory deficit  Gait: Gait normal       Deep Tendon Reflexes: Reflexes are normal and symmetric  Psychiatric:         Speech: Speech normal          Behavior: Behavior normal          Thought Content:  Thought content normal          Judgment: Judgment normal        + SLR at 30 degr on  R

## 2022-03-22 ENCOUNTER — TELEPHONE (OUTPATIENT)
Dept: FAMILY MEDICINE CLINIC | Facility: CLINIC | Age: 37
End: 2022-03-22

## 2022-03-22 NOTE — TELEPHONE ENCOUNTER
(10:58 AM) Dr Marta Sanches called from [facility] in [facility] regarding mutual pt, Virginia Maurice  Pt last seen in office on 3/18/22      Dr Marta Sanches requests a call back to discuss details

## 2022-04-18 DIAGNOSIS — F41.9 ANXIETY: ICD-10-CM

## 2022-04-18 RX ORDER — CLONAZEPAM 1 MG/1
1 TABLET ORAL 3 TIMES DAILY
Qty: 90 TABLET | Refills: 5 | Status: SHIPPED | OUTPATIENT
Start: 2022-04-18

## 2022-04-18 NOTE — TELEPHONE ENCOUNTER
Last fill per PDMP -- klonopin  03/18/2022 ???  Please view per not on 03/22/2022 Rise Le was cancelled due to been RX by other provider dr Marinus Meigs [facility] [place]    Please view

## 2022-05-13 ENCOUNTER — TELEPHONE (OUTPATIENT)
Dept: PSYCHIATRY | Facility: CLINIC | Age: 37
End: 2022-05-13

## 2022-05-13 NOTE — TELEPHONE ENCOUNTER
Cm made outreach call to patient per referral from [facility]       Patient is currently at work, by would like a call back at 2pm as patient is extremely interested in OP program

## 2022-05-13 NOTE — TELEPHONE ENCOUNTER
Per patient request, CM made call back to attempt intake for [facility] office, left VM message to return call

## 2022-05-13 NOTE — TELEPHONE ENCOUNTER
[facility] Intake Questions       Referred by: [facility] - Bryant Zaldivar    Please advise interviewee that they need to answer all questions truthfully to allow for best care and any misrepresentations of information may affect their ability to be seen at this clinic     Was this discussed? yes      [facility] Intake History -        Presenting Problem (in patient's words): MH, OUD        Are there any developmental disabilities? No    Does the patient have a language barrier or hearing impairment? no       Substance Use-       1  Are you being referred for treatment of any of the following: alcohol, benzodiazepines, baclofen, GHB, phenibut, or Soma? No  Please refer to detox       2  Are you being referred for the treatment of opioid use? Yes  Are you currently prescribed buprenorphine (Suboxone or Subutex)? Yes  Who prescribes? Dr Delmy Crump with [facility]      3  For what substance use are you being referred to the [facility]? Opioids         Psychiatric Care-        Do you have a psychiatric diagnosis? Yes  What is the diagnosis? ADHD, MDD, Axiety, BPD, Insomnia    2  Are you taking any psychiatric medications? Yes  Who prescribes? Dr Delmy Crump Are they injectable medications? no    3  Have you been treated at [address] [place] [address] by a therapist or a doctor in the past? If yes, who? No       4  Are you currently having thoughts of harming yourself or others? No     5   Have you been hospitalized for mental health? Yes  How long was the last hospitalization? a few days on the [facility] unit at [facility]    6  Do you have a community treatment team or ? no       Legal History-         Do you have any history of arrests, intermediate/halfway time, or DUIs? Yes  What types of charges? Posession    When were they last incarcerated? No intermediate time    Are they currently on parole or probation? no       Prenatal/         Are you pregnant? No        Have you given birth in the last 28 days?  No        Intake Team, please check with provider before scheduling if flags come up such as:     - complex case     - legal history (other than DUI)     - communication barrier concerns are present     - already being prescribed buprenorphine or methadone     - if, in your judgment, this needs further review        ACCEPTED as a patient Yes   Appointment Date: 05/17/2022 - Michael Reyes     06/23/2022 - Dr Atkins Means? no          Name of Insurance Co:     Insurance ID#     [employer] #     If ins is primary or secondary

## 2022-05-19 DIAGNOSIS — J45.909 ASTHMA, UNSPECIFIED ASTHMA SEVERITY, UNSPECIFIED WHETHER COMPLICATED, UNSPECIFIED WHETHER PERSISTENT: ICD-10-CM

## 2022-05-20 RX ORDER — CYCLOBENZAPRINE HCL 10 MG
TABLET ORAL
Qty: 90 TABLET | Refills: 3 | Status: SHIPPED | OUTPATIENT
Start: 2022-05-20

## 2022-05-23 DIAGNOSIS — F43.10 PTSD (POST-TRAUMATIC STRESS DISORDER): ICD-10-CM

## 2022-05-23 RX ORDER — LEVOTHYROXINE SODIUM 0.05 MG/1
TABLET ORAL
Qty: 30 TABLET | Refills: 5 | Status: SHIPPED | OUTPATIENT
Start: 2022-05-23

## 2022-06-03 ENCOUNTER — DOCUMENTATION (OUTPATIENT)
Dept: PSYCHIATRY | Facility: CLINIC | Age: 37
End: 2022-06-03
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
## 2022-06-03 NOTE — PROGRESS NOTES
Note Type:  Note                  Date of Service: [unfilled]  Service:  [facility] MAT Office    Note:  Note  Agusto Turner 29 y o  female [id number]  Attending  Substance Use History     Social History     Substance and Sexual Activity   Alcohol Use Not Currently        Social History     Substance and Sexual Activity   Drug Use Not Currently    Types: Methadone, Heroin, Cocaine, Marijuana, Prescription, Methamphetamines    Comment: 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       Encounter Type:   Patient Face-to-Face    Start Time 10:00am  End Time 10:50am     Recovery needs addressed at this meeting:  Peer Support     Note  D:Pt came in on time and alert, well groomed and appeared to  Have veery litle anxiety or stress coming in   A:Initial visit with patient resulted in a lot of information  Pt currently engaged living with shailesh and her 10year old son  Pt has 2 /12 years substance free r[facility]ng [facility], transferring care to our office   Pt has multiple severe PTSD and history of drug overdose  Pt has 6 children in total ages range from 25 10years old  Pt has giving up 5 other children as she was unable to raise them for multiple reasons including lack of financial means and/or support  Pt is engaged to someone who knows nothing about addiction or mental health issues and not willing to learn   Pt doesn't appear to have any supoort from him nor does she have any family support, Pt doesn't work right now just trying to be a decent parent and take care of her own mental health at this time   P:     Referrals made  NA    Next appointment date and time  [id number]

## 2022-06-06 ENCOUNTER — DOCUMENTATION (OUTPATIENT)
Dept: PSYCHIATRY | Facility: CLINIC | Age: 37
End: 2022-06-06

## 2022-06-08 ENCOUNTER — DOCUMENTATION (OUTPATIENT)
Dept: PSYCHIATRY | Facility: CLINIC | Age: 37
End: 2022-06-08
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
## 2022-06-15 ENCOUNTER — DOCUMENTATION (OUTPATIENT)
Dept: PSYCHIATRY | Facility: CLINIC | Age: 37
End: 2022-06-15
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
## 2022-06-23 ENCOUNTER — APPOINTMENT (OUTPATIENT)
Dept: LAB | Facility: HOSPITAL | Age: 37
End: 2022-06-23
Attending: EMERGENCY MEDICINE
Payer: MEDICARE

## 2022-06-23 ENCOUNTER — OFFICE VISIT (OUTPATIENT)
Dept: OTHER | Age: 37
End: 2022-06-23
Payer: MEDICARE

## 2022-06-23 ENCOUNTER — DOCUMENTATION (OUTPATIENT)
Dept: PSYCHIATRY | Facility: CLINIC | Age: 37
End: 2022-06-23

## 2022-06-23 DIAGNOSIS — B18.2 CHRONIC HEPATITIS C WITHOUT HEPATIC COMA (HCC): ICD-10-CM

## 2022-06-23 DIAGNOSIS — F11.21 OPIOID USE DISORDER, SEVERE, IN SUSTAINED REMISSION, ON MAINTENANCE THERAPY, DEPENDENCE (HCC): Primary | ICD-10-CM

## 2022-06-23 LAB
HIV 1+2 AB+HIV1 P24 AG SERPL QL IA: NORMAL
HIV1 P24 AG SER QL: NORMAL

## 2022-06-23 PROCEDURE — 1036F TOBACCO NON-USER: CPT | Performed by: EMERGENCY MEDICINE

## 2022-06-23 PROCEDURE — 99204 OFFICE O/P NEW MOD 45 MIN: CPT | Performed by: EMERGENCY MEDICINE

## 2022-06-23 PROCEDURE — 87522 HEPATITIS C REVRS TRNSCRPJ: CPT

## 2022-06-23 PROCEDURE — 36415 COLL VENOUS BLD VENIPUNCTURE: CPT

## 2022-06-23 PROCEDURE — 87806 HIV AG W/HIV1&2 ANTB W/OPTIC: CPT

## 2022-06-23 NOTE — PROGRESS NOTES
SHARE Program     History and Physical  Collette Coho 29 y o  female MRN: [id number]   @ Encounter: [id number]       1  Opioid use disorder, severe, in sustained remission, on maintenance therapy, dependence ([facility] )  Assessment & Plan:  · Pt has been stable without use for over a year on buprenorphine  · Continue current regimen  She does not need a new Rx at today's visit  · Pt seems to be struggling from a mental health standpoint now as far as depression is concerned  She did have a brief interruption of her gabapentin and duloxetine recently, which may have contributed to this  She has psychiatry follow-up and we will engage the [facility] team to ensure patient makes it to the psychiatry and psychotherapy appointments  2  Chronic hepatitis C without hepatic coma ([facility] )  Assessment & Plan:  · Pt states that she was told at some point that she had cleared the virus and therefore did not need Harvoni  That may be the case, but I do not see a viral load result in her record  · Will check viral load to ensure no additional treatment needed  Orders:  -     Hepatitis C RNA, quantitative, PCR; Future  -     Rapid HIV 1/2 AB-AG Combo; Future        In addition to the above recommendations, the patient will also be referred to the [facility] Certified Addiction Counselors for additional evaluation and psychotherapy  We will also engage our Certified  for patient support  HPI: Collette Coho is a 29y o  year old female who presents with OUD  The patient started smoking marijuana at age 16  From age [age], she used cocaine regularly on the weekends  In her mid 19's, she developed binge alcohol use on the weekends to the point of blackouts  That persisted for a few years  In her late 19's, she started using hydrocodone and oxycodone initially by mouth  Her use escalated to the point where she started using heroin/fentanyl, initially via insufflation then via IV use   She was using 1-5 bags per day  She has had multiple inpatient treatment stays over the past 5-6 years as well as about 5 years of methadone maintenance, which was interspersed with episodes of recurrence of use  In January 2021, she started buprenorphine  She has been stable without use since that time  She is transitioning to [facility] due to its closing  Opioid History   Age of first use: mid to late 19's   Opioids used: heroin, fentanyl and oral opioids   Route of use: oral, insufflation and intravenous  Date/Time of Last Opioid Use: 2021   Current Signs/Symptoms of Opioid Withdrawal: none    OUD MAT History   Currently on methadone maintenance? no   Currently on naltrexone? no   Currently taking buprenorphine? yes   History of prior treatment with buprenorphine? yes   History of using buprenorphine without having a prescription? no   History of IVDU? yes   Co-existing substance use? Yes see HPI     Review of PDMP:     PDMP Review       Value Time User    PDMP Reviewed  Yes 6/23/2022  8:33 AM Rosio Garcia, DO             Social History     Tobacco Use   Smoking Status Former Smoker    Packs/day: 1 00    Years: 12 00    Pack years: 12 00    Types: Cigarettes   Smokeless Tobacco Never Used   Tobacco Comment    Vaping        Review of Systems   Constitutional: Negative for chills and fever  HENT: Negative for ear pain and sore throat  Eyes: Negative for pain and visual disturbance  Respiratory: Negative for cough and shortness of breath  Cardiovascular: Negative for chest pain and palpitations  Gastrointestinal: Negative for abdominal pain and vomiting  Genitourinary: Negative for dysuria and hematuria  Musculoskeletal: Negative for arthralgias and back pain  Skin: Negative for color change and rash  Neurological: Negative for seizures and syncope  Psychiatric/Behavioral: Positive for dysphoric mood  Negative for self-injury and suicidal ideas     All other systems reviewed and are negative  ROS     Historical Information      Past Medical History:   Diagnosis Date    Anxiety     "severe"    Asthma     Bipolar disorder ([facility] )     Cigarette smoker     Depression     Disease of thyroid gland     Drug dependence, in remission ([facility] )     GERD (gastroesophageal reflux disease)     H/O: whooping cough     while pregnant    Hepatitis C, chronic (HCC)     Insomnia disorder     Liver disease     Hepatitis C    Migraine     Psychiatric illness     PTSD (post-traumatic stress disorder)     Substance abuse ([place] [address] )         Past Surgical History:   Procedure Laterality Date    OTHER SURGICAL HISTORY      body piercing    WA REPAIR UMBILICAL NZAN,8+F/G,CCKOT N/A 4/19/2016    Procedure: REPAIR HERNIA UMBILICAL WITH MESH;  Surgeon: Kenzie Wright MD;  Location:  MAIN OR;  Service: General    VAGINAL DELIVERY      X 6    WISDOM TOOTH EXTRACTION      X 4    WOUND DEBRIDEMENT Right 1/17/2018    Procedure: RIGHT KNEE DEBRIDEMENT; [address]; AND LACERATION REPAIR  INTRAOPERATIVE ASSESSMENT OF PATELLA TENDON;  Surgeon: Maricruz Diana MD;  Location: [facility] MAIN [facility];  Service: Orthopedics        Family History   Problem Relation Age of Onset    Hypertension Mother     Thyroid disease Mother     Hypertension Father     Prostate cancer Father     Anxiety disorder Sister         Social History      Marital Status: Single    Patient Pre-hospital Living Situation: stable housing     Communicable diseases:    Tuberculosis (TB):   Have you traveled extensively (more than 4 weeks) outside the [address]  in the last five years to high tuberculosis incidence areas ([place], [place], [place], [place])?: No  Have you resided in any of these facilities in the past year: jails, prisons, shelters, nursing homes, and other long-term care facilities such as rehabilitation centers?  If residents of any of these facilities were tested within the past three (3) reggie: No  Have you had any close contact with someone diagnosed with tuberculosis?: No  Have you been homeless within the past year?: No  Have you ever injected drugs?: Yes  If yes, describe[de-identified] years ago  Do you or anyone in your household, currently have the following symptoms such as a sustained cough for two or more weeks, coughing up blood, fever/chills, loss of appetite, unexplained weight loss, fatigue, night sweats? : No  Do you currently have or anticipate having any condition that would decrease your immune system?: No    Viral Hepatitis:  Have you been tested for Hepatitis B or C?: Yes  Diagnosed with Hepatitis B or C?: Yes  If yes, when?: Hep C 2017  If you have not been diagnosed with Hepatitis B or C, do you meet any of these criteria: received a clotting factor produced before 1987, received hemodialysis, been diagnosed with HIV, or received a blood transfusion or organ transplant?: No    HIV:  Have you been diagnosed with HIV?: No  Do you have high risk factors for HIV including injecting drugs, multiple sexual partners, engaging in unprotected sexual activity, infected or recently treated for viral hepatitis, or infected or recently treated for a sexually transmitted disease(STD)?: Yes  If yes, describe[de-identified] tested negative previously    Allergies   Allergen Reactions    Amoxicillin      Pt states it never works for her         Prior to Admission medications    Medication Sig Start Date End Date Taking?  Authorizing Provider   albuterol (PROVENTIL HFA,VENTOLIN HFA) 90 mcg/act inhaler Inhale 2 puffs every 4 (four) hours as needed for wheezing 3/1/21  Yes Jean Claude Montano MD   clonazePAM (KlonoPIN) 1 mg tablet Take 1 tablet (1 mg total) by mouth 3 (three) times a day 4/18/22  Yes Jean Claude Montano MD   cyclobenzaprine (FLEXERIL) 10 mg tablet take 1 tablet by mouth three times a day if needed for muscle spasm 5/20/22  Yes Jean Claude Montano MD   DULoxetine (CYMBALTA) 30 mg delayed release capsule Take 1 capsule (30 mg total) by mouth daily 1/29/22  Yes Radha Rossi MD   gabapentin (NEURONTIN) 100 mg capsule Take 1 capsule (100 mg total) by mouth 3 (three) times a day 1/28/22  Yes Radha Rossi MD   ibuprofen (MOTRIN) 800 mg tablet Take 1 tablet (800 mg total) by mouth every 8 (eight) hours as needed for mild pain 11/1/21  Yes Baldomero Winslow MD   levothyroxine 50 mcg tablet 1 daily 5/23/22  Yes Baldomero Winslow MD   omeprazole (PriLOSEC) 20 mg delayed release capsule Take 1 capsule (20 mg total) by mouth daily 11/1/21  Yes Baldomero Winslow MD   topiramate (TOPAMAX) 100 mg tablet Take 1 tablet (100 mg total) by mouth 3 (three) times a day 10/15/21 6/23/22 Yes Baldomero Winslow MD   traZODone (DESYREL) 50 mg tablet 1-2 HS 10/15/21  Yes Baldomero Winslow MD   [facility] 4 MG/0 1ML LIQD ADMINISTER A SINGLE spray INTO ONE NOSTRIL CALL 911 MAY REPEAT ONCE  Patient not taking: Reported on 7/28/2021 9/8/19   Historical Provider, MD   OLANZapine (ZyPREXA) 10 mg tablet Take 1 tablet (10 mg total) by mouth daily at bedtime  Patient not taking: Reported on 6/23/2022 1/28/22   Radha Rossi MD   oxyCODONE-acetaminophen (PERCOCET) 5-325 mg per tablet Take 1 tablet by mouth every 4 (four) hours as needed for severe pain Max Daily Amount: 6 tablets  Patient not taking: Reported on 6/23/2022 3/14/22   Baldomero Winslow MD       This patient does not have an active medication from one of the medication groupers  Objective      Vitals    There were no vitals filed for this visit  Physical Exam  Constitutional:       Appearance: Normal appearance  HENT:      Mouth/Throat:      Mouth: Mucous membranes are moist    Eyes:      Extraocular Movements: Extraocular movements intact  Cardiovascular:      Rate and Rhythm: Normal rate  Pulmonary:      Effort: Pulmonary effort is normal    Abdominal:      Palpations: Abdomen is soft  Skin:     General: Skin is warm  Coloration: Skin is not jaundiced  Findings: No rash     Neurological:      Mental Status: She is alert and oriented to person, place, and time  Psychiatric:         Behavior: Behavior normal          Thought Content: Thought content normal              COWS Score:          Data:     Lab Results:  Results from last 6 Months   Lab Units 01/26/22  0636   WBC Thousand/uL 6 10   HEMOGLOBIN g/dL 13 7   HEMATOCRIT % 41 9   PLATELETS Thousands/uL 242        Results from last 6 Months   Lab Units 01/28/22  0634 01/27/22  0555 01/26/22  0636   POTASSIUM mmol/L 3 9   < > 2 9*   CHLORIDE mmol/L 107   < > 105   CO2 mmol/L 30   < > 30   BUN mg/dL 11   < > 19   CREATININE mg/dL 0 96   < > 0 83   CALCIUM mg/dL 9 0   < > 8 9   ALBUMIN g/dL  --   --  4 0   ALK PHOS U/L  --   --  51   ALT U/L  --   --  8   AST U/L  --   --  14    < > = values in this interval not displayed  Hepatitis panel:        HIV results:       TB:        Imaging Studies: I have personally reviewed pertinent reports  EKG, Pathology, and Other Studies: I have personally reviewed pertinent reports  Counseling / Coordination of Care     Total time spent today 45 minutes  Greater than 50% of total time was spent with the patient and / or family counseling and / or coordination of care  ** Please Note: This note may have been constructed using a voice recognition system   **     Maia Dasilva,

## 2022-06-23 NOTE — PROGRESS NOTES
Note Type:  Note                  Date of Service: [unfilled]  [facility]    Note:  Note  Khushboo Ace 29 y o  female [id number]  Attending  Substance Use History     Social History     Substance and Sexual Activity   Alcohol Use Not Currently        Social History     Substance and Sexual Activity   Drug Use Not Currently    Types: Methadone, Heroin, Cocaine, Marijuana, Prescription, Methamphetamines    Comment: 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       Encounter Type:   Patient Face-to-Face    Start Time 11:10am   End Time 11:40am    Recovery needs addressed at this meeting:  Basic  Needs, Physical Health, [address], Family,  and Peer Support     Note  D patient presented in office visibly shaken, crying but well dressed and clean  A Pt is very upset that she has to go to court from "betts' grandmother regarding contempt of court issues for not signing paperwork for basketball  Pt wants to move, file taxes and get drivers license  Pt appears to be overwhelmed at this moment  Was late for appointment so had to leave 1/2 way through   Coming back tomorrow   P will meet again with pt tomorrow to discuss POC      Referrals made  NA     Next appointment date and time  06/24/2022  11:00am

## 2022-06-23 NOTE — ASSESSMENT & PLAN NOTE
· Pt states that she was told at some point that she had cleared the virus and therefore did not need Harvoni  That may be the case, but I do not see a viral load result in her record  · Will check viral load to ensure no additional treatment needed

## 2022-06-23 NOTE — ASSESSMENT & PLAN NOTE
· Pt has been stable without use for over a year on buprenorphine  · Continue current regimen  She does not need a new Rx at today's visit  · Pt seems to be struggling from a mental health standpoint now as far as depression is concerned  She did have a brief interruption of her gabapentin and duloxetine recently, which may have contributed to this  She has psychiatry follow-up and we will engage the [facility] team to ensure patient makes it to the psychiatry and psychotherapy appointments

## 2022-06-24 ENCOUNTER — DOCUMENTATION (OUTPATIENT)
Dept: PSYCHIATRY | Facility: CLINIC | Age: 37
End: 2022-06-24

## 2022-06-24 NOTE — PROGRESS NOTES
Note Type: Case Management Note                  Date of Service: 06/24/2022  Service:  [facility] Office    Note: Case Management Note  Effie Carrizales 29 y o  female [id number]  Attending  Substance Use History     Social History     Substance and Sexual Activity   Alcohol Use Not Currently        Social History     Substance and Sexual Activity   Drug Use Not Currently    Types: Methadone, Heroin, Cocaine, Marijuana, Prescription, Methamphetamines    Comment: 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       Encounter Type:   Patient Face-to-Face    Start Time 1104  End Time 1206    Recovery needs addressed at this meeting:   and Legal Status    Note  D: Patient appeared for in-person, scheduled visit with this CM  Patient presented a custody order that is currently in place with Paternal grandmother to patient's son, Alexi John  Paternal grandmother filed a contempt to the custody order in which patient must appear for court on July 11, 2022  Patient was able to successfully respond to contempt with the assistance of this CM  With permission from patient, this CM then forward response to Patient's  [employer] ([employer] [email or web address])    A: Patient appeared teary eyed as patient is overwhelmed with the custody matter  Patient feels as though paternal grandmother is attacking patient and parenting skills  P: Patient will continue to meet with this CM as well as treatment team  Patient and CM will continue to utilize resources available to ensure patient is staying on track with safety and sobriety      Referrals made  No referrals made during this session     Next appointment date and time  07/01/2022 @ 11:00am

## 2022-06-27 LAB
HCV RNA SERPL NAA+PROBE-ACNC: NORMAL IU/ML
TEST INFORMATION: NORMAL

## 2022-07-01 ENCOUNTER — TELEMEDICINE (OUTPATIENT)
Dept: PSYCHIATRY | Facility: CLINIC | Age: 37
End: 2022-07-01

## 2022-07-01 ENCOUNTER — DOCUMENTATION (OUTPATIENT)
Dept: PSYCHIATRY | Facility: CLINIC | Age: 37
End: 2022-07-01

## 2022-07-01 DIAGNOSIS — F33.3 MDD (MAJOR DEPRESSIVE DISORDER), RECURRENT, SEVERE, WITH PSYCHOSIS (HCC): ICD-10-CM

## 2022-07-01 DIAGNOSIS — F11.21 OPIOID USE DISORDER, SEVERE, IN SUSTAINED REMISSION, ON MAINTENANCE THERAPY, DEPENDENCE (HCC): Primary | ICD-10-CM

## 2022-07-01 PROCEDURE — NOSHOW

## 2022-07-01 NOTE — PROGRESS NOTES
Note Type: Case Management Note                  Date of Service: 07/01/2022  Service:  [facility] Office    Note: Case Management Note  Georgette Gains 29 y o  female [id number]  Attending  Substance Use History     Social History     Substance and Sexual Activity   Alcohol Use Not Currently        Social History     Substance and Sexual Activity   Drug Use Not Currently    Types: Methadone, Heroin, Cocaine, Marijuana, Prescription, Methamphetamines    Comment: 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       Encounter Type:   Patient Face-to-Face    Start Time 1130  End Time 1200    Recovery needs addressed at this meeting:  Basic  Needs, Emotional/Mental Health and Social    Note  D: Patient presented for in-person, scheduled visit  Present was patient's son  Patient spoke about current stressor being possible hacked or having identity stolen  Patient states having over 6,000 emails that include confirmation emails in which patient did not initiate  Patient spoke about current credit score being so low when patient only ever had one credit card  This CM suggested looking into a credit , in which patient agreed, also needing this CM's assistance in navigating  Patient spoke further on requesting a referral through [facility]  Patient feels as though patient is not safe in current living situation and would like to be in a more safe, stable environment for son and self  Patient did have contact with  for upcoming custody hearing  But does not feel any more confident  A: Patient appeared in a slightly more positive mood than prior session  Patient states when son is present that patient feels at ease  P: Patient will continue to follow up with CM on a weekly basis until stable in recovery as well as overall living situation          Referrals made    [facility]    Next appointment date and time  07/05/2022 @ 2:00pm

## 2022-07-06 ENCOUNTER — DOCUMENTATION (OUTPATIENT)
Dept: PSYCHIATRY | Facility: CLINIC | Age: 37
End: 2022-07-06

## 2022-07-06 NOTE — PROGRESS NOTES
Note Type: Case Management Note                  Date of Service: 07/06/2022  Service:  [facility] Office    Note: Case Management Note  Juan Dixon 29 y o  female [id number]  Attending  Substance Use History     Social History     Substance and Sexual Activity   Alcohol Use Not Currently        Social History     Substance and Sexual Activity   Drug Use Not Currently    Types: Methadone, Heroin, Cocaine, Marijuana, Prescription, Methamphetamines    Comment: 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       Encounter Type:   Patient Face-to-Face    Start Time 3208   End Time 1600    Recovery needs addressed at this meeting:  Basic  Needs, Family and Legal Status    Note  D: Patient appeared for in-person, scheduled visit with this CM  Patient and this CM worked to submit 2021 taxes via mail  This CM made sure to let patient know that the deadline for tax submission was April 18, 2022 and patient stated that the IRS understood patients situation and patient would like to still submit taxes  Patient and CM continued to work on patient's custody paperwork for hearing that is to be held on 07/11/2022  Patient needed to sign paperwork to resend to , Jluis Miller ([employer]) in which this CM assisted with on patient's behalf  A: Patient appeared in a more positive mood  Patient stated that significant other has had off from work for the week, so patient has not felt alone though son has been with PGM  P: Patient will followup with this CM on Friday 07/08/2022 to ensure patient feels comfortable and safe prior to custody hearing     Patient and CM will also work to clear out patients emails as patient feels as though a high level of stress comes from the 13,000+ emails patient has on phone    Referrals made  No referrals made during this session    Next appointment date and time  07/08/2022 @ 11:00am

## 2022-07-11 ENCOUNTER — TELEPHONE (OUTPATIENT)
Dept: PSYCHIATRY | Facility: CLINIC | Age: 37
End: 2022-07-11

## 2022-07-11 DIAGNOSIS — F11.21 OPIOID USE DISORDER, SEVERE, IN SUSTAINED REMISSION, ON MAINTENANCE THERAPY, DEPENDENCE (HCC): Primary | ICD-10-CM

## 2022-07-11 RX ORDER — BUPRENORPHINE AND NALOXONE 8; 2 MG/1; MG/1
8 FILM, SOLUBLE BUCCAL; SUBLINGUAL 2 TIMES DAILY
Qty: 60 FILM | Refills: 0 | Status: SHIPPED | OUTPATIENT
Start: 2022-07-11 | End: 2022-08-11 | Stop reason: SDUPTHER

## 2022-07-11 NOTE — TELEPHONE ENCOUNTER
Patient called to cancel her appointment with Dr Latricia Flores and requested refills  Stated she has three tablets left of her Suboxone  Asked if we could refill her Topamax, advised she calls her PCP to refill it  Is scheduled for an appointment with Dr Heidi Whitt on 07/14/2022  Routing to Dr Heidi Whitt to advise

## 2022-07-12 ENCOUNTER — DOCUMENTATION (OUTPATIENT)
Dept: PSYCHIATRY | Facility: CLINIC | Age: 37
End: 2022-07-12

## 2022-07-12 NOTE — PROGRESS NOTES
Note Type: Case Management Note                  Date of Service: 07/12/2022  Service:  [facility] Office    Note: Case Management Note  Urban Nadia 29 y o  female [id number]  Attending  Substance Use History     Social History     Substance and Sexual Activity   Alcohol Use Not Currently        Social History     Substance and Sexual Activity   Drug Use Not Currently    Types: Methadone, Heroin, Cocaine, Marijuana, Prescription, Methamphetamines    Comment: 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       Encounter Type:   Patient Face-to-Face    Start Time 7614  End Time 5720    Recovery needs addressed at this meeting:  Basic  Needs, Emotional/Mental Health, Family,  and Legal Status    Note  D: Patient presented for in-person, scheduled visit with this CM  Present was patient's adolescent son Bigg Flanagan  Patient had a custody hearing this past Monday (07/11/2022) which resulted in a trail hearing to occur on 09/21/2022 for the custody of minor son  Patient requested assistance in gaining access to family therapy, in which this CM consulted with Psychotherapist and ultimately scheduled an appointment for patient and therapist to further discuss options  Patient also needed assistance in gaining access to virtual visits on [employer] in which this CM was able to successfully connect patient by defaulting certain Internet settings on patient's phone to allow for video calls  A: Patient appeared in an overwhelmed state  Patient feels as though PGM of minor son is stalking patient to have so much information on patient and the whereabouts of son  P: Patient will continue to follow-up with CM on a weekly basis to continue to stabilize patient within community and surroundings       Referrals made  Psychotherapist    Next appointment date and time  Follow up one week

## 2022-07-13 ENCOUNTER — OFFICE VISIT (OUTPATIENT)
Dept: FAMILY MEDICINE CLINIC | Facility: CLINIC | Age: 37
End: 2022-07-13

## 2022-07-13 VITALS
HEIGHT: 64 IN | WEIGHT: 181 LBS | OXYGEN SATURATION: 97 % | DIASTOLIC BLOOD PRESSURE: 64 MMHG | TEMPERATURE: 98.4 F | SYSTOLIC BLOOD PRESSURE: 108 MMHG | HEART RATE: 108 BPM | BODY MASS INDEX: 30.9 KG/M2 | RESPIRATION RATE: 18 BRPM

## 2022-07-13 DIAGNOSIS — Z76.89 ENCOUNTER TO ESTABLISH CARE: Primary | ICD-10-CM

## 2022-07-13 DIAGNOSIS — A64 STI (SEXUALLY TRANSMITTED INFECTION): ICD-10-CM

## 2022-07-13 DIAGNOSIS — E55.9 VITAMIN D DEFICIENCY: ICD-10-CM

## 2022-07-13 PROCEDURE — 99204 OFFICE O/P NEW MOD 45 MIN: CPT | Performed by: FAMILY MEDICINE

## 2022-07-13 NOTE — PROGRESS NOTES
Assessment/Plan:     Encounter to establish care  - Referred from [facility], on Suboxone 8-2 mg   - No acute medical complains today  - Would like to be tested for H[facility]P [facility]  - H/o of Hypothyroidism, on Levothyroxine 50 mcg  - Last TSH 1/26/2022 - 2 740 normal  - continue Levothyroxine 50 mcg   -Repeat TSH   -The risks and benefits of my recommendations, as well as other treatment options were discussed with the patient today  Questions were answered  - H/o of Bipolar disorder and MDD, follows up with Psych outpatient   - Encourage medications compliant  - Labs reviewed with patient, HIV, RPR, Hep C negative  - Follow up with [facility]  - Follow up with Psychiatry  - Follow up: 3 months for Physical    Subjective:     Patient ID: Angy Olmstead is a 29 y o  female  HPI  Patient is a 28 yo F with PMHx of hypothyroidism, Bipolar disorder, asthma, anxiety who presents to established care  Patient was referred from the [facility] program  She has no acute medical complains today  She follows up with psychiatry outpatient  Patient is currently on Suboxone for opioids withdrawal  She states that she is complaint with her medications  Patient would like to be tested for HOSP [facility], states that she had a h/o of previous GC infection, sexually active with 1 male partner  Denies headache, dizziness, visual changes, chest pain, abdominal pain, N/V/D  Review of Systems   Constitutional: Negative for chills and fever  HENT: Negative for congestion, ear pain and sore throat  Eyes: Negative for pain and visual disturbance  Respiratory: Negative for cough, chest tightness, shortness of breath and wheezing  Cardiovascular: Negative for chest pain, palpitations and leg swelling  Gastrointestinal: Negative for abdominal pain, blood in stool, constipation, diarrhea, nausea and vomiting  Genitourinary: Negative for dysuria and hematuria  Musculoskeletal: Negative for arthralgias and back pain     Skin: Negative for color change and rash  Neurological: Negative for dizziness, seizures, syncope, weakness and headaches  Psychiatric/Behavioral: Negative for sleep disturbance and suicidal ideas  All other systems reviewed and are negative  Objective:     Physical Exam  Constitutional:       General: She is not in acute distress  Appearance: Normal appearance  HENT:      Head: Normocephalic and atraumatic  Right Ear: External ear normal       Left Ear: External ear normal       Nose: No congestion  Mouth/Throat:      Mouth: Mucous membranes are moist    Eyes:      Extraocular Movements: Extraocular movements intact  Pupils: Pupils are equal, round, and reactive to light  Cardiovascular:      Rate and Rhythm: Normal rate and regular rhythm  Pulses: Normal pulses  Heart sounds: Normal heart sounds  Pulmonary:      Effort: Pulmonary effort is normal  No respiratory distress  Breath sounds: Normal breath sounds  No stridor  No wheezing or rhonchi  Chest:      Chest wall: No tenderness  Abdominal:      General: Bowel sounds are normal  There is no distension  Palpations: Abdomen is soft  There is no mass  Tenderness: There is no abdominal tenderness  There is no right CVA tenderness, left CVA tenderness or guarding  Musculoskeletal:         General: No swelling or tenderness  Normal range of motion  Cervical back: Neck supple  Right lower leg: No edema  Left lower leg: No edema  Skin:     General: Skin is warm and dry  Capillary Refill: Capillary refill takes less than 2 seconds  Findings: No lesion or rash  Neurological:      General: No focal deficit present  Mental Status: She is alert and oriented to person, place, and time     Psychiatric:         Mood and Affect: Mood normal

## 2022-07-13 NOTE — PATIENT INSTRUCTIONS
Depression   WHAT YOU NEED TO KNOW:   Depression is a medical condition that causes feelings of sadness or hopelessness that do not go away  Depression may cause you to lose interest in things you used to enjoy  These feelings may interfere with your daily life  DISCHARGE INSTRUCTIONS:   Call your local emergency number (911 in the [address]) if:   You think about harming yourself or someone else  You have done something on purpose to hurt yourself  Call your therapist or doctor if:   Your symptoms do not improve  You cannot make it to your next appointment  You have new symptoms  You have questions or concerns about your condition or care  The following resources are available at any time to help you, if needed:   [address]: [phone] (9-895-610-SR)     Suicide Hotline: 9-375.932.8681 (2-414-UPDHSPU)     For a list of international numbers: https://save org/find-help/international-resources/    Medicines:   Antidepressants  may be given to improve or balance your mood  You may need to take this medicine for several weeks before you begin to feel better  Take your medicine as directed  Contact your healthcare provider if you think your medicine is not helping or if you have side effects  Tell him of her if you are allergic to any medicine  Keep a list of the medicines, vitamins, and herbs you take  Include the amounts, and when and why you take them  Bring the list or the pill bottles to follow-up visits  Carry your medicine list with you in case of an emergency  Therapy  is often used together with medicine to relieve depression  Therapy is a way for you to talk about your feelings and anything that may be causing depression  Therapy can be done alone or in a group  It may also be done with family members or a significant other  Self-care:   Get regular physical activity  Try to be active for 30 minutes, 3 to 5 days a week   Physical activity can help relieve depression  Work with your healthcare provider to develop a plan that you enjoy  It may help to ask someone to be active with you  Create a regular sleep schedule  A routine can help you relax before bed  Listen to music, read, or do yoga  Try to go to bed and wake up at the same time every day  Sleep is important for emotional health  Eat a variety of healthy foods  Healthy foods include fruits, vegetables, whole-grain breads, low-fat dairy products, lean meats, fish, and cooked beans  A healthy meal plan is low in fat, salt, and added sugar  Do not drink alcohol or use drugs  Alcohol and drugs can make depression worse  Talk to your therapist or doctor if you need help quitting  Follow up with your healthcare provider as directed: Your healthcare provider will monitor your progress at follow-up visits  He or she will also monitor your medicine if you take antidepressants  Your healthcare provider will ask if the medicine is helping  Tell him or her about any side effects or problems you may have with your medicine  The type or amount of medicine may need to be changed  Write down your questions so you remember to ask them during your visits  © Copyright [employer] 2022 Information is for End User's use only and may not be sold, redistributed or otherwise used for commercial purposes  All illustrations and images included in CareNotes® are the copyrighted property of A D A M , Inc  or [address] [place], [place] [address]víctor   The above information is an  only  It is not intended as medical advice for individual conditions or treatments  Talk to your doctor, nurse or pharmacist before following any medical regimen to see if it is safe and effective for you  Hypothyroidism   WHAT YOU NEED TO KNOW:   Hypothyroidism is a condition that develops when the thyroid gland does not make enough thyroid hormone  Thyroid hormones help control body temperature, heart rate, growth, and weight         DISCHARGE INSTRUCTIONS:   Call your local emergency number (911 in the [address]) or have someone call if:   You have sudden chest pain or shortness of breath  You have a seizure  You feel like you are going to faint  Return to the emergency department if:   You have diarrhea, tremors, or trouble sleeping  Your legs, ankles, or feet are swollen  Call your doctor or endocrinologist if:   You have a fever  You have chills, a cough, or feel weak and achy  You have pain and swelling in your muscles and joints  Your skin is itchy, swollen, or you have a rash  Your signs and symptoms return or get worse, even after treatment  You have questions or concerns about your condition or care  Medicines:   Thyroid hormone replacement medicine  helps bring your thyroid hormone level back to normal  You will need to take this medicine for the rest of your life to control hypothyroidism  It is important to take the medicine every day as directed  You will be given instructions for what to do if you miss a dose  Take your medicine as directed  Contact your healthcare provider if you think your medicine is not helping or if you have side effects  Tell him or her if you are allergic to any medicine  Keep a list of the medicines, vitamins, and herbs you take  Include the amounts, and when and why you take them  Bring the list or the pill bottles to follow-up visits  Carry your medicine list with you in case of an emergency  Manage hypothyroidism:   Get more iodine  The thyroid gland uses iodine to work correctly and to make thyroid hormones  Your healthcare provider may tell you to eat foods that are rich in iodine  He or she will tell you how much of these foods to eat  Milk and seafood are good sources of iodine  You may also need iodine supplements  Keep track of your blood pressure and weight:      Check your blood pressure  and write it down as often as directed   It is important to measure your blood pressure on the same arm and in the same position each time  Keep track of your blood pressure readings, along with the date and time you took them  Take this record with you to your followup visits  Weigh yourself daily  before breakfast after you urinate  Weight gain may be a sign of extra fluid in your body  Keep track of your daily weights and take the record with you to your followup visits  Follow up with your doctor or endocrinologist as directed: You may need to return for more blood tests to check your thyroid hormone level  This will show if you are getting the right amount of thyroid medicine  Write down your questions so you remember to ask them during your visits  © [employer] 2022 Information is for End User's use only and may not be sold, redistributed or otherwise used for commercial purposes  All illustrations and images included in CareNotes® are the copyrighted property of [employer]  or Gilda Obando   The above information is an  only  It is not intended as medical advice for individual conditions or treatments  Talk to your doctor, nurse or pharmacist before following any medical regimen to see if it is safe and effective for you

## 2022-07-14 ENCOUNTER — TELEMEDICINE (OUTPATIENT)
Dept: OTHER | Age: 37
End: 2022-07-14
Payer: MEDICARE

## 2022-07-14 DIAGNOSIS — G62.9 NEUROPATHY: ICD-10-CM

## 2022-07-14 DIAGNOSIS — F11.21 OPIOID USE DISORDER, SEVERE, IN SUSTAINED REMISSION, ON MAINTENANCE THERAPY, DEPENDENCE (HCC): Primary | ICD-10-CM

## 2022-07-14 DIAGNOSIS — F33.3 MDD (MAJOR DEPRESSIVE DISORDER), RECURRENT, SEVERE, WITH PSYCHOSIS (HCC): ICD-10-CM

## 2022-07-14 PROCEDURE — 99212 OFFICE O/P EST SF 10 MIN: CPT | Performed by: EMERGENCY MEDICINE

## 2022-07-14 RX ORDER — DULOXETIN HYDROCHLORIDE 30 MG/1
30 CAPSULE, DELAYED RELEASE ORAL DAILY
Qty: 30 CAPSULE | Refills: 1 | Status: SHIPPED | OUTPATIENT
Start: 2022-07-14 | End: 2022-07-18

## 2022-07-14 RX ORDER — GABAPENTIN 100 MG/1
100 CAPSULE ORAL 3 TIMES DAILY
Qty: 90 CAPSULE | Refills: 1 | Status: SHIPPED | OUTPATIENT
Start: 2022-07-14 | End: 2022-07-19

## 2022-07-14 NOTE — PROGRESS NOTES
Virtual Regular Visit    Verification of patient location:    Patient is located in the following state in which I hold an active license [place]      Assessment/Plan:    Problem List Items Addressed This Visit        Nervous and Auditory    Neuropathy    Relevant Medications    gabapentin (NEURONTIN) 100 mg capsule       Other    Opioid use disorder, severe, in sustained remission, on maintenance therapy, dependence ([address] ) - Primary     · Pt continues to do well  She required virtual visit due to childcare needs today  No complaints  No cravings or use  · Will continue current treatment  Refill sent in a few days ago prior to visit  MDD (major depressive disorder), recurrent, severe, with psychosis ([address] )     · Pt requested refill of Cymbalta today as she only has a few pills left  · Refill sent and patient is scheduled with Dr Jessica Montes week for [facility] psychiatry  Relevant Medications    DULoxetine (CYMBALTA) 30 mg delayed release capsule               Reason for visit is   Chief Complaint   Patient presents with    Virtual Regular Visit        Encounter provider Stefan Crenshaw DO    Provider located at [address] [place] [address]  [phone]      Recent Visits  Date Type Provider Dept   07/13/22 Office Visit Cory Rascon MD  [facility]   Showing recent visits within past 7 days and meeting all other requirements  Today's Visits  Date Type Provider Dept   07/14/22 Telemedicine Stefan Crenshaw DO [facility]   Showing today's visits and meeting all other requirements  Future Appointments  No visits were found meeting these conditions  Showing future appointments within next 150 days and meeting all other requirements       The patient was identified by name and date of birth   Tod Pi was informed that this is a telemedicine visit and that the visit is being conducted through [facility] and patient was informed this is a secure, HIPAA-complaint platform  She agrees to proceed     My office door was closed  No one else was in the room  She acknowledged consent and understanding of privacy and security of the video platform  The patient has agreed to participate and understands they can discontinue the visit at any time  Patient is aware this is a billable service  Naya Hughes is a 29 y o  female for follow up of MAT for OUD  She voices no complaints  She is doing well without use or cravings  Remains in stable recovery  Past Medical History:   Diagnosis Date    Anxiety     "severe"    Asthma     [facility]     Cigarette smoker     Depression     Disease of thyroid gland     Drug dependence, in remission ([facility] )     GERD (gastroesophageal reflux disease)     H/O: whooping cough     while pregnant    Hepatitis C, chronic (HCC)     Insomnia disorder     Liver disease     Hepatitis C    Migraine     Psychiatric illness     PTSD (post-traumatic stress disorder)     Substance abuse ([place] [address] )        Past Surgical History:   Procedure Laterality Date    KNEE SURGERY      OTHER SURGICAL HISTORY      body piercing    TX REPAIR UMBILICAL XKLB,6+Q/K,CVBLA N/A 04/19/2016    Procedure: REPAIR HERNIA UMBILICAL WITH MESH;  Surgeon: Ran Barajas MD;  Location:  MAIN OR;  Service: General    VAGINAL DELIVERY      X 6    WISDOM TOOTH EXTRACTION      X 4    WOUND DEBRIDEMENT Right 01/17/2018    Procedure: RIGHT KNEE DEBRIDEMENT; [address]; AND LACERATION REPAIR   INTRAOPERATIVE ASSESSMENT OF PATELLA TENDON;  Surgeon: Frankie Mary MD;  Location:  MAIN OR;  Service: Orthopedics       Current Outpatient Medications   Medication Sig Dispense Refill    DULoxetine (CYMBALTA) 30 mg delayed release capsule Take 1 capsule (30 mg total) by mouth daily 30 capsule 1    gabapentin (NEURONTIN) 100 mg capsule Take 1 capsule (100 mg total) by mouth 3 (three) times a day 90 capsule 1    albuterol (PROVENTIL HFA,VENTOLIN HFA) 90 mcg/act inhaler Inhale 2 puffs every 4 (four) hours as needed for wheezing 18 g 5    buprenorphine-naloxone (Suboxone) 8-2 mg Place 1 Film (8 mg total) under the tongue 2 (two) times a day 60 Film 0    clonazePAM (KlonoPIN) 1 mg tablet Take 1 tablet (1 mg total) by mouth 3 (three) times a day 90 tablet 5    cyclobenzaprine (FLEXERIL) 10 mg tablet take 1 tablet by mouth three times a day if needed for muscle spasm 90 tablet 3    ibuprofen (MOTRIN) 800 mg tablet Take 1 tablet (800 mg total) by mouth every 8 (eight) hours as needed for mild pain 90 tablet 3    levothyroxine 50 mcg tablet 1 daily 30 tablet 5    NARCAN 4 MG/0 1ML LIQD ADMINISTER A SINGLE spray INTO ONE NOSTRIL CALL 911 MAY REPEAT ONCE (Patient not taking: Reported on 7/28/2021)  0    OLANZapine (ZyPREXA) 10 mg tablet Take 1 tablet (10 mg total) by mouth daily at bedtime (Patient not taking: Reported on 6/23/2022) 30 tablet 1    omeprazole (PriLOSEC) 20 mg delayed release capsule Take 1 capsule (20 mg total) by mouth daily 90 capsule 3    oxyCODONE-acetaminophen (PERCOCET) 5-325 mg per tablet Take 1 tablet by mouth every 4 (four) hours as needed for severe pain Max Daily Amount: 6 tablets (Patient not taking: Reported on 6/23/2022) 20 tablet 0    topiramate (TOPAMAX) 100 mg tablet Take 1 tablet (100 mg total) by mouth 3 (three) times a day 90 tablet 5    traZODone (DESYREL) 50 mg tablet 1-2  tablet 3     No current facility-administered medications for this visit  Allergies   Allergen Reactions    Amoxicillin      Pt states it never works for her          Video Exam    There were no vitals filed for this visit  Physical Exam  Constitutional:       General: She is not in acute distress  Appearance: Normal appearance  Neurological:      Mental Status: She is oriented to person, place, and time     Psychiatric:         Mood and Affect: Mood normal          Behavior: Behavior normal       Remainder of exam not done due to virtual nature  I spent 15 minutes with patient today in which greater than 50% of the time was spent in counseling/coordination of care regarding OUD management and medcation management    VIRTUAL VISIT DISCLAIMER      Geno Courtney verbally agrees to participate in [employer]  Pt is aware that [employer] could be limited without vital signs or the ability to perform a full hands-on physical exam  Geno Courtney understands she or the provider may request at any time to terminate the video visit and request the patient to seek care or treatment in person

## 2022-07-14 NOTE — ASSESSMENT & PLAN NOTE
· Pt requested refill of Cymbalta today as she only has a few pills left  · Refill sent and patient is scheduled with Dr Anastasiya Jaquez week for [facility] psychiatry

## 2022-07-14 NOTE — ASSESSMENT & PLAN NOTE
· Pt continues to do well  She required virtual visit due to childcare needs today  No complaints  No cravings or use  · Will continue current treatment  Refill sent in a few days ago prior to visit

## 2022-07-15 ENCOUNTER — SOCIAL WORK (OUTPATIENT)
Dept: PSYCHIATRY | Facility: CLINIC | Age: 37
End: 2022-07-15
Payer: COMMERCIAL

## 2022-07-15 ENCOUNTER — APPOINTMENT (OUTPATIENT)
Dept: LAB | Facility: HOSPITAL | Age: 37
End: 2022-07-15
Payer: MEDICARE

## 2022-07-15 DIAGNOSIS — F33.2 MDD (MAJOR DEPRESSIVE DISORDER), RECURRENT SEVERE, WITHOUT PSYCHOSIS (HCC): Primary | ICD-10-CM

## 2022-07-15 DIAGNOSIS — A64 STI (SEXUALLY TRANSMITTED INFECTION): ICD-10-CM

## 2022-07-15 DIAGNOSIS — F43.10 PTSD (POST-TRAUMATIC STRESS DISORDER): ICD-10-CM

## 2022-07-15 DIAGNOSIS — F11.21 OPIOID USE DISORDER, SEVERE, IN SUSTAINED REMISSION, ON MAINTENANCE THERAPY, DEPENDENCE (HCC): ICD-10-CM

## 2022-07-15 DIAGNOSIS — Z76.89 ENCOUNTER TO ESTABLISH CARE: Primary | ICD-10-CM

## 2022-07-15 DIAGNOSIS — E55.9 VITAMIN D DEFICIENCY: ICD-10-CM

## 2022-07-15 LAB
25(OH)D3 SERPL-MCNC: 39.6 NG/ML (ref 30–100)
ALBUMIN SERPL BCP-MCNC: 4.2 G/DL (ref 3–5.2)
ALP SERPL-CCNC: 44 U/L (ref 43–122)
ALT SERPL W P-5'-P-CCNC: 11 U/L
ANION GAP SERPL CALCULATED.3IONS-SCNC: 9 MMOL/L (ref 5–14)
AST SERPL W P-5'-P-CCNC: 15 U/L (ref 14–36)
BASOPHILS # BLD AUTO: 0.03 THOUSANDS/ΜL (ref 0–0.1)
BASOPHILS NFR BLD AUTO: 0 % (ref 0–1)
BILIRUB SERPL-MCNC: 0.3 MG/DL
BUN SERPL-MCNC: 12 MG/DL (ref 5–25)
CALCIUM SERPL-MCNC: 9.3 MG/DL (ref 8.4–10.2)
CHLORIDE SERPL-SCNC: 104 MMOL/L (ref 97–108)
CO2 SERPL-SCNC: 25 MMOL/L (ref 22–30)
CREAT SERPL-MCNC: 1.01 MG/DL (ref 0.6–1.2)
EOSINOPHIL # BLD AUTO: 0.2 THOUSAND/ΜL (ref 0–0.61)
EOSINOPHIL NFR BLD AUTO: 3 % (ref 0–6)
ERYTHROCYTE [DISTWIDTH] IN BLOOD BY AUTOMATED COUNT: 12.5 % (ref 11.6–15.1)
GFR SERPL CREATININE-BSD FRML MDRD: 72 ML/MIN/1.73SQ M
GLUCOSE SERPL-MCNC: 82 MG/DL (ref 70–99)
HCT VFR BLD AUTO: 41.6 % (ref 34.8–46.1)
HGB BLD-MCNC: 13.1 G/DL (ref 11.5–15.4)
IMM GRANULOCYTES # BLD AUTO: 0.04 THOUSAND/UL (ref 0–0.2)
IMM GRANULOCYTES NFR BLD AUTO: 1 % (ref 0–2)
LYMPHOCYTES # BLD AUTO: 2.04 THOUSANDS/ΜL (ref 0.6–4.47)
LYMPHOCYTES NFR BLD AUTO: 25 % (ref 14–44)
MCH RBC QN AUTO: 29.2 PG (ref 26.8–34.3)
MCHC RBC AUTO-ENTMCNC: 31.5 G/DL (ref 31.4–37.4)
MCV RBC AUTO: 93 FL (ref 82–98)
MONOCYTES # BLD AUTO: 0.42 THOUSAND/ΜL (ref 0.17–1.22)
MONOCYTES NFR BLD AUTO: 5 % (ref 4–12)
NEUTROPHILS # BLD AUTO: 5.31 THOUSANDS/ΜL (ref 1.85–7.62)
NEUTS SEG NFR BLD AUTO: 66 % (ref 43–75)
NRBC BLD AUTO-RTO: 0 /100 WBCS
PLATELET # BLD AUTO: 211 THOUSANDS/UL (ref 149–390)
PMV BLD AUTO: 10.9 FL (ref 8.9–12.7)
POTASSIUM SERPL-SCNC: 4.6 MMOL/L (ref 3.6–5)
PROT SERPL-MCNC: 6.9 G/DL (ref 5.9–8.4)
RBC # BLD AUTO: 4.49 MILLION/UL (ref 3.81–5.12)
SODIUM SERPL-SCNC: 138 MMOL/L (ref 137–147)
TSH SERPL DL<=0.05 MIU/L-ACNC: 0.86 UIU/ML (ref 0.45–4.5)
WBC # BLD AUTO: 8.04 THOUSAND/UL (ref 4.31–10.16)

## 2022-07-15 PROCEDURE — 90791 PSYCH DIAGNOSTIC EVALUATION: CPT

## 2022-07-15 PROCEDURE — 80053 COMPREHEN METABOLIC PANEL: CPT

## 2022-07-15 PROCEDURE — 87491 CHLMYD TRACH DNA AMP PROBE: CPT

## 2022-07-15 PROCEDURE — 85025 COMPLETE CBC W/AUTO DIFF WBC: CPT

## 2022-07-15 PROCEDURE — 36415 COLL VENOUS BLD VENIPUNCTURE: CPT

## 2022-07-15 PROCEDURE — 82306 VITAMIN D 25 HYDROXY: CPT

## 2022-07-15 PROCEDURE — 84443 ASSAY THYROID STIM HORMONE: CPT

## 2022-07-15 PROCEDURE — 87591 N.GONORRHOEAE DNA AMP PROB: CPT

## 2022-07-15 NOTE — PSYCH
MAT Intake    Assessment    Presenting problem:  Was referred by [facility]; has been on opioid maintenance     Individuals perception of the problem: Would like to continue with maintenance therapy      Individual/family's expectations: Would like to discuss issues with son's paternal grandmother, currently in a custody keller in [place]    Current living situation: [place]    Issues with current living situation: No    Able to return?: Yes    Describe primary support system: My fiance but he doesn't understand [address] or addiction     Marital status: Living with significant other    Family and relationships (please list relation, first and last name, sex, age)    Relation First and Last name Sex age                                   Academic and Vocational    Currently in school: No    Education level: [employer] name: [address] [place], [place] [id number] [facility]    Academic performance: B/C student    Occupation/Vocation: No    Employment status: receiving disability, SSI     Hours per week: N/A     Issues with employment: N/A      service: No    Comments: N/A     Cultural and Ethnic Background    Describe cultural or ethnic beliefs that may impact care: N/A     Symptoms Checklist    Depression:  Yes depressed mood, fatigue, feelings of worthlessness/guilt, loss of energy/fatigue and disturbed sleep    [place]:  No    Anxiety:  Denies has had anxiety in the past     Eating Disorder:  Denies    Post Traumatic Stress Disorder  Yes Distressing recollection of traumatic events, Flashbacks    Obsessive/Compulsive  Denies    Thought process:  Denies    Impulsive behavior:  Has issues with spending money that she's have     Development/Childhood History    Developmental /Childhood history  Assessed within normal limits     Child's current age and completed milestones  Within normal limits     Childhood Medical History    Medical history  Was pregnant in 10th grade     Legal    Legal history  Yes Charges pending? No    On parole/probation? No    Currently court ordered to treatment? No    Currently in a custody keller with son's paternal grandmother       Sexual History    Sexual orientation  Heterosexual or straight    Gender identity  Female    Gender identity issues? No    Self-history of sexual or physical abuse  No    Treatment History    Past Psychiatric treatment  Inpatient, Detox, Outpatient    Current behavorial health care services  No    Substance Abuse History    Drug and Alcohol history  Yes Heroin/Opioids Age of onset:  In early 20's     Method of use: pills initially; snorting and IVDU    Average use and frequency: doesn't matter; was narcaned a couple of times     When last used: November 2019-January 2020    Last used amount: Enough to get high     Longest period w/o using: Has been in recovery for 2 years     History of detoxification or rehabilitation? Yes, describe:  [facility]     Family substance use history? Yes, describe: Father is an alcoholic and mother has abused xanax    History of addictive behavior? Yes  Other  Has impulsion to spend money    Any other comments about substance abuse?   No    Mental Status Exam    Consciousness  Alert    Behavior  Cooperative    Dress  Casually dressed    Motor  No abnormal movements were observed    Age  stated age    Speech- Rate  normal    Speech- Tone  normal    Speech- Volume  normal    Mood  Good    Affect  Normal    Range  Normal    Orientation to person, place and time  oriented to person and place and time    Cognition    Memory  short term memory intact and long term memory intact    Attention and concentration  Intact; though she feels disjointed at times     Thought form  Normal    Thought Content    Suicidal ideation  denies    Homicidal Ideation  denies    Self- injurious thoughts  denies    Delusional Content  None reported    Perception  Auditory Hallucinations  denies    Visual Hallucinations  denies    Tactile Hallucinations  denies    Cognitive function  At baseline    Insight  Good     Judgement  Good     Other pertinent findings  N/A     Risk Assessment    History of Violence? Other No    Risk factor to self    Yes History of Overdose Yes, describe: 3,4, or 5 had to be narcanned     Preoccupation with death    No    Risk Factors (to others)    Danger to others  No    Homicidal ideation  No    Past  No    Present  No    Threats  No    Current plan  No    Past  No    Present  No    History of attempts  No    Past  No    Present  No    Methods  No    Past  No    Present  No    Immediate access to firearms  Yes- person(s) advised to address this? Please provide name my shailesh Mccain  Individual or family response: Handgun is in a locked safe and shailesh has the key  Protective Factors    Family involvement  No    Sense of hope  Yes, describe: always     Strong social support  No    Other  No    History of Trauma    Have you experienced trauma in your life? Emotional trauma  Lives with shailesh in the apartment where the dead girlfriend used to stay  and Witnessed a traumatic event  ex  passed from an anuerism  Perpetrator  No    History of family trauma? Other  Nature and frequency of trauma was pregnant in 10th grade     Strengths/Weaknesses    Strengths  Manages health care needs, Understands medication plan, Independent in ADL's, Adequate support system    Weakness  Other   Tends to isolate and distrust anyone in her [place]    Needs assessment    Patient- Primary language spoken [place]    Family- Primary language spoken English    Patient- able to read Yes    Family- able to read Yes    Patient- Primary language read [place]    Family- Primary language read English    Indicate Consumer/Family ability and readiness for treatment and learning    Educational  Use of commonly service systems, Goal setting, ADL's, Wellness, Dynamics of substance/chemical abuse, Lifestyle changes and How to build on strengths    Learning ability  Patient has the desire and motivation to learn more about her substance use and managing mental health  Diagnosis  Encounter Diagnoses   Name Primary?  MDD (major depressive disorder), recurrent severe, without psychosis ([facility] [address] ) Yes    Opioid use disorder, severe, in sustained remission, on maintenance therapy, dependence ([facility] )     PTSD (post-traumatic stress disorder)          Clinical Summary    Was family involved in the assessment/treatment recommendation? no    Clinical summary  Geno presented for her initial evaluation today with her son, Cecily Lora is a referral from [facility]; she has in on medication assisted treatment for 2 years prior to establishing care at this office  Geno states she is currently in a difficult custody keller with her son's grandmother; it is a considerable source of stress for her  Geno suffered the sudden loss of Johnnie's father from an anuerism  Chloe Oakes is currently in a relationship with her fiance but states he is unaware of mental health and addiction needs  Geno would benefit from continued psychotherapy and medication management for her symptoms  Recommendations- Summary  Chloe Oakes is in agreement to start psychotherapy services; she is connected with toxicology, psychiatry and case management services       Referrals/Consults/Linkage  MH Service, Individual Counseling, Medication Management, Blended Case Management, Peer Support Program

## 2022-07-16 LAB
C TRACH DNA SPEC QL NAA+PROBE: NEGATIVE
N GONORRHOEA DNA SPEC QL NAA+PROBE: NEGATIVE

## 2022-07-18 ENCOUNTER — DOCUMENTATION (OUTPATIENT)
Dept: PSYCHIATRY | Facility: CLINIC | Age: 37
End: 2022-07-18

## 2022-07-18 ENCOUNTER — OFFICE VISIT (OUTPATIENT)
Dept: PSYCHIATRY | Facility: CLINIC | Age: 37
End: 2022-07-18
Payer: COMMERCIAL

## 2022-07-18 VITALS
HEART RATE: 92 BPM | DIASTOLIC BLOOD PRESSURE: 71 MMHG | WEIGHT: 183 LBS | BODY MASS INDEX: 31.24 KG/M2 | SYSTOLIC BLOOD PRESSURE: 104 MMHG | HEIGHT: 64 IN

## 2022-07-18 DIAGNOSIS — F31.9 BIPOLAR DISORDER WITH DEPRESSION (HCC): Primary | ICD-10-CM

## 2022-07-18 PROCEDURE — 99203 OFFICE O/P NEW LOW 30 MIN: CPT | Performed by: PSYCHIATRY & NEUROLOGY

## 2022-07-18 RX ORDER — DULOXETIN HYDROCHLORIDE 20 MG/1
20 CAPSULE, DELAYED RELEASE ORAL DAILY
Qty: 30 CAPSULE | Refills: 1 | Status: SHIPPED | OUTPATIENT
Start: 2022-07-18 | End: 2022-08-05 | Stop reason: ALTCHOICE
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
## 2022-07-18 NOTE — PROGRESS NOTES
Note Type:  Note                  Date of Service: [unfilled]  Service:  [facility] SH[facility] MAT Office    Note:  Note  Raya Cunningham 29 y o  female [id number]  Attending  Substance Use History     Social History     Substance and Sexual Activity   Alcohol Use Not Currently        Social History     Substance and Sexual Activity   Drug Use Not Currently    Types: Methadone, Heroin, Cocaine, Marijuana, Prescription, Methamphetamines    Comment: 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       Encounter Type:   Patient Face-to-Face    Start Time 2:50pm  End Time 3:30pm     Recovery needs addressed at this meeting:  Peer Support     Note  D pt came in office today very upset with new court info she got from [facility] for son  PGM is seeking full physical custody of 10 yo son Gerardo Alpaugh indicates that she is not using drugs and or alcohol just very upset with legal situation with court  Py is seeing psychiatrist today to help maybe adjust her medications  Pt feels that the PGM is watching everything she does and that she is spying on her  Pt also brought in all of her paperwork for DMV and to restore her DL   Will look through paperwork I have for her and see what we cna figure out with restoring her DL   P will touch base with pt 07/22/2022 when she meets with [facility]       Referrals made  NA     Next appointment date and time  07/22/2022  11:00am

## 2022-07-19 ENCOUNTER — TELEPHONE (OUTPATIENT)
Dept: PSYCHIATRY | Facility: CLINIC | Age: 37
End: 2022-07-19

## 2022-07-19 DIAGNOSIS — F41.9 ANXIETY: Primary | ICD-10-CM

## 2022-07-19 DIAGNOSIS — M79.2 NEUROPATHIC PAIN: ICD-10-CM

## 2022-07-19 RX ORDER — GABAPENTIN 300 MG/1
300 CAPSULE ORAL 3 TIMES DAILY
Qty: 90 CAPSULE | Refills: 1 | Status: SHIPPED | OUTPATIENT
Start: 2022-07-19 | End: 2022-08-05 | Stop reason: SDUPTHER

## 2022-07-19 NOTE — PROGRESS NOTES
After discussion with the patient Suboxone provider decision was made that increase of Neurontin to 300 mg 3 times a day would not significantly affect the patient negative, the writer believes it may help to decrease patient anxiety and even depression which partly associated with the patient increase of chronic pain, neuropathic pain related to her history of trauma, which was decreased by higher dose of Neurontin in the past.

## 2022-07-21 ENCOUNTER — TELEPHONE (OUTPATIENT)
Dept: PSYCHIATRY | Facility: CLINIC | Age: 37
End: 2022-07-21

## 2022-07-21 ENCOUNTER — DOCUMENTATION (OUTPATIENT)
Dept: PSYCHIATRY | Facility: CLINIC | Age: 37
End: 2022-07-21

## 2022-07-21 ENCOUNTER — APPOINTMENT (EMERGENCY)
Dept: RADIOLOGY | Facility: HOSPITAL | Age: 37
End: 2022-07-21
Payer: MEDICARE

## 2022-07-21 ENCOUNTER — HOSPITAL ENCOUNTER (EMERGENCY)
Facility: HOSPITAL | Age: 37
Discharge: HOME | End: 2022-07-21
Attending: EMERGENCY MEDICINE
Payer: MEDICARE

## 2022-07-21 VITALS
HEART RATE: 82 BPM | TEMPERATURE: 98.2 F | SYSTOLIC BLOOD PRESSURE: 120 MMHG | OXYGEN SATURATION: 100 % | DIASTOLIC BLOOD PRESSURE: 65 MMHG | WEIGHT: 185.85 LBS | BODY MASS INDEX: 32.15 KG/M2 | RESPIRATION RATE: 17 BRPM

## 2022-07-21 DIAGNOSIS — R51.9 ACUTE HEADACHE: Primary | ICD-10-CM

## 2022-07-21 DIAGNOSIS — B34.9 VIRAL SYNDROME: ICD-10-CM

## 2022-07-21 LAB
ALBUMIN SERPL BCP-MCNC: 4.3 G/DL (ref 3.5–5)
ALP SERPL-CCNC: 49 U/L (ref 43–122)
ALT SERPL W P-5'-P-CCNC: 13 U/L
ANION GAP SERPL CALCULATED.3IONS-SCNC: 12 MMOL/L (ref 5–14)
AST SERPL W P-5'-P-CCNC: 18 U/L (ref 14–36)
ATRIAL RATE: 81 BPM
ATRIAL RATE: 89 BPM
ATRIAL RATE: 89 BPM
BACTERIA UR QL AUTO: NORMAL /HPF
BASOPHILS # BLD AUTO: 0.04 THOUSANDS/ΜL (ref 0–0.1)
BASOPHILS NFR BLD AUTO: 1 % (ref 0–1)
BILIRUB SERPL-MCNC: 0.27 MG/DL (ref 0.2–1)
BILIRUB UR QL STRIP: NEGATIVE
BUN SERPL-MCNC: 12 MG/DL (ref 5–25)
CALCIUM SERPL-MCNC: 8.4 MG/DL (ref 8.4–10.2)
CARDIAC TROPONIN I PNL SERPL HS: <2 NG/L
CHLORIDE SERPL-SCNC: 107 MMOL/L (ref 96–108)
CLARITY UR: CLEAR
CO2 SERPL-SCNC: 21 MMOL/L (ref 21–32)
COLOR UR: ABNORMAL
CREAT SERPL-MCNC: 1.15 MG/DL (ref 0.6–1.2)
EOSINOPHIL # BLD AUTO: 0.23 THOUSAND/ΜL (ref 0–0.61)
EOSINOPHIL NFR BLD AUTO: 3 % (ref 0–6)
ERYTHROCYTE [DISTWIDTH] IN BLOOD BY AUTOMATED COUNT: 12.7 % (ref 11.6–15.1)
EXT PREG TEST URINE: NEGATIVE
EXT. CONTROL ED NAV: NORMAL
GFR SERPL CREATININE-BSD FRML MDRD: 61 ML/MIN/1.73SQ M
GLUCOSE SERPL-MCNC: 79 MG/DL (ref 70–99)
GLUCOSE SERPL-MCNC: 81 MG/DL (ref 65–140)
GLUCOSE UR STRIP-MCNC: NEGATIVE MG/DL
HCT VFR BLD AUTO: 39 % (ref 34.8–46.1)
HGB BLD-MCNC: 12.6 G/DL (ref 11.5–15.4)
HGB UR QL STRIP.AUTO: NEGATIVE
IMM GRANULOCYTES # BLD AUTO: 0.05 THOUSAND/UL (ref 0–0.2)
IMM GRANULOCYTES NFR BLD AUTO: 1 % (ref 0–2)
KETONES UR STRIP-MCNC: NEGATIVE MG/DL
LEUKOCYTE ESTERASE UR QL STRIP: 25
LYMPHOCYTES # BLD AUTO: 2.75 THOUSANDS/ΜL (ref 0.6–4.47)
LYMPHOCYTES NFR BLD AUTO: 38 % (ref 14–44)
MCH RBC QN AUTO: 29.2 PG (ref 26.8–34.3)
MCHC RBC AUTO-ENTMCNC: 32.3 G/DL (ref 31.4–37.4)
MCV RBC AUTO: 91 FL (ref 82–98)
MONOCYTES # BLD AUTO: 0.33 THOUSAND/ΜL (ref 0.17–1.22)
MONOCYTES NFR BLD AUTO: 5 % (ref 4–12)
NEUTROPHILS # BLD AUTO: 3.85 THOUSANDS/ΜL (ref 1.85–7.62)
NEUTS SEG NFR BLD AUTO: 52 % (ref 43–75)
NITRITE UR QL STRIP: NEGATIVE
NON-SQ EPI CELLS URNS QL MICRO: NORMAL /HPF
NRBC BLD AUTO-RTO: 0 /100 WBCS
P AXIS: 43 DEGREES
P AXIS: 62 DEGREES
P AXIS: 72 DEGREES
PH UR STRIP.AUTO: 6.5 [PH]
PLATELET # BLD AUTO: 217 THOUSANDS/UL (ref 149–390)
PMV BLD AUTO: 11.3 FL (ref 8.9–12.7)
POTASSIUM SERPL-SCNC: 3.4 MMOL/L (ref 3.5–5.3)
PR INTERVAL: 132 MS
PR INTERVAL: 134 MS
PR INTERVAL: 146 MS
PROT SERPL-MCNC: 7.4 G/DL (ref 6.4–8.4)
PROT UR STRIP-MCNC: NEGATIVE MG/DL
QRS AXIS: 27 DEGREES
QRS AXIS: 39 DEGREES
QRS AXIS: 52 DEGREES
QRSD INTERVAL: 80 MS
QRSD INTERVAL: 82 MS
QRSD INTERVAL: 86 MS
QT INTERVAL: 370 MS
QT INTERVAL: 378 MS
QT INTERVAL: 416 MS
QTC INTERVAL: 450 MS
QTC INTERVAL: 459 MS
QTC INTERVAL: 483 MS
RBC # BLD AUTO: 4.31 MILLION/UL (ref 3.81–5.12)
RBC #/AREA URNS AUTO: NORMAL /HPF
SARS-COV-2 RNA RESP QL NAA+PROBE: NEGATIVE
SODIUM SERPL-SCNC: 140 MMOL/L (ref 135–147)
SP GR UR STRIP.AUTO: 1.01 (ref 1–1.04)
T WAVE AXIS: -10 DEGREES
T WAVE AXIS: 14 DEGREES
T WAVE AXIS: 17 DEGREES
UROBILINOGEN UA: NEGATIVE MG/DL
VENTRICULAR RATE: 81 BPM
VENTRICULAR RATE: 89 BPM
VENTRICULAR RATE: 89 BPM
WBC # BLD AUTO: 7.25 THOUSAND/UL (ref 4.31–10.16)
WBC #/AREA URNS AUTO: NORMAL /HPF

## 2022-07-21 PROCEDURE — 80053 COMPREHEN METABOLIC PANEL: CPT

## 2022-07-21 PROCEDURE — U0005 INFEC AGEN DETEC AMPLI PROBE: HCPCS

## 2022-07-21 PROCEDURE — 84484 ASSAY OF TROPONIN QUANT: CPT

## 2022-07-21 PROCEDURE — 85025 COMPLETE CBC W/AUTO DIFF WBC: CPT

## 2022-07-21 PROCEDURE — 96375 TX/PRO/DX INJ NEW DRUG ADDON: CPT

## 2022-07-21 PROCEDURE — 87510 GARDNER VAG DNA DIR PROBE: CPT

## 2022-07-21 PROCEDURE — 87480 CANDIDA DNA DIR PROBE: CPT

## 2022-07-21 PROCEDURE — 81001 URINALYSIS AUTO W/SCOPE: CPT

## 2022-07-21 PROCEDURE — 99285 EMERGENCY DEPT VISIT HI MDM: CPT

## 2022-07-21 PROCEDURE — 71045 X-RAY EXAM CHEST 1 VIEW: CPT

## 2022-07-21 PROCEDURE — 96374 THER/PROPH/DIAG INJ IV PUSH: CPT

## 2022-07-21 PROCEDURE — 96372 THER/PROPH/DIAG INJ SC/IM: CPT

## 2022-07-21 PROCEDURE — 96361 HYDRATE IV INFUSION ADD-ON: CPT

## 2022-07-21 PROCEDURE — 93010 ELECTROCARDIOGRAM REPORT: CPT | Performed by: INTERNAL MEDICINE

## 2022-07-21 PROCEDURE — 82948 REAGENT STRIP/BLOOD GLUCOSE: CPT

## 2022-07-21 PROCEDURE — 36415 COLL VENOUS BLD VENIPUNCTURE: CPT

## 2022-07-21 PROCEDURE — U0003 INFECTIOUS AGENT DETECTION BY NUCLEIC ACID (DNA OR RNA); SEVERE ACUTE RESPIRATORY SYNDROME CORONAVIRUS 2 (SARS-COV-2) (CORONAVIRUS DISEASE [COVID-19]), AMPLIFIED PROBE TECHNIQUE, MAKING USE OF HIGH THROUGHPUT TECHNOLOGIES AS DESCRIBED BY CMS-2020-01-R: HCPCS

## 2022-07-21 PROCEDURE — 81025 URINE PREGNANCY TEST: CPT

## 2022-07-21 PROCEDURE — 93005 ELECTROCARDIOGRAM TRACING: CPT

## 2022-07-21 PROCEDURE — 99284 EMERGENCY DEPT VISIT MOD MDM: CPT

## 2022-07-21 PROCEDURE — 87660 TRICHOMONAS VAGIN DIR PROBE: CPT

## 2022-07-21 RX ORDER — KETOROLAC TROMETHAMINE 30 MG/ML
15 INJECTION, SOLUTION INTRAMUSCULAR; INTRAVENOUS ONCE
Status: COMPLETED | OUTPATIENT
Start: 2022-07-21 | End: 2022-07-21

## 2022-07-21 RX ORDER — DIPHENHYDRAMINE HYDROCHLORIDE 50 MG/ML
25 INJECTION INTRAMUSCULAR; INTRAVENOUS ONCE
Status: COMPLETED | OUTPATIENT
Start: 2022-07-21 | End: 2022-07-21

## 2022-07-21 RX ORDER — METOCLOPRAMIDE HYDROCHLORIDE 5 MG/ML
10 INJECTION INTRAMUSCULAR; INTRAVENOUS ONCE
Status: COMPLETED | OUTPATIENT
Start: 2022-07-21 | End: 2022-07-21

## 2022-07-21 RX ADMIN — KETOROLAC TROMETHAMINE 15 MG: 30 INJECTION, SOLUTION INTRAMUSCULAR; INTRAVENOUS at 16:54

## 2022-07-21 RX ADMIN — DIPHENHYDRAMINE HYDROCHLORIDE 25 MG: 50 INJECTION, SOLUTION INTRAMUSCULAR; INTRAVENOUS at 16:53

## 2022-07-21 RX ADMIN — SODIUM CHLORIDE 1000 ML: 0.9 INJECTION, SOLUTION INTRAVENOUS at 16:53

## 2022-07-21 RX ADMIN — METOCLOPRAMIDE 10 MG: 5 INJECTION, SOLUTION INTRAMUSCULAR; INTRAVENOUS at 16:54

## 2022-07-21 NOTE — ED PROVIDER NOTES
History  Chief Complaint   Patient presents with    Headache     Pt came to ER for migraine that started at 1030 after pt went outside into heat  Pt reports PMH of migraines but symptoms are different  Pt reports left sided head pain, dizziness, and dyspnea  Pt reports visual changes that she describes as "fuzziness" in b/l eyes  29 y o  F with PMH of migraines presenting to ED complaining of headache since this morning  She also has tertiary complaint of vaginal discharge  Denies high risk sexual activity  States it feels like when she had BV         History provided by:  Patient  Headache  Pain location:  L temporal  Quality:  Stabbing  Radiates to:  Does not radiate  Onset quality:  Gradual  Timing:  Constant  Progression:  Unchanged  Chronicity:  Recurrent  Similar to prior headaches: yes (headache feels the same, but dizziness, weakness, sob do not usually occur with migraines )    Context: activity, bright light and caffeine    Context comment:  Started while walking outside in the heat (95F) and she reports little sleep last night and not drinking water, only coffee   Relieved by:  Nothing  Worsened by:  Light and activity  Ineffective treatments:  None tried  Associated symptoms: blurred vision, cough, dizziness, fatigue, photophobia and weakness (generalized, no focal )    Associated symptoms: no abdominal pain, no back pain, no congestion, no diarrhea, no drainage, no ear pain, no eye pain, no facial pain, no fever, no focal weakness, no hearing loss, no loss of balance, no myalgias, no nausea, no near-syncope, no neck pain, no neck stiffness, no numbness, no paresthesias, no seizures, no sinus pressure, no sore throat, no syncope, no tingling, no URI and no vomiting    Cough:     Cough characteristics:  Productive    Sputum characteristics:  Clear and white  Dizziness:     Dizziness duration: since walking outside in heat   Fatigue:     Fatigue severity: since waking up this morning   Weakness: Duration: since waking up this morning   Risk factors: no family hx of SAH    Shortness of Breath  Duration: since waking up this morning   Context: activity and weather changes    Relieved by:  Nothing  Worsened by:  Exertion  Ineffective treatments:  Inhaler  Associated symptoms: cough, headaches and sputum production    Associated symptoms: no abdominal pain, no chest pain, no ear pain, no fever, no hemoptysis, no neck pain, no PND, no rash, no sore throat, no syncope, no vomiting and no wheezing    Risk factors: tobacco use    Risk factors: no hx of cancer, no hx of PE/DVT, no prolonged immobilization and no recent surgery        Prior to Admission Medications   Prescriptions Last Dose Informant Patient Reported? Taking?    DULoxetine (CYMBALTA) 20 mg capsule   No No   Sig: Take 1 capsule (20 mg total) by mouth daily   NARCAN 4 MG/0 1ML LIQD  Self Yes No   Sig: ADMINISTER A SINGLE spray INTO ONE NOSTRIL CALL 911 MAY REPEAT ONCE   Patient not taking: No sig reported   OLANZapine (ZyPREXA) 10 mg tablet   No No   Sig: Take 1 tablet (10 mg total) by mouth daily at bedtime   Patient not taking: Reported on 2022   albuterol (PROVENTIL HFA,VENTOLIN HFA) 90 mcg/act inhaler  Self No No   Sig: Inhale 2 puffs every 4 (four) hours as needed for wheezing   buprenorphine-naloxone (Suboxone) 8-2 mg   No No   Sig: Place 1 Film (8 mg total) under the tongue 2 (two) times a day   clonazePAM (KlonoPIN) 1 mg tablet   No No   Sig: Take 1 tablet (1 mg total) by mouth 3 (three) times a day   cyclobenzaprine (FLEXERIL) 10 mg tablet   No No   Sig: take 1 tablet by mouth three times a day if needed for muscle spasm   gabapentin (Neurontin) 300 mg capsule   No No   Sig: Take 1 capsule (300 mg total) by mouth 3 (three) times a day   ibuprofen (MOTRIN) 800 mg tablet  Self No No   Sig: Take 1 tablet (800 mg total) by mouth every 8 (eight) hours as needed for mild pain   levothyroxine 50 mcg tablet   No No   Si daily   lurasidone (LATUDA) 40 mg tablet   No No   Sig: Take 0 5 tablets (20 mg total) by mouth daily with breakfast   omeprazole (PriLOSEC) 20 mg delayed release capsule  Self No No   Sig: Take 1 capsule (20 mg total) by mouth daily   Patient taking differently: Take 20 mg by mouth if needed   oxyCODONE-acetaminophen (PERCOCET) 5-325 mg per tablet   No No   Sig: Take 1 tablet by mouth every 4 (four) hours as needed for severe pain Max Daily Amount: 6 tablets   Patient not taking: Reported on 2022   topiramate (TOPAMAX) 100 mg tablet   No No   Sig: Take 1 tablet (100 mg total) by mouth 3 (three) times a day   traZODone (DESYREL) 50 mg tablet  Self No No   Si-2 HS      Facility-Administered Medications: None       Past Medical History:   Diagnosis Date    Anxiety     "severe"    Asthma     [facility]     Cigarette smoker     Depression     Disease of thyroid gland     Drug dependence, in remission ([facility] )     GERD (gastroesophageal reflux disease)     H/O: whooping cough     while pregnant    Hepatitis C, chronic (HCC)     Insomnia disorder     Liver disease     Hepatitis C    Migraine     Psychiatric illness     PTSD (post-traumatic stress disorder)     Substance abuse ([facility] )        Past Surgical History:   Procedure Laterality Date    KNEE SURGERY      OTHER SURGICAL HISTORY      body piercing    MT REPAIR UMBILICAL FDXE,6+T/R,LPGLB N/A 2016    Procedure: REPAIR HERNIA UMBILICAL WITH MESH;  Surgeon: Karson Kraus MD;  Location:  MAIN OR;  Service: General    VAGINAL DELIVERY      X 6    WISDOM TOOTH EXTRACTION      X 4    WOUND DEBRIDEMENT Right 2018    Procedure: RIGHT KNEE DEBRIDEMENT; [address] OUT; AND LACERATION REPAIR   INTRAOPERATIVE ASSESSMENT OF PATELLA TENDON;  Surgeon: Roberto Peters MD;  Location:  MAIN OR;  Service: Orthopedics       Family History   Problem Relation Age of Onset    Hypertension Mother     Thyroid disease Mother     Hypertension Father    Anamaria Leisure Prostate cancer Father     Anxiety disorder Sister      I have reviewed and agree with the history as documented  E-Cigarette/Vaping    E-Cigarette Use Never User      E-Cigarette/Vaping Substances     Social History     Tobacco Use    Smoking status: Former Smoker     Packs/day: 1 00     Years: 12 00     Pack years: 12 00     Types: Cigarettes    Smokeless tobacco: Never Used    Tobacco comment: Vaping   Vaping Use    Vaping Use: Never used   Substance Use Topics    Alcohol use: Not Currently    Drug use: Not Currently     Types: Methadone, Heroin, Cocaine, Marijuana, Prescription, Methamphetamines     Comment: 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       Review of Systems   Constitutional: Positive for fatigue  Negative for chills and fever  HENT: Negative for congestion, ear discharge, ear pain, hearing loss, postnasal drip, rhinorrhea, sinus pressure, sore throat, trouble swallowing and voice change  Eyes: Positive for blurred vision, photophobia and visual disturbance  Negative for pain  Respiratory: Positive for cough, sputum production, chest tightness and shortness of breath  Negative for hemoptysis and wheezing  Cardiovascular: Negative for chest pain, palpitations, leg swelling, syncope, PND and near-syncope  Gastrointestinal: Negative for abdominal pain, diarrhea, nausea and vomiting  Genitourinary: Positive for vaginal discharge (foul smelling, white, unsure how long, says it feels like when she previously had BV )  Negative for dysuria, flank pain, frequency, genital sores, hematuria, pelvic pain, urgency and vaginal bleeding  Musculoskeletal: Negative for back pain, gait problem, myalgias, neck pain and neck stiffness  Skin: Negative for color change and rash  Neurological: Positive for dizziness, weakness (generalized, no focal ), light-headedness and headaches  Negative for focal weakness, seizures, syncope, numbness, paresthesias and loss of balance  Hematological: Negative for adenopathy  Psychiatric/Behavioral: Negative for confusion  The patient is nervous/anxious  All other systems reviewed and are negative  Physical Exam  Physical Exam  Vitals and nursing note reviewed  Constitutional:       General: She is awake  She is not in acute distress  Appearance: Normal appearance  She is not ill-appearing, toxic-appearing or diaphoretic  HENT:      Head: Normocephalic and atraumatic  Jaw: No swelling  Right Ear: Tympanic membrane, ear canal and external ear normal       Left Ear: Tympanic membrane, ear canal and external ear normal       Mouth/Throat:      Lips: Pink  Mouth: Mucous membranes are moist       Pharynx: Oropharynx is clear  Uvula midline  No pharyngeal swelling, oropharyngeal exudate, posterior oropharyngeal erythema or uvula swelling  Eyes:      General: Lids are normal  Vision grossly intact  No visual field deficit  Right eye: No discharge  Left eye: No discharge  Extraocular Movements: Extraocular movements intact  Right eye: Normal extraocular motion and no nystagmus  Left eye: Normal extraocular motion and no nystagmus  Conjunctiva/sclera: Conjunctivae normal       Pupils: Pupils are equal, round, and reactive to light  Neck:      Trachea: Phonation normal       Meningeal: Brudzinski's sign and Kernig's sign absent  Cardiovascular:      Rate and Rhythm: Normal rate and regular rhythm  Heart sounds: Normal heart sounds  Pulmonary:      Effort: Pulmonary effort is normal  No tachypnea, bradypnea, accessory muscle usage or respiratory distress  Breath sounds: Normal breath sounds  No stridor, decreased air movement or transmitted upper airway sounds  No decreased breath sounds, wheezing, rhonchi or rales  Abdominal:      General: There is no distension  Palpations: Abdomen is soft  Tenderness: There is no abdominal tenderness     Musculoskeletal: Cervical back: Full passive range of motion without pain and neck supple  No rigidity  Right lower leg: No edema  Left lower leg: No edema  Lymphadenopathy:      Cervical: No cervical adenopathy  Skin:     General: Skin is warm and dry  Capillary Refill: Capillary refill takes more than 3 seconds  Coloration: Skin is not jaundiced or pale  Findings: No erythema or rash  Neurological:      General: No focal deficit present  Mental Status: She is alert and oriented to person, place, and time  Cranial Nerves: No cranial nerve deficit  Sensory: No sensory deficit  Motor: No weakness  Coordination: Coordination normal       Gait: Gait normal       Deep Tendon Reflexes: Reflexes normal       Comments: GCS 15  AAOx4  No focal neuro deficits  CN II-XII intact  PERRL  EOMI  No pronator drift   strength 5/5 bilaterally  B/L UE strength 5/5 throughout  Finger to nose, heel shin, rapid alternating movements Cerebellar function normal  Ambulates without difficulty  B/L LE strength 5/5 throughout  Gross sensation to b/l upper and lower extremities intact        Psychiatric:         Mood and Affect: Mood normal          Behavior: Behavior normal          Thought Content:  Thought content normal          Vital Signs  ED Triage Vitals   Temperature Pulse Respirations Blood Pressure SpO2   07/21/22 1659 07/21/22 1601 07/21/22 1601 07/21/22 1601 07/21/22 1601   98 2 °F (36 8 °C) 89 20 126/75 100 %      Temp Source Heart Rate Source Patient Position - Orthostatic VS BP Location FiO2 (%)   07/21/22 1659 07/21/22 1601 07/21/22 1630 07/21/22 1630 --   Oral Monitor Lying Right arm       Pain Score       07/21/22 1601       10 - Worst Possible Pain           Vitals:    07/21/22 1710 07/21/22 1711 07/21/22 1712 07/21/22 1730   BP: 126/70 110/77 122/59 120/65   Pulse: 90 92 90 82   Patient Position - Orthostatic VS: Lying - Orthostatic VS Sitting - Orthostatic VS Standing - Orthostatic VS Lying         Visual Acuity  Visual Acuity    Flowsheet Row Most Recent Value   Visual acuity R eye is 20/30   Visual acuity Left eye is 20/25   Visual acuity in both eyes is 20/20   Wearing corrective eyewear/lenses? No   No corrective eyewear/lenses Yes          ED Medications  Medications   sodium chloride 0 9 % bolus 1,000 mL (0 mL Intravenous Stopped 7/21/22 1852)   ketorolac (TORADOL) injection 15 mg (15 mg Intramuscular Given 7/21/22 1654)   metoclopramide (REGLAN) injection 10 mg (10 mg Intravenous Given 7/21/22 1654)   diphenhydrAMINE (BENADRYL) injection 25 mg (25 mg Intravenous Given 7/21/22 1653)       Diagnostic Studies  Results Reviewed     Procedure Component Value Units Date/Time    COVID only [[id number]]  (Normal) Collected: 07/21/22 1647    Lab Status: Final result Specimen: Nares from Nose Updated: 07/21/22 1809     SARS-CoV-2 Negative    Narrative:      FOR PEDIATRIC PATIENTS - copy/paste COVID Guidelines URL to browser: [email or web address]    SARS-CoV-2 assay is a Nucleic Acid Amplification assay intended for the  qualitative detection of nucleic acid from SARS-CoV-2 in nasopharyngeal  swabs  Results are for the presumptive identification of SARS-CoV-2 RNA  Positive results are indicative of infection with SARS-CoV-2, the virus  causing COVID-19, but do not rule out bacterial infection or co-infection  with other viruses  Laboratories within the [place] and its  territories are required to report all positive results to the appropriate  public health authorities  Negative results do not preclude SARS-CoV-2  infection and should not be used as the sole basis for treatment or other  patient management decisions  Negative results must be combined with  clinical observations, patient history, and epidemiological information  This test has not been FDA cleared or approved      This test has been authorized by FDA under an Emergency Use Authorization  (EUA)  This test is only authorized for the duration of time the  declaration that circumstances exist justifying the authorization of the  emergency use of an in vitro diagnostic tests for detection of SARS-CoV-2  virus and/or diagnosis of COVID-19 infection under section 564(b)(1) of  the Act, 21 U  [place] C  967ZLD-4(P)(7), unless the authorization is terminated  or revoked sooner  The test has been validated but independent review by FDA  and CLIA is pending  Test performed using [employer] GeneXpert: This RT-PCR assay targets N2,  a region unique to SARS-CoV-2  A conserved region in the E-gene was chosen  for pan-Sarbecovirus detection which includes SARS-CoV-2      HS Troponin 0hr (reflex protocol) [[id number]]  (Normal) Collected: 07/21/22 1647    Lab Status: Final result Specimen: Blood from Arm, Right Updated: 07/21/22 1741     hs TnI 0hr <2 ng/L     Comprehensive metabolic panel [[id number]]  (Abnormal) Collected: 07/21/22 1647    Lab Status: Final result Specimen: Blood from Arm, Right Updated: 07/21/22 1732     Sodium 140 mmol/L      Potassium 3 4 mmol/L      Chloride 107 mmol/L      CO2 21 mmol/L      ANION GAP 12 mmol/L      BUN 12 mg/dL      Creatinine 1 15 mg/dL      Glucose 79 mg/dL      Calcium 8 4 mg/dL      AST 18 U/L      ALT 13 U/L      Alkaline Phosphatase 49 U/L      Total Protein 7 4 g/dL      Albumin 4 3 g/dL      Total Bilirubin 0 27 mg/dL      eGFR 61 ml/min/1 73sq m     Narrative:      Seema guidelines for Chronic Kidney Disease (CKD):     Stage 1 with normal or high GFR (GFR > 90 mL/min/1 73 square meters)    Stage 2 Mild CKD (GFR = 60-89 mL/min/1 73 square meters)    Stage 3A Moderate CKD (GFR = 45-59 mL/min/1 73 square meters)    Stage 3B Moderate CKD (GFR = 30-44 mL/min/1 73 square meters)    Stage 4 Severe CKD (GFR = 15-29 mL/min/1 73 square meters)    Stage 5 End Stage CKD (GFR <15 mL/min/1 73 square meters)  Note: GFR calculation is accurate only with a steady state creatinine    Urine Microscopic [[id number]]  (Normal) Collected: 07/21/22 1647    Lab Status: Final result Specimen: Urine, Clean Catch Updated: 07/21/22 1729     RBC, UA None Seen /hpf      WBC, UA 1-2 /hpf      Epithelial Cells Occasional /hpf      Bacteria, UA None Seen /hpf     UA (URINE) with reflex to Scope [[id number]]  (Abnormal) Collected: 07/21/22 1647    Lab Status: Final result Specimen: Urine, Clean Catch Updated: 07/21/22 1723     Color, UA Straw     Clarity, UA Clear     Specific Gravity, UA 1 015     pH, UA 6 5     Leukocytes, UA 25 0     Nitrite, UA Negative     Protein, UA Negative mg/dl      Glucose, UA Negative mg/dl      Ketones, UA Negative mg/dl      Bilirubin, UA Negative     Occult Blood, UA Negative     UROBILINOGEN UA Negative mg/dL     CBC and differential [[id number]] Collected: 07/21/22 1647    Lab Status: Final result Specimen: Blood from Arm, Right Updated: 07/21/22 1720     WBC 7 25 Thousand/uL      RBC 4 31 Million/uL      Hemoglobin 12 6 g/dL      Hematocrit 39 0 %      MCV 91 fL      MCH 29 2 pg      MCHC 32 3 g/dL      RDW 12 7 %      MPV 11 3 fL      Platelets 622 Thousands/uL      nRBC 0 /100 WBCs      Neutrophils Relative 52 %      Immat GRANS % 1 %      Lymphocytes Relative 38 %      Monocytes Relative 5 %      Eosinophils Relative 3 %      Basophils Relative 1 %      Neutrophils Absolute 3 85 Thousands/µL      Immature Grans Absolute 0 05 Thousand/uL      Lymphocytes Absolute 2 75 Thousands/µL      Monocytes Absolute 0 33 Thousand/µL      Eosinophils Absolute 0 23 Thousand/µL      Basophils Absolute 0 04 Thousands/µL     VAGINOSIS DNA PROBE (AFFIRM) [[id number]] Collected: 07/21/22 1647    Lab Status:  In process Specimen: Genital from Vaginal Updated: 07/21/22 1716    Fingerstick Glucose (POCT) [[id number]]  (Normal) Collected: 07/21/22 1705    Lab Status: Final result Updated: 07/21/22 1706     POC Glucose 81 mg/dl     POCT pregnancy, urine [[id number]]  (Normal) Resulted: 07/21/22 1701    Lab Status: Final result Specimen: Urine Updated: 07/21/22 1701     EXT PREG TEST UR (Ref: Negative) negative     Control Vaild                 XR chest 1 view portable   ED Interpretation by Jeanie Flores PA-C (07/21 1817)   No focal consolidation       Final Result by Rajinder Cornell MD (07/21 2048)      No acute cardiopulmonary disease  Workstation performed: [id number]                    Procedures  Procedures         ED Course  ED Course as of 07/22/22 0030   Thu Jul 21, 2022   1654 Procedure Note: EKG  Date/Time: 07/21/22 4:54 PM   Performed by: Nia Burleson by: Ishan Lee  ECG interpreted by me, the ED Provider: yes   The EKG demonstrates:  Rate 89 bpm  Rhythm NSR  QTc 459 ms   No ST elevations/depressions  Nonspecific T wave abnormality, present in 8/2021 1815 SARS-COV-2: Negative   1822 Patient reporting improvement in headache, dizziness, other symptoms  Patient was reexamined at this time and informed of laboratory and/or imaging results and was found to be stable for discharge  Return to emergency department criteria was reviewed with the patient who verbalized understanding and was agreeable to discharge and the treatment plan at this time                   HEART Risk Score    Flowsheet Row Most Recent Value   Heart Score Risk Calculator    History 0 Filed at: 07/21/2022 1756   ECG 1 Filed at: 07/21/2022 1756   Age 0 Filed at: 07/21/2022 1756   Risk Factors 1 Filed at: 07/21/2022 1756   Troponin 0 Filed at: 07/21/2022 1756   HEART Score 2 Filed at: 07/21/2022 1756                PERC Rule for PE    Flowsheet Row Most Recent Value   PERC Rule for PE    Age >=50 0 Filed at: 07/21/2022 1650   HR >=100 0 Filed at: 07/21/2022 1650   O2 Sat on room air < 95% 0 Filed at: 07/21/2022 1650   History of PE or DVT 0 Filed at: 07/21/2022 1650   Recent trauma or surgery 0 Filed at: 07/21/2022 1650   Hemoptysis 0 Filed at: 07/21/2022 1650   Exogenous estrogen 0 Filed at: 07/21/2022 1650   Unilateral leg swelling 0 Filed at: 07/21/2022 1650   PERC Rule for PE Results 0 Filed at: 07/21/2022 1650                            MDM  Number of Diagnoses or Management Options  Acute headache  Viral syndrome  Diagnosis management comments: Vital signs stable  Afebrile  No acute respiratory distress  No focal neuro deficits  Headache was not acute or maximal in onset  Headache similar to previous headaches  Do not suspect SAH, temporal arteritis, meningitis, encephalitis, CO poisoning, acute angle closure glaucoma, dural venous sinus thrombosis as cause of headache  Do not feel that further imaging or workup (including LP) are warranted at this time  Perc negative  Troponin less than to, EKG without acute ischemic changes heart score of 2 patient follow-up PCP  Chest x-ray without acute findings  Symptoms improved with treatment  Other symptoms consistent with acute viral syndrome  Will test for COVID-19  Patient will follow up with PCP and return to ED if symptoms worsen or persist      All imaging and/or lab testing discussed with patient, strict return to ED precautions discussed  Patient recommended to follow up promptly with appropriate outpatient provider  Patient and/or family members verbalizes understanding and agrees with plan  Patient and/or family members were given opportunity to ask questions, all questions were answered at this time  Patient is stable for discharge      Portions of the record may have been created with voice recognition software  Occasional wrong word or "sound a like" substitutions may have occurred due to the inherent limitations of voice recognition software  Read the chart carefully and recognize, using context, where substitutions have occurred              Amount and/or Complexity of Data Reviewed  Clinical lab tests: ordered and reviewed  Tests in the radiology section of CPT®: ordered and reviewed        Disposition  Final diagnoses:   Acute headache   Viral syndrome     Time reflects when diagnosis was documented in both MDM as applicable and the Disposition within this note     Time User Action Codes Description Comment    7/21/2022  6:23 PM Ivory Specking Add [R51 9] Acute headache     7/21/2022  6:23 PM Ivory Specking Add [B34 9] Viral syndrome       ED Disposition     ED Disposition   Discharge    Condition   Stable    Date/Time   u Jul 21, 2022  6:24 PM    Comment   [facility] discharge to home/self care                 Follow-up Information     Follow up With Specialties Details Why Contact Info    Darryl Mendieta MD Family Medicine Schedule an appointment as soon as possible for a visit  For follow up regarding your symptoms [id number]  [address] [place] [id number] [phone]            Discharge Medication List as of 7/21/2022  6:24 PM      CONTINUE these medications which have NOT CHANGED    Details   albuterol (PROVENTIL HFA,VENTOLIN HFA) 90 mcg/act inhaler Inhale 2 puffs every 4 (four) hours as needed for wheezing, Starting Mon 3/1/2021, Normal      buprenorphine-naloxone (Suboxone) 8-2 mg Place 1 Film (8 mg total) under the tongue 2 (two) times a day, Starting Mon 7/11/2022, Normal      clonazePAM (KlonoPIN) 1 mg tablet Take 1 tablet (1 mg total) by mouth 3 (three) times a day, Starting Mon 4/18/2022, Normal      cyclobenzaprine (FLEXERIL) 10 mg tablet take 1 tablet by mouth three times a day if needed for muscle spasm, Normal      DULoxetine (CYMBALTA) 20 mg capsule Take 1 capsule (20 mg total) by mouth daily, Starting Mon 7/18/2022, Until Wed 8/17/2022, Normal      gabapentin (Neurontin) 300 mg capsule Take 1 capsule (300 mg total) by mouth 3 (three) times a day, Starting Tue 7/19/2022, Until Thu 8/18/2022, Normal      ibuprofen (MOTRIN) 800 mg tablet Take 1 tablet (800 mg total) by mouth every 8 (eight) hours as needed for mild pain, Starting Mon 11/1/2021, Normal      levothyroxine 50 mcg tablet 1 daily, Normal      lurasidone (LATUDA) 40 mg tablet Take 0 5 tablets (20 mg total) by mouth daily with breakfast, Starting Mon 7/18/2022, Until Wed 8/17/2022, Normal      NARCAN 4 MG/0 1ML LIQD ADMINISTER A SINGLE spray INTO ONE NOSTRIL CALL 911 MAY REPEAT ONCE, Historical Med      OLANZapine (ZyPREXA) 10 mg tablet Take 1 tablet (10 mg total) by mouth daily at bedtime, Starting Fri 1/28/2022, Normal      omeprazole (PriLOSEC) 20 mg delayed release capsule Take 1 capsule (20 mg total) by mouth daily, Starting Mon 11/1/2021, Normal      oxyCODONE-acetaminophen (PERCOCET) 5-325 mg per tablet Take 1 tablet by mouth every 4 (four) hours as needed for severe pain Max Daily Amount: 6 tablets, Starting Mon 3/14/2022, Normal      topiramate (TOPAMAX) 100 mg tablet Take 1 tablet (100 mg total) by mouth 3 (three) times a day, Starting Fri 10/15/2021, Until Thu 6/23/2022, Normal      traZODone (DESYREL) 50 mg tablet 1-2 HS, Normal             No discharge procedures on file      PDMP Review       Value Time User    PDMP Reviewed  Yes 7/18/2022  3:23 PM Nathen Turk MD          ED Provider  Electronically Signed by           Josselin Holley PA-C  07/22/22 0030

## 2022-07-21 NOTE — PROGRESS NOTES
Note Type: Case Management Note                  Date of Service: 07/21/2022  Service:  [facility] MAT Office    Note: Case Management Note  Bertrand Bess 29 y o  female [id number]  Attending  Substance Use History     Social History     Substance and Sexual Activity   Alcohol Use Not Currently        Social History     Substance and Sexual Activity   Drug Use Not Currently    Types: Methadone, Heroin, Cocaine, Marijuana, Prescription, Methamphetamines    Comment: 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       Encounter Type:   Patient Face-to-Face    Start Time 1440  End Time 1526    Recovery needs addressed at this meeting:  Basic  Needs    Note  D: Patient presented for in-person, scheduled visit  Patient presented with information regarding an emergency petition that paternal grandmother of bio son presented for emergency custody  Patient has to present information to  regarding medical information as well as a document to waive any legal fees due to patients financial standings  A: Patient presented in an overwhelmed state  Patient also walked to this session, making patient dehydrated and dizzy when arriving to session  This CM requested that patient go to [facility] to receive fluids before returning home or calling significant other to come pick patient up in which patient declined, stating just wanting to go smoke a cigarette  P: Patient and CM will follow up during regularly scheduled meeting tomorrow and go through emails as the amount of emails paitnet has on phone is increasingly overwhelming   Patient and CM will send necessary paperwork to  ([employer] on file)    Referrals made  [facility] ED    Next appointment date and time  07/22/2022

## 2022-07-21 NOTE — Clinical Note
Siri Valdivia was seen and treated in our emergency department on 7/21/2022  Diagnosis:     Geno    She may return on this date: 07/24/2022         If you have any questions or concerns, please don't hesitate to call        Jose Luis Deleon PA-C    ______________________________           _______________          _______________  Hospital Representative                              Date                                Time

## 2022-07-22 ENCOUNTER — DOCUMENTATION (OUTPATIENT)
Dept: PSYCHIATRY | Facility: CLINIC | Age: 37
End: 2022-07-22

## 2022-07-22 DIAGNOSIS — F31.9 BIPOLAR DISORDER WITH DEPRESSION (HCC): Primary | ICD-10-CM

## 2022-07-22 LAB
CANDIDA RRNA VAG QL PROBE: NEGATIVE
G VAGINALIS RRNA GENITAL QL PROBE: POSITIVE
T VAGINALIS RRNA GENITAL QL PROBE: NEGATIVE

## 2022-07-22 RX ORDER — QUETIAPINE FUMARATE 100 MG/1
100 TABLET, FILM COATED ORAL
Qty: 30 TABLET | Refills: 2 | Status: SHIPPED | OUTPATIENT
Start: 2022-07-22 | End: 2022-08-05 | Stop reason: SINTOL

## 2022-07-22 NOTE — PROGRESS NOTES
Note Type: Case Management Note                  Date of Service: 07/22/2022  Service:  [facility] MAT Office    Note: Case Management Note  Angy Olmstead 29 y o  female [id number]  Attending  Substance Use History     Social History     Substance and Sexual Activity   Alcohol Use Not Currently        Social History     Substance and Sexual Activity   Drug Use Not Currently    Types: Methadone, Heroin, Cocaine, Marijuana, Prescription, Methamphetamines    Comment: 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       Encounter Type:   Patient Face-to-Face    Start Time 4753  End Time 1140    Recovery needs addressed at this meeting:  Basic  Needs    Note  D: Patient presented for in-person, scheduled visit with this CM  Patient and this CM successfully completed necessary paperwork needed in order for patient to have fees waived for court hearing and mediation  Patient and CM also faxed necessary medical documents to , Darin Shearer ([employer] on file)  Patient and CM were able to upload necessary documents to [employer] application to ensure patients food stamps will be reinstated  A: Patient was extremely tired throughout session  Patient stated has not been able to sleep for two days, in which patient actually fell asleep during session twice  P: Patient will continue to follow up on a weekly basis, but will call if needed to be seen earlier       Referrals made  [employer]    Next appointment date and time  07/29/2022 @ 11:00am

## 2022-07-22 NOTE — PROGRESS NOTES
The writer discussed with the patient potential benefits and side effects of Seroquel for bipolar depression treatment  Pt was in agreement to start Seroquel  Will start with a small dose and titrate as tolerated

## 2022-07-22 NOTE — PROGRESS NOTES
Due to previous [address] report made through [place] on 06/08/2022 ([id number]), see documentation   Roshan made contact with this CM requesting information regarding childline report  Due to certain information pertaining to mom, Faith requested that this CM make an additional CHILDLINE report to [facility] ANGEL referral: [id number]

## 2022-07-26 DIAGNOSIS — B96.89 BV (BACTERIAL VAGINOSIS): Primary | ICD-10-CM

## 2022-07-26 DIAGNOSIS — N76.0 BV (BACTERIAL VAGINOSIS): Primary | ICD-10-CM

## 2022-07-26 RX ORDER — METRONIDAZOLE 500 MG/1
500 TABLET ORAL EVERY 12 HOURS SCHEDULED
Qty: 14 TABLET | Refills: 0 | Status: SHIPPED | OUTPATIENT
Start: 2022-07-26 | End: 2022-08-02

## 2022-07-29 ENCOUNTER — DOCUMENTATION (OUTPATIENT)
Dept: PSYCHIATRY | Facility: CLINIC | Age: 37
End: 2022-07-29

## 2022-07-29 ENCOUNTER — TELEMEDICINE (OUTPATIENT)
Dept: PSYCHIATRY | Facility: CLINIC | Age: 37
End: 2022-07-29
Payer: MEDICARE

## 2022-07-29 DIAGNOSIS — F11.21 OPIOID USE DISORDER, SEVERE, IN SUSTAINED REMISSION, ON MAINTENANCE THERAPY, DEPENDENCE (HCC): ICD-10-CM

## 2022-07-29 DIAGNOSIS — F43.10 PTSD (POST-TRAUMATIC STRESS DISORDER): ICD-10-CM

## 2022-07-29 DIAGNOSIS — F31.9 BIPOLAR DISORDER WITH DEPRESSION (HCC): ICD-10-CM

## 2022-07-29 DIAGNOSIS — F33.2 MDD (MAJOR DEPRESSIVE DISORDER), RECURRENT SEVERE, WITHOUT PSYCHOSIS (HCC): Primary | ICD-10-CM

## 2022-07-29 PROCEDURE — 90834 PSYTX W PT 45 MINUTES: CPT

## 2022-07-29 NOTE — PSYCH
Virtual Regular Visit    Verification of patient location:    Patient is located in the following state in which I hold an active license [place]      Assessment/Plan:    Problem List Items Addressed This Visit        Other    PTSD (post-traumatic stress disorder)    Opioid use disorder, severe, in sustained remission, on maintenance therapy, dependence ([facility] )    [facility] depression [facility] )    MDD (major depressive disorder), recurrent severe, without psychosis ([facility] ) - Primary          Goals addressed in session: Goal 1          Reason for visit is   Chief Complaint   Patient presents with    Virtual Regular Visit        Encounter provider Arya Curry, [facility]    Provider located at [address] [address] [phone]      Recent Visits  No visits were found meeting these conditions  Showing recent visits within past 7 days and meeting all other requirements  Future Appointments  No visits were found meeting these conditions  Showing future appointments within next 150 days and meeting all other requirements       The patient was identified by name and date of birth  Baylee Torres was informed that this is a telemedicine visit and that the visit is being conducted throughEpic Embedded and patient was informed this is a secure, HIPAA-complaint platform  She agrees to proceed     My office door was closed  No one else was in the room  She acknowledged consent and understanding of privacy and security of the video platform  The patient has agreed to participate and understands they can discontinue the visit at any time  Patient is aware this is a billable service  Jocy Mesa is a 29 y o  female presenting for follow up today      Problem List Items Addressed This Visit        Other    PTSD (post-traumatic stress disorder)    Opioid use disorder, severe, in sustained remission, on maintenance therapy, dependence ([facility] )    Bipolar disorder with depression ([facility] )    MDD (major depressive disorder), recurrent severe, without psychosis ([email or web address] ) - Primary            D: Geno attended a 45 minutes individual session today  Geno entered session with healthy appearance,Normal mood and normal affect  Geno's speech was appropriate quality, quantity and organization of sentences  Patient and clinician discussed the relationship between her and nicole's grandmother  The session was entirely around the issues with Nicole's grandmother; Geno feels that his grandmother is out to get her and take her grandson  While Geno's feelings were validated this writer would need to redirect and ask Geno to be specific  Geno states she currently has to take Lia Alexandra to a nutritionist because of an allegation that he is underweight  We processed and discussed creating a food log to record when and what Lia Alexandra ate  Geno states she isn't a good at journaling but was urged to give this activity a try  Geno was held to task about her remarks regarding Nicole's grandmother and the realization that she would have to go through this court proceeding regardless  Geno states she must feed Lia Alexandra nutritional drinks such as ensure to help with his weight  Geno was also stating that the  would place Lia Alexandra with the grandmother due her being in a better school district and having a nicer house  Geno was redirected that in order for a  to place a child in another home, there would have to be a significant issue with that child's care  Geno continued to reiterate that she wants to be a good parent to Lia Alexandra, but could not articulate how to do so  We created a treatment plan which Manuel Mccurdy was an active participant  A: Geno presented as cooperative throughout the session  Geno appears to be  to change at this time   Manuel Mccurdy has Age appropriate insight  Current suicide risk : none     P: Geno will follow up with Zeeshan CASPER , LPC, CAADC, NCC, CCTP in 2 weeks  Goal(s) 1 was/were addressed in session  Geno denies SI, SH, HI at this time and can contract for safety  Behavioral Health Treatment Plan [facility]: Diagnosis and Treatment Plan [facility] relates understanding diagnosis and is agreeable to Treatment Plan  Yes       HPI     Past Medical History:   Diagnosis Date    Anxiety     "severe"    Asthma     [facility]     Cigarette smoker     Depression     Disease of thyroid gland     Drug dependence, in remission ([facility] )     GERD (gastroesophageal reflux disease)     H/O: whooping cough     while pregnant    Hepatitis C, chronic (HCC)     Insomnia disorder     Liver disease     Hepatitis C    Migraine     Psychiatric illness     PTSD (post-traumatic stress disorder)     Substance abuse ([place] [id number] )        Past Surgical History:   Procedure Laterality Date    KNEE SURGERY      OTHER SURGICAL HISTORY      body piercing    TX REPAIR UMBILICAL BCFE,6+O/D,TDBWM N/A 04/19/2016    Procedure: REPAIR HERNIA UMBILICAL WITH MESH;  Surgeon: Radha Laureano MD;  Location:  MAIN OR;  Service: General    VAGINAL DELIVERY      X 6    WISDOM TOOTH EXTRACTION      X 4    WOUND DEBRIDEMENT Right 01/17/2018    Procedure: RIGHT KNEE DEBRIDEMENT; [facility] OUT; AND LACERATION REPAIR   INTRAOPERATIVE ASSESSMENT OF PATELLA TENDON;  Surgeon: Tae Layne MD;  Location:  MAIN OR;  Service: Orthopedics       Current Outpatient Medications   Medication Sig Dispense Refill    albuterol (PROVENTIL HFA,VENTOLIN HFA) 90 mcg/act inhaler Inhale 2 puffs every 4 (four) hours as needed for wheezing 18 g 5    buprenorphine-naloxone (Suboxone) 8-2 mg Place 1 Film (8 mg total) under the tongue 2 (two) times a day 60 Film 0    clonazePAM (KlonoPIN) 1 mg tablet Take 1 tablet (1 mg total) by mouth 3 (three) times a day 90 tablet 5    cyclobenzaprine (FLEXERIL) 10 mg tablet take 1 tablet by mouth three times a day if needed for muscle spasm 90 tablet 3    DULoxetine (CYMBALTA) 20 mg capsule Take 1 capsule (20 mg total) by mouth daily 30 capsule 1    gabapentin (Neurontin) 300 mg capsule Take 1 capsule (300 mg total) by mouth 3 (three) times a day 90 capsule 1    ibuprofen (MOTRIN) 800 mg tablet Take 1 tablet (800 mg total) by mouth every 8 (eight) hours as needed for mild pain 90 tablet 3    levothyroxine 50 mcg tablet 1 daily 30 tablet 5    lurasidone (LATUDA) 40 mg tablet Take 0 5 tablets (20 mg total) by mouth daily with breakfast 30 tablet 2    metroNIDAZOLE (FLAGYL) 500 mg tablet Take 1 tablet (500 mg total) by mouth every 12 (twelve) hours for 7 days 14 tablet 0    NARCAN 4 MG/0 1ML LIQD ADMINISTER A SINGLE spray INTO ONE NOSTRIL CALL 911 MAY REPEAT ONCE (Patient not taking: No sig reported)  0    OLANZapine (ZyPREXA) 10 mg tablet Take 1 tablet (10 mg total) by mouth daily at bedtime (Patient not taking: Reported on 6/23/2022) 30 tablet 1    omeprazole (PriLOSEC) 20 mg delayed release capsule Take 1 capsule (20 mg total) by mouth daily (Patient taking differently: Take 20 mg by mouth if needed) 90 capsule 3    oxyCODONE-acetaminophen (PERCOCET) 5-325 mg per tablet Take 1 tablet by mouth every 4 (four) hours as needed for severe pain Max Daily Amount: 6 tablets (Patient not taking: Reported on 6/23/2022) 20 tablet 0    QUEtiapine (SEROquel) 100 mg tablet Take 1 tablet (100 mg total) by mouth daily at bedtime Take 1/2 tablet at bedtime for three days, then take the full tablet at night every night  Do not drive if you feel sleepy or tired  30 tablet 2    topiramate (TOPAMAX) 100 mg tablet Take 1 tablet (100 mg total) by mouth 3 (three) times a day 90 tablet 5    traZODone (DESYREL) 50 mg tablet 1-2  tablet 3     No current facility-administered medications for this visit          Allergies Allergen Reactions    Amoxicillin      Pt states it never works for her        Review of Systems    Video Exam    There were no vitals filed for this visit  Physical Exam     I spent 45 minutes directly with the patient during this visit    VIRTUAL VISIT DISCLAIMER    Geno Courtney verbally agrees to participate in [employer]  Pt is aware that [employer] could be limited without vital signs or the ability to perform a full hands-on physical exam  Geno Courtney understands she or the provider may request at any time to terminate the video visit and request the patient to seek care or treatment in person

## 2022-07-29 NOTE — PROGRESS NOTES
Note Type: Case Management Note                  Date of Service: [unfilled]  [facility]  [facility]    Note: Case Management Note  Luana Crowder 29 y o  female [id number]  Attending  Substance Use History     Social History     Substance and Sexual Activity   Alcohol Use Not Currently        Social History     Substance and Sexual Activity   Drug Use Not Currently    Types: Methadone, Heroin, Cocaine, Marijuana, Prescription, Methamphetamines    Comment: 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       Encounter Type:   Patient Face-to-Face    Start Time 1120  End Time 2902    Recovery needs addressed at this meeting:  Basic  Needs and Legal Status    Note  D: Patient presented for in-person, scheduled visit with this CM  Patient did arrive late, but did request to see this CM briefly to send needed information to  for custody case  Present was minor child who was "excited" to see this CM because he knew he was going to eat (open CYS case with [facility])    Patient and this CM worked together to complete paperwork for [facility] case in which patient needed to present information showing that patient's fees were waived for parenting class  This CM was able to complete paperwork and prepare for mail out  A: Patient appeared in a normal mood  Patient was fixated on being hacked after visiting [employer]      P: Patient requested to meet with this CM on Monday to go over information we were unable too during this SEssion    Referrals made  No referrals made during this time    Next appointment date and time  Monday 08/01/2022 @ 8am

## 2022-07-29 NOTE — BH TREATMENT PLAN
Geno Courtney  1985       Date of Initial Treatment Plan: 7/29/22  Date of Current Treatment Plan: 07/29/22    Treatment Plan Number 1     Strengths/Personal Resources for Self Care: Medication adherence; Gaining insight into illness    Diagnosis:   1  MDD (major depressive disorder), recurrent severe, without psychosis ([facility] )     2  Bipolar disorder with depression ([facility] )     3  Opioid use disorder, severe, in sustained remission, on maintenance therapy, dependence ([facility] )     4  PTSD (post-traumatic stress disorder)         Area of Needs: Stress Management; tends to isolate  Long Term Goal 1: I want to be a good parent to Liset  1  Geno will maintain at least 95% medication adherence with her medications  2  When appropriate, Shimaill discuss and process past traumatic events that impact her behaviors and relationships in the present  3  Geno will learn and practice effective communication skills, distress tolerance skills, and emotion regulation skills (using DBT-informed skills)  4   Geno will continue to identify and maintain personal limits and boundaries (especially with regard to family members) and will discuss issues, as they arise, in her therapy sessions  5  Geno will work on increased confidence in parenting with less conflicts  Target Date: 1/29/22  Completion Date: 2/28/22     GOAL 1: Modality: Individual 2-4x per month   Completion Date 2/28/22      Behavioral Health Treatment Plan [facility]: Diagnosis and Treatment Plan explained to Madelinetravis Sinclair relates understanding diagnosis and is agreeable to Treatment Plan  Client Comments : Please share your thoughts, feelings, need and/or experiences regarding your treatment plan: None     Geno Courtney, 1985, actively participated in the creation of this treatment plan during a virtual session, using the [employer]     Geno Emajose eduardo  provided verbal consent on 7/29/2022 at 1:58 PM  The treatment plan was transcribed into the [employer] Record at a later time

## 2022-08-03 DIAGNOSIS — F31.81 BIPOLAR 2 DISORDER (HCC): ICD-10-CM

## 2022-08-03 RX ORDER — TOPIRAMATE 100 MG/1
TABLET, FILM COATED ORAL
Qty: 90 TABLET | Refills: 5 | Status: SHIPPED | OUTPATIENT
Start: 2022-08-03 | End: 2022-08-05 | Stop reason: SDUPTHER

## 2022-08-05 ENCOUNTER — TELEMEDICINE (OUTPATIENT)
Dept: PSYCHIATRY | Facility: CLINIC | Age: 37
End: 2022-08-05
Payer: COMMERCIAL

## 2022-08-05 ENCOUNTER — DOCUMENTATION (OUTPATIENT)
Dept: PSYCHIATRY | Facility: CLINIC | Age: 37
End: 2022-08-05

## 2022-08-05 DIAGNOSIS — F51.01 PRIMARY INSOMNIA: ICD-10-CM

## 2022-08-05 DIAGNOSIS — F31.81 BIPOLAR 2 DISORDER (HCC): Primary | ICD-10-CM

## 2022-08-05 DIAGNOSIS — M79.2 NEUROPATHIC PAIN: ICD-10-CM

## 2022-08-05 DIAGNOSIS — F41.9 ANXIETY: ICD-10-CM

## 2022-08-05 PROCEDURE — 99214 OFFICE O/P EST MOD 30 MIN: CPT | Performed by: PSYCHIATRY & NEUROLOGY

## 2022-08-05 RX ORDER — GABAPENTIN 300 MG/1
300 CAPSULE ORAL 3 TIMES DAILY
Qty: 90 CAPSULE | Refills: 3 | Status: SHIPPED | OUTPATIENT
Start: 2022-08-05 | End: 2022-10-14 | Stop reason: SDUPTHER

## 2022-08-05 RX ORDER — ZIPRASIDONE HYDROCHLORIDE 40 MG/1
40 CAPSULE ORAL
Qty: 30 CAPSULE | Refills: 3 | Status: SHIPPED | OUTPATIENT
Start: 2022-08-05 | End: 2022-10-14 | Stop reason: ALTCHOICE

## 2022-08-05 RX ORDER — TOPIRAMATE 100 MG/1
100 TABLET, FILM COATED ORAL 3 TIMES DAILY
Qty: 90 TABLET | Refills: 3 | Status: SHIPPED | OUTPATIENT
Start: 2022-08-05 | End: 2022-10-05 | Stop reason: SDUPTHER

## 2022-08-05 RX ORDER — TRAZODONE HYDROCHLORIDE 50 MG/1
TABLET ORAL
Qty: 180 TABLET | Refills: 3 | Status: SHIPPED | OUTPATIENT
Start: 2022-08-05 | End: 2022-10-14 | Stop reason: SDUPTHER

## 2022-08-05 NOTE — PSYCH
SHARE Program    Telemedicine consent    Patient: Lindsay Courtney  Provider: Richa Ramirez MD  Provider located at [address] [place] [address] [address]  [place] [facility] [address] [phone]    The patient was identified by name and date of birth  Georgette Alonso was informed that this is a telemedicine visit and that the visit is being conducted through [facility] and patient was informed this is a secure, HIPAA-complaint platform  She agrees to proceed     My office door was closed  No one else was in the room  She acknowledged consent and understanding of privacy and security of the video platform  The patient has agreed to participate and understands they can discontinue the visit at any time  Patient is aware this is a billable service  I spent 30 minutes with the patient during this visit  Progress Note  Georgette Alonso 29 y o  female MRN: [id number]  @ Encounter: [id number]      1  Bipolar 2 disorder (HCC)  -     topiramate (TOPAMAX) 100 mg tablet; Take 1 tablet (100 mg total) by mouth 3 (three) times a day  -     ziprasidone (GEODON) 40 mg capsule; Take 1 capsule (40 mg total) by mouth daily with dinner    2  Primary insomnia  -     traZODone (DESYREL) 50 mg tablet; 1-2 HS    3  Anxiety  -     gabapentin (Neurontin) 300 mg capsule; Take 1 capsule (300 mg total) by mouth 3 (three) times a day    4  Neuropathic pain  -     gabapentin (Neurontin) 300 mg capsule; Take 1 capsule (300 mg total) by mouth 3 (three) times a day          Support Services  Case Management and Certified  are following     Patient was referred to the [facility] Certified Addiction Counselors: Yes  The patient is actively engaged with the [facility] Certified Addiction Counselors psychotherapy plan: Yes    buprenorphine-naloxone      PDMP reviewed:     PDMP Review       Value Time User    PDMP Reviewed  Yes 7/18/2022  3:23 Thomas Wei MD          SUBJECTIVE:  I am not doing much better because of my stress related to grandmother says trying to take my child from me  The patient stated that taking Seroquel did not help her mood, and she felt more tired  She stated that in the past Seroquel was not really helpful  We discussed that [place] that was initially suggested by the writer to address patient's bipolar depression is not available because of the insurance limitations  Geodon would be an appropriate medication to address the patient mood instability, depressed and at times mixed emotional feelings associated with her current situation  We discussed the Geodon way lead to tiredness and that all some we have to take it with food  The writer also encouraged the patient to continue with therapy that the patient found helpful        Drug cravings: none  Motivation to continue treatment: high      Current Outpatient Medications:     gabapentin (Neurontin) 300 mg capsule, Take 1 capsule (300 mg total) by mouth 3 (three) times a day, Disp: 90 capsule, Rfl: 3    topiramate (TOPAMAX) 100 mg tablet, Take 1 tablet (100 mg total) by mouth 3 (three) times a day, Disp: 90 tablet, Rfl: 3    traZODone (DESYREL) 50 mg tablet, 1-2 HS, Disp: 180 tablet, Rfl: 3    ziprasidone (GEODON) 40 mg capsule, Take 1 capsule (40 mg total) by mouth daily with dinner, Disp: 30 capsule, Rfl: 3    albuterol (PROVENTIL HFA,VENTOLIN HFA) 90 mcg/act inhaler, Inhale 2 puffs every 4 (four) hours as needed for wheezing, Disp: 18 g, Rfl: 5    buprenorphine-naloxone (Suboxone) 8-2 mg, Place 1 Film (8 mg total) under the tongue 2 (two) times a day, Disp: 60 Film, Rfl: 0    clonazePAM (KlonoPIN) 1 mg tablet, Take 1 tablet (1 mg total) by mouth 3 (three) times a day, Disp: 90 tablet, Rfl: 5    cyclobenzaprine (FLEXERIL) 10 mg tablet, take 1 tablet by mouth three times a day if needed for muscle spasm, Disp: 90 tablet, Rfl: 3    ibuprofen (MOTRIN) 800 mg tablet, Take 1 tablet (800 mg total) by mouth every 8 (eight) hours as needed for mild pain, Disp: 90 tablet, Rfl: 3    levothyroxine 50 mcg tablet, 1 daily, Disp: 30 tablet, Rfl: 5    NARCAN 4 MG/0 1ML LIQD, ADMINISTER A SINGLE spray INTO ONE NOSTRIL CALL 911 MAY REPEAT ONCE (Patient not taking: No sig reported), Disp: , Rfl: 0    OLANZapine (ZyPREXA) 10 mg tablet, Take 1 tablet (10 mg total) by mouth daily at bedtime (Patient not taking: Reported on 6/23/2022), Disp: 30 tablet, Rfl: 1    omeprazole (PriLOSEC) 20 mg delayed release capsule, Take 1 capsule (20 mg total) by mouth daily (Patient taking differently: Take 20 mg by mouth if needed), Disp: 90 capsule, Rfl: 3    oxyCODONE-acetaminophen (PERCOCET) 5-325 mg per tablet, Take 1 tablet by mouth every 4 (four) hours as needed for severe pain Max Daily Amount: 6 tablets (Patient not taking: Reported on 6/23/2022), Disp: 20 tablet, Rfl: 0    Objective     There were no vitals filed for this visit      Physical Exam      Mental Status Evaluation:    Appearance:  dressed appropriately, casually dressed   Mood:  depressed, anxious   Affect: constricted    Speech:  normal rate and volume   Thought Content:  negative thinking   Perceptual Disturbances: no auditory hallucinations, no visual hallucinations   Risk Potential: Suicidal ideation - None  Homicidal ideation - None  Potential for aggression - No   Insight:  fair   Judgment: fair   Motor Activity: no abnormal movements         Lab Results:   Results from last 6 Months   Lab Units 07/21/22  1647   WBC Thousand/uL 7 25   HEMOGLOBIN g/dL 12 6   HEMATOCRIT % 39 0   PLATELETS Thousands/uL 217      Results from last 6 Months   Lab Units 07/21/22  1647   POTASSIUM mmol/L 3 4*   CHLORIDE mmol/L 107   CO2 mmol/L 21   BUN mg/dL 12   CREATININE mg/dL 1 15   CALCIUM mg/dL 8 4   ALBUMIN g/dL 4 3   ALK PHOS U/L 49   ALT U/L 13   AST U/L 18         Results from last 6 Months   Lab Units 06/23/22  1235 RAPID HIV 1X2  Non-Reactive   HIV-1 P24 AG  Non-Reactive        Assessment and plan  Will stop Seroquel and start Geodon 40 mg with dinner as initial dose, and increase as tolerated if the patient does not feel tired  The writer suggested not to take Geodon and Neurontin together to decrease potential for tiredness, and will start Geodon with once a day with dinner if the patient feel too tired she can take it at night with a good snack at least 350 calories  The writer also provided motivational therapy to the patient to continue her medication management and stay in recovery  This note was not shared with the patient due to this is a psychotherapy note    Counseling / Coordination of Care  Total time spent today 30 minutes  Greater than 50% of total time was spent with the patient and / or family counseling and / or coordination of care       Devan Liu MD

## 2022-08-05 NOTE — PROGRESS NOTES
Note Type: Case Management Note                  Date of Service: 08/05/2022  Service:  [facility]    Note: Case Management Note  Georgette Gains 29 y o  female [id number]  Attending  Substance Use History     Social History     Substance and Sexual Activity   Alcohol Use Not Currently        Social History     Substance and Sexual Activity   Drug Use Not Currently    Types: Methadone, Heroin, Cocaine, Marijuana, Prescription, Methamphetamines    Comment: 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       Encounter Type:   Patient Face-to-Face    Start Time 1102  End Time 1147    Recovery needs addressed at this meeting:  Basic  Needs    Note  D: Patient presented for in-person, scheduled visit with this CM    Patient mentioned scheduled hearings for current custody order - first being 08/24/2022 for emergency custody mediation and 09/21/2022 for overall custody trial  Patient did mention an open case with CYS, but has moved to GPS in which patient thought CYS was tracking patient  This CM advised patient GPS is not tracking, but "General Protective services "    Patient did make the comment that patient feels  is not helping at all towards case as  is not being paid enough for his time  Patient feels as though child will be taken from her and patient is now accepting it  This CM suggested to look at the situation in a positive outlook  Rearrange home to have a more comfortable space for son and self  Patient went on to say home is so cluttered with belongings and animals, had to put animals outside and older dogs pee all over the apartment  Rena litter is all over the house that patient has to always clean up or patient has to walk all over it  This CM suggested to satrt with a small space and work towards cleaning up area and declutter, not only home but headspace as well  A: Patient appeared in a normal mood   Patient begins to show defensive behavior towards paternal grandmother in which this CM redirects  P: Patient and this CM discussed ways in saving money within the month  Patient did have food stamps turned back on   This CM discussed free internet through [place] in which patient will discuss with boyfriend as they are currently paying $150 through [address]  This CM did refer patient to begin seeing CRS again as patient needs to utilize free time more throughout the weeks that child is with PGM    Referrals made  [facility]      Next appointment date and time  Follow up in one week

## 2022-08-09 ENCOUNTER — DOCUMENTATION (OUTPATIENT)
Dept: PSYCHIATRY | Facility: CLINIC | Age: 37
End: 2022-08-09

## 2022-08-09 NOTE — PROGRESS NOTES
Note Type:  Note                  Date of Service: [unfilled]  Service:  [place]  [facility]    Note:  Note  Nixon Barros 29 y o  female [id number]  Attending  Substance Use History     Social History     Substance and Sexual Activity   Alcohol Use Not Currently        Social History     Substance and Sexual Activity   Drug Use Not Currently    Types: Methadone, Heroin, Cocaine, Marijuana, Prescription, Methamphetamines    Comment: 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       Encounter Type:   Patient Face-to-Face    Start Time 1300  End Time 1345    Recovery needs addressed at this meeting:  Peer Support     Note  Pt presented in office with her son, pt distraught about custody keller with her son  (Jabari Pruitt is seeking primary custody of her son) Pt is having issues with her boyfriend as well (he just quit his job today) Child [employer] is involved in custody keller, currently  Danish Cardona had just quit and now pt has no one to represent her  Pt has 3 cats, 1 dog and boxes everywhere  Pts boyfriend hoards everything that he finds at other properties, reports BF quit his job today and informed her that they are behind on the rent  Pt is interested in her and son going to the shelter to live   Pt doesn't address any drug and or alcohol related topics, adjusting well to the medication being switched  To see patient as needed for peer support  Relay message to Marlyn to switch insurance to [employer] and needs a  in [facility]       Referrals made  NA    Next appointment date and time  08/12/2022  11:00

## 2022-08-11 ENCOUNTER — TELEPHONE (OUTPATIENT)
Dept: PSYCHIATRY | Facility: CLINIC | Age: 37
End: 2022-08-11

## 2022-08-11 DIAGNOSIS — F11.21 OPIOID USE DISORDER, SEVERE, IN SUSTAINED REMISSION, ON MAINTENANCE THERAPY, DEPENDENCE (HCC): ICD-10-CM

## 2022-08-11 RX ORDER — BUPRENORPHINE AND NALOXONE 8; 2 MG/1; MG/1
8 FILM, SOLUBLE BUCCAL; SUBLINGUAL 2 TIMES DAILY
Qty: 14 FILM | Refills: 0 | Status: SHIPPED | OUTPATIENT
Start: 2022-08-11 | End: 2022-08-18

## 2022-08-12 DIAGNOSIS — F11.20 OPIOID DEPENDENCE ON AGONIST THERAPY (HCC): Primary | ICD-10-CM

## 2022-08-12 RX ORDER — BUPRENORPHINE AND NALOXONE 8; 2 MG/1; MG/1
8 FILM, SOLUBLE BUCCAL; SUBLINGUAL 2 TIMES DAILY
Qty: 60 FILM | Refills: 1 | Status: SHIPPED | OUTPATIENT
Start: 2022-08-18 | End: 2022-09-17

## 2022-08-12 NOTE — PROGRESS NOTES
Suboxone was prescribed one month supply one refill after the writer discussed Suboxone prescription with Dr Yue Peterson  The writer will start prescribing Suboxone for patient

## 2022-08-15 ENCOUNTER — DOCUMENTATION (OUTPATIENT)
Dept: PSYCHIATRY | Facility: CLINIC | Age: 37
End: 2022-08-15

## 2022-08-15 NOTE — PROGRESS NOTES
Note Type: Case Management Note                  Date of Service: 08/12/2022  Service:  [facility]    Note: Case Management Note  Lilian Tavares 29 y o  female [id number]  Attending  Substance Use History     Social History     Substance and Sexual Activity   Alcohol Use Not Currently        Social History     Substance and Sexual Activity   Drug Use Not Currently    Types: Methadone, Heroin, Cocaine, Marijuana, Prescription, Methamphetamines    Comment: 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       Encounter Type:   Patient Face-to-Face    Start Time 9307  End Time 1145    Recovery needs addressed at this meeting:  Legal Status    Note  Patient presented for in-person, scheduled visit with this CM  Present was patient's minor child, Tracy Whiting  (documenting snack was given to minor child during session)    Patient mentioned tot his CM about  withdrawing from custody case  This CM suggested patient re apply with [employer] in which patient did not agree and patient spoke with PGM sister whom offered to pay for an  up to $10,000 00  This CM stated if patient intends to take route with that  to ensure keeping in touch with sister as patient is in need or representation by 08/25/2022  A: Patient appeared in an overwhelmed state  Patient had to be redirected multiple times during session from minor arguments with minor child  Minor child would state certain statements such as "Tell Miss Marlyn you always are mad at me  Go on, Tell her "     (documenting CYS case for LC on file )    This CM also redirected patient to refocus attention away from custody hearing and negative speaking of paternal grandmother as minor child was present during session   Patient stated boyfriend always blames minor child for what is currently going on     P: This CM was able to print out an August Calendar for patient to help keep track of all appointments for patient and minor child as well as assigned custody weeks     Patient will inform this CM during next session whether or not to refer patient to [address] for additional legal services      Referrals made  [address]    Next appointment date and time  Follow up in one week

## 2022-08-19 ENCOUNTER — DOCUMENTATION (OUTPATIENT)
Dept: PSYCHIATRY | Facility: CLINIC | Age: 37
End: 2022-08-19

## 2022-08-19 ENCOUNTER — TELEMEDICINE (OUTPATIENT)
Dept: PSYCHIATRY | Facility: CLINIC | Age: 37
End: 2022-08-19

## 2022-08-19 DIAGNOSIS — F31.81 BIPOLAR 2 DISORDER (HCC): Primary | ICD-10-CM

## 2022-08-19 DIAGNOSIS — F51.01 PRIMARY INSOMNIA: ICD-10-CM

## 2022-08-19 DIAGNOSIS — F11.21 OPIOID USE DISORDER, SEVERE, IN SUSTAINED REMISSION, ON MAINTENANCE THERAPY, DEPENDENCE (HCC): ICD-10-CM

## 2022-08-19 DIAGNOSIS — F41.9 ANXIETY: ICD-10-CM

## 2022-08-19 DIAGNOSIS — F33.41 MAJOR DEPRESSIVE DISORDER, RECURRENT, IN PARTIAL REMISSION (HCC): ICD-10-CM

## 2022-08-19 PROCEDURE — NC001 PR NO CHARGE

## 2022-08-19 NOTE — PSYCH
This writer attempted to engage in a telemedicine visit; patient asked to be rescheduled, stating she had a bad morning  Patient did not go into further detail  Patient rescheduled for 9/13/22  no

## 2022-08-19 NOTE — PROGRESS NOTES
Note Type: Case Management Note                  Date of Service: 08/19/2022  Service:  [facility]    Note: Case Management Note  Newton Cobb 29 y o  female [phone]  Attending  Substance Use History     Social History     Substance and Sexual Activity   Alcohol Use Not Currently        Social History     Substance and Sexual Activity   Drug Use Not Currently    Types: Methadone, Heroin, Cocaine, Marijuana, Prescription, Methamphetamines    Comment: 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       Encounter Type:   Patient Face-to-Face    Start Time 1108  End Time 1140    Recovery needs addressed at this meeting:  Basic  Needs and Legal Status    Note  D: Patient presented for in-person, scheduled visit with this CM  A: Patient appeared in a somewhat altered state  Patient feels as though neighbors have been listening to patient through the walls and are attempting to have patient evicted  Patient and significant other are behind on rent and feel as though that is a good enough reason to be evicted though ptient's significant other works for [employer] and was told not to pay rent  This CM questioned patient on where patient is with  and representation for the following week with mediation of custody case and tril in September in which patient stated Natasha Joya to still be representing patient   This CM requested to call Julius Carter ( [employer]OK on file ) in which Julius Carter was able to confirm still representing patient and requested patient to provide information to  to help create a preliminary statement for the trial     P: This CM was able to write down information requested by  and request patient to work on the information immediately as mediation is next week,    Patient will follow up with this CM in one week    Referrals made  No referrals made during this session    Next appointment date and time  Follow up in one week

## 2022-09-06 DIAGNOSIS — J45.909 ASTHMA, UNSPECIFIED ASTHMA SEVERITY, UNSPECIFIED WHETHER COMPLICATED, UNSPECIFIED WHETHER PERSISTENT: ICD-10-CM

## 2022-09-07 RX ORDER — CYCLOBENZAPRINE HCL 10 MG
TABLET ORAL
Qty: 90 TABLET | Refills: 3 | Status: SHIPPED | OUTPATIENT
Start: 2022-09-07

## 2022-09-13 ENCOUNTER — SOCIAL WORK (OUTPATIENT)
Dept: PSYCHIATRY | Facility: CLINIC | Age: 37
End: 2022-09-13
Payer: COMMERCIAL

## 2022-09-13 DIAGNOSIS — F11.21 OPIOID USE DISORDER, SEVERE, IN SUSTAINED REMISSION, ON MAINTENANCE THERAPY, DEPENDENCE (HCC): ICD-10-CM

## 2022-09-13 DIAGNOSIS — F31.81 BIPOLAR 2 DISORDER (HCC): Primary | ICD-10-CM

## 2022-09-13 DIAGNOSIS — F33.41 MAJOR DEPRESSIVE DISORDER, RECURRENT, IN PARTIAL REMISSION (HCC): ICD-10-CM

## 2022-09-13 DIAGNOSIS — F41.9 ANXIETY: ICD-10-CM

## 2022-09-13 PROCEDURE — 90834 PSYTX W PT 45 MINUTES: CPT
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
## 2022-09-19 ENCOUNTER — DOCUMENTATION (OUTPATIENT)
Dept: PSYCHIATRY | Facility: CLINIC | Age: 37
End: 2022-09-19

## 2022-09-19 ENCOUNTER — TELEPHONE (OUTPATIENT)
Dept: PSYCHIATRY | Facility: CLINIC | Age: 37
End: 2022-09-19
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
## 2022-09-19 NOTE — TELEPHONE ENCOUNTER
Melany Coto is requesting a refill on their Suboxone  Patient's next appointment with Melecio Page MD is on 10/14/2022

## 2022-09-20 DIAGNOSIS — F11.20 OPIOID DEPENDENCE ON AGONIST THERAPY (HCC): Primary | ICD-10-CM

## 2022-09-20 RX ORDER — BUPRENORPHINE AND NALOXONE 8; 2 MG/1; MG/1
8 FILM, SOLUBLE BUCCAL; SUBLINGUAL 2 TIMES DAILY
Qty: 60 FILM | Refills: 0 | Status: SHIPPED | OUTPATIENT
Start: 2022-09-20 | End: 2022-10-14 | Stop reason: SDUPTHER

## 2022-09-20 RX ORDER — GABAPENTIN 100 MG/1
100 CAPSULE ORAL 3 TIMES DAILY
COMMUNITY
Start: 2022-09-04 | End: 2022-10-14 | Stop reason: DRUGHIGH

## 2022-09-20 RX ORDER — DULOXETIN HYDROCHLORIDE 30 MG/1
30 CAPSULE, DELAYED RELEASE ORAL DAILY
COMMUNITY
Start: 2022-09-06 | End: 2022-10-14 | Stop reason: SDUPTHER

## 2022-09-23 ENCOUNTER — DOCUMENTATION (OUTPATIENT)
Dept: PSYCHIATRY | Facility: CLINIC | Age: 37
End: 2022-09-23

## 2022-09-23 NOTE — PROGRESS NOTES
Note Type: Case Management Note                  Date of Service: 09/23/2022  Service:  [facility]    Note: Case Management Note  Siri Valdivia 29 y o  female [id number]  Attending  Substance Use History     Social History     Substance and Sexual Activity   Alcohol Use Not Currently        Social History     Substance and Sexual Activity   Drug Use Not Currently    Types: Methadone, Heroin, Cocaine, Marijuana, Prescription, Methamphetamines    Comment: 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       Encounter Type:   Patient Face-to-Face    Start Time 1110  End Time 1140    Recovery needs addressed at this meeting:  Basic  Needs    Note  D: Patient presented for in-person, scheduled visit with this CM  This CM questioned if patient was able to delted necessary emails in phone as this CM requested to do before this session, in which patient did not  Patient and this CM did go on to discuss further options in regards to patient moving in which this CM did reach out to patients mom during session  Mom did agree for patient to move into home temporarily while patient looks for apartments near mom  A: Patient was hesitant towards decisions made during session, stating will need to make ultimatums with boyfriend  This CM discussed with patient the safety and securtiy of being in a stable home with self and minor child       P: Patient plans to have phone conversation with mom and will continue to follow up with this CM on a weekly basis to continue to discuss future stability within community    Referrals made  No referrals made during this session    Next appointment date and time  Follow up in one week

## 2022-09-29 ENCOUNTER — TELEMEDICINE (OUTPATIENT)
Dept: PSYCHIATRY | Facility: CLINIC | Age: 37
End: 2022-09-29
Payer: COMMERCIAL

## 2022-09-29 DIAGNOSIS — F31.81 BIPOLAR 2 DISORDER (HCC): Primary | ICD-10-CM

## 2022-09-29 DIAGNOSIS — F33.41 MAJOR DEPRESSIVE DISORDER, RECURRENT, IN PARTIAL REMISSION (HCC): ICD-10-CM

## 2022-09-29 DIAGNOSIS — F41.9 ANXIETY: ICD-10-CM

## 2022-09-29 DIAGNOSIS — F11.21 OPIOID USE DISORDER, SEVERE, IN SUSTAINED REMISSION, ON MAINTENANCE THERAPY, DEPENDENCE (HCC): ICD-10-CM

## 2022-09-29 PROCEDURE — 90834 PSYTX W PT 45 MINUTES: CPT

## 2022-09-29 NOTE — PSYCH
Problem List Items Addressed This Visit        Other    Anxiety    Opioid use disorder, severe, in sustained remission, on maintenance therapy, dependence ([facility] )    Major depressive disorder, recurrent, in partial remission ([facility] )    Bipolar 2 disorder ([facility] ) - Primary        10 05 AM 10 50 AM      D: Geno attended a 45 minutes individual session today  Geno entered session with cooperative appearance,Depressed mood and normal affect  Geno's speech was appropriate quality, quantity and organization of sentences  Patient and clinician discussed how Jeane Watkins has been doing; she states she has gained some insight where she realized she was focusing her energy on the situation with her son's grandmother instead of focusing on her son  Geno states she has been meditating more and focusing on possessing and generating more positive energy  Geno states she has been walking with her neighbors and realized she has been needing to go outside of the apartment to change her perspective  Geno states she is stressed and scatterbrained right now; she spoke about reconnecting with her Rastafari family in [facility] but her fiance was not interested in attending  Geno states she needs to get out of the house; we discussed attending meetings at the local drop in centers in [facility] as supports  Geno states she was happy to reconnect her with her sister over a facetime call recently  Geno states she is proud of herself for getting some things accomplished but recognizes she needs to continue with moving forward  A: Geno presented as cooperative throughout the session  Geno appears to be preparation for change at this time  Geno has Good insight  Current suicide risk : none    P: Geno will follow up with Mike CASPER , LPC, CAADC, NCC, CCTP in 2 weeks  Goal(s) 1 was/were addressed in session      Geno denies SI, SH, HI at this time and can contract for safety  Behavioral Health Treatment Plan [facility]: Diagnosis and Treatment Plan explained to CHER Lora relates understanding diagnosis and is agreeable to Treatment Plan  Yes       Virtual Regular Visit    Verification of patient location: [place]    Patient is located in the following state in which I hold an active license [place]      Assessment/Plan:    Problem List Items Addressed This Visit        Other    Anxiety    Opioid use disorder, severe, in sustained remission, on maintenance therapy, dependence ([facility] )    Major depressive disorder, recurrent, in partial remission ([facility] )    Bipolar 2 disorder ([facility] ) - Primary          Goals addressed in session: Goal 1          Reason for visit is No chief complaint on file  Encounter provider Jasbir Joseph, [facility]    Provider located at [address] [address] [phone]      Recent Visits  No visits were found meeting these conditions  Showing recent visits within past 7 days and meeting all other requirements  Future Appointments  No visits were found meeting these conditions  Showing future appointments within next 150 days and meeting all other requirements       The patient was identified by name and date of birth  Lilian Tavares was informed that this is a telemedicine visit and that the visit is being conducted throughic Embedded and patient was informed this is a secure, HIPAA-complaint platform  She agrees to proceed     My office door was closed  No one else was in the room  She acknowledged consent and understanding of privacy and security of the video platform  The patient has agreed to participate and understands they can discontinue the visit at any time  Patient is aware this is a billable service  Connor Worthington is a 29 y o  female presenting for follow up today        HPI     Past Medical History:   Diagnosis Date    Anxiety     "severe"    Asthma     Bipolar disorder ([place] [address] )     Cigarette smoker     Depression     Disease of thyroid gland     Drug dependence, in remission ([place] [address] )     GERD (gastroesophageal reflux disease)     H/O: whooping cough     while pregnant    Hepatitis C, chronic (HCC)     Insomnia disorder     Liver disease     Hepatitis C    Migraine     Psychiatric illness     PTSD (post-traumatic stress disorder)     Substance abuse ([place] [address] )        Past Surgical History:   Procedure Laterality Date    KNEE SURGERY      OTHER SURGICAL HISTORY      body piercing    IL REPAIR UMBILICAL QDWP,2+M/F,WRSRR N/A 04/19/2016    Procedure: REPAIR HERNIA UMBILICAL WITH MESH;  Surgeon: Treasure Spring MD;  Location:  MAIN OR;  Service: General    VAGINAL DELIVERY      X 6    WISDOM TOOTH EXTRACTION      X 4    WOUND DEBRIDEMENT Right 01/17/2018    Procedure: RIGHT KNEE DEBRIDEMENT; [address]; AND LACERATION REPAIR   INTRAOPERATIVE ASSESSMENT OF PATELLA TENDON;  Surgeon: Marjorie Ruelas MD;  Location:  MAIN OR;  Service: Orthopedics       Current Outpatient Medications   Medication Sig Dispense Refill    buprenorphine-naloxone (Suboxone) 8-2 mg Place 1 Film (8 mg total) under the tongue 2 (two) times a day 60 Film 0    albuterol (PROVENTIL HFA,VENTOLIN HFA) 90 mcg/act inhaler Inhale 2 puffs every 4 (four) hours as needed for wheezing 18 g 5    clonazePAM (KlonoPIN) 1 mg tablet Take 1 tablet (1 mg total) by mouth 3 (three) times a day 90 tablet 5    cyclobenzaprine (FLEXERIL) 10 mg tablet take 1 tablet by mouth three times a day if needed for muscle spasm 90 tablet 3    DULoxetine (CYMBALTA) 30 mg delayed release capsule Take 30 mg by mouth daily      gabapentin (NEURONTIN) 100 mg capsule Take 100 mg by mouth 3 (three) times a day      gabapentin (Neurontin) 300 mg capsule Take 1 capsule (300 mg total) by mouth 3 (three) times a day 90 capsule 3    ibuprofen (MOTRIN) 800 mg tablet Take 1 tablet (800 mg total) by mouth every 8 (eight) hours as needed for mild pain 90 tablet 3    levothyroxine 50 mcg tablet 1 daily 30 tablet 5    NARCAN 4 MG/0 1ML LIQD ADMINISTER A SINGLE spray INTO ONE NOSTRIL CALL 911 MAY REPEAT ONCE (Patient not taking: No sig reported)  0    omeprazole (PriLOSEC) 20 mg delayed release capsule Take 1 capsule (20 mg total) by mouth daily (Patient taking differently: Take 20 mg by mouth if needed) 90 capsule 3    topiramate (TOPAMAX) 100 mg tablet Take 1 tablet (100 mg total) by mouth 3 (three) times a day 90 tablet 3    traZODone (DESYREL) 50 mg tablet 1-2  tablet 3    ziprasidone (GEODON) 40 mg capsule Take 1 capsule (40 mg total) by mouth daily with dinner 30 capsule 3     No current facility-administered medications for this visit  Allergies   Allergen Reactions    Amoxicillin      Pt states it never works for her        Review of Systems    Video Exam    There were no vitals filed for this visit      Physical Exam     I spent 45 minutes directly with the patient during this visit

## 2022-09-30 ENCOUNTER — DOCUMENTATION (OUTPATIENT)
Dept: PSYCHIATRY | Facility: CLINIC | Age: 37
End: 2022-09-30

## 2022-09-30 NOTE — PROGRESS NOTES
Note Type: Case Management Note                  Date of Service: 09/30/2022  Service:  [facility]    Note: Case Management Note  Florence Gandara 29 y o  female [id number]  Attending  Substance Use History     Social History     Substance and Sexual Activity   Alcohol Use Not Currently        Social History     Substance and Sexual Activity   Drug Use Not Currently    Types: Methadone, Heroin, Cocaine, Marijuana, Prescription, Methamphetamines    Comment: 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       Encounter Type:   Patient Face-to-Face    Start Time 1104  End Time 1145    Recovery needs addressed at this meeting:  Basic  Needs, Emotional/Mental Health, Life Skills and Social    Note  D: Patient presented for in-person, scheduled visit with this CM  Patient mentioned to have seen a psychic whom helped patient come to the realization of grounding self and focusing less on crystals and more on the surroundings within community  Patient and this CM were able to work together and reschedule appointments with PCP and CRS as patient would like to become interactive with local centers for recovery    A: Patient appeared in a normal mood  Patient was well organized with thoughts throughout session, understanding to write certain things down rather that keeping headspace clustered       Patient did not fixate on the custody case or PGM at all throughout today's session which is great progress    P: Patient will meet with this CM on a weekly basis along with asigned treatment team         Referrals made  [facility] on [facility] - 10/5/2022 @ 1pm    Next appointment date and time  Follow up in one week

## 2022-10-05 ENCOUNTER — OFFICE VISIT (OUTPATIENT)
Dept: FAMILY MEDICINE CLINIC | Facility: CLINIC | Age: 37
End: 2022-10-05

## 2022-10-05 VITALS
SYSTOLIC BLOOD PRESSURE: 120 MMHG | HEIGHT: 64 IN | DIASTOLIC BLOOD PRESSURE: 72 MMHG | OXYGEN SATURATION: 97 % | BODY MASS INDEX: 33.29 KG/M2 | RESPIRATION RATE: 20 BRPM | WEIGHT: 195 LBS | TEMPERATURE: 97.2 F | HEART RATE: 92 BPM

## 2022-10-05 DIAGNOSIS — F41.9 ANXIETY: ICD-10-CM

## 2022-10-05 DIAGNOSIS — N76.0 BACTERIAL VAGINOSIS: Primary | ICD-10-CM

## 2022-10-05 DIAGNOSIS — F31.81 BIPOLAR 2 DISORDER (HCC): ICD-10-CM

## 2022-10-05 DIAGNOSIS — B96.89 BACTERIAL VAGINOSIS: Primary | ICD-10-CM

## 2022-10-05 DIAGNOSIS — F43.10 PTSD (POST-TRAUMATIC STRESS DISORDER): ICD-10-CM

## 2022-10-05 DIAGNOSIS — J45.909 ASTHMA, UNSPECIFIED ASTHMA SEVERITY, UNSPECIFIED WHETHER COMPLICATED, UNSPECIFIED WHETHER PERSISTENT: ICD-10-CM

## 2022-10-05 PROCEDURE — 99214 OFFICE O/P EST MOD 30 MIN: CPT | Performed by: FAMILY MEDICINE

## 2022-10-05 RX ORDER — ALBUTEROL SULFATE 90 UG/1
2 AEROSOL, METERED RESPIRATORY (INHALATION) EVERY 4 HOURS PRN
Qty: 18 G | Refills: 5 | Status: SHIPPED | OUTPATIENT
Start: 2022-10-05

## 2022-10-05 RX ORDER — TOPIRAMATE 100 MG/1
100 TABLET, FILM COATED ORAL 2 TIMES DAILY
Qty: 90 TABLET | Refills: 3 | Status: SHIPPED | OUTPATIENT
Start: 2022-10-05 | End: 2022-11-04

## 2022-10-05 RX ORDER — LEVOTHYROXINE SODIUM 0.05 MG/1
TABLET ORAL
Qty: 30 TABLET | Refills: 5 | Status: SHIPPED | OUTPATIENT
Start: 2022-10-05

## 2022-10-05 RX ORDER — CLONAZEPAM 1 MG/1
1 TABLET ORAL 3 TIMES DAILY
Qty: 90 TABLET | Refills: 5 | Status: CANCELLED | OUTPATIENT
Start: 2022-10-05

## 2022-10-05 RX ORDER — METRONIDAZOLE 500 MG/1
500 TABLET ORAL EVERY 12 HOURS SCHEDULED
Qty: 14 TABLET | Refills: 0 | Status: SHIPPED | OUTPATIENT
Start: 2022-10-05 | End: 2022-10-12

## 2022-10-05 NOTE — PATIENT INSTRUCTIONS
Dysuria   WHAT YOU NEED TO KNOW:   Dysuria is difficulty urinating, or pain, burning, or discomfort with urination  Dysuria is usually a symptom of another problem  DISCHARGE INSTRUCTIONS:   Return to the emergency department if:   You have severe back, side, or abdominal pain  You have fever and shaking chills  You vomit several times in a row  Contact your healthcare provider if:   Your symptoms do not go away, even after treatment  You have questions or concerns about your condition or care  Medicines:   Medicines  may be given to help treat a bacterial infection or help decrease bladder spasms  Take your medicine as directed  Contact your healthcare provider if you think your medicine is not helping or if you have side effects  Tell him of her if you are allergic to any medicine  Keep a list of the medicines, vitamins, and herbs you take  Include the amounts, and when and why you take them  Bring the list or the pill bottles to follow-up visits  Carry your medicine list with you in case of an emergency  Follow up with your healthcare provider as directed: Your healthcare provider may also refer you to a urologist or nephrologist to have additional testing  Write down your questions so you remember to ask them during your visits  Manage your dysuria:   Drink more liquids  Liquids help flush out bacteria that may be causing an infection  Ask your healthcare provider how much liquid to drink each day and which liquids are best for you  Take sitz baths as directed  Fill a bathtub with 4 to 6 inches of warm water  You may also use a sitz bath pan that fits over a toilet  Sit in the sitz bath for 20 minutes  Do this 2 to 3 times a day, or as directed  The warm water can help decrease pain and swelling  © Copyright [employer] 2022 Information is for End User's use only and may not be sold, redistributed or otherwise used for commercial purposes   All illustrations and images included in [facility] are the copyrighted property of A D A M , Inc  or [facility] a[employer]my [employer]   The above information is an  only  It is not intended as medical advice for individual conditions or treatments  Talk to your doctor, nurse or pharmacist before following any medical regimen to see if it is safe and effective for you

## 2022-10-05 NOTE — PROGRESS NOTES
Name: Siri Valdivia      : 1985      MRN: [id number]  Encounter Provider: Carmelo Mahoney MD  Encounter Date: 10/5/2022   Encounter department: [facility]    Assessment & Plan      Bacterial vaginosis  - Burning and pain on urination  Vaginal malodor (described as fishy)  - Prior h/o BV infections  -  Vaginosis DNA Probe positive (22  -  Denies trauma, hematuria  -     Will start metroNIDAZOLE (FLAGYL) 500 mg tablet; Take 1 tablet (500 mg total) by mouth every 12 (twelve) hours for 7 days  - Encourage patient to stay away from Alcohol while taking medication to avoid disulfiram like reaction    - Discussed risk factors include but not limited to multiple male/female sex partners, new partner, douching, lack of condom use, lack of vaginal lactobacilli  - Discussed abstaining from sex, or use condoms during intercourse  -  Encourage medication compliance  -  Follow up in 6 weeks with PCP for Annual Physical    Review Lab results from 22 - mild hypokalemia   - Denies chest pain, palpitation, SOB, Abdominal pain, N/V/D  Discuss dietary changes  Other orders/Medication refills  -     levothyroxine 50 mcg tablet; 1 daily  -     topiramate (TOPAMAX) 100 mg tablet; Take 1 tablet (100 mg total) by mouth 2 (two) times a day  -     albuterol (PROVENTIL HFA,VENTOLIN HFA) 90 mcg/act inhaler; Inhale 2 puffs every 4 (four) hours as needed for wheezing      BMI Counseling: Body mass index is 33 73 kg/m²  The BMI is above normal  Nutrition recommendations include consuming healthier snacks and limiting drinks that contain sugar  Exercise recommendations include moderate physical activity 150 minutes/week  Rationale for BMI follow-up plan is due to patient being overweight or obese  Subjective      HPI  Review of Systems   Constitutional: Negative for chills and fever  HENT: Negative for ear pain and sore throat      Eyes: Negative for pain and visual disturbance  Respiratory: Negative for cough and shortness of breath  Cardiovascular: Negative for chest pain and palpitations  Gastrointestinal: Negative for abdominal pain and vomiting  Genitourinary: Positive for dysuria, frequency, vaginal discharge and vaginal pain  Negative for flank pain, hematuria, pelvic pain and vaginal bleeding  Musculoskeletal: Negative for arthralgias and back pain  Skin: Negative for color change and rash  Neurological: Negative for seizures and syncope  All other systems reviewed and are negative        Current Outpatient Medications on File Prior to Visit   Medication Sig    buprenorphine-naloxone (Suboxone) 8-2 mg Place 1 Film (8 mg total) under the tongue 2 (two) times a day    clonazePAM (KlonoPIN) 1 mg tablet Take 1 tablet (1 mg total) by mouth 3 (three) times a day    cyclobenzaprine (FLEXERIL) 10 mg tablet take 1 tablet by mouth three times a day if needed for muscle spasm    DULoxetine (CYMBALTA) 30 mg delayed release capsule Take 30 mg by mouth daily    gabapentin (NEURONTIN) 100 mg capsule Take 100 mg by mouth 3 (three) times a day    gabapentin (Neurontin) 300 mg capsule Take 1 capsule (300 mg total) by mouth 3 (three) times a day    ibuprofen (MOTRIN) 800 mg tablet Take 1 tablet (800 mg total) by mouth every 8 (eight) hours as needed for mild pain    NARCAN 4 MG/0 1ML LIQD ADMINISTER A SINGLE spray INTO ONE NOSTRIL CALL 911 MAY REPEAT ONCE (Patient not taking: No sig reported)    omeprazole (PriLOSEC) 20 mg delayed release capsule Take 1 capsule (20 mg total) by mouth daily (Patient taking differently: Take 20 mg by mouth if needed)    traZODone (DESYREL) 50 mg tablet 1-2 HS    ziprasidone (GEODON) 40 mg capsule Take 1 capsule (40 mg total) by mouth daily with dinner       Objective     /72 (BP Location: Left arm, Patient Position: Sitting, Cuff Size: Standard)   Pulse 92   Temp (!) 97 2 °F (36 2 °C) (Temporal)   Resp 20   Ht 5' 3 75" (1 619 m)   Wt 88 5 kg (195 lb)   LMP 09/28/2022   SpO2 97%   BMI 33 73 kg/m²     Physical Exam  Constitutional:       Appearance: Normal appearance  HENT:      Head: Normocephalic and atraumatic  Cardiovascular:      Rate and Rhythm: Normal rate and regular rhythm  Pulses: Normal pulses  Heart sounds: Normal heart sounds  No murmur heard  No friction rub  No gallop  Pulmonary:      Effort: Pulmonary effort is normal  No respiratory distress  Breath sounds: Normal breath sounds  No stridor  No wheezing or rhonchi  Chest:      Chest wall: No tenderness  Abdominal:      General: Bowel sounds are normal  There is no distension  Palpations: Abdomen is soft  There is no mass  Tenderness: There is no abdominal tenderness  There is no right CVA tenderness, left CVA tenderness or guarding  Musculoskeletal:         General: Normal range of motion  Cervical back: Neck supple  No tenderness  Skin:     General: Skin is warm and dry  Neurological:      Mental Status: She is alert and oriented to person, place, and time     Psychiatric:         Mood and Affect: Mood normal        Saulo Jovel MD

## 2022-10-06 ENCOUNTER — TELEPHONE (OUTPATIENT)
Dept: FAMILY MEDICINE CLINIC | Facility: CLINIC | Age: 37
End: 2022-10-06

## 2022-10-06 NOTE — TELEPHONE ENCOUNTER
Received completed Dept of transportation Seizure Reporting Form  Patient notified form is ready for     Form scanned into patient chart  Form placed in  bin

## 2022-10-10 ENCOUNTER — DOCUMENTATION (OUTPATIENT)
Dept: PSYCHIATRY | Facility: CLINIC | Age: 37
End: 2022-10-10

## 2022-10-10 NOTE — PROGRESS NOTES
Note Type: Case Management Note                  Date of Service: [unfilled]  [facility]  [facility]    Note: Case Management Note  Siri Valdivia 29 y o  female [id number]  Attending  Substance Use History     Social History     Substance and Sexual Activity   Alcohol Use Not Currently        Social History     Substance and Sexual Activity   Drug Use Not Currently   • Types: Methadone, Heroin, Cocaine, Marijuana, Prescription, Methamphetamines    Comment: 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       Encounter Type:   Patient Face-to-Face    Start Time 1240  End Time 9069    Recovery needs addressed at this meeting:  Physical Health    Note  D: Patient presented for in-person, scheduled visit with this CM  This CM presented the form partially completed by Dr Jessica Cooper for patient to take to PCP appt  Patient requested this form to be completed for [facility] purposes as patient is attempting to regain license  A: Patient appeared in a normal mood  Patient was well groomed  Patient stated to be doing well in home atmosphere with son and significant other      P: Patient will continue to follow up with this CM on a weekly basis    Referrals made  No referrals made during this session    Next appointment date and time  Follow up in one week

## 2022-10-13 ENCOUNTER — TELEMEDICINE (OUTPATIENT)
Dept: PSYCHIATRY | Facility: CLINIC | Age: 37
End: 2022-10-13
Payer: COMMERCIAL

## 2022-10-13 DIAGNOSIS — F33.2 MDD (MAJOR DEPRESSIVE DISORDER), RECURRENT SEVERE, WITHOUT PSYCHOSIS (HCC): ICD-10-CM

## 2022-10-13 DIAGNOSIS — F31.81 BIPOLAR 2 DISORDER (HCC): Primary | ICD-10-CM

## 2022-10-13 DIAGNOSIS — F11.20 OPIOID DEPENDENCE ON AGONIST THERAPY (HCC): ICD-10-CM

## 2022-10-13 DIAGNOSIS — F41.9 ANXIETY: ICD-10-CM

## 2022-10-13 PROCEDURE — 90834 PSYTX W PT 45 MINUTES: CPT

## 2022-10-13 NOTE — PSYCH
Visit Time    Visit Start Time: 2:10PM  Visit Stop Time: 2:50 PM  Total Visit Duration: 40 minutes    Virtual Regular Visit    Verification of patient location: [place]     Patient is located in the following state in which I hold an active license [place]      Assessment/Plan:    Problem List Items Addressed This Visit        Other    Anxiety    MDD (major depressive disorder), recurrent severe, without psychosis ([facility] [address] )    Bipolar 2 disorder ([facility] [address] ) - Primary    Opioid dependence on agonist therapy ([facility] )          Goals addressed in session: Goal 1          Reason for visit is No chief complaint on file  Encounter provider Shagufta Rutherford, [facility]    Provider located at [address]  [address] [id number]  [phone]      Recent Visits  Date Type Provider Dept   10/06/22 Telephone Isaias Burciaga MD  [facility]   Showing recent visits within past 7 days and meeting all other requirements  Future Appointments  No visits were found meeting these conditions  Showing future appointments within next 150 days and meeting all other requirements       The patient was identified by name and date of birth  Michael Joseph was informed that this is a telemedicine visit and that the visit is being conducted throughEpic Embedded and patient was informed this is a secure, HIPAA-complaint platform  She agrees to proceed     My office door was closed  No one else was in the room  She acknowledged consent and understanding of privacy and security of the video platform  The patient has agreed to participate and understands they can discontinue the visit at any time  Patient is aware this is a billable service  Fabiola Levin is a 29 y o  female presenting for follow up today      Problem List Items Addressed This Visit        Other    Anxiety    MDD (major depressive disorder), recurrent severe, without psychosis ([place] [address] )    Bipolar 2 disorder ([place] [address] ) - Primary    Opioid dependence on agonist therapy ([place] [address] )          D: Geno attended a 40 minutes individual session today  Geno entered session with healthy appearance,Normal mood and normal affect  Geno's speech was appropriate quality, quantity and organization of sentences  Patient and clinician discussed how Celeste Aj has been doing; she reports doing better overall  Geno reported she was 10 minutes late coming back from an appointment for [facility] since he wasn't feeling well  Geno mentioned that she feels distanced from her fiance as of late; she states she tries to bring up these issues with him and he dismisses her  Geno states she has concerns because he has increased his increased his fabiana and alcohol consumption  Geno was encouraged to talk with her fiance about these feelings since it seems she is carrying some uncertainties and telling herself stories  A: Geno presented as cooperative throughout the session  Geno appears to be in preperation to change at this time  Geno has Good insight  Current suicide risk : none    P: Geno will follow up with Joycelyn CASPER , LPC, CAADC, NCC, CCTP in 2 weeks  Goal(s) 1 was/were addressed in session  Geno denies SI, SH, HI at this time and can contract for safety  Behavioral Health Treatment Plan [facility]: Diagnosis and Treatment Plan explained to Celeste Lora relates understanding diagnosis and is agreeable to Treatment Plan   Yes       HPI     Past Medical History:   Diagnosis Date   • Anxiety     "severe"   • Asthma    • Bipolar disorder ([place] [address] )    • Cigarette smoker    • Depression    • Disease of thyroid gland    • Drug dependence, in remission ([place] [address] )    • GERD (gastroesophageal reflux disease)    • H/O: whooping cough     while pregnant   • Hepatitis C, chronic (HCC)    • Insomnia disorder    • Liver disease     Hepatitis C   • Migraine    • Psychiatric illness    • PTSD (post-traumatic stress disorder)    • Substance abuse ([facility] [address] )        Past Surgical History:   Procedure Laterality Date   • KNEE SURGERY     • OTHER SURGICAL HISTORY      body piercing   • [place] REPAIR UMBILICAL WBKE,9+F/V,PQVRD N/A 04/19/2016    Procedure: REPAIR HERNIA UMBILICAL WITH MESH;  Surgeon: Marjorie Nettles MD;  Location:  MAIN OR;  Service: General   • VAGINAL DELIVERY      X 6   • WISDOM TOOTH EXTRACTION      X 4   • WOUND DEBRIDEMENT Right 01/17/2018    Procedure: RIGHT KNEE DEBRIDEMENT; [facility] OUT; AND LACERATION REPAIR   INTRAOPERATIVE ASSESSMENT OF PATELLA TENDON;  Surgeon: Ayaan Gill MD;  Location:  MAIN OR;  Service: Orthopedics       Current Outpatient Medications   Medication Sig Dispense Refill   • albuterol (PROVENTIL HFA,VENTOLIN HFA) 90 mcg/act inhaler Inhale 2 puffs every 4 (four) hours as needed for wheezing 18 g 5   • buprenorphine-naloxone (Suboxone) 8-2 mg Place 1 Film (8 mg total) under the tongue 2 (two) times a day 60 Film 0   • clonazePAM (KlonoPIN) 1 mg tablet take 1 tablet by mouth three times a day 90 tablet 2   • cyclobenzaprine (FLEXERIL) 10 mg tablet take 1 tablet by mouth three times a day if needed for muscle spasm 90 tablet 3   • DULoxetine (CYMBALTA) 30 mg delayed release capsule Take 30 mg by mouth daily     • gabapentin (NEURONTIN) 100 mg capsule Take 100 mg by mouth 3 (three) times a day     • gabapentin (Neurontin) 300 mg capsule Take 1 capsule (300 mg total) by mouth 3 (three) times a day 90 capsule 3   • ibuprofen (MOTRIN) 800 mg tablet Take 1 tablet (800 mg total) by mouth every 8 (eight) hours as needed for mild pain 90 tablet 3   • levothyroxine 50 mcg tablet 1 daily 30 tablet 5   • NARCAN 4 MG/0 1ML LIQD ADMINISTER A SINGLE spray INTO ONE NOSTRIL CALL 911 MAY REPEAT ONCE (Patient not taking: No sig reported)  0   • omeprazole (PriLOSEC) 20 mg delayed release capsule Take 1 capsule (20 mg total) by mouth daily (Patient taking differently: Take 20 mg by mouth if needed) 90 capsule 3   • topiramate (TOPAMAX) 100 mg tablet Take 1 tablet (100 mg total) by mouth 2 (two) times a day 90 tablet 3   • traZODone (DESYREL) 50 mg tablet 1-2  tablet 3   • ziprasidone (GEODON) 40 mg capsule Take 1 capsule (40 mg total) by mouth daily with dinner 30 capsule 3     No current facility-administered medications for this visit  Allergies   Allergen Reactions   • Amoxicillin      Pt states it never works for her        Review of Systems    Video Exam    There were no vitals filed for this visit      Physical Exam     I spent 40 minutes directly with the patient during this visit

## 2022-10-14 ENCOUNTER — OFFICE VISIT (OUTPATIENT)
Dept: PSYCHIATRY | Facility: CLINIC | Age: 37
End: 2022-10-14
Payer: COMMERCIAL

## 2022-10-14 VITALS
WEIGHT: 195 LBS | HEIGHT: 64 IN | DIASTOLIC BLOOD PRESSURE: 64 MMHG | HEART RATE: 81 BPM | BODY MASS INDEX: 33.29 KG/M2 | SYSTOLIC BLOOD PRESSURE: 92 MMHG

## 2022-10-14 DIAGNOSIS — M79.2 NEUROPATHIC PAIN: ICD-10-CM

## 2022-10-14 DIAGNOSIS — E55.9 VITAMIN D INSUFFICIENCY: ICD-10-CM

## 2022-10-14 DIAGNOSIS — F51.01 PRIMARY INSOMNIA: ICD-10-CM

## 2022-10-14 DIAGNOSIS — F31.81 BIPOLAR 2 DISORDER (HCC): ICD-10-CM

## 2022-10-14 DIAGNOSIS — F11.20 OPIOID DEPENDENCE ON AGONIST THERAPY (HCC): Primary | ICD-10-CM

## 2022-10-14 DIAGNOSIS — F41.9 ANXIETY: ICD-10-CM

## 2022-10-14 PROCEDURE — 99214 OFFICE O/P EST MOD 30 MIN: CPT | Performed by: PSYCHIATRY & NEUROLOGY

## 2022-10-14 RX ORDER — TRAZODONE HYDROCHLORIDE 50 MG/1
TABLET ORAL
Qty: 180 TABLET | Refills: 0 | Status: SHIPPED | OUTPATIENT
Start: 2022-10-14

## 2022-10-14 RX ORDER — BUPRENORPHINE AND NALOXONE 8; 2 MG/1; MG/1
8 FILM, SOLUBLE BUCCAL; SUBLINGUAL 2 TIMES DAILY
Qty: 60 FILM | Refills: 1 | Status: SHIPPED | OUTPATIENT
Start: 2022-10-14 | End: 2022-11-13

## 2022-10-14 RX ORDER — DULOXETIN HYDROCHLORIDE 30 MG/1
30 CAPSULE, DELAYED RELEASE ORAL DAILY
Qty: 30 CAPSULE | Refills: 3 | Status: SHIPPED | OUTPATIENT
Start: 2022-10-14 | End: 2022-11-13

## 2022-10-14 RX ORDER — MELATONIN
1000 DAILY
Qty: 30 TABLET | Refills: 0 | Status: SHIPPED | OUTPATIENT
Start: 2022-10-14 | End: 2022-11-13

## 2022-10-14 RX ORDER — GABAPENTIN 300 MG/1
300 CAPSULE ORAL 3 TIMES DAILY
Qty: 90 CAPSULE | Refills: 0 | Status: SHIPPED | OUTPATIENT
Start: 2022-10-14 | End: 2022-11-13

## 2022-10-14 NOTE — PSYCH
[facility]    Progress Note  Baylee Torres 29 y o  female MRN: [id number]   Encounter: [id number]        SUBJECTIVE: The court related still continues, pt is more concentrated on her presence, not past of future  INTERVAL HISTORY: taking Suboxone twice a day as prescribed and clonazepam helps with anxiety  Pt is working on artistic projects  Patient stated that her mood is more stable she does not feel depression, engaging in motivational therapy, and mindfulness, together with our psychotherapist helpful  Patient no longer had concerns about her neighbors  She does not hear their voices talking about her, on countrary the patient's  neighbors are more friendly and social with her  Mliss Belts spent more time outdoors        Review Of Systems:    sleep changes: decreased  appetite changes: no change  energy/anergy: decreased    Support Services  The patient is actively engaged with the [facility] Certified Addiction Counselor’s psychotherapy plan: Yes        PDMP reviewed:     PDMP Review       Value Time User    PDMP Reviewed  Yes 10/14/2022  8:53 AM Acosta Huff MD            Drug cravings: none  Motivation to continue treatment: high      Current Outpatient Medications:   •  albuterol (PROVENTIL HFA,VENTOLIN HFA) 90 mcg/act inhaler, Inhale 2 puffs every 4 (four) hours as needed for wheezing, Disp: 18 g, Rfl: 5  •  buprenorphine-naloxone (Suboxone) 8-2 mg, Place 1 Film (8 mg total) under the tongue 2 (two) times a day, Disp: 60 Film, Rfl: 1  •  cholecalciferol (VITAMIN D3) 1,000 units tablet, Take 1 tablet (1,000 Units total) by mouth daily, Disp: 30 tablet, Rfl: 0  •  clonazePAM (KlonoPIN) 1 mg tablet, take 1 tablet by mouth three times a day, Disp: 90 tablet, Rfl: 2  •  cyclobenzaprine (FLEXERIL) 10 mg tablet, take 1 tablet by mouth three times a day if needed for muscle spasm, Disp: 90 tablet, Rfl: 3  •  DULoxetine (CYMBALTA) 30 mg delayed release capsule, Take 1 capsule (30 mg total) by mouth daily, Disp: 30 capsule, Rfl: 3  •  gabapentin (Neurontin) 300 mg capsule, Take 1 capsule (300 mg total) by mouth 3 (three) times a day, Disp: 90 capsule, Rfl: 0  •  ibuprofen (MOTRIN) 800 mg tablet, Take 1 tablet (800 mg total) by mouth every 8 (eight) hours as needed for mild pain, Disp: 90 tablet, Rfl: 3  •  levothyroxine 50 mcg tablet, 1 daily, Disp: 30 tablet, Rfl: 5  •  omeprazole (PriLOSEC) 20 mg delayed release capsule, Take 1 capsule (20 mg total) by mouth daily (Patient taking differently: Take 20 mg by mouth if needed), Disp: 90 capsule, Rfl: 3  •  topiramate (TOPAMAX) 100 mg tablet, Take 1 tablet (100 mg total) by mouth 2 (two) times a day (Patient taking differently: Take 100 mg by mouth 3 (three) times a day), Disp: 90 tablet, Rfl: 3  •  traZODone (DESYREL) 50 mg tablet, 1-2 HS, Disp: 180 tablet, Rfl: 0  •  NARCAN 4 MG/0 1ML LIQD, ADMINISTER A SINGLE spray INTO ONE NOSTRIL CALL 911 MAY REPEAT ONCE (Patient not taking: No sig reported), Disp: , Rfl: 0  •  ziprasidone (GEODON) 40 mg capsule, Take 1 capsule (40 mg total) by mouth daily with dinner (Patient not taking: Reported on 10/14/2022), Disp: 30 capsule, Rfl: 3    Objective     Vitals:    10/14/22 1036   BP: 92/64   Pulse: 81           Mental Status Evaluation:    Appearance:  dressed appropriately, casually dressed   Mood:  improved, anxious   Affect: normal range and intensity, appropriate    Speech:  normal rate and volume, normal pitch, fluent   Thought Content:  normal   Perceptual Disturbances: no auditory hallucinations, no visual hallucinations, denies auditory hallucinations when asked, does not appear responding to internal stimuli   Risk Potential: Suicidal ideation - None  Homicidal ideation - None  Potential for aggression - No   Insight:  fair   Judgment: fair   Motor Activity: no abnormal movements       Lab Results:   Results from last 6 Months   Lab Units 07/21/22  1647   WBC Thousand/uL 7 25   HEMOGLOBIN g/dL 12  6   HEMATOCRIT % 39 0   PLATELETS Thousands/uL 217      Results from last 6 Months   Lab Units 07/21/22  1647   POTASSIUM mmol/L 3 4*   CHLORIDE mmol/L 107   CO2 mmol/L 21   BUN mg/dL 12   CREATININE mg/dL 1 15   CALCIUM mg/dL 8 4   ALBUMIN g/dL 4 3   ALK PHOS U/L 49   ALT U/L 13   AST U/L 18         Results from last 6 Months   Lab Units 06/23/22  1235   RAPID HIV 1X2  Non-Reactive   HIV-1 P24 AG  Non-Reactive                   Diagnoses and all orders for this visit:    Vitamin D insufficiency  -     cholecalciferol (VITAMIN D3) 1,000 units tablet; Take 1 tablet (1,000 Units total) by mouth daily    Bipolar 2 disorder (HCC)    Anxiety  -     gabapentin (Neurontin) 300 mg capsule; Take 1 capsule (300 mg total) by mouth 3 (three) times a day  -     DULoxetine (CYMBALTA) 30 mg delayed release capsule; Take 1 capsule (30 mg total) by mouth daily    Neuropathic pain  -     gabapentin (Neurontin) 300 mg capsule; Take 1 capsule (300 mg total) by mouth 3 (three) times a day  -     DULoxetine (CYMBALTA) 30 mg delayed release capsule; Take 1 capsule (30 mg total) by mouth daily    Primary insomnia  -     traZODone (DESYREL) 50 mg tablet; 1-2 HS    Opioid dependence on agonist therapy (HCC)  -     buprenorphine-naloxone (Suboxone) 8-2 mg; Place 1 Film (8 mg total) under the tongue 2 (two) times a day      Will refill patient's medications continue Suboxone that help the patient to stay in  Recovery  Vitamin-D was prescribed because vitamin-D deficiency in the fall winter times in [place] is very common, and low vitamin-D level might be associated with depressive feelings  The patient no longer need mood stabilizers Radha Philip and all orders for this visit:    Vitamin D insufficiency  -     cholecalciferol (VITAMIN D3) 1,000 units tablet; Take 1 tablet (1,000 Units total) by mouth daily    Bipolar 2 disorder (HCC)    Anxiety  -     gabapentin (Neurontin) 300 mg capsule;  Take 1 capsule (300 mg total) by mouth 3 (three) times a day  -     DULoxetine (CYMBALTA) 30 mg delayed release capsule; Take 1 capsule (30 mg total) by mouth daily    Neuropathic pain  -     gabapentin (Neurontin) 300 mg capsule; Take 1 capsule (300 mg total) by mouth 3 (three) times a day  -     DULoxetine (CYMBALTA) 30 mg delayed release capsule; Take 1 capsule (30 mg total) by mouth daily    Primary insomnia  -     traZODone (DESYREL) 50 mg tablet; 1-2 HS    Opioid dependence on agonist therapy (HCC)  -     buprenorphine-naloxone (Suboxone) 8-2 mg; Place 1 Film (8 mg total) under the tongue 2 (two) times a day             Risks / Benefits of Treatment:    Risks, benefits, and possible side effects of medications explained to patient and patient verbalizes understanding and agreement for treatment  Urinary drug screen was ordered to review the patient adherence with her medications  This note was not shared with the patient due to this is a psychotherapy note     Counseling / Coordination of Care  Total time spent today 50 min  Greater than 50% of total time was spent with the patient and / or family counseling and / or coordination of care       Jesse Garcia MD    Start 10:30 AM stop 11:20 AM

## 2022-10-20 ENCOUNTER — DOCUMENTATION (OUTPATIENT)
Dept: PSYCHIATRY | Facility: CLINIC | Age: 37
End: 2022-10-20

## 2022-10-20 NOTE — PROGRESS NOTES
Note Type: Case Management Note                  Date of Service: 10/18/2022  Service:  [facility] MAT Office    Note: Case Management Note  Bobby Sims 29 y o  female [id number]  Attending  Substance Use History     Social History     Substance and Sexual Activity   Alcohol Use Not Currently        Social History     Substance and Sexual Activity   Drug Use Not Currently   • Types: Methadone, Heroin, Cocaine, Marijuana, Prescription, Methamphetamines    Comment: 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       Encounter Type:   Patient Face-to-Face    Start Time 1020  End Time 1108    Recovery needs addressed at this meeting:  Legal Status    Note  D: Patient presented for in-person, scheduled visit with this CM  Patient and this CM spent time completed necessary questionnaires for patient's upcoming custody trial     A: Patient appeared in a normal mood  Patient was well groomed and coherent throughout session  Patient did become overwhelmed at times when completing questionnaire, but was easily redirected      P: Patient will continue to follow up with this CM on our regularly scheduled session    Referrals made  No referrals were made during this session    Next appointment date and time  Follow up on 10/21/2022

## 2022-10-21 ENCOUNTER — DOCUMENTATION (OUTPATIENT)
Dept: PSYCHIATRY | Facility: CLINIC | Age: 37
End: 2022-10-21

## 2022-10-21 NOTE — PROGRESS NOTES
Note Type: Case Management Note                  Date of Service: 10/21/2022  Service:  [facility]    Note: Case Management Note  Teddy Catalan 29 y o  female [id number]  Attending  Substance Use History     Social History     Substance and Sexual Activity   Alcohol Use Not Currently        Social History     Substance and Sexual Activity   Drug Use Not Currently   • Types: Methadone, Heroin, Cocaine, Marijuana, Prescription, Methamphetamines    Comment: 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       Encounter Type:   Patient Face-to-Face    Start Time 0167  End Time 1140    Recovery needs addressed at this meeting:  Basic  Needs, Emotional/Mental Health, Family and Legal Status    Note  D: Patient presented for in-person, scheduled visit with this [facility]  Patient discussed completing the necessary tasks required by patient's   Patient stated, though was extremely difficult, patient was able to take the time and complete those specific tasks  Patient went on to discuss the needs outside of the custody trial that patient wishes to work on with this CM - those being credit repair, etc     A: Patient appeared in a normal mood  Patient has been focusing heavily on mental health and cleaning up living space to feel better about overall being  Patient has been getting out more with minor child and being active  Patient did mention discussing with Psychiatrist to begin a taper off of Suboxone in which Psychiatrist did not agree  This CM stated that in the best interest of the patient's mental health, to also wait until the current barriers are out of the way and then focus on different med changes  P: Patient will continue to meet with this CM on a weekly basis to grow towards a baseline of stability       Referrals made  No referrals made during this session    Next appointment date and time  Follow up in one week

## 2022-10-25 ENCOUNTER — HOSPITAL ENCOUNTER (OUTPATIENT)
Dept: ULTRASOUND IMAGING | Facility: HOSPITAL | Age: 37
Discharge: HOME | End: 2022-10-25
Payer: COMMERCIAL

## 2022-10-25 ENCOUNTER — OFFICE VISIT (OUTPATIENT)
Dept: OBGYN CLINIC | Facility: CLINIC | Age: 37
End: 2022-10-25

## 2022-10-25 VITALS
WEIGHT: 198.6 LBS | HEIGHT: 64 IN | BODY MASS INDEX: 33.9 KG/M2 | HEART RATE: 99 BPM | SYSTOLIC BLOOD PRESSURE: 103 MMHG | DIASTOLIC BLOOD PRESSURE: 67 MMHG

## 2022-10-25 DIAGNOSIS — R10.2 PELVIC PAIN: Primary | ICD-10-CM

## 2022-10-25 DIAGNOSIS — R10.2 PELVIC PAIN: ICD-10-CM

## 2022-10-25 LAB
6MAM UR QL CFM: NEGATIVE NG/ML
7AMINOCLONAZEPAM UR QL CFM: NORMAL NG/ML
A-OH ALPRAZ UR QL CFM: NEGATIVE NG/ML
ACCEPTABLE CREAT UR QL: NORMAL MG/DL
ACCEPTIBLE SP GR UR QL: NORMAL
AMPHET UR QL CFM: NEGATIVE NG/ML
BUPRENORPHINE UR QL CFM: NORMAL NG/ML
BUTALBITAL UR QL CFM: NEGATIVE NG/ML
BZE UR QL CFM: NEGATIVE NG/ML
CODEINE UR QL CFM: NEGATIVE NG/ML
EDDP UR QL CFM: NEGATIVE NG/ML
ETHYL GLUCURONIDE UR QL CFM: NEGATIVE NG/ML
ETHYL SULFATE UR QL SCN: NEGATIVE NG/ML
EUTYLONE UR QL: NEGATIVE NG/ML
FENTANYL UR QL CFM: NEGATIVE NG/ML
GLIADIN IGG SER IA-ACNC: NEGATIVE NG/ML
HYDROCODONE UR QL CFM: NEGATIVE NG/ML
HYDROMORPHONE UR QL CFM: NEGATIVE NG/ML
LORAZEPAM UR QL CFM: NEGATIVE NG/ML
ME-PHENIDATE UR QL CFM: NEGATIVE NG/ML
MEPERIDINE UR QL CFM: NEGATIVE NG/ML
METHADONE UR QL CFM: NEGATIVE NG/ML
METHAMPHET UR QL CFM: NEGATIVE NG/ML
MORPHINE UR QL CFM: NEGATIVE NG/ML
NALTREXONE UR QL CFM: NEGATIVE NG/ML
NITRITE UR QL: NORMAL UG/ML
NORBUPRENORPHINE UR QL CFM: NORMAL NG/ML
NORDIAZEPAM UR QL CFM: NEGATIVE NG/ML
NORFENTANYL UR QL CFM: NEGATIVE NG/ML
NORHYDROCODONE UR QL CFM: NEGATIVE NG/ML
NORMEPERIDINE UR QL CFM: NEGATIVE NG/ML
NOROXYCODONE UR QL CFM: NEGATIVE NG/ML
OXAZEPAM UR QL CFM: NEGATIVE NG/ML
OXYCODONE UR QL CFM: NEGATIVE NG/ML
OXYMORPHONE UR QL CFM: NEGATIVE NG/ML
OXYMORPHONE UR QL CFM: NEGATIVE NG/ML
PARA-FLUOROFENTANYL QUANTIFICATION: NORMAL NG/ML
PHENOBARB UR QL CFM: NEGATIVE NG/ML
RESULT ALL_PRESCRIBED MEDS AND SPECIAL INSTRUCTIONS: NORMAL
SECOBARBITAL UR QL CFM: NEGATIVE NG/ML
SL AMB  POCT GLUCOSE, UA: NEGATIVE
SL AMB 4-ANPP QUANTIFICATION: NORMAL NG/ML
SL AMB 5F-ADB-M7 METABOLITE QUANTIFICATION: NEGATIVE NG/ML
SL AMB 7-OH-MITRAGYNINE (KRATOM ALKALOID) QUANTIFICATION: NEGATIVE NG/ML
SL AMB AB-FUBINACA-M3 METABOLITE QUANTIFICATION: NEGATIVE NG/ML
SL AMB ACETYL FENTANYL QUANTIFICATION: NORMAL NG/ML
SL AMB ACETYL NORFENTANYL QUANTIFICATION: NORMAL NG/ML
SL AMB ACRYL FENTANYL QUANTIFICATION: NORMAL NG/ML
SL AMB CARFENTANIL QUANTIFICATION: NORMAL NG/ML
SL AMB CTHC (MARIJUANA METABOLITE) QUANTIFICATION: ABNORMAL NG/ML
SL AMB DEXTRORPHAN (DEXTROMETHORPHAN METABOLITE) QUANT: NEGATIVE NG/ML
SL AMB GABAPENTIN QUANTIFICATION: NORMAL
SL AMB JWH018 METABOLITE QUANTIFICATION: NEGATIVE NG/ML
SL AMB JWH073 METABOLITE QUANTIFICATION: NEGATIVE NG/ML
SL AMB LEUKOCYTE ESTERASE,UA: NEGATIVE
SL AMB MDMB-FUBINACA-M1 METABOLITE QUANTIFICATION: NEGATIVE NG/ML
SL AMB METHYLONE QUANTIFICATION: NORMAL NG/ML
SL AMB N-DESMETHYL-TRAMADOL QUANTIFICATION: NEGATIVE NG/ML
SL AMB PHENTERMINE QUANTIFICATION: NEGATIVE NG/ML
SL AMB POCT BILIRUBIN,UA: NEGATIVE
SL AMB POCT BLOOD,UA: NEGATIVE
SL AMB POCT CLARITY,UA: CLEAR
SL AMB POCT COLOR,UA: YELLOW
SL AMB POCT KETONES,UA: NEGATIVE
SL AMB POCT NITRITE,UA: NEGATIVE
SL AMB POCT PH,UA: 5
SL AMB POCT SPECIFIC GRAVITY,UA: 1.02
SL AMB POCT URINE PROTEIN: NORMAL
SL AMB POCT UROBILINOGEN: 0.2
SL AMB PREGABALIN QUANTIFICATION: NEGATIVE
SL AMB RCS4 METABOLITE QUANTIFICATION: NEGATIVE NG/ML
SL AMB RITALINIC ACID QUANTIFICATION: NEGATIVE NG/ML
SMOOTH MUSCLE AB TITR SER IF: NEGATIVE NG/ML
SPECIMEN DRAWN SERPL: NEGATIVE NG/ML
SPECIMEN PH ACCEPTABLE UR: NORMAL
TAPENTADOL UR QL CFM: NEGATIVE NG/ML
TEMAZEPAM UR QL CFM: NEGATIVE NG/ML
TEMAZEPAM UR QL CFM: NEGATIVE NG/ML
TRAMADOL UR QL CFM: NEGATIVE NG/ML
URATE/CREAT 24H UR: NEGATIVE NG/ML

## 2022-10-25 PROCEDURE — 76830 TRANSVAGINAL US NON-OB: CPT

## 2022-10-25 PROCEDURE — 76856 US EXAM PELVIC COMPLETE: CPT

## 2022-10-25 PROCEDURE — 81002 URINALYSIS NONAUTO W/O SCOPE: CPT | Performed by: OBSTETRICS & GYNECOLOGY

## 2022-10-25 PROCEDURE — 99203 OFFICE O/P NEW LOW 30 MIN: CPT | Performed by: OBSTETRICS & GYNECOLOGY

## 2022-10-25 NOTE — PATIENT INSTRUCTIONS
Thank you for your confidence in our team    We appreciate you and welcome your feedback  If you receive a survey from us, please take a few moments to let us know how we are doing     Sincerely,  Jacklyn Alanis

## 2022-10-25 NOTE — PROGRESS NOTES
PROBLEM GYNECOLOGICAL VISIT    Josep Glass is a 29 y o  female who presents today with complaint of pelvic pain  Her general medical history has been reviewed and she reports it as follows:    Past Medical History:   Diagnosis Date   • Anxiety     "severe"   • Asthma    • Bipolar disorder ([facility] )    • Cigarette smoker    • Depression    • Disease of thyroid gland    • Drug dependence, in remission ([facility] )    • GERD (gastroesophageal reflux disease)    • H/O: whooping cough     while pregnant   • Hepatitis C, chronic (HCC)    • Insomnia disorder    • Liver disease     Hepatitis C   • Migraine    • Psychiatric illness    • PTSD (post-traumatic stress disorder)    • Substance abuse ([place] [address] )      Past Surgical History:   Procedure Laterality Date   • KNEE SURGERY     • OTHER SURGICAL HISTORY      body piercing   • RI REPAIR UMBILICAL KJUU,5+W/L,UHDPN N/A 04/19/2016    Procedure: REPAIR HERNIA UMBILICAL WITH MESH;  Surgeon: Amber Talavera MD;  Location:  MAIN OR;  Service: General   • VAGINAL DELIVERY      X 6   • WISDOM TOOTH EXTRACTION      X 4   • WOUND DEBRIDEMENT Right 01/17/2018    Procedure: RIGHT KNEE DEBRIDEMENT; [address] OUT; AND LACERATION REPAIR  INTRAOPERATIVE ASSESSMENT OF PATELLA TENDON;  Surgeon: Michael Stevens MD;  Location:  MAIN OR;  Service: Orthopedics     OB History    No obstetric history on file  Social History     Tobacco Use   • Smoking status: Current Every Day Smoker     Packs/day: 1 00     Years: 12 00     Pack years: 12 00     Types: Cigarettes   • Smokeless tobacco: Never Used   Vaping Use   • Vaping Use: Never used   Substance Use Topics   • Alcohol use: Not Currently   • Drug use: Yes     Types: Marijuana     Comment: patient in recovery   clean since oct of 2019     Social History     Substance and Sexual Activity   Sexual Activity Yes   • Partners: Male   • Birth control/protection: Other       Current Outpatient Medications   Medication Instructions   • albuterol (PROVENTIL HFA,VENTOLIN HFA) 90 mcg/act inhaler 2 puffs, Inhalation, Every 4 hours PRN   • buprenorphine-naloxone (Suboxone) 8-2 mg 8 mg, Sublingual, 2 times daily   • cholecalciferol (VITAMIN D3) 1,000 Units, Oral, Daily   • clonazePAM (KlonoPIN) 1 mg tablet take 1 tablet by mouth three times a day   • cyclobenzaprine (FLEXERIL) 10 mg tablet take 1 tablet by mouth three times a day if needed for muscle spasm   • DULoxetine (CYMBALTA) 30 mg, Oral, Daily   • gabapentin (NEURONTIN) 300 mg, Oral, 3 times daily   • ibuprofen (MOTRIN) 800 mg, Oral, Every 8 hours PRN   • levothyroxine 50 mcg tablet 1 daily   • NARCAN 4 MG/0 1ML LIQD ADMINISTER A SINGLE spray INTO ONE NOSTRIL CALL 911 MAY REPEAT ONCE   • omeprazole (PRILOSEC) 20 mg, Oral, Daily   • topiramate (TOPAMAX) 100 mg, Oral, 2 times daily   • traZODone (DESYREL) 50 mg tablet 1-2 HS       History of Present Illness:   Patient states has been pelvic pain for a couple of months and having problem with urination  Patient states she wears a waist  all the time  Review of Systems:  Review of Systems   Genitourinary: Positive for pelvic pain  All other systems reviewed and are negative  Physical Exam:  /67   Pulse 99   Ht 5' 3 75" (1 619 m)   Wt 90 1 kg (198 lb 9 6 oz)   LMP 10/13/2022   BMI 34 36 kg/m²   Physical Exam  Constitutional:       Appearance: Normal appearance  Genitourinary:      Urethral meatus normal       No lesions in the vagina  Right Labia: No rash, tenderness or lesions  Left Labia: No tenderness, lesions or rash  No vaginal discharge or bleeding  Right Adnexa: not tender, not full and no mass present  Left Adnexa: not tender, not full and no mass present  No cervical motion tenderness, discharge or lesion  Uterus is not enlarged or tender  No uterine mass detected  No urethral tenderness or mass present  Bladder is tender  Neurological:      Mental Status: She is alert     Vitals reviewed  Assessment:   1  Pelvic pain          Plan:   1  Cultures ordered: urine culture   2  Imaging ordered: pelvic sonogram   3  Return to office 3wks for annual examination  Reviewed with patient that [facility], but that they will not necessarily be reviewed by me immediately  Explained that I will review results at my earliest opportunity and contact patient appropriately

## 2022-10-27 ENCOUNTER — SOCIAL WORK (OUTPATIENT)
Dept: PSYCHIATRY | Facility: CLINIC | Age: 37
End: 2022-10-27

## 2022-10-27 DIAGNOSIS — F31.81 BIPOLAR 2 DISORDER (HCC): Primary | ICD-10-CM

## 2022-10-27 DIAGNOSIS — F11.20 OPIOID DEPENDENCE ON AGONIST THERAPY (HCC): ICD-10-CM

## 2022-10-27 DIAGNOSIS — Z72.0 TOBACCO USE: ICD-10-CM

## 2022-10-27 DIAGNOSIS — F41.9 ANXIETY: ICD-10-CM

## 2022-10-28 ENCOUNTER — DOCUMENTATION (OUTPATIENT)
Dept: PSYCHIATRY | Facility: CLINIC | Age: 37
End: 2022-10-28

## 2022-10-28 NOTE — PROGRESS NOTES
Note Type: Case Management Note                  Date of Service: 10/28/2022  Service:  [facility]    Note: Case Management Note  Anthony Winston 29 y o  female [id number]  Attending  Substance Use History     Social History     Substance and Sexual Activity   Alcohol Use Not Currently        Social History     Substance and Sexual Activity   Drug Use Yes   • Types: Marijuana    Comment: 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       Encounter Type:   Patient Face-to-Face    Start Time 1100  End Time 1140    Recovery needs addressed at this meeting:  Basic  Needs    Note  D: Patient presented for in-person, scheduled visit    Patient spoke to this CM about continuously being hacked through forms of social media and different email addresses  This CM was able to walk through different apps on patient's phone and reassure patient there was no form of being hacked (I e  Facebook)  Patient and this CM then worked together to submit a referral to [facility] as patient feels current apartment is not beneficial to recovery as significant other's prior girlfriend who passed away - family members live downstairs and constantly cause family to grieve the loss of prior girlfriend  A: Patient appeared in an anxious mood during beginning of session but was able to understand and re-center self and focus on the entirety of session    P: Patient will continue to follow up with this CM on a weekly basis      This CM did remind patient of upcoming appts with psychotherapy and psychiatry    Referrals made  [employer] [employer]    Next appointment date and time  Follow up in one week

## 2022-11-04 ENCOUNTER — DOCUMENTATION (OUTPATIENT)
Dept: PSYCHIATRY | Facility: CLINIC | Age: 37
End: 2022-11-04

## 2022-11-04 NOTE — PROGRESS NOTES
Note Type: Case Management Note                  Date of Service: 11/04/2022  Service:  [facility]    Note: Case Management Note  Sherman Hummel 29 y o  female [id number]  Attending  Substance Use History     Social History     Substance and Sexual Activity   Alcohol Use Not Currently        Social History     Substance and Sexual Activity   Drug Use Yes   • Types: Marijuana    Comment: 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       Encounter Type:   Patient Face-to-Face    Start Time 1068  End Time 1140    Recovery needs addressed at this meeting:  Basic  Needs and Emotional/Mental Health    Note  D: Patient presented for in-person, scheduled visit with this CM  Patient and this CM discussed current stressors in the home in which this CM suggested patient to begin dropping in at [facility] [place] to help occupy time productively  Patient and this CM also discussed patient and the DMV in which patient will need the breathalyzer for vehicle  This CM suggested patient prioritize finding the paperwork that shows patient has previously paid for the machine to be mounted in vehicle  A: Patient appeared in normal mood  Patient did express concerns within relationship at home with significant other as patient feels significant other is not supportive in recovery or as a parent figure to minor child  This CM did mention to patient, the importance of communication effectively and safely when given the opportunity       P: Patient will continue to meet with this CM on a weekly basis    Referrals made  [facility] [place] - November Calendar provided    Next appointment date and time  Follow up in one week

## 2022-11-05 DIAGNOSIS — K21.00 GASTROESOPHAGEAL REFLUX DISEASE WITH ESOPHAGITIS: ICD-10-CM

## 2022-11-10 ENCOUNTER — TELEMEDICINE (OUTPATIENT)
Dept: PSYCHIATRY | Facility: CLINIC | Age: 37
End: 2022-11-10

## 2022-11-10 DIAGNOSIS — Z72.0 TOBACCO USE: ICD-10-CM

## 2022-11-10 DIAGNOSIS — F31.81 BIPOLAR 2 DISORDER (HCC): Primary | ICD-10-CM

## 2022-11-10 DIAGNOSIS — F11.20 OPIOID DEPENDENCE ON AGONIST THERAPY (HCC): ICD-10-CM

## 2022-11-10 NOTE — PSYCH
Virtual Regular Visit    Verification of patient location: [place]     Patient is located in the following state in which I hold an active license [place]      Assessment/Plan:    Problem List Items Addressed This Visit        Other    Tobacco use    Bipolar 2 disorder ([facility] ) - Primary    Opioid dependence on agonist therapy ([facility] )          Goals addressed in session: Goal 1          Reason for visit is No chief complaint on file  Encounter provider Sarath Lewis, [facility] \[facility]\""    Provider located at [address] [address] [phone]      Recent Visits  No visits were found meeting these conditions  Showing recent visits within past 7 days and meeting all other requirements  Future Appointments  No visits were found meeting these conditions  Showing future appointments within next 150 days and meeting all other requirements       The patient was identified by name and date of birth  Cirilo Ro was informed that this is a telemedicine visit and that the visit is being conducted thr[facility]  She agrees to proceed     My office door was closed  No one else was in the room  She acknowledged consent and understanding of privacy and security of the video platform  The patient has agreed to participate and understands they can discontinue the visit at any time  Patient is aware this is a billable service  Kami Davis is a 29 y o  female presenting for follow up today  11/10/22  Start Time: 1304  Stop Time: 1330  Total Visit Time: 26 minutes  Problem List Items Addressed This Visit        Other    Tobacco use    Bipolar 2 disorder ([facility] ) - Primary    Opioid dependence on agonist therapy ([facility] )            D: Geno attended a 26 minutes individual session today  Geno entered session with cooperative appearance,Normal mood and normal affect  Geno's speech was appropriate quality, quantity and organization of sentences  Patient and clinician discussed how Maribel Middleton has been doing  Latisha states she's been journaling and realizing she is stuck when it comes to her relationship  Carlos states that her fiance Garfield Adler doesn't want to move into a bigger apartment; they are currently in a one bedroom with her son Page Zaman and it's too small  When Maribel Middleton brought up moving Garfield Adler stated that if she didn't like it she could leave  Latisha processed the idea of moving into an apartment that would be big enough for herself and Page Zaman; she recounted how she was able live independently in the past  Maribel Middleton was given interventions to use in the aid of helping make a decision for her; Maribel Middleton was cautioned that inaction would not change the situation and could make things more complex as Page Zaman gets older  Maribel Middleton states that she needs to get through [address] [place] [address] with Johnnie's grandmother and was frustrated by the grandmother's request to change the dates she would see Page Zaman; Maribel Middleton was encouraged to go through her  rather than conversing with her directly given her animosity towards the grandmother  Maribel Middleton stated she needed to cut the session short due to her having to address the e-mail received from the grandmother  A: Geno presented as cooperative throughout the session  Geno appears to be in preperation to change at this time  Geno has Fair insight  Current suicide risk : none    P: Geno will follow up with Bryan CASPER , LPC, CAADC, NCC, CCTP in 2 weeks  Goal(s) 1 was/were addressed in session  Geno denies SI, SH, HI at this time and can contract for safety  Behavioral Health Treatment Plan [facility]: Diagnosis and Treatment Plan explained to CHER Lora relates understanding diagnosis and is agreeable to Treatment Plan   Yes       HPI     Past Medical History:   Diagnosis Date   • Anxiety "severe"   • Asthma    • Bipolar disorder ([facility] )    • Cigarette smoker    • Depression    • Disease of thyroid gland    • Drug dependence, in remission ([facility] )    • GERD (gastroesophageal reflux disease)    • H/O: whooping cough     while pregnant   • Hepatitis C, chronic (HCC)    • Insomnia disorder    • Liver disease     Hepatitis C   • Migraine    • Psychiatric illness    • PTSD (post-traumatic stress disorder)    • Substance abuse ([place] [address] )        Past Surgical History:   Procedure Laterality Date   • KNEE SURGERY     • OTHER SURGICAL HISTORY      body piercing   • [place] REPAIR UMBILICAL ONJB,7+H/U,WGMQR N/A 04/19/2016    Procedure: REPAIR HERNIA UMBILICAL WITH MESH;  Surgeon: Mike Watts MD;  Location:  MAIN OR;  Service: General   • VAGINAL DELIVERY      X 6   • WISDOM TOOTH EXTRACTION      X 4   • WOUND DEBRIDEMENT Right 01/17/2018    Procedure: RIGHT KNEE DEBRIDEMENT; [facility] OUT; AND LACERATION REPAIR   INTRAOPERATIVE ASSESSMENT OF PATELLA TENDON;  Surgeon: Pam Watts MD;  Location:  MAIN OR;  Service: Orthopedics       Current Outpatient Medications   Medication Sig Dispense Refill   • albuterol (PROVENTIL HFA,VENTOLIN HFA) 90 mcg/act inhaler Inhale 2 puffs every 4 (four) hours as needed for wheezing 18 g 5   • buprenorphine-naloxone (Suboxone) 8-2 mg Place 1 Film (8 mg total) under the tongue 2 (two) times a day 60 Film 1   • cholecalciferol (VITAMIN D3) 1,000 units tablet Take 1 tablet (1,000 Units total) by mouth daily 30 tablet 0   • clonazePAM (KlonoPIN) 1 mg tablet take 1 tablet by mouth three times a day 90 tablet 2   • cyclobenzaprine (FLEXERIL) 10 mg tablet take 1 tablet by mouth three times a day if needed for muscle spasm 90 tablet 3   • DULoxetine (CYMBALTA) 30 mg delayed release capsule Take 1 capsule (30 mg total) by mouth daily 30 capsule 3   • gabapentin (Neurontin) 300 mg capsule Take 1 capsule (300 mg total) by mouth 3 (three) times a day 90 capsule 0   • ibuprofen (MOTRIN) 800 mg tablet Take 1 tablet (800 mg total) by mouth every 8 (eight) hours as needed for mild pain 90 tablet 3   • levothyroxine 50 mcg tablet 1 daily 30 tablet 5   • NARCAN 4 MG/0 1ML LIQD ADMINISTER A SINGLE spray INTO ONE NOSTRIL CALL 911 MAY REPEAT ONCE (Patient not taking: No sig reported)  0   • omeprazole (PriLOSEC) 20 mg delayed release capsule Take 1 capsule (20 mg total) by mouth daily (Patient taking differently: Take 20 mg by mouth if needed) 90 capsule 3   • topiramate (TOPAMAX) 100 mg tablet Take 1 tablet (100 mg total) by mouth 2 (two) times a day (Patient taking differently: Take 100 mg by mouth 3 (three) times a day) 90 tablet 3   • traZODone (DESYREL) 50 mg tablet 1-2  tablet 0     No current facility-administered medications for this visit  Allergies   Allergen Reactions   • Amoxicillin      Pt states it never works for her        Review of Systems    Video Exam    There were no vitals filed for this visit      Physical Exam     Visit Time    Visit Start Time: 1:04   Visit Stop Time: 1:30  Total Visit Duration: 26 minutes

## 2022-11-15 ENCOUNTER — ANNUAL EXAM (OUTPATIENT)
Dept: OBGYN CLINIC | Facility: CLINIC | Age: 37
End: 2022-11-15

## 2022-11-15 VITALS
WEIGHT: 200 LBS | DIASTOLIC BLOOD PRESSURE: 78 MMHG | HEART RATE: 90 BPM | BODY MASS INDEX: 34.15 KG/M2 | SYSTOLIC BLOOD PRESSURE: 109 MMHG | HEIGHT: 64 IN

## 2022-11-15 DIAGNOSIS — Z11.3 SCREEN FOR STD (SEXUALLY TRANSMITTED DISEASE): ICD-10-CM

## 2022-11-15 DIAGNOSIS — N84.1 ENDOCERVICAL POLYP: ICD-10-CM

## 2022-11-15 DIAGNOSIS — N84.0 ENDOMETRIAL POLYP: ICD-10-CM

## 2022-11-15 DIAGNOSIS — Z12.4 SCREENING FOR CERVICAL CANCER: ICD-10-CM

## 2022-11-15 DIAGNOSIS — Z01.419 ROUTINE GYNECOLOGICAL EXAMINATION: Primary | ICD-10-CM

## 2022-11-15 RX ORDER — OMEPRAZOLE 20 MG/1
CAPSULE, DELAYED RELEASE ORAL
Qty: 90 CAPSULE | Refills: 3 | Status: SHIPPED | OUTPATIENT
Start: 2022-11-15

## 2022-11-15 NOTE — PROGRESS NOTES
Jay Anglin is a 29 y o  female who presents today for annual GYN exam   Her last pap smear was performed 9/20/18 and result was normal   She reports no history of abnormal pap smears in her past     She reports menses as irregular  Patient's last menstrual period was 09/13/2022  Her general medical history has been reviewed and she reports it as follows:    Past Medical History:   Diagnosis Date   • Anxiety     "severe"   • Asthma    • Bipolar disorder ([facility] )    • Cigarette smoker    • Depression    • Disease of thyroid gland    • Drug dependence, in remission ([place] [address] )    • GERD (gastroesophageal reflux disease)    • H/O: whooping cough     while pregnant   • Hepatitis C, chronic (HCC)    • Insomnia disorder    • Liver disease     Hepatitis C   • Migraine    • Psychiatric illness    • PTSD (post-traumatic stress disorder)    • Substance abuse ([place] [address] )      Past Surgical History:   Procedure Laterality Date   • KNEE SURGERY     • OTHER SURGICAL HISTORY      body piercing   • [place] REPAIR UMBILICAL ZKUR,8+P/E,HWKNB N/A 04/19/2016    Procedure: REPAIR HERNIA UMBILICAL WITH MESH;  Surgeon: Ria Mulligan MD;  Location:  MAIN OR;  Service: General   • VAGINAL DELIVERY      X 6   • WISDOM TOOTH EXTRACTION      X 4   • WOUND DEBRIDEMENT Right 01/17/2018    Procedure: RIGHT KNEE DEBRIDEMENT; [address] OUT; AND LACERATION REPAIR  INTRAOPERATIVE ASSESSMENT OF PATELLA TENDON;  Surgeon: Tiana Goins MD;  Location: [facility] OR;  Service: Orthopedics     OB History    No obstetric history on file  Social History     Tobacco Use   • Smoking status: Current Every Day Smoker     Packs/day: 1 00     Years: 12 00     Pack years: 12 00     Types: Cigarettes   • Smokeless tobacco: Never Used   Vaping Use   • Vaping Use: Never used   Substance Use Topics   • Alcohol use: Not Currently   • Drug use: Not Currently     Types: Marijuana     Comment: patient in recovery   clean since oct of 2019 Social History     Substance and Sexual Activity   Sexual Activity Yes   • Partners: Male   • Birth control/protection: Other     Cancer-related family history includes Prostate cancer in her father  Current Outpatient Medications   Medication Instructions   • albuterol (PROVENTIL HFA,VENTOLIN HFA) 90 mcg/act inhaler 2 puffs, Inhalation, Every 4 hours PRN   • cholecalciferol (VITAMIN D3) 1,000 Units, Oral, Daily   • clonazePAM (KlonoPIN) 1 mg tablet take 1 tablet by mouth three times a day   • cyclobenzaprine (FLEXERIL) 10 mg tablet take 1 tablet by mouth three times a day if needed for muscle spasm   • DULoxetine (CYMBALTA) 30 mg, Oral, Daily   • gabapentin (NEURONTIN) 300 mg, Oral, 3 times daily   • ibuprofen (MOTRIN) 800 mg, Oral, Every 8 hours PRN   • levothyroxine 50 mcg tablet 1 daily   • NARCAN 4 MG/0 1ML LIQD ADMINISTER A SINGLE spray INTO ONE NOSTRIL CALL 911 MAY REPEAT ONCE   • omeprazole (PRILOSEC) 20 mg, Oral, Daily   • topiramate (TOPAMAX) 100 mg, Oral, 2 times daily   • traZODone (DESYREL) 50 mg tablet 1-2 HS       Review of Systems:  Review of Systems    Physical Exam:  /78   Pulse 90   Ht 5' 3 75" (1 619 m)   Wt 90 7 kg (200 lb)   LMP 09/13/2022   BMI 34 60 kg/m²   Physical Exam  Constitutional:       Appearance: Normal appearance  Genitourinary:      Bladder and urethral meatus normal       No lesions in the vagina  Right Labia: No rash, tenderness, lesions or skin changes  Left Labia: No tenderness, lesions, skin changes or rash  No inguinal adenopathy present in the right or left side  No vaginal discharge or bleeding  Right Adnexa: not tender, not full and no mass present  Left Adnexa: not tender, not full and no mass present  Cervical polyp present  No cervical motion tenderness or discharge  Cervical exam comments: friable  Uterus is not enlarged or tender  No uterine mass detected       No urethral tenderness or mass present  Breasts:      Right: No swelling, bleeding, inverted nipple, mass, nipple discharge, skin change, tenderness or breast implant  Left: No swelling, bleeding, inverted nipple, mass, nipple discharge, skin change, tenderness or breast implant  HENT:      Head: Normocephalic  Cardiovascular:      Rate and Rhythm: Normal rate and regular rhythm  Pulmonary:      Effort: Pulmonary effort is normal       Breath sounds: Normal breath sounds  Abdominal:      General: Bowel sounds are normal       Palpations: Abdomen is soft  Tenderness: There is no abdominal tenderness  Hernia: There is no hernia in the left inguinal area or right inguinal area  Musculoskeletal:         General: Normal range of motion  Cervical back: Normal range of motion  Lymphadenopathy:      Lower Body: No right inguinal adenopathy  No left inguinal adenopathy  Neurological:      Mental Status: She is alert and oriented to person, place, and time  Skin:     General: Skin is warm and dry  Vitals reviewed  Assessment/Plan:   1  Normal well-woman GYN exam   2  Cervical cancer screening:  Normal cervical exam   Pap smear done with HPV co-testing  3  STD screening:  Patient declines  Orders placed for vaginal GC/CT cultures  4  Breast cancer screening:  Normal breast exam    Reviewed breast self-awareness  5  Depression Screening: Patient's depression screening was assessed with a PHQ-2 score of 0  Their PHQ-9 score was 0     6  BMI Counseling: Body mass index is 34 6 kg/m²  Discussed the patient's BMI with her  The BMI is above normal  Nutrition recommendations include reducing portion sizes  7  Recommend removal of the cervical polyp since it is friable and bleeding  8  Return to office prn  Reviewed with patient that [facility], but that they will not necessarily be reviewed by me immediately    Explained that I will review results at my earliest opportunity and contact patient appropriately

## 2022-11-15 NOTE — PROGRESS NOTES
Biopsy    Date/Time: 11/15/2022 4:33 PM  Performed by: Rosalie Self DO  Authorized by: Rosalie Self DO   Universal Protocol:  Consent: Verbal consent obtained  Written consent obtained  Risks and benefits: risks, benefits and alternatives were discussed  Consent given by: patient  Time out: Immediately prior to procedure a "time out" was called to verify the correct patient, procedure, equipment, support staff and site/side marked as required  Timeout called at: 11/15/2022 11:36 AM   Patient understanding: patient states understanding of the procedure being performed  Patient consent: the patient's understanding of the procedure matches consent given  Procedure consent: procedure consent matches procedure scheduled  Relevant documents: relevant documents present and verified  Test results: test results available and properly labeled  Patient identity confirmed: verbally with patient and provided demographic data      Procedure Details - Lesion Biopsy:     Biopsy tissue type: cervical polyp  Malignancy: benign lesion       The polyp was grasped with a ring forceps and was twisted  clockwise and removed from the stalk  Specimen placed in formalin

## 2022-11-15 NOTE — PATIENT INSTRUCTIONS
Thank you for your confidence in our team    We appreciate you and welcome your feedback  If you receive a survey from us, please take a few moments to let us know how we are doing     Sincerely,  Rosalie Self, DO

## 2022-11-16 LAB
HPV HR 12 DNA CVX QL NAA+PROBE: NEGATIVE
HPV16 DNA CVX QL NAA+PROBE: NEGATIVE
HPV18 DNA CVX QL NAA+PROBE: NEGATIVE
SL AMB POCT URINE HCG: NEGATIVE

## 2022-11-18 ENCOUNTER — DOCUMENTATION (OUTPATIENT)
Dept: PSYCHIATRY | Facility: CLINIC | Age: 37
End: 2022-11-18

## 2022-11-18 LAB
C TRACH DNA SPEC QL NAA+PROBE: NEGATIVE
LAB AP GYN PRIMARY INTERPRETATION: NORMAL
Lab: NORMAL
N GONORRHOEA DNA SPEC QL NAA+PROBE: NEGATIVE
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
## 2022-11-23 ENCOUNTER — TELEPHONE (OUTPATIENT)
Dept: PSYCHIATRY | Facility: CLINIC | Age: 37
End: 2022-11-23

## 2022-11-25 ENCOUNTER — TELEMEDICINE (OUTPATIENT)
Dept: PSYCHIATRY | Facility: CLINIC | Age: 37
End: 2022-11-25

## 2022-11-25 DIAGNOSIS — F11.20 OPIOID DEPENDENCE ON AGONIST THERAPY (HCC): ICD-10-CM

## 2022-11-25 DIAGNOSIS — F31.81 BIPOLAR 2 DISORDER (HCC): Primary | ICD-10-CM

## 2022-11-25 DIAGNOSIS — Z72.0 TOBACCO USE: ICD-10-CM

## 2022-11-25 NOTE — PSYCH
Virtual Regular Visit    Verification of patient location: [place]    Patient is located in the following state in which I hold an active license [place]      Assessment/Plan:    Problem List Items Addressed This Visit        Other    Tobacco use    Bipolar 2 disorder ([facility] ) - Primary    Opioid dependence on agonist therapy ([facility] )       Goals addressed in session: Goal 1          Reason for visit is No chief complaint on file  Encounter provider [facility], [facility]    Provider located at [address] [address] [phone]      Recent Visits  Date Type Provider Dept   11/23/22 Telephone DID[facility]   Showing recent visits within past 7 days and meeting all other requirements  Today's Visits  Date Type Provider Dept   [facility]   Showing today's visits and meeting all other requirements  Future Appointments  No visits were found meeting these conditions  Showing future appointments within next 150 days and meeting all other requirements       The patient was identified by name and date of birth  Jerod Mcwilliams was informed that this is a telemedicine visit and [facility] the [facility]  She agrees to proceed     My office door was closed  No one else was in the room  She acknowledged consent and understanding of privacy and security of the video platform  The patient has agreed to participate and understands they can discontinue the visit at any time  Patient is aware this is a billable service  Mora Rueda is a 29 y o  female presenting for follow up today      11/25/22  Start Time: 0800  Stop Time: 0859  Total Visit Time: 61 minutes  Problem List Items Addressed This Visit        Other    Tobacco use    Bipolar 2 disorder ([facility] ) - Primary    Opioid dependence on agonist therapy ([facility] )         D: Geno attended a 59 minutes individual session today  Geno entered session with cooperative appearance,Normal mood and normal affect  Geno's speech was appropriate quality, quantity and organization of sentences  Patient and clinician discussed how she's been doing; Geno discussed her upcoming court case with Johnnie's grandmother; Geno continues to have stressors from this situation  Geno recounted that she feels under a microscope; Geno states she constantly questions herself as a parent because of this  Geno states she doesn't feel like she can be open and honest sine she always feels under a microscope because of the ongoing custody case  We processed interventions for Geno to utilize for upcoming Hearing on Monday; to handle what she can control and invest in herself  Geno states she did go to [facility] [employer] and has an intake scheduled today  Geno states she had a positive experience so far  A: Geno presented as cooperative throughout the session  Geno appears to be in preparation to change at this time  Ching Collado has Age appropriate insight  Current suicide risk : none    P: Geno will follow up with Raul CASPER , LPC, CAADC, NCC, CCTP in 2 weeks  Goal(s) 1 was/were addressed in session  Geno denies SI, SH, HI at this time and can contract for safety  Behavioral Health Treatment Plan [facility]: Diagnosis and Treatment Plan explained to Ching Clolado  Geno relates understanding diagnosis and is agreeable to Treatment Plan   Yes     HPI     Past Medical History:   Diagnosis Date   • Anxiety     "severe"   • Asthma    • [facility]    • Cigarette smoker    • Depression    • Disease of thyroid gland    • Drug dependence, in remission ([facility] )    • GERD (gastroesophageal reflux disease)    • H/O: whooping cough     while pregnant   • Hepatitis C, chronic (HCC)    • Insomnia disorder    • Liver disease     Hepatitis C   • Migraine    • Psychiatric illness    • PTSD (post-traumatic stress disorder)    • Substance abuse ([facility] )        Past Surgical History:   Procedure Laterality Date   • KNEE SURGERY     • OTHER SURGICAL HISTORY      body piercing   • [place] REPAIR UMBILICAL SAWB,6+K/R,PYJZG N/A 04/19/2016    Procedure: REPAIR HERNIA UMBILICAL WITH MESH;  Surgeon: Theresa Durand MD;  Location:  MAIN OR;  Service: General   • VAGINAL DELIVERY      X 6   • WISDOM TOOTH EXTRACTION      X 4   • WOUND DEBRIDEMENT Right 01/17/2018    Procedure: RIGHT KNEE DEBRIDEMENT; [address] OUT; AND LACERATION REPAIR   INTRAOPERATIVE ASSESSMENT OF PATELLA TENDON;  Surgeon: Sendy Lemus MD;  Location:  MAIN OR;  Service: Orthopedics       Current Outpatient Medications   Medication Sig Dispense Refill   • omeprazole (PriLOSEC) 20 mg delayed release capsule take 1 capsule by mouth once daily 90 capsule 3   • albuterol (PROVENTIL HFA,VENTOLIN HFA) 90 mcg/act inhaler Inhale 2 puffs every 4 (four) hours as needed for wheezing 18 g 5   • cholecalciferol (VITAMIN D3) 1,000 units tablet Take 1 tablet (1,000 Units total) by mouth daily 30 tablet 0   • clonazePAM (KlonoPIN) 1 mg tablet take 1 tablet by mouth three times a day 90 tablet 2   • cyclobenzaprine (FLEXERIL) 10 mg tablet take 1 tablet by mouth three times a day if needed for muscle spasm 90 tablet 3   • DULoxetine (CYMBALTA) 30 mg delayed release capsule Take 1 capsule (30 mg total) by mouth daily 30 capsule 3   • gabapentin (Neurontin) 300 mg capsule Take 1 capsule (300 mg total) by mouth 3 (three) times a day 90 capsule 0   • ibuprofen (MOTRIN) 800 mg tablet Take 1 tablet (800 mg total) by mouth every 8 (eight) hours as needed for mild pain 90 tablet 3   • levothyroxine 50 mcg tablet 1 daily 30 tablet 5   • NARCAN 4 MG/0 1ML LIQD ADMINISTER A SINGLE spray INTO ONE NOSTRIL CALL 911 MAY REPEAT ONCE (Patient not taking: No sig reported)  0   • topiramate (TOPAMAX) 100 mg tablet Take 1 tablet (100 mg total) by mouth 2 (two) times a day (Patient taking differently: Take 100 mg by mouth 3 (three) times a day) 90 tablet 3   • traZODone (DESYREL) 50 mg tablet 1-2  tablet 0     No current facility-administered medications for this visit  Allergies   Allergen Reactions   • Amoxicillin      Pt states it never works for her        Review of Systems    Video Exam    There were no vitals filed for this visit      Physical Exam     Visit Time     Visit Start Time: 967  Visit Stop Time: 942  Total Visit Duration: 59 minutes

## 2022-11-29 ENCOUNTER — TELEPHONE (OUTPATIENT)
Dept: PSYCHIATRY | Facility: CLINIC | Age: 37
End: 2022-11-29

## 2022-12-01 ENCOUNTER — TELEPHONE (OUTPATIENT)
Dept: PSYCHIATRY | Facility: CLINIC | Age: 37
End: 2022-12-01

## 2022-12-02 ENCOUNTER — TELEPHONE (OUTPATIENT)
Dept: FAMILY MEDICINE CLINIC | Facility: CLINIC | Age: 37
End: 2022-12-02

## 2022-12-02 ENCOUNTER — OFFICE VISIT (OUTPATIENT)
Dept: FAMILY MEDICINE CLINIC | Facility: CLINIC | Age: 37
End: 2022-12-02

## 2022-12-02 VITALS
HEART RATE: 97 BPM | SYSTOLIC BLOOD PRESSURE: 98 MMHG | DIASTOLIC BLOOD PRESSURE: 60 MMHG | OXYGEN SATURATION: 98 % | RESPIRATION RATE: 19 BRPM | TEMPERATURE: 97.8 F

## 2022-12-02 DIAGNOSIS — M79.2 NEUROPATHIC PAIN: ICD-10-CM

## 2022-12-02 DIAGNOSIS — F43.10 PTSD (POST-TRAUMATIC STRESS DISORDER): ICD-10-CM

## 2022-12-02 DIAGNOSIS — E55.9 VITAMIN D INSUFFICIENCY: ICD-10-CM

## 2022-12-02 DIAGNOSIS — E66.9 CLASS 1 OBESITY WITH BODY MASS INDEX (BMI) OF 34.0 TO 34.9 IN ADULT, UNSPECIFIED OBESITY TYPE, UNSPECIFIED WHETHER SERIOUS COMORBIDITY PRESENT: ICD-10-CM

## 2022-12-02 DIAGNOSIS — F31.81 BIPOLAR 2 DISORDER (HCC): ICD-10-CM

## 2022-12-02 DIAGNOSIS — Z59.6 PATIENT CANNOT AFFORD MEDICATIONS: Primary | ICD-10-CM

## 2022-12-02 DIAGNOSIS — F41.9 ANXIETY: ICD-10-CM

## 2022-12-02 RX ORDER — GABAPENTIN 300 MG/1
300 CAPSULE ORAL 3 TIMES DAILY
Qty: 90 CAPSULE | Refills: 0 | Status: SHIPPED | OUTPATIENT
Start: 2022-12-02 | End: 2023-01-01

## 2022-12-02 RX ORDER — LEVOTHYROXINE SODIUM 0.05 MG/1
TABLET ORAL
Qty: 30 TABLET | Refills: 5 | Status: SHIPPED | OUTPATIENT
Start: 2022-12-02

## 2022-12-02 RX ORDER — TOPIRAMATE 100 MG/1
100 TABLET, FILM COATED ORAL 2 TIMES DAILY
Qty: 90 TABLET | Refills: 3 | Status: SHIPPED | OUTPATIENT
Start: 2022-12-02 | End: 2023-01-01

## 2022-12-02 RX ORDER — MELATONIN
1000 DAILY
Qty: 30 TABLET | Refills: 0 | Status: SHIPPED | OUTPATIENT
Start: 2022-12-02 | End: 2023-01-01

## 2022-12-02 SDOH — ECONOMIC STABILITY - INCOME SECURITY: LOW INCOME: Z59.6

## 2022-12-02 NOTE — PROGRESS NOTES
[address] [facility] PRACTICE NELL    NAME: Geno Courtney  AGE: 29 y o  SEX: female  : 1985     DATE: 2022     Assessment and Plan:     Problem List Items Addressed This Visit        Other    Anxiety    Relevant Medications    gabapentin (Neurontin) 300 mg capsule    Bipolar 2 disorder (HCC)    Relevant Medications    topiramate (TOPAMAX) 100 mg tablet   Other Visit Diagnoses     Patient cannot afford medications    -  Primary    Relevant Orders    Ambulatory Referral to Social Work Care Management Program    Class 1 obesity with body mass index (BMI) of 34 0 to 34 9 in adult, unspecified obesity type, unspecified whether serious comorbidity present        Relevant Orders    Ambulatory Referral to Weight Management    Vitamin D insufficiency        Relevant Medications    cholecalciferol (VITAMIN D3) 1,000 units tablet    Neuropathic pain        Relevant Medications    gabapentin (Neurontin) 300 mg capsule          Immunizations and preventive care screenings were discussed with patient today  Appropriate education was printed on patient's after visit summary  Counseling:  Alcohol/drug use: discussed moderation in alcohol intake, the recommendations for healthy alcohol use, and avoidance of illicit drug use  Dental Health: discussed importance of regular tooth brushing, flossing, and dental visits  Sexual health: discussed sexually transmitted diseases, partner selection, use of condoms, avoidance of unintended pregnancy, and contraceptive alternatives  · Exercise: the importance of regular exercise/physical activity was discussed  Recommend exercise 3-5 times per week for at least 30 minutes  Tobacco Cessation Counseling: Tobacco cessation counseling was provided  The patient is sincerely urged to quit consumption of tobacco  She is not ready to quit tobacco  Patient refused medication           No follow-ups on file      Chief Complaint:     Chief Complaint   Patient presents with   • Physical Exam      History of Present Illness:     Adult Annual Physical   Patient here for a comprehensive physical exam  The patient reports no problems  Diet and Physical Activity  · Diet/Nutrition: poor diet  · Exercise: no formal exercise  Depression Screening  PHQ-2/9 Depression Screening    Little interest or pleasure in doing things: 1 - several days  Feeling down, depressed, or hopeless: 1 - several days  Trouble falling or staying asleep, or sleeping too much: 1 - several days  Feeling tired or having little energy: 1 - several days  Poor appetite or overeatin - more than half the days  Feeling bad about yourself - or that you are a failure or have let yourself or your family down: 2 - more than half the days  Trouble concentrating on things, such as reading the newspaper or watching television: 2 - more than half the days  Moving or speaking so slowly that other people could have noticed  Or the opposite - being so fidgety or restless that you have been moving around a lot more than usual: 0 - not at all  Thoughts that you would be better off dead, or of hurting yourself in some way: 0 - not at all  PHQ-9 Score: 10   PHQ-9 Interpretation: Moderate depression        General Health  · Sleep: gets 7-8 hours of sleep on average  · Hearing: normal - bilateral   · Vision: no vision problems  · Dental: no dental visits for >1 year  /GYN Health  · Last menstrual period: September, following up with OB  · Contraceptive method: none  · History of STDs?: yes  Review of Systems:     Review of Systems   Constitutional: Negative for chills and fever  HENT: Negative for ear pain and sore throat  Eyes: Negative for pain and visual disturbance  Respiratory: Negative for cough and shortness of breath  Cardiovascular: Negative for chest pain and palpitations     Gastrointestinal: Negative for abdominal pain and vomiting  Genitourinary: Negative for dysuria and hematuria  Musculoskeletal: Negative for arthralgias and back pain  Skin: Negative for color change and rash  Neurological: Negative for seizures and syncope  All other systems reviewed and are negative  Past Medical History:     Past Medical History:   Diagnosis Date   • Anxiety     "severe"   • Asthma    • Bipolar disorder ([place] [address] )    • Cigarette smoker    • Depression    • Disease of thyroid gland    • Drug dependence, in remission ([place] [address] )    • GERD (gastroesophageal reflux disease)    • H/O: whooping cough     while pregnant   • Hepatitis C, chronic (HCC)    • Insomnia disorder    • Liver disease     Hepatitis C   • Migraine    • Psychiatric illness    • PTSD (post-traumatic stress disorder)    • Substance abuse ([place] [address] )       Past Surgical History:     Past Surgical History:   Procedure Laterality Date   • KNEE SURGERY     • OTHER SURGICAL HISTORY      body piercing   • ID REPAIR UMBILICAL KEFO,2+M/E,QWHDG N/A 04/19/2016    Procedure: REPAIR HERNIA UMBILICAL WITH MESH;  Surgeon: Theresa Durand MD;  Location:  MAIN OR;  Service: General   • VAGINAL DELIVERY      X 6   • WISDOM TOOTH EXTRACTION      X 4   • WOUND DEBRIDEMENT Right 01/17/2018    Procedure: RIGHT KNEE DEBRIDEMENT; [address] OUT; AND LACERATION REPAIR   INTRAOPERATIVE ASSESSMENT OF PATELLA TENDON;  Surgeon: Sendy Lemus MD;  Location:  MAIN OR;  Service: Orthopedics      Social History:     Social History     Socioeconomic History   • Marital status: Single     Spouse name: None   • Number of children: None   • Years of education: None   • Highest education level: None   Occupational History   • None   Tobacco Use   • Smoking status: Every Day     Packs/day: 1 00     Years: 12 00     Pack years: 12 00     Types: Cigarettes   • Smokeless tobacco: Never   Vaping Use   • Vaping Use: Never used   Substance and Sexual Activity   • Alcohol use: Not Currently   • Drug use: Not Currently     Types: Marijuana     Comment: 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   • Sexual activity: Yes     Partners: Male     Birth control/protection: Other   Other Topics Concern   • None   Social History Narrative    ** Merged History Encounter **          Social Determinants of Health     Financial Resource Strain: Low Risk    • Difficulty of Paying Living Expenses: Not hard at all   Food Insecurity: No Food Insecurity   • Worried About [employer] [employer] in the Last Year: Never true   • Ran Out of Food in the Last Year: Never true   Transportation Needs: No Transportation Needs   • Lack of Transportation (Medical): No   • Lack of Transportation (Non-Medical):  No   Physical Activity: Not on file   Stress: Not on file   Social Connections: Not on file   Intimate Partner Violence: Not on file   Housing Stability: Not on file      Family History:     Family History   Problem Relation Age of Onset   • Hypertension Mother    • Thyroid disease Mother    • Hypertension Father    • Prostate cancer Father    • Anxiety disorder Sister       Current Medications:     Current Outpatient Medications   Medication Sig Dispense Refill   • cholecalciferol (VITAMIN D3) 1,000 units tablet Take 1 tablet (1,000 Units total) by mouth daily 30 tablet 0   • gabapentin (Neurontin) 300 mg capsule Take 1 capsule (300 mg total) by mouth 3 (three) times a day 90 capsule 0   • omeprazole (PriLOSEC) 20 mg delayed release capsule take 1 capsule by mouth once daily 90 capsule 3   • topiramate (TOPAMAX) 100 mg tablet Take 1 tablet (100 mg total) by mouth 2 (two) times a day 90 tablet 3   • albuterol (PROVENTIL HFA,VENTOLIN HFA) 90 mcg/act inhaler Inhale 2 puffs every 4 (four) hours as needed for wheezing 18 g 5   • clonazePAM (KlonoPIN) 1 mg tablet take 1 tablet by mouth three times a day 90 tablet 2   • cyclobenzaprine (FLEXERIL) 10 mg tablet take 1 tablet by mouth three times a day if needed for muscle spasm 90 tablet 3   • DULoxetine (CYMBALTA) 30 mg delayed release capsule Take 1 capsule (30 mg total) by mouth daily 30 capsule 3   • ibuprofen (MOTRIN) 800 mg tablet Take 1 tablet (800 mg total) by mouth every 8 (eight) hours as needed for mild pain 90 tablet 3   • levothyroxine 50 mcg tablet 1 daily 30 tablet 5   • NARCAN 4 MG/0 1ML LIQD ADMINISTER A SINGLE spray INTO ONE NOSTRIL CALL 911 MAY REPEAT ONCE (Patient not taking: No sig reported)  0   • traZODone (DESYREL) 50 mg tablet 1-2  tablet 0     No current facility-administered medications for this visit  Allergies: Allergies   Allergen Reactions   • Amoxicillin      Pt states it never works for her       Physical Exam:     BP 98/60 (BP Location: Left arm, Patient Position: Sitting, Cuff Size: Standard)   Pulse 97   Temp 97 8 °F (36 6 °C) (Temporal)   Resp 19   LMP 09/17/2022   SpO2 98%     Physical Exam  Vitals and nursing note reviewed  Constitutional:       General: She is not in acute distress  Appearance: She is well-developed  HENT:      Head: Normocephalic and atraumatic  Right Ear: External ear normal       Left Ear: External ear normal       Nose: Nose normal       Mouth/Throat:      Mouth: Mucous membranes are moist    Eyes:      Extraocular Movements: Extraocular movements intact  Conjunctiva/sclera: Conjunctivae normal       Pupils: Pupils are equal, round, and reactive to light  Cardiovascular:      Rate and Rhythm: Normal rate and regular rhythm  Pulses: Normal pulses  Heart sounds: Normal heart sounds  No murmur heard  Pulmonary:      Effort: Pulmonary effort is normal  No respiratory distress  Breath sounds: Normal breath sounds  No wheezing  Abdominal:      Palpations: Abdomen is soft  Tenderness: There is no abdominal tenderness  Musculoskeletal:         General: No swelling  Cervical back: Neck supple  Skin:     General: Skin is warm and dry  Capillary Refill: Capillary refill takes less than 2 seconds  Neurological:      Mental Status: She is alert     Psychiatric:         Mood and Affect: Mood normal           MD Tomas Carver

## 2022-12-05 ENCOUNTER — DOCUMENTATION (OUTPATIENT)
Dept: PSYCHIATRY | Facility: CLINIC | Age: 37
End: 2022-12-05

## 2022-12-05 ENCOUNTER — TELEPHONE (OUTPATIENT)
Dept: PSYCHIATRY | Facility: CLINIC | Age: 37
End: 2022-12-05

## 2022-12-05 NOTE — PROGRESS NOTES
Note Type: Case Management Note                  Date of Service: 12/02/2022  Service:  [facility] Office    Note: Case Management Note  [place] Cobb 29 y o  female [phone]  Attending  Substance Use History     Social History     Substance and Sexual Activity   Alcohol Use Not Currently        Social History     Substance and Sexual Activity   Drug Use Not Currently   • Types: Marijuana    Comment: 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       Encounter Type:   Patient Face-to-Face    Start Time 1105  End Time 0145    Recovery needs addressed at this meeting:  Basic  Needs and Legal Status    Note  D: Patient presented for in-person, scheduled visit with this CM  Patient and this CM worked on establishing information in reference to patient purchasing an interlock system from [facility]  This CM made outreach call to [facility] in which this CM was able to gain the information as follows:  -Patient purchased the processing fee in December of 2021 where patient was to complete the [id number] form and return it back to [facility] in order for them to submit the certification to [facility]  [employer] stated patient did not return the form to [facility], but to [facility], meaning the certification for an interlock system was never created and due to the processing fee being paid over 60 days ago, patient's original fee was sent to the [facility]  Though upset, patient understands the need to resubmit the processing fee in order to get an interlock system in a car for patient to begin driving again  A: Patient did appear hesitant to meet with this CM due to patient recently beginning the custody trial and understand thing CM was involved in a CYS report  This CM did take the time to explain to patient as to why, as a mandated , what needed to be reported and where this CM feels patient has grown throughout treatment within the [facility] office  Patient was understanding       P: Patient will continue to meet with this CM on a weekly basis    Referrals made  India Hickey    Next appointment date and time  Follow up in one week

## 2022-12-05 NOTE — TELEPHONE ENCOUNTER
Called and left message regarding 12/8 appointment being cancelled due to provider availability  Patient will be seen on 12/22 and was encouraged to reach out to the office should there be a need for additional support in the interim

## 2022-12-16 ENCOUNTER — TELEMEDICINE (OUTPATIENT)
Dept: PSYCHIATRY | Facility: CLINIC | Age: 37
End: 2022-12-16

## 2022-12-16 DIAGNOSIS — F31.81 BIPOLAR II DISORDER (HCC): Primary | ICD-10-CM

## 2022-12-16 NOTE — PSYCH
No Call  No Show  No Charge    Geno Courtney no showed 12/16/22 appointment , staff called and left message to reschedule appointment     Treatment Plan not due at this session

## 2022-12-19 ENCOUNTER — HOSPITAL ENCOUNTER (OUTPATIENT)
Dept: ULTRASOUND IMAGING | Facility: HOSPITAL | Age: 37
Discharge: HOME | End: 2022-12-19

## 2022-12-19 ENCOUNTER — DOCUMENTATION (OUTPATIENT)
Dept: PSYCHIATRY | Facility: CLINIC | Age: 37
End: 2022-12-19

## 2022-12-19 DIAGNOSIS — N84.0 ENDOMETRIAL POLYP: ICD-10-CM

## 2022-12-19 NOTE — PROGRESS NOTES
Note Type: Case Management Note                  Date of Service: 12/16/2022  Service:  [facility]    Note: Case Management Note  Ольга Hay 29 y o  female [phone]  Attending  Substance Use History     Social History     Substance and Sexual Activity   Alcohol Use Not Currently        Social History     Substance and Sexual Activity   Drug Use Not Currently   • Types: Marijuana    Comment: 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       Encounter Type:   Patient Face-to-Face    Start Time 2691  End Time 1150    Recovery needs addressed at this meeting:  Basic  Needs and Family    Note  D: Patient presented for in-person, scheduled visit with this CM  Patient and this CM discussed patient's status with custody trial that is still continuing as patient's minor child must testify  Patient and this CM worked on making sure patient understood the need to re-process certification with [employer]  A: Patient appeared in a normal mood, normal affect  Patient did discuss current stressor being the time shared with minor child for the jesus holiday  P: Patient will continue to meet with this CM on a weekly basis to work towards stability       Referrals made  [place]    Next appointment date and time  Follow up in one week

## 2022-12-20 ENCOUNTER — TELEPHONE (OUTPATIENT)
Dept: PSYCHIATRY | Facility: CLINIC | Age: 37
End: 2022-12-20

## 2022-12-20 DIAGNOSIS — F11.20 OPIOID DEPENDENCE ON AGONIST THERAPY (HCC): Primary | ICD-10-CM

## 2022-12-20 RX ORDER — BUPRENORPHINE AND NALOXONE 8; 2 MG/1; MG/1
8 FILM, SOLUBLE BUCCAL; SUBLINGUAL 2 TIMES DAILY
Qty: 60 FILM | Refills: 1 | Status: SHIPPED | OUTPATIENT
Start: 2022-12-20 | End: 2023-01-19

## 2022-12-20 RX ORDER — BUPRENORPHINE AND NALOXONE 8; 2 MG/1; MG/1
FILM, SOLUBLE BUCCAL; SUBLINGUAL
COMMUNITY
Start: 2022-11-19 | End: 2022-12-20 | Stop reason: SDUPTHER

## 2022-12-20 NOTE — TELEPHONE ENCOUNTER
Patient called requesting a refill on her Suboxone and a prescription adjustment on her Topamax  She stated she has been taking 100mg TID for years, however her PCP sent a script for 100mg BID  Please refill Suboxone and adjust Topamax appropriately

## 2022-12-21 NOTE — TELEPHONE ENCOUNTER
Patient stated he thought it was too high of a dose, despite her being on it for years  She will contact him about it tomorrow

## 2022-12-22 ENCOUNTER — SOCIAL WORK (OUTPATIENT)
Dept: PSYCHIATRY | Facility: CLINIC | Age: 37
End: 2022-12-22

## 2022-12-22 DIAGNOSIS — F31.81 BIPOLAR 2 DISORDER (HCC): Primary | ICD-10-CM

## 2022-12-22 DIAGNOSIS — Z72.0 TOBACCO USE: ICD-10-CM

## 2022-12-22 DIAGNOSIS — F11.20 OPIOID DEPENDENCE ON AGONIST THERAPY (HCC): ICD-10-CM

## 2022-12-22 NOTE — PSYCH
Virtual Regular Visit    Verification of patient location: [facility]    Patient is located in the following state in which I hold an active license [place]      Assessment/Plan:    Problem List Items Addressed This Visit        Other    Tobacco use    Bipolar 2 disorder ([facility] ) - Primary    Opioid dependence on agonist therapy ([facility] )       Goals addressed in session: Goal 1          Reason for visit is   Chief Complaint   Patient presents with   • Virtual Regular Visit        Encounter provider Karan Ford [facility]    Provider located at [address] [id number] [phone]      Recent Visits  Date Type Provider Dept   12/20/22 Telephone Joo PatientGonzales recent visits within past 7 days and meeting all other requirements  Future Appointments  No visits were found meeting these conditions  Showing future appointments within next 150 days and meeting all other requirements       The patient was identified by name and date of birth  Logan Fish was informed that this is a telemedicine visit and that the visit is being conducted throughthe [facility]  She agrees to proceed     My office door was closed  No one else was in the room  She acknowledged consent and understanding of privacy and security of the video platform  The patient has agreed to participate and understands they can discontinue the visit at any time  Patient is aware this is a billable service  Ida Mclean is a 29 y o  female presenting for follow up today  12/22/22  Start Time: 1300  Stop Time: 1345  Total Visit Time: 45 minutes  Problem List Items Addressed This Visit        Other    Tobacco use    Bipolar 2 disorder ([facility] ) - Primary    Opioid dependence on agonist therapy ([facility] )         D: Geno attended a 45 minutes individual session today  Geno entered session with cooperative appearance,Normal mood and normal affect  Geno's speech was appropriate quality, quantity and organization of sentences  Patient and clinician discussed how Daniela Noriega has been doing  Geno states she is anxious due to the ongoing custody trial in regard to her son Ananda Lora recounted how she was on the stand for an extended amount of time and how draining that was for her  Chantell would fixate on Johnnie's grandmother and what her intentions were repeatedly; Geno was encouraged to put her energy into what she could control such as her relationship with Ananda Fitzpatrick and being a healthy and supportive mother to him  Geno was encouraged to engage in healthy activities that would resent to her such as meditation and journaling  Geno reflected that she will continue to work on focusing situations and feelings that she can control as opposed to expending energy into situations that are out of her control  A: Geno presented as cooperative throughout the session  Geno appears to be in preperation to change at this time  Geno has Good insight  Current suicide risk : none    P: Geno will follow up with Yadi CASPER , LPC, CAADC, NCC, CCTP in 2 weeks  Goal(s) 1 was/were addressed in session  Geno denies SI, SH, HI at this time and can contract for safety  Behavioral Health Treatment Plan [facility]: Diagnosis and Treatment Plan explained to Daniela Lora relates understanding diagnosis and is agreeable to Treatment Plan   Yes       HPI     Past Medical History:   Diagnosis Date   • Anxiety     "severe"   • Asthma    • Bipolar disorder ([facility] )    • Cigarette smoker    • Depression    • Disease of thyroid gland    • Drug dependence, in remission ([facility] )    • GERD (gastroesophageal reflux disease)    • H/O: whooping cough     while pregnant   • Hepatitis C, chronic (HCC)    • Insomnia disorder    • Liver disease Hepatitis C   • Migraine    • Psychiatric illness    • PTSD (post-traumatic stress disorder)    • Substance abuse ([facility] )        Past Surgical History:   Procedure Laterality Date   • KNEE SURGERY     • OTHER SURGICAL HISTORY      body piercing   • FL REPAIR UMBILICAL SDSQ,7+Z/T,SVJLR N/A 04/19/2016    Procedure: REPAIR HERNIA UMBILICAL WITH MESH;  Surgeon: Dustin Roche MD;  Location:  MAIN OR;  Service: General   • VAGINAL DELIVERY      X 6   • WISDOM TOOTH EXTRACTION      X 4   • WOUND DEBRIDEMENT Right 01/17/2018    Procedure: RIGHT KNEE DEBRIDEMENT; [address] OUT; AND LACERATION REPAIR   INTRAOPERATIVE ASSESSMENT OF PATELLA TENDON;  Surgeon: Minal Plunkett MD;  Location: [facility] MAIN OR;  Service: Orthopedics       Current Outpatient Medications   Medication Sig Dispense Refill   • omeprazole (PriLOSEC) 20 mg delayed release capsule take 1 capsule by mouth once daily 90 capsule 3   • albuterol (PROVENTIL HFA,VENTOLIN HFA) 90 mcg/act inhaler Inhale 2 puffs every 4 (four) hours as needed for wheezing 18 g 5   • buprenorphine-naloxone (Suboxone) 8-2 mg Place 1 Film (8 mg total) under the tongue 2 (two) times a day 60 Film 1   • cholecalciferol (VITAMIN D3) 1,000 units tablet Take 1 tablet (1,000 Units total) by mouth daily 30 tablet 0   • clonazePAM (KlonoPIN) 1 mg tablet take 1 tablet by mouth three times a day 90 tablet 2   • cyclobenzaprine (FLEXERIL) 10 mg tablet take 1 tablet by mouth three times a day if needed for muscle spasm 90 tablet 3   • DULoxetine (CYMBALTA) 30 mg delayed release capsule Take 1 capsule (30 mg total) by mouth daily 30 capsule 3   • gabapentin (Neurontin) 300 mg capsule Take 1 capsule (300 mg total) by mouth 3 (three) times a day 90 capsule 0   • ibuprofen (MOTRIN) 800 mg tablet Take 1 tablet (800 mg total) by mouth every 8 (eight) hours as needed for mild pain 90 tablet 3   • levothyroxine 50 mcg tablet 1 daily 30 tablet 5   • NARCAN 4 MG/0 1ML LIQD ADMINISTER A SINGLE spray INTO ONE NOSTRIL CALL 911 MAY REPEAT ONCE (Patient not taking: No sig reported)  0   • topiramate (TOPAMAX) 100 mg tablet Take 1 tablet (100 mg total) by mouth 2 (two) times a day 90 tablet 3   • traZODone (DESYREL) 50 mg tablet 1-2  tablet 0     No current facility-administered medications for this visit  Allergies   Allergen Reactions   • Amoxicillin      Pt states it never works for her        Review of Systems    Video Exam    There were no vitals filed for this visit      Physical Exam     Visit Time    Visit Start Time: 100  Visit Stop Time: 145  Total Visit Duration: 45 minutes

## 2022-12-23 ENCOUNTER — DOCUMENTATION (OUTPATIENT)
Dept: PSYCHIATRY | Facility: CLINIC | Age: 37
End: 2022-12-23

## 2022-12-23 NOTE — PROGRESS NOTES
Note Type: Case Management Note                  Date of Service: 12/23/2022  Service:  [facility]    Note: Case Management Note  [facility] 29 [facility] [phone]  Attending  Substance Use History     Social History     Substance and Sexual Activity   Alcohol Use Not Currently        Social History     Substance and Sexual Activity   Drug Use Not Currently   • Types: Marijuana    Comment: 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       Encounter Type:   Patient Face-to-Face    Start Time 1100  End Time 1121    Recovery needs addressed at this meeting:  Basic  Needs    Note  D: Patient presented for in-person, scheduled visit with this CM  Present during this session was patient's minor child  Patient began to speak about feeling hacked, but then received a text message from neighbor and requested to end the session as patient needed to return home    A: Patient appeared in a normal mood, normal affect  Patient presented well groomed  P: Patient will continue to meet with this CM on a weekly basis      Referrals made  No referrals made during this session    Next appointment date and time  Follow up in one week

## 2022-12-27 ENCOUNTER — TELEPHONE (OUTPATIENT)
Dept: FAMILY MEDICINE CLINIC | Facility: CLINIC | Age: 37
End: 2022-12-27

## 2022-12-27 NOTE — TELEPHONE ENCOUNTER
Spoke with patient via Telephone to discuss Topamax doses change during last visit  Patient states that she has been on topamax 100 mg 3 times daily for years, exceeding total maximal dose recommended  Patient requesting to remain on topamax 100 mg 3 times daily, Last precription was fill for topamax 100 mg x2 daily  Patient states she spoke with another provider already about dose adjustment and that is being done

## 2022-12-28 ENCOUNTER — OFFICE VISIT (OUTPATIENT)
Dept: OBGYN CLINIC | Facility: CLINIC | Age: 37
End: 2022-12-28

## 2022-12-28 VITALS
WEIGHT: 197 LBS | SYSTOLIC BLOOD PRESSURE: 126 MMHG | DIASTOLIC BLOOD PRESSURE: 58 MMHG | HEART RATE: 94 BPM | HEIGHT: 63 IN | BODY MASS INDEX: 34.91 KG/M2

## 2022-12-28 DIAGNOSIS — N89.8 VAGINAL DISCHARGE: ICD-10-CM

## 2022-12-28 DIAGNOSIS — Z71.2 ENCOUNTER TO DISCUSS TEST RESULTS: Primary | ICD-10-CM

## 2022-12-28 LAB
BV WHIFF TEST VAG QL: POSITIVE
CLUE CELLS SPEC QL WET PREP: POSITIVE
PH SMN: 5 [PH]
SL AMB POCT WET MOUNT: ABNORMAL
T VAGINALIS VAG QL WET PREP: NEGATIVE
YEAST VAG QL WET PREP: NEGATIVE

## 2022-12-28 RX ORDER — METRONIDAZOLE 500 MG/1
500 TABLET ORAL EVERY 12 HOURS SCHEDULED
Qty: 14 TABLET | Refills: 0 | Status: SHIPPED | OUTPATIENT
Start: 2022-12-28 | End: 2023-01-04

## 2022-12-28 NOTE — PROGRESS NOTES
PROBLEM GYNECOLOGICAL VISIT    Alejandro De Leon is a 29 y o  female who presents today for results  Her general medical history has been reviewed and she reports it as follows:    Past Medical History:   Diagnosis Date   • Anxiety     "severe"   • Asthma    • Bipolar disorder ([facility] )    • Cigarette smoker    • Depression    • Disease of thyroid gland    • Drug dependence, in remission ([place] [address] )    • GERD (gastroesophageal reflux disease)    • H/O: whooping cough     while pregnant   • Hepatitis C, chronic (HCC)    • Insomnia disorder    • Liver disease     Hepatitis C   • Migraine    • Psychiatric illness    • PTSD (post-traumatic stress disorder)    • Substance abuse ([place] [address] )      Past Surgical History:   Procedure Laterality Date   • KNEE SURGERY     • OTHER SURGICAL HISTORY      body piercing   • SD RPR UMBILICAL HRNA 5 YRS/> REDUCIBLE N/A 04/19/2016    Procedure: REPAIR HERNIA UMBILICAL WITH MESH;  Surgeon: Kathy Call MD;  Location:  MAIN OR;  Service: General   • VAGINAL DELIVERY      X 6   • WISDOM TOOTH EXTRACTION      X 4   • WOUND DEBRIDEMENT Right 01/17/2018    Procedure: RIGHT KNEE DEBRIDEMENT; [address] OUT; AND LACERATION REPAIR  INTRAOPERATIVE ASSESSMENT OF PATELLA TENDON;  Surgeon: Maggi Gonzales MD;  Location:  MAIN OR;  Service: Orthopedics     OB History    No obstetric history on file  Social History     Tobacco Use   • Smoking status: Every Day     Packs/day: 1 00     Years: 12 00     Pack years: 12 00     Types: Cigarettes   • Smokeless tobacco: Never   Vaping Use   • Vaping Use: Never used   Substance Use Topics   • Alcohol use: Not Currently   • Drug use: Not Currently     Types: Marijuana     Comment: patient in recovery   clean since oct of 2019     Social History     Substance and Sexual Activity   Sexual Activity Yes   • Partners: Male   • Birth control/protection: Other       Current Outpatient Medications   Medication Instructions   • albuterol (PROVENTIL HFA,VENTOLIN HFA) 90 mcg/act inhaler 2 puffs, Inhalation, Every 4 hours PRN   • buprenorphine-naloxone (Suboxone) 8-2 mg 8 mg, Sublingual, 2 times daily   • cholecalciferol (VITAMIN D3) 1,000 Units, Oral, Daily   • clonazePAM (KlonoPIN) 1 mg tablet take 1 tablet by mouth three times a day   • cyclobenzaprine (FLEXERIL) 10 mg tablet take 1 tablet by mouth three times a day if needed for muscle spasm   • DULoxetine (CYMBALTA) 30 mg, Oral, Daily   • gabapentin (NEURONTIN) 300 mg, Oral, 3 times daily   • ibuprofen (MOTRIN) 800 mg, Oral, Every 8 hours PRN   • levothyroxine 50 mcg tablet 1 daily   • metroNIDAZOLE (FLAGYL) 500 mg, Oral, Every 12 hours scheduled   • NARCAN 4 MG/0 1ML LIQD ADMINISTER A SINGLE spray INTO ONE NOSTRIL CALL 911 MAY REPEAT ONCE   • omeprazole (PriLOSEC) 20 mg delayed release capsule take 1 capsule by mouth once daily   • topiramate (TOPAMAX) 100 mg, Oral, 2 times daily   • traZODone (DESYREL) 50 mg tablet 1-2 HS       History of Present Illness:   Patient presents for results s/p pelvic sonogram with c/o possible having BV has been experiencing malodorous discharge  Review of Systems:  Review of Systems   Genitourinary: Positive for vaginal discharge  All other systems reviewed and are negative  Physical Exam:  /58   Pulse 94   Ht 5' 3" (1 6 m)   Wt 89 4 kg (197 lb)   BMI 34 90 kg/m²   Physical Exam  Constitutional:       Appearance: Normal appearance  Genitourinary:      Bladder and urethral meatus normal       No lesions in the vagina  Right Labia: No rash, tenderness or lesions  Left Labia: No tenderness, lesions or rash  Vaginal discharge present  No vaginal bleeding  No urethral tenderness or mass present  Neurological:      Mental Status: She is alert  Vitals reviewed  Assessment:   1  Small endometrial polyp   2  BV    Plan:   1  Cultures ordered: GC/Chlamydia   2  Flagyl escribed   3  Return to office prn       Reviewed with patient that test results are available in [address] immediately, but that they will not necessarily be reviewed by me immediately  Explained that I will review results at my earliest opportunity and contact patient appropriately

## 2022-12-29 LAB
C TRACH DNA SPEC QL NAA+PROBE: NEGATIVE
N GONORRHOEA DNA SPEC QL NAA+PROBE: NEGATIVE

## 2023-01-04 ENCOUNTER — OFFICE VISIT (OUTPATIENT)
Dept: FAMILY MEDICINE CLINIC | Facility: CLINIC | Age: 38
End: 2023-01-04

## 2023-01-04 VITALS
HEIGHT: 64 IN | HEART RATE: 96 BPM | SYSTOLIC BLOOD PRESSURE: 114 MMHG | OXYGEN SATURATION: 98 % | TEMPERATURE: 96.8 F | DIASTOLIC BLOOD PRESSURE: 66 MMHG | WEIGHT: 196.8 LBS | RESPIRATION RATE: 17 BRPM | BODY MASS INDEX: 33.6 KG/M2

## 2023-01-04 DIAGNOSIS — F41.9 ANXIETY: Primary | ICD-10-CM

## 2023-01-04 DIAGNOSIS — Z72.0 TOBACCO USE: ICD-10-CM

## 2023-01-04 DIAGNOSIS — E55.9 VITAMIN D DEFICIENCY: ICD-10-CM

## 2023-01-04 DIAGNOSIS — J45.909 ASTHMA, UNSPECIFIED ASTHMA SEVERITY, UNSPECIFIED WHETHER COMPLICATED, UNSPECIFIED WHETHER PERSISTENT: ICD-10-CM

## 2023-01-04 DIAGNOSIS — E66.9 CLASS 1 OBESITY WITHOUT SERIOUS COMORBIDITY IN ADULT, UNSPECIFIED BMI, UNSPECIFIED OBESITY TYPE: ICD-10-CM

## 2023-01-04 RX ORDER — CYCLOBENZAPRINE HCL 10 MG
10 TABLET ORAL 3 TIMES DAILY PRN
Qty: 90 TABLET | Refills: 3 | Status: SHIPPED | OUTPATIENT
Start: 2023-01-04

## 2023-01-04 NOTE — PROGRESS NOTES
Name: Maria De Jesus Ramirez      : 1985      MRN: [id number]  Encounter Provider: Nemo Otero MD  Encounter Date: 2023   Encounter department: [facility]    Assessment & Plan      Anxiety  - reports anxiety has been the same  - follows outpatient with Psychiatry who's managing patient Psych disorders  - continue Clonazepam 1 mg daily  - encourage medications  - ambulatory referral to behavior therapy  - encourage safe coping and relaxation techniques  - follow up with Psychiatry    Obesity  - Class 1 obesity  - reports trying dieting, exercise, weight loss but nothing same to be working    - Patient interested in weight loss medications, states she has try everything    - Discuss benefits and disadvantages of weight loss medications  - Encourage multi-discipline approach including weight management, nutrition, health diet and exercise  - Patient unhappy that provider will not start her on medications today  - Ambulatory referral to weight management  - Ambulatory referral to nutrition  - Follow up with PCP in 3 months      H/o Vitamin D deficiency  - recheck Vit D levels    Other orders/Medication refills  -     levothyroxine 50 mcg tablet; 1 daily  -     topiramate (TOPAMAX) 100 mg tablet; Take 1 tablet (100 mg total) by mouth 2 (two) times a day  -     albuterol (PROVENTIL HFA,VENTOLIN HFA) 90 mcg/act inhaler; Inhale 2 puffs every 4 (four) hours as needed for wheezing      BMI Counseling: Body mass index is 33 73 kg/m²  The BMI is above normal  Nutrition recommendations include consuming healthier snacks and limiting drinks that contain sugar  Exercise recommendations include moderate physical activity 150 minutes/week  Rationale for BMI follow-up plan is due to patient being overweight or obese  Subjective   Patient is a 39 y/o F with PMHx of Anxiety disorder, Obesity, MDD, Drug dependence who presents to the clinic for a follow up visit  Patients states that her main concern today is weight loss  She would like a medications to help her loss weight  Patient states that she has try everything and nothing has work in the past  Patient states that her previous provider will prescribed any medications she needs  She reports taking Topamax TID because her previous doctor told her that it will help her loss weight  Discuss with patient benefits and side effects of taking Topamax for weight loss purposes  Patient states that she has been taking the medication (topamax) for years  She can only go down on the dose to twice a day if I prescribe her a medication to help her lose weight  Denies SI/HI, headache, chest pain, SOB  HPI  Review of Systems   Constitutional: Negative for chills and fever  HENT: Negative for ear pain and sore throat  Eyes: Negative for pain and visual disturbance  Respiratory: Negative for cough and shortness of breath  Cardiovascular: Negative for chest pain and palpitations  Gastrointestinal: Negative for abdominal pain and vomiting  Genitourinary: Negative for dysuria, frequency, vaginal discharge and vaginal pain  Negative for flank pain, hematuria, pelvic pain and vaginal bleeding  Musculoskeletal: Negative for arthralgias and back pain  Skin: Negative for color change and rash  Neurological: Negative for seizures and syncope  All other systems reviewed and are negative        Current Outpatient Medications on File Prior to Visit   Medication Sig   • omeprazole (PriLOSEC) 20 mg delayed release capsule take 1 capsule by mouth once daily   • albuterol (PROVENTIL HFA,VENTOLIN HFA) 90 mcg/act inhaler Inhale 2 puffs every 4 (four) hours as needed for wheezing   • buprenorphine-naloxone (Suboxone) 8-2 mg Place 1 Film (8 mg total) under the tongue 2 (two) times a day   • cholecalciferol (VITAMIN D3) 1,000 units tablet Take 1 tablet (1,000 Units total) by mouth daily   • clonazePAM (KlonoPIN) 1 mg tablet take 1 tablet by mouth three times a day   • DULoxetine (CYMBALTA) 30 mg delayed release capsule Take 1 capsule (30 mg total) by mouth daily   • gabapentin (Neurontin) 300 mg capsule Take 1 capsule (300 mg total) by mouth 3 (three) times a day   • ibuprofen (MOTRIN) 800 mg tablet Take 1 tablet (800 mg total) by mouth every 8 (eight) hours as needed for mild pain   • levothyroxine 50 mcg tablet 1 daily   • [] metroNIDAZOLE (FLAGYL) 500 mg tablet Take 1 tablet (500 mg total) by mouth every 12 (twelve) hours for 7 days   • NARCAN 4 MG/0 1ML LIQD ADMINISTER A SINGLE spray INTO ONE NOSTRIL CALL 911 MAY REPEAT ONCE (Patient not taking: No sig reported)   • topiramate (TOPAMAX) 100 mg tablet Take 1 tablet (100 mg total) by mouth 2 (two) times a day   • traZODone (DESYREL) 50 mg tablet 1-2 HS       Objective     /66 (BP Location: Left arm, Patient Position: Sitting, Cuff Size: Standard)   Pulse 96   Temp (!) 96 8 °F (36 °C) (Temporal)   Resp 17   Ht 5' 3 75" (1 619 m)   Wt 89 3 kg (196 lb 12 8 oz)   SpO2 98%   BMI 34 05 kg/m²     Physical Exam  Constitutional:       Appearance: Normal appearance  HENT:      Head: Normocephalic and atraumatic  Cardiovascular:      Rate and Rhythm: Normal rate and regular rhythm  Pulses: Normal pulses  Heart sounds: Normal heart sounds  No murmur heard  No friction rub  No gallop  Pulmonary:      Effort: Pulmonary effort is normal  No respiratory distress  Breath sounds: Normal breath sounds  No stridor  No wheezing or rhonchi  Chest:      Chest wall: No tenderness  Abdominal:      General: Bowel sounds are normal  There is no distension  Palpations: Abdomen is soft  There is no mass  Tenderness: There is no abdominal tenderness  There is no right CVA tenderness, left CVA tenderness or guarding  Musculoskeletal:         General: Normal range of motion  Cervical back: Neck supple  No tenderness     Skin:     General: Skin is warm and dry    Neurological:      Mental Status: She is alert and oriented to person, place, and time     Psychiatric:         Mood and Affect: Mood normal        Lalo Carrillo MD

## 2023-01-05 DIAGNOSIS — M79.2 NEUROPATHIC PAIN: ICD-10-CM

## 2023-01-05 DIAGNOSIS — F41.9 ANXIETY: ICD-10-CM

## 2023-01-06 ENCOUNTER — DOCUMENTATION (OUTPATIENT)
Dept: PSYCHIATRY | Facility: CLINIC | Age: 38
End: 2023-01-06

## 2023-01-06 ENCOUNTER — TELEMEDICINE (OUTPATIENT)
Dept: PSYCHIATRY | Facility: CLINIC | Age: 38
End: 2023-01-06

## 2023-01-06 DIAGNOSIS — F51.01 PRIMARY INSOMNIA: ICD-10-CM

## 2023-01-06 DIAGNOSIS — M79.2 NEUROPATHIC PAIN: ICD-10-CM

## 2023-01-06 DIAGNOSIS — F11.20 OPIOID DEPENDENCE ON AGONIST THERAPY (HCC): ICD-10-CM

## 2023-01-06 DIAGNOSIS — F41.9 ANXIETY: ICD-10-CM

## 2023-01-06 RX ORDER — BUPRENORPHINE AND NALOXONE 8; 2 MG/1; MG/1
8 FILM, SOLUBLE BUCCAL; SUBLINGUAL 2 TIMES DAILY
Qty: 60 FILM | Refills: 2 | Status: SHIPPED | OUTPATIENT
Start: 2023-01-19 | End: 2023-02-18

## 2023-01-06 RX ORDER — GABAPENTIN 300 MG/1
CAPSULE ORAL
Qty: 90 CAPSULE | Refills: 0 | Status: SHIPPED | OUTPATIENT
Start: 2023-01-06

## 2023-01-06 RX ORDER — TRAZODONE HYDROCHLORIDE 50 MG/1
TABLET ORAL
Qty: 180 TABLET | Refills: 1 | Status: SHIPPED | OUTPATIENT
Start: 2023-01-06

## 2023-01-06 RX ORDER — DULOXETIN HYDROCHLORIDE 30 MG/1
30 CAPSULE, DELAYED RELEASE ORAL DAILY
Qty: 30 CAPSULE | Refills: 3 | Status: SHIPPED | OUTPATIENT
Start: 2023-01-06 | End: 2023-02-05

## 2023-01-09 NOTE — PSYCH
[facility]    Progress Note  Rolejose eduardo Cunningham 40 y o  female MRN: [id number]   Encounter: [id number]      Telemedicine consent    Patient: Jennifer Courtney  Provider: Abdifatah Negron MD  Provider located at [facility] E  [facility]    [address] [place] [address] B  [facility] [address] [phone]    The patient was identified by name and date of birth  Raya Cunningham was informed that this is a telemedicine visit and that the visit is being conducted through the [employer]  She agrees to proceed     My office door was closed  No one else was in the room  She acknowledged consent and understanding of privacy and security of the video platform  The patient has agreed to participate and understands they can discontinue the visit at any time  Patient is aware this is a billable service  I spent 30 minutes with the patient during this visit  Visit Time    Visit Start Time: 9AM  Visit Stop Time: 9:30AM  Total Visit Duration: 30 minutes    SUBJECTIVE: I feel stressed out but Suboxone helps her to stay in recovery  INTERVAL HISTORY: The patient stated that despite her current stressors associated with court hearing regarding custody of her children, the patient continued to work on her recovery, taking Suboxone as prescribed, without any side effects or cravings  Although her mood continued to be anxious and moderately depressed, she has positive thoughts about her life  Patient's sleep is still disturbed, medications helped      Review Of Systems:    sleep changes: decreased  appetite changes: no  energy/anergy: decreased    Support Services  The patient is actively engaged with the [facility] Certified Addiction Counselor’s psychotherapy plan: Yes        PDMP reviewed:     PDMP Review       Value Time User    PDMP Reviewed  Yes 1/6/2023  8:57 AM Abdifatah Negron MD            Drug cravings: none  Motivation to continue treatment: high      Current Outpatient Medications:   •  [START ON 1/19/2023] buprenorphine-naloxone (Suboxone) 8-2 mg, Place 1 Film (8 mg total) under the tongue 2 (two) times a day Do not start before January 19, 2023 , Disp: 60 Film, Rfl: 2  •  DULoxetine (CYMBALTA) 30 mg delayed release capsule, Take 1 capsule (30 mg total) by mouth daily, Disp: 30 capsule, Rfl: 3  •  omeprazole (PriLOSEC) 20 mg delayed release capsule, take 1 capsule by mouth once daily, Disp: 90 capsule, Rfl: 3  •  traZODone (DESYREL) 50 mg tablet, 1-2 HS, Disp: 180 tablet, Rfl: 1  •  albuterol (PROVENTIL HFA,VENTOLIN HFA) 90 mcg/act inhaler, Inhale 2 puffs every 4 (four) hours as needed for wheezing, Disp: 18 g, Rfl: 5  •  cholecalciferol (VITAMIN D3) 1,000 units tablet, Take 1 tablet (1,000 Units total) by mouth daily, Disp: 30 tablet, Rfl: 0  •  clonazePAM (KlonoPIN) 1 mg tablet, take 1 tablet by mouth three times a day, Disp: 90 tablet, Rfl: 2  •  cyclobenzaprine (FLEXERIL) 10 mg tablet, Take 1 tablet (10 mg total) by mouth 3 (three) times a day as needed for muscle spasms, Disp: 90 tablet, Rfl: 3  •  gabapentin (NEURONTIN) 300 mg capsule, take 1 capsule by mouth three times a day, Disp: 90 capsule, Rfl: 0  •  ibuprofen (MOTRIN) 800 mg tablet, Take 1 tablet (800 mg total) by mouth every 8 (eight) hours as needed for mild pain, Disp: 90 tablet, Rfl: 3  •  levothyroxine 50 mcg tablet, 1 daily, Disp: 30 tablet, Rfl: 5  •  NARCAN 4 MG/0 1ML LIQD, ADMINISTER A SINGLE spray INTO ONE NOSTRIL CALL 911 MAY REPEAT ONCE (Patient not taking: No sig reported), Disp: , Rfl: 0  •  topiramate (TOPAMAX) 100 mg tablet, Take 1 tablet (100 mg total) by mouth 2 (two) times a day, Disp: 90 tablet, Rfl: 3    Objective     There were no vitals filed for this visit          Mental Status Evaluation: Casually dressed, pleasant acute distress but feeling anxious, full range of affect, linear concrete thoughts, normal memory, normal insight and judgment  Lab Results:   Results from last 6 Months   Lab Units 07/21/22  1647   WBC Thousand/uL 7 25   HEMOGLOBIN g/dL 12 6   HEMATOCRIT % 39 0   PLATELETS Thousands/uL 217      Results from last 6 Months   Lab Units 07/21/22  1647   POTASSIUM mmol/L 3 4*   CHLORIDE mmol/L 107   CO2 mmol/L 21   BUN mg/dL 12   CREATININE mg/dL 1 15   CALCIUM mg/dL 8 4   ALBUMIN g/dL 4 3   ALK PHOS U/L 49   ALT U/L 13   AST U/L 18                           Diagnoses and all orders for this visit:    Opioid dependence on agonist therapy (HCC)  -     buprenorphine-naloxone (Suboxone) 8-2 mg; Place 1 Film (8 mg total) under the tongue 2 (two) times a day Do not start before January 19, 2023  Anxiety  -     DULoxetine (CYMBALTA) 30 mg delayed release capsule; Take 1 capsule (30 mg total) by mouth daily    Neuropathic pain  -     DULoxetine (CYMBALTA) 30 mg delayed release capsule; Take 1 capsule (30 mg total) by mouth daily    Primary insomnia  -     traZODone (DESYREL) 50 mg tablet; 1-2 HS       Motivational therapy was also provided to the patient, in addition supportive therapy was provided because of the patient's court hearing related to taking back custody of her children, and her confrontational relationship with her grandmother  The patient admitted having increased level of stress related to this  Risks / Benefits of Treatment:    Risks, benefits, and possible side effects of medications explained to patient and patient verbalizes understanding and agreement for treatment  This note was not shared with the patient due to this is a psychotherapy note     Counseling / Coordination of Care  Total time spent today 30 minutes  Greater than 50% of total time was spent with the patient and / or family counseling and / or coordination of care       Morris Chong MD

## 2023-01-10 ENCOUNTER — TELEPHONE (OUTPATIENT)
Dept: PSYCHIATRY | Facility: CLINIC | Age: 38
End: 2023-01-10

## 2023-01-10 DIAGNOSIS — F41.9 ANXIETY: ICD-10-CM

## 2023-01-10 RX ORDER — CLONAZEPAM 1 MG/1
1 TABLET ORAL 3 TIMES DAILY
Qty: 90 TABLET | Refills: 1 | Status: SHIPPED | OUTPATIENT
Start: 2023-01-10 | End: 2023-03-07 | Stop reason: SDUPTHER

## 2023-01-10 NOTE — PROGRESS NOTES
Note Type: Case Management Note                  Date of Service: 01/06/2023  Service:  [facility]    Note: Case Management Note  Michael Joseph 40 y o  female [id number]  Attending  Substance Use History     Social History     Substance and Sexual Activity   Alcohol Use Not Currently        Social History     Substance and Sexual Activity   Drug Use Not Currently   • Types: Marijuana    Comment: 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       Encounter Type:   Patient Face-to-Face    Start Time 1100  End Time 1130    Recovery needs addressed at this meeting:  Basic  Needs    Note  D: Patient presented for in-person, scheduled visit with this CM  Patient advised this CM that [address] had made an outreach to patient to schedule an enrollment meeting with patient to offer rental assistance to patient  Patient requested this CM to make an outreach to assigned , Clifton Braden to in fact set up said appt as patient has talked with significant other who has agreed to go forward with rental assistance  Patient and this CM [facility] [place] in which patient scheduled appt for following Thursday at [facility] [address] appeared in a normal mood, normal affect  Patient has been in higher spirits now that custody trial has neared the end and patient feels to be more confident about parenting skills  P: Patient will continue to meet with this CM on a weekly basis to ensure patient is working towards a safer place of living with the available funding  Patient and this CM will again make an outreach to [facility] to gain insight to what patient is able to do in reference to patient's driving privileges       Referrals made  [employer]    Next appointment date and time  Follow up in one week

## 2023-01-10 NOTE — TELEPHONE ENCOUNTER
Confirmed with Marlyn she messaged Dr Anuj Pinedo about clonazePAM (KlonoPIN) 1 mg tablet [[id number]] Progress Note:     S:  Doing well. Feeling contractions    Vitals:    21 0835 21 0901 21 0955 21 1145   BP: 120/74 121/75 128/76 122/70   BP Location: LUE - Left upper extremity LUE - Left upper extremity LUE - Left upper extremity LUE - Left upper extremity   Patient Position: Semi-Dominguez's Semi-Dominguez's  Semi-Dominguez's   Pulse: 78 78 92 80   Resp:    18   Temp:    97.9 °F (36.6 °C)   TempSrc:    Temporal   SpO2:    97%   Weight:       Height:       LMP: 2020       SVE:  2.5 cm/ 70%/ -1   AROM clear fluid     FHTS: category 1  [place]: Q2-3 mins    A/P:  This is a 37 year old   at 39w1d weeks IOL/ AMA    - category 1  - analgesia as desired    Briana Valdovinos MD

## 2023-01-11 NOTE — PROGRESS NOTES
01/26/22 0800   Team Meeting   Meeting Type Daily Rounds   Team Members Present   Team Members Present Physician;Nurse;   Physician Team Member Dr Terrell Hernadez Team Member [address] [facility] Management Team Member Apurva   Patient/Family Present   Patient Present No   Patient's Family Present No   Pt is a new admission [facility] from [facility] ED  Pt presented after her boyfriend and family encouraged her to go to the ED  Pt has not been sleeping, pacing all night, and believes that someone implanted a device in her brain  Pt signed 72 hour notice today, will ask to rescind   Readmit score 24 

## 2023-01-17 ENCOUNTER — TELEPHONE (OUTPATIENT)
Dept: PSYCHIATRY | Facility: CLINIC | Age: 38
End: 2023-01-17

## 2023-01-17 NOTE — TELEPHONE ENCOUNTER
Called patient to reschedule appointment due to provider's unavailability   Rescheduled to 1/26 at 1 pm

## 2023-01-19 ENCOUNTER — PATIENT OUTREACH (OUTPATIENT)
Dept: FAMILY MEDICINE CLINIC | Facility: CLINIC | Age: 38
End: 2023-01-19

## 2023-01-19 NOTE — PROGRESS NOTES
JEREMIAH CHISHOLM received consult from Provider, Dr Princess Tobias regarding patient cannot afford her medications  Per chart reviewed pt is established with [facility] and follow-up on a weekly basis with BETSY DANIELS CM reached out to KATHRINE Cline via Team to see If JEREMIAH CHISHOLM need to also follow-up with pt  Marlyn states she is meeting with pt tomorrow and she can follow-up with pt tomorrow regarding the medications  Marlyn will continue to follow-up with pt on a weekly basis  JEREMIAH CHISHOLM will closed this referral as pt already established with BETSY and has a rapport with KATHRINE DANIELS CM will remains available for future consult as needed

## 2023-01-20 ENCOUNTER — TELEPHONE (OUTPATIENT)
Dept: PSYCHIATRY | Facility: CLINIC | Age: 38
End: 2023-01-20

## 2023-01-20 NOTE — TELEPHONE ENCOUNTER
This Cm outreached patient to rescheule appt, but patient also requested to reschedule as patient has 101 fever  Patient filled this CM in on custody hearing in which patient has kept primary sole/physical custody of minor child with slight visitation arrangements but patient is overall satisfied       Patient has appt with [facility] today in which patient requested this CM to outreach to reschedule for patient as patient is ill

## 2023-01-26 ENCOUNTER — TELEMEDICINE (OUTPATIENT)
Dept: PSYCHIATRY | Facility: CLINIC | Age: 38
End: 2023-01-26

## 2023-01-26 DIAGNOSIS — F11.20 OPIOID DEPENDENCE ON AGONIST THERAPY (HCC): ICD-10-CM

## 2023-01-26 DIAGNOSIS — F31.81 BIPOLAR 2 DISORDER (HCC): Primary | ICD-10-CM

## 2023-01-26 NOTE — PSYCH
Virtual Regular Visit    Verification of patient location: \[facility]\""     Patient is located in the following state in which I hold an active license [place]      Assessment/Plan:    Problem List Items Addressed This Visit        Other    Bipolar 2 disorder ([facility] ) - Primary    Opioid dependence on agonist therapy ([facility] )       Goals addressed in session: Goal 1         Behavioral Health Psychotherapy Progress Note    Psychotherapy Provided: Individual Psychotherapy     1  Bipolar 2 disorder ([facility] )        2  Opioid dependence on agonist therapy ([facility] )            Goals addressed in session: Goal 1     DATA: Geno presents today in a better mood due to custody court being completed  Geno stated that she was happy overall with the outcome  Geno stated she wants to make sure that she can remain civil with Johnnie's grandmother to see if this custody keller can be resolved out of court  Geno states that she has been engaging in self care such as yoga with her healing stones and meditation  Geno states she has been better with her understanding of Johnnie's view of his grandmother; that he does care about her and wants to continue having a relationhship with her  Geno states she continues to enjoy her time with her son working on his schoolwork and being able to read better  Geno reflected how proud she is of ThereseBeanStockd uffkaushal and how he  Geno reflected that she has been holding onto negativity has been detrimental to her mental health and has been working on letting that feeling go and how much better she feels overall  Geno states she really values the support from the program      During this session, this clinician used the following therapeutic modalities: Engagement Strategies, Client-centered Therapy, Mindfulness-based Strategies, Motivational Interviewing, Solution-Focused Therapy and Supportive Psychotherapy    Substance Abuse was addressed during this session   If the client is diagnosed with a co-occurring substance use disorder, please indicate any changes in the frequency or amount of use: Geno endorses no use due to being on Suboxone but did state she has had dreams where substance use is endorsed  Geno states this usually happens when she sees drug paraphernalia in the community  Geno celebrated 3 years of sobriety  Stage of change for addressing substance use diagnoses: Maintenance    ASSESSMENT:  Geno Mcclelland presents with a Euthymic/ normal mood  her affect is Normal range and intensity, which is congruent, with her mood and the content of the session  The client has made progress on their goals  Cornell De Santiago presents with a none risk of suicide, none risk of self-harm, and none risk of harm to others  For any risk assessment that surpasses a "low" rating, a safety plan must be developed  A safety plan was indicated: no  If yes, describe in detail     PLAN: Between sessions, Cornell De Santiago will continue to work on her treatement goals  At the next session, the therapist will use Engagement Strategies, Client-centered Therapy, Mindfulness-based Strategies, Motivational Interviewing, Solution-Focused Therapy and Supportive Psychotherapy to address her mood lability and stress management  Behavioral Health Treatment Plan and Discharge Planning: Cornell De Santiago is aware of and agrees to continue to work on their treatment plan  They have identified and are working toward their discharge goals   yes    Visit start and stop times:    01/26/23  Start Time: 1305  Stop Time: 1355  Total Visit Time: 50 minutes    Reason for visit is   Chief Complaint   Patient presents with   • Virtual Regular Visit        Encounter provider Claudia [facility] \[facility]\""    Provider located at [address]  [phone]      Recent Visits  Date Type Provider Dept   01/20/23 Telephone Marlyn Girard [facility]   Showing recent visits within past 7 days and meeting all other requirements  Today's Visits  Date Type Provider Dept   01/26/23 Telemedicine Neel Crow [facility]   Showing today's visits and meeting all other requirements  Future Appointments  No visits were found meeting these conditions  Showing future appointments within next 150 days and meeting all other requirements       The patient was identified by name and date of birth  Logan Fish was informed that this is a telemedicine visit and that the visit is being conducted throughthe "[employer]" platform  She agrees to proceed     My office door was closed  No one else was in the room  She acknowledged consent and understanding of privacy and security of the video platform  The patient has agreed to participate and understands they can discontinue the visit at any time  Patient is aware this is a billable service  Ida Mclean is a 40 y o  female presenting for follow up today        HPI     Past Medical History:   Diagnosis Date   • Anxiety     "severe"   • Asthma    • Bipolar disorder ([facility] )    • Cigarette smoker    • Depression    • Disease of thyroid gland    • Drug dependence, in remission ([facility] )    • GERD (gastroesophageal reflux disease)    • H/O: whooping cough     while pregnant   • Hepatitis C, chronic (HCC)    • Insomnia disorder    • Liver disease     Hepatitis C   • Migraine    • Psychiatric illness    • PTSD (post-traumatic stress disorder)    • Substance abuse ([facility] )        Past Surgical History:   Procedure Laterality Date   • KNEE SURGERY     • OTHER SURGICAL HISTORY      body piercing   • [place] RPR UMBILICAL HRNA 5 YRS/> REDUCIBLE N/A 04/19/2016    Procedure: REPAIR HERNIA UMBILICAL WITH MESH;  Surgeon: Aldo Hopper MD;  Location:  MAIN OR;  Service: General   • VAGINAL DELIVERY      X 6   • WISDOM TOOTH EXTRACTION X 4   • WOUND DEBRIDEMENT Right 01/17/2018    Procedure: RIGHT KNEE DEBRIDEMENT; [address] OUT; AND LACERATION REPAIR  INTRAOPERATIVE ASSESSMENT OF PATELLA TENDON;  Surgeon: Enrique Heredia MD;  Location: [facility];  Service: Orthopedics       Current Outpatient Medications   Medication Sig Dispense Refill   • omeprazole (PriLOSEC) 20 mg delayed release capsule take 1 capsule by mouth once daily 90 capsule 3   • albuterol (PROVENTIL HFA,VENTOLIN HFA) 90 mcg/act inhaler Inhale 2 puffs every 4 (four) hours as needed for wheezing 18 g 5   • buprenorphine-naloxone (Suboxone) 8-2 mg Place 1 Film (8 mg total) under the tongue 2 (two) times a day Do not start before January 19, 2023  60 Film 2   • cholecalciferol (VITAMIN D3) 1,000 units tablet Take 1 tablet (1,000 Units total) by mouth daily 30 tablet 0   • clonazePAM (KlonoPIN) 1 mg tablet Take 1 tablet (1 mg total) by mouth 3 (three) times a day 90 tablet 1   • cyclobenzaprine (FLEXERIL) 10 mg tablet Take 1 tablet (10 mg total) by mouth 3 (three) times a day as needed for muscle spasms 90 tablet 3   • DULoxetine (CYMBALTA) 30 mg delayed release capsule Take 1 capsule (30 mg total) by mouth daily 30 capsule 3   • gabapentin (NEURONTIN) 300 mg capsule take 1 capsule by mouth three times a day 90 capsule 0   • ibuprofen (MOTRIN) 800 mg tablet Take 1 tablet (800 mg total) by mouth every 8 (eight) hours as needed for mild pain 90 tablet 3   • levothyroxine 50 mcg tablet 1 daily 30 tablet 5   • NARCAN 4 MG/0 1ML LIQD ADMINISTER A SINGLE spray INTO ONE NOSTRIL CALL 911 MAY REPEAT ONCE (Patient not taking: No sig reported)  0   • topiramate (TOPAMAX) 100 mg tablet Take 1 tablet (100 mg total) by mouth 2 (two) times a day 90 tablet 3   • traZODone (DESYREL) 50 mg tablet 1-2  tablet 1     No current facility-administered medications for this visit          Allergies   Allergen Reactions   • Amoxicillin      Pt states it never works for her        Review of Systems    Video Exam    There were no vitals filed for this visit      Physical Exam

## 2023-01-30 ENCOUNTER — DOCUMENTATION (OUTPATIENT)
Dept: PSYCHIATRY | Facility: CLINIC | Age: 38
End: 2023-01-30

## 2023-01-30 NOTE — PROGRESS NOTES
Note Type: Case Management Note                  Date of Service: 01/27/2023  Service:  [facility]    Note: Case Management Note  Renan Kumari 40 y o  female [phone]  Attending  Substance Use History     Social History     Substance and Sexual Activity   Alcohol Use Not Currently        Social History     Substance and Sexual Activity   Drug Use Not Currently   • Types: Marijuana    Comment: 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       Encounter Type:   Patient Face-to-Face    Start Time 1102  End Time 1157    Recovery needs addressed at this meeting:  Basic  Needs    Note  D: Patient presented for in-person, scheduled visit with this CM  Patient and this CM discussed patient submitting necessary paperwork to [employer]  Patient and this CM worked for a majority of this session with finding a reputable inter lock system as patient has to begin the process to obtain [place] drivers license  Patient and this CM reached out to smart lock as well as intoxalock in which patient decided to stick with intoxalock    A: Patient appeared in a normal mood, normal affect  Patient advised that custody trial has come to a complete end in which patient is satisfied with the outcome      P: Patient will continue to meet with this CM on a weekly basis    Referrals made  [employer]tox[employer]  Smart lock    Next appointment date and time  Follow up In one week

## 2023-02-04 DIAGNOSIS — M79.2 NEUROPATHIC PAIN: ICD-10-CM

## 2023-02-04 DIAGNOSIS — F41.9 ANXIETY: ICD-10-CM

## 2023-02-04 RX ORDER — GABAPENTIN 300 MG/1
CAPSULE ORAL
Qty: 90 CAPSULE | Refills: 0 | Status: SHIPPED | OUTPATIENT
Start: 2023-02-04

## 2023-02-16 ENCOUNTER — TELEPHONE (OUTPATIENT)
Dept: PSYCHIATRY | Facility: CLINIC | Age: 38
End: 2023-02-16

## 2023-02-27 ENCOUNTER — HOSPITAL ENCOUNTER (EMERGENCY)
Facility: HOSPITAL | Age: 38
Discharge: HOME | End: 2023-02-27
Attending: EMERGENCY MEDICINE

## 2023-02-27 ENCOUNTER — APPOINTMENT (EMERGENCY)
Dept: RADIOLOGY | Facility: HOSPITAL | Age: 38
End: 2023-02-27

## 2023-02-27 VITALS
HEART RATE: 96 BPM | TEMPERATURE: 97.9 F | OXYGEN SATURATION: 99 % | WEIGHT: 194.22 LBS | RESPIRATION RATE: 20 BRPM | DIASTOLIC BLOOD PRESSURE: 86 MMHG | BODY MASS INDEX: 33.6 KG/M2 | SYSTOLIC BLOOD PRESSURE: 118 MMHG

## 2023-02-27 DIAGNOSIS — R07.9 CHEST PAIN: Primary | ICD-10-CM

## 2023-02-27 DIAGNOSIS — F41.9 ANXIETY: ICD-10-CM

## 2023-02-27 LAB
ALBUMIN SERPL BCP-MCNC: 4.4 G/DL (ref 3.5–5)
ALP SERPL-CCNC: 49 U/L (ref 43–122)
ALT SERPL W P-5'-P-CCNC: 14 U/L
ANION GAP SERPL CALCULATED.3IONS-SCNC: 9 MMOL/L (ref 5–14)
AST SERPL W P-5'-P-CCNC: 17 U/L (ref 14–36)
ATRIAL RATE: 90 BPM
BASOPHILS # BLD AUTO: 0.03 THOUSANDS/ÂΜL (ref 0–0.1)
BASOPHILS NFR BLD AUTO: 0 % (ref 0–1)
BILIRUB SERPL-MCNC: 0.29 MG/DL (ref 0.2–1)
BUN SERPL-MCNC: 14 MG/DL (ref 5–25)
CALCIUM SERPL-MCNC: 8.9 MG/DL (ref 8.4–10.2)
CARDIAC TROPONIN I PNL SERPL HS: <2 NG/L (ref 8–18)
CHLORIDE SERPL-SCNC: 111 MMOL/L (ref 96–108)
CO2 SERPL-SCNC: 19 MMOL/L (ref 21–32)
CREAT SERPL-MCNC: 0.93 MG/DL (ref 0.6–1.2)
EOSINOPHIL # BLD AUTO: 0.09 THOUSAND/ÂΜL (ref 0–0.61)
EOSINOPHIL NFR BLD AUTO: 1 % (ref 0–6)
ERYTHROCYTE [DISTWIDTH] IN BLOOD BY AUTOMATED COUNT: 12.5 % (ref 11.6–15.1)
GFR SERPL CREATININE-BSD FRML MDRD: 79 ML/MIN/1.73SQ M
GLUCOSE SERPL-MCNC: 94 MG/DL (ref 70–99)
HCT VFR BLD AUTO: 42.5 % (ref 34.8–46.1)
HGB BLD-MCNC: 13.7 G/DL (ref 11.5–15.4)
IMM GRANULOCYTES # BLD AUTO: 0.03 THOUSAND/UL (ref 0–0.2)
IMM GRANULOCYTES NFR BLD AUTO: 0 % (ref 0–2)
LYMPHOCYTES # BLD AUTO: 2.23 THOUSANDS/ÂΜL (ref 0.6–4.47)
LYMPHOCYTES NFR BLD AUTO: 30 % (ref 14–44)
MCH RBC QN AUTO: 29.5 PG (ref 26.8–34.3)
MCHC RBC AUTO-ENTMCNC: 32.2 G/DL (ref 31.4–37.4)
MCV RBC AUTO: 92 FL (ref 82–98)
MONOCYTES # BLD AUTO: 0.26 THOUSAND/ÂΜL (ref 0.17–1.22)
MONOCYTES NFR BLD AUTO: 4 % (ref 4–12)
NEUTROPHILS # BLD AUTO: 4.75 THOUSANDS/ÂΜL (ref 1.85–7.62)
NEUTS SEG NFR BLD AUTO: 65 % (ref 43–75)
NRBC BLD AUTO-RTO: 0 /100 WBCS
NT-PROBNP SERPL-MCNC: 33.6 PG/ML (ref 0–299)
P AXIS: 61 DEGREES
PLATELET # BLD AUTO: 214 THOUSANDS/UL (ref 149–390)
PMV BLD AUTO: 10.7 FL (ref 8.9–12.7)
POTASSIUM SERPL-SCNC: 4.1 MMOL/L (ref 3.5–5.3)
PR INTERVAL: 136 MS
PROT SERPL-MCNC: 7.4 G/DL (ref 6.4–8.4)
QRS AXIS: 52 DEGREES
QRSD INTERVAL: 78 MS
QT INTERVAL: 368 MS
QTC INTERVAL: 447 MS
RBC # BLD AUTO: 4.64 MILLION/UL (ref 3.81–5.12)
SODIUM SERPL-SCNC: 139 MMOL/L (ref 135–147)
T WAVE AXIS: 5 DEGREES
VENTRICULAR RATE: 89 BPM
WBC # BLD AUTO: 7.39 THOUSAND/UL (ref 4.31–10.16)

## 2023-02-27 RX ORDER — CLONAZEPAM 0.5 MG/1
1 TABLET ORAL ONCE
Status: COMPLETED | OUTPATIENT
Start: 2023-02-27 | End: 2023-02-27

## 2023-02-27 RX ADMIN — CLONAZEPAM 1 MG: 0.5 TABLET ORAL at 12:20

## 2023-02-27 NOTE — Clinical Note
Golden Keyes was seen and treated in our emergency department on 2/27/2023  Diagnosis:     Geno    She may return on this date: 03/01/2023         If you have any questions or concerns, please don't hesitate to call        Angela Elizalde, DO    ______________________________           _______________          _______________  Hospital Representative                              Date                                Time

## 2023-02-27 NOTE — ED PROVIDER NOTES
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 Taking?    DULoxetine (CYMBALTA) 30 mg delayed release capsule   No No   Sig: Take 1 capsule (30 mg total) by mouth daily   NARCAN 4 MG/0 1ML LIQD   Yes No   Sig: ADMINISTER A SINGLE spray INTO ONE NOSTRIL CALL 911 MAY REPEAT ONCE   Patient not taking: No sig reported   albuterol (PROVENTIL HFA,VENTOLIN HFA) 90 mcg/act inhaler   No No   Sig: Inhale 2 puffs every 4 (four) hours as needed for wheezing   cholecalciferol (VITAMIN D3) 1,000 units tablet   No No   Sig: Take 1 tablet (1,000 Units total) by mouth daily   clonazePAM (KlonoPIN) 1 mg tablet   No No   Sig: Take 1 tablet (1 mg total) by mouth 3 (three) times a day   cyclobenzaprine (FLEXERIL) 10 mg tablet   No No   Sig: Take 1 tablet (10 mg total) by mouth 3 (three) times a day as needed for muscle spasms   gabapentin (NEURONTIN) 300 mg capsule   No No   Sig: take 1 capsule by mouth three times a day   ibuprofen (MOTRIN) 800 mg tablet   No No   Sig: Take 1 tablet (800 mg total) by mouth every 8 (eight) hours as needed for mild pain   levothyroxine 50 mcg tablet   No No   Si daily   omeprazole (PriLOSEC) 20 mg delayed release capsule   No No   Sig: take 1 capsule by mouth once daily   topiramate (TOPAMAX) 100 mg tablet   No No   Sig: Take 1 tablet (100 mg total) by mouth 2 (two) times a day   traZODone (DESYREL) 50 mg tablet   No No   Si-2 HS      Facility-Administered Medications: None       Past Medical History:   Diagnosis Date   • Anxiety     "severe"   • Asthma    • Bipolar disorder (HCC)    • Cigarette smoker    • Depression    • Disease of thyroid gland    • Drug dependence, in remission ([facility] )    • GERD (gastroesophageal reflux disease)    • H/O: whooping cough     while pregnant   • Hepatitis C, chronic (HCC)    • Insomnia disorder    • Liver disease     Hepatitis C   • Migraine    • Psychiatric illness    • PTSD (post-traumatic stress disorder)    • Substance abuse ([place] [address] )        Past Surgical History:   Procedure Laterality Date   • KNEE SURGERY     • OTHER SURGICAL HISTORY      body piercing   • [place] RPR UMBILICAL HRNA 5 YRS/> REDUCIBLE N/A 2016    Procedure: REPAIR HERNIA UMBILICAL WITH MESH;  Surgeon: Kathy Call MD;  Location:  MAIN OR;  Service: General   • VAGINAL DELIVERY      X 6   • WISDOM TOOTH EXTRACTION      X 4   • WOUND DEBRIDEMENT Right 2018    Procedure: RIGHT KNEE DEBRIDEMENT; [address] OUT; AND LACERATION REPAIR  INTRAOPERATIVE ASSESSMENT OF PATELLA TENDON;  Surgeon: Maggi Gonzales MD;  Location:  MAIN OR;  Service: Orthopedics       Family History   Problem Relation Age of Onset   • Hypertension Mother    • Thyroid disease Mother    • Hypertension Father    • Prostate cancer Father    • Anxiety disorder Sister      I have reviewed and agree with the history as documented      E-Cigarette/Vaping   • E-Cigarette Use Never User      E-Cigarette/Vaping Substances   • Other Yes      Social History     Tobacco Use   • Smoking status: Every Day     Packs/day: 0 25     Years: 12 00     Pack years: 3 00     Types: Cigarettes   • Smokeless tobacco: Never   Vaping Use   • Vaping Use: Never used   Substance Use Topics   • Alcohol use: Not Currently   • Drug use: Not Currently     Types: Marijuana     Comment: 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       Review of Systems   Constitutional: Negative  Negative for chills and fever  HENT: Negative  Negative for rhinorrhea, sore throat, trouble swallowing and voice change  Eyes: Negative  Negative for pain and visual disturbance  Respiratory: Negative  Negative for cough, shortness of breath and wheezing  Cardiovascular: Positive for chest pain  Negative for palpitations  Gastrointestinal: Negative for abdominal pain, diarrhea, nausea and vomiting  Genitourinary: Negative  Negative for dysuria and frequency  Musculoskeletal: Negative  Negative for neck pain and neck stiffness  Skin: Negative  Negative for rash  Neurological: Negative  Negative for dizziness, speech difficulty, weakness, light-headedness and numbness  Psychiatric/Behavioral: The patient is nervous/anxious  Physical Exam  Physical Exam  Vitals and nursing note reviewed  Constitutional:       General: She is not in acute distress  Appearance: She is well-developed  HENT:      Head: Normocephalic and atraumatic  Eyes:      Conjunctiva/sclera: Conjunctivae normal       Pupils: Pupils are equal, round, and reactive to light  Neck:      Trachea: No tracheal deviation  Cardiovascular:      Rate and Rhythm: Normal rate and regular rhythm  Pulmonary:      Effort: Pulmonary effort is normal  No respiratory distress  Breath sounds: Normal breath sounds  No wheezing or rales  Abdominal:      General: Bowel sounds are normal  There is no distension  Palpations: Abdomen is soft  Tenderness: There is no abdominal tenderness  There is no guarding or rebound  Musculoskeletal:         General: No tenderness or deformity  Normal range of motion  Cervical back: Normal range of motion and neck supple  Skin:     General: Skin is warm and dry  Capillary Refill: Capillary refill takes less than 2 seconds  Findings: No rash     Neurological: Mental Status: She is alert and oriented to person, place, and time     Psychiatric:         Behavior: Behavior normal          Vital Signs  ED Triage Vitals [02/27/23 1157]   Temperature Pulse Respirations Blood Pressure SpO2   97 9 °F (36 6 °C) 96 22 118/86 99 %      Temp Source Heart Rate Source Patient Position - Orthostatic VS BP Location FiO2 (%)   Oral Monitor Sitting Left arm --      Pain Score       --           Vitals:    02/27/23 1157   BP: 118/86   Pulse: 96   Patient Position - Orthostatic VS: Sitting         Visual Acuity      ED Medications  Medications   clonazePAM (KlonoPIN) tablet 1 mg (1 mg Oral Given 2/27/23 1220)       Diagnostic Studies  Results Reviewed     Procedure Component Value Units Date/Time    NT-BNP PRO-BE,[facility] only           [[id number]]  (Normal) Collected: 02/27/23 1214    Lab Status: Final result Specimen: Blood from Arm, Right Updated: 02/27/23 1244     NT-proBNP 33 6 pg/mL     Comprehensive metabolic panel [[id number]]  (Abnormal) Collected: 02/27/23 1214    Lab Status: Final result Specimen: Blood from Arm, Right Updated: 02/27/23 1244     Sodium 139 mmol/L      Potassium 4 1 mmol/L      Chloride 111 mmol/L      CO2 19 mmol/L      ANION GAP 9 mmol/L      BUN 14 mg/dL      Creatinine 0 93 mg/dL      Glucose 94 mg/dL      Calcium 8 9 mg/dL      AST 17 U/L      ALT 14 U/L      Alkaline Phosphatase 49 U/L      Total Protein 7 4 g/dL      Albumin 4 4 g/dL      Total Bilirubin 0 29 mg/dL      eGFR 79 ml/min/1 73sq m     Narrative:      Seema guidelines for Chronic Kidney Disease (CKD):   •  Stage 1 with normal or high GFR (GFR > 90 mL/min/1 73 square meters)  •  Stage 2 Mild CKD (GFR = 60-89 mL/min/1 73 square meters)  •  Stage 3A Moderate CKD (GFR = 45-59 mL/min/1 73 square meters)  •  Stage 3B Moderate CKD (GFR = 30-44 mL/min/1 73 square meters)  •  Stage 4 Severe CKD (GFR = 15-29 mL/min/1 73 square meters)  •  Stage 5 End Stage CKD (GFR <15 mL/min/1 73 square meters)  Note: GFR calculation is accurate only with a steady state creatinine    High Sensitivity Troponin I Random [[id number]]  (Abnormal) Collected: 02/27/23 1214    Lab Status: Final result Specimen: Blood from Arm, Right Updated: 02/27/23 1241     HS TnI random <2 ng/L     CBC and differential [[id number]] Collected: 02/27/23 1214    Lab Status: Final result Specimen: Blood from Arm, Right Updated: 02/27/23 1219     WBC 7 39 Thousand/uL      RBC 4 64 Million/uL      Hemoglobin 13 7 g/dL      Hematocrit 42 5 %      MCV 92 fL      MCH 29 5 pg      MCHC 32 2 g/dL      RDW 12 5 %      MPV 10 7 fL      Platelets 378 Thousands/uL      nRBC 0 /100 WBCs      Neutrophils Relative 65 %      Immat GRANS % 0 %      Lymphocytes Relative 30 %      Monocytes Relative 4 %      Eosinophils Relative 1 %      Basophils Relative 0 %      Neutrophils Absolute 4 75 Thousands/µL      Immature Grans Absolute 0 03 Thousand/uL      Lymphocytes Absolute 2 23 Thousands/µL      Monocytes Absolute 0 26 Thousand/µL      Eosinophils Absolute 0 09 Thousand/µL      Basophils Absolute 0 03 Thousands/µL     POCT pregnancy, urine [[id number]]     Lab Status: No result                  XR chest 1 view portable   ED Interpretation by Xi Chan DO (02/27 1228)   No acute cardiopulmonary disease appreciated                   Procedures  Procedures         ED Course  ED Course as of 02/27/23 1252   Mon Feb 27, 2023   1211 Procedure Note: EKG  Date/Time: 02/27/23 12:11 PM   Performed by: Maritza Zhang  Authorized by: Maritza Zhang  ECG interpreted by me, the ED Provider: yes   The EKG demonstrates:  Rate 89  Rhythm sinus  QTc 447  No ST elevations/depressions                 HEART Risk Score    Flowsheet Row Most Recent Value   Heart Score Risk Calculator    History 0 Filed at: 02/27/2023 1245   ECG 1 Filed at: 02/27/2023 1245   Age 0 Filed at: 02/27/2023 1245   Risk Factors 1 Filed at: 02/27/2023 1245   Troponin 0 Filed at: 02/27/2023 1245   HEART Score 2 Filed at: 02/27/2023 1245                                      Medical Decision Making  Patient is a well-appearing 15-year-old female who when discussing her stress becomes increasingly anxious  She is not currently having chest pain and has been several hours since she experienced any  She states she is not seen a specialist or had blood work to assess her heart in the past   Differential diagnosis includes but is not limited to anxiety, arrhythmias, ACS  Plan for EKG chest x-ray and blood work to screen for arrhythmias and ACS  We will treat for anxiety symptomatically as she is on Klonopin is due for a dose at noon today  Patient's blood work and imaging and EKG are okay, patient can be discharged for outpatient follow-up  EKG reviewed by me and and without obvious arrhythmia, Labs reviewed by me and steph, CXR reviewed by me and steph as well  Troponin undetectable  Patient updated on test results, need for follow up with PCP and cardiology  Strict return precautions reviewed  Anxiety: self-limited or minor problem  Chest pain: acute illness or injury  Amount and/or Complexity of Data Reviewed  Labs: ordered  Radiology: ordered and independent interpretation performed  Risk  Prescription drug management  Disposition  Final diagnoses:   Chest pain   Anxiety     Time reflects when diagnosis was documented in both MDM as applicable and the Disposition within this note     Time User Action Codes Description Comment    2/27/2023 12:47 PM Beth Gaspar Add [R07 9] Chest pain     2/27/2023 12:47 PM Beth Gaspar Add [F41 9] Anxiety       ED Disposition     ED Disposition   Discharge    Condition   Stable    Date/Time   Mon Feb 27, 2023 12:47 PM    Comment   Geno Courtney discharge to home/self care                 Follow-up Information     Follow up With Specialties Details Why Contact Info Additional [address] In 1 week  [address], [address] [address]  [address], [address], [address] [place], [place] [place] [address] Cardiology Schedule an appointment as soon as possible for a visit   [address] [address]  [address], [address] [facility], [place], [place], [address] [phone]          Patient's Medications   Discharge Prescriptions    No medications on file       No discharge procedures on file      PDMP Review       Value Time User    PDMP Reviewed  Yes 1/6/2023  8:57 AM Adelso Terrell MD          ED Provider  Electronically Signed by           Karoline Tejada DO  02/27/23 1134

## 2023-02-27 NOTE — ED NOTES
Patient reports she needs to leave as she has other things to do at home  Requesting a phone call with any abnormal results  Provider made aware and patient marked for discharge        Kenn Caldwell RN  02/27/23 7817

## 2023-03-05 DIAGNOSIS — F41.9 ANXIETY: ICD-10-CM

## 2023-03-05 DIAGNOSIS — M79.2 NEUROPATHIC PAIN: ICD-10-CM

## 2023-03-05 RX ORDER — GABAPENTIN 300 MG/1
CAPSULE ORAL
Qty: 90 CAPSULE | Refills: 0 | Status: SHIPPED | OUTPATIENT
Start: 2023-03-05

## 2023-03-07 DIAGNOSIS — F41.9 ANXIETY: ICD-10-CM

## 2023-03-07 RX ORDER — CLONAZEPAM 1 MG/1
1 TABLET ORAL 3 TIMES DAILY
Qty: 90 TABLET | Refills: 0 | Status: SHIPPED | OUTPATIENT
Start: 2023-03-07

## 2023-03-13 ENCOUNTER — DOCUMENTATION (OUTPATIENT)
Dept: PSYCHIATRY | Facility: CLINIC | Age: 38
End: 2023-03-13
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
## 2023-03-20 ENCOUNTER — HOSPITAL ENCOUNTER (EMERGENCY)
Facility: HOSPITAL | Age: 38
Discharge: HOME | End: 2023-03-20
Attending: EMERGENCY MEDICINE | Admitting: EMERGENCY MEDICINE

## 2023-03-20 ENCOUNTER — APPOINTMENT (EMERGENCY)
Dept: CT IMAGING | Facility: HOSPITAL | Age: 38
End: 2023-03-20

## 2023-03-20 VITALS
DIASTOLIC BLOOD PRESSURE: 61 MMHG | BODY MASS INDEX: 33.93 KG/M2 | WEIGHT: 196.1 LBS | TEMPERATURE: 97.6 F | OXYGEN SATURATION: 98 % | RESPIRATION RATE: 16 BRPM | SYSTOLIC BLOOD PRESSURE: 103 MMHG | HEART RATE: 89 BPM

## 2023-03-20 DIAGNOSIS — R51.9 HEADACHE: ICD-10-CM

## 2023-03-20 DIAGNOSIS — R07.9 CHEST PAIN, UNSPECIFIED TYPE: Primary | ICD-10-CM

## 2023-03-20 LAB
AMPHETAMINES SERPL QL SCN: NEGATIVE
ANION GAP SERPL CALCULATED.3IONS-SCNC: 6 MMOL/L (ref 4–13)
ATRIAL RATE: 100 BPM
BARBITURATES UR QL: NEGATIVE
BASOPHILS # BLD AUTO: 0.04 THOUSANDS/ÂΜL (ref 0–0.1)
BASOPHILS NFR BLD AUTO: 1 % (ref 0–1)
BENZODIAZ UR QL: NEGATIVE
BUN SERPL-MCNC: 12 MG/DL (ref 5–25)
CALCIUM SERPL-MCNC: 8.6 MG/DL (ref 8.4–10.2)
CARDIAC TROPONIN I PNL SERPL HS: <2 NG/L (ref 8–18)
CHLORIDE SERPL-SCNC: 110 MMOL/L (ref 96–108)
CO2 SERPL-SCNC: 24 MMOL/L (ref 21–32)
COCAINE UR QL: NEGATIVE
CREAT SERPL-MCNC: 1 MG/DL (ref 0.6–1.3)
EOSINOPHIL # BLD AUTO: 0.13 THOUSAND/ÂΜL (ref 0–0.61)
EOSINOPHIL NFR BLD AUTO: 2 % (ref 0–6)
ERYTHROCYTE [DISTWIDTH] IN BLOOD BY AUTOMATED COUNT: 12.7 % (ref 11.6–15.1)
EXT PREGNANCY TEST URINE: NEGATIVE
EXT. CONTROL: NORMAL
GFR SERPL CREATININE-BSD FRML MDRD: 72 ML/MIN/1.73SQ M
GLUCOSE SERPL-MCNC: 98 MG/DL (ref 65–140)
HCT VFR BLD AUTO: 38.6 % (ref 34.8–46.1)
HGB BLD-MCNC: 12.3 G/DL (ref 11.5–15.4)
IMM GRANULOCYTES # BLD AUTO: 0.04 THOUSAND/UL (ref 0–0.2)
IMM GRANULOCYTES NFR BLD AUTO: 1 % (ref 0–2)
LYMPHOCYTES # BLD AUTO: 2.75 THOUSANDS/ÂΜL (ref 0.6–4.47)
LYMPHOCYTES NFR BLD AUTO: 44 % (ref 14–44)
MCH RBC QN AUTO: 28.9 PG (ref 26.8–34.3)
MCHC RBC AUTO-ENTMCNC: 31.9 G/DL (ref 31.4–37.4)
MCV RBC AUTO: 91 FL (ref 82–98)
METHADONE UR QL: NEGATIVE
MONOCYTES # BLD AUTO: 0.27 THOUSAND/ÂΜL (ref 0.17–1.22)
MONOCYTES NFR BLD AUTO: 4 % (ref 4–12)
NEUTROPHILS # BLD AUTO: 3.07 THOUSANDS/ÂΜL (ref 1.85–7.62)
NEUTS SEG NFR BLD AUTO: 48 % (ref 43–75)
NRBC BLD AUTO-RTO: 0 /100 WBCS
OPIATES UR QL SCN: NEGATIVE
OXYCODONE+OXYMORPHONE UR QL SCN: NEGATIVE
P AXIS: 68 DEGREES
PCP UR QL: NEGATIVE
PLATELET # BLD AUTO: 209 THOUSANDS/UL (ref 149–390)
PMV BLD AUTO: 11.1 FL (ref 8.9–12.7)
POTASSIUM SERPL-SCNC: 3.6 MMOL/L (ref 3.5–5.3)
PR INTERVAL: 124 MS
QRS AXIS: 60 DEGREES
QRSD INTERVAL: 76 MS
QT INTERVAL: 320 MS
QTC INTERVAL: 412 MS
RBC # BLD AUTO: 4.25 MILLION/UL (ref 3.81–5.12)
SODIUM SERPL-SCNC: 140 MMOL/L (ref 135–147)
T WAVE AXIS: -65 DEGREES
THC UR QL: POSITIVE
VENTRICULAR RATE: 100 BPM
WBC # BLD AUTO: 6.3 THOUSAND/UL (ref 4.31–10.16)

## 2023-03-20 RX ORDER — ACETAMINOPHEN 325 MG/1
975 TABLET ORAL ONCE
Status: COMPLETED | OUTPATIENT
Start: 2023-03-20 | End: 2023-03-20

## 2023-03-20 RX ADMIN — ACETAMINOPHEN 975 MG: 325 TABLET ORAL at 17:37

## 2023-03-20 NOTE — ED PROVIDER NOTES
History  Chief Complaint   Patient presents with   • Medical Problem     States she has been waking up with marks on herself that she cannot explain for past 2 years  States she trusts the people she lives with and does not think they are doing this   • Chest Pain     Also is having chest pain and states that specific spots of her head hurt     Patient is a 64-year-old female here with 3 complaints  1   Patient states she's had daily sternal constant nonradiating chest pain for the last several months  Cannot specify onset  Has been seen here previous for same  No cardiac history  No meds taken  2   Patient states has also had daily headaches again for an unknown duration, states specifically at two locations in her head  Has seen PCP about it  Had topomax decreased     No numbness/weakness/n/v   3   States for over 2 years she wakes up almost daily with marks on her body that eventually turn into lumps  States 2 weeks ago had bruising behind her knee and felt a "waterfall" like sensation go down to her ankle  Now with a lump there as well  States had one on her leg  Cannot remember which leg  Cannot describe what these marks look like  States long term partner has never been physically or verbally abusive  States only person she's close to who has an issue with her is her mother in law, but she does not have access to the house  Patient [facility] the [facility]   Has been requesting a drug test for the last few weeks to see if she's been given something  Prior to Admission Medications   Prescriptions Last Dose Informant Patient Reported? Taking?    DULoxetine (CYMBALTA) 30 mg delayed release capsule   No No   Sig: Take 1 capsule (30 mg total) by mouth daily   NARCAN 4 MG/0 1ML LIQD   Yes No   Sig: ADMINISTER A SINGLE spray INTO ONE NOSTRIL CALL 911 MAY REPEAT ONCE   Patient not taking: No sig reported   albuterol (PROVENTIL HFA,VENTOLIN HFA) 90 mcg/act inhaler   No No   Sig: Inhale 2 puffs every 4 (four) hours as needed for wheezing   cholecalciferol (VITAMIN D3) 1,000 units tablet   No No   Sig: Take 1 tablet (1,000 Units total) by mouth daily   clonazePAM (KlonoPIN) 1 mg tablet   No No   Sig: Take 1 tablet (1 mg total) by mouth 3 (three) times a day   cyclobenzaprine (FLEXERIL) 10 mg tablet   No No   Sig: Take 1 tablet (10 mg total) by mouth 3 (three) times a day as needed for muscle spasms   gabapentin (NEURONTIN) 300 mg capsule   No No   Sig: take 1 capsule by mouth three times a day   ibuprofen (MOTRIN) 800 mg tablet   No No   Sig: Take 1 tablet (800 mg total) by mouth every 8 (eight) hours as needed for mild pain   levothyroxine 50 mcg tablet   No No   Si daily   omeprazole (PriLOSEC) 20 mg delayed release capsule   No No   Sig: take 1 capsule by mouth once daily   topiramate (TOPAMAX) 100 mg tablet   No No   Sig: Take 1 tablet (100 mg total) by mouth 2 (two) times a day   traZODone (DESYREL) 50 mg tablet   No No   Si-2 HS      Facility-Administered Medications: None       Past Medical History:   Diagnosis Date   • Anxiety     "severe"   • Asthma    • Bipolar disorder (HCC)    • Cigarette smoker    • Depression    • Disease of thyroid gland    • Drug dependence, in remission (HCC)    • GERD (gastroesophageal reflux disease)    • H/O: whooping cough     while pregnant   • Hepatitis C, chronic (HCC)    • Insomnia disorder    • Liver disease     Hepatitis C   • Migraine    • Psychiatric illness    • PTSD (post-traumatic stress disorder)    • Substance abuse ([facility] [address] )        Past Surgical History:   Procedure Laterality Date   • KNEE SURGERY     • OTHER SURGICAL HISTORY      body piercing   • FL RPR UMBILICAL HRNA 5 YRS/> REDUCIBLE N/A 2016    Procedure: REPAIR HERNIA UMBILICAL WITH MESH;  Surgeon: Jesse Flynn MD;  Location: [facility];  Service: General   • VAGINAL DELIVERY      X 6   • WISDOM TOOTH EXTRACTION      X 4   • WOUND DEBRIDEMENT Right 2018    Procedure: RIGHT KNEE DEBRIDEMENT; [address] OUT; AND LACERATION REPAIR  INTRAOPERATIVE ASSESSMENT OF PATELLA TENDON;  Surgeon: Marjorie Ruelas MD;  Location: [facility];  Service: Orthopedics       Family History   Problem Relation Age of Onset   • Hypertension Mother    • Thyroid disease Mother    • Hypertension Father    • Prostate cancer Father    • Anxiety disorder Sister      I have reviewed and agree with the history as documented  E-Cigarette/Vaping   • E-Cigarette Use Current Every Day User      E-Cigarette/Vaping Substances   • Other Yes      Social History     Tobacco Use   • Smoking status: Every Day     Packs/day: 0 25     Years: 12 00     Pack years: 3 00     Types: Cigarettes   • Smokeless tobacco: Never   Vaping Use   • Vaping Use: Every day   Substance Use Topics   • Alcohol use: Not Currently   • Drug use: Not Currently     Types: Marijuana     Comment: 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       Review of Systems   Constitutional: Negative  HENT: Negative  Eyes: Negative  Respiratory: Negative  Cardiovascular: Positive for chest pain  Gastrointestinal: Negative  Endocrine: Negative  Genitourinary: Negative  Musculoskeletal: Negative  Skin: Positive for wound  Allergic/Immunologic: Negative  Neurological: Positive for headaches  Hematological: Negative  Psychiatric/Behavioral: Negative  All other systems reviewed and are negative  Physical Exam  Physical Exam  Vitals and nursing note reviewed  Constitutional:       Appearance: She is well-developed  She is obese  HENT:      Head: Normocephalic and atraumatic  Eyes:      Extraocular Movements: Extraocular movements intact  Pupils: Pupils are equal, round, and reactive to light  Cardiovascular:      Rate and Rhythm: Normal rate and regular rhythm  Heart sounds: Normal heart sounds  Pulmonary:      Effort: Pulmonary effort is normal       Breath sounds: Normal breath sounds     Abdominal: General: Bowel sounds are normal       Palpations: Abdomen is soft  Musculoskeletal:         General: Normal range of motion  Cervical back: Normal range of motion and neck supple  Right lower leg: No tenderness  No edema  Left lower leg: No tenderness  No edema  Skin:     General: Skin is warm and dry  Comments: No visible signs of trauma on body  No abnormal anatomy  Neurological:      General: No focal deficit present  Mental Status: She is alert and oriented to person, place, and time  Cranial Nerves: No cranial nerve deficit  Motor: No weakness  Psychiatric:         Mood and Affect: Mood is anxious  Behavior: Behavior is agitated           Vital Signs  ED Triage Vitals   Temperature Pulse Respirations Blood Pressure SpO2   03/20/23 1652 03/20/23 1652 03/20/23 1652 03/20/23 1652 03/20/23 1652   97 6 °F (36 4 °C) 93 20 124/69 98 %      Temp Source Heart Rate Source Patient Position - Orthostatic VS BP Location FiO2 (%)   03/20/23 1652 03/20/23 1652 03/20/23 1652 03/20/23 1652 --   Tympanic Monitor Sitting Left arm       Pain Score       03/20/23 1737       8           Vitals:    03/20/23 1652 03/20/23 1900   BP: 124/69 103/61   Pulse: 93 89   Patient Position - Orthostatic VS: Sitting Sitting         Visual Acuity      ED Medications  Medications   acetaminophen (TYLENOL) tablet 975 mg (975 mg Oral Given 3/20/23 1737)       Diagnostic Studies  Results Reviewed     Procedure Component Value Units Date/Time    High Sensitivity Troponin I Random [[id number]]  (Abnormal) Collected: 03/20/23 1722    Lab Status: Final result Specimen: Blood from Arm, Right Updated: 03/20/23 1828     HS TnI random <2 ng/L     Rapid drug screen, urine [[id number]]  (Abnormal) Collected: 03/20/23 1722    Lab Status: Final result Specimen: Urine, Catheter Updated: 03/20/23 1755     Amph/Meth UR Negative     Barbiturate Ur Negative     Benzodiazepine Urine Negative     Cocaine Urine Negative     Methadone Urine Negative     Opiate Urine Negative     PCP Ur Negative     THC Urine Positive     Oxycodone Urine Negative    Narrative:      Presumptive report  If requested, specimen will be sent to reference lab for confirmation  FOR MEDICAL PURPOSES ONLY  IF CONFIRMATION NEEDED PLEASE CONTACT THE LAB WITHIN 5 DAYS      Drug Screen Cutoff Levels:  AMPHETAMINE/METHAMPHETAMINES  1000 ng/mL  BARBITURATES     200 ng/mL  BENZODIAZEPINES     200 ng/mL  COCAINE      300 ng/mL  METHADONE      300 ng/mL  OPIATES      300 ng/mL  PHENCYCLIDINE     25 ng/mL  THC       50 ng/mL  OXYCODONE      100 ng/mL    Basic metabolic panel [[id number]]  (Abnormal) Collected: 03/20/23 1722    Lab Status: Final result Specimen: Blood from Arm, Right Updated: 03/20/23 1751     Sodium 140 mmol/L      Potassium 3 6 mmol/L      Chloride 110 mmol/L      CO2 24 mmol/L      ANION GAP 6 mmol/L      BUN 12 mg/dL      Creatinine 1 00 mg/dL      Glucose 98 mg/dL      Calcium 8 6 mg/dL      eGFR 72 ml/min/1 73sq m     Narrative:      [place] guidelines for Chronic Kidney Disease (CKD):   •  Stage 1 with normal or high GFR (GFR > 90 mL/min/1 73 square meters)  •  Stage 2 Mild CKD (GFR = 60-89 mL/min/1 73 square meters)  •  Stage 3A Moderate CKD (GFR = 45-59 mL/min/1 73 square meters)  •  Stage 3B Moderate CKD (GFR = 30-44 mL/min/1 73 square meters)  •  Stage 4 Severe CKD (GFR = 15-29 mL/min/1 73 square meters)  •  Stage 5 End Stage CKD (GFR <15 mL/min/1 73 square meters)  Note: GFR calculation is accurate only with a steady state creatinine    CBC and differential [[id number]] Collected: 03/20/23 1722    Lab Status: Final result Specimen: Blood from Arm, Right Updated: 03/20/23 1737     WBC 6 30 Thousand/uL      RBC 4 25 Million/uL      Hemoglobin 12 3 g/dL      Hematocrit 38 6 %      MCV 91 fL      MCH 28 9 pg      MCHC 31 9 g/dL      RDW 12 7 %      MPV 11 1 fL      Platelets 915 Thousands/uL      nRBC 0 /100 WBCs      Neutrophils Relative 48 %      Immat GRANS % 1 %      Lymphocytes Relative 44 %      Monocytes Relative 4 %      Eosinophils Relative 2 %      Basophils Relative 1 %      Neutrophils Absolute 3 07 Thousands/µL      Immature Grans Absolute 0 04 Thousand/uL      Lymphocytes Absolute 2 75 Thousands/µL      Monocytes Absolute 0 27 Thousand/µL      Eosinophils Absolute 0 13 Thousand/µL      Basophils Absolute 0 04 Thousands/µL     POCT pregnancy, urine [[id number]]  (Normal) Resulted: 03/20/23 1724    Lab Status: Final result Updated: 03/20/23 1725     EXT Preg Test, Ur Negative     Control Valid                 CT head without contrast   Final Result by Matthieu Oquendo MD (03/20 1853)      No acute intracranial abnormality  Workstation performed: [id number]                    Procedures  ECG 12 Lead Documentation Only    Date/Time: 3/20/2023 4:45 PM  Performed by: Tiffanie Milligan MD  Authorized by: Tiffanie Milligan MD     Indications / Diagnosis:  Cp  ECG reviewed by me, the ED Provider: yes    Patient location:  ED  Interpretation:     Interpretation: normal    Rate:     ECG rate:  100    ECG rate assessment: normal    Rhythm:     Rhythm: sinus rhythm    Ectopy:     Ectopy: PVCs      PVCs:  Infrequent  QRS:     QRS intervals:  Normal  Conduction:     Conduction: normal    ST segments:     ST segments:  Normal  T waves:     T waves: normal               ED Course  ED Course as of 03/20/23 1927   Mon Mar 20, 2023   1902 HS TnI random(!): <2   1908 Went over results  Stressed need to follow up with PCP  Cannot explain her 2 year h/o complaints                 HEART Risk Score    Flowsheet Row Most Recent Value   Heart Score Risk Calculator    History 0 Filed at: 03/20/2023 1841   ECG 0 Filed at: 03/20/2023 1841   Age 0 Filed at: 03/20/2023 1841   Risk Factors 0 Filed at: 03/20/2023 1841   Troponin 0 Filed at: 03/20/2023 1841   HEART Score 0 Filed at: 03/20/2023 6074 SBIRT 20yo+    Flowsheet Row Most Recent Value   SBIRT (23 yo +)    In order to provide better care to our patients, we are screening all of our patients for alcohol and drug use  Would it be okay to ask you these screening questions? No Filed at: 03/20/2023 1657                    Medical Decision Making  Chest pain, unspecified type: chronic illness or injury     Details: HEART score zero  Headache: chronic illness or injury     Details: CT neg  Amount and/or Complexity of Data Reviewed  Labs: ordered  Decision-making details documented in ED Course  Radiology: ordered  Decision-making details documented in ED Course  ECG/medicine tests: ordered and independent interpretation performed  Decision-making details documented in ED Course  Risk  OTC drugs  Disposition  Final diagnoses:   Chest pain, unspecified type   Headache     Time reflects when diagnosis was documented in both MDM as applicable and the Disposition within this note     Time User Action Codes Description Comment    3/20/2023  7:11 PM Therisa Jonathan [R07 9] Chest pain, unspecified type     3/20/2023  7:11 PM Batool Pi Add [R51 9] Headache       ED Disposition     ED Disposition   Discharge    Condition   Stable    Date/Time   Mon Mar 20, 2023  7:11 PM    Comment   CHER Courtney discharge to home/self care                 Follow-up Information     Follow up With Specialties Details Why [address] Cori Victor MD [address]   [phone]            Discharge Medication List as of 3/20/2023  7:12 PM      CONTINUE these medications which have NOT CHANGED    Details   omeprazole (PriLOSEC) 20 mg delayed release capsule take 1 capsule by mouth once daily, Normal      albuterol (PROVENTIL HFA,VENTOLIN HFA) 90 mcg/act inhaler Inhale 2 puffs every 4 (four) hours as needed for wheezing, Starting Wed 10/5/2022, Normal      cholecalciferol (VITAMIN D3) 1,000 units tablet Take 1 tablet (1,000 Units total) by mouth daily, Starting Fri 12/2/2022, Until Sun 1/1/2023, Normal      clonazePAM (KlonoPIN) 1 mg tablet Take 1 tablet (1 mg total) by mouth 3 (three) times a day, Starting Tue 3/7/2023, Normal      cyclobenzaprine (FLEXERIL) 10 mg tablet Take 1 tablet (10 mg total) by mouth 3 (three) times a day as needed for muscle spasms, Starting Wed 1/4/2023, Normal      DULoxetine (CYMBALTA) 30 mg delayed release capsule Take 1 capsule (30 mg total) by mouth daily, Starting Fri 1/6/2023, Until Sun 2/5/2023, Normal      gabapentin (NEURONTIN) 300 mg capsule take 1 capsule by mouth three times a day, Normal      ibuprofen (MOTRIN) 800 mg tablet Take 1 tablet (800 mg total) by mouth every 8 (eight) hours as needed for mild pain, Starting Mon 11/1/2021, Normal      levothyroxine 50 mcg tablet 1 daily, Normal      NARCAN 4 MG/0 1ML LIQD ADMINISTER A SINGLE spray INTO ONE NOSTRIL CALL 911 MAY REPEAT ONCE, Historical Med      topiramate (TOPAMAX) 100 mg tablet Take 1 tablet (100 mg total) by mouth 2 (two) times a day, Starting Fri 12/2/2022, Until Sun 1/1/2023, Normal      traZODone (DESYREL) 50 mg tablet 1-2 HS, Normal             No discharge procedures on file      PDMP Review       Value Time User    PDMP Reviewed  Yes 3/7/2023  9:28 PM Pamelia Dance, MD          ED Provider  Electronically Signed by           Trudy Orlando MD  03/20/23 2018

## 2023-03-21 ENCOUNTER — DOCUMENTATION (OUTPATIENT)
Dept: PSYCHIATRY | Facility: CLINIC | Age: 38
End: 2023-03-21

## 2023-03-21 ENCOUNTER — TELEPHONE (OUTPATIENT)
Dept: PSYCHIATRY | Facility: CLINIC | Age: 38
End: 2023-03-21

## 2023-03-21 NOTE — TELEPHONE ENCOUNTER
Patient called the office looking to speak with Ceferino Mosquera, [facility]   She did not specify what she needed to discuss, however it appears she was seen in the ED yesterday at 4:26PM

## 2023-03-23 ENCOUNTER — TELEMEDICINE (OUTPATIENT)
Dept: PSYCHIATRY | Facility: CLINIC | Age: 38
End: 2023-03-23

## 2023-03-23 DIAGNOSIS — F11.20 OPIOID DEPENDENCE ON AGONIST THERAPY (HCC): ICD-10-CM

## 2023-03-23 DIAGNOSIS — Z72.0 TOBACCO USE: ICD-10-CM

## 2023-03-23 DIAGNOSIS — F41.9 ANXIETY: Primary | ICD-10-CM

## 2023-03-23 NOTE — PSYCH
Virtual Regular Visit    Verification of patient location: [place]    Patient is located in the following state in which I hold an active license [place]      Assessment/Plan:    Problem List Items Addressed This Visit        Other    Anxiety - Primary    Tobacco use    Opioid dependence on agonist therapy ([facility] )       Goals addressed in session: Goal 1          Reason for visit is   Chief Complaint   Patient presents with   • Virtual Regular Visit        Encounter provider Genesis Terry, [facility]    Provider located at [address] [address] [phone]      Recent Visits  Date Type Provider Dept   03/21/23 Telephone Gonzales Burton recent visits within past 7 days and meeting all other requirements  Today's Visits  Date Type Provider Dept   03/23/23 Telemedicine Amelia Peoples [facility] Mitch Emmanuel   Showing today's visits and meeting all other requirements  Future Appointments  No visits were found meeting these conditions  Showing future appointments within next 150 days and meeting all other requirements       The patient was identified by name and date of birth  Teddy Catalan was informed that this is a telemedicine visit and [facility] the [facility]  She agrees to proceed     My office door was closed  No one else was in the room  She acknowledged consent and understanding of privacy and security of the video platform  The patient has agreed to participate and understands they can discontinue the visit at any time  Patient is aware this is a billable service  Marielle Cheatham is a 40 y o  female presenting for follow up today  Behavioral Health Psychotherapy Progress Note    Psychotherapy Provided: Individual Psychotherapy     1  Anxiety        2   Opioid dependence on agonist therapy ([facility] )        3  Tobacco use            Goals addressed in session: Goal 1     DATA: Geno discussed her perceptions of the relationship between Johnnie's grandmother and herself the majority of the session; Geno was challenged with reality checking instead of allowing herself to build on perceptions and negative thoughts  Geno was encouraged to focus on what she can control and do which is being a good mother to her son  Geno agreed she needs to focus on what she can control and what she can do instead of allowing negative thoughts and perceptions to take over her thinking  During this session, this clinician used the following therapeutic modalities: Engagement Strategies, Bereavement Therapy, Mindfulness-based Strategies, Motivational Interviewing, Solution-Focused Therapy and Supportive Psychotherapy    Substance Abuse was addressed during this session  If the client is diagnosed with a co-occurring substance use disorder, please indicate any changes in the frequency or amount of use: Geno endorses no reoccurrence of use at this time  Stage of change for addressing substance use diagnoses: Maintenance    ASSESSMENT:  Geno Courtney presents with a Anxious mood  her affect is Constricted, which is congruent, with her mood and the content of the session  The client has made progress on their goals  Baylee Torres presents with a none risk of suicide, none risk of self-harm, and none risk of harm to others  For any risk assessment that surpasses a "low" rating, a safety plan must be developed  A safety plan was indicated: yes  If yes, describe in detail     PLAN: Between sessions, Baylee Torres will    At the next session, the therapist will use Engagement Strategies, Client-centered Therapy, Cognitive Behavioral Therapy, Mindfulness-based Strategies, Motivational Interviewing, Solution-Focused Therapy and Supportive Psychotherapy to address      Behavioral Health Treatment Plan and Discharge Planning: Ariel Zuniga is aware of and agrees to continue to work on their treatment plan  They have identified and are working toward their discharge goals  yes    Visit start and stop times:    03/23/23  Start Time: 1315  Stop Time: 1345  Total Visit Time: 30 minutes    HPI     Past Medical History:   Diagnosis Date   • Anxiety     "severe"   • Asthma    • Bipolar disorder ([facility] )    • Cigarette smoker    • Depression    • Disease of thyroid gland    • Drug dependence, in remission ([facility] )    • GERD (gastroesophageal reflux disease)    • H/O: whooping cough     while pregnant   • Hepatitis C, chronic (HCC)    • Insomnia disorder    • Liver disease     Hepatitis C   • Migraine    • Psychiatric illness    • PTSD (post-traumatic stress disorder)    • Substance abuse ([facility] )        Past Surgical History:   Procedure Laterality Date   • KNEE SURGERY     • OTHER SURGICAL HISTORY      body piercing   • AZ RPR UMBILICAL HRNA 5 YRS/> REDUCIBLE N/A 04/19/2016    Procedure: REPAIR HERNIA UMBILICAL WITH MESH;  Surgeon: Mikey Middleton MD;  Location:  MAIN OR;  Service: General   • VAGINAL DELIVERY      X 6   • WISDOM TOOTH EXTRACTION      X 4   • WOUND DEBRIDEMENT Right 01/17/2018    Procedure: RIGHT KNEE DEBRIDEMENT; [address] OUT; AND LACERATION REPAIR   INTRAOPERATIVE ASSESSMENT OF PATELLA TENDON;  Surgeon: Kendall Jacobsen MD;  Location:  MAIN OR;  Service: Orthopedics       Current Outpatient Medications   Medication Sig Dispense Refill   • omeprazole (PriLOSEC) 20 mg delayed release capsule take 1 capsule by mouth once daily 90 capsule 3   • albuterol (PROVENTIL HFA,VENTOLIN HFA) 90 mcg/act inhaler Inhale 2 puffs every 4 (four) hours as needed for wheezing 18 g 5   • cholecalciferol (VITAMIN D3) 1,000 units tablet Take 1 tablet (1,000 Units total) by mouth daily 30 tablet 0   • clonazePAM (KlonoPIN) 1 mg tablet Take 1 tablet (1 mg total) by mouth 3 (three) times a day 90 tablet 0   • cyclobenzaprine (FLEXERIL) 10 mg tablet Take 1 tablet (10 mg total) by mouth 3 (three) times a day as needed for muscle spasms 90 tablet 3   • DULoxetine (CYMBALTA) 30 mg delayed release capsule Take 1 capsule (30 mg total) by mouth daily 30 capsule 3   • gabapentin (NEURONTIN) 300 mg capsule take 1 capsule by mouth three times a day 90 capsule 0   • ibuprofen (MOTRIN) 800 mg tablet Take 1 tablet (800 mg total) by mouth every 8 (eight) hours as needed for mild pain 90 tablet 3   • levothyroxine 50 mcg tablet 1 daily 30 tablet 5   • NARCAN 4 MG/0 1ML LIQD ADMINISTER A SINGLE spray INTO ONE NOSTRIL CALL 911 MAY REPEAT ONCE (Patient not taking: No sig reported)  0   • topiramate (TOPAMAX) 100 mg tablet Take 1 tablet (100 mg total) by mouth 2 (two) times a day 90 tablet 3   • traZODone (DESYREL) 50 mg tablet 1-2  tablet 1     No current facility-administered medications for this visit  Allergies   Allergen Reactions   • Amoxicillin      Pt states it never works for her        Review of Systems    Video Exam    There were no vitals filed for this visit      Physical Exam
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
## 2023-03-24 ENCOUNTER — TELEPHONE (OUTPATIENT)
Dept: BARIATRICS | Facility: CLINIC | Age: 38
End: 2023-03-24

## 2023-03-28 ENCOUNTER — TELEPHONE (OUTPATIENT)
Dept: PSYCHIATRY | Facility: CLINIC | Age: 38
End: 2023-03-28

## 2023-03-28 NOTE — TELEPHONE ENCOUNTER
Patient called the office looking to speak with Lisy De Luna, [address]  Informed her Marlyn was speaking with a patient and would need to call her back  She verbalized understanding  Routing to Marlyn to advise

## 2023-04-03 ENCOUNTER — DOCUMENTATION (OUTPATIENT)
Dept: PSYCHIATRY | Facility: CLINIC | Age: 38
End: 2023-04-03

## 2023-04-03 NOTE — PROGRESS NOTES
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       Encounter Type:   Patient Face-to-Face    Start Time 1105  End Time 1140    Recovery needs addressed at this meeting:  Physical Health and Emotional/Mental Health    Note  D: Patient presented for in-person, scheduled visit with this CM  Patient states to still be feeling fatigued as well seeing the red spots continue to appear on patient's body which are now appearing on patient's son  This CM again questioned if patient has checked for any insects, such as bed bugs within the home  Patient denied  This CM questioned if patient wanted this CM to scheduled appt at [address] [place] [address] for patient to be seen as patient does not want to be seen in [facility] ED in which patient agreed  This CM was able to schedule same day appt for 1:20pm      Patient did request this CM to reach out to patient's mother in which this CM left message for mother to return call  Patient does want to relocate to mother's residence in order to start fresh with minor child  Patient did just enroll son in [employer] school in which patient states son to be enjoying  A: Patient presented in a labile mood  Patient still feels phones are hacked and does not wish to use and forms of communication when present at home  Patient did not present as a danger to self or others, no symptoms of SI/HI  P: Patient will continue to meet with this CM on a weekly basis     This CM will discuss these symptoms patient is experiencing with provider, Dr Lary Lovett for opinion on what patient should do to help reduction       Referrals made  Dulce Jensen    Next appointment date and time  Follow up in one week

## 2023-04-04 DIAGNOSIS — M79.2 NEUROPATHIC PAIN: ICD-10-CM

## 2023-04-04 DIAGNOSIS — F41.9 ANXIETY: ICD-10-CM

## 2023-04-04 RX ORDER — GABAPENTIN 300 MG/1
CAPSULE ORAL
Qty: 90 CAPSULE | Refills: 0 | Status: SHIPPED | OUTPATIENT
Start: 2023-04-04

## 2023-04-04 RX ORDER — CLONAZEPAM 1 MG/1
TABLET ORAL
Qty: 90 TABLET | Refills: 0 | Status: SHIPPED | OUTPATIENT
Start: 2023-04-04

## 2023-04-04 NOTE — TELEPHONE ENCOUNTER
We will refill patient's monthly clonazepam medication without any changes  The writer is only prescriber of the patient's clonazepam  PDMP was reviewed

## 2023-04-13 ENCOUNTER — SOCIAL WORK (OUTPATIENT)
Dept: PSYCHIATRY | Facility: CLINIC | Age: 38
End: 2023-04-13

## 2023-04-13 DIAGNOSIS — Z72.0 TOBACCO USE: ICD-10-CM

## 2023-04-13 DIAGNOSIS — F39 MOOD DISORDER (HCC): Primary | ICD-10-CM

## 2023-04-13 DIAGNOSIS — F41.9 ANXIETY: ICD-10-CM

## 2023-04-13 DIAGNOSIS — F11.20 OPIOID DEPENDENCE ON AGONIST THERAPY (HCC): ICD-10-CM

## 2023-04-13 PROBLEM — G89.18 POSTOPERATIVE PAIN: Status: ACTIVE | Noted: 2023-04-13

## 2023-04-13 PROBLEM — Z30.011 ORAL CONTRACEPTIVE PRESCRIBED: Status: ACTIVE | Noted: 2023-04-13

## 2023-04-24 ENCOUNTER — DOCUMENTATION (OUTPATIENT)
Dept: PSYCHIATRY | Facility: CLINIC | Age: 38
End: 2023-04-24
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
## 2023-04-26 PROBLEM — F39 MOOD DISORDER (HCC): Status: ACTIVE | Noted: 2023-04-26
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
## 2023-05-05 DIAGNOSIS — F41.9 ANXIETY: ICD-10-CM

## 2023-05-05 DIAGNOSIS — M79.2 NEUROPATHIC PAIN: ICD-10-CM

## 2023-05-05 DIAGNOSIS — J45.909 ASTHMA, UNSPECIFIED ASTHMA SEVERITY, UNSPECIFIED WHETHER COMPLICATED, UNSPECIFIED WHETHER PERSISTENT: ICD-10-CM

## 2023-05-05 RX ORDER — DULOXETIN HYDROCHLORIDE 30 MG/1
CAPSULE, DELAYED RELEASE ORAL
Qty: 30 CAPSULE | Refills: 3 | Status: SHIPPED | OUTPATIENT
Start: 2023-05-05

## 2023-05-06 RX ORDER — CYCLOBENZAPRINE HCL 10 MG
TABLET ORAL
Qty: 90 TABLET | Refills: 3 | Status: SHIPPED | OUTPATIENT
Start: 2023-05-06

## 2023-05-08 ENCOUNTER — DOCUMENTATION (OUTPATIENT)
Dept: PSYCHIATRY | Facility: CLINIC | Age: 38
End: 2023-05-08

## 2023-05-10 DIAGNOSIS — M79.2 NEUROPATHIC PAIN: ICD-10-CM

## 2023-05-10 DIAGNOSIS — F41.9 ANXIETY: ICD-10-CM

## 2023-05-10 DIAGNOSIS — F39 MOOD DISORDER (HCC): ICD-10-CM

## 2023-05-10 DIAGNOSIS — F51.01 PRIMARY INSOMNIA: ICD-10-CM

## 2023-05-10 RX ORDER — RISPERIDONE 0.5 MG/1
0.5 TABLET ORAL DAILY
Qty: 30 TABLET | Refills: 0 | Status: SHIPPED | OUTPATIENT
Start: 2023-05-10

## 2023-05-10 RX ORDER — RISPERIDONE 2 MG/1
2 TABLET ORAL
Qty: 30 TABLET | Refills: 0 | Status: SHIPPED | OUTPATIENT
Start: 2023-05-10

## 2023-05-10 RX ORDER — CLONAZEPAM 1 MG/1
1 TABLET ORAL 3 TIMES DAILY
Qty: 90 TABLET | Refills: 0 | Status: SHIPPED | OUTPATIENT
Start: 2023-05-10

## 2023-05-10 RX ORDER — TRAZODONE HYDROCHLORIDE 50 MG/1
25 TABLET ORAL
Qty: 15 TABLET | Refills: 0 | Status: SHIPPED | OUTPATIENT
Start: 2023-05-10

## 2023-05-11 NOTE — PROGRESS NOTES
Note Type: Case Management Note                  Date of Service: 05/08/2023  Service:  [facility]    Note: Case Management Note  Joycelyn Miller 40 y o  female [id number]  Attending  Substance Use History     Social History     Substance and Sexual Activity   Alcohol Use Not Currently        Social History     Substance and Sexual Activity   Drug Use Not Currently   • Types: Marijuana    Comment: 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       Encounter Type:   Patient Face-to-Face    Start Time 1100  End Time 1206    Recovery needs addressed at this meeting:  Basic  Needs, Life Skills, Social and Peer Support     Note  D: Patient presented for in-person, scheduled visit with this CM  Patient mentioned to this CM that neighbor in 3rd floor apartment has moved out since last session and patient feels a sense of relief as patient does not feel as many people are listening to patient and significant other  Patient does state to still be overwhelmed with cell phones and lack of knowledge on how to utilize phone  Patient and this CM spent a majority of session going through phone and organizing and deleting unnecessary apps or multiples to ensure patient is not confused or does not feel that there is more information on phone than there really is  A: Patient states to be feeling a sense of relief from prior session  Patient and this CM did touch base on concerns that did arise during last session in which patient did state to being overwhelmed but has since been able to spend time working on relationship with parents and significant other which has allowed patient to determine which relationships are more necessary to keep than others  Patient stated that relationship with parents seems to be more toxic than helpful as parents continue to drink and will not be good for overall wellbeing of both patient and minor child  Patient did present to be safe   No symptoms of SI/HI    P: Patient and this CM will continue to clean out cell phones in order for patient to ultimately function with solely one phone  Patient will follow up with this CM in one week       Patient did mention needing refills on certain medication with Dr Gleda Moritz  In which this CM will disucss    Referrals made  No referrals made during this session    Next appointment date and time  Follow up in one week

## 2023-05-15 ENCOUNTER — OFFICE VISIT (OUTPATIENT)
Dept: FAMILY MEDICINE CLINIC | Facility: CLINIC | Age: 38
End: 2023-05-15

## 2023-05-15 VITALS
TEMPERATURE: 97.8 F | HEART RATE: 92 BPM | OXYGEN SATURATION: 98 % | HEIGHT: 64 IN | WEIGHT: 190 LBS | DIASTOLIC BLOOD PRESSURE: 80 MMHG | BODY MASS INDEX: 32.44 KG/M2 | RESPIRATION RATE: 18 BRPM | SYSTOLIC BLOOD PRESSURE: 128 MMHG

## 2023-05-15 DIAGNOSIS — J30.1 SEASONAL ALLERGIC RHINITIS DUE TO POLLEN: Primary | ICD-10-CM

## 2023-05-15 RX ORDER — CETIRIZINE HYDROCHLORIDE 10 MG/1
10 TABLET ORAL DAILY
Qty: 30 TABLET | Refills: 0 | Status: SHIPPED | OUTPATIENT
Start: 2023-05-15

## 2023-05-15 RX ORDER — GABAPENTIN 300 MG/1
CAPSULE ORAL
Qty: 90 CAPSULE | Refills: 0 | Status: SHIPPED | OUTPATIENT
Start: 2023-05-15

## 2023-05-15 RX ORDER — FLUTICASONE PROPIONATE 50 MCG
1 SPRAY, SUSPENSION (ML) NASAL DAILY
Qty: 16 G | Refills: 2 | Status: SHIPPED | OUTPATIENT
Start: 2023-05-15

## 2023-05-15 NOTE — PATIENT INSTRUCTIONS
Allergic Rhinitis   WHAT YOU NEED TO KNOW:   Allergic rhinitis, or hay fever, is swelling of the inside of your nose  The swelling is a reaction to allergens in the air  An allergen can be anything that causes an allergic reaction  Allergies to weeds, grass, trees, or mold often cause seasonal allergic rhinitis  Indoor dust mites, cockroaches, pet dander, or mold can also cause allergic rhinitis  DISCHARGE INSTRUCTIONS:   Call 911 for the following: You have chest pain or shortness of breath  Return to the emergency department if:   You have severe pain  You cough up blood  Contact your healthcare provider if:   You have a fever  You have ear or sinus pain, or a headache  Your symptoms get worse, even after treatment  You have yellow, green, brown, or bloody mucus coming from your nose  Your nose is bleeding or you have pain inside your nose  You have trouble sleeping because of your symptoms  You have questions or concerns about your condition or care  Medicines:   Medicines  help decrease your symptoms and clear your stuffy nose  Take your medicine as directed  Contact your healthcare provider if you think your medicine is not helping or if you have side effects  Tell him of her if you are allergic to any medicine  Keep a list of the medicines, vitamins, and herbs you take  Include the amounts, and when and why you take them  Bring the list or the pill bottles to follow-up visits  Carry your medicine list with you in case of an emergency  How to manage allergic rhinitis:  The best way to manage allergic rhinitis is to avoid allergens that can trigger your symptoms  Any of the following may help decrease your symptoms:  Rinse your nose and sinuses  with a salt water solution or use a salt water nasal spray  This will help thin the mucus in your nose and rinse away pollen and dirt  It will also help reduce swelling so you can breathe normally   Ask your healthcare provider how often to rinse your nose  Reduce exposure to dust mites  Wash sheets and towels in hot water every week  Cover your pillows and mattresses with allergen-free covers  Limit the number of stuffed animals and soft toys your child has  Wash your child's toys in hot water regularly  Vacuum weekly and use a vacuum  with an air filter  If possible, get rid of carpets and curtains  These collect dust and dust mites  Reduce exposure to pollen  Keep windows and doors closed in your house and car  Stay inside when air pollution or the pollen count is high  Run your air conditioner on recycle, and change air filters often  Shower and wash your hair before bed every night to rinse away pollen  Reduce exposure to pet dander  If possible, do not keep cats, dogs, birds, or other pets  If you do keep pets in your home, keep them out of bedrooms and carpeted rooms  Bathe them often  Reduce exposure to mold  Do not spend time in basements  Choose artificial plants instead of live plants  Keep your home's humidity at less than 45%  Do not have ponds or standing water in your home or yard  Do not smoke  Avoid others who smoke  Ask your healthcare provider for information if you currently smoke and need help to quit  Follow up with your healthcare provider as directed: You may need to see an allergist often to control your symptoms  Write down your questions so you remember to ask them during your visits  © Copyright [employer] 2022 Information is for End User's use only and may not be sold, redistributed or otherwise used for commercial purposes  All illustrations and images included in CareNotes® are the copyrighted property of [employer]  or Gilda Rivera  The above information is an  only  It is not intended as medical advice for individual conditions or treatments   Talk to your doctor, nurse or pharmacist before following any medical regimen to see if it is safe and effective for you

## 2023-05-15 NOTE — PROGRESS NOTES
"Name: Hiro Amezcua      : 1985      MRN: [id number]  Encounter Provider: Shahana Steel MD  Encounter Date: 5/15/2023   Encounter department: [address] [place] [address]     1  Seasonal allergic rhinitis due to pollen  -     fluticasone (FLONASE) 50 mcg/act nasal spray; 1 spray into each nostril daily  -     cetirizine (ZyrTEC) 10 mg tablet; Take 1 tablet (10 mg total) by mouth daily      Subjective     HPI   Patient presents today with concern for \"allergy\"  She reports scratchy throat, sinus congestion and postnasal drip that makes her cough which started last night  Review of Systems  Denies fevers, chills, chest pain, shortness of breath, palpitations, syncopal or presyncopal episodes, abdominal pain, nausea, vomiting    Past Medical History:   Diagnosis Date   • Anxiety     \"severe\"   • Asthma    • Bipolar disorder ([facility] [address] [address] )    • Cigarette smoker    • Depression    • Disease of thyroid gland    • [facility]    • GERD (gastroesophageal reflux disease)    • H/O: whooping cough     while pregnant   • Hepatitis C, chronic (HCC)    • Insomnia disorder    • Liver disease     Hepatitis C   • Migraine    • Psychiatric illness    • PTSD (post-traumatic stress disorder)    • Substance abuse ([facility] [address] )      Past Surgical History:   Procedure Laterality Date   • KNEE SURGERY     • OTHER SURGICAL HISTORY      body piercing   • [place] RPR UMBILICAL HRNA 5 YRS/> REDUCIBLE N/A 2016    Procedure: REPAIR HERNIA UMBILICAL WITH MESH;  Surgeon: Zelalem Cole MD;  Location:  MAIN OR;  Service: General   • VAGINAL DELIVERY      X 6   • WISDOM TOOTH EXTRACTION      X 4   • WOUND DEBRIDEMENT Right 2018    Procedure: RIGHT KNEE DEBRIDEMENT; [address] OUT; AND LACERATION REPAIR   INTRAOPERATIVE ASSESSMENT OF PATELLA TENDON;  Surgeon: Abhijeet Malcolm MD;  [facility]: [facility];  Service: Orthopedics     Family History   Problem Relation Age of Onset   • " Hypertension Mother    • Thyroid disease Mother    • Hypertension Father    • Prostate cancer Father    • Anxiety disorder Sister      Social History     Socioeconomic History   • Marital status: Single     Spouse name: None   • Number of children: None   • Years of education: None   • Highest education level: None   Occupational History   • None   Tobacco Use   • Smoking status: Every Day     Packs/day: 0 25     Years: 12 00     Pack years: 3 00     Types: Cigarettes     Passive exposure: Current   • Smokeless tobacco: Never   Vaping Use   • Vaping Use: Every day   Substance and Sexual Activity   • Alcohol use: Not Currently   • Drug use: Not Currently     Types: Marijuana     Comment: 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   • Sexual activity: Yes     Partners: Male     Birth control/protection: Other   Other Topics Concern   • None   Social History Narrative    ** Merged History Encounter **          Social Determinants of Health     Financial Resource Strain: Low Risk    • Difficulty of Paying Living Expenses: Not hard at all   Food Insecurity: No Food Insecurity   • Worried About [employer] in the Last Year: Never true   • Ran Out of Food in the Last Year: Never true   Transportation Needs: No Transportation Needs   • Lack of Transportation (Medical): No   • Lack of Transportation (Non-Medical): No   Physical Activity: Not on file   Stress: Not on file   Social Connections: Not on file   Intimate Partner Violence: Not on file   Housing Stability: Not on file     Current Outpatient Medications on File Prior to Visit   Medication Sig   • omeprazole (PriLOSEC) 20 mg delayed release capsule take 1 capsule by mouth once daily   • albuterol (PROVENTIL HFA,VENTOLIN HFA) 90 mcg/act inhaler Inhale 2 puffs every 4 (four) hours as needed for wheezing   • buprenorphine-naloxone (Suboxone) 8-2 mg Place 1 Film (8 mg total) under the tongue 2 (two) times a day Do not start before April 21, 2023     • cholecalciferol (VITAMIN D3) 1,000 units tablet Take 1 tablet (1,000 Units total) by mouth daily   • clonazePAM (KlonoPIN) 1 mg tablet Take 1 tablet (1 mg total) by mouth 3 (three) times a day   • cyclobenzaprine (FLEXERIL) 10 mg tablet take 1 tablet by mouth three times a day if needed for muscle spasm   • DULoxetine (CYMBALTA) 30 mg delayed release capsule take 1 capsule by mouth once daily   • gabapentin (NEURONTIN) 300 mg capsule take 1 capsule by mouth three times a day   • ibuprofen (MOTRIN) 800 mg tablet Take 1 tablet (800 mg total) by mouth every 8 (eight) hours as needed for mild pain   • levothyroxine 50 mcg tablet 1 daily   • NARCAN 4 MG/0 1ML LIQD ADMINISTER A SINGLE spray INTO ONE NOSTRIL CALL 911 MAY REPEAT ONCE (Patient not taking: No sig reported)   • risperiDONE (RisperDAL) 0 5 mg tablet Take 1 tablet (0 5 mg total) by mouth in the morning   • risperiDONE (RisperDAL) 2 mg tablet Take 1 tablet (2 mg total) by mouth daily at bedtime   • topiramate (TOPAMAX) 100 mg tablet Take 1 tablet (100 mg total) by mouth 2 (two) times a day   • traZODone (DESYREL) 50 mg tablet Take 0 5 tablets (25 mg total) by mouth daily at bedtime   • [DISCONTINUED] gabapentin (NEURONTIN) 300 mg capsule take 1 capsule by mouth three times a day     Allergies   Allergen Reactions   • Amoxicillin      Pt states it never works for her      Immunization History   Administered Date(s) Administered   • COVID-19 PFIZER VACCINE 0 3 ML IM 04/20/2021, 05/16/2021, 12/01/2021   • Hep A, adult 10/22/2019   • Hib (PRP-T) 01/22/2018   • INFLUENZA 08/29/2016   • Influenza Injectable, MDCK, Preservative Free, Quadrivalent 10/04/2019   • Influenza Quadrivalent Preservative Free 3 years and older IM 08/29/2016   • Meningococcal MCV4P 01/22/2018   • Pneumococcal Conjugate 13-Valent 01/22/2018   • Tdap 01/16/2018       Objective     /80 (BP Location: Left arm, Patient Position: Sitting, Cuff Size: Standard)   Pulse 92   Temp 97 8 °F (36 6 "°C) (Temporal)   Resp 18   Ht 5' 4\" (1 626 m)   Wt 86 2 kg (190 lb)   LMP 04/26/2023 (Exact Date)   SpO2 98%   BMI 32 61 kg/m²     Physical Exam  Vitals reviewed  Constitutional:       General: She is not in acute distress  Appearance: She is not ill-appearing  HENT:      Head:      Comments: Mild soreness of maxillary area     Right Ear: Tympanic membrane, ear canal and external ear normal       Left Ear: Tympanic membrane, ear canal and external ear normal       Nose: Congestion present  No rhinorrhea  Mouth/Throat:      Mouth: Mucous membranes are moist       Pharynx: Posterior oropharyngeal erythema (mild) present  No oropharyngeal exudate  Eyes:      General:         Right eye: No discharge  Left eye: No discharge  Conjunctiva/sclera: Conjunctivae normal       Pupils: Pupils are equal, round, and reactive to light  Neck:      Thyroid: No thyromegaly  Cardiovascular:      Rate and Rhythm: Normal rate and regular rhythm  Heart sounds: Normal heart sounds  No murmur heard  Pulmonary:      Effort: Pulmonary effort is normal  No respiratory distress  Breath sounds: Normal breath sounds  No wheezing  Musculoskeletal:      Cervical back: Normal range of motion and neck supple  No tenderness  Lymphadenopathy:      Cervical: No cervical adenopathy  Skin:     General: Skin is warm  Findings: No rash  Neurological:      Mental Status: She is alert         Shahana Steel MD  "

## 2023-05-17 ENCOUNTER — TELEPHONE (OUTPATIENT)
Dept: PSYCHIATRY | Facility: CLINIC | Age: 38
End: 2023-05-17

## 2023-05-17 NOTE — TELEPHONE ENCOUNTER
Patient called the office regarding concerns she has and wanted to speak with CM  She also requested CM calls [facility] ED to see if they finalized son's strep results  Routing to CM to advise

## 2023-05-22 ENCOUNTER — DOCUMENTATION (OUTPATIENT)
Dept: PSYCHIATRY | Facility: CLINIC | Age: 38
End: 2023-05-22

## 2023-05-23 ENCOUNTER — TELEPHONE (OUTPATIENT)
Dept: PSYCHIATRY | Facility: CLINIC | Age: 38
End: 2023-05-23

## 2023-05-23 NOTE — TELEPHONE ENCOUNTER
Patient called the office looking to speak with CM  She stated it is important - She believes her phone is being connected to a land line number  Routing to CM to advise

## 2023-05-26 ENCOUNTER — HOSPITAL ENCOUNTER (EMERGENCY)
Facility: HOSPITAL | Age: 38
Discharge: HOME | End: 2023-05-26
Attending: EMERGENCY MEDICINE

## 2023-05-26 ENCOUNTER — TELEPHONE (OUTPATIENT)
Dept: PSYCHIATRY | Facility: CLINIC | Age: 38
End: 2023-05-26

## 2023-05-26 VITALS
BODY MASS INDEX: 31.69 KG/M2 | SYSTOLIC BLOOD PRESSURE: 118 MMHG | WEIGHT: 184.6 LBS | RESPIRATION RATE: 20 BRPM | DIASTOLIC BLOOD PRESSURE: 87 MMHG | OXYGEN SATURATION: 100 % | TEMPERATURE: 98 F | HEART RATE: 91 BPM

## 2023-05-26 DIAGNOSIS — N89.8 VAGINAL DISCHARGE: Primary | ICD-10-CM

## 2023-05-26 DIAGNOSIS — Z20.2 POSSIBLE EXPOSURE TO STD: ICD-10-CM

## 2023-05-26 LAB
BACTERIA UR QL AUTO: NORMAL /HPF
BILIRUB UR QL STRIP: NEGATIVE
CLARITY UR: CLEAR
COLOR UR: YELLOW
EXT PREGNANCY TEST URINE: NEGATIVE
EXT. CONTROL: NORMAL
GLUCOSE UR STRIP-MCNC: NEGATIVE MG/DL
HGB UR QL STRIP.AUTO: 10
KETONES UR STRIP-MCNC: NEGATIVE MG/DL
LEUKOCYTE ESTERASE UR QL STRIP: NEGATIVE
NITRITE UR QL STRIP: NEGATIVE
NON-SQ EPI CELLS URNS QL MICRO: NORMAL /HPF
PH UR STRIP.AUTO: 6 [PH]
PROT UR STRIP-MCNC: NEGATIVE MG/DL
RBC #/AREA URNS AUTO: NORMAL /HPF
SP GR UR STRIP.AUTO: 1.01 (ref 1–1.04)
UROBILINOGEN UA: NEGATIVE MG/DL
WBC #/AREA URNS AUTO: NORMAL /HPF

## 2023-05-26 RX ORDER — FLUCONAZOLE 150 MG/1
150 TABLET ORAL ONCE
Qty: 1 TABLET | Refills: 0 | Status: SHIPPED | OUTPATIENT
Start: 2023-05-26 | End: 2023-05-26

## 2023-05-26 RX ORDER — DOXYCYCLINE HYCLATE 100 MG/1
100 CAPSULE ORAL 2 TIMES DAILY
Qty: 14 CAPSULE | Refills: 0 | Status: SHIPPED | OUTPATIENT
Start: 2023-05-26 | End: 2023-06-02

## 2023-05-26 RX ORDER — BUPRENORPHINE AND NALOXONE 8; 2 MG/1; MG/1
FILM, SOLUBLE BUCCAL; SUBLINGUAL
COMMUNITY
Start: 2023-05-23

## 2023-05-26 RX ORDER — METRONIDAZOLE 500 MG/1
500 TABLET ORAL EVERY 12 HOURS SCHEDULED
Qty: 14 TABLET | Refills: 0 | Status: SHIPPED | OUTPATIENT
Start: 2023-05-26 | End: 2023-05-27 | Stop reason: SDUPTHER

## 2023-05-26 RX ORDER — DOXYCYCLINE HYCLATE 100 MG/1
100 CAPSULE ORAL ONCE
Status: COMPLETED | OUTPATIENT
Start: 2023-05-26 | End: 2023-05-26

## 2023-05-26 RX ADMIN — DOXYCYCLINE 100 MG: 100 CAPSULE ORAL at 12:02

## 2023-05-26 RX ADMIN — LIDOCAINE HYDROCHLORIDE 500 MG: 10 INJECTION, SOLUTION EPIDURAL; INFILTRATION; INTRACAUDAL; PERINEURAL at 12:03

## 2023-05-26 NOTE — ED PROVIDER NOTES
History  Chief Complaint   Patient presents with   • Vaginal Discharge     States she has had vaginal discharge and an odor  States she has also been lactating     40 old female presenting for evaluation of vaginal discharge patient ports has had bacterial vaginosis in the past and reports this episode is similar to prior episodes of vaginal discharge/bacterial vaginosis  Patient is requesting antibiotics at this time as well as testing and empiric treatment for GC  Patient reports he does have an OB/GYN and is able to follow with them declines exam today  Patient reports she has had some lactation however denies any other associated complaints reports she feels as though these are due to antipsychotic medications patient was advised to follow-up with her psychiatrist in regards to this patient denies any breast pain, headaches, loss of consciousness  History provided by:  Patient   used: No    Vaginal Discharge  Associated symptoms: no abdominal pain, no dysuria, no fever, no nausea and no vomiting        Prior to Admission Medications   Prescriptions Last Dose Informant Patient Reported? Taking?    DULoxetine (CYMBALTA) 30 mg delayed release capsule   No No   Sig: take 1 capsule by mouth once daily   NARCAN 4 MG/0 1ML LIQD   Yes No   Sig: ADMINISTER A SINGLE spray INTO ONE NOSTRIL CALL 911 MAY REPEAT ONCE   Patient not taking: No sig reported   albuterol (PROVENTIL HFA,VENTOLIN HFA) 90 mcg/act inhaler   No No   Sig: Inhale 2 puffs every 4 (four) hours as needed for wheezing   buprenorphine-naloxone (Suboxone) 8-2 mg   Yes No   Sig: dissolve 1 FILM under the tongue twice a day   cetirizine (ZyrTEC) 10 mg tablet   No No   Sig: Take 1 tablet (10 mg total) by mouth daily   cholecalciferol (VITAMIN D3) 1,000 units tablet   No No   Sig: Take 1 tablet (1,000 Units total) by mouth daily   clonazePAM (KlonoPIN) 1 mg tablet   No No   Sig: Take 1 tablet (1 mg total) by mouth 3 (three) times a day "  cyclobenzaprine (FLEXERIL) 10 mg tablet   No No   Sig: take 1 tablet by mouth three times a day if needed for muscle spasm   fluticasone (FLONASE) 50 mcg/act nasal spray   No No   Si spray into each nostril daily   gabapentin (NEURONTIN) 300 mg capsule   No No   Sig: take 1 capsule by mouth three times a day   ibuprofen (MOTRIN) 800 mg tablet  Self No No   Sig: Take 1 tablet (800 mg total) by mouth every 8 (eight) hours as needed for mild pain   levothyroxine 50 mcg tablet   No No   Si daily   omeprazole (PriLOSEC) 20 mg delayed release capsule   No No   Sig: take 1 capsule by mouth once daily   risperiDONE (RisperDAL) 0 5 mg tablet   No No   Sig: Take 1 tablet (0 5 mg total) by mouth in the morning   risperiDONE (RisperDAL) 2 mg tablet   No No   Sig: Take 1 tablet (2 mg total) by mouth daily at bedtime   topiramate (TOPAMAX) 100 mg tablet   No No   Sig: Take 1 tablet (100 mg total) by mouth 2 (two) times a day   traZODone (DESYREL) 50 mg tablet   No No   Sig: Take 0 5 tablets (25 mg total) by mouth daily at bedtime      Facility-Administered Medications: None       Past Medical History:   Diagnosis Date   • Anxiety     \"severe\"   • Asthma    • Bipolar disorder (HCC)    • Cigarette smoker    • Depression    • Disease of thyroid gland    • Drug dependence, in remission (HCC)    • GERD (gastroesophageal reflux disease)    • H/O: whooping cough     while pregnant   • Hepatitis C, chronic (HCC)    • Insomnia disorder    • Liver disease     Hepatitis C   • Migraine    • Psychiatric illness    • PTSD (post-traumatic stress disorder)    • Substance abuse (HCC)        Past Surgical History:   Procedure Laterality Date   • KNEE SURGERY     • OTHER SURGICAL HISTORY      body piercing   • [place] RPR UMBILICAL HRNA 5 YRS/> REDUCIBLE N/A 2016    Procedure: REPAIR HERNIA UMBILICAL WITH MESH;  Surgeon: Carmel Anne MD;  Location: [facility];  Service: General   • VAGINAL DELIVERY      X 6   • WISDOM TOOTH " EXTRACTION      X 4   • WOUND DEBRIDEMENT Right 01/17/2018    Procedure: RIGHT KNEE DEBRIDEMENT; [address] OUT; AND LACERATION REPAIR  INTRAOPERATIVE ASSESSMENT OF PATELLA TENDON;  Surgeon: Megan Velasco MD;  Location[facility];  Service: Orthopedics       Family History   Problem Relation Age of Onset   • Hypertension Mother    • Thyroid disease Mother    • Hypertension Father    • Prostate cancer Father    • Anxiety disorder Sister      I have reviewed and agree with the history as documented  E-Cigarette/Vaping   • E-Cigarette Use Current Every Day User      E-Cigarette/Vaping Substances   • Other Yes      Social History     Tobacco Use   • Smoking status: Every Day     Packs/day: 0 25     Years: 12 00     Total pack years: 3 00     Types: Cigarettes     Passive exposure: Current   • Smokeless tobacco: Never   Vaping Use   • Vaping Use: Every day   Substance Use Topics   • Alcohol use: Not Currently   • Drug use: Not Currently     Types: Marijuana     Comment: 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       Review of Systems   Constitutional: Negative for chills, fatigue and fever  HENT: Negative for congestion, ear pain, rhinorrhea and sore throat  Eyes: Negative for redness  Respiratory: Negative for chest tightness and shortness of breath  Cardiovascular: Negative for chest pain and palpitations  Gastrointestinal: Negative for abdominal pain, nausea and vomiting  Genitourinary: Positive for vaginal discharge  Negative for dysuria and hematuria  Musculoskeletal: Negative  Skin: Negative for rash  Neurological: Negative for dizziness, syncope, light-headedness and numbness  Physical Exam  Physical Exam  Vitals and nursing note reviewed  Constitutional:       Appearance: She is well-developed  HENT:      Head: Normocephalic  Eyes:      General: No scleral icterus  Cardiovascular:      Rate and Rhythm: Normal rate and regular rhythm     Pulmonary:      Effort: Pulmonary effort is normal       Breath sounds: Normal breath sounds  No stridor  Abdominal:      General: There is no distension  Palpations: Abdomen is soft  Tenderness: There is no abdominal tenderness  Genitourinary:     Comments: Deferred by patient  Musculoskeletal:         General: Normal range of motion  Skin:     General: Skin is warm and dry  Capillary Refill: Capillary refill takes less than 2 seconds  Neurological:      Mental Status: She is alert and oriented to person, place, and time           Vital Signs  ED Triage Vitals [05/26/23 1125]   Temperature Pulse Respirations Blood Pressure SpO2   98 °F (36 7 °C) 91 20 118/87 100 %      Temp Source Heart Rate Source Patient Position - Orthostatic VS BP Location FiO2 (%)   Tympanic Monitor Sitting Left arm --      Pain Score       --           Vitals:    05/26/23 1125   BP: 118/87   Pulse: 91   Patient Position - Orthostatic VS: Sitting         Visual Acuity      ED Medications  Medications   doxycycline hyclate (VIBRAMYCIN) capsule 100 mg (has no administration in time range)   cefTRIAXone (ROCEPHIN) 500 mg in lidocaine (PF) (XYLOCAINE-MPF) 1 % IM only syringe (has no administration in time range)       Diagnostic Studies  Results Reviewed     Procedure Component Value Units Date/Time    UA w Reflex to Microscopic w Reflex to Culture [[id number]] Collected: 05/26/23 1159    Lab Status: No result Specimen: Urine, Clean Catch     Chlamydia/GC amplified DNA by PCR [[id number]] Collected: 05/26/23 1159    Lab Status: No result Specimen: Urine, Other     VAGINOSIS DNA PROBE (AFFIRM) [[id number]]     Lab Status: No result Specimen: Genital     POCT pregnancy, urine [[id number]]     Lab Status: No result                  No orders to display              Procedures  Procedures         ED Course                                             Medical Decision Making  11year-old sexually active female presenting for evaluation of vaginal discharge patient reports she "has had bacterial vaginosis in the past reports this feels similar is requesting antibiotics at this time, bacterial vaginosis testing obtained via swab as the patient deferred physical exam   Flagyl initiated in this emergency department and prescribed for use at home for empiric bacterial vaginosis treatment  Patient is also requesting GC testing and treatment, Rocephin and doxycycline administered in the emergency department with a prescription for doxycycline at home  Diflucan prescribed for use at the end of antibiotic therapy for the potential for yeast involvement  Patient advised to follow-up with her psychiatrist in regards to concerns for lactation secondary to antipsychotic medication use, no indication for testing or imaging at this time given the lack of other reported symptoms and normal vital signs  Patient denies any headaches, loss of vision or syncopal episodes to suggest prolactinoma or other significant concerns warranting imaging  All imaging and/or lab testing discussed with patient, strict return to ED precautions discussed  Patient and/or family members verbalizes understanding and agrees with plan  Patient is stable for discharge     Portions of the record may have been created with voice recognition software  Occasional wrong word or \"sound a like\" substitutions may have occurred due to the inherent limitations of voice recognition software  Read the chart carefully and recognize, using context, where substitutions have occurred  Amount and/or Complexity of Data Reviewed  Labs: ordered  Risk  Prescription drug management            Disposition  Final diagnoses:   Vaginal discharge   Possible exposure to STD     Time reflects when diagnosis was documented in both MDM as applicable and the Disposition within this note     Time User Action Codes Description Comment    5/26/2023 11:41 AM Kel Loen Add [N89 8] Vaginal discharge     5/26/2023 11:41 AM Kel Leon Add " [Z20 2] Possible exposure to STD       ED Disposition     ED Disposition   Discharge    Condition   Stable    Date/Time   Fri May 26, 2023 11:42 AM    Comment   Geno Courtney discharge to home/self care  Follow-up Information     Follow up With Specialties Details Why [facility] Schedule an appointment as soon as possible for a visit   Jamal Combs6 [address] [facility] [address] [place] [address] Internal Medicine Schedule an appointment as soon as possible for a visit in 1 day for further testing [address] [facility] [address] [phone]            Patient's Medications   Discharge Prescriptions    DOXYCYCLINE HYCLATE (VIBRAMYCIN) 100 MG CAPSULE    Take 1 capsule (100 mg total) by mouth 2 (two) times a day for 7 days       Start Date: 5/26/2023 End Date: 6/2/2023       Order Dose: 100 mg       Quantity: 14 capsule    Refills: 0    FLUCONAZOLE (DIFLUCAN) 150 MG TABLET    Take 1 tablet (150 mg total) by mouth once for 1 dose Please take this medication once you have completed your Flagyl and doxycycline courses  Start Date: 5/26/2023 End Date: 5/26/2023       Order Dose: 150 mg       Quantity: 1 tablet    Refills: 0    METRONIDAZOLE (FLAGYL) 500 MG TABLET    Take 1 tablet (500 mg total) by mouth every 12 (twelve) hours for 7 days       Start Date: 5/26/2023 End Date: 6/2/2023       Order Dose: 500 mg       Quantity: 14 tablet    Refills: 0       No discharge procedures on file      PDMP Review       Value Time User    PDMP Reviewed  Yes 5/10/2023  2:08 PM Taylor Adler DO          ED Provider  Electronically Signed by           Alis Zimmerman PA-C  05/26/23 7685

## 2023-05-26 NOTE — Clinical Note
Nubia Herrera was seen and treated in our emergency department on 5/26/2023  Diagnosis:     Geno  may return to work on return date  She may return on this date: 05/29/2023         If you have any questions or concerns, please don't hesitate to call        Miki Haider PA-C    ______________________________           _______________          _______________  Hospital Representative                              Date                                Time

## 2023-05-27 DIAGNOSIS — F19.90 SUBSTANCE USE DISORDER: Primary | ICD-10-CM

## 2023-05-27 RX ORDER — METRONIDAZOLE 500 MG/1
500 TABLET ORAL EVERY 12 HOURS SCHEDULED
Qty: 14 TABLET | Refills: 0 | Status: SHIPPED | OUTPATIENT
Start: 2023-05-27 | End: 2023-06-03

## 2023-05-29 LAB
C TRACH DNA SPEC QL NAA+PROBE: NEGATIVE
N GONORRHOEA DNA SPEC QL NAA+PROBE: NEGATIVE

## 2023-05-30 LAB — CANDIDA RRNA VAG QL PROBE: NORMAL
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
## 2023-06-03 DIAGNOSIS — F31.81 BIPOLAR 2 DISORDER (HCC): ICD-10-CM

## 2023-06-05 ENCOUNTER — DOCUMENTATION (OUTPATIENT)
Dept: PSYCHIATRY | Facility: CLINIC | Age: 38
End: 2023-06-05

## 2023-06-05 RX ORDER — CLONAZEPAM 1 MG/1
TABLET ORAL
Qty: 90 TABLET | OUTPATIENT
Start: 2023-06-05

## 2023-06-06 ENCOUNTER — TELEPHONE (OUTPATIENT)
Dept: FAMILY MEDICINE CLINIC | Facility: CLINIC | Age: 38
End: 2023-06-06

## 2023-06-06 NOTE — TELEPHONE ENCOUNTER
06/06/23     first attempt to contact patient  no answer     Left message       If pt contacts office please assist pt with rescheduling 08/15/23, PCP not available

## 2023-06-07 ENCOUNTER — TELEPHONE (OUTPATIENT)
Dept: FAMILY MEDICINE CLINIC | Facility: CLINIC | Age: 38
End: 2023-06-07

## 2023-06-07 DIAGNOSIS — F41.9 ANXIETY: ICD-10-CM

## 2023-06-07 RX ORDER — TOPIRAMATE 100 MG/1
TABLET, FILM COATED ORAL
Qty: 90 TABLET | Refills: 3 | Status: SHIPPED | OUTPATIENT
Start: 2023-06-07

## 2023-06-07 RX ORDER — CLONAZEPAM 1 MG/1
1 TABLET ORAL 3 TIMES DAILY
Qty: 90 TABLET | Refills: 0 | Status: SHIPPED | OUTPATIENT
Start: 2023-06-07

## 2023-06-08 ENCOUNTER — TELEMEDICINE (OUTPATIENT)
Dept: PSYCHIATRY | Facility: CLINIC | Age: 38
End: 2023-06-08

## 2023-06-08 ENCOUNTER — DOCUMENTATION (OUTPATIENT)
Dept: PSYCHIATRY | Facility: CLINIC | Age: 38
End: 2023-06-08

## 2023-06-08 DIAGNOSIS — F39 MOOD DISORDER (HCC): Primary | ICD-10-CM

## 2023-06-08 DIAGNOSIS — F19.90 SUBSTANCE USE DISORDER: ICD-10-CM

## 2023-06-08 NOTE — PROGRESS NOTES
Note Type: Case Management Note                  Date of Service:06/08/2023  Service:  [facility]    Note: Case Management Note  Geno Courtney 40 y o  female [phone]  Attending  Substance Use History     Social History     Substance and Sexual Activity   Alcohol Use Not Currently        Social History     Substance and Sexual Activity   Drug Use Not Currently   • Types: Marijuana    Comment: 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       Encounter Type:   Patient Face-to-Face    Start Time 1108  End Time 1151    Recovery needs addressed at this meeting:  Basic  Needs    Note  D: Patient presented for in-person, scheduled visit with this CM  Patient and this CM were able to review checklist in which patient is required to presented to social security in order to maintain benefits  Patient and this CM completed form provided by social security as well as contacted the IRS to gain insight on how to file an ammended return for the 2021 tax year  Patient and this CM were able to successfully obtain a 1040x form for the IRS in which patient is to provide the income for the year of 2021  A: Patient presented in a normal mood, normal affect  Patient stated to overall feel better about this process as patient and this CM made progress in completing the checklist      Patient did not present as a danger to self or others, no symptoms of SI/HI    P: Patient will follow up with this CM on Monday in order to complete the 1040X and send to the Department of the [facility] in [place], [place]       Referrals made  No referrals made during this session    Next appointment date and time  Follow up in [address] [place] [address]

## 2023-06-08 NOTE — PSYCH
No Call  No Show  No Charge    Geno Courtney no showed 06/08/23 appointment , staff called and left message to reschedule appointment     Treatment Plan not completed within required time limits due to:  Geno Courtney no show appointment on 06/08/23

## 2023-06-08 NOTE — PROGRESS NOTES
Note Type: Case Management Note                  Date of Service: [unfilled]  Service:  [facility]    Note: Case Management Note  Ade Turner 40 y o  female [phone]  Attending  Substance Use History     Social History     Substance and Sexual Activity   Alcohol Use Not Currently        Social History     Substance and Sexual Activity   Drug Use Not Currently   • Types: Marijuana    Comment: 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       Encounter Type:   Patient Face-to-Face    Start Time 1115  End Time 1200    Recovery needs addressed at this meeting:  Basic  Needs and Legal Status    Note  D: Patient presented for in-person, scheduled visit with this CM  Patient advised this CM of a letter received from [address] [place] [address] patient is required to present information to social security in order to continue receiving benefits  Patient requested this CM's assistance in order to complete these tasks in which this CM requested to meet with patient again and bring checklist as requested information    A: Patient presented as slightly stressed as patient is concerned about losing benefits  Patient and this CM will ensure to complete this information in a timely manner  P: Patient is to follow up with this CM on Thursday       Referrals made  No referrals made during this session    Next appointment date and time  Follow up on Thursday, 06/08/2023

## 2023-06-12 ENCOUNTER — DOCUMENTATION (OUTPATIENT)
Dept: PSYCHIATRY | Facility: CLINIC | Age: 38
End: 2023-06-12

## 2023-06-15 NOTE — PROGRESS NOTES
"Note Type: Case Management Note                  Date of Service: 06/12/2023  Service:  [facility]    Note: Case Management Note  Michael Hassan 40 y o  female [phone]  Attending  Substance Use History     Social History     Substance and Sexual Activity   Alcohol Use Not Currently        Social History     Substance and Sexual Activity   Drug Use Not Currently   • Types: Marijuana    Comment: 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       Encounter Type:   Patient Face-to-Face    Start Time 1120  End Time 1204    Recovery needs addressed at this meeting:  Basic  Needs    Note  D: Patient presented for in-person, scheduled visit with this CM  This is to be patient's initial visit with the [facility] office  Please utilize this documentation as formal intake  Patient and this CM spent time completing the form 1040x required for patient's amended return for year 2021  A: Patient overall was doing well  Patient's son, also present during this session states to be having a \"fun summer,\" with mom in which patient feels positive about  Patient did not present as a danger to self or others, no symptoms of SI  HI    P: Patient plans to submit all necessary documents to the IRS and will follow up with this CM in one week       Referrals made  No formal referrals made during this session     Next appointment date and time  Follow up in one week    "

## 2023-06-21 ENCOUNTER — OFFICE VISIT (OUTPATIENT)
Dept: FAMILY MEDICINE CLINIC | Facility: CLINIC | Age: 38
End: 2023-06-21

## 2023-06-21 VITALS
BODY MASS INDEX: 33.19 KG/M2 | SYSTOLIC BLOOD PRESSURE: 124 MMHG | RESPIRATION RATE: 18 BRPM | HEART RATE: 92 BPM | WEIGHT: 194.4 LBS | TEMPERATURE: 98.4 F | OXYGEN SATURATION: 98 % | HEIGHT: 64 IN | DIASTOLIC BLOOD PRESSURE: 72 MMHG

## 2023-06-21 DIAGNOSIS — F41.9 ANXIETY: ICD-10-CM

## 2023-06-21 DIAGNOSIS — E66.9 CLASS 1 OBESITY WITH BODY MASS INDEX (BMI) OF 34.0 TO 34.9 IN ADULT, UNSPECIFIED OBESITY TYPE, UNSPECIFIED WHETHER SERIOUS COMORBIDITY PRESENT: ICD-10-CM

## 2023-06-21 DIAGNOSIS — N92.6 MISSED PERIOD: Primary | ICD-10-CM

## 2023-06-21 LAB — SL AMB POCT URINE HCG: NEGATIVE

## 2023-06-21 NOTE — PROGRESS NOTES
Name: Guillermina Camara      : 1985      MRN: [id number]  Encounter Provider: Bekah Mcnulty MD  Encounter Date: 2023   Encounter department: [facility]    Assessment & Plan      Anxiety  - reports anxiety has been the same  - follows outpatient with Psychiatry who's managing patient Psych disorders  - continue Clonazepam 1 mg daily  - encourage medications compliance  - continue follow up with behavior therapy  - encourage safe coping and relaxation techniques  - follow up with Psychiatry    Obesity  - Class 1 obesity, BMI 33 37  - Patient interested in starting new medications for weight loss  Reports Topamax is not help  - Discussed upcoming Obesity meds trial in our clinic  Patient interest to participate  Will referral patient to study  - reports trying dieting, exercise, weight loss but nothing same to be working   - Discuss benefits and disadvantages of weight loss medications  - Encourage multi-discipline approach including weight management, nutrition, health diet and exercise  - Patient unhappy that provider will not start her on medications today  - follow up with weight management  - follow up with nutrition  - Follow up with PCP in 3 months      H/o Vitamin D deficiency  - recheck Vit D levels    Other orders/Medication refills  -     TSH, future      BMI Counseling: Body mass index is 33 73 kg/m²  The BMI is above normal  Nutrition recommendations include consuming healthier snacks and limiting drinks that contain sugar  Exercise recommendations include moderate physical activity 150 minutes/week  Rationale for BMI follow-up plan is due to patient being overweight or obese  Subjective     Patient is a 39 y/o F with PMHx of Anxiety disorder, Obesity, MDD, Drug dependence who presents to the clinic for a follow up visit  Patients states that her main concern today is weight loss   She would like a medications to help her loss weight  Patient states that she has try everything and nothing has work in the past    She takes Topamax TID because her previous doctor told her that it will help her loss weight  Discuss with patient benefits and side effects of taking Topamax for weight loss purposes  Denies SI/HI, headache, chest pain, SOB  HPI  Review of Systems   Constitutional: Negative for chills and fever  HENT: Negative for ear pain and sore throat  Eyes: Negative for pain and visual disturbance  Respiratory: Negative for cough and shortness of breath  Cardiovascular: Negative for chest pain and palpitations  Gastrointestinal: Negative for abdominal pain and vomiting  Genitourinary: Negative for dysuria, frequency, vaginal discharge and vaginal pain  Negative for flank pain, hematuria, pelvic pain and vaginal bleeding  Musculoskeletal: Negative for arthralgias and back pain  Skin: Negative for color change and rash  Neurological: Negative for seizures and syncope  All other systems reviewed and are negative        Current Outpatient Medications on File Prior to Visit   Medication Sig   • omeprazole (PriLOSEC) 20 mg delayed release capsule take 1 capsule by mouth once daily   • albuterol (PROVENTIL HFA,VENTOLIN HFA) 90 mcg/act inhaler Inhale 2 puffs every 4 (four) hours as needed for wheezing   • buprenorphine-naloxone (Suboxone) 8-2 mg dissolve 1 FILM under the tongue twice a day   • cetirizine (ZyrTEC) 10 mg tablet Take 1 tablet (10 mg total) by mouth daily   • cholecalciferol (VITAMIN D3) 1,000 units tablet Take 1 tablet (1,000 Units total) by mouth daily   • clonazePAM (KlonoPIN) 1 mg tablet Take 1 tablet (1 mg total) by mouth 3 (three) times a day   • cyclobenzaprine (FLEXERIL) 10 mg tablet take 1 tablet by mouth three times a day if needed for muscle spasm   • DULoxetine (CYMBALTA) 30 mg delayed release capsule take 1 capsule by mouth once daily   • fluticasone (FLONASE) 50 mcg/act nasal spray 1 spray into each "nostril daily   • gabapentin (NEURONTIN) 300 mg capsule take 1 capsule by mouth three times a day   • ibuprofen (MOTRIN) 800 mg tablet Take 1 tablet (800 mg total) by mouth every 8 (eight) hours as needed for mild pain   • levothyroxine 50 mcg tablet 1 daily   • NARCAN 4 MG/0 1ML LIQD ADMINISTER A SINGLE spray INTO ONE NOSTRIL CALL 911 MAY REPEAT ONCE (Patient not taking: No sig reported)   • risperiDONE (RisperDAL) 0 5 mg tablet Take 1 tablet (0 5 mg total) by mouth in the morning   • risperiDONE (RisperDAL) 2 mg tablet Take 1 tablet (2 mg total) by mouth daily at bedtime   • topiramate (TOPAMAX) 100 mg tablet take 1 tablet by mouth twice a day   • traZODone (DESYREL) 50 mg tablet Take 0 5 tablets (25 mg total) by mouth daily at bedtime       Objective     /72 (BP Location: Left arm, Patient Position: Sitting, Cuff Size: Standard)   Pulse 92   Temp 98 4 °F (36 9 °C) (Temporal)   Resp 18   Ht 5' 4\" (1 626 m)   Wt 88 2 kg (194 lb 6 4 oz)   LMP 05/22/2023 (Approximate)   SpO2 98%   BMI 33 37 kg/m²     Physical Exam  Constitutional:       Appearance: Normal appearance  HENT:      Head: Normocephalic and atraumatic  Cardiovascular:      Rate and Rhythm: Normal rate and regular rhythm  Pulses: Normal pulses  Heart sounds: Normal heart sounds  No murmur heard  No friction rub  No gallop  Pulmonary:      Effort: Pulmonary effort is normal  No respiratory distress  Breath sounds: Normal breath sounds  No stridor  No wheezing or rhonchi  Chest:      Chest wall: No tenderness  Abdominal:      General: Bowel sounds are normal  There is no distension  Palpations: Abdomen is soft  There is no mass  Tenderness: There is no abdominal tenderness  There is no right CVA tenderness, left CVA tenderness or guarding  Musculoskeletal:         General: Normal range of motion  Cervical back: Neck supple  No tenderness  Skin:     General: Skin is warm and dry     Neurological:      " Mental Status: She is alert and oriented to person, place, and time     Psychiatric:         Mood and Affect: Mood normal        Nayely Manzanares MD

## 2023-06-23 DIAGNOSIS — E55.9 VITAMIN D INSUFFICIENCY: ICD-10-CM

## 2023-06-23 DIAGNOSIS — J30.1 SEASONAL ALLERGIC RHINITIS DUE TO POLLEN: ICD-10-CM

## 2023-06-23 DIAGNOSIS — F41.9 ANXIETY: ICD-10-CM

## 2023-06-23 DIAGNOSIS — M79.2 NEUROPATHIC PAIN: ICD-10-CM

## 2023-06-25 RX ORDER — GABAPENTIN 300 MG/1
CAPSULE ORAL
Qty: 90 CAPSULE | Refills: 0 | Status: SHIPPED | OUTPATIENT
Start: 2023-06-25 | End: 2023-07-07 | Stop reason: SDUPTHER

## 2023-06-25 RX ORDER — MELATONIN
Qty: 30 TABLET | Refills: 0 | Status: SHIPPED | OUTPATIENT
Start: 2023-06-25

## 2023-06-25 RX ORDER — CETIRIZINE HYDROCHLORIDE 10 MG/1
TABLET ORAL
Qty: 30 TABLET | Refills: 0 | Status: SHIPPED | OUTPATIENT
Start: 2023-06-25

## 2023-06-26 ENCOUNTER — DOCUMENTATION (OUTPATIENT)
Dept: PSYCHIATRY | Facility: CLINIC | Age: 38
End: 2023-06-26

## 2023-06-27 RX ORDER — CLONAZEPAM 1 MG/1
TABLET ORAL
Qty: 90 TABLET | Refills: 2 | Status: SHIPPED | OUTPATIENT
Start: 2023-06-27 | End: 2023-07-07

## 2023-06-27 NOTE — PROGRESS NOTES
Note Type: Case Management Note                  Date of Service: 06/26/2023  Service:  [facility]    Note: Case Management Note  Richmond Every 40 y o  female [phone]  Attending  Substance Use History     Social History     Substance and Sexual Activity   Alcohol Use Not Currently        Social History     Substance and Sexual Activity   Drug Use Not Currently   • Types: Marijuana    Comment: 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       Encounter Type:   Patient Face-to-Face    Start Time 1110  End Time 1150    Recovery needs addressed at this meeting:  Basic  Needs, Emotional/Mental Health and Recovery Resources    Note  D: Patient presented for in-person, scheduled visit with this CM  Patient spoke with this CM regarding patient's recent connection with [facility] and connecting with a mobile CRS, [facility] was obtained  Patient states to have made a good connection with this CRS and feels to have made a strong support system within [facility]  This CM was grateful patient has finally been able to access support within the community  Patient spoke to this CM about potential places of residency in which this CM discussed patient's hesitancy towards moving out of current home due to patient's relationship  Patient understands the codependency, but also feels there would be a sense of relief if patient would obtain own place of residence with minor child  Patient and this CM reviewed different housing options such as [facility] within the [facility]  A: Patient presented in a normal mood, normal affect  Patient overall states to be doing well since metting with outside resources and plans to continue engagement     Patient did speak about transitioning from Suboxone to Sublocade injection in which this CM stated to discuss during next session with Dr Nixon Johnson      Patient did not present as a danger to self or others, no symptoms of SI/HI    P: Patient will continue to meet with this CM on a weekly basis as well as assigned treatment team      Referrals made  [facility]    Next appointment date and time  Follow up in one week

## 2023-06-29 ENCOUNTER — HOSPITAL ENCOUNTER (EMERGENCY)
Facility: HOSPITAL | Age: 38
Discharge: HOME | End: 2023-06-29
Attending: EMERGENCY MEDICINE
Payer: COMMERCIAL

## 2023-06-29 ENCOUNTER — APPOINTMENT (EMERGENCY)
Dept: RADIOLOGY | Facility: HOSPITAL | Age: 38
End: 2023-06-29
Payer: COMMERCIAL

## 2023-06-29 VITALS
OXYGEN SATURATION: 98 % | BODY MASS INDEX: 33.3 KG/M2 | HEART RATE: 94 BPM | SYSTOLIC BLOOD PRESSURE: 117 MMHG | WEIGHT: 194 LBS | TEMPERATURE: 97 F | DIASTOLIC BLOOD PRESSURE: 76 MMHG | RESPIRATION RATE: 18 BRPM

## 2023-06-29 DIAGNOSIS — K59.00 CONSTIPATION, UNSPECIFIED CONSTIPATION TYPE: Primary | ICD-10-CM

## 2023-06-29 DIAGNOSIS — N64.3 GALACTORRHEA: ICD-10-CM

## 2023-06-29 LAB
EXT PREGNANCY TEST URINE: NEGATIVE
EXT. CONTROL: NORMAL

## 2023-06-29 PROCEDURE — 74022 RADEX COMPL AQT ABD SERIES: CPT

## 2023-06-29 PROCEDURE — 81025 URINE PREGNANCY TEST: CPT | Performed by: EMERGENCY MEDICINE

## 2023-06-29 PROCEDURE — 99284 EMERGENCY DEPT VISIT MOD MDM: CPT

## 2023-06-29 NOTE — ED PROVIDER NOTES
History  Chief Complaint   Patient presents with   • Abdominal Pain     State that she feels constipated and has missed 2 periods. States her preg tests have been negative     52-year-old female with a history of anxiety, bipolar disorder, GERD, hypothyroidism, opioid use disorder on buprenorphine maintenance therapy presenting for evaluation of lower abdominal pain, constipation, and a missed menstrual cycle for 2 months. Patient states she has had small, hard bowel movements and is straining to go. Takes her buprenorphine daily. Last bowel movement was this morning. Has a history of umbilical hernia repair. Denies associated nausea and vomiting. Notes lower abdominal discomfort. Denies fever, chills, vomiting, nausea, dysuria, hematuria, increased urinary frequency, vaginal bleeding or discharge. Patient states she has not had a menstrual cycle for 2 months and is also lactating. She notes breast enlargement in addition to weight gain. States she started risperidone 3 to 4 months ago. Denies other medication changes. Prior to Admission Medications   Prescriptions Last Dose Informant Patient Reported? Taking?    DULoxetine (CYMBALTA) 30 mg delayed release capsule 6/29/2023  No Yes   Sig: take 1 capsule by mouth once daily   NARCAN 4 MG/0.1ML LIQD Not Taking  Yes No   Sig: ADMINISTER A SINGLE spray INTO ONE NOSTRIL CALL 911 MAY REPEAT ONCE   Patient not taking: No sig reported   albuterol (PROVENTIL HFA,VENTOLIN HFA) 90 mcg/act inhaler More than a month  No No   Sig: Inhale 2 puffs every 4 (four) hours as needed for wheezing   buprenorphine-naloxone (Suboxone) 8-2 mg 6/29/2023  Yes Yes   Sig: dissolve 1 FILM under the tongue twice a day   cetirizine (ZyrTEC) 10 mg tablet 6/29/2023  No Yes   Sig: take 1 tablet by mouth once daily   cholecalciferol (VITAMIN D3) 1,000 units tablet 6/29/2023  No Yes   Sig: take 1 tablet by mouth once daily   clonazePAM (KlonoPIN) 1 mg tablet 6/29/2023  No Yes   Sig: take 1 tablet by mouth three times a day   cyclobenzaprine (FLEXERIL) 10 mg tablet Past Month  No Yes   Sig: take 1 tablet by mouth three times a day if needed for muscle spasm   fluticasone (FLONASE) 50 mcg/act nasal spray 2023  No Yes   Si spray into each nostril daily   gabapentin (NEURONTIN) 300 mg capsule 2023  No Yes   Sig: take 1 capsule by mouth three times a day   ibuprofen (MOTRIN) 800 mg tablet Not Taking Self No No   Sig: Take 1 tablet (800 mg total) by mouth every 8 (eight) hours as needed for mild pain   Patient not taking: Reported on 2023   levothyroxine 50 mcg tablet 2023  No Yes   Si daily   omeprazole (PriLOSEC) 20 mg delayed release capsule 2023  No Yes   Sig: take 1 capsule by mouth once daily   risperiDONE (RisperDAL) 0.5 mg tablet 2023  No Yes   Sig: Take 1 tablet (0.5 mg total) by mouth in the morning   risperiDONE (RisperDAL) 2 mg tablet 2023  No Yes   Sig: Take 1 tablet (2 mg total) by mouth daily at bedtime   topiramate (TOPAMAX) 100 mg tablet 2023  No Yes   Sig: take 1 tablet by mouth twice a day   traZODone (DESYREL) 50 mg tablet 2023  No Yes   Sig: Take 0.5 tablets (25 mg total) by mouth daily at bedtime      Facility-Administered Medications: None       Past Medical History:   Diagnosis Date   • Anxiety     "severe"   • Asthma    • Bipolar disorder ([address])    • Cigarette smoker    • Depression    • Disease of thyroid gland    • Drug dependence, in remission ([address])    • GERD (gastroesophageal reflux disease)    • H/O: whooping cough     while pregnant   • Hepatitis C, chronic (HCC)    • Insomnia disorder    • Liver disease     Hepatitis C   • Migraine    • Psychiatric illness    • PTSD (post-traumatic stress disorder)    • Substance abuse ([address])        Past Surgical History:   Procedure Laterality Date   • KNEE SURGERY     • OTHER SURGICAL HISTORY      body piercing   • CA RPR UMBILICAL HRNA 5 YRS/> REDUCIBLE N/A 2016    Procedure: REPAIR HERNIA UMBILICAL WITH MESH;  Surgeon: Sara Orlando MD;  Location:  MAIN OR;  Service: General   • VAGINAL DELIVERY      X 6   • WISDOM TOOTH EXTRACTION      X 4   • WOUND DEBRIDEMENT Right 01/17/2018    Procedure: RIGHT KNEE DEBRIDEMENT; [address] OUT; AND LACERATION REPAIR. INTRAOPERATIVE ASSESSMENT OF PATELLA TENDON;  Surgeon: Tahira Hogan MD;  Location: [facility];  Service: Orthopedics       Family History   Problem Relation Age of Onset   • Hypertension Mother    • Thyroid disease Mother    • Hypertension Father    • Prostate cancer Father    • Anxiety disorder Sister      I have reviewed and agree with the history as documented. E-Cigarette/Vaping   • E-Cigarette Use Current Every Day User      E-Cigarette/Vaping Substances   • Other Yes      Social History     Tobacco Use   • Smoking status: Every Day     Packs/day: 0.25     Years: 12.00     Total pack years: 3.00     Types: Cigarettes     Passive exposure: Current   • Smokeless tobacco: Never   Vaping Use   • Vaping Use: Every day   Substance Use Topics   • Alcohol use: Not Currently   • Drug use: Not Currently     Types: Marijuana       Review of Systems   Constitutional: Positive for unexpected weight change. Negative for chills and fever. HENT: Negative for congestion and sore throat. Respiratory: Negative for cough and shortness of breath. Cardiovascular: Negative for chest pain. Gastrointestinal: Positive for abdominal pain and constipation. Negative for nausea and vomiting. Endocrine:        Lactation   Genitourinary: Positive for menstrual problem. Negative for difficulty urinating, dysuria, frequency, urgency, vaginal bleeding and vaginal discharge. Musculoskeletal: Negative for arthralgias and myalgias. Skin: Negative for rash. Neurological: Negative for headaches. Psychiatric/Behavioral: The patient is nervous/anxious. Physical Exam  Physical Exam  Vitals and nursing note reviewed.    Constitutional: General: She is not in acute distress. Appearance: She is not ill-appearing, toxic-appearing or diaphoretic. HENT:      Head: Normocephalic and atraumatic. Mouth/Throat:      Mouth: Mucous membranes are moist.   Eyes:      General: No scleral icterus. Cardiovascular:      Rate and Rhythm: Normal rate and regular rhythm. Pulmonary:      Effort: Pulmonary effort is normal. No respiratory distress. Breath sounds: No wheezing, rhonchi or rales. Abdominal:      General: Abdomen is flat. Palpations: Abdomen is soft. Tenderness: There is abdominal tenderness (mild to deep palpation) in the right lower quadrant and left lower quadrant. There is no guarding or rebound. Skin:     General: Skin is warm and dry. Findings: No rash. Neurological:      General: No focal deficit present. Mental Status: She is alert and oriented to person, place, and time.    Psychiatric:         Mood and Affect: Mood normal.         Behavior: Behavior normal.         Vital Signs  ED Triage Vitals [06/29/23 0940]   Temperature Pulse Respirations Blood Pressure SpO2   (!) 97 °F (36.1 °C) 94 18 117/76 98 %      Temp Source Heart Rate Source Patient Position - Orthostatic VS BP Location FiO2 (%)   Tympanic Monitor Lying Left arm --      Pain Score       --           Vitals:    06/29/23 0940   BP: 117/76   Pulse: 94   Patient Position - Orthostatic VS: Lying         Visual Acuity      ED Medications  Medications - No data to display    Diagnostic Studies  Results Reviewed     Procedure Component Value Units Date/Time    POCT pregnancy, urine [[id number]]  (Normal) Resulted: 06/29/23 0954    Lab Status: Final result Updated: 06/29/23 0954     EXT Preg Test, Ur Negative     Control Valid                 XR abdomen obstruction series   ED Interpretation by Brian Balderrama MD (06/29 1038)   No obstruction                 Procedures  Procedures         ED Course  ED Course as of 06/29/23 1103   Thu Luis 29, 2023   0997 PREGNANCY TEST URINE: Negative                               SBIRT 20yo+    Flowsheet Row Most Recent Value   Initial Alcohol Screen: US AUDIT-C     1. How often do you have a drink containing alcohol? 0 Filed at: 06/29/2023 0948   2. How many drinks containing alcohol do you have on a typical day you are drinking? 0 Filed at: 06/29/2023 0948   3a. Male UNDER 65: How often do you have five or more drinks on one occasion? 0 Filed at: 06/29/2023 0948   3b. FEMALE Any Age, or MALE 65+: How often do you have 4 or more drinks on one occassion? 0 Filed at: 06/29/2023 0948   Audit-C Score 0 Filed at: 06/29/2023 0597   JAVIER: How many times in the past year have you. .. Used an illegal drug or used a prescription medication for non-medical reasons? Never Filed at: 06/29/2023 4625                    Medical Decision Making  60-year-old female presenting for evaluation of constipation and concern with pregnancy given lack of menstrual cycle and lactation. Differential diagnosis includes constipation, obstruction, urinary tract infection, ectopic pregnancy, pregnancy, ovarian tumor, hyperprolactinemia, thyroid dysfunction, medication side effect. Patient's abdomen is soft and nontender, and she denies other associated symptoms other than constipation. Buprenorphine can be constipating in nature. Abdominal x-ray obtained which is negative for evidence of obstruction. Do not feel patient requires laboratory studies or imaging today for constipation. Counseled on use of MiraLAX and Colace at home in addition to increasing fluid intake and fiber intake. Low suspicion for any acute surgical cause of her symptoms. Patient's pregnancy test is negative here. Low suspicion for ovarian pathology at this time, and she denies urinary symptoms. I suspect patient's galactorrhea and amenorrhea are secondary to risperidone, which the patient started 1 to 2 months prior to the symptoms.   I have a low suspicion for a mass lesion as a cause of the symptoms, though this remains in the differential.  I strongly advised outpatient follow-up with patient psychiatrist for medication change. Patient provided with information regarding galactorrhea and risperidone. Patient is appropriate for discharge home at this time with outpatient follow-up. Return precautions discussed. Patient is in agreement and understanding of these instructions. Amount and/or Complexity of Data Reviewed  Labs: ordered. Decision-making details documented in ED Course. Radiology: ordered and independent interpretation performed. Disposition  Final diagnoses:   Constipation, unspecified constipation type   Galactorrhea     Time reflects when diagnosis was documented in both MDM as applicable and the Disposition within this note     Time User Action Codes Description Comment    6/29/2023 10:42 AM Laurita Hardin Add [K59.00] Constipation, unspecified constipation type     6/29/2023 10:42 AM Laurita Hardin Add [E87.20] Hyperlactatemia     6/29/2023 10:43 AM Laurita Hardin Remove [E87.20] Hyperlactatemia     6/29/2023 10:43 AM Frederich Puls Add [N64.3] Galactorrhea       ED Disposition     ED Disposition   Discharge    Condition   Stable    Date/Time   Thu Jun 29, 2023 10:42 AM    Comment   Geno Courtney discharge to home/self care.                Follow-up Information     Follow up With Specialties Details Why Contact Info    Primary care doctor, psychiatrist              Discharge Medication List as of 6/29/2023 10:43 AM      CONTINUE these medications which have NOT CHANGED    Details   buprenorphine-naloxone (Suboxone) 8-2 mg dissolve 1 FILM under the tongue twice a day, Historical Med      cetirizine (ZyrTEC) 10 mg tablet take 1 tablet by mouth once daily, Normal      cholecalciferol (VITAMIN D3) 1,000 units tablet take 1 tablet by mouth once daily, Normal      clonazePAM (KlonoPIN) 1 mg tablet take 1 tablet by mouth three times a day, Normal      cyclobenzaprine (FLEXERIL) 10 mg tablet take 1 tablet by mouth three times a day if needed for muscle spasm, Normal      DULoxetine (CYMBALTA) 30 mg delayed release capsule take 1 capsule by mouth once daily, Normal      fluticasone (FLONASE) 50 mcg/act nasal spray 1 spray into each nostril daily, Starting Mon 5/15/2023, Normal      gabapentin (NEURONTIN) 300 mg capsule take 1 capsule by mouth three times a day, Normal      levothyroxine 50 mcg tablet 1 daily, Normal      omeprazole (PriLOSEC) 20 mg delayed release capsule take 1 capsule by mouth once daily, Normal      !! risperiDONE (RisperDAL) 0.5 mg tablet Take 1 tablet (0.5 mg total) by mouth in the morning, Starting Wed 5/10/2023, Normal      !! risperiDONE (RisperDAL) 2 mg tablet Take 1 tablet (2 mg total) by mouth daily at bedtime, Starting Wed 5/10/2023, Normal      topiramate (TOPAMAX) 100 mg tablet take 1 tablet by mouth twice a day, Normal      traZODone (DESYREL) 50 mg tablet Take 0.5 tablets (25 mg total) by mouth daily at bedtime, Starting Wed 5/10/2023, Normal      albuterol (PROVENTIL HFA,VENTOLIN HFA) 90 mcg/act inhaler Inhale 2 puffs every 4 (four) hours as needed for wheezing, Starting Wed 10/5/2022, Normal      ibuprofen (MOTRIN) 800 mg tablet Take 1 tablet (800 mg total) by mouth every 8 (eight) hours as needed for mild pain, Starting Mon 11/1/2021, Normal      NARCAN 4 MG/0.1ML LIQD ADMINISTER A SINGLE spray INTO ONE NOSTRIL CALL 911 MAY REPEAT ONCE, Historical Med       !! - Potential duplicate medications found. Please discuss with provider. No discharge procedures on file.     PDMP Review       Value Time User    PDMP Reviewed  Yes 5/10/2023  2:08 PM Johnnie Au DO          ED Provider  Electronically Signed by           Mike Kimbrough MD  06/29/23 5816

## 2023-07-07 ENCOUNTER — OFFICE VISIT (OUTPATIENT)
Dept: PSYCHIATRY | Facility: CLINIC | Age: 38
End: 2023-07-07
Payer: COMMERCIAL

## 2023-07-07 DIAGNOSIS — F51.01 PRIMARY INSOMNIA: ICD-10-CM

## 2023-07-07 DIAGNOSIS — F41.9 ANXIETY: ICD-10-CM

## 2023-07-07 DIAGNOSIS — M79.2 NEUROPATHIC PAIN: ICD-10-CM

## 2023-07-07 DIAGNOSIS — F31.4 BIPOLAR DISORDER, CURRENT EPISODE DEPRESSED, SEVERE, WITHOUT PSYCHOTIC FEATURES (HCC): Primary | ICD-10-CM

## 2023-07-07 DIAGNOSIS — F11.20 OPIOID DEPENDENCE ON AGONIST THERAPY (HCC): ICD-10-CM

## 2023-07-07 PROCEDURE — 99213 OFFICE O/P EST LOW 20 MIN: CPT | Performed by: PSYCHIATRY & NEUROLOGY

## 2023-07-07 RX ORDER — TRAZODONE HYDROCHLORIDE 50 MG/1
25 TABLET ORAL
Qty: 15 TABLET | Refills: 4 | Status: SHIPPED | OUTPATIENT
Start: 2023-07-07

## 2023-07-07 RX ORDER — BUPRENORPHINE AND NALOXONE 8; 2 MG/1; MG/1
8 FILM, SOLUBLE BUCCAL; SUBLINGUAL 2 TIMES DAILY
Qty: 60 FILM | Refills: 0 | Status: SHIPPED | OUTPATIENT
Start: 2023-07-24

## 2023-07-07 RX ORDER — GABAPENTIN 300 MG/1
300 CAPSULE ORAL 3 TIMES DAILY
Qty: 60 CAPSULE | Refills: 4 | Status: SHIPPED | OUTPATIENT
Start: 2023-07-07

## 2023-07-07 RX ORDER — CLONAZEPAM 1 MG/1
1 TABLET ORAL 2 TIMES DAILY
Qty: 60 TABLET | Refills: 2 | Status: SHIPPED | OUTPATIENT
Start: 2023-07-07

## 2023-07-07 RX ORDER — FLUCONAZOLE 150 MG/1
TABLET ORAL
COMMUNITY
Start: 2023-05-26

## 2023-07-07 RX ORDER — DULOXETIN HYDROCHLORIDE 30 MG/1
30 CAPSULE, DELAYED RELEASE ORAL DAILY
Qty: 30 CAPSULE | Refills: 4 | Status: SHIPPED | OUTPATIENT
Start: 2023-07-07

## 2023-07-08 ENCOUNTER — TELEPHONE (OUTPATIENT)
Dept: OTHER | Facility: OTHER | Age: 38
End: 2023-07-08

## 2023-07-08 DIAGNOSIS — F31.4 BIPOLAR DISORDER, CURRENT EPISODE DEPRESSED, SEVERE, WITHOUT PSYCHOTIC FEATURES (HCC): ICD-10-CM

## 2023-07-08 NOTE — TELEPHONE ENCOUNTER
Call from patient advising she is struggling with her bipolar disorder and the script for Giovani Radon that was newly prescribed is not available at her pharmacy and will need an insurance auth as well. She is requesting call back at [phone] to discuss. Paged to on call.

## 2023-07-08 NOTE — TELEPHONE ENCOUNTER
Patient reports that she would like to start medication but a prior authorization is required. She is asking to have a partial prescription sent to pharmacy so that she can pay out-of-pocket for medication. This writer will send 5 pills to [facility] as requested and advised patient to contact Dr. India Glass office Monday to advise them of the full prescription requiring a prior authorization. Patient expressed her understanding and was in agreement with plan. All other questions and concerns addressed.

## 2023-07-08 NOTE — PSYCH
SHARE Program    Progress Note  Paulett Cockayne 40 y.o. female MRN: [id number]   Encounter: [id number]      Visit Time    Visit Start Time: 4:00PM  Visit Stop Time: 4:20PM  Total Visit Duration: 20 minutes    SUBJECTIVE: I am doing well, risperidone helps me. However he developed side effects and the ER doctor suggested me to talk about medication changes. INTERVAL HISTORY: The patient was no longer in any paranoid psychotic or dysphoric state. She was calm, relaxed, when asked, she denied any somatic hallucinations or paranoid delusions, she admitted that the risperidone helps her mood. Unfortunately she developed amenorrhagia, and breast discharges. Most likely this is a side effects of risperidone because no other medication changes were made. The same time the patient continues to take Suboxone but is very interested to switch to [facility], because she is stated she has been taking Suboxone for a long time and found it effective but she does not want to go up several withdrawal symptoms if she misses a dose. Review of the patient's record indicated that she may benefit from [facility] once a month injection.     Review Of Systems:    sleep changes: decreased  appetite changes: decreased  energy/anergy: decreased    Support Services  The patient is actively engaged with the [facility] Certified Addiction Counselor’s psychotherapy plan: Yes        PDMP reviewed:     PDMP Review       Value Time User    PDMP Reviewed  Yes 7/7/2023  4:07 PM Zaira Odom MD            Drug cravings: none  Motivation to continue treatment: none      Current Outpatient Medications:   •  [START ON 7/24/2023] buprenorphine-naloxone (Suboxone) 8-2 mg, Place 1 Film (8 mg total) under the tongue 2 (two) times a day Do not start before July 24, 2023., Disp: 60 Film, Rfl: 0  •  cariprazine (VRAYLAR) 1.5 MG capsule, Take 1 capsule (1.5 mg total) by mouth daily, Disp: 30 capsule, Rfl: 4  •  clonazePAM (KlonoPIN) 1 mg tablet, Take 1 tablet (1 mg total) by mouth 2 (two) times a day, Disp: 60 tablet, Rfl: 2  •  DULoxetine (CYMBALTA) 30 mg delayed release capsule, Take 1 capsule (30 mg total) by mouth daily, Disp: 30 capsule, Rfl: 4  •  gabapentin (NEURONTIN) 300 mg capsule, Take 1 capsule (300 mg total) by mouth 3 (three) times a day, Disp: 60 capsule, Rfl: 4  •  traZODone (DESYREL) 50 mg tablet, Take 0.5 tablets (25 mg total) by mouth daily at bedtime, Disp: 15 tablet, Rfl: 4  •  albuterol (PROVENTIL HFA,VENTOLIN HFA) 90 mcg/act inhaler, Inhale 2 puffs every 4 (four) hours as needed for wheezing, Disp: 18 g, Rfl: 5  •  cetirizine (ZyrTEC) 10 mg tablet, take 1 tablet by mouth once daily, Disp: 30 tablet, Rfl: 0  •  cholecalciferol (VITAMIN D3) 1,000 units tablet, take 1 tablet by mouth once daily, Disp: 30 tablet, Rfl: 0  •  cyclobenzaprine (FLEXERIL) 10 mg tablet, take 1 tablet by mouth three times a day if needed for muscle spasm, Disp: 90 tablet, Rfl: 3  •  fluconazole (DIFLUCAN) 150 mg tablet, take 1 tablet by mouth ONCE FOR 1 DOSE ONCE YOU COMPLETED FLAGYL AND DOXYCYCLINE COURSES, Disp: , Rfl:   •  fluticasone (FLONASE) 50 mcg/act nasal spray, 1 spray into each nostril daily, Disp: 16 g, Rfl: 2  •  ibuprofen (MOTRIN) 800 mg tablet, Take 1 tablet (800 mg total) by mouth every 8 (eight) hours as needed for mild pain (Patient not taking: Reported on 6/29/2023), Disp: 90 tablet, Rfl: 3  •  levothyroxine 50 mcg tablet, 1 daily, Disp: 30 tablet, Rfl: 5  •  NARCAN 4 MG/0.1ML LIQD, ADMINISTER A SINGLE spray INTO ONE NOSTRIL CALL 911 MAY REPEAT ONCE (Patient not taking: No sig reported), Disp: , Rfl: 0  •  omeprazole (PriLOSEC) 20 mg delayed release capsule, take 1 capsule by mouth once daily, Disp: 90 capsule, Rfl: 3  •  topiramate (TOPAMAX) 100 mg tablet, take 1 tablet by mouth twice a day, Disp: 90 tablet, Rfl: 3    Objective     There were no vitals filed for this visit.         Mental Status Evaluation: Casually dressed, not in acute distress, patient well taking care of herself, not any symptoms of psychosis or mood disturbance. Normal rate and volume of speech. Improved insight and judgment. She is basically  Lab Results:   Results from last 6 Months   Lab Units 03/20/23  1722   WBC Thousand/uL 6.30   HEMOGLOBIN g/dL 12.3   HEMATOCRIT % 38.6   PLATELETS Thousands/uL 209      Results from last 6 Months   Lab Units 03/20/23  1722 02/27/23  1214   POTASSIUM mmol/L 3.6 4.1   CHLORIDE mmol/L 110* 111*   CO2 mmol/L 24 19*   BUN mg/dL 12 14   CREATININE mg/dL 1.00 0.93   CALCIUM mg/dL 8.6 8.9   ALBUMIN g/dL  --  4.4   ALK PHOS U/L  --  49   ALT U/L  --  14   AST U/L  --  17     Should    Diagnoses and all orders for this visit:    Bipolar disorder, current episode depressed, severe, without psychotic features ([address])  -     cariprazine (VRAYLAR) 1.5 MG capsule; Take 1 capsule (1.5 mg total) by mouth daily    Anxiety  -     clonazePAM (KlonoPIN) 1 mg tablet; Take 1 tablet (1 mg total) by mouth 2 (two) times a day  -     DULoxetine (CYMBALTA) 30 mg delayed release capsule; Take 1 capsule (30 mg total) by mouth daily  -     gabapentin (NEURONTIN) 300 mg capsule; Take 1 capsule (300 mg total) by mouth 3 (three) times a day    Opioid dependence on agonist therapy (HCC)  -     buprenorphine-naloxone (Suboxone) 8-2 mg; Place 1 Film (8 mg total) under the tongue 2 (two) times a day Do not start before July 24, 2023. Neuropathic pain  -     DULoxetine (CYMBALTA) 30 mg delayed release capsule; Take 1 capsule (30 mg total) by mouth daily  -     gabapentin (NEURONTIN) 300 mg capsule; Take 1 capsule (300 mg total) by mouth 3 (three) times a day    Primary insomnia  -     traZODone (DESYREL) 50 mg tablet;  Take 0.5 tablets (25 mg total) by mouth daily at bedtime    Other orders  -     fluconazole (DIFLUCAN) 150 mg tablet; take 1 tablet by mouth ONCE FOR 1 DOSE ONCE YOU COMPLETED FLAGYL AND DOXYCYCLINE COURSES       We discussed that because risperidone has a antipsychotic mood stabilizer help with lead to weight gain and other side effects, Vraylar, the [place] the same class is less side effects, not associated with galactorrhea or weight gain, he is preferable medication to help the patient with history of bipolar disorder. We will start with a small dose of 1.5 mg and increase as tolerated. This writer discussed common side effects associated with the regular which in most cases about the white symptoms or symptoms of patients. The patient was in agreement to start this new medication. At the same time we refilled all of his medications since the writer submitted a request for Sublocade 300 mg. We will review the patient's response to 300 mg of Sublocade before the 300 mg dose as needed. We can safely decrease to 100 mg after the first injection. Potential side effects of Sublocade such as pain on injection and also benefits of having stable level of buprenorphine was discussed. In addition to medication management the writer provided supportive therapy. Risks / Benefits of Treatment:    Risks, benefits, and possible side effects of medications explained to patient and patient verbalizes understanding and agreement for treatment. This note was not shared with the patient due to this is a psychotherapy note     Counseling / Coordination of Care  Total time spent today 20 minutes. Greater than 50% of total time was spent with the patient and / or family counseling and / or coordination of care.      Stephani Villarreal MD

## 2023-07-10 ENCOUNTER — DOCUMENTATION (OUTPATIENT)
Dept: PSYCHIATRY | Facility: CLINIC | Age: 38
End: 2023-07-10

## 2023-07-10 ENCOUNTER — TELEPHONE (OUTPATIENT)
Dept: PSYCHIATRY | Facility: CLINIC | Age: 38
End: 2023-07-10

## 2023-07-10 NOTE — TELEPHONE ENCOUNTER
Prior Authorization for Sublocade 300 MG Subcutaneous injection faxed to [employer]. Decision pending. Spoke to Earline at infusion center and she provided me with appointment for injection on Monday 7/17 at 10:30. May be rescheduled sooner if approval for Prior Authorization is received sooner. Spoke to CHER FERNÁNDEZ and provided her with appointment information.

## 2023-07-10 NOTE — TELEPHONE ENCOUNTER
Geno called the office and is requesting that new 30 day script for Vraylar be sent to [email or web address]. States she only has 1 pill. Will refer to Dr Anatoliy Grande for script.

## 2023-07-10 NOTE — TELEPHONE ENCOUNTER
Fax from [facility]i [facility]ary stating that Sublocasde Subcutaneous injection does not require a Prior Authorization. [address] and spoke to Michelle Dickson. Asked if she can try to get patient in this week for the injection. Called Geno and updated her with this information.

## 2023-07-10 NOTE — TELEPHONE ENCOUNTER
Prior Authorization for Vraylar 1.5 MG capsule faxed to [facility] via [phone]. Decision pending.

## 2023-07-11 DIAGNOSIS — F31.4 BIPOLAR DISORDER, CURRENT EPISODE DEPRESSED, SEVERE, WITHOUT PSYCHOTIC FEATURES (HCC): ICD-10-CM

## 2023-07-11 NOTE — TELEPHONE ENCOUNTER
Follow up call to CHER FERNÁNDEZ. Informed her as per Dr Ariana Sosa, that after [facility] injection, she can continue using 1/2 film of suboxone daily to help with cravings.

## 2023-07-11 NOTE — TELEPHONE ENCOUNTER
Fax from [facility] with [place] approval for Vraylar 1.5 MG capsule effective 7/10/23 - 7/10/24. Called pharmacy and updated them with approval. Pharmacist states they will need a full 30 day script. Called Geno and updated her with approval.  She is requesting that script be sent ASAP as she only had 1 pill for yesterday. Will refer to Dr Vidhya Rojas for script.

## 2023-07-17 ENCOUNTER — DOCUMENTATION (OUTPATIENT)
Dept: PSYCHIATRY | Facility: CLINIC | Age: 38
End: 2023-07-17

## 2023-07-17 ENCOUNTER — HOSPITAL ENCOUNTER (OUTPATIENT)
Dept: INFUSION CENTER | Facility: HOSPITAL | Age: 38
Discharge: HOME | End: 2023-07-17
Payer: COMMERCIAL

## 2023-07-17 PROCEDURE — 96372 THER/PROPH/DIAG INJ SC/IM: CPT

## 2023-07-17 RX ADMIN — BUPRENORPHINE 300 MG: 300 SOLUTION SUBCUTANEOUS at 10:58

## 2023-07-17 NOTE — PROGRESS NOTES
Pt tolerated first sublocade 300mg to left abdomen without complications.  Confirmed next appt, pt uses mychart
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
## 2023-07-19 NOTE — PROGRESS NOTES
Note Type: Case Management Note                  Date of Service: 07/17/2023  Service:  [facility]    Note: Case Management Note  Cindy Preston 40 y.o. female [id number]  Attending  Substance Use History     Social History     Substance and Sexual Activity   Alcohol Use Not Currently        Social History     Substance and Sexual Activity   Drug Use Not Currently   • Types: Marijuana       Encounter Type:   Patient Face-to-Face    Start Time 1115  End Time 1150    Recovery needs addressed at this meeting:  Basic  Needs    Note  D: Patient presented for in-person, scheduled visit with this CM. Present during this session was patient's younger child. Patient mentioned to this CM of the recent contact made with CPS who ultimately have requested child's medical records. Patient has also been working on making RSens to help offset monthly bills in which patient has been making upwards of $200.00. A: Patient does feel to be in a good space. Patient was very affectionate towards child during session, showing great praise. Patient, overall is stating to be well. No symptoms of SI/HI    P: Patient is still working with community CRS, but states CRS hasn't been doing much. Patient would like to continue to look for housing with the reality that patient is unable to find a large home with the price range needed and/or given with [employer].      Referrals made  [employer]    Next appointment date and time  Follow up in one week

## 2023-07-24 ENCOUNTER — DOCUMENTATION (OUTPATIENT)
Dept: PSYCHIATRY | Facility: CLINIC | Age: 38
End: 2023-07-24
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
## 2023-08-07 ENCOUNTER — DOCUMENTATION (OUTPATIENT)
Dept: PSYCHIATRY | Facility: CLINIC | Age: 38
End: 2023-08-07

## 2023-08-08 NOTE — PROGRESS NOTES
Note Type: Case Management Note                  Date of Service: [unfilled]  Service:  [facility]    Note: Case Management Note  Amaris Thurston 40 y.o. female [id number]  Attending  Substance Use History     Social History     Substance and Sexual Activity   Alcohol Use Not Currently        Social History     Substance and Sexual Activity   Drug Use Not Currently   • Types: Marijuana       Encounter Type:   Patient Face-to-Face    Start Time 1100  End Time 5708    Recovery needs addressed at this meeting:  Basic  Needs, Emotional/Mental Health and Legal Status    Note  D: Patient presented for in-person, scheduled visit with this CM. Present during this session was patient's minor child. Patient advised this CM that patient received notice of custody hearing for contempt from minor child's paternal grandmother scheduled for 08/25/2023. Patient stated the contempt was not sent correctly in which patient will state to have not received the notice and will not attend this hearing. This CM did mention to patient that it is important to attend this hearing to ensure there is no decision making without patient present. A: Patient states to be doing well. 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 Patient and significant other still have arguments, but feels to be doing better overall. Patient does not state to be a danger to self or others, no symptoms of SI/HI    P: Patient will follow up with this CM in one week and present the contempt notice to review with this CM and answer accordingly.     Referrals made  No formal referrals made during this session     Next appointment date and time  Follow up in one week

## 2023-08-14 ENCOUNTER — TELEPHONE (OUTPATIENT)
Dept: PSYCHIATRY | Facility: CLINIC | Age: 38
End: 2023-08-14

## 2023-08-14 ENCOUNTER — OFFICE VISIT (OUTPATIENT)
Dept: PSYCHIATRY | Facility: CLINIC | Age: 38
End: 2023-08-14
Payer: COMMERCIAL

## 2023-08-14 ENCOUNTER — HOSPITAL ENCOUNTER (OUTPATIENT)
Dept: INFUSION CENTER | Facility: HOSPITAL | Age: 38
Discharge: HOME | End: 2023-08-14
Attending: PSYCHIATRY & NEUROLOGY
Payer: COMMERCIAL

## 2023-08-14 VITALS
HEART RATE: 93 BPM | SYSTOLIC BLOOD PRESSURE: 105 MMHG | DIASTOLIC BLOOD PRESSURE: 74 MMHG | WEIGHT: 200 LBS | HEIGHT: 64 IN | BODY MASS INDEX: 34.15 KG/M2

## 2023-08-14 DIAGNOSIS — M79.2 NEUROPATHIC PAIN: ICD-10-CM

## 2023-08-14 DIAGNOSIS — F31.4 BIPOLAR DISORDER, CURRENT EPISODE DEPRESSED, SEVERE, WITHOUT PSYCHOTIC FEATURES (HCC): Primary | ICD-10-CM

## 2023-08-14 DIAGNOSIS — F90.8 ATTENTION DEFICIT HYPERACTIVITY DISORDER (ADHD), OTHER TYPE: ICD-10-CM

## 2023-08-14 DIAGNOSIS — F51.01 PRIMARY INSOMNIA: ICD-10-CM

## 2023-08-14 DIAGNOSIS — F41.9 ANXIETY: ICD-10-CM

## 2023-08-14 PROBLEM — Z30.09 FAMILY PLANNING: Status: ACTIVE | Noted: 2023-08-14

## 2023-08-14 PROBLEM — B19.20 HEPATITIS C VIRUS INFECTION: Status: ACTIVE | Noted: 2023-08-14

## 2023-08-14 PROCEDURE — 90833 PSYTX W PT W E/M 30 MIN: CPT | Performed by: PSYCHIATRY & NEUROLOGY

## 2023-08-14 PROCEDURE — 99214 OFFICE O/P EST MOD 30 MIN: CPT | Performed by: PSYCHIATRY & NEUROLOGY

## 2023-08-14 RX ORDER — GABAPENTIN 300 MG/1
300 CAPSULE ORAL 3 TIMES DAILY
Qty: 60 CAPSULE | Refills: 4 | Status: SHIPPED | OUTPATIENT
Start: 2023-08-14

## 2023-08-14 RX ORDER — ATOMOXETINE 40 MG/1
40 CAPSULE ORAL DAILY
Qty: 30 CAPSULE | Refills: 2 | Status: SHIPPED | OUTPATIENT
Start: 2023-08-14

## 2023-08-14 RX ORDER — BUPROPION HYDROCHLORIDE 150 MG/1
150 TABLET, EXTENDED RELEASE ORAL DAILY
Qty: 30 TABLET | Refills: 4 | Status: SHIPPED | OUTPATIENT
Start: 2023-08-14

## 2023-08-14 RX ORDER — CLONAZEPAM 1 MG/1
0.5 TABLET ORAL 2 TIMES DAILY
Qty: 30 TABLET | Refills: 2 | Status: SHIPPED | OUTPATIENT
Start: 2023-08-14

## 2023-08-14 RX ORDER — DULOXETIN HYDROCHLORIDE 30 MG/1
30 CAPSULE, DELAYED RELEASE ORAL DAILY
Qty: 30 CAPSULE | Refills: 4 | Status: SHIPPED | OUTPATIENT
Start: 2023-08-14

## 2023-08-14 RX ORDER — TRAZODONE HYDROCHLORIDE 50 MG/1
25 TABLET ORAL
Qty: 15 TABLET | Refills: 4 | Status: SHIPPED | OUTPATIENT
Start: 2023-08-14

## 2023-08-14 RX ADMIN — BUPRENORPHINE 300 MG: 300 SOLUTION SUBCUTANEOUS at 13:59

## 2023-08-14 NOTE — TELEPHONE ENCOUNTER
Prior Authorizations for Atomoxetine 40 MG capsule and Vraylar 3 MG capsule faxed to [facility] via [address] [employer].

## 2023-08-14 NOTE — TELEPHONE ENCOUNTER
Call from [facility] stating that a Prior Authorization is needed for both Atomoxetine and Vraylar. She is also requesting that Atomoxetine be transferred to [facility] since she will have to wait for both medications. Will refer to Dr Paris Hilario for review.

## 2023-08-14 NOTE — PROGRESS NOTES
Pt tolerated dose #2/2 sublocade 300mg to right abdomen without complication. Pt saw provider today prior to visit.   confirmed next appt

## 2023-08-14 NOTE — PSYCH
[facility] Program    Progress Note  Patria Gallo 40 y.o. female MRN: [id number]   Encounter: [id number]      Visit Time    Visit Start Time: 1PM  Visit Stop Time: 1:30 PM  Total Visit Duration: 30 minutes    SUBJECTIVE: My mood is much better now after I started Vraylar, I feel more stable. I have less energy in the morning was not a switch from Suboxone to shots of Sublocade. They do not have any cravings to opioids. My ADD is getting worse. I cannot concentrate. I am switching from one project to another cannot finish them. I also want to lose some weight. I am trying to diet and exercise and still overweight and this is frustrating and depressing. Overall I am doing better now, less anxious and less french. INTERVAL HISTORY: The patient tolerated switch from Suboxone to [facility] well. We will continue with the one more 300 mg shot to make sure that the patient has a stable buprenorphine level, and then we will lower monthly shots to 100 mg. The patient feels like overall her mood is more stable but still needs help with energy, motivation, and improvement of attention that she stated is getting worse. Denied any paranoid ideations did not endorse any symptoms of psychosis.     Review Of Systems:    sleep changes: decreased  appetite changes: no change  energy/anergy: no change    Support Services  The patient is actively engaged with the [facility] Certified Addiction Counselor’s psychotherapy plan: Yes        PDMP reviewed:     PDMP Review       Value Time User    PDMP Reviewed  Yes 7/7/2023  4:07 PM Arley Ortez MD            Drug cravings: none after Sublocade was started, no need for supplemental oral Suboxone  Motivation to continue treatment: high       Current Outpatient Medications:   •  atoMOXetine (STRATTERA) 40 mg capsule, Take 1 capsule (40 mg total) by mouth daily, Disp: 30 capsule, Rfl: 2  •  buPROPion (Wellbutrin SR) 150 mg 12 hr tablet, Take 1 tablet (150 mg total) by mouth in the morning, Disp: 30 tablet, Rfl: 4  •  cariprazine (VRAYLAR) 3 MG capsule, Take 1 capsule (3 mg total) by mouth daily, Disp: 30 capsule, Rfl: 5  •  clonazePAM (KlonoPIN) 1 mg tablet, Take 0.5 tablets (0.5 mg total) by mouth 2 (two) times a day, Disp: 30 tablet, Rfl: 2  •  DULoxetine (CYMBALTA) 30 mg delayed release capsule, Take 1 capsule (30 mg total) by mouth daily, Disp: 30 capsule, Rfl: 4  •  gabapentin (NEURONTIN) 300 mg capsule, Take 1 capsule (300 mg total) by mouth 3 (three) times a day, Disp: 60 capsule, Rfl: 4  •  traZODone (DESYREL) 50 mg tablet, Take 0.5 tablets (25 mg total) by mouth daily at bedtime, Disp: 15 tablet, Rfl: 4  •  albuterol (PROVENTIL HFA,VENTOLIN HFA) 90 mcg/act inhaler, Inhale 2 puffs every 4 (four) hours as needed for wheezing, Disp: 18 g, Rfl: 5  •  buprenorphine-naloxone (Suboxone) 8-2 mg, Place 1 Film (8 mg total) under the tongue 2 (two) times a day Do not start before July 24, 2023., Disp: 60 Film, Rfl: 0  •  cetirizine (ZyrTEC) 10 mg tablet, take 1 tablet by mouth once daily, Disp: 30 tablet, Rfl: 0  •  cholecalciferol (VITAMIN D3) 1,000 units tablet, take 1 tablet by mouth once daily, Disp: 30 tablet, Rfl: 0  •  cyclobenzaprine (FLEXERIL) 10 mg tablet, take 1 tablet by mouth three times a day if needed for muscle spasm, Disp: 90 tablet, Rfl: 3  •  fluconazole (DIFLUCAN) 150 mg tablet, take 1 tablet by mouth ONCE FOR 1 DOSE ONCE YOU COMPLETED FLAGYL AND DOXYCYCLINE COURSES, Disp: , Rfl:   •  fluticasone (FLONASE) 50 mcg/act nasal spray, 1 spray into each nostril daily, Disp: 16 g, Rfl: 2  •  ibuprofen (MOTRIN) 800 mg tablet, Take 1 tablet (800 mg total) by mouth every 8 (eight) hours as needed for mild pain (Patient not taking: Reported on 6/29/2023), Disp: 90 tablet, Rfl: 3  •  levothyroxine 50 mcg tablet, 1 daily, Disp: 30 tablet, Rfl: 5  •  NARCAN 4 MG/0.1ML LIQD, ADMINISTER A SINGLE spray INTO ONE NOSTRIL CALL 911 MAY REPEAT ONCE (Patient not taking: No sig reported), Disp: , Rfl: 0  •  omeprazole (PriLOSEC) 20 mg delayed release capsule, take 1 capsule by mouth once daily, Disp: 90 capsule, Rfl: 3  •  topiramate (TOPAMAX) 100 mg tablet, take 1 tablet by mouth twice a day, Disp: 90 tablet, Rfl: 3  No current facility-administered medications for this visit. Facility-Administered Medications Ordered in Other Visits:   •  Buprenorphine ER (Sublocade) subcutaneous injection 300 mg, 300 mg, Subcutaneous, Once, Adriana Scott MD    Objective     Vitals:    08/14/23 1306   BP: 105/74   Pulse: 93           Mental Status Evaluation: Improved eye contact. No more intense feelings, less depressed, less anxious, normal rate and volume of speech. Linear thoughts. Did not endorse symptoms of psychosis. Did not respond to internal stimuli. No paranoid ideations. No somatic hallucinations. Insight and judgment improved. Denied any cravings or not in any withdrawal.    Lab Results:   Results from last 6 Months   Lab Units 03/20/23  1722   WBC Thousand/uL 6.30   HEMOGLOBIN g/dL 12.3   HEMATOCRIT % 38.6   PLATELETS Thousands/uL 209      Results from last 6 Months   Lab Units 03/20/23  1722 02/27/23  1214   POTASSIUM mmol/L 3.6 4.1   CHLORIDE mmol/L 110* 111*   CO2 mmol/L 24 19*   BUN mg/dL 12 14   CREATININE mg/dL 1.00 0.93   CALCIUM mg/dL 8.6 8.9   ALBUMIN g/dL  --  4.4   ALK PHOS U/L  --  49   ALT U/L  --  14   AST U/L  --  17                           Diagnoses and all orders for this visit:    Bipolar disorder, current episode depressed, severe, without psychotic features ([address])  -     cariprazine (VRAYLAR) 3 MG capsule; Take 1 capsule (3 mg total) by mouth daily    Anxiety  -     clonazePAM (KlonoPIN) 1 mg tablet; Take 0.5 tablets (0.5 mg total) by mouth 2 (two) times a day  -     DULoxetine (CYMBALTA) 30 mg delayed release capsule; Take 1 capsule (30 mg total) by mouth daily  -     gabapentin (NEURONTIN) 300 mg capsule;  Take 1 capsule (300 mg total) by mouth 3 (three) times a day    Neuropathic pain  -     DULoxetine (CYMBALTA) 30 mg delayed release capsule; Take 1 capsule (30 mg total) by mouth daily  -     gabapentin (NEURONTIN) 300 mg capsule; Take 1 capsule (300 mg total) by mouth 3 (three) times a day    Attention deficit hyperactivity disorder (ADHD), other type  -     buPROPion (Wellbutrin SR) 150 mg 12 hr tablet; Take 1 tablet (150 mg total) by mouth in the morning  -     atoMOXetine (STRATTERA) 40 mg capsule; Take 1 capsule (40 mg total) by mouth daily    Primary insomnia  -     traZODone (DESYREL) 50 mg tablet; Take 0.5 tablets (25 mg total) by mouth daily at bedtime       Decision was to increase Vraylar to help with residual symptoms of mood instability, at the same time because the patient feels tired we will decrease clonazepam.  We will continue his Neurontin and add Wellbutrin to address patient's ADHD together with Strattera. Wellbutrin was also selected as medications that might help with smoking cessation. Although Wellbutrin is not FDA approved to address weight gain, its combination was naltrexone FDA approved to treat abnormally high weight. So writer discussed with the patient Wellbutrin may help with some energy, smoking cessation and help him not to restart smoking, and off label may help with weight regulation. This writer also provided motivational therapy. The patient is missing her therapist safe here but she is scheduled with another therapist in our office. Facility-Administered Medications Ordered in Other Visits   Medication Dose Route Frequency Provider Last Rate   • Buprenorphine ER  300 mg Subcutaneous Once Tabitha Joshi MD         Risks / Benefits of Treatment:    Risks, benefits, and possible side effects of medications explained to patient and patient verbalizes understanding and agreement for treatment.       This note was not shared with the patient due to this is a psychotherapy note     Counseling / Coordination of Care  Total time spent today 30 minutes. Greater than 50% of total time was spent with the patient and / or family counseling and / or coordination of care.      Gail Diamond MD

## 2023-08-15 NOTE — TELEPHONE ENCOUNTER
Fax from [facility] with ARAM ojeda for Atomoxetine 40 MG capsules. "Your doctor needs to send us your symptoms to show that your diagnosis of Attention Deficit Hyperactivity Disorder (ADHD) was made using rules from the Diagnostic and Statistical Manual of Mental Health Disorders (DSM-5, a guide for mental health disorders)". Will refer to Dr Rajan Kelley for review.

## 2023-08-17 ENCOUNTER — TELEPHONE (OUTPATIENT)
Dept: PSYCHIATRY | Facility: CLINIC | Age: 38
End: 2023-08-17

## 2023-08-17 NOTE — TELEPHONE ENCOUNTER
Fax from [facility] with [place] approval for Vraylar 3 MG capsule effective 8/14/23 - 7/10/24. Called pharmacy and updated them with approval.  They will order medication for tomorrow.     Called Geno and updated her with approval.

## 2023-08-22 ENCOUNTER — DOCUMENTATION (OUTPATIENT)
Dept: PSYCHIATRY | Facility: CLINIC | Age: 38
End: 2023-08-22

## 2023-08-24 ENCOUNTER — DOCUMENTATION (OUTPATIENT)
Dept: PSYCHIATRY | Facility: CLINIC | Age: 38
End: 2023-08-24

## 2023-08-24 NOTE — PROGRESS NOTES
Note Type: Case Management Note                  Date of Service: [unfilled]  Service:  [facility]    Note: Case Management Note  Arnaldo Hutchison 40 y.o. female [id number]  Attending  Substance Use History     Social History     Substance and Sexual Activity   Alcohol Use Not Currently        Social History     Substance and Sexual Activity   Drug Use Not Currently   • Types: Marijuana       Encounter Type:   Patient Face-to-Face    Start Time 1105  End Time 1200    Recovery needs addressed at this meeting:  Basic  Needs, Emotional/Mental Health, Family,  and Legal Status    Note  D: Patient presented for in-person, scheduled visit with this CM. Present during this visit was patient's minor child. Patient advised this CM of an upcoming court hearing for custody of minor child in which patient will have to present to [facility] court house with paternal grandmother and she is once again attempting to gain custody of minor child after trial was completed in [place]. Patient and this CM reviewed contempt complaint and answered to each statement in which patient is requesting to meet with this CM once more prior to hearing in order to ensure all answers are correctly and professionally documented. A: Patient was overwhelmed throughout session. Patient has been going through unnecessary hearings for approximately three years when referencing paternal grandmother. Patient, other than new hearing information has been doing well.  No symptoms of SI/HI    P: Patient will follow up with this CM on Thursday at 8am    Referrals made  No formal referrals made during this session     Next appointment date and time  Follow up 08/24 @ 8am
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
## 2023-08-27 DIAGNOSIS — J45.909 ASTHMA, UNSPECIFIED ASTHMA SEVERITY, UNSPECIFIED WHETHER COMPLICATED, UNSPECIFIED WHETHER PERSISTENT: ICD-10-CM

## 2023-08-28 ENCOUNTER — DOCUMENTATION (OUTPATIENT)
Dept: PSYCHIATRY | Facility: CLINIC | Age: 38
End: 2023-08-28

## 2023-08-28 NOTE — PATIENT INSTRUCTIONS
1  Obtained pap smear and vaginal culture, will call patient with results  2   Call office with any concerns OUTPATIENT PROGRESS NOTE    DATE OF SERVICE:  August 30, 2023    Patient Active Problem List   Diagnosis   • Mixed incontinence urge and stress   • Hypercholesterolemia   • Hyperparathyroidism, secondary (CMS/HCC)   • Essential hypertension   • PKD (polycystic kidney disease)   • PVD (peripheral vascular disease) (CMD)   • Dialysis patient (CMD)   • ESRD (end stage renal disease) (CMD)   • Severe obesity (BMI 35.0-39.9) with comorbidity (CMD)   • ERRONEOUS ENCOUNTER - DISREGARD         referred for recurrent thrombosis of graft vein anastomosis, dialysis access  6/28/23 Findings: 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 Brisk flow throughout after placement and thrombolysis  Records indicate AV fistula 2/21, revision 8/21, again 4/23  On Eliquis for the last month 2.5 mg (and prior to most recent thrombus)  Went greater than a year with first access and without thrombosis, she believes  Right internal jugular access, fibrin sheath on removal but no thrombosis        Results: creat 13.0; hgb 10.4, plat 231K     7/3/23 : I will send a hypercoag work up but as 1. she went some time with first access before thrombus 2. all episodes have been at access sites, none other locations 3. no personal or family hx of DVT PE etc. 4. stenosis noted at recent surgery, I think this is low yield. She says she has excess bleeding on removal of needle for dialysis on current 2.5 dose, and at least one episode now on anticoagulation.      Up to Date . \"The data on antiplatelet agents is not very encouraging:Dipyridamole lowered graft thrombosis whether it was administered alone or in conjunction with aspirin.  In a large, multicenter study, long-acting dipyridamole plus aspirin improved primary patency [33]. In this trial, 649 patients with a new AV graft were randomly assigned to aspirin (25 mg twice daily) plus extended-release  clotted requiring tPA to clear it.  She has not had no other peripheral evidence of thromboembolic disease or other symptoms.  She is been off Eliquis since Sunday which should afford us the opportunity to get a lupus anticoagulant.  Insurance refused prothrombin 20210 apparently requiring a genetics referral.  I think it is highly unlikely that she has a prothrombotic hereditary condition.  And will not pursue it at this point as it will not change recommendations for anticoagulation.  Plan: Lupus anticoagulant protein C and protein S obtained while off of anti Xa therapy.  Follow up in 3 weeks' time      Results: S 71%, 133%; C 128%; lupus anticoagulant negative, but RVV elevated and thrombin time slightly prolonged     08/23/2023:   The right subclavian long-term access catheter seems to be working well. (telephone contact after this visit with Hans Matthews suggests this is NOT the case)  It is somewhat sensitive to her moving coughing etc. in terms of flow rates but she is not had any thrombosis that she knows of.  Her medication list in the computer says that she is on Eliquis and not on antiplatelet agents.  She very coherently states that she is not on Eliquis and is on Plavix.  I have a message out to the nurse practitioner in dialysis to try to establish currency and correctness of her medication list. (correct per message)  I tried to explain to Mrs. Arceo the potential for a dysfibrinogenemia, and the most are associated with either no symptoms or bleeding, some can be associated with hypercoagulability.  It would not immediately change her medications that would be an indication for systemic anticoagulation in and of themselves.  All of her thrombotic events have been associated with a prior access sites.  I explained that if she does have a substantial difference and fibrinogen activity and antigen were a persistent prolongation of thrombin time (I am not repeating the Deacon's viper venom) in  Tuesday, Thurs, Saturday.). 120 tablet 3   • Methoxy PEG-Epoetin Beta (Mircera) 30 MCG/0.3ML Solution Prefilled Syringe Inject 30 mcg as directed every 28 days.     • calcium acetate gelcap (PHOSLO) 667 MG capsule TAKE 4 CAPSULES BY MOUTH THREE TIMES DAILY WITH MEALS     • calcium carbonate (TUMS) 500 MG chewable tablet Chew 1 tablet by mouth daily.     • rosuvastatin (CRESTOR) 5 MG tablet Take 1 tablet Every other day(3 days per week) 45 tablet 1   • B Complex-C-Folic Acid (Dialyvite) tablet Take 1 tablet by mouth daily.     • acetaminophen (TYLENOL) 325 MG tablet Take 2 tablets by mouth every 6 hours as needed for Pain.     • Apple Cider Vinegar 600 MG Cap Take 600 mg by mouth daily.       No current facility-administered medications for this visit.       PHYSICAL EXAM:  GENERAL: The patient is in no apparent distress.   Visit Vitals   Visit Vitals  /71 (BP Location: RUE - Right upper extremity, Patient Position: Sitting, Cuff Size: Regular)   Pulse (!) 109   Temp 99.2 °F (37.3 °C) (Oral)   Resp 16   Ht 5' 2.01\" (1.575 m)   Wt 81.1 kg (178 lb 14.4 oz)   SpO2 99%   BMI 32.71 kg/m²        The patient has been in some bruising.  The right subclavian catheter is intact and apparently functioned well earlier   LABS:  Reviewed and confirmed in the EMR (Electronic Medical Record).    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  Eliquis prescribed at 5 mg b.i.d.  Patient continues on Plavix  Follow-up in 2 months' time to assess tolerance and efficacy  ECOG [08/30/23 1326]   ECOG Performance Status 0      Time with patient 40 minutes   > 50%  time spent in  and education      Regis Kc MD
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
## 2023-08-30 RX ORDER — CYCLOBENZAPRINE HCL 10 MG
TABLET ORAL
Qty: 90 TABLET | Refills: 3 | Status: SHIPPED | OUTPATIENT
Start: 2023-08-30

## 2023-09-05 DIAGNOSIS — F90.8 ATTENTION DEFICIT HYPERACTIVITY DISORDER (ADHD), OTHER TYPE: ICD-10-CM

## 2023-09-05 RX ORDER — ATOMOXETINE 40 MG/1
40 CAPSULE ORAL DAILY
Qty: 30 CAPSULE | Refills: 0 | Status: SHIPPED | OUTPATIENT
Start: 2023-09-05

## 2023-09-11 ENCOUNTER — DOCUMENTATION (OUTPATIENT)
Dept: PSYCHIATRY | Facility: CLINIC | Age: 38
End: 2023-09-11

## 2023-09-11 ENCOUNTER — HOSPITAL ENCOUNTER (OUTPATIENT)
Dept: INFUSION CENTER | Facility: HOSPITAL | Age: 38
Discharge: HOME | End: 2023-09-11
Attending: EMERGENCY MEDICINE
Payer: COMMERCIAL

## 2023-09-11 PROCEDURE — 96372 THER/PROPH/DIAG INJ SC/IM: CPT

## 2023-09-11 RX ADMIN — BUPRENORPHINE 100 MG: 100 SOLUTION SUBCUTANEOUS at 11:46

## 2023-09-11 NOTE — PROGRESS NOTES
Note Type: Case Management Note                  Date of Service: 09/11/2023  Service:  [facility] Office    Note: Case Management Note  Creston Oppenheim 40 y.o. female [id number]  Attending  Substance Use History     Social History     Substance and Sexual Activity   Alcohol Use Not Currently        Social History     Substance and Sexual Activity   Drug Use Not Currently   • Types: Marijuana       Encounter Type:   Patient Face-to-Face    Start Time 1903  End Time 1140    Recovery needs addressed at this meeting:  Basic  Needs, Physical Health and Life Skills    Note  D: Patient presented to the [facility] office for in-person, scheduled visit with this CM. Patient requested this CM to call the [facility] ifusion center to see if patient was able to have Sublocade injection sooner in the day so patient would not have to return back to the hospital later this afternoon. This CM was able to confirm that infusion was scheduled during this session to which this CM accompanied patient to the infusion center. Patient was able to advise this CM that a mediation session is going to occur for custody arrangements where patient will be able to have advocates present. A: Patient presents with a positive attitude as patient feels confident about overall outcome of custody mediation. Patient feels that home life is doing well.    Patient states to have continued success with Sublocade injection    P: Patient will continue to meet with this CM as well as assigned treatment team     Referrals made  No formal referrals made during this session     Next appointment date and time  Follow up in one week

## 2023-09-11 NOTE — PROGRESS NOTES
Pt tolerated Sublocade SQ today in the left abd. AVS provided. Left unit ambulatory with a steady gait.

## 2023-09-14 ENCOUNTER — TELEPHONE (OUTPATIENT)
Dept: PSYCHIATRY | Facility: CLINIC | Age: 38
End: 2023-09-14

## 2023-09-14 ENCOUNTER — TELEPHONE (OUTPATIENT)
Dept: FAMILY MEDICINE CLINIC | Facility: CLINIC | Age: 38
End: 2023-09-14

## 2023-09-14 NOTE — TELEPHONE ENCOUNTER
----- Message from Eli Zambrano MD sent at 9/13/2023  2:51 PM EDT -----  Regarding: weight management  Hi   Please schedule this patient for weight management initial visit with Dr Emely Jackson in the next 2 weeks if possible. The initial visit should be 40mins.   Please let the patient know this is an appointment for our weight loss program.    If the patient cannot come on the days that the doctor above is in the office, you can schedule them with another doctor in the weight management team   Please let me know if there is any issues with scheduling this
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
## 2023-09-17 DIAGNOSIS — F43.10 PTSD (POST-TRAUMATIC STRESS DISORDER): ICD-10-CM

## 2023-09-18 ENCOUNTER — DOCUMENTATION (OUTPATIENT)
Dept: PSYCHIATRY | Facility: CLINIC | Age: 38
End: 2023-09-18

## 2023-09-18 RX ORDER — LEVOTHYROXINE SODIUM 0.05 MG/1
TABLET ORAL
Qty: 30 TABLET | Refills: 5 | Status: SHIPPED | OUTPATIENT
Start: 2023-09-18

## 2023-09-18 NOTE — PROGRESS NOTES
Note Type: Case Management Note                  Date of Service: 09/18/2023  Service:  Dwayne MEYER [facility] Office    Note: Case Management Note  Mattie uDtton 40 y.o. female [id number]  Attending  Substance Use History     Social History     Substance and Sexual Activity   Alcohol Use Not Currently        Social History     Substance and Sexual Activity   Drug Use Not Currently   • Types: Marijuana       Encounter Type:   Patient Face-to-Face    Start Time 1110  End Time 4510

## 2023-09-19 ENCOUNTER — TELEPHONE (OUTPATIENT)
Dept: FAMILY MEDICINE CLINIC | Facility: CLINIC | Age: 38
End: 2023-09-19

## 2023-09-25 ENCOUNTER — OFFICE VISIT (OUTPATIENT)
Dept: PSYCHIATRY | Facility: CLINIC | Age: 38
End: 2023-09-25
Payer: COMMERCIAL

## 2023-09-25 ENCOUNTER — DOCUMENTATION (OUTPATIENT)
Dept: PSYCHIATRY | Facility: CLINIC | Age: 38
End: 2023-09-25

## 2023-09-25 VITALS
HEIGHT: 64 IN | SYSTOLIC BLOOD PRESSURE: 106 MMHG | DIASTOLIC BLOOD PRESSURE: 75 MMHG | HEART RATE: 99 BPM | WEIGHT: 198 LBS | BODY MASS INDEX: 33.8 KG/M2

## 2023-09-25 DIAGNOSIS — F31.4 BIPOLAR DISORDER, CURRENT EPISODE DEPRESSED, SEVERE, WITHOUT PSYCHOTIC FEATURES (HCC): ICD-10-CM

## 2023-09-25 DIAGNOSIS — F41.9 ANXIETY: ICD-10-CM

## 2023-09-25 DIAGNOSIS — F90.8 ATTENTION DEFICIT HYPERACTIVITY DISORDER (ADHD), OTHER TYPE: ICD-10-CM

## 2023-09-25 DIAGNOSIS — M79.2 NEUROPATHIC PAIN: ICD-10-CM

## 2023-09-25 PROCEDURE — 99214 OFFICE O/P EST MOD 30 MIN: CPT | Performed by: PSYCHIATRY & NEUROLOGY

## 2023-09-25 PROCEDURE — 90833 PSYTX W PT W E/M 30 MIN: CPT | Performed by: PSYCHIATRY & NEUROLOGY

## 2023-09-25 RX ORDER — BUPROPION HYDROCHLORIDE 150 MG/1
150 TABLET, EXTENDED RELEASE ORAL DAILY
Qty: 30 TABLET | Refills: 4 | Status: SHIPPED | OUTPATIENT
Start: 2023-09-25

## 2023-09-25 RX ORDER — LIDOCAINE 50 MG/G
PATCH TOPICAL
COMMUNITY
Start: 2023-09-14

## 2023-09-25 RX ORDER — DULOXETIN HYDROCHLORIDE 20 MG/1
20 CAPSULE, DELAYED RELEASE ORAL DAILY
Qty: 30 CAPSULE | Refills: 3 | Status: SHIPPED | OUTPATIENT
Start: 2023-09-25

## 2023-09-25 RX ORDER — GABAPENTIN 100 MG/1
100 CAPSULE ORAL 3 TIMES DAILY
Qty: 90 CAPSULE | Refills: 4 | Status: SHIPPED | OUTPATIENT
Start: 2023-09-25

## 2023-09-25 RX ORDER — ATOMOXETINE 80 MG/1
80 CAPSULE ORAL DAILY
Qty: 30 CAPSULE | Refills: 4 | Status: SHIPPED | OUTPATIENT
Start: 2023-09-25

## 2023-09-25 RX ORDER — CLONAZEPAM 1 MG/1
1 TABLET ORAL 3 TIMES DAILY
Qty: 90 TABLET | Refills: 1 | Status: SHIPPED | OUTPATIENT
Start: 2023-09-25

## 2023-09-25 NOTE — PSYCH
SHARE Program    Progress Note  Noam Aguilar 40 y.o. female MRN: [id number]   Encounter: [id number]      Visit Time    Visit Start Time: 11:30AM  Visit Stop Time: 12 PM  Total Visit Duration: 30 minutes    SUBJECTIVE: "I rather be miserable than fat."  This is a second visit was the patient was significantly concerns about her weigh. INTERVAL HISTORY: The patient attributed her weight gain, likely a side effect of the medication that the writer recently started such as Wellbutrin which is usually associated with weight loss in the Vraylar some medicines the patient has been taking for a while without any change in her weight. Willene Miguel Ángel is not associated with weight gain and may just 3% of patients may gain some weight. After reviewing the rest of the patient medications decision was made to lower patient's Neurontin which may be sometimes associated with weight gain and trazodone. At the same time because of the patient anxiety and to address the decrease of Neurontin, the writer decided to increase clonazepam back to 1 mg 3 times a day. The patient feels that so writer decreased to 1 mg twice a day the patient became more anxious daily, hence she felt more frustrated. She denied any overt symptoms of psychosis like paranoid delusions denied auditory or visual hallucinations. The patient still has problems with attention and concentration, stating she cannot read the book at night when she cannot fall asleep, this is why she cannot follow some hygiene rules. She also admitted that her boyfriend placed loud videogames at night because it helped him to "fall asleep ". We discussed that part of her insomnia might be associated with environmental stressors.     Review Of Systems:    sleep changes: decreased  appetite changes: increased  energy/anergy: decreased    Support Services  The patient is actively engaged with the [facility] Certified Addiction Counselor’s psychotherapy plan: Yes PDMP reviewed:     PDMP Review       Value Time User    PDMP Reviewed  Yes 9/25/2023 11:54 AM Madison Cruz MD            Drug cravings: none  Motivation to continue treatment: high       Current Outpatient Medications:   •  atoMOXetine (STRATTERA) 80 MG capsule, Take 1 capsule (80 mg total) by mouth daily, Disp: 30 capsule, Rfl: 4  •  buPROPion (Wellbutrin SR) 150 mg 12 hr tablet, Take 1 tablet (150 mg total) by mouth in the morning, Disp: 30 tablet, Rfl: 4  •  cariprazine (VRAYLAR) 1.5 MG capsule, Take 2 capsules (3 mg total) by mouth daily, Disp: 30 capsule, Rfl: 4  •  clonazePAM (KlonoPIN) 1 mg tablet, Take 1 tablet (1 mg total) by mouth 3 (three) times a day, Disp: 90 tablet, Rfl: 1  •  DULoxetine (CYMBALTA) 20 mg capsule, Take 1 capsule (20 mg total) by mouth daily, Disp: 30 capsule, Rfl: 3  •  gabapentin (NEURONTIN) 100 mg capsule, Take 1 capsule (100 mg total) by mouth 3 (three) times a day, Disp: 90 capsule, Rfl: 4  •  albuterol (PROVENTIL HFA,VENTOLIN HFA) 90 mcg/act inhaler, Inhale 2 puffs every 4 (four) hours as needed for wheezing, Disp: 18 g, Rfl: 5  •  buprenorphine-naloxone (Suboxone) 8-2 mg, Place 1 Film (8 mg total) under the tongue 2 (two) times a day Do not start before July 24, 2023., Disp: 60 Film, Rfl: 0  •  cetirizine (ZyrTEC) 10 mg tablet, take 1 tablet by mouth once daily, Disp: 30 tablet, Rfl: 0  •  cholecalciferol (VITAMIN D3) 1,000 units tablet, take 1 tablet by mouth once daily, Disp: 30 tablet, Rfl: 0  •  cyclobenzaprine (FLEXERIL) 10 mg tablet, take 1 tablet by mouth three times a day if needed for muscle spasm, Disp: 90 tablet, Rfl: 3  •  fluconazole (DIFLUCAN) 150 mg tablet, take 1 tablet by mouth ONCE FOR 1 DOSE ONCE YOU COMPLETED FLAGYL AND DOXYCYCLINE COURSES, Disp: , Rfl:   •  fluticasone (FLONASE) 50 mcg/act nasal spray, 1 spray into each nostril daily, Disp: 16 g, Rfl: 2  •  ibuprofen (MOTRIN) 800 mg tablet, Take 1 tablet (800 mg total) by mouth every 8 (eight) hours as needed for mild pain (Patient not taking: Reported on 6/29/2023), Disp: 90 tablet, Rfl: 3  •  levothyroxine 50 mcg tablet, take 1 tablet by mouth once daily, Disp: 30 tablet, Rfl: 5  •  lidocaine (LIDODERM) 5 %, apply 1 patch TO THE AFFECTED AREA DAILY. LEAVE ON FOR 12 HOURS AND THEN OFF FOR 12 HOURS., Disp: , Rfl:   •  NARCAN 4 MG/0.1ML LIQD, ADMINISTER A SINGLE spray INTO ONE NOSTRIL CALL 911 MAY REPEAT ONCE (Patient not taking: No sig reported), Disp: , Rfl: 0  •  omeprazole (PriLOSEC) 20 mg delayed release capsule, take 1 capsule by mouth once daily, Disp: 90 capsule, Rfl: 3  •  topiramate (TOPAMAX) 100 mg tablet, take 1 tablet by mouth twice a day, Disp: 90 tablet, Rfl: 3    Objective     Vitals:    09/25/23 1134   BP: 106/75   Pulse: 99           Mental Status Evaluation: Restricted affect and depressed mood with some mixed depression and irritability, not responding to internal stimuli and did not endorse paranoid delusions. Had significant concerns about her weight. Insight and judgment limited      Lab Results:            Diagnoses and all orders for this visit:    Attention deficit hyperactivity disorder (ADHD), other type  -     atoMOXetine (STRATTERA) 80 MG capsule; Take 1 capsule (80 mg total) by mouth daily  -     buPROPion (Wellbutrin SR) 150 mg 12 hr tablet; Take 1 tablet (150 mg total) by mouth in the morning    Anxiety  -     gabapentin (NEURONTIN) 100 mg capsule; Take 1 capsule (100 mg total) by mouth 3 (three) times a day  -     clonazePAM (KlonoPIN) 1 mg tablet; Take 1 tablet (1 mg total) by mouth 3 (three) times a day  -     DULoxetine (CYMBALTA) 20 mg capsule; Take 1 capsule (20 mg total) by mouth daily    Bipolar disorder, current episode depressed, severe, without psychotic features ([address])  -     cariprazine (VRAYLAR) 1.5 MG capsule; Take 2 capsules (3 mg total) by mouth daily  -     clonazePAM (KlonoPIN) 1 mg tablet;  Take 1 tablet (1 mg total) by mouth 3 (three) times a day    Neuropathic pain  -     gabapentin (NEURONTIN) 100 mg capsule; Take 1 capsule (100 mg total) by mouth 3 (three) times a day  -     DULoxetine (CYMBALTA) 20 mg capsule; Take 1 capsule (20 mg total) by mouth daily    Other orders  -     lidocaine (LIDODERM) 5 %; apply 1 patch TO THE AFFECTED AREA DAILY. LEAVE ON FOR 12 HOURS AND THEN OFF FOR 12 HOURS. The patient had concerns about her weight gain and stopped taking her Vraylar Wellbutrin Cymbalta and now feels "miserable. Stopping her medications unlikely will lead to significant weight loss, and we discussed that Doyne Hussar is not weight gaining for 93% of patients and Wellbutrin and Cymbalta may lead to some weight loss but not weight gain. Decision was made to stop trazodone which might be associated with some weight gain, and lower Neurontin that also some people report as leads to weight gain. At the same time the writer encouraged the patient to continue the Wellbutrin which also helps the patient to stop smoking, which by itself also may temporarily lead to weight gain, lower Cymbalta and Neurontin and Vraylar to help the patient to continue her adherence to his medications, but did not stop them abruptly. The patient also has an appointment with weight management clinic we also discussed that keeping diet diary might help. Increase of clonazepam to address anxiety especially after decreasing Neurontin was made. PDMP was reviewed. The patient had been taking 1 mg of clonazepam 3 times a day previously for many months. The writer also provided supportive family chelation therapy for patient to continue Treatment of bipolar disorder with psychotic features and improved about might be deteriorating after the patient abruptly stopped her medications.     Risks / Benefits of Treatment:    Risks, benefits, and possible side effects of medications explained to patient including risks of misuse, abuse or dependence, sedation and respiratory depression related to treatment with benzodiazepine medications. The patient verbalizes understanding and agreement for treatment. This note was not shared with the patient due to this is a psychotherapy note     Counseling / Coordination of Care  Total time spent today 30 minutes. Greater than 50% of total time was spent with the patient and / or family counseling and / or coordination of care.      Anderson Almaguer MD

## 2023-09-28 NOTE — PATIENT INSTRUCTIONS
Please schedule an appointment to see me within 2 weeks. Your weight management appointments will then go to monthly thereafter.

## 2023-09-28 NOTE — PROGRESS NOTES
Name: Archana Ortega      : 1985      MRN: [id number]  Encounter Provider: Elisabeth Duenas MD  Encounter Date: 2023   Encounter department: [address] [place] [address]                 Weight management project   Assessment & Plan     1. Encounter for weight management  -     Lipid Panel with Direct LDL reflex; Future  -     HEMOGLOBIN A1C W/ EAG ESTIMATION; Future    2. Class 1 obesity due to excess calories without serious comorbidity with body mass index (BMI) of 33.0 to 33.9 in adult  Assessment & Plan:  Initial weight management visit  Consent completed, and signed      - Return to clinic in 2 weeks  - Meet with nutritionist and  before next visit       Orders:  -     Lipid Panel with Direct LDL reflex; Future  -     HEMOGLOBIN A1C W/ EAG ESTIMATION; Future    BMI Counseling: There is no height or weight on file to calculate BMI. The BMI is above normal. Nutrition recommendations include decreasing portion sizes, encouraging healthy choices of fruits and vegetables and limiting drinks that contain sugar. Exercise recommendations include exercising 3-5 times per week and obtaining a gym membership. No pharmacotherapy was ordered. Patient referred to nutritionist and weight management. Rationale for BMI follow-up plan is due to patient being overweight or obese. Informed consent received and signed Yes  Archana Ortega is a 40 y.o. female  patient presenting for assistance with weight management. Limitations to weight reduction: She believes her medications are causing her to gain weight. When she stopped taking all her meds, she lost 40 lbs but suffered mentally so she had to go back on it. Current Status:   Current Weight :   Weight (last 2 days)     Date/Time Weight    23 1035 89.7 (197.8)        • BMI: Body mass index is 33.95 kg/m².   • Neck Circumference: 14 inches  • Waist Circumference: 40 inches      Goals of Management:    Patient interested in : lifestyle management plus medication    • What motivate you to loose weight:Self-image  • Goals:No sugary beverages. At least 64oz of water daily. , Increase physical activity by 10 minutes daily, 9375-6489 calories and cronometer jeremy     • Weight goal:  50lbs. (reduction of 5-10%) in 6 months   • Nutritional goals:  o Ensure adequate water intake of 64oz daily  o Encouraged to maintain healthy calorie restricted diet of 7318-8133 kcal/day  o Protein: 107 (1-1.2g/kg/d)  o Consume less than 10% of daily kcal in healthy fats  o Join group nutrition class: Remind patient of dates ( October 3rd at 2pm, Nov 14 at 2pm, Dec 13 at 10am, jan 17 at 2pm, feb 16 at 10 am at [facility] )  o Patient can make their own individual nutrition appointment at [phone]  Group session are however encouraged    • Exercise goal:  o Increase physical activity by 10 minutes daily. o Check your steps on your phone and aim to increase it daily   o Join group step challenge:  download jeremy during visit: "Challenges": join challenge: code [id number] Shari Douglas regarding the free  at the [facility] given: every Tuesday and Thursday from 4:30pm to 6:30pm     • Medication Therapy:  if interested discussed after 3 -6 months of participating if no changes are seen       1. Obesity:  Patient has set new goals for weight management. A.  Patient plans to improve diet by reducing suger sweetened beverages, reducing calories, increasing fruit and vegetable intacke and eating breakfast everyday  B. Patient plans to increase physical activity by walking more  C. Patient plans to exercise for 60 minutes 3 times per week  D. Patient to keep food and exercise diary.       2.  Will evaluate with labs: CMP, Fasting Lipid Panel, HbA1c, TSH      Patient recommended to make above changes for purpose of lifelong lifestyle and involve family members to increase consistency and motivation. Follow up in approximately 2 weeks with weight management team.          Soraya Gonzalez is a 40 y.o. female  presenting today for her initial weight management visit. Program expectations and appointments were discussed in detail. Patient is in agreement and wishes to continue with program. Consent form completed and signed. Eliseo Triplett is a 40 y.o. female patient who present to the office for assistance with weight management. How do you feel about your weight: "I feel ugly because of my weight. It is disgusting!"    Weight range: 140-150lbs  Age of overweight onset:  early adult years   Any inciting life occurrences: stress , others statred gaining weight after she had her 9year old son and when she got clean from drugs. Then she found out she had hypothyroidism. Contributing factors: others:Drugs and stress. Associated symptoms:fatigue, body image issues and decreased self esteem. Review of Systems   Constitutional: Negative for chills and fever. HENT: Negative for ear pain and sore throat. Eyes: Negative for pain and visual disturbance. Respiratory: Negative for cough and shortness of breath. Cardiovascular: Negative for chest pain and palpitations. Gastrointestinal: Negative for abdominal pain and vomiting. Genitourinary: Negative for dysuria and hematuria. Musculoskeletal: Negative for arthralgias and back pain. Skin: Negative for color change and rash. Neurological: Negative for seizures and syncope. All other systems reviewed and are negative.       Past Medical History:   Diagnosis Date   • Anxiety     "severe"   • Asthma    • Bipolar disorder ([address] )    • Cigarette smoker    • Depression    • Disease of thyroid gland    • Drug dependence, in remission ([address] [facility])    • GERD (gastroesophageal reflux disease)    • H/O: whooping cough     while pregnant   • Hepatitis C, chronic (HCC)    • Insomnia disorder    • Liver disease Hepatitis C   • Migraine    • Psychiatric illness    • PTSD (post-traumatic stress disorder)    • Substance abuse ([address])      Past Surgical History:   Procedure Laterality Date   • KNEE SURGERY     • OTHER SURGICAL HISTORY      body piercing   • NC RPR UMBILICAL HRNA 5 YRS/> REDUCIBLE N/A 04/19/2016    Procedure: REPAIR HERNIA UMBILICAL WITH MESH;  Surgeon: Caitlyn Gooden MD;  Location:  MAIN OR;  Service: General   • VAGINAL DELIVERY      X 6   • WISDOM TOOTH EXTRACTION      X 4   • WOUND DEBRIDEMENT Right 01/17/2018    Procedure: RIGHT KNEE DEBRIDEMENT; [address] OUT; AND LACERATION REPAIR.  INTRAOPERATIVE ASSESSMENT OF PATELLA TENDON;  Surgeon: Mauricio Saleh MD;  Location: [facility];  Service: Orthopedics     Family History   Problem Relation Age of Onset   • Hypertension Mother    • Thyroid disease Mother    • Hypertension Father    • Prostate cancer Father    • Anxiety disorder Sister      Social History     Socioeconomic History   • Marital status: Single     Spouse name: None   • Number of children: None   • Years of education: None   • Highest education level: None   Occupational History   • None   Tobacco Use   • Smoking status: Every Day     Packs/day: 0.25     Years: 12.00     Total pack years: 3.00     Types: Cigarettes     Passive exposure: Current   • Smokeless tobacco: Never   Vaping Use   • Vaping Use: Every day   Substance and Sexual Activity   • Alcohol use: Not Currently   • Drug use: Not Currently     Types: Marijuana   • Sexual activity: Yes     Partners: Male     Birth control/protection: Other   Other Topics Concern   • None   Social History Narrative    ** Merged History Encounter **          Social Determinants of Health     Financial Resource Strain: Low Risk  (11/15/2022)    Overall Financial Resource Strain (CARDIA)    • Difficulty of Paying Living Expenses: Not hard at all   Food Insecurity: No Food Insecurity (11/15/2022)    Hunger Vital Sign    • Worried About Running Out of Food in the Last Year: Never true    • Ran Out of Food in the Last Year: Never true   Transportation Needs: No Transportation Needs (11/15/2022)    PRAPARE - Transportation    • Lack of Transportation (Medical): No    • Lack of Transportation (Non-Medical): No   Physical Activity: Not on file   Stress: Not on file   Social Connections: Not on file   Intimate Partner Violence: Not on file   Housing Stability: Not on file     Current Outpatient Medications on File Prior to Visit   Medication Sig   • albuterol (PROVENTIL HFA,VENTOLIN HFA) 90 mcg/act inhaler Inhale 2 puffs every 4 (four) hours as needed for wheezing   • atoMOXetine (STRATTERA) 80 MG capsule Take 1 capsule (80 mg total) by mouth daily   • buprenorphine-naloxone (Suboxone) 8-2 mg Place 1 Film (8 mg total) under the tongue 2 (two) times a day Do not start before July 24, 2023.    • buPROPion (Wellbutrin SR) 150 mg 12 hr tablet Take 1 tablet (150 mg total) by mouth in the morning   • cariprazine (VRAYLAR) 1.5 MG capsule Take 2 capsules (3 mg total) by mouth daily   • cetirizine (ZyrTEC) 10 mg tablet take 1 tablet by mouth once daily   • cholecalciferol (VITAMIN D3) 1,000 units tablet take 1 tablet by mouth once daily   • clonazePAM (KlonoPIN) 1 mg tablet Take 1 tablet (1 mg total) by mouth 3 (three) times a day   • cyclobenzaprine (FLEXERIL) 10 mg tablet take 1 tablet by mouth three times a day if needed for muscle spasm   • DULoxetine (CYMBALTA) 20 mg capsule Take 1 capsule (20 mg total) by mouth daily   • fluconazole (DIFLUCAN) 150 mg tablet take 1 tablet by mouth ONCE FOR 1 DOSE ONCE YOU COMPLETED FLAGYL AND DOXYCYCLINE COURSES   • fluticasone (FLONASE) 50 mcg/act nasal spray 1 spray into each nostril daily   • gabapentin (NEURONTIN) 100 mg capsule Take 1 capsule (100 mg total) by mouth 3 (three) times a day   • ibuprofen (MOTRIN) 800 mg tablet Take 1 tablet (800 mg total) by mouth every 8 (eight) hours as needed for mild pain (Patient not taking: Reported on 6/29/2023)   • levothyroxine 50 mcg tablet take 1 tablet by mouth once daily   • lidocaine (LIDODERM) 5 % apply 1 patch TO THE AFFECTED AREA DAILY. LEAVE ON FOR 12 HOURS AND THEN OFF FOR 12 HOURS. • NARCAN 4 MG/0.1ML LIQD ADMINISTER A SINGLE spray INTO ONE NOSTRIL CALL 911 MAY REPEAT ONCE (Patient not taking: No sig reported)   • omeprazole (PriLOSEC) 20 mg delayed release capsule take 1 capsule by mouth once daily   • topiramate (TOPAMAX) 100 mg tablet take 1 tablet by mouth twice a day     No Known Allergies  Immunization History   Administered Date(s) Administered   • COVID-19 PFIZER VACCINE 0.3 ML IM 04/20/2021, 05/16/2021, 12/01/2021   • Hep A, adult 10/22/2019   • Hib (PRP-T) 01/22/2018   • INFLUENZA 08/29/2016   • Influenza Injectable, MDCK, Preservative Free, Quadrivalent 10/04/2019   • Influenza Quadrivalent Preservative Free 3 years and older IM 08/29/2016   • Meningococcal MCV4P 01/22/2018   • Pneumococcal Conjugate 13-Valent 01/22/2018   • Tdap 01/16/2018       Objective     /72 (BP Location: Right arm, Patient Position: Sitting, Cuff Size: Standard)   Pulse 98   Temp 98.3 °F (36.8 °C) (Temporal)   Resp 18   Ht 5' 4" (1.626 m)   Wt 89.7 kg (197 lb 12.8 oz)   SpO2 99%   BMI 33.95 kg/m²     Physical Exam  Vitals reviewed. Constitutional:       General: She is not in acute distress. Appearance: She is normal weight. She is not toxic-appearing. HENT:      Head: Normocephalic. Right Ear: External ear normal.      Left Ear: External ear normal.      Nose: Nose normal.      Mouth/Throat:      Mouth: Mucous membranes are moist.   Eyes:      Extraocular Movements: Extraocular movements intact. Cardiovascular:      Rate and Rhythm: Normal rate and regular rhythm. Pulses: Normal pulses. Heart sounds: Normal heart sounds. No murmur heard. No friction rub. No gallop.    Pulmonary:      Effort: Pulmonary effort is normal.   Abdominal:      General: Bowel sounds are normal. Palpations: Abdomen is soft. There is no mass. Tenderness: There is no rebound. Neurological:      General: No focal deficit present. Mental Status: She is alert and oriented to person, place, and time. Psychiatric:         Mood and Affect: Mood normal.         Behavior: Behavior normal.         Thought Content:  Thought content normal.         Judgment: Judgment normal.       Mehdi Coon MD

## 2023-09-29 ENCOUNTER — OFFICE VISIT (OUTPATIENT)
Dept: FAMILY MEDICINE CLINIC | Facility: CLINIC | Age: 38
End: 2023-09-29

## 2023-09-29 ENCOUNTER — APPOINTMENT (OUTPATIENT)
Dept: LAB | Facility: CLINIC | Age: 38
End: 2023-09-29
Payer: COMMERCIAL

## 2023-09-29 VITALS
HEART RATE: 98 BPM | RESPIRATION RATE: 18 BRPM | HEIGHT: 64 IN | TEMPERATURE: 98.3 F | SYSTOLIC BLOOD PRESSURE: 108 MMHG | BODY MASS INDEX: 33.77 KG/M2 | OXYGEN SATURATION: 99 % | WEIGHT: 197.8 LBS | DIASTOLIC BLOOD PRESSURE: 72 MMHG

## 2023-09-29 DIAGNOSIS — E55.9 VITAMIN D DEFICIENCY: ICD-10-CM

## 2023-09-29 DIAGNOSIS — E66.09 CLASS 1 OBESITY DUE TO EXCESS CALORIES WITHOUT SERIOUS COMORBIDITY WITH BODY MASS INDEX (BMI) OF 33.0 TO 33.9 IN ADULT: ICD-10-CM

## 2023-09-29 DIAGNOSIS — E66.9 CLASS 1 OBESITY WITH BODY MASS INDEX (BMI) OF 34.0 TO 34.9 IN ADULT, UNSPECIFIED OBESITY TYPE, UNSPECIFIED WHETHER SERIOUS COMORBIDITY PRESENT: ICD-10-CM

## 2023-09-29 DIAGNOSIS — Z76.89 ENCOUNTER FOR WEIGHT MANAGEMENT: Primary | ICD-10-CM

## 2023-09-29 LAB
25(OH)D3 SERPL-MCNC: 33.4 NG/ML (ref 30–100)
CHOLEST SERPL-MCNC: 171 MG/DL
EST. AVERAGE GLUCOSE BLD GHB EST-MCNC: 94 MG/DL
HBA1C MFR BLD: 4.9 %
HDLC SERPL-MCNC: 37 MG/DL
LDLC SERPL CALC-MCNC: 123 MG/DL (ref 0–100)
TRIGL SERPL-MCNC: 54 MG/DL
TSH SERPL DL<=0.05 MIU/L-ACNC: 2.27 UIU/ML (ref 0.45–4.5)

## 2023-09-29 PROCEDURE — 80061 LIPID PANEL: CPT

## 2023-09-29 PROCEDURE — 36415 COLL VENOUS BLD VENIPUNCTURE: CPT

## 2023-09-29 PROCEDURE — 99213 OFFICE O/P EST LOW 20 MIN: CPT | Performed by: FAMILY MEDICINE

## 2023-09-29 PROCEDURE — 83036 HEMOGLOBIN GLYCOSYLATED A1C: CPT

## 2023-09-29 PROCEDURE — 82306 VITAMIN D 25 HYDROXY: CPT

## 2023-09-29 PROCEDURE — 84443 ASSAY THYROID STIM HORMONE: CPT
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
## 2023-09-29 NOTE — ASSESSMENT & PLAN NOTE
Initial weight management visit  Consent completed, and signed      - Return to clinic in 2 weeks  - Meet with nutritionist and  before next visit

## 2023-10-02 ENCOUNTER — DOCUMENTATION (OUTPATIENT)
Dept: PSYCHIATRY | Facility: CLINIC | Age: 38
End: 2023-10-02

## 2023-10-05 ENCOUNTER — SOCIAL WORK (OUTPATIENT)
Dept: PSYCHIATRY | Facility: CLINIC | Age: 38
End: 2023-10-05

## 2023-10-05 ENCOUNTER — TELEPHONE (OUTPATIENT)
Dept: PSYCHIATRY | Facility: CLINIC | Age: 38
End: 2023-10-05

## 2023-10-05 DIAGNOSIS — F33.2 MDD (MAJOR DEPRESSIVE DISORDER), RECURRENT SEVERE, WITHOUT PSYCHOSIS (HCC): ICD-10-CM

## 2023-10-05 DIAGNOSIS — F41.9 ANXIETY: ICD-10-CM

## 2023-10-05 DIAGNOSIS — F11.20 OPIOID DEPENDENCE ON AGONIST THERAPY (HCC): ICD-10-CM

## 2023-10-05 DIAGNOSIS — F43.10 PTSD (POST-TRAUMATIC STRESS DISORDER): Primary | ICD-10-CM

## 2023-10-05 NOTE — BH TREATMENT PLAN
Outpatient Behavioral Health Psychotherapy Treatment Plan    Gulshan Sanchez  1985     Date of Initial Psychotherapy Assessment: 7/19/2022  Date of Current Treatment Plan: 10/05/23  Treatment Plan Target Date: 10/5/2024  Treatment Plan Expiration Date: 4/5/2023    Diagnosis:   1. PTSD (post-traumatic stress disorder)        2. Anxiety        3. MDD (major depressive disorder), recurrent severe, without psychosis ([address])        4. Opioid dependence on agonist therapy ([address])            Area(s) of Need: anxiety, opioid use disorder, depression    Long Term Goal 1 (in the client's own words): "take energy from focusing on issues related to my son's grandparent" " I need to stop worrying about what she ( son's grandparent) thinks about me"    Stage of Change: Action    Target Date for completion: 10/5/2024     Anticipated therapeutic modalities: talk therapy, cognitive processing     People identified to complete this goal: yasmine Lora      Objective 1: (identify the means of measuring success in meeting the objective): I will stop allowing what my son's grandparent  make me feel worse about myself      Objective 2: (identify the means of measuring success in meeting the objective): I will learn how to communicate with her without feeling bad about it. Long Term Goal 2 (in the client's own words): I want to be able to recognize my successes and I don't want to feel bad or weird about it. Stage of Change: Action    Target Date for completion: 10/5/2024     Anticipated therapeutic modalities: motivational interviewing, cognitive processing     People identified to complete this goal: Geno Lora, and therapist      Objective 1: (identify the means of measuring success in meeting the objective): I would be able to feel successful and tell others about my acheivements      Objective 2: (identify the means of measuring success in meeting the objective):  I will find a support network that helps me celebrate my successes     Long Term Goal 3 (in the client's own words): I want to stop using MAT    Stage of Change: Action    Target Date for completion: 12/5/2023     Anticipated therapeutic modalities: addiction education, relapse prevention skills development     People identified to complete this goal: [place] therapist      Objective 1: (identify the means of measuring success in meeting the objective): I will be able to stop my sublocade but I want to make sure I don't use. I am currently under the care of a [facility] psychiatric provider: yes    My [facility] psychiatric provider is: Dr. Jf Rodriguez    I am currently taking psychiatric medications: Yes, as prescribed    I feel that I will be ready for discharge from mental health care when I reach the following (measurable goal/objective): increased self esteem and ability to to stop MAT and not use. For children and adults who have a legal guardian:   Has there been any change to custody orders and/or guardianship status? NA. If yes, attach updated documentation. I have updated my Crisis Plan and have been offered a copy of this plan    [address]: Diagnosis and Treatment Plan explained to [facility] acknowledges an understanding of their diagnosis. Alexandria Wrightdo does not agree to this treatment plan.     I have been offered a copy of this Treatment Plan. yes

## 2023-10-05 NOTE — BH CRISIS PLAN
Client Name: Emy Lucero       Client YOB: 1985  : 1985    Treatment Team (include name and contact information):     Psychotherapist: Zulema Coreas    Psychiatrist: Dr. Fan   Release of information completed: yes     Marlyn Cannon   Release of information completed: yes        Healthcare Provider  Maryellen Corcoran MD  [address]    Type of Plan   * Child plans (children 15 yo and younger) must be completed and signed by the child's legal guardian   * Plans for all individuals 15 yo and above must be signed by the client. Plan Type: adolescent/adult (15 and over) Initial      My Personal Strengths are (in the client's own words):  "believing in positivity, good mother, consistent with medications and treatment, stayed sober almost 4 years"  The stressors and triggers that may put me at risk are:  family court issues, chronic anxiety, family conflict and limited support    Coping skills I can use to keep myself calm and safe:  Call a friend or family member, Call my sponsor or sober support and Other (describe) talk things out    Coping skills/supports I can use to maintain abstinence from substance use:   friend support and being a mother    The people that provide me with help and support: (Include name, contact, and how they can help)   Support person #1: Mindy ([address] [facility])    * Phone number: in cell phone    * How can they help me? Takes me places   Support person #2:Myron (shailesh)    * Phone number: in cell phone    * How can they help me?  Father figure to my son        In the past, the following has helped me in times of crisis:    Being with other people, Taking medications, Breathing exercises (or other mindfulness-based activities), Praying or meditating and Other: essential oils      If it is an emergency and you need immediate help, call     If there is a possibility of danger to yourself or others, call the following crisis hotline resources:     Adult Crisis Numbers  Suicide Prevention Hotline - Dial 9-8-8  [facility]: [address]: [address]: [address]: [phone]  [address] [place] [address] Street: [phone]  [facility]: [address]: [address]: [phone] (daytime). [phone] (after hours, weekends, holidays)     Child/Adolescent Crisis Numbers   [facility]: [address]: [phone]   [facility]: [phone]   [address] [place] [address] Street: [phone]    Please note: Some [facility] do not have a separate number for Child/Adolescent specific crisis. If your county is not listed under Child/Adolescent, please call the adult number for your county     National Talk to Text Line   All Ages - [phone]    In the event your feelings become unmanageable, and you cannot reach your support system, you will call 911 immediately or go to the nearest hospital emergency room.

## 2023-10-05 NOTE — PSYCH
Behavioral Health Psychotherapy Progress Note    Psychotherapy Provided: Individual Psychotherapy     1. PTSD (post-traumatic stress disorder)        2. Anxiety        3. MDD (major depressive disorder), recurrent severe, without psychosis ([address])        4. Opioid dependence on agonist therapy ([address])            Goals addressed in session: Goal 1, Goal 2 and Goal 3      DATA: Geno arrived on time for her appointment. This was Geno's first session with this therapist and rapport building techniques were used. Geno began session by explaining the current situation with her child's grandparent and the legal issues involved with this situation. Therapist utilized this discussion to discuss with Geno her goals regarding therapy and how this could assist with Geno in this situation. Geno and therapist composed a treatment plan update and this was signed. Geno and therapist composed a crisis plan and this was signed. Geno and therapist discussed ways Geno could improve her self image and Geno agreed to begin to recognize her achievements, starting with her ability to maintain abstinence for almost 4 years. Therapist utilized open ended questions, highlighting discrepancies in Geno's life compared to her belief, and developing solutions to problems to assist Geno during this session. Geno was receptive to all these techniques. During this session, this clinician used the following therapeutic modalities: Engagement Strategies, Motivational Interviewing, Solution-Focused Therapy and Supportive Psychotherapy    Substance Abuse was addressed during this session. If the client is diagnosed with a co-occurring substance use disorder, please indicate any changes in the frequency or amount of use: Geno reports she has remained abstinent for almost 4 years at this time.  Stage of change for addressing substance use diagnoses: Maintenance    ASSESSMENT: Araceli Urbano presents with a Euthymic/ normal and Anxious mood. Geno expressed on going progress in her life but struggled to address her symptoms management of anxiety. Geno presented as pre-occupied with the situation involving her child and her child's grandparent but struggled to identify ways this could be improved for her in therapy. her affect is Normal range and intensity, which is congruent, with her mood and the content of the session. The client has made progress on their goals. Araceli Urbano presents with a none risk of suicide, none risk of self-harm, and none risk of harm to others. For any risk assessment that surpasses a "low" rating, a safety plan must be developed. A safety plan was indicated: yes  If yes, describe in detail completion of initial    PLAN: Between sessions, Araceli Urbano will meet with her CRS and discuss ways she can celebrate her recovery anniversary, continue with her MAT until she consults with provider regarding stopping this, and begin considering who she can use as a support system to improve her self esteem. At the next session, the therapist will use Cognitive Processing Therapy, Motivational Interviewing and Solution-Focused Therapy to address Geno self perception difficulties, anxiety, and relapse prevention. Behavioral Health Treatment Plan and Discharge Planning: Araceli Urbano is aware of and agrees to continue to work on their treatment plan. They have identified and are working toward their discharge goals.  yes    Visit start and stop times:        10/05/23  Start Time: 1055  Stop Time: 1155  Total Visit Time: 60 minutes

## 2023-10-05 NOTE — TELEPHONE ENCOUNTER
Patient needs prior Auth for medication Strattera. Patient did state that Pharmacy needs Prior Auth in order to fill. Patient can be reached at [phone].

## 2023-10-06 ENCOUNTER — TELEPHONE (OUTPATIENT)
Dept: OTHER | Age: 38
End: 2023-10-06

## 2023-10-06 DIAGNOSIS — F90.9 ATTENTION DEFICIT HYPERACTIVITY DISORDER (ADHD), UNSPECIFIED ADHD TYPE: Primary | ICD-10-CM

## 2023-10-06 RX ORDER — ATOMOXETINE 40 MG/1
40 CAPSULE ORAL DAILY
Qty: 30 CAPSULE | Refills: 0 | Status: SHIPPED | OUTPATIENT
Start: 2023-10-06 | End: 2023-10-26 | Stop reason: DRUGHIGH

## 2023-10-06 NOTE — TELEPHONE ENCOUNTER
Geno called the MAT office and states that she is out of her Atomoxetine medication. She states that Dr. Elizabeth Copeland recently increased her dose and she is awaiting a prior authorization from the office. There is a prescription at the pharmacy, with refills, but it requires a PA from [employer]. Please advise.

## 2023-10-06 NOTE — TELEPHONE ENCOUNTER
FYI - Called to notify Geno that the provider sent an Atomoxetine 40 mg cap prescription to the pharmacy, and Geno was at the pharmacy and e\th pharmacist will fill it now.

## 2023-10-09 ENCOUNTER — DOCUMENTATION (OUTPATIENT)
Dept: PSYCHIATRY | Facility: CLINIC | Age: 38
End: 2023-10-09

## 2023-10-09 ENCOUNTER — HOSPITAL ENCOUNTER (OUTPATIENT)
Dept: INFUSION CENTER | Facility: HOSPITAL | Age: 38
Discharge: HOME | End: 2023-10-09
Attending: PSYCHIATRY & NEUROLOGY
Payer: COMMERCIAL

## 2023-10-09 PROCEDURE — 96372 THER/PROPH/DIAG INJ SC/IM: CPT

## 2023-10-09 RX ADMIN — BUPRENORPHINE 100 MG: 100 SOLUTION SUBCUTANEOUS at 12:04

## 2023-10-09 NOTE — PROGRESS NOTES
Pt tolerated sublocade to left lower abdomen without complications, pt did not want to make next sublocade appt at this time and will discuss with ordering doctor at the 10/26/23 appt.

## 2023-10-09 NOTE — PROGRESS NOTES
Note Type: Case Management Note                  Date of Service: [unfilled]  Service:  [facility]    Note: Case Management Note  Gulshan Sanchez 40 y.o. female [id number]  Attending  Substance Use History     Social History     Substance and Sexual Activity   Alcohol Use Not Currently        Social History     Substance and Sexual Activity   Drug Use Not Currently   • Types: Marijuana       Encounter Type:   Patient Face-to-Face    Start Time 1100  End Time 1140    Recovery needs addressed at this meeting:  Basic  Needs, Family and Legal Status    Note  D: Patient presented for in-person, scheduled visit with this CM. Patient made this CM aware that patient successfully gained PFA in [address] from paternal grandmother that is effective towards patient and bio son. Patient will attend a hearing tomorrow to see the extent of how long the PFA will last.     Patient found great success within initial session with Psychotherapist to which patient feels confident reducing time with this CM - now seeing this CM on a biweekly basis. A: Patient was able to identify a point of relief knowing that patient did not have to communicate with paternal grandmother for the past week. Patient understands that, though the grandmother is able to have a relationship with patient's son, it is unrealistic the accommodations to which grandmother is requesting as well as the timeframe to which it is expected to where patient is feeling overwhelmed and unable to focus on recovery and overall mental health. Patient does state to feel safe, no danger to self or others.      P: Patient will meet with this CM on a biweekly basis, while continuing to meet with Psychotherapist weekly    Referrals made  No formal referrals made during this session     Next appointment date and time  Follow up scheduled in two weeks to begin working on DL requirements

## 2023-10-12 ENCOUNTER — DOCUMENTATION (OUTPATIENT)
Dept: PSYCHIATRY | Facility: CLINIC | Age: 38
End: 2023-10-12

## 2023-10-12 NOTE — PROGRESS NOTES
Spoke with Geno by phone after Geno left a message requesting to rescheduled missed appointment from earlier today.  Geno rescheduled for 10/13/2023 10:00am.

## 2023-10-13 ENCOUNTER — OFFICE VISIT (OUTPATIENT)
Dept: FAMILY MEDICINE CLINIC | Facility: CLINIC | Age: 38
End: 2023-10-13

## 2023-10-13 ENCOUNTER — TELEPHONE (OUTPATIENT)
Dept: PSYCHIATRY | Facility: CLINIC | Age: 38
End: 2023-10-13

## 2023-10-13 ENCOUNTER — SOCIAL WORK (OUTPATIENT)
Dept: PSYCHIATRY | Facility: CLINIC | Age: 38
End: 2023-10-13
Payer: COMMERCIAL

## 2023-10-13 VITALS
OXYGEN SATURATION: 98 % | SYSTOLIC BLOOD PRESSURE: 110 MMHG | WEIGHT: 204 LBS | DIASTOLIC BLOOD PRESSURE: 80 MMHG | HEART RATE: 87 BPM | TEMPERATURE: 96.7 F | BODY MASS INDEX: 34.83 KG/M2 | RESPIRATION RATE: 16 BRPM | HEIGHT: 64 IN

## 2023-10-13 DIAGNOSIS — F43.10 PTSD (POST-TRAUMATIC STRESS DISORDER): Primary | ICD-10-CM

## 2023-10-13 DIAGNOSIS — F11.20 OPIOID DEPENDENCE ON AGONIST THERAPY (HCC): ICD-10-CM

## 2023-10-13 DIAGNOSIS — Z76.89 ENCOUNTER FOR WEIGHT MANAGEMENT: Primary | ICD-10-CM

## 2023-10-13 DIAGNOSIS — F33.3 MDD (MAJOR DEPRESSIVE DISORDER), RECURRENT, SEVERE, WITH PSYCHOSIS (HCC): ICD-10-CM

## 2023-10-13 DIAGNOSIS — F41.9 ANXIETY: ICD-10-CM

## 2023-10-13 PROCEDURE — 90834 PSYTX W PT 45 MINUTES: CPT | Performed by: COUNSELOR

## 2023-10-13 NOTE — PROGRESS NOTES
Name: Jennifer Velazquez      : 1985      MRN: [id number]  Encounter Provider: Shruti Villaseñor MD  Encounter Date: 10/13/2023   Encounter department: [address] [place] [address]                 Weight management project   Assessment & Plan     {There are no diagnoses linked to this encounter. (Refresh or delete this SmartLink)}    BMI Counseling: There is no height or weight on file to calculate BMI. The BMI is above normal. Nutrition recommendations include decreasing portion sizes, encouraging healthy choices of fruits and vegetables and limiting drinks that contain sugar. Exercise recommendations include exercising 3-5 times per week and obtaining a gym membership. No pharmacotherapy was ordered. Patient referred to nutritionist and weight management. Rationale for BMI follow-up plan is due to patient being overweight or obese. Informed consent received and signed Yes  Jennifer Velazquez is a 40 y.o. female  patient presenting for assistance with weight management. Limitations to weight reduction: She believes her medications are causing her to gain weight. When she stopped taking all her meds, she lost 40 lbs but suffered mentally so she had to go back on it. Current Status:   Current Weight :   Weight (last 2 days)       None          BMI: There is no height or weight on file to calculate BMI. Neck Circumference: 14 inches  Waist Circumference: 40 inches      Goals of Management:    Patient interested in : lifestyle management plus medication    What motivate you to loose weight:Self-image  Goals:No sugary beverages. At least 64oz of water daily. , Increase physical activity by 10 minutes daily, 8185-1323 calories and cronometer jeremy      Weight goal:  50lbs.  (reduction of 5-10%) in 6 months   Nutritional goals:  Ensure adequate water intake of 64oz daily  Encouraged to maintain healthy calorie restricted diet of 9225-9705 kcal/day  Protein: 107 (1-1.2g/kg/d)  Consume less than 10% of daily kcal in healthy fats  Join group nutrition class: Remind patient of dates ( October 3rd at 2pm, Nov 14 at 2pm, Dec 13 at 10am, jan 17 at 2pm, feb 16 at 10 am at [facility] )  Patient can make their own individual nutrition appointment at [phone]  Group session are however encouraged    Exercise goal:  Increase physical activity by 10 minutes daily. Check your steps on your phone and aim to increase it daily   Join group step challenge:  download jeremy during visit: "Challenges": join challenge: code [address] regarding the free  at the [facility] given: every Tuesday and Thursday from 4:30pm to 6:30pm     Medication Therapy:  if interested discussed after 3 -6 months of participating if no changes are seen       1. Obesity:  Patient has set new goals for weight management. A.  Patient plans to improve diet by reducing suger sweetened beverages, reducing calories, increasing fruit and vegetable intacke and eating breakfast everyday  B. Patient plans to increase physical activity by walking more  C. Patient plans to exercise for 60 minutes 3 times per week  D. Patient to keep food and exercise diary. 2.  Will evaluate with labs: CMP, Fasting Lipid Panel, HbA1c, TSH      Patient recommended to make above changes for purpose of lifelong lifestyle and involve family members to increase consistency and motivation. Follow up in approximately 2 weeks with weight management team.          Jessy Melendez is a 40 y.o. female  presenting today for her follow up weight management visit. Program expectations and appointments were discussed in detail. Patient is in agreement and wishes to continue with program. Consent form completed and signed. Viky Farris is a 40 y.o. female patient who present to the office for assistance with weight management.    How do you feel about your weight: "I feel ugly because of my weight. It is disgusting!"    Weight range: 140-150lbs  Age of overweight onset:  early adult years   Any inciting life occurrences: stress , others statred gaining weight after she had her 9year old son and when she got clean from drugs. Then she found out she had hypothyroidism. Contributing factors: others:Drugs and stress. Associated symptoms:fatigue, body image issues and decreased self esteem. Review of Systems   Constitutional:  Negative for chills and fever. HENT:  Negative for ear pain and sore throat. Eyes:  Negative for pain and visual disturbance. Respiratory:  Negative for cough and shortness of breath. Cardiovascular:  Negative for chest pain and palpitations. Gastrointestinal:  Negative for abdominal pain and vomiting. Genitourinary:  Negative for dysuria and hematuria. Musculoskeletal:  Negative for arthralgias and back pain. Skin:  Negative for color change and rash. Neurological:  Negative for seizures and syncope. All other systems reviewed and are negative.       Past Medical History:   Diagnosis Date    Anxiety     "severe"    Asthma     Bipolar disorder ([address])     Cigarette smoker     Depression     Disease of thyroid gland     Drug dependence, in remission ([address])     GERD (gastroesophageal reflux disease)     H/O: whooping cough     while pregnant    Hepatitis C, chronic (HCC)     Insomnia disorder     Liver disease     Hepatitis C    Migraine     Psychiatric illness     PTSD (post-traumatic stress disorder)     Substance abuse ([address])      Past Surgical History:   Procedure Laterality Date    KNEE SURGERY      OTHER SURGICAL HISTORY      body piercing    MT RPR UMBILICAL HRNA 5 YRS/> REDUCIBLE N/A 04/19/2016    Procedure: REPAIR HERNIA UMBILICAL WITH MESH;  Surgeon: Lyndsey Hirsch MD;  Location:  MAIN OR;  Service: General    VAGINAL DELIVERY      X 6    WISDOM TOOTH EXTRACTION      X 4    WOUND DEBRIDEMENT Right 01/17/2018 Procedure: RIGHT KNEE DEBRIDEMENT; [address]; AND LACERATION REPAIR. INTRAOPERATIVE ASSESSMENT OF PATELLA TENDON;  Surgeon: Lucinda Jorge MD;  Location: [facility];  Service: Orthopedics     Family History   Problem Relation Age of Onset    Hypertension Mother     Thyroid disease Mother     Hypertension Father     Prostate cancer Father     Anxiety disorder Sister      Social History     Socioeconomic History    Marital status: Single     Spouse name: Not on file    Number of children: Not on file    Years of education: Not on file    Highest education level: Not on file   Occupational History    Not on file   Tobacco Use    Smoking status: Every Day     Packs/day: 0.25     Years: 12.00     Total pack years: 3.00     Types: Cigarettes     Passive exposure: Current    Smokeless tobacco: Never   Vaping Use    Vaping Use: Every day   Substance and Sexual Activity    Alcohol use: Not Currently    Drug use: Not Currently     Types: Marijuana    Sexual activity: Yes     Partners: Male     Birth control/protection: Other   Other Topics Concern    Not on file   Social History Narrative    ** Merged History Encounter **          Social Determinants of Health     Financial Resource Strain: Low Risk  (11/15/2022)    Overall Financial Resource Strain (CARDIA)     Difficulty of Paying Living Expenses: Not hard at all   Food Insecurity: No Food Insecurity (11/15/2022)    Hunger Vital Sign     Worried About Running Out of Food in the Last Year: Never true     Ran Out of Food in the Last Year: Never true   Transportation Needs: No Transportation Needs (11/15/2022)    PRAPARE - Transportation     Lack of Transportation (Medical): No     Lack of Transportation (Non-Medical):  No   Physical Activity: Not on file   Stress: Not on file   Social Connections: Not on file   Intimate Partner Violence: Not on file   Housing Stability: Not on file     Current Outpatient Medications on File Prior to Visit   Medication Sig    albuterol (PROVENTIL HFA,VENTOLIN HFA) 90 mcg/act inhaler Inhale 2 puffs every 4 (four) hours as needed for wheezing    atoMOXetine (STRATTERA) 40 mg capsule Take 1 capsule (40 mg total) by mouth daily    atoMOXetine (STRATTERA) 80 MG capsule Take 1 capsule (80 mg total) by mouth daily    buprenorphine-naloxone (Suboxone) 8-2 mg Place 1 Film (8 mg total) under the tongue 2 (two) times a day Do not start before July 24, 2023. buPROPion (Wellbutrin SR) 150 mg 12 hr tablet Take 1 tablet (150 mg total) by mouth in the morning    cariprazine (VRAYLAR) 1.5 MG capsule Take 2 capsules (3 mg total) by mouth daily    cetirizine (ZyrTEC) 10 mg tablet take 1 tablet by mouth once daily    cholecalciferol (VITAMIN D3) 1,000 units tablet take 1 tablet by mouth once daily    clonazePAM (KlonoPIN) 1 mg tablet Take 1 tablet (1 mg total) by mouth 3 (three) times a day    cyclobenzaprine (FLEXERIL) 10 mg tablet take 1 tablet by mouth three times a day if needed for muscle spasm    DULoxetine (CYMBALTA) 20 mg capsule Take 1 capsule (20 mg total) by mouth daily    fluconazole (DIFLUCAN) 150 mg tablet take 1 tablet by mouth ONCE FOR 1 DOSE ONCE YOU COMPLETED FLAGYL AND DOXYCYCLINE COURSES    fluticasone (FLONASE) 50 mcg/act nasal spray 1 spray into each nostril daily    gabapentin (NEURONTIN) 100 mg capsule Take 1 capsule (100 mg total) by mouth 3 (three) times a day    ibuprofen (MOTRIN) 800 mg tablet Take 1 tablet (800 mg total) by mouth every 8 (eight) hours as needed for mild pain (Patient not taking: Reported on 6/29/2023)    levothyroxine 50 mcg tablet take 1 tablet by mouth once daily    lidocaine (LIDODERM) 5 % apply 1 patch TO THE AFFECTED AREA DAILY. LEAVE ON FOR 12 HOURS AND THEN OFF FOR 12 HOURS.     NARCAN 4 MG/0.1ML LIQD ADMINISTER A SINGLE spray INTO ONE NOSTRIL CALL 911 MAY REPEAT ONCE (Patient not taking: No sig reported)    omeprazole (PriLOSEC) 20 mg delayed release capsule take 1 capsule by mouth once daily    topiramate (TOPAMAX) 100 mg tablet take 1 tablet by mouth twice a day     No Known Allergies  Immunization History   Administered Date(s) Administered    COVID-19 PFIZER VACCINE 0.3 ML IM 04/20/2021, 05/16/2021, 12/01/2021    Hep A, adult 10/22/2019    Hib (PRP-T) 01/22/2018    INFLUENZA 08/29/2016    Influenza Injectable, MDCK, Preservative Free, Quadrivalent 10/04/2019    Influenza Quadrivalent Preservative Free 3 years and older IM 08/29/2016    Meningococcal MCV4P 01/22/2018    Pneumococcal Conjugate 13-Valent 01/22/2018    Tdap 01/16/2018       Objective     There were no vitals taken for this visit. Physical Exam  Vitals reviewed. Constitutional:       General: She is not in acute distress. Appearance: She is normal weight. She is not toxic-appearing. HENT:      Head: Normocephalic. Right Ear: External ear normal.      Left Ear: External ear normal.      Nose: Nose normal.      Mouth/Throat:      Mouth: Mucous membranes are moist.   Eyes:      Extraocular Movements: Extraocular movements intact. Cardiovascular:      Rate and Rhythm: Normal rate and regular rhythm. Pulses: Normal pulses. Heart sounds: Normal heart sounds. No murmur heard. No friction rub. No gallop. Pulmonary:      Effort: Pulmonary effort is normal.   Abdominal:      General: Bowel sounds are normal.      Palpations: Abdomen is soft. There is no mass. Tenderness: There is no rebound. Neurological:      General: No focal deficit present. Mental Status: She is alert and oriented to person, place, and time. Psychiatric:         Mood and Affect: Mood normal.         Behavior: Behavior normal.         Thought Content:  Thought content normal.         Judgment: Judgment normal.       Tiff Hernandez MD

## 2023-10-13 NOTE — TELEPHONE ENCOUNTER
Follow up call to CHER FERNÁNDEZ to confirm that she was able to fill her Atomoxetine 80 MG capsules. Geno informed me that [employer] did a Prior Authorization for her and got it approved. Nothing further needed at this time.

## 2023-10-13 NOTE — PSYCH
Behavioral Health Psychotherapy Progress Note    Psychotherapy Provided: Individual Psychotherapy     1. PTSD (post-traumatic stress disorder)        2. Anxiety        3. MDD (major depressive disorder), recurrent, severe, with psychosis ([address])        4. Opioid dependence on agonist therapy ([address])            Goals addressed in session: Goal 1, Goal 2, and Goal 3      DATA: Geno arrived on time for her session. Geno discussed the outcome of the PFA withher child's grandparent and presented as calmer and less bothered by this situation. Geno reports she continues to receive her sublocade MAT and is in agreement with continuing working with her MAT provider to address her desire to stop this medication. Geno and therapist discussed eGno's concerns with her medication and therapist utilized planning skills to determine how to address Geno's concerns that her medication were affecting her weight. Geno was able to process her feelings about her weight gain and her motivation to change this situation. Therapist provided some suggestions to consider when addressing her weight gain. Geno agreed to discuss her weight gain issues with her weight management provider. Therapist asked Johanne Mark to explore how this and other issues influence her self perception. Geno was able to explain she feels good about herself when she can help others. During this session, this clinician used the following therapeutic modalities: Cognitive Processing Therapy, Motivational Interviewing, Solution-Focused Therapy, and Supportive Psychotherapy    Substance Abuse was addressed during this session. If the client is diagnosed with a co-occurring substance use disorder, please indicate any changes in the frequency or amount of use: Geno indicates continued abstinence.  Stage of change for addressing substance use diagnoses: Maintenance    ASSESSMENT:  Geno Courtney presents with a Euthymic/ normal mood. Geno presented with less displayed stress and frustration and reported better mood management this week. Geno displayed engagement in therapy and a desire to continue to work on her goals. her affect is Normal range and intensity, which is congruent, with her mood and the content of the session. The client has made progress on their goals. Jennifer Self presents with a none risk of suicide, none risk of self-harm, and none risk of harm to others. For any risk assessment that surpasses a "low" rating, a safety plan must be developed. A safety plan was indicated: no  If yes, describe in detail n/a    PLAN: Between sessions, Jennifer Self will will discuss with her weight management doctor the effects of her medication on her weight, continue meeting with her CRS, and explore ways to care for self. At the next session, the therapist will use Cognitive Behavioral Therapy, Cognitive Processing Therapy, and Solution-Focused Therapy to address maintaining abstinence, developing better self image, and maintaining emotional balance. .    Behavioral Health Treatment Plan and Discharge Planning: Jennifer Self is aware of and agrees to continue to work on their treatment plan. They have identified and are working toward their discharge goals.  yes    Visit start and stop times:    10/13/23  Start Time: 1006  Stop Time: 1051  Total Visit Time: 45 minutes
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
## 2023-10-15 PROBLEM — F33.41 MAJOR DEPRESSIVE DISORDER, RECURRENT, IN PARTIAL REMISSION (HCC): Status: RESOLVED | Noted: 2022-09-13 | Resolved: 2023-10-15

## 2023-10-15 PROBLEM — Z30.09 FAMILY PLANNING: Status: RESOLVED | Noted: 2023-08-14 | Resolved: 2023-10-15

## 2023-10-15 PROBLEM — S82.044C: Status: RESOLVED | Noted: 2018-01-16 | Resolved: 2023-10-15
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
## 2023-10-15 PROBLEM — F19.21 DRUG DEPENDENCE, IN REMISSION (HCC): Status: RESOLVED | Noted: 2020-07-15 | Resolved: 2023-10-15

## 2023-10-15 PROBLEM — S81.011S: Status: RESOLVED | Noted: 2018-02-01 | Resolved: 2023-10-15

## 2023-10-15 PROBLEM — R45.851 PASSIVE SUICIDAL IDEATIONS: Status: RESOLVED | Noted: 2018-01-17 | Resolved: 2023-10-15

## 2023-10-15 PROBLEM — N17.9 ACUTE KIDNEY INJURY (HCC): Status: RESOLVED | Noted: 2018-01-19 | Resolved: 2023-10-15

## 2023-10-15 PROBLEM — S22.43XA MULTIPLE FRACTURES OF RIBS, BILATERAL, INITIAL ENCOUNTER FOR CLOSED FRACTURE: Status: RESOLVED | Noted: 2018-01-16 | Resolved: 2023-10-15

## 2023-10-15 PROBLEM — Z76.89 ENCOUNTER FOR WEIGHT MANAGEMENT: Status: ACTIVE | Noted: 2023-10-15

## 2023-10-15 PROBLEM — S36.039A SPLEEN LACERATION: Status: RESOLVED | Noted: 2018-01-16 | Resolved: 2023-10-15

## 2023-10-15 PROBLEM — Z30.011 ORAL CONTRACEPTIVE PRESCRIBED: Status: RESOLVED | Noted: 2023-04-13 | Resolved: 2023-10-15

## 2023-10-15 PROBLEM — K66.1 HEMOPERITONEUM: Status: RESOLVED | Noted: 2018-01-16 | Resolved: 2023-10-15

## 2023-10-15 PROBLEM — F11.10 HEROIN ABUSE (HCC): Status: RESOLVED | Noted: 2018-01-16 | Resolved: 2023-10-15

## 2023-10-15 PROBLEM — G89.18 POSTOPERATIVE PAIN: Status: RESOLVED | Noted: 2023-04-13 | Resolved: 2023-10-15

## 2023-10-17 ENCOUNTER — DOCUMENTATION (OUTPATIENT)
Dept: PSYCHIATRY | Facility: CLINIC | Age: 38
End: 2023-10-17

## 2023-10-17 NOTE — PROGRESS NOTES
Geno arrived at the office and requested a list of completed appointments, indicating she required this for her  handling her custody case.  This writer provided this list.

## 2023-10-20 ENCOUNTER — TELEPHONE (OUTPATIENT)
Dept: FAMILY MEDICINE CLINIC | Facility: CLINIC | Age: 38
End: 2023-10-20

## 2023-10-20 NOTE — TELEPHONE ENCOUNTER
----- Message from Dalia Campos MD sent at 10/19/2023  4:05 PM EDT -----  Regarding: weight management follow up  Hi team  Could you please reschedule this patient's weight management follow up that was scheduled with pcp to one of the weight management doctors. Idealy it would be with dr Lorena Sanchez since she last saw her.  It should be around the week of November 14th   Thank you

## 2023-10-20 NOTE — TELEPHONE ENCOUNTER
Appointment for f/u was already scheduled. ----- Message from Barbara Pelletier MD sent at 10/19/2023  4:05 PM EDT -----  Regarding: weight management follow up  Hi team  Could you please reschedule this patient's weight management follow up that was scheduled with pcp to one of the weight management doctors. Idealy it would be with dr Dawood Copeland since she last saw her.  It should be around the week of November 14th   Thank you

## 2023-10-20 NOTE — TELEPHONE ENCOUNTER
Hi, my name is Stephanie Ureña [birth year]. You guys called me about rescheduling my weight management appointment. If you can call me back at [phone]. Thank you.       Spoke with patient she was ok with the 11/24 appointment

## 2023-10-23 ENCOUNTER — DOCUMENTATION (OUTPATIENT)
Dept: PSYCHIATRY | Facility: CLINIC | Age: 38
End: 2023-10-23

## 2023-10-23 NOTE — PROGRESS NOTES
Note Type: Case Management Note                  Date of Service: 10/23/2023  Service:  [facility] MAT Office    Note: Case Management Note  Angel Cason 40 y.o. female [id number]  Attending  Substance Use History     Social History     Substance and Sexual Activity   Alcohol Use Not Currently        Social History     Substance and Sexual Activity   Drug Use Not Currently    Types: Marijuana       Encounter Type:   Patient Face-to-Face    Start Time 1007  End Time 7022    Recovery needs addressed at this meeting:  Basic  Needs, Emotional/Mental Health, and Legal Status    Note  D: Patient presented for in-person, scheduled visit with this CM. Patient has received a new set of dentures which are more fitting for patient and more comfortable overall for patient. Patient also states to have a new court date for patient and paternal grandmother and must find representation to which patient is working with [facility]    Patient and this CM did write a letter of service which was requested by patient previous  to which may be able to still represent patient    A: Patient overall doing well. Patient states therapy has been working in a beneficial manner.      Patient did not state to be a danger to self or others, no symptoms of SI/HI    P: Patient will follow up with this CM in two weeks as well as assigned treatment team     Referrals made  No formal referrals made during this session     Next appointment date and time  Follow up two weeks

## 2023-10-26 ENCOUNTER — SOCIAL WORK (OUTPATIENT)
Dept: PSYCHIATRY | Facility: CLINIC | Age: 38
End: 2023-10-26
Payer: COMMERCIAL

## 2023-10-26 ENCOUNTER — OFFICE VISIT (OUTPATIENT)
Dept: PSYCHIATRY | Facility: CLINIC | Age: 38
End: 2023-10-26
Payer: COMMERCIAL

## 2023-10-26 VITALS
HEART RATE: 99 BPM | HEIGHT: 64 IN | DIASTOLIC BLOOD PRESSURE: 75 MMHG | WEIGHT: 200 LBS | BODY MASS INDEX: 34.15 KG/M2 | SYSTOLIC BLOOD PRESSURE: 121 MMHG

## 2023-10-26 DIAGNOSIS — F11.20 OPIOID DEPENDENCE ON AGONIST THERAPY (HCC): ICD-10-CM

## 2023-10-26 DIAGNOSIS — Z72.0 SMOKING TRYING TO QUIT: ICD-10-CM

## 2023-10-26 DIAGNOSIS — F90.8 ATTENTION DEFICIT HYPERACTIVITY DISORDER (ADHD), OTHER TYPE: ICD-10-CM

## 2023-10-26 DIAGNOSIS — F41.9 ANXIETY: ICD-10-CM

## 2023-10-26 DIAGNOSIS — F32.89 OTHER DEPRESSION: ICD-10-CM

## 2023-10-26 DIAGNOSIS — F32.A DEPRESSION, UNSPECIFIED DEPRESSION TYPE: ICD-10-CM

## 2023-10-26 DIAGNOSIS — F31.9 BIPOLAR DISORDER WITH DEPRESSION (HCC): ICD-10-CM

## 2023-10-26 DIAGNOSIS — F43.10 PTSD (POST-TRAUMATIC STRESS DISORDER): Primary | ICD-10-CM

## 2023-10-26 DIAGNOSIS — F41.9 ANXIETY: Primary | ICD-10-CM

## 2023-10-26 DIAGNOSIS — F39 MOOD DISORDER (HCC): ICD-10-CM

## 2023-10-26 DIAGNOSIS — F31.4 BIPOLAR DISORDER, CURRENT EPISODE DEPRESSED, SEVERE, WITHOUT PSYCHOTIC FEATURES (HCC): ICD-10-CM

## 2023-10-26 DIAGNOSIS — F33.3 MDD (MAJOR DEPRESSIVE DISORDER), RECURRENT, SEVERE, WITH PSYCHOSIS (HCC): ICD-10-CM

## 2023-10-26 PROBLEM — B18.2 HEPATITIS C, CHRONIC (HCC): Status: RESOLVED | Noted: 2018-09-20 | Resolved: 2023-10-26

## 2023-10-26 PROBLEM — F33.2 MDD (MAJOR DEPRESSIVE DISORDER), RECURRENT SEVERE, WITHOUT PSYCHOSIS (HCC): Status: RESOLVED | Noted: 2022-07-29 | Resolved: 2023-10-26

## 2023-10-26 PROBLEM — B19.20 HEPATITIS C VIRUS INFECTION: Status: RESOLVED | Noted: 2023-08-14 | Resolved: 2023-10-26

## 2023-10-26 PROCEDURE — 90837 PSYTX W PT 60 MINUTES: CPT | Performed by: COUNSELOR

## 2023-10-26 PROCEDURE — 99213 OFFICE O/P EST LOW 20 MIN: CPT | Performed by: PSYCHIATRY & NEUROLOGY

## 2023-10-26 RX ORDER — ATOMOXETINE 80 MG/1
80 CAPSULE ORAL DAILY
Qty: 30 CAPSULE | Refills: 4 | Status: SHIPPED | OUTPATIENT
Start: 2023-10-26

## 2023-10-26 RX ORDER — HYDROXYZINE HYDROCHLORIDE 10 MG/1
10 TABLET, FILM COATED ORAL
Qty: 30 TABLET | Refills: 3 | Status: SHIPPED | OUTPATIENT
Start: 2023-10-26

## 2023-10-26 RX ORDER — BUPROPION HYDROCHLORIDE 300 MG/1
300 TABLET ORAL DAILY
Qty: 30 TABLET | Refills: 4 | Status: SHIPPED | OUTPATIENT
Start: 2023-10-26

## 2023-10-26 RX ORDER — CLONAZEPAM 1 MG/1
1 TABLET ORAL 3 TIMES DAILY
Qty: 90 TABLET | Refills: 2 | Status: SHIPPED | OUTPATIENT
Start: 2023-10-26

## 2023-10-26 NOTE — PSYCH
[facility] Program    Progress Note  Mannie Dears 40 y.o. female MRN: [id number]   Encounter: [id number]      Visit Time    Visit Start Time: 9:30AM  Visit Stop Time: 10AM  Total Visit Duration: 30 min    SUBJECTIVE: My bipolar disorder is under control. I still suffer with my weight. I am with the weight management program.      INTERVAL HISTORY: Patient's sleep is still disturbed. She goes to bed around 8 PM and wakes up around 2 AM but she feels that it has not enough. She would like to sleep longer hours. There is some environmental factors which are associated with sleep problems. Like the last time we talked about sleep hygiene and the writer suggested her to consider that her sleep by going to bed later at night but the patient had other factors which prevented him from doing that. She wanted to get some medicine that may help her to fall asleep faster after waking up about 2 AM.  She does not want to take any controlled substances.   At the same time the patient felt like Strattera helps her to concentrate better and she does not feel any sleepiness or tiredness in the daytime taking clonazepam.    Review Of Systems:    sleep changes: decreased  appetite changes: increased  energy/anergy: no    Support Services  The patient is actively engaged with the [facility] Certified Addiction Counselor’s psychotherapy plan: No, has CPR at other program        PDMP reviewed:     PDMP Review         Value Time User    PDMP Reviewed  Yes 10/26/2023 10:12 AM Welton Gilford, MD              Drug cravings: no  Motivation to continue treatment: high      Current Outpatient Medications:     buPROPion (Wellbutrin XL) 300 mg 24 hr tablet, Take 1 tablet (300 mg total) by mouth daily, Disp: 30 tablet, Rfl: 4    cariprazine (VRAYLAR) 1.5 MG capsule, Take 2 capsules (3 mg total) by mouth daily, Disp: 30 capsule, Rfl: 4    clonazePAM (KlonoPIN) 1 mg tablet, Take 1 tablet (1 mg total) by mouth 3 (three) times a day, Disp: 90 tablet, Rfl: 2    hydrOXYzine HCL (ATARAX) 10 mg tablet, Take 1 tablet (10 mg total) by mouth daily at bedtime as needed for anxiety, Disp: 30 tablet, Rfl: 3    albuterol (PROVENTIL HFA,VENTOLIN HFA) 90 mcg/act inhaler, Inhale 2 puffs every 4 (four) hours as needed for wheezing, Disp: 18 g, Rfl: 5    atoMOXetine (STRATTERA) 80 MG capsule, Take 1 capsule (80 mg total) by mouth daily, Disp: 30 capsule, Rfl: 4    buprenorphine-naloxone (Suboxone) 8-2 mg, Place 1 Film (8 mg total) under the tongue 2 (two) times a day Do not start before July 24, 2023., Disp: 60 Film, Rfl: 0    cetirizine (ZyrTEC) 10 mg tablet, take 1 tablet by mouth once daily, Disp: 30 tablet, Rfl: 0    cholecalciferol (VITAMIN D3) 1,000 units tablet, take 1 tablet by mouth once daily, Disp: 30 tablet, Rfl: 0    cyclobenzaprine (FLEXERIL) 10 mg tablet, take 1 tablet by mouth three times a day if needed for muscle spasm, Disp: 90 tablet, Rfl: 3    fluconazole (DIFLUCAN) 150 mg tablet, take 1 tablet by mouth ONCE FOR 1 DOSE ONCE YOU COMPLETED FLAGYL AND DOXYCYCLINE COURSES, Disp: , Rfl:     fluticasone (FLONASE) 50 mcg/act nasal spray, 1 spray into each nostril daily, Disp: 16 g, Rfl: 2    gabapentin (NEURONTIN) 100 mg capsule, Take 1 capsule (100 mg total) by mouth 3 (three) times a day, Disp: 90 capsule, Rfl: 4    ibuprofen (MOTRIN) 800 mg tablet, Take 1 tablet (800 mg total) by mouth every 8 (eight) hours as needed for mild pain (Patient not taking: Reported on 6/29/2023), Disp: 90 tablet, Rfl: 3    levothyroxine 50 mcg tablet, take 1 tablet by mouth once daily, Disp: 30 tablet, Rfl: 5    lidocaine (LIDODERM) 5 %, apply 1 patch TO THE AFFECTED AREA DAILY.  LEAVE ON FOR 12 HOURS AND THEN OFF FOR 12 HOURS., Disp: , Rfl:     NARCAN 4 MG/0.1ML LIQD, ADMINISTER A SINGLE spray INTO ONE NOSTRIL CALL 911 MAY REPEAT ONCE (Patient not taking: No sig reported), Disp: , Rfl: 0    omeprazole (PriLOSEC) 20 mg delayed release capsule, take 1 capsule by mouth once daily, Disp: 90 capsule, Rfl: 3    topiramate (TOPAMAX) 100 mg tablet, take 1 tablet by mouth twice a day, Disp: 90 tablet, Rfl: 3    Objective     Vitals:    10/26/23 0935   BP: 121/75   Pulse: 99           Mental Status Evaluation:    Appearance:  dressed appropriately, casually dressed   Mood:  anxious   Affect: constricted    Speech:  decreased rate   Thought Content:  normal   Perceptual Disturbances: no auditory hallucinations, no visual hallucinations, denies auditory hallucinations when asked, does not appear responding to internal stimuli   Risk Potential: Suicidal ideation - None, contracts for safety on the unit  Homicidal ideation - None  Potential for aggression - No   Insight:  improved   Judgment: improved   Motor Activity: no abnormal movements       Lab Results:                                Diagnoses and all orders for this visit:    Anxiety  -     hydrOXYzine HCL (ATARAX) 10 mg tablet; Take 1 tablet (10 mg total) by mouth daily at bedtime as needed for anxiety  -     clonazePAM (KlonoPIN) 1 mg tablet; Take 1 tablet (1 mg total) by mouth 3 (three) times a day    Other depression    Smoking trying to quit  -     buPROPion (Wellbutrin XL) 300 mg 24 hr tablet; Take 1 tablet (300 mg total) by mouth daily    Depression, unspecified depression type  -     buPROPion (Wellbutrin XL) 300 mg 24 hr tablet; Take 1 tablet (300 mg total) by mouth daily    Bipolar disorder, current episode depressed, severe, without psychotic features ([address])  -     cariprazine (VRAYLAR) 1.5 MG capsule; Take 2 capsules (3 mg total) by mouth daily  -     clonazePAM (KlonoPIN) 1 mg tablet; Take 1 tablet (1 mg total) by mouth 3 (three) times a day       Decision was to continue him medications as prescribed. The writer prescribed low-dose of Atarax that the patient may take around 2 AM in the hope that it will help him to relax and continuation of sleep but is not strong enough to lead to daytime sleepiness.   CBT-based insomnia therapy was again provided to the patient was concentration on sleep hygiene and relaxation, improving sleep environment and also sleep consolidation with some sleep restriction. We will continue to work with that with the patient. Risks / Benefits of Treatment:    Risks, benefits, and possible side effects of medications explained to patient and patient verbalizes understanding and agreement for treatment. This note was not shared with the patient due to this is a psychotherapy note     Counseling / Coordination of Care  Total time spent today 30 minutes. Greater than 50% of total time was spent with the patient and / or family counseling and / or coordination of care.      Radha Chapman MD

## 2023-10-26 NOTE — PSYCH
Behavioral Health Psychotherapy Progress Note    Psychotherapy Provided: Individual Psychotherapy     1. PTSD (post-traumatic stress disorder)        2. Anxiety        3. MDD (major depressive disorder), recurrent, severe, with psychosis ([address])        4. Bipolar disorder with depression ([address])        5. Opioid dependence on agonist therapy ([address])            Goals addressed in session: Goal 1, Goal 2, and Goal 3      DATA: Geno arrived on time for her session. Prior to session Geno had seen psychiatric provider. Geno and therapist reviewed this appointment. Geno states this appointment had gone well and she was assured her medications were not causing her weight gain. Geno states she discussed her MAT sublocade and has agreed with psychiatric provider to stop her sublocade. Geno states " as of right now, the way I feel, I do not want to go for another shot". Geno explained a current situation regarding her custody issues with her son's grandmother. . Therapist worked with Lizzie Keita to process these thoughts and feelings and develop more effective ways to address these feelings. Geno states she struggles with her relationship with her significant other. Therapist reviewed with Geno this relationship and discussed with Geno solutions to resolve these concerns. Geno will be seen again for therapy next week. During this session, this clinician used the following therapeutic modalities: Cognitive Processing Therapy, Solution-Focused Therapy, and Supportive Psychotherapy    Substance Abuse was addressed during this session. If the client is diagnosed with a co-occurring substance use disorder, please indicate any changes in the frequency or amount of use: Geno reports she cotninues to maintain abstinence for almost 3 years. Rodolfo reports she continues to work with a CRS and continues to plan a 3 year celebration for her abstinence.    Stage of change for addressing substance use diagnoses: Maintenance    ASSESSMENT:  Geno Aguilar presents with a Euthymic/ normal mood. Geno presented as stable in mood and displayed no increase in anxiety or depression. Geno was able to communicate stressful situations and expressed an ability to de-escalate her moods after expressing these stressful situations. her affect is Normal range and intensity, which is congruent, with her mood and the content of the session. The client has made progress on their goals. Isabell Sharpe presents with a none risk of suicide, none risk of self-harm, and none risk of harm to others. For any risk assessment that surpasses a "low" rating, a safety plan must be developed. A safety plan was indicated: no  If yes, describe in detail n/a    PLAN: Between sessions, Isabell Sharpe will continue maintain abstinence and manage increased anxiety and stress levels. At the next session, the therapist will use Cognitive Processing Therapy, Solution-Focused Therapy, and Supportive Psychotherapy to address Geno's ongoing mood instability and need to learn more effective coping skills. Behavioral Health Treatment Plan and Discharge Planning: Isabell Sharpe is aware of and agrees to continue to work on their treatment plan. They have identified and are working toward their discharge goals.  yes    Visit start and stop times:    10/26/23  Start Time: 1004  Stop Time: 1057  Total Visit Time: 53 minutes

## 2023-11-01 ENCOUNTER — TELEPHONE (OUTPATIENT)
Dept: BEHAVIORAL/MENTAL HEALTH CLINIC | Facility: CLINIC | Age: 38
End: 2023-11-01

## 2023-11-01 NOTE — TELEPHONE ENCOUNTER
Called pharmacy regarding PA for [employer]. Pharmacist informed me it is rejecting because dose is for 2 capsules. Script can be filled by writing for one 3 MG capsule daily. Will refer to Dr Lizzeth Gifford for review.

## 2023-11-01 NOTE — TELEPHONE ENCOUNTER
Pt called because she was having trouble picking up her medication Vraylar from the pharmacy. Writer contacted pharmacy about this problem and pharmacy stated that medication requires a prior auth. Pt requesting a call back with update on prior auth and when she can  medication from pharmacy. Pt is also requesting a call back regarding a medication problem she is having. For your review.

## 2023-11-02 DIAGNOSIS — F31.4 BIPOLAR DISORDER, CURRENT EPISODE DEPRESSED, SEVERE, WITHOUT PSYCHOTIC FEATURES (HCC): ICD-10-CM

## 2023-11-02 NOTE — TELEPHONE ENCOUNTER
Called Geno and informed her that new script was sent to her pharmacy for Johnathon Clifford as a 3 MG capsule.

## 2023-11-06 ENCOUNTER — DOCUMENTATION (OUTPATIENT)
Dept: PSYCHIATRY | Facility: CLINIC | Age: 38
End: 2023-11-06

## 2023-11-06 NOTE — PROGRESS NOTES
Note Type: Case Management Note                  Date of Service: 11/06/2023  Service:  [facility] MAT Office    Note: Case Management Note  Meg Patrick 40 y.o. female [id number]  Attending  Substance Use History     Social History     Substance and Sexual Activity   Alcohol Use Not Currently        Social History     Substance and Sexual Activity   Drug Use Not Currently    Types: Marijuana       Encounter Type:   Patient Face-to-Face    Start Time 1100  End Time 1200    Recovery needs addressed at this meeting:  Basic  Needs, Emotional/Mental Health, and Family    Note  D: Patient presented for in-person, scheduled visit with this CM. Patient and this CM discussed recent events that occurred at patient's home, leading to patient's CRS making contact with this CM. Patient advised this CM that patient has come to the understanding to be self-sabotaging with current relationship as patient states significant other and patient were cleaning apartment with plan to move unused belongings into storage or into the trash to where patient began to feel that significant other was trying to have patient pack own belongings and move out. Patient feels a new medication has been affecting moods to which this CM, with psychiatrist approval, rescheduled patient's appointment for a sooner date to address these concerns. Patient also presented mail received from [facility] stating license was to be suspended due to nonpayment of fines in [employer] to which this CM was able to make contact with [employer] and acknowledge patient's need to re-confirm a payment amount and date which also removed patient's payments from collection, ultimately removing approximately $1000 from total cost of fine. A: Patient did present with a flat affect throughout session. Patient states to be struggling with an appetite lately as well as finding interest in certain things at home.  Patient does have  clear mind on understanding that significant other is being supportive during this higher depressive state, stating significant other has been assisting with home chores and taking patient to and from appointments, etc.     Patient did not state to be a danger to self or others, no SI/HI, did stress the desire to address concerns surrounding new medication.      P: Patient will follow up with this CM in one week as well as assigned treatment team     Referrals made  [facility]     Next appointment date and time  Follow up one week

## 2023-11-07 ENCOUNTER — OFFICE VISIT (OUTPATIENT)
Dept: PSYCHIATRY | Facility: CLINIC | Age: 38
End: 2023-11-07
Payer: COMMERCIAL

## 2023-11-07 DIAGNOSIS — Z72.0 SMOKING TRYING TO QUIT: ICD-10-CM

## 2023-11-07 DIAGNOSIS — F11.21 OPIOID USE DISORDER, MODERATE, IN EARLY REMISSION, ON MAINTENANCE THERAPY, DEPENDENCE (HCC): Primary | ICD-10-CM

## 2023-11-07 DIAGNOSIS — F90.0 ATTENTION DEFICIT HYPERACTIVITY DISORDER, INATTENTIVE TYPE: ICD-10-CM

## 2023-11-07 DIAGNOSIS — F32.A DEPRESSION, UNSPECIFIED DEPRESSION TYPE: ICD-10-CM

## 2023-11-07 PROBLEM — F17.200 NICOTINE DEPENDENCE, UNCOMPLICATED: Status: ACTIVE | Noted: 2023-11-07

## 2023-11-07 PROCEDURE — 99213 OFFICE O/P EST LOW 20 MIN: CPT | Performed by: PSYCHIATRY & NEUROLOGY

## 2023-11-07 RX ORDER — BUPROPION HYDROCHLORIDE 150 MG/1
150 TABLET ORAL DAILY
Qty: 30 TABLET | Refills: 2 | Status: SHIPPED | OUTPATIENT
Start: 2023-11-07

## 2023-11-07 NOTE — PSYCH
SHARE Program    Progress Note  Umer Duong 40 y.o. female MRN: [id number]   Encounter: [id number]      Visit Time    Visit Start Time: 11AM  Visit Stop Time: 11:30AM  Total Visit Duration:  30 minutes    SUBJECTIVE: At the end of my month after my last Sublocade shot I am not doing so well. I have goosebumps, pain, shakiness, nervousness and nausea like I am getting into withdrawal.      INTERVAL HISTORY: The patient stated that her mind is clearer, she denied any symptoms of psychosis, or mood disturbance, she feels like his attention, concentration, and thoughts reality based, however she is more sensitive now to symptoms of withdrawal.  The patient's wanted to start and continue with skilled Sublocade monthly shots.     Review Of Systems:    sleep changes: decreased  appetite changes: decreased  energy/anergy: decreased    Support Services  The patient is actively engaged with the [facility] Certified Addiction Counselor’s psychotherapy plan: Yes        PDMP reviewed:     PDMP Review         Value Time User    PDMP Reviewed  Yes 10/26/2023 10:12 AM Garrett Andres MD              Drug cravings: no  Motivation to continue treatment: high      Current Outpatient Medications:     buPROPion (Wellbutrin XL) 150 mg 24 hr tablet, Take 1 tablet (150 mg total) by mouth daily, Disp: 30 tablet, Rfl: 2    albuterol (PROVENTIL HFA,VENTOLIN HFA) 90 mcg/act inhaler, Inhale 2 puffs every 4 (four) hours as needed for wheezing, Disp: 18 g, Rfl: 5    atomoxetine (STRATTERA) 80 MG capsule, Take 1 capsule (80 mg total) by mouth daily, Disp: 30 capsule, Rfl: 4    buprenorphine-naloxone (Suboxone) 8-2 mg, Place 1 Film (8 mg total) under the tongue 2 (two) times a day Do not start before July 24, 2023., Disp: 60 Film, Rfl: 0    cariprazine (VRAYLAR) 3 MG capsule, Take 1 capsule (3 mg total) by mouth daily, Disp: 30 capsule, Rfl: 5    cetirizine (ZyrTEC) 10 mg tablet, take 1 tablet by mouth once daily, Disp: 30 tablet, Rfl: 0    cholecalciferol (VITAMIN D3) 1,000 units tablet, take 1 tablet by mouth once daily, Disp: 30 tablet, Rfl: 0    clonazePAM (KlonoPIN) 1 mg tablet, Take 1 tablet (1 mg total) by mouth 3 (three) times a day, Disp: 90 tablet, Rfl: 2    cyclobenzaprine (FLEXERIL) 10 mg tablet, take 1 tablet by mouth three times a day if needed for muscle spasm, Disp: 90 tablet, Rfl: 3    fluconazole (DIFLUCAN) 150 mg tablet, take 1 tablet by mouth ONCE FOR 1 DOSE ONCE YOU COMPLETED FLAGYL AND DOXYCYCLINE COURSES, Disp: , Rfl:     fluticasone (FLONASE) 50 mcg/act nasal spray, 1 spray into each nostril daily, Disp: 16 g, Rfl: 2    gabapentin (NEURONTIN) 100 mg capsule, Take 1 capsule (100 mg total) by mouth 3 (three) times a day, Disp: 90 capsule, Rfl: 4    hydrOXYzine HCL (ATARAX) 10 mg tablet, Take 1 tablet (10 mg total) by mouth daily at bedtime as needed for anxiety, Disp: 30 tablet, Rfl: 3    ibuprofen (MOTRIN) 800 mg tablet, Take 1 tablet (800 mg total) by mouth every 8 (eight) hours as needed for mild pain (Patient not taking: Reported on 6/29/2023), Disp: 90 tablet, Rfl: 3    levothyroxine 50 mcg tablet, take 1 tablet by mouth once daily, Disp: 30 tablet, Rfl: 5    lidocaine (LIDODERM) 5 %, apply 1 patch TO THE AFFECTED AREA DAILY. LEAVE ON FOR 12 HOURS AND THEN OFF FOR 12 HOURS., Disp: , Rfl:     NARCAN 4 MG/0.1ML LIQD, ADMINISTER A SINGLE spray INTO ONE NOSTRIL CALL 911 MAY REPEAT ONCE (Patient not taking: No sig reported), Disp: , Rfl: 0    omeprazole (PriLOSEC) 20 mg delayed release capsule, take 1 capsule by mouth once daily, Disp: 90 capsule, Rfl: 3    topiramate (TOPAMAX) 100 mg tablet, take 1 tablet by mouth twice a day, Disp: 90 tablet, Rfl: 3    Objective     There were no vitals filed for this visit.         Mental Status Evaluation:    Appearance:  dressed appropriately, casually dressed   Mood:  anxious   Affect: constricted    Speech:  normal rate and volume   Thought Content:  normal, negative thinking   Perceptual Disturbances: no auditory hallucinations, no visual hallucinations, denies auditory hallucinations when asked, does not appear responding to internal stimuli   Risk Potential: Suicidal ideation - None  Homicidal ideation - None  Potential for aggression - No   Insight:  improved   Judgment: normal   Motor Activity: no abnormal movements       Lab Results:                                Diagnoses and all orders for this visit:    Opioid use disorder, moderate, in early remission, on maintenance therapy, dependence ([facility])  -     [facility] All Prescribed Meds and Special Instructions  -     Amphetamines, Methamphetamines  -     Butalbital  -     Phenobarbital  -     Secobarbital  -     Alprazolam  -     Clonazepam  -     Diazepam, Temazepam, Oxazepam  -     Lorazepam  -     Gabapentin  -     Pregabalin  -     Cocaine  -     Heroin  -     Buprenorphine  -     Levorphanol  -     Meperidine  -     Naltrexone  -     Fentanyl  -     Methadone  -     Oxycodone, Oxymorphone  -     Tapentadol  -     THC  -     Tramadol  -     Codeine, Hydrocodone, Hydropmorphone, Morphine  -     Bath Salts  -     Ethyl Glucuronide/Ethyl Sulfate  -     Kratom  -     Spice  -     Methylphenidate  -     Phentermine  -     Validity Oxidant  -     Validity Creatinine  -     Validity pH  -     Validity Specific    Smoking trying to quit  -     buPROPion (Wellbutrin XL) 150 mg 24 hr tablet; Take 1 tablet (150 mg total) by mouth daily    Depression, unspecified depression type  -     buPROPion (Wellbutrin XL) 150 mg 24 hr tablet; Take 1 tablet (150 mg total) by mouth daily    Other orders  -     Buprenorphine ER (Sublocade) subcutaneous injection 100 mg         Decision was made to restart Sublocade stroke. The writer restarted patient's therapy plan, in the next several Sublocade short will be provided tomorrow about 1 month after the last stroke.   The writer suggested the patient to meanwhile take 8 mg of Suboxone and that the patient still had some left after she started on Sublocade. At the same time to reduce potential side effects of higher dose of Wellbutrin which may also sometimes lead to increased of nervousness the writer suggested to decrease Wellbutrin back to 150 mg.  Strattera helped the patient with attention and concentration. The writer also provided supportive and motivational therapy to support  with her decision to continue to work accepting [employer]. Risks / Benefits of Treatment:    Risks, benefits, and possible side effects of medications explained to patient and patient verbalizes understanding and agreement for treatment. This note was not shared with the patient due to this is a psychotherapy note     Counseling / Coordination of Care  Total time spent today 30 minutes. Greater than 50% of total time was spent with the patient and / or family counseling and / or coordination of care.      Destinee Baum MD

## 2023-11-09 ENCOUNTER — HOSPITAL ENCOUNTER (OUTPATIENT)
Dept: INFUSION CENTER | Facility: HOSPITAL | Age: 38
End: 2023-11-09
Attending: PSYCHIATRY & NEUROLOGY
Payer: COMMERCIAL

## 2023-11-09 ENCOUNTER — SOCIAL WORK (OUTPATIENT)
Dept: PSYCHIATRY | Facility: CLINIC | Age: 38
End: 2023-11-09
Payer: COMMERCIAL

## 2023-11-09 DIAGNOSIS — F11.21 OPIOID USE DISORDER, MODERATE, IN EARLY REMISSION, ON MAINTENANCE THERAPY, DEPENDENCE (HCC): Primary | ICD-10-CM

## 2023-11-09 DIAGNOSIS — F11.21 OPIOID USE DISORDER, MODERATE, IN EARLY REMISSION, ON MAINTENANCE THERAPY, DEPENDENCE (HCC): Chronic | ICD-10-CM

## 2023-11-09 DIAGNOSIS — F31.9 BIPOLAR DISORDER WITH DEPRESSION (HCC): ICD-10-CM

## 2023-11-09 DIAGNOSIS — F43.10 PTSD (POST-TRAUMATIC STRESS DISORDER): Primary | ICD-10-CM

## 2023-11-09 PROCEDURE — 96372 THER/PROPH/DIAG INJ SC/IM: CPT

## 2023-11-09 PROCEDURE — 90837 PSYTX W PT 60 MINUTES: CPT | Performed by: COUNSELOR

## 2023-11-09 RX ADMIN — BUPRENORPHINE 100 MG: 100 SOLUTION SUBCUTANEOUS at 12:18

## 2023-11-09 NOTE — PSYCH
Behavioral Health Psychotherapy Progress Note    Psychotherapy Provided: Individual Psychotherapy     1. PTSD (post-traumatic stress disorder)        2. Bipolar disorder with depression ([address])        3. Opioid use disorder, moderate, in early remission, on maintenance therapy, dependence ([address])            Goals addressed in session: Goal 1, Goal 2, and Goal 3      DATA: Geno arrived on time for her session. Geno reports recent med changes causing distress. Geno reports some improvement recently but states that she had made a decision to returning to taking her sublocade injection and would be receiving this today. Therapist praised Geno for this insight and discussed with Geno her symptoms management techniques. Geno indicated some conflict due to being accused of using by her SO because of these symptoms. Geno and therapist discussed effective communication skills when having feelings of hurt and how to address this situation in a healthy manor. Therapist  stressed the importance of mindfulness, Geno stated she had been practicing this in the past but had not been practicing this as often. Geno stated in the past she had utilized talking to self as a coping mechanism but had stopped recently due to feelings of embarrassment that others were judging her. Therapist explored this feeling with Geno and explored Geno's cognitive process. Geno stated she would try to begin journaling. Geno provided update regarding the situation with her child's grandparent. Geno will be scheduled for a session in one week. During this session, this clinician used the following therapeutic modalities: Cognitive Processing Therapy, Mindfulness-based Strategies, Motivational Interviewing, and Solution-Focused Therapy    Substance Abuse was addressed during this session.  If the client is diagnosed with a co-occurring substance use disorder, please indicate any changes in the frequency or amount of use: Geno maintains continued abstinence and reports she is restarting her sublocade due to concerns regarding withdrawal symptoms. Stage of change for addressing substance use diagnoses: Maintenance    ASSESSMENT:  Geno Aguilar presents with a Euthymic/ normal mood. Geno continues to present as engaged during session. Geno displays improved insight and demonstrates a willingness to change her decisions to meet her needs in the moment. Geno continues to struggle to find effective coping mechanisms to address her distressful feelings. her affect is Normal range and intensity, which is congruent, with her mood and the content of the session. The client has made progress on their goals. Isabell Sharpe presents with a none risk of suicide, none risk of self-harm, and none risk of harm to others. For any risk assessment that surpasses a "low" rating, a safety plan must be developed. A safety plan was indicated: no  If yes, describe in detail n/a    PLAN: Between sessions, Isabell Sharpe will being increased mindfulness practice, restart sublocade, and start journaling. At the next session, the therapist will use Cognitive Behavioral Therapy, Cognitive Processing Therapy, Dialectical Behavior Therapy, Motivational Interviewing, and Solution-Focused Therapy to address Geno's continued need to improve mood regulation and maintain abstinence. Behavioral Health Treatment Plan and Discharge Planning: Isabell Sharpe is aware of and agrees to continue to work on their treatment plan. They have identified and are working toward their discharge goals.  yes    Visit start and stop times:    11/09/23  Start Time: 1105  Stop Time: 1200  Total Visit Time: 55 minutes

## 2023-11-16 ENCOUNTER — SOCIAL WORK (OUTPATIENT)
Dept: PSYCHIATRY | Facility: CLINIC | Age: 38
End: 2023-11-16
Payer: COMMERCIAL

## 2023-11-16 DIAGNOSIS — F11.21 OPIOID USE DISORDER, MODERATE, IN EARLY REMISSION, ON MAINTENANCE THERAPY, DEPENDENCE (HCC): Primary | Chronic | ICD-10-CM

## 2023-11-16 DIAGNOSIS — F41.9 ANXIETY: ICD-10-CM

## 2023-11-16 DIAGNOSIS — F31.81 BIPOLAR 2 DISORDER (HCC): ICD-10-CM

## 2023-11-16 PROCEDURE — 90834 PSYTX W PT 45 MINUTES: CPT | Performed by: COUNSELOR

## 2023-11-16 NOTE — PSYCH
Behavioral Health Psychotherapy Progress Note    Psychotherapy Provided: Individual Psychotherapy     1. Opioid use disorder, moderate, in early remission, on maintenance therapy, dependence ([address])        2. Anxiety        3. Bipolar 2 disorder ([address])            Goals addressed in session: Goal 1, Goal 2, and Goal 3      DATA: Geno arrived on time for her session. Geno began session by explaining that she had began to organize her home. Geno and therapist explored how her and her significant were managing the stress of this situation. Geno vented regarding her upcoming court hearing with her child's paternal grandparent. Therapist provided emotional support. Therapist attempted to direct Geno to identify her feelings over this situation. Geno was able to identify anger as a feeling. Therapist validated this feeling and discussed with Geno the healthiest way to respond to this feeling and situation. Geno was able to list ways to respond that would would be more effective than reacting with anger. Therapist allowed [facility] to explore her anger and how the person she is angry at is responding. Geno was able to recognize that this persons response was based in her own fear and anger. Geno was able to explore empathy for this person. Geno was scheduled for a session in 2 weeks due to the holiday next week. During this session, this clinician used the following therapeutic modalities: Cognitive Behavioral Therapy, Cognitive Processing Therapy, Motivational Interviewing, and Solution-Focused Therapy    Substance Abuse was addressed during this session. If the client is diagnosed with a co-occurring substance use disorder, please indicate any changes in the frequency or amount of use: Geno reports continued abstinence. Geno reports continued contact with her CRS and reports she attend a recovery [place] meeting.  Geno states she is still planning on celebrating her 4 years of abstinence at a 12 step meeting. Geno reports she feels improvement after receiving her sublocade IM again and is pleased she had decided to not stop her sublocade again. Therapist praised Geno for continued maintenance of her and continued recognition of her need to maintain her recovery. Stage of change for addressing substance use diagnoses: Maintenance    ASSESSMENT:  Annamarie Mellody Fabry presents with a Euthymic/ normal mood. Geno presented as engaged during session. Geno displays improvement in her ability to address her anger regarding her situation with her son and displays a willingness to continue to improve.       her affect is Normal range and intensity, which is congruent, with her mood and the content of the session. The client has made progress on their goals. Hulda Galeazzi presents with a none risk of suicide, none risk of self-harm, and none risk of harm to others. For any risk assessment that surpasses a "low" rating, a safety plan must be developed. A safety plan was indicated: no  If yes, describe in detail n/a    PLAN: Between sessions, Hulda Galeazzi will continue to work with her CRS and attend support groups to maintain abstinence. Geon will continue to identify her feelings and practice skills to effectively address her feelings. At the next session, the therapist will use Cognitive Behavioral Therapy, Cognitive Processing Therapy, Motivational Interviewing, and Solution-Focused Therapy to address Geno's current maintenance stage of recovery and ongoing emotional skills development needs. Behavioral Health Treatment Plan and Discharge Planning: Hulda Galeazzi is aware of and agrees to continue to work on their treatment plan. They have identified and are working toward their discharge goals.  yes    Visit start and stop times:    11/16/23  Start Time: 1100  Stop Time: 1151  Total Visit Time: 51 minutes

## 2023-11-22 ENCOUNTER — ANNUAL EXAM (OUTPATIENT)
Dept: OBGYN CLINIC | Facility: CLINIC | Age: 38
End: 2023-11-22

## 2023-11-22 VITALS
WEIGHT: 194 LBS | HEART RATE: 88 BPM | HEIGHT: 64 IN | SYSTOLIC BLOOD PRESSURE: 116 MMHG | BODY MASS INDEX: 33.12 KG/M2 | DIASTOLIC BLOOD PRESSURE: 79 MMHG

## 2023-11-22 DIAGNOSIS — Z01.419 ROUTINE GYNECOLOGICAL EXAMINATION: Primary | ICD-10-CM

## 2023-11-22 DIAGNOSIS — N64.4 BREAST PAIN, RIGHT: ICD-10-CM

## 2023-11-22 PROCEDURE — 99395 PREV VISIT EST AGE 18-39: CPT | Performed by: OBSTETRICS & GYNECOLOGY
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
## 2023-11-22 NOTE — PATIENT INSTRUCTIONS
Thank you for your confidence in our team.   We appreciate you and welcome your feedback. If you receive a survey from us, please take a few moments to let us know how we are doing.    Sincerely,  Isabelle Gillette, DO

## 2023-11-24 ENCOUNTER — TELEPHONE (OUTPATIENT)
Dept: PSYCHIATRY | Facility: CLINIC | Age: 38
End: 2023-11-24

## 2023-11-29 NOTE — TELEPHONE ENCOUNTER
Prior authorization summit under cover my med's for  LAtuda 40 mg  Follow up in 3 business days  [facility])
Received insurances respond for the Latuda 40 mg  Insurances denied completely because patient needs to try or cannot used one of the following preferred drugs Fluphenazine tablet,haloperidol tablet,perphenazine tablet,quetiapine tablet or extended release,risperidone tablet ,ziprasidone capsule  Please review  Insurances respond is scan under this encounter  Thank you 
I have a steady place to live

## 2023-11-30 ENCOUNTER — SOCIAL WORK (OUTPATIENT)
Dept: PSYCHIATRY | Facility: CLINIC | Age: 38
End: 2023-11-30
Payer: COMMERCIAL

## 2023-11-30 DIAGNOSIS — F31.9 BIPOLAR DISORDER WITH DEPRESSION (HCC): ICD-10-CM

## 2023-11-30 DIAGNOSIS — F43.10 PTSD (POST-TRAUMATIC STRESS DISORDER): Primary | ICD-10-CM

## 2023-11-30 DIAGNOSIS — F11.20 OPIOID DEPENDENCE ON AGONIST THERAPY (HCC): ICD-10-CM

## 2023-11-30 PROCEDURE — 90837 PSYTX W PT 60 MINUTES: CPT | Performed by: COUNSELOR

## 2023-11-30 NOTE — PSYCH
Behavioral Health Psychotherapy Progress Note    Psychotherapy Provided: Individual Psychotherapy     1. PTSD (post-traumatic stress disorder)        2. Bipolar disorder with depression ([address])        3. Opioid dependence on agonist therapy ([address])            Goals addressed in session: Goal 1, Goal 2, and Goal 3      DATA: Geno arrived on time for her session. Geno requested assistance with understanding a form she had received to be completed for her pre-trial hearing. Therapist utilized this as an opportunity to [employer] in identifying her feelings and putting words to her feelings. Geno was able to explore her feelings in regards to this situation and stated she was now considering not opposing the request to modify this custody arrangement. During this review Geno was reviewing times when she fell short as a parent and therapist utilized this to process these feelings with Geno. Geno reports improved communication with her SO and states this is due to her practicing and implementing what she had learned in therapy. Geno was scheduled for her next session in one week. During this session, this clinician used the following therapeutic modalities: Cognitive Behavioral Therapy, Motivational Interviewing, Supportive Psychotherapy, and brief case management needs    Substance Abuse was addressed during this session. If the client is diagnosed with a co-occurring substance use disorder, please indicate any changes in the frequency or amount of use: Geno reports continued abstinence, reporting she will have 4 years in January 2024. Geno reports increased 12 step meeting attendance. Geno reports she received her 3 year coin at a meeting. Therapist utilized stage appropriate interventions to address this substance abuse issue.   Geno was praised for recognizing her achievement and therapist utilized this as an opportunity to explore Geno's use of this as a self esteem building tool. Therapist praised Geno Stage of change for addressing substance use diagnoses: Maintenance    ASSESSMENT:  Mari Goode presents with a Euthymic/ normal mood. Geno presents as displaying improvement in recognizing her emotional states and how to address these emotional states. Geno has been displaying improved self esteem as displayed by her willingness to celebrate her abstinence goal.     her affect is Normal range and intensity, which is congruent, with her mood and the content of the session. The client has made progress on their goals. Mari Goode presents with a none risk of suicide, none risk of self-harm, and none risk of harm to others. For any risk assessment that surpasses a "low" rating, a safety plan must be developed. A safety plan was indicated: no  If yes, describe in detail n/a    PLAN: Between sessions, Mari Goode will complete her pre-trial form and be mindful of her feelings during this completion. At the next session, the therapist will use Cognitive Behavioral Therapy, Motivational Interviewing, and Supportive Psychotherapy to address Geno's ongoing need to manage her feelings and maintain abstinence. Behavioral Health Treatment Plan and Discharge Planning: Mari Goode is aware of and agrees to continue to work on their treatment plan. They have identified and are working toward their discharge goals.  yes    Visit start and stop times:    11/30/23  Start Time: 1059  Stop Time: 1200  Total Visit Time: 61 minutes

## 2023-12-04 ENCOUNTER — DOCUMENTATION (OUTPATIENT)
Dept: PSYCHIATRY | Facility: CLINIC | Age: 38
End: 2023-12-04

## 2023-12-04 NOTE — PROGRESS NOTES
Geno Odonnellalfredo  12/04/2023       Visit type: In person    Recovery needs addressed during session: Emotional/Mental Health, Childcare, and Legal Status    Notes (DAP Format)  D: Patient presented for in-person, scheduled visit with this CM. Patient advised this CM of recently scheduled trial for custody agreement with paternal grandmother [facility] to which patient requested this CM to attend for support. This CM will request attendance but made patient aware this CM is unable to testify or speak on patient's behalf. Patient did request this CM to assist in completing a pretrial statement to which this CM suggested patient and this CM move back to weekly sessions    A: Patient has been attending support meetings on Tuesdays to which patient received a '3 year,'  coin and finds great support. Patient did not express and concern of SI/HI, no danger to self or others.      P: patient will follow up with this CM on 12/19 to work on pretrial statement     Referrals made during this visit no formal referrals made     Recovery connections: NA meetings, psychotherapy    Next appointment follow up one week

## 2023-12-05 LAB
6MAM UR QL CFM: NEGATIVE NG/ML
7AMINOCLONAZEPAM UR QL CFM: NORMAL NG/ML
A-OH ALPRAZ UR QL CFM: NEGATIVE NG/ML
ACCEPTABLE CREAT UR QL: NORMAL MG/DL
ACCEPTIBLE SP GR UR QL: NORMAL
AMPHET UR QL CFM: NEGATIVE NG/ML
BUPRENORPHINE UR QL CFM: NORMAL NG/ML
BUTALBITAL UR QL CFM: NEGATIVE NG/ML
BZE UR QL CFM: NEGATIVE NG/ML
CODEINE UR QL CFM: NEGATIVE NG/ML
EDDP UR QL CFM: NEGATIVE NG/ML
ETHYL GLUCURONIDE UR QL CFM: NEGATIVE NG/ML
ETHYL SULFATE UR QL SCN: NEGATIVE NG/ML
EUTYLONE UR QL: NEGATIVE NG/ML
FENTANYL UR QL CFM: NEGATIVE NG/ML
GLIADIN IGG SER IA-ACNC: NEGATIVE NG/ML
HYDROCODONE UR QL CFM: NEGATIVE NG/ML
HYDROMORPHONE UR QL CFM: NEGATIVE NG/ML
LORAZEPAM UR QL CFM: NEGATIVE NG/ML
ME-PHENIDATE UR QL CFM: NEGATIVE NG/ML
MEPERIDINE UR QL CFM: NEGATIVE NG/ML
METHADONE UR QL CFM: NEGATIVE NG/ML
METHAMPHET UR QL CFM: NEGATIVE NG/ML
MORPHINE UR QL CFM: NEGATIVE NG/ML
NALTREXONE UR QL CFM: NEGATIVE NG/ML
NITRITE UR QL: NORMAL UG/ML
NORBUPRENORPHINE UR QL CFM: NORMAL NG/ML
NORDIAZEPAM UR QL CFM: NEGATIVE NG/ML
NORFENTANYL UR QL CFM: NEGATIVE NG/ML
NORHYDROCODONE UR QL CFM: NEGATIVE NG/ML
NORMEPERIDINE UR QL CFM: NEGATIVE NG/ML
NOROXYCODONE UR QL CFM: NEGATIVE NG/ML
OXAZEPAM UR QL CFM: NEGATIVE NG/ML
OXYCODONE UR QL CFM: NEGATIVE NG/ML
OXYMORPHONE UR QL CFM: NEGATIVE NG/ML
PARA-FLUOROFENTANYL QUANTIFICATION: NORMAL NG/ML
PHENOBARB UR QL CFM: NEGATIVE NG/ML
RESULT ALL_PRESCRIBED MEDS AND SPECIAL INSTRUCTIONS: NORMAL
SECOBARBITAL UR QL CFM: NEGATIVE NG/ML
SL AMB 4-ANPP QUANTIFICATION: NORMAL NG/ML
SL AMB 5F-ADB-M7 METABOLITE QUANTIFICATION: NEGATIVE NG/ML
SL AMB 7-OH-MITRAGYNINE (KRATOM ALKALOID) QUANTIFICATION: NEGATIVE NG/ML
SL AMB AB-FUBINACA-M3 METABOLITE QUANTIFICATION: NEGATIVE NG/ML
SL AMB ACETYL FENTANYL QUANTIFICATION: NORMAL NG/ML
SL AMB ACETYL NORFENTANYL QUANTIFICATION: NORMAL NG/ML
SL AMB ACRYL FENTANYL QUANTIFICATION: NORMAL NG/ML
SL AMB CARFENTANIL QUANTIFICATION: NORMAL NG/ML
SL AMB CTHC (MARIJUANA METABOLITE) QUANTIFICATION: ABNORMAL NG/ML
SL AMB DEXTRORPHAN (DEXTROMETHORPHAN METABOLITE) QUANT: NEGATIVE NG/ML
SL AMB GABAPENTIN QUANTIFICATION: NORMAL
SL AMB JWH018 METABOLITE QUANTIFICATION: NEGATIVE NG/ML
SL AMB JWH073 METABOLITE QUANTIFICATION: NEGATIVE NG/ML
SL AMB MDMB-FUBINACA-M1 METABOLITE QUANTIFICATION: NEGATIVE NG/ML
SL AMB METHYLONE QUANTIFICATION: NEGATIVE NG/ML
SL AMB N-DESMETHYL-TRAMADOL QUANTIFICATION: NEGATIVE NG/ML
SL AMB PHENTERMINE QUANTIFICATION: NEGATIVE NG/ML
SL AMB PREGABALIN QUANTIFICATION: NEGATIVE
SL AMB RCS4 METABOLITE QUANTIFICATION: NEGATIVE NG/ML
SL AMB RITALINIC ACID QUANTIFICATION: NEGATIVE NG/ML
SMOOTH MUSCLE AB TITR SER IF: NEGATIVE NG/ML
SPECIMEN DRAWN SERPL: NEGATIVE NG/ML
SPECIMEN PH ACCEPTABLE UR: NORMAL
TAPENTADOL UR QL CFM: NEGATIVE NG/ML
TEMAZEPAM UR QL CFM: NEGATIVE NG/ML
TRAMADOL UR QL CFM: NEGATIVE NG/ML
URATE/CREAT 24H UR: NEGATIVE NG/ML

## 2023-12-07 ENCOUNTER — HOSPITAL ENCOUNTER (OUTPATIENT)
Dept: INFUSION CENTER | Facility: HOSPITAL | Age: 38
End: 2023-12-07
Payer: COMMERCIAL

## 2023-12-07 DIAGNOSIS — F31.81 BIPOLAR 2 DISORDER (HCC): ICD-10-CM

## 2023-12-07 DIAGNOSIS — F11.21 OPIOID USE DISORDER, MODERATE, IN EARLY REMISSION, ON MAINTENANCE THERAPY, DEPENDENCE (HCC): Primary | ICD-10-CM

## 2023-12-07 PROCEDURE — 96372 THER/PROPH/DIAG INJ SC/IM: CPT

## 2023-12-07 RX ADMIN — BUPRENORPHINE 100 MG: 100 SOLUTION SUBCUTANEOUS at 12:14

## 2023-12-07 NOTE — PROGRESS NOTES
Pt tolerated 100 mg Sublocade injection to L abd without difficulty. Next appt confirmed. AVS declined. Left ambulatory in stable condition.

## 2023-12-08 RX ORDER — TOPIRAMATE 100 MG/1
TABLET, FILM COATED ORAL
Qty: 90 TABLET | Refills: 3 | Status: SHIPPED | OUTPATIENT
Start: 2023-12-08

## 2023-12-13 ENCOUNTER — DOCUMENTATION (OUTPATIENT)
Dept: PSYCHIATRY | Facility: CLINIC | Age: 38
End: 2023-12-13

## 2023-12-13 ENCOUNTER — OFFICE VISIT (OUTPATIENT)
Dept: FAMILY MEDICINE CLINIC | Facility: CLINIC | Age: 38
End: 2023-12-13

## 2023-12-13 VITALS
HEIGHT: 64 IN | HEART RATE: 94 BPM | TEMPERATURE: 98.3 F | RESPIRATION RATE: 18 BRPM | WEIGHT: 195 LBS | OXYGEN SATURATION: 97 % | BODY MASS INDEX: 33.29 KG/M2 | SYSTOLIC BLOOD PRESSURE: 116 MMHG | DIASTOLIC BLOOD PRESSURE: 74 MMHG

## 2023-12-13 DIAGNOSIS — J06.9 UPPER RESPIRATORY TRACT INFECTION, UNSPECIFIED TYPE: Primary | ICD-10-CM

## 2023-12-13 PROCEDURE — 99213 OFFICE O/P EST LOW 20 MIN: CPT | Performed by: FAMILY MEDICINE
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
## 2023-12-14 ENCOUNTER — SOCIAL WORK (OUTPATIENT)
Dept: PSYCHIATRY | Facility: CLINIC | Age: 38
End: 2023-12-14

## 2023-12-14 DIAGNOSIS — F31.9 BIPOLAR DISORDER WITH DEPRESSION (HCC): Primary | ICD-10-CM

## 2023-12-14 NOTE — PSYCH
No Call. No Show. No Charge    Geno Courtney no showed 12/14/23 appointment , Geno presented yesterday with reports of cold like symptoms. Geno is scheduled for a session next week. Treatment Plan not due at this session.

## 2023-12-15 ENCOUNTER — OFFICE VISIT (OUTPATIENT)
Dept: FAMILY MEDICINE CLINIC | Facility: CLINIC | Age: 38
End: 2023-12-15

## 2023-12-15 VITALS
SYSTOLIC BLOOD PRESSURE: 102 MMHG | RESPIRATION RATE: 18 BRPM | HEART RATE: 101 BPM | DIASTOLIC BLOOD PRESSURE: 68 MMHG | OXYGEN SATURATION: 99 % | WEIGHT: 190.6 LBS | BODY MASS INDEX: 32.54 KG/M2 | TEMPERATURE: 98.1 F | HEIGHT: 64 IN

## 2023-12-15 DIAGNOSIS — Z76.89 ENCOUNTER FOR WEIGHT MANAGEMENT: Primary | ICD-10-CM

## 2023-12-15 DIAGNOSIS — E66.09 CLASS 1 OBESITY DUE TO EXCESS CALORIES WITHOUT SERIOUS COMORBIDITY WITH BODY MASS INDEX (BMI) OF 32.0 TO 32.9 IN ADULT: ICD-10-CM

## 2023-12-15 PROCEDURE — 99213 OFFICE O/P EST LOW 20 MIN: CPT | Performed by: STUDENT IN AN ORGANIZED HEALTH CARE EDUCATION/TRAINING PROGRAM

## 2023-12-15 NOTE — ASSESSMENT & PLAN NOTE
Been eating cereal a lot and eats 1 meal a day which causes her to eat a big meal  Completing circuit challenges at home.    -1 month follow up  - Lab work to be completed before considering adding phentermine to Topamax (Qsymia) to assist with weight loss.  Choosing this option since pt endorsed positive result with Qsymia in the past.
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
## 2023-12-18 ENCOUNTER — TELEPHONE (OUTPATIENT)
Dept: OBGYN CLINIC | Facility: CLINIC | Age: 38
End: 2023-12-18

## 2023-12-18 ENCOUNTER — DOCUMENTATION (OUTPATIENT)
Dept: PSYCHIATRY | Facility: CLINIC | Age: 38
End: 2023-12-18

## 2023-12-18 NOTE — PROGRESS NOTES
Patient and this CM completed pre-trial statement for upcoming court hearing 12/19/2023. Patient has requested this CM to attend hearing for support to which this CM will request permission from supervisors if schedule is open during schedule hearing.

## 2023-12-21 ENCOUNTER — SOCIAL WORK (OUTPATIENT)
Dept: PSYCHIATRY | Facility: CLINIC | Age: 38
End: 2023-12-21

## 2023-12-21 DIAGNOSIS — F41.1 GAD (GENERALIZED ANXIETY DISORDER): Primary | ICD-10-CM

## 2023-12-21 DIAGNOSIS — F31.81 BIPOLAR 2 DISORDER (HCC): Primary | ICD-10-CM

## 2023-12-21 DIAGNOSIS — J45.909 ASTHMA, UNSPECIFIED ASTHMA SEVERITY, UNSPECIFIED WHETHER COMPLICATED, UNSPECIFIED WHETHER PERSISTENT: ICD-10-CM

## 2023-12-21 RX ORDER — DULOXETIN HYDROCHLORIDE 30 MG/1
30 CAPSULE, DELAYED RELEASE ORAL DAILY
Qty: 30 CAPSULE | Refills: 4 | Status: SHIPPED | OUTPATIENT
Start: 2023-12-21

## 2023-12-21 NOTE — PSYCH
No Call. No Show. No Charge    Geno Courtney no showed 12/21/23 appointment , staff called and left message to reschedule appointment     Treatment Plan not due at this session.

## 2023-12-22 ENCOUNTER — TELEPHONE (OUTPATIENT)
Dept: PSYCHIATRY | Facility: CLINIC | Age: 38
End: 2023-12-22

## 2023-12-22 NOTE — TELEPHONE ENCOUNTER
----- Message from Chris Araujo sent at 12/22/2023  9:57 AM EST -----  Regarding: RE: patient transfer to mental health  Therapy from my end. I am uncertain what occurs when a person has maintained abstinence and is a patient of Dr. PARK  ----- Message -----  From: Savannah Tineo  Sent: 12/22/2023   9:22 AM EST  To: Chris Araujo  Subject: RE: patient transfer to mental health            Hi Chris!    Are we looking for medication management or therapy?    Thanks!  Savannah  ----- Message -----  From: Chris Araujo  Sent: 12/22/2023   8:58 AM EST  To: Madison Clark; [facility]  Subject: patient transfer to mental health                Patient current Patient of [facility] program but reports ongoing abstinence (+4 Years) requires transfer to [facility] outpatient

## 2023-12-26 RX ORDER — CYCLOBENZAPRINE HCL 10 MG
TABLET ORAL
Qty: 90 TABLET | Refills: 3 | Status: SHIPPED | OUTPATIENT
Start: 2023-12-26

## 2023-12-28 ENCOUNTER — SOCIAL WORK (OUTPATIENT)
Dept: PSYCHIATRY | Facility: CLINIC | Age: 38
End: 2023-12-28

## 2023-12-28 ENCOUNTER — TELEPHONE (OUTPATIENT)
Dept: BEHAVIORAL/MENTAL HEALTH CLINIC | Facility: CLINIC | Age: 38
End: 2023-12-28

## 2023-12-28 DIAGNOSIS — F31.9 BIPOLAR DISORDER WITH DEPRESSION (HCC): Primary | ICD-10-CM

## 2023-12-28 PROCEDURE — NOSHOW: Performed by: COUNSELOR

## 2023-12-28 NOTE — TELEPHONE ENCOUNTER
Attempted to call pt in regards to missed appt today 12/28 at 11am with Chris Araujo  No answer  LVM informing pt that her future appts will be cancelled per provider due to multiple no shows and cancellations.  Informed pt to call back office to reschedule her appt.  Provided [facility] office phone number in vm

## 2023-12-28 NOTE — PSYCH
No Call. No Show. No Charge    Geno Courtney no showed 12/28/23 appointment , staff called and left message to reschedule appointment     Treatment Plan not due at this session.

## 2024-01-04 ENCOUNTER — HOSPITAL ENCOUNTER (OUTPATIENT)
Dept: INFUSION CENTER | Facility: HOSPITAL | Age: 39
End: 2024-01-04
Attending: PSYCHIATRY & NEUROLOGY
Payer: COMMERCIAL

## 2024-01-04 DIAGNOSIS — F11.21 OPIOID USE DISORDER, MODERATE, IN EARLY REMISSION, ON MAINTENANCE THERAPY, DEPENDENCE (HCC): Primary | ICD-10-CM

## 2024-01-04 PROCEDURE — 96372 THER/PROPH/DIAG INJ SC/IM: CPT

## 2024-01-04 RX ADMIN — BUPRENORPHINE 100 MG: 100 SOLUTION SUBCUTANEOUS at 10:59

## 2024-01-04 NOTE — PROGRESS NOTES
Per Korina Choudhary Infusion Manager ok to release plan early.      Pt tolerated injection sub q on R side of abd w/o complication.      Next appt scheduled on 2/8/24 at 1030am.    Advised pt to make sure she has her next appt. scheduled with Dr. Payton - Per pt she believes she does have this appt scheduled and she will follow up to make sure.      AVS declined.

## 2024-01-04 NOTE — TELEPHONE ENCOUNTER
Patient's "elie" called back after patient was advised to go to the ED  He did not provide his name  He said that patient has been to the ED before with similar symptoms and they don't do anything to help her  I told him that I can't discuss patient's medical care with him due to HIPAA  I asked for permission from patient to speak with him  No one came back on the phone, after a few minutes, so I hung up 
Treatment Goal Explanation (Does Not Render In The Note): Stable for the purposes of categorizing medical decision making is defined by the specific treatment goals for an individual patient. A patient that is not at their treatment goal is not stable, even if the condition has not changed and there is no short- term threat to life or function.
Treatment Goal Met?: no

## 2024-01-15 ENCOUNTER — DOCUMENTATION (OUTPATIENT)
Dept: PSYCHIATRY | Facility: CLINIC | Age: 39
End: 2024-01-15

## 2024-01-15 NOTE — PROGRESS NOTES
[place]    Housing - [employer] referral  Tour apartment - [address]  License  Credit

## 2024-01-16 ENCOUNTER — TELEMEDICINE (OUTPATIENT)
Dept: PSYCHIATRY | Facility: CLINIC | Age: 39
End: 2024-01-16

## 2024-01-16 DIAGNOSIS — F31.4 BIPOLAR DISORDER, CURRENT EPISODE DEPRESSED, SEVERE, WITHOUT PSYCHOTIC FEATURES (HCC): ICD-10-CM

## 2024-01-16 DIAGNOSIS — F41.9 ANXIETY: ICD-10-CM

## 2024-01-16 RX ORDER — CLONAZEPAM 1 MG/1
1 TABLET ORAL 3 TIMES DAILY
Qty: 90 TABLET | Refills: 2 | Status: SHIPPED | OUTPATIENT
Start: 2024-02-02

## 2024-01-16 NOTE — PSYCH
SHARE Program    Progress Note  Geno Courtney 38 y.o. female MRN: [id number]   Encounter: [id number]        REQUIRED DOCUMENTATION:     1. This service was provided via Telemedicine.  2. Provider located at [facility]  3. TeleMed provider: Trell Payton MD.  4. Identify all parties in room with patient during tele consult:  Only pt  5.Patient was then informed that this was a Telemedicine visit and that the exam was being conducted confidentially over secure lines. My office door was closed. No one else was in the room.  Patient acknowledged consent and understanding of privacy and security of the Telemedicine visit, and gave us permission to have the assistant stay in the room in order to assist with the history and to conduct the exam.  I informed the patient that I have reviewed their record in Epic and presented the opportunity for them to ask any questions regarding the visit today.  The patient agreed to participate.     Telemedicine consent    Patient: Geno Courtney  Provider: Trell Payton MD  Provider located at [facility] [address] [address] [phone]    The patient was identified by name and date of birth. Geno Courtney was informed that this is a telemedicine visit and that the visit is being conducted through the Epic Embedded platform. She agrees to proceed..  My office door was closed. No one else was in the room.  She acknowledged consent and understanding of privacy and security of the video platform. The patient has agreed to participate and understands they can discontinue the visit at any time.    Patient is aware this is a billable service.     I spent 30 minutes with the patient during this visit.        Visit Time    Visit Start Time: 9:30AM  Visit Stop Time: 10AM  Total Visit Duration: 30 min    SUBJECTIVE: I am doing better. Vraylar 3 mg makes me jittery.  Sublocade helps with my cravings. I am 4 years in my recovery.       INTERVAL HISTORY: Pt felt better despite waiting for a court hearing for her son in February, and ongoing stress associated with her grandmother. Pt is lookinig forward to meet with her new therapist at [facility].  Denied concerns for safety. Did not endorse paranoid delusions, but admitted suspiciousness toward people.  She felt less anxious, clonazepam was helpful. Pt takes it as proscribed.  The patient no longer worries about weight gain telling that she participates in weight management clinic.  The writer provided brief supportive therapy.    Review Of Systems:    sleep changes: no change  appetite changes: no change  energy/anergy: increased    Support Services  The patient is actively engaged with the [facility] Certified Addiction Counselor’s psychotherapy plan: Yes        PDMP reviewed:     PDMP Review         Value Time User    PDMP Reviewed  Yes 1/16/2024  8:41 AM Trell Payton MD              Drug cravings: none, Sublocade works  Motivation to continue treatment: high      Current Outpatient Medications:     cariprazine (VRAYLAR) 1.5 MG capsule, Take 2 capsules (3 mg total) by mouth daily, Disp: 30 capsule, Rfl: 5    [START ON 2/2/2024] clonazePAM (KlonoPIN) 1 mg tablet, Take 1 tablet (1 mg total) by mouth 3 (three) times a day Do not start before February 2, 2024., Disp: 90 tablet, Rfl: 2    albuterol (PROVENTIL HFA,VENTOLIN HFA) 90 mcg/act inhaler, Inhale 2 puffs every 4 (four) hours as needed for wheezing, Disp: 18 g, Rfl: 5    atomoxetine (STRATTERA) 80 MG capsule, Take 1 capsule (80 mg total) by mouth daily, Disp: 30 capsule, Rfl: 4    Buprenorphine ER (Sublocade) 100 MG/0.5ML, Inject 100 mg under the skin every 30 (thirty) days, Disp: , Rfl:     buPROPion (Wellbutrin XL) 150 mg 24 hr tablet, Take 1 tablet (150 mg total) by mouth daily, Disp: 30 tablet, Rfl: 2    cetirizine (ZyrTEC) 10 mg tablet, take 1 tablet by  mouth once daily, Disp: 30 tablet, Rfl: 0    cholecalciferol (VITAMIN D3) 1,000 units tablet, take 1 tablet by mouth once daily (Patient not taking: Reported on 11/22/2023), Disp: 30 tablet, Rfl: 0    cyclobenzaprine (FLEXERIL) 10 mg tablet, take 1 tablet by mouth three times a day if needed for muscle spasm, Disp: 90 tablet, Rfl: 3    dextromethorphan-guaifenesin (MUCINEX DM)  MG per 12 hr tablet, Take 1 tablet by mouth every 12 (twelve) hours, Disp: 20 tablet, Rfl: 0    DULoxetine (Cymbalta) 30 mg delayed release capsule, Take 1 capsule (30 mg total) by mouth daily, Disp: 30 capsule, Rfl: 4    fluconazole (DIFLUCAN) 150 mg tablet, take 1 tablet by mouth ONCE FOR 1 DOSE ONCE YOU COMPLETED FLAGYL AND DOXYCYCLINE COURSES, Disp: , Rfl:     fluticasone (FLONASE) 50 mcg/act nasal spray, 1 spray into each nostril daily, Disp: 16 g, Rfl: 2    gabapentin (NEURONTIN) 100 mg capsule, Take 1 capsule (100 mg total) by mouth 3 (three) times a day, Disp: 90 capsule, Rfl: 4    hydrOXYzine HCL (ATARAX) 10 mg tablet, Take 1 tablet (10 mg total) by mouth daily at bedtime as needed for anxiety, Disp: 30 tablet, Rfl: 3    ibuprofen (MOTRIN) 800 mg tablet, Take 1 tablet (800 mg total) by mouth every 8 (eight) hours as needed for mild pain (Patient not taking: Reported on 6/29/2023), Disp: 90 tablet, Rfl: 3    levothyroxine 50 mcg tablet, take 1 tablet by mouth once daily, Disp: 30 tablet, Rfl: 5    lidocaine (LIDODERM) 5 %, apply 1 patch TO THE AFFECTED AREA DAILY. LEAVE ON FOR 12 HOURS AND THEN OFF FOR 12 HOURS. (Patient not taking: Reported on 11/22/2023), Disp: , Rfl:     NARCAN 4 MG/0.1ML LIQD, ADMINISTER A SINGLE spray INTO ONE NOSTRIL CALL 911 MAY REPEAT ONCE (Patient not taking: No sig reported), Disp: , Rfl: 0    omeprazole (PriLOSEC) 20 mg delayed release capsule, take 1 capsule by mouth once daily (Patient not taking: Reported on 11/22/2023), Disp: 90 capsule, Rfl: 3    topiramate (TOPAMAX) 100 mg tablet, take 1 tablet  by mouth twice a day, Disp: 90 tablet, Rfl: 3    Objective     There were no vitals filed for this visit.        Mental Status Evaluation: The patient was not in acute distress, with some restriction of affect, normal mood, normal pattern of thoughts, did not endorse paranoid delusions, limited but improved insight and judgment.  No longer worried about weight gain.     Lab Results:                                Diagnoses and all orders for this visit:    Bipolar disorder, current episode depressed, severe, without psychotic features (HCC)  -     cariprazine (VRAYLAR) 1.5 MG capsule; Take 2 capsules (3 mg total) by mouth daily  -     clonazePAM (KlonoPIN) 1 mg tablet; Take 1 tablet (1 mg total) by mouth 3 (three) times a day Do not start before February 2, 2024.    Anxiety  -     clonazePAM (KlonoPIN) 1 mg tablet; Take 1 tablet (1 mg total) by mouth 3 (three) times a day Do not start before February 2, 2024.               Risks / Benefits of Treatment:    Risks, benefits, and possible side effects of medications explained to patient and patient verbalizes understanding and agreement for treatment.      This note was not shared with the patient due to this is a psychotherapy note     Counseling / Coordination of Care  Total time spent today 30 minutes. Greater than 50% of total time was spent with the patient and / or family counseling and / or coordination of care.     Trell Payton MD

## 2024-01-18 ENCOUNTER — OFFICE VISIT (OUTPATIENT)
Dept: FAMILY MEDICINE CLINIC | Facility: CLINIC | Age: 39
End: 2024-01-18

## 2024-01-18 VITALS
RESPIRATION RATE: 18 BRPM | SYSTOLIC BLOOD PRESSURE: 122 MMHG | HEIGHT: 64 IN | WEIGHT: 190 LBS | OXYGEN SATURATION: 98 % | TEMPERATURE: 97.9 F | HEART RATE: 104 BPM | BODY MASS INDEX: 32.44 KG/M2 | DIASTOLIC BLOOD PRESSURE: 84 MMHG

## 2024-01-18 DIAGNOSIS — Z76.89 ENCOUNTER FOR WEIGHT MANAGEMENT: Primary | ICD-10-CM

## 2024-01-18 DIAGNOSIS — Z32.00 POSSIBLE PREGNANCY: ICD-10-CM

## 2024-01-18 LAB — SL AMB POCT URINE HCG: NEGATIVE

## 2024-01-18 PROCEDURE — 81025 URINE PREGNANCY TEST: CPT | Performed by: STUDENT IN AN ORGANIZED HEALTH CARE EDUCATION/TRAINING PROGRAM

## 2024-01-18 PROCEDURE — 99213 OFFICE O/P EST LOW 20 MIN: CPT | Performed by: STUDENT IN AN ORGANIZED HEALTH CARE EDUCATION/TRAINING PROGRAM
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
## 2024-01-19 ENCOUNTER — TELEPHONE (OUTPATIENT)
Dept: PSYCHIATRY | Facility: CLINIC | Age: 39
End: 2024-01-19

## 2024-01-19 DIAGNOSIS — F31.4 BIPOLAR DISORDER, CURRENT EPISODE DEPRESSED, SEVERE, WITHOUT PSYCHOTIC FEATURES (HCC): ICD-10-CM

## 2024-01-19 NOTE — ASSESSMENT & PLAN NOTE
Doing quite well with current diet and exercise. Though pt unhappy with current weight loss, I reinforced that this is a significant improvement and I congratulated her on her ongoing effort and decrease in waist circumference.     - return in one month for follow up  - would like to move forward with phentermine addition to current topamax. Will complete prelim lab work before initiating medication.

## 2024-01-22 NOTE — TELEPHONE ENCOUNTER
Script updated to 1 capsule daily instead of 2.  Per pharmacist, no Prior Authorization is required.

## 2024-01-23 DIAGNOSIS — Z72.0 SMOKING TRYING TO QUIT: ICD-10-CM

## 2024-01-23 DIAGNOSIS — F32.A DEPRESSION, UNSPECIFIED DEPRESSION TYPE: ICD-10-CM

## 2024-01-23 RX ORDER — BUPROPION HYDROCHLORIDE 150 MG/1
150 TABLET ORAL DAILY
Qty: 30 TABLET | Refills: 2 | Status: SHIPPED | OUTPATIENT
Start: 2024-01-23
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
## 2024-01-29 ENCOUNTER — DOCUMENTATION (OUTPATIENT)
Dept: PSYCHIATRY | Facility: CLINIC | Age: 39
End: 2024-01-29
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
## 2024-02-05 ENCOUNTER — SOCIAL WORK (OUTPATIENT)
Dept: BEHAVIORAL/MENTAL HEALTH CLINIC | Facility: CLINIC | Age: 39
End: 2024-02-05
Payer: COMMERCIAL

## 2024-02-05 ENCOUNTER — LAB (OUTPATIENT)
Dept: LAB | Facility: CLINIC | Age: 39
End: 2024-02-05
Payer: COMMERCIAL

## 2024-02-05 DIAGNOSIS — Z76.89 ENCOUNTER FOR WEIGHT MANAGEMENT: ICD-10-CM

## 2024-02-05 DIAGNOSIS — F31.9 BIPOLAR DISORDER WITH DEPRESSION (HCC): Primary | ICD-10-CM

## 2024-02-05 DIAGNOSIS — E66.09 CLASS 1 OBESITY DUE TO EXCESS CALORIES WITHOUT SERIOUS COMORBIDITY WITH BODY MASS INDEX (BMI) OF 32.0 TO 32.9 IN ADULT: ICD-10-CM

## 2024-02-05 LAB
CHOLEST SERPL-MCNC: 178 MG/DL
HDLC SERPL-MCNC: 38 MG/DL
LDLC SERPL CALC-MCNC: 122 MG/DL (ref 0–100)
NONHDLC SERPL-MCNC: 140 MG/DL
TRIGL SERPL-MCNC: 88 MG/DL
TSH SERPL DL<=0.05 MIU/L-ACNC: 1.84 UIU/ML (ref 0.45–4.5)

## 2024-02-05 PROCEDURE — 83036 HEMOGLOBIN GLYCOSYLATED A1C: CPT

## 2024-02-05 PROCEDURE — 90791 PSYCH DIAGNOSTIC EVALUATION: CPT | Performed by: COUNSELOR

## 2024-02-05 PROCEDURE — 84443 ASSAY THYROID STIM HORMONE: CPT

## 2024-02-05 PROCEDURE — 80061 LIPID PANEL: CPT

## 2024-02-05 PROCEDURE — 36415 COLL VENOUS BLD VENIPUNCTURE: CPT
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
## 2024-02-06 LAB
EST. AVERAGE GLUCOSE BLD GHB EST-MCNC: 100 MG/DL
HBA1C MFR BLD: 5.1 %

## 2024-02-07 NOTE — RESULT ENCOUNTER NOTE
Thyroid hormone normal. A1c normal, meaning no diabetes or prediabetes.   Low HDL and slightly elevated LDL but improvement from 4 months ago. Continue lifestyle modifications and follow-up with the weight management team.

## 2024-02-08 ENCOUNTER — HOSPITAL ENCOUNTER (OUTPATIENT)
Dept: INFUSION CENTER | Facility: HOSPITAL | Age: 39
End: 2024-02-08
Attending: PSYCHIATRY & NEUROLOGY
Payer: COMMERCIAL

## 2024-02-08 DIAGNOSIS — F11.21 OPIOID USE DISORDER, MODERATE, IN EARLY REMISSION, ON MAINTENANCE THERAPY, DEPENDENCE (HCC): Primary | ICD-10-CM

## 2024-02-08 PROCEDURE — 96372 THER/PROPH/DIAG INJ SC/IM: CPT

## 2024-02-08 RX ADMIN — BUPRENORPHINE 100 MG: 100 SOLUTION SUBCUTANEOUS at 10:57

## 2024-02-08 NOTE — PROGRESS NOTES
Pt tolerated Sublocade SQ in the Left abd today with no adverse reactions. Next apt 3/7 @ 11:00, Declined AVS.  Left unit ambulatory with a steady gait.  
English

## 2024-02-19 ENCOUNTER — TELEMEDICINE (OUTPATIENT)
Dept: BEHAVIORAL/MENTAL HEALTH CLINIC | Facility: CLINIC | Age: 39
End: 2024-02-19
Payer: COMMERCIAL

## 2024-02-19 DIAGNOSIS — F31.9 BIPOLAR DISORDER WITH DEPRESSION (HCC): Primary | ICD-10-CM

## 2024-02-19 PROCEDURE — 90834 PSYTX W PT 45 MINUTES: CPT | Performed by: COUNSELOR
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
## 2024-02-19 NOTE — BH CRISIS PLAN
Client Name: Geno Courtney       Client YOB: 1985    [facility]      Creation Date: 1/16/24 Update Date: 2/19/24   Created By: Trell Payton MD Last Updated By: Keenan Vasquez LPC      Step 1: Warning Signs:   Warning Signs   Cant sit down   Cant breath right - Feels like a brick on my chest   Crying   Get sucked into drama   Isolation            Step 2: Internal Coping Strategies:   Internal Coping Strategies   talking out stress and yoga   Breathing   Meditation   Exercising            Step 3: People and social settings that provide distraction:   Name Contact Information    at [facility] [phone]   Sister    Dad     Places   [facility] Office [address]   [facility] 603           Step 4: People whom I can ask for help during a crisis:      Name Contact Information    Marlyn at office [phone]     Fiyann - Myron       Step 5: Professionals or agencies I can contact during a crisis:      Clinican/Agency Name Phone Emergency Contact    [address]      Keenan Vasquez - Psychotherapist [facility] [phone]       Local Emergency Department Emergency Department Phone Emergency Department Address    [facility] Emergency Room [phone] [address] [place] [id number]        Crisis Phone Numbers:   Suicide Prevention Lifeline: Call or Text  503 Crisis Text Line: Text HOME to 918-450   Please note: Some [facility] do not have a separate number for Child/Adolescent specific crisis. If your county is not listed under Child/Adolescent, please call the adult number for your county      Adult Crisis Numbers: Child/Adolescent Crisis Numbers   [facility]: [phone] [facility]: [phone]   [facility]: [phone] [facility]: [phone]   [facility]: [phone] [place]: [phone]   [facility]: [phone] [place]: [phone]   [place] [facility]:  "[phone]   [facility]: [phone]   [facility]: [phone]   [facility]: [phone] (daytime) [phone] (after hours, weekends, holidays)      Step 6: Making the environment safer (plan for lethal means safety):   Patient did not identify any lethal methods: Yes     Optional: What is most important to me and worth living for?   Geno states, \"My family is most important to me.\"     [facility] Safety Plan. Dionne Menjivar and Nicholas Crocker. Used with permission of the authors.           "

## 2024-02-19 NOTE — BH TREATMENT PLAN
"Outpatient Behavioral Health Psychotherapy Treatment Plan    Geno Courtney  1985     Date of Initial Psychotherapy Assessment: 7/19/2022  Date of Current Treatment Plan: 02/19/24  Treatment Plan Target Date: 8/19/2024  Treatment Plan Expiration Date: TBD    Diagnosis:   1. Bipolar disorder with depression (HCC)            Area(s) of Need: anxiety, opioid use disorder, depression    Long Term Goal 1 (in the client's own words): \"take energy from focusing on issues related to my son's grandparent\" \" I need to stop worrying about what she ( son's grandparent) thinks about me\"    Stage of Change: Action    Target Date for completion: 8/19/2024     Anticipated therapeutic modalities: Engagement Strategies, Client-centered Therapy, Cognitive Behavioral Therapy, Cognitive Processing Therapy, Mindfulness-based Strategies, Motivational Interviewing, Solution-Focused Therapy, Supportive Psychotherapy, and Psycho-Education     People identified to complete this goal: Geno Courtney and Psychotherapist - Keenan Vasquez, MS, LPC, NCC, CCTP      Objective 1: (identify the means of measuring success in meeting the objective): I will stop allowing what my son's grandparent  make me feel worse about myself      Objective 2: (identify the means of measuring success in meeting the objective): I will learn how to communicate with her without feeling bad about it.      Long Term Goal 2 (in the client's own words): I want to be able to recognize my successes and I don't want to feel bad or weird about it.    Stage of Change: Action    Target Date for completion: 8/19/2024     Anticipated therapeutic modalities: Engagement Strategies, Client-centered Therapy, Cognitive Behavioral Therapy, Cognitive Processing Therapy, Mindfulness-based Strategies, Motivational Interviewing, Solution-Focused Therapy, Supportive Psychotherapy, and Psycho-Education     People identified to complete this goal: Geno Courtney and Psychotherapist " - Keenan Vasquez, MS, LPC, NCC, CCTP      Objective 1: (identify the means of measuring success in meeting the objective): I would be able to feel successful and tell others about my acheivements      Objective 2: (identify the means of measuring success in meeting the objective): I will find a support network that helps me celebrate my successes       I am currently under the care of a [facility] psychiatric provider: yes    My [facility] psychiatric provider is: Dr. Payton    I am currently taking psychiatric medications: Yes, as prescribed    I feel that I will be ready for discharge from mental health care when I reach the following (measurable goal/objective): increased self esteem and I feel better.    For children and adults who have a legal guardian:   Has there been any change to custody orders and/or guardianship status? NA. If yes, attach updated documentation.    I have updated my Crisis Plan and have been offered a copy of this plan    Behavioral Health Treatment Plan [facility]: Diagnosis and Treatment Plan explained to Geno Courtney acknowledges an understanding of their diagnosis. Geno Courtney does not agree to this treatment plan.    I have been offered a copy of this Treatment Plan. yes

## 2024-02-20 ENCOUNTER — TELEPHONE (OUTPATIENT)
Dept: PSYCHIATRY | Facility: CLINIC | Age: 39
End: 2024-02-20

## 2024-02-20 NOTE — TELEPHONE ENCOUNTER
Manager attempted to contact patient to apologized for the inconvenience it may cause, appointment cancellation on 02/19 with provider, and to ensure that patient has enough medications and no concerns prior to the next visit scheduled with provider. LVM

## 2024-02-22 ENCOUNTER — TELEPHONE (OUTPATIENT)
Dept: BEHAVIORAL/MENTAL HEALTH CLINIC | Facility: CLINIC | Age: 39
End: 2024-02-22

## 2024-02-27 ENCOUNTER — DOCUMENTATION (OUTPATIENT)
Dept: PSYCHIATRY | Facility: CLINIC | Age: 39
End: 2024-02-27

## 2024-02-27 NOTE — PROGRESS NOTES
CRS left from [facility] - Transfer to [facility]. Did send over paper needed for patient to continue services with [facility] via fax.  Part-time work, minimal hours. Patient did dsicuss the want for taking up free time with a part time job. Will work with CRS to obtain the goal  Court hearing in two days - did not obtain advocate. May potentially request a continuance

## 2024-03-04 ENCOUNTER — TELEPHONE (OUTPATIENT)
Dept: FAMILY MEDICINE CLINIC | Facility: CLINIC | Age: 39
End: 2024-03-04

## 2024-03-04 ENCOUNTER — OFFICE VISIT (OUTPATIENT)
Dept: FAMILY MEDICINE CLINIC | Facility: CLINIC | Age: 39
End: 2024-03-04

## 2024-03-04 VITALS
HEART RATE: 86 BPM | OXYGEN SATURATION: 99 % | WEIGHT: 194.4 LBS | HEIGHT: 64 IN | BODY MASS INDEX: 33.19 KG/M2 | DIASTOLIC BLOOD PRESSURE: 86 MMHG | TEMPERATURE: 98 F | SYSTOLIC BLOOD PRESSURE: 122 MMHG

## 2024-03-04 DIAGNOSIS — Z76.89 ENCOUNTER FOR WEIGHT MANAGEMENT: Primary | ICD-10-CM

## 2024-03-04 DIAGNOSIS — E66.9 CLASS 1 OBESITY WITH BODY MASS INDEX (BMI) OF 33.0 TO 33.9 IN ADULT, UNSPECIFIED OBESITY TYPE, UNSPECIFIED WHETHER SERIOUS COMORBIDITY PRESENT: ICD-10-CM

## 2024-03-04 LAB — SL AMB POCT URINE HCG: NEGATIVE

## 2024-03-04 PROCEDURE — 99213 OFFICE O/P EST LOW 20 MIN: CPT | Performed by: FAMILY MEDICINE

## 2024-03-04 PROCEDURE — 81025 URINE PREGNANCY TEST: CPT | Performed by: FAMILY MEDICINE

## 2024-03-04 RX ORDER — ORLISTAT 120 MG/1
120 CAPSULE ORAL
Qty: 90 CAPSULE | Refills: 0 | Status: SHIPPED | OUTPATIENT
Start: 2024-03-04 | End: 2024-04-03
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
## 2024-03-04 NOTE — TELEPHONE ENCOUNTER
Discussed the benefits and risks of phentermine addition to pt medication regimen with provider today - stressed to rule out risk factors such as drug abuse hx, uncontrolled htn, hx of cardiac events, etc - discussed data for short duration only     (Pt has failed bupropion +/I naltrexone, and also topirimate)    Would also consider wegovy with a prior auth.     Reviewed mechanism of action, adverse effects, benefits, risks, administration, frequency, and likely duration of therapy of phentermine with provider. Discussed potential dosing if the medication is to be started (15 mg daily is typical)    Pharmacist Tracking Tool  Reason For Outreach: Embedded Pharmacist  Demographics:  Intervention Method: In Person  Type of Intervention: Follow-Up  Topics Addressed: Obesity  Pharmacologic Interventions: Medication Initiation  Non-Pharmacologic Interventions: Care coordination, Chart update, Cost, Disease state education, Medication Monitoring, and Medication/Device education  Time:  Direct Patient Care:  0  mins  Care Coordination:  10  mins  Recommendation Recipient: Provider  Outcome: Accepted

## 2024-03-05 ENCOUNTER — DOCUMENTATION (OUTPATIENT)
Dept: PSYCHIATRY | Facility: CLINIC | Age: 39
End: 2024-03-05

## 2024-03-05 NOTE — ASSESSMENT & PLAN NOTE
Recent lab work reviewed. Pt a poor candidate for phentermine therapy given previous opioid hx therefore will instead begin orlistat 120mg TID with meals. Follow up in one month to evaluate tolerance to medication. Can consider Contrave if orlistat poorly tolerated.    Urine HCG negative today.
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
## 2024-03-07 ENCOUNTER — HOSPITAL ENCOUNTER (OUTPATIENT)
Dept: INFUSION CENTER | Facility: HOSPITAL | Age: 39
End: 2024-03-07
Attending: PSYCHIATRY & NEUROLOGY
Payer: COMMERCIAL

## 2024-03-07 DIAGNOSIS — F11.21 OPIOID USE DISORDER, MODERATE, IN EARLY REMISSION, ON MAINTENANCE THERAPY, DEPENDENCE (HCC): Primary | ICD-10-CM

## 2024-03-07 PROCEDURE — 96372 THER/PROPH/DIAG INJ SC/IM: CPT

## 2024-03-07 RX ADMIN — BUPRENORPHINE 100 MG: 100 SOLUTION SUBCUTANEOUS at 11:03

## 2024-03-12 DIAGNOSIS — F90.8 ATTENTION DEFICIT HYPERACTIVITY DISORDER (ADHD), OTHER TYPE: ICD-10-CM

## 2024-03-12 RX ORDER — ATOMOXETINE 80 MG/1
80 CAPSULE ORAL DAILY
Qty: 30 CAPSULE | Refills: 4 | Status: SHIPPED | OUTPATIENT
Start: 2024-03-12

## 2024-03-14 DIAGNOSIS — F43.10 PTSD (POST-TRAUMATIC STRESS DISORDER): ICD-10-CM

## 2024-03-14 RX ORDER — LEVOTHYROXINE SODIUM 0.05 MG/1
TABLET ORAL
Qty: 30 TABLET | Refills: 5 | Status: SHIPPED | OUTPATIENT
Start: 2024-03-14

## 2024-03-18 ENCOUNTER — TELEPHONE (OUTPATIENT)
Dept: PSYCHIATRY | Facility: CLINIC | Age: 39
End: 2024-03-18

## 2024-03-27 ENCOUNTER — TELEPHONE (OUTPATIENT)
Dept: BEHAVIORAL/MENTAL HEALTH CLINIC | Facility: CLINIC | Age: 39
End: 2024-03-27

## 2024-03-27 ENCOUNTER — HOSPITAL ENCOUNTER (EMERGENCY)
Facility: HOSPITAL | Age: 39
Discharge: HOME | End: 2024-03-27
Attending: INTERNAL MEDICINE
Payer: COMMERCIAL

## 2024-03-27 VITALS
DIASTOLIC BLOOD PRESSURE: 86 MMHG | RESPIRATION RATE: 20 BRPM | OXYGEN SATURATION: 99 % | HEART RATE: 90 BPM | SYSTOLIC BLOOD PRESSURE: 119 MMHG | TEMPERATURE: 98 F

## 2024-03-27 DIAGNOSIS — N76.0 BACTERIAL VAGINOSIS: Primary | ICD-10-CM

## 2024-03-27 DIAGNOSIS — B96.89 BACTERIAL VAGINOSIS: Primary | ICD-10-CM

## 2024-03-27 LAB
EXT PREGNANCY TEST URINE: NEGATIVE
EXT. CONTROL: NORMAL

## 2024-03-27 PROCEDURE — 99283 EMERGENCY DEPT VISIT LOW MDM: CPT

## 2024-03-27 PROCEDURE — 87491 CHLMYD TRACH DNA AMP PROBE: CPT

## 2024-03-27 PROCEDURE — 99284 EMERGENCY DEPT VISIT MOD MDM: CPT

## 2024-03-27 PROCEDURE — 87591 N.GONORRHOEAE DNA AMP PROB: CPT

## 2024-03-27 PROCEDURE — 81514 NFCT DS BV&VAGINITIS DNA ALG: CPT

## 2024-03-27 PROCEDURE — 81025 URINE PREGNANCY TEST: CPT

## 2024-03-27 RX ORDER — METRONIDAZOLE 500 MG/1
500 TABLET ORAL EVERY 12 HOURS SCHEDULED
Qty: 14 TABLET | Refills: 0 | Status: SHIPPED | OUTPATIENT
Start: 2024-03-27 | End: 2024-04-03

## 2024-03-27 NOTE — TELEPHONE ENCOUNTER
Clinician made a call to follow up with patient in regards to scheduling. Clinician left a message and letter will be sent to her home to confirm her attending the next session.
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
## 2024-03-27 NOTE — DISCHARGE INSTRUCTIONS
Your STI testing will take a few days to result, we will call you with any positive results and they should be available on [facility].    Take metronidazole (Flagyl) antibiotic 2 times per day for 7 days. Do not drink alcohol while taking this and for the three days after finishing.

## 2024-03-28 LAB
C GLABRATA DNA VAG QL NAA+PROBE: NEGATIVE
C KRUSEI DNA VAG QL NAA+PROBE: NEGATIVE
C TRACH DNA SPEC QL NAA+PROBE: NEGATIVE
CANDIDA SP 6 PNL VAG NAA+PROBE: NEGATIVE
N GONORRHOEA DNA SPEC QL NAA+PROBE: NEGATIVE
T VAGINALIS DNA VAG QL NAA+PROBE: NEGATIVE
VAGINOSIS/ITIS DNA PNL VAG PROBE+SIG AMP: POSITIVE

## 2024-04-01 ENCOUNTER — OFFICE VISIT (OUTPATIENT)
Dept: FAMILY MEDICINE CLINIC | Facility: CLINIC | Age: 39
End: 2024-04-01

## 2024-04-01 ENCOUNTER — SOCIAL WORK (OUTPATIENT)
Dept: BEHAVIORAL/MENTAL HEALTH CLINIC | Facility: CLINIC | Age: 39
End: 2024-04-01

## 2024-04-01 VITALS
WEIGHT: 185 LBS | HEART RATE: 101 BPM | BODY MASS INDEX: 31.58 KG/M2 | SYSTOLIC BLOOD PRESSURE: 125 MMHG | TEMPERATURE: 97.3 F | DIASTOLIC BLOOD PRESSURE: 77 MMHG | HEIGHT: 64 IN | RESPIRATION RATE: 18 BRPM | OXYGEN SATURATION: 98 %

## 2024-04-01 DIAGNOSIS — F31.9 BIPOLAR DISORDER WITH DEPRESSION (HCC): Primary | ICD-10-CM

## 2024-04-01 DIAGNOSIS — Z76.89 ENCOUNTER FOR WEIGHT MANAGEMENT: Primary | ICD-10-CM

## 2024-04-01 DIAGNOSIS — E66.9 CLASS 1 OBESITY WITH BODY MASS INDEX (BMI) OF 33.0 TO 33.9 IN ADULT, UNSPECIFIED OBESITY TYPE, UNSPECIFIED WHETHER SERIOUS COMORBIDITY PRESENT: ICD-10-CM

## 2024-04-01 DIAGNOSIS — F41.9 ANXIETY: ICD-10-CM

## 2024-04-01 PROCEDURE — G9920 SCRNING PERF AND NEGATIVE: HCPCS | Performed by: FAMILY MEDICINE

## 2024-04-01 PROCEDURE — 99212 OFFICE O/P EST SF 10 MIN: CPT | Performed by: FAMILY MEDICINE

## 2024-04-01 RX ORDER — ORLISTAT 120 MG/1
120 CAPSULE ORAL
Qty: 90 CAPSULE | Refills: 0 | Status: SHIPPED | OUTPATIENT
Start: 2024-04-01 | End: 2024-05-01

## 2024-04-01 NOTE — ASSESSMENT & PLAN NOTE
BMI Counseling: Body mass index is 31.76 kg/m². The BMI is above normal. Nutrition recommendations include decreasing portion sizes, encouraging healthy choices of fruits and vegetables, decreasing fast food intake, consuming healthier snacks and limiting drinks that contain sugar. Exercise recommendations include moderate physical activity 150 minutes/week, vigorous physical activity 75 minutes/week and exercising 3-5 times per week. Pharmacotherapy was ordered to help aid in weight loss. Patient referred to nutritionist and PCP. Rationale for BMI follow-up plan is due to patient being overweight or obese.

## 2024-04-01 NOTE — ASSESSMENT & PLAN NOTE
- Follow up visit for weight management.   - Recently started on Oristat 120 mg Tid with meals but not started due to Insurance not covering. Awaiting prior Auth   - Reviewed recent labs   - Patient is a poor candidate for phentermine therapy given previous opioid hx therefore will instead begin orlistat 120mg TID with meals. Follow up in one month to evaluate tolerance to medication. Can consider Contrave if orlistat poorly tolerated.   - Recheck HCG prior to starting  - follow up with PCP

## 2024-04-01 NOTE — PROGRESS NOTES
Name: Geno Courtney      : 1985      MRN: [id number]  Encounter Provider: Lucian Neal MD  Encounter Date: 2024   Encounter department: [facility]    Assessment & Plan     1. Encounter for weight management  Assessment & Plan:  - Follow up visit for weight management.   - Recently started on Oristat 120 mg Tid with meals but not started due to Insurance not covering. Awaiting prior Auth   - Reviewed recent labs   - Patient is a poor candidate for phentermine therapy given previous opioid hx therefore will instead begin orlistat 120mg TID with meals. Follow up in one month to evaluate tolerance to medication. Can consider Contrave if orlistat poorly tolerated.   - Recheck HCG prior to starting  - follow up with PCP         Orders:  -     orlistat (XENICAL) 120 MG capsule; Take 1 capsule (120 mg total) by mouth 3 (three) times a day with meals  -     Pregnancy Test (HCG Qualitative); Future    2. Class 1 obesity with body mass index (BMI) of 33.0 to 33.9 in adult, unspecified obesity type, unspecified whether serious comorbidity present  -     orlistat (XENICAL) 120 MG capsule; Take 1 capsule (120 mg total) by mouth 3 (three) times a day with meals      BMI Counseling: Body mass index is 31.76 kg/m². The BMI is above normal. Nutrition recommendations include decreasing portion sizes, encouraging healthy choices of fruits and vegetables, decreasing fast food intake, consuming healthier snacks and limiting drinks that contain sugar. Exercise recommendations include moderate physical activity 150 minutes/week, vigorous physical activity 75 minutes/week and exercising 3-5 times per week. Pharmacotherapy was ordered to help aid in weight loss. Patient referred to nutritionist and PCP. Rationale for BMI follow-up plan is due to patient being overweight or obese.         Subjective      Patient is a 39 yo F who presents to the office for a follow up visit for  weight management. Patient states she was unable to  Orlistat which was prescribed 3 weeks ago. Spoke with patient Pharmacy via telephone and was informed that patient Insurance does not cover medication. A prior Authorization form will need to be submitted on patient behalf. Patient has no other acute complains today.     HPI  Review of Systems   Constitutional: Negative.    Respiratory: Negative.     Cardiovascular: Negative.    Gastrointestinal: Negative.    Genitourinary: Negative.    Musculoskeletal: Negative.    Skin: Negative.    Neurological: Negative.    Psychiatric/Behavioral:  Negative for suicidal ideas.        Current Outpatient Medications on File Prior to Visit   Medication Sig    albuterol (PROVENTIL HFA,VENTOLIN HFA) 90 mcg/act inhaler Inhale 2 puffs every 4 (four) hours as needed for wheezing    atomoxetine (STRATTERA) 80 MG capsule take 1 capsule by mouth once daily    Buprenorphine ER (Sublocade) 100 MG/0.5ML Inject 100 mg under the skin every 30 (thirty) days    buPROPion (WELLBUTRIN XL) 150 mg 24 hr tablet take 1 tablet by mouth once daily    cariprazine (VRAYLAR) 1.5 MG capsule Take 1 capsule (1.5 mg total) by mouth daily    cetirizine (ZyrTEC) 10 mg tablet take 1 tablet by mouth once daily    cholecalciferol (VITAMIN D3) 1,000 units tablet take 1 tablet by mouth once daily (Patient not taking: Reported on 11/22/2023)    clonazePAM (KlonoPIN) 1 mg tablet Take 1 tablet (1 mg total) by mouth 3 (three) times a day Do not start before February 2, 2024.    cyclobenzaprine (FLEXERIL) 10 mg tablet take 1 tablet by mouth three times a day if needed for muscle spasm    dextromethorphan-guaifenesin (MUCINEX DM)  MG per 12 hr tablet Take 1 tablet by mouth every 12 (twelve) hours    DULoxetine (Cymbalta) 30 mg delayed release capsule Take 1 capsule (30 mg total) by mouth daily    fluconazole (DIFLUCAN) 150 mg tablet take 1 tablet by mouth ONCE FOR 1 DOSE ONCE YOU COMPLETED FLAGYL AND  "DOXYCYCLINE COURSES    fluticasone (FLONASE) 50 mcg/act nasal spray 1 spray into each nostril daily    gabapentin (NEURONTIN) 100 mg capsule Take 1 capsule (100 mg total) by mouth 3 (three) times a day    hydrOXYzine HCL (ATARAX) 10 mg tablet Take 1 tablet (10 mg total) by mouth daily at bedtime as needed for anxiety    ibuprofen (MOTRIN) 800 mg tablet Take 1 tablet (800 mg total) by mouth every 8 (eight) hours as needed for mild pain (Patient not taking: Reported on 6/29/2023)    levothyroxine 50 mcg tablet take 1 tablet by mouth once daily    lidocaine (LIDODERM) 5 % apply 1 patch TO THE AFFECTED AREA DAILY. LEAVE ON FOR 12 HOURS AND THEN OFF FOR 12 HOURS. (Patient not taking: Reported on 11/22/2023)    metroNIDAZOLE (FLAGYL) 500 mg tablet Take 1 tablet (500 mg total) by mouth every 12 (twelve) hours for 7 days    NARCAN 4 MG/0.1ML LIQD ADMINISTER A SINGLE spray INTO ONE NOSTRIL CALL 911 MAY REPEAT ONCE (Patient not taking: No sig reported)    omeprazole (PriLOSEC) 20 mg delayed release capsule take 1 capsule by mouth once daily (Patient not taking: Reported on 11/22/2023)    topiramate (TOPAMAX) 100 mg tablet take 1 tablet by mouth twice a day    [DISCONTINUED] orlistat (XENICAL) 120 MG capsule Take 1 capsule (120 mg total) by mouth 3 (three) times a day with meals       Objective     /77 (BP Location: Right arm, Patient Position: Sitting, Cuff Size: Large)   Pulse 101   Temp (!) 97.3 °F (36.3 °C) (Temporal)   Resp 18   Ht 5' 4\" (1.626 m)   Wt 83.9 kg (185 lb)   LMP 03/19/2024 (Exact Date) Comment: regular  SpO2 98%   BMI 31.76 kg/m²     Physical Exam  Constitutional:       General: She is not in acute distress.  HENT:      Head: Atraumatic.      Mouth/Throat:      Mouth: Mucous membranes are moist.   Cardiovascular:      Rate and Rhythm: Normal rate and regular rhythm.      Pulses: Normal pulses.      Heart sounds: Normal heart sounds.   Pulmonary:      Effort: Pulmonary effort is normal.      " Breath sounds: Normal breath sounds.   Abdominal:      General: Bowel sounds are normal.      Palpations: Abdomen is soft.   Musculoskeletal:         General: Normal range of motion.   Skin:     General: Skin is warm.      Capillary Refill: Capillary refill takes less than 2 seconds.   Neurological:      General: No focal deficit present.   Psychiatric:         Mood and Affect: Mood normal.       Lucian Neal MD
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
## 2024-04-11 ENCOUNTER — TELEPHONE (OUTPATIENT)
Dept: BEHAVIORAL/MENTAL HEALTH CLINIC | Facility: CLINIC | Age: 39
End: 2024-04-11

## 2024-04-11 NOTE — TELEPHONE ENCOUNTER
Clinician made an attempt to reach out to patient. Phone continues to be out of service. Clinician sent patient an email to follow up.

## 2024-04-12 ENCOUNTER — HOSPITAL ENCOUNTER (EMERGENCY)
Facility: HOSPITAL | Age: 39
Discharge: HOME | End: 2024-04-12
Attending: EMERGENCY MEDICINE
Payer: COMMERCIAL

## 2024-04-12 VITALS
DIASTOLIC BLOOD PRESSURE: 72 MMHG | TEMPERATURE: 98.3 F | SYSTOLIC BLOOD PRESSURE: 140 MMHG | RESPIRATION RATE: 18 BRPM | HEART RATE: 103 BPM | OXYGEN SATURATION: 97 %

## 2024-04-12 DIAGNOSIS — F29 PSYCHOSES (HCC): ICD-10-CM

## 2024-04-12 DIAGNOSIS — Z00.8 ENCOUNTER FOR PSYCHOLOGICAL EVALUATION: Primary | ICD-10-CM

## 2024-04-12 LAB
ALBUMIN SERPL BCP-MCNC: 4.6 G/DL (ref 3.5–5)
ALP SERPL-CCNC: 38 U/L (ref 34–104)
ALT SERPL W P-5'-P-CCNC: 14 U/L (ref 7–52)
ANION GAP SERPL CALCULATED.3IONS-SCNC: 11 MMOL/L (ref 4–13)
AST SERPL W P-5'-P-CCNC: 15 U/L (ref 13–39)
BASOPHILS # BLD AUTO: 0.04 THOUSANDS/ÂΜL (ref 0–0.1)
BASOPHILS NFR BLD AUTO: 1 % (ref 0–1)
BILIRUB SERPL-MCNC: 0.49 MG/DL (ref 0.2–1)
BUN SERPL-MCNC: 20 MG/DL (ref 5–25)
CALCIUM SERPL-MCNC: 9.3 MG/DL (ref 8.4–10.2)
CHLORIDE SERPL-SCNC: 103 MMOL/L (ref 96–108)
CO2 SERPL-SCNC: 25 MMOL/L (ref 21–32)
CREAT SERPL-MCNC: 1.15 MG/DL (ref 0.6–1.3)
EOSINOPHIL # BLD AUTO: 0.14 THOUSAND/ÂΜL (ref 0–0.61)
EOSINOPHIL NFR BLD AUTO: 2 % (ref 0–6)
ERYTHROCYTE [DISTWIDTH] IN BLOOD BY AUTOMATED COUNT: 12.8 % (ref 11.6–15.1)
GFR SERPL CREATININE-BSD FRML MDRD: 60 ML/MIN/1.73SQ M
GLUCOSE SERPL-MCNC: 81 MG/DL (ref 65–140)
HCT VFR BLD AUTO: 40.4 % (ref 34.8–46.1)
HGB BLD-MCNC: 13.7 G/DL (ref 11.5–15.4)
IMM GRANULOCYTES # BLD AUTO: 0.02 THOUSAND/UL (ref 0–0.2)
IMM GRANULOCYTES NFR BLD AUTO: 0 % (ref 0–2)
LYMPHOCYTES # BLD AUTO: 2.93 THOUSANDS/ÂΜL (ref 0.6–4.47)
LYMPHOCYTES NFR BLD AUTO: 43 % (ref 14–44)
MCH RBC QN AUTO: 29.4 PG (ref 26.8–34.3)
MCHC RBC AUTO-ENTMCNC: 33.9 G/DL (ref 31.4–37.4)
MCV RBC AUTO: 87 FL (ref 82–98)
MONOCYTES # BLD AUTO: 0.44 THOUSAND/ÂΜL (ref 0.17–1.22)
MONOCYTES NFR BLD AUTO: 6 % (ref 4–12)
NEUTROPHILS # BLD AUTO: 3.27 THOUSANDS/ÂΜL (ref 1.85–7.62)
NEUTS SEG NFR BLD AUTO: 48 % (ref 43–75)
NRBC BLD AUTO-RTO: 0 /100 WBCS
PLATELET # BLD AUTO: 301 THOUSANDS/UL (ref 149–390)
PMV BLD AUTO: 10.8 FL (ref 8.9–12.7)
POTASSIUM SERPL-SCNC: 3.2 MMOL/L (ref 3.5–5.3)
PROT SERPL-MCNC: 6.8 G/DL (ref 6.4–8.4)
RBC # BLD AUTO: 4.66 MILLION/UL (ref 3.81–5.12)
SODIUM SERPL-SCNC: 139 MMOL/L (ref 135–147)
TSH SERPL DL<=0.05 MIU/L-ACNC: 0.59 UIU/ML (ref 0.45–4.5)
WBC # BLD AUTO: 6.84 THOUSAND/UL (ref 4.31–10.16)

## 2024-04-12 PROCEDURE — 85025 COMPLETE CBC W/AUTO DIFF WBC: CPT | Performed by: EMERGENCY MEDICINE

## 2024-04-12 PROCEDURE — 99284 EMERGENCY DEPT VISIT MOD MDM: CPT | Performed by: EMERGENCY MEDICINE

## 2024-04-12 PROCEDURE — 84443 ASSAY THYROID STIM HORMONE: CPT | Performed by: EMERGENCY MEDICINE

## 2024-04-12 PROCEDURE — 80053 COMPREHEN METABOLIC PANEL: CPT | Performed by: EMERGENCY MEDICINE

## 2024-04-12 PROCEDURE — 99284 EMERGENCY DEPT VISIT MOD MDM: CPT

## 2024-04-12 PROCEDURE — 36415 COLL VENOUS BLD VENIPUNCTURE: CPT | Performed by: EMERGENCY MEDICINE

## 2024-04-12 RX ORDER — NICOTINE 21 MG/24HR
14 PATCH, TRANSDERMAL 24 HOURS TRANSDERMAL DAILY PRN
Status: DISCONTINUED | OUTPATIENT
Start: 2024-04-12 | End: 2024-04-12 | Stop reason: HOSPADM

## 2024-04-12 RX ORDER — OLANZAPINE 5 MG/1
5 TABLET, ORALLY DISINTEGRATING ORAL
Qty: 30 TABLET | Refills: 0 | Status: SHIPPED | OUTPATIENT
Start: 2024-04-12 | End: 2024-05-12

## 2024-04-12 RX ADMIN — NICOTINE 14 MG: 14 PATCH, EXTENDED RELEASE TRANSDERMAL at 11:54

## 2024-04-12 RX ADMIN — NICOTINE POLACRILEX 2 MG: 2 GUM, CHEWING BUCCAL at 11:54
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
## 2024-04-12 NOTE — ED NOTES
"This tech entered pts room to assist in changing pt into safety scrubs, do blood work and everything else for pt to be evaluated for crisis evaluation, Pt started to say she wasn't changing or staying and that she wanted to speak to the doctor. This tech told pt that she would relay message to doctor and left the room.    After doctor spoke with pt, doctor said that she was going to cooperate and do everything she needed to do for an evaluation.  This tech and another tech entered the room, this tech explained to the pt what she was expected to do, pt stood up and stated, \"I told the doctor I would be evaluated not be admitted to psych, then she said I know you dont like me, I can tell with how you are talking to me\". This tech tried to reassure pt that I had no ill feelings toward her and that I was here to help get her situated so she can be evaluated. The pt kept on rambling how this tech did not like her and she can feel it. This tech left the room so pt would escalate.     Ruth Ann Gaffney  04/12/24 1159    "

## 2024-04-12 NOTE — ED NOTES
Pt attempted to leave hospital. Pt was informed that she needs to talk to psychiatry before she can leave. Pt walked back to her room.      Lizbeth Bonds RN  04/12/24 3757

## 2024-04-12 NOTE — ED NOTES
Patient presented in the ED along with her son. Patient appears to have bizarre, psychotic thinking (told me that someone is coming into her house putting a pickline into her and her son giving them something). she has crystals and other things around the house for protection. Per her mother in law (Narinder), there was a court hearing yesterday that she did not attend regarding the custody of the son. (grandmother has partial custody and is trying to get full custody). GEORGI Durand Jeff came to the ED and interviewed patient regarding the care of the son. He will be following up on the situation. Patient [facility], and as patient refused to be admitted, recommended discharge as she did not meet 302 requirements. Patient was irritable was interviewed, upset that the son went home with the grandmother , and that she has been under a great deal of stress (she might be referring to the court situation that she missed). She declined transportation and will walk home.     Patient is followed outpt by Dr. Payton, but dosen;t remember her next appointment.

## 2024-04-12 NOTE — ED CARE HANDOFF
Emergency Department Sign Out Note        Sign out and transfer of care from Dr. Ashley. See Separate Emergency Department note.     The patient, Geno Courtney, was evaluated by the previous provider for psych eval.    Workup Completed:  Behavioral health labs   [facility] psych    ED Course / Workup Pending (followup):  Psychiatrist deemed patient is not a candidate for inpatient psych   Patient discharged                                      Procedures  Medical Decision Making  Amount and/or Complexity of Data Reviewed  Labs: ordered.    Risk  OTC drugs.  Prescription drug management.            Disposition  Final diagnoses:   Encounter for psychological evaluation   Psychoses (HCC)     Time reflects when diagnosis was documented in both MDM as applicable and the Disposition within this note       Time User Action Codes Description Comment    4/12/2024 11:19 AM Wily Ashley Add [Z00.8] Encounter for psychological evaluation     4/12/2024  5:01 PM Reyes Flores Add [F29] Psychoses (HCC)           ED Disposition       ED Disposition   Discharge    Condition   Stable    Date/Time   Fri Apr 12, 2024  5:00 PM    Comment   Geno Courtney should be transferred out to [facility] and has been medically cleared.               Follow-up Information       Follow up With Specialties Details Why Contact Info Additional Information    [facility] Emergency Medicine Schedule an appointment as soon as possible for a visit   [address] [place] [id number] [phone] [facility]          Discharge Medication List as of 4/12/2024  5:02 PM        START taking these medications    Details   OLANZapine (ZyPREXA ZYDIS) 5 mg dispersible tablet Take 1 tablet (5 mg total) by mouth daily at bedtime, Starting Fri 4/12/2024, Until Sun 5/12/2024, Normal           CONTINUE these medications which have NOT CHANGED    Details   atomoxetine  (STRATTERA) 80 MG capsule take 1 capsule by mouth once daily, Starting Tue 3/12/2024, Normal      buPROPion (WELLBUTRIN XL) 150 mg 24 hr tablet take 1 tablet by mouth once daily, Starting Tue 1/23/2024, Normal      cariprazine (VRAYLAR) 1.5 MG capsule Take 1 capsule (1.5 mg total) by mouth daily, Starting Fri 1/19/2024, Normal      clonazePAM (KlonoPIN) 1 mg tablet Take 1 tablet (1 mg total) by mouth 3 (three) times a day Do not start before February 2, 2024., Starting Fri 2/2/2024, Normal      cyclobenzaprine (FLEXERIL) 10 mg tablet take 1 tablet by mouth three times a day if needed for muscle spasm, Normal      dextromethorphan-guaifenesin (MUCINEX DM)  MG per 12 hr tablet Take 1 tablet by mouth every 12 (twelve) hours, Starting Wed 12/13/2023, Normal      DULoxetine (Cymbalta) 30 mg delayed release capsule Take 1 capsule (30 mg total) by mouth daily, Starting Thu 12/21/2023, Normal      fluticasone (FLONASE) 50 mcg/act nasal spray 1 spray into each nostril daily, Starting Mon 5/15/2023, Normal      gabapentin (NEURONTIN) 100 mg capsule Take 1 capsule (100 mg total) by mouth 3 (three) times a day, Starting Mon 9/25/2023, Normal      hydrOXYzine HCL (ATARAX) 10 mg tablet Take 1 tablet (10 mg total) by mouth daily at bedtime as needed for anxiety, Starting Thu 10/26/2023, Normal      levothyroxine 50 mcg tablet take 1 tablet by mouth once daily, Normal      topiramate (TOPAMAX) 100 mg tablet take 1 tablet by mouth twice a day, Normal      albuterol (PROVENTIL HFA,VENTOLIN HFA) 90 mcg/act inhaler Inhale 2 puffs every 4 (four) hours as needed for wheezing, Starting Wed 10/5/2022, Normal      Buprenorphine ER (Sublocade) 100 MG/0.5ML Inject 100 mg under the skin every 30 (thirty) days, Historical Med      cetirizine (ZyrTEC) 10 mg tablet take 1 tablet by mouth once daily, Normal      cholecalciferol (VITAMIN D3) 1,000 units tablet take 1 tablet by mouth once daily, Normal      fluconazole (DIFLUCAN) 150 mg tablet  take 1 tablet by mouth ONCE FOR 1 DOSE ONCE YOU COMPLETED FLAGYL AND DOXYCYCLINE COURSES, Historical Med      ibuprofen (MOTRIN) 800 mg tablet Take 1 tablet (800 mg total) by mouth every 8 (eight) hours as needed for mild pain, Starting Mon 11/1/2021, Normal      lidocaine (LIDODERM) 5 % apply 1 patch TO THE AFFECTED AREA DAILY. LEAVE ON FOR 12 HOURS AND THEN OFF FOR 12 HOURS., Historical Med      NARCAN 4 MG/0.1ML LIQD ADMINISTER A SINGLE spray INTO ONE NOSTRIL CALL 911 MAY REPEAT ONCE, Historical Med      omeprazole (PriLOSEC) 20 mg delayed release capsule take 1 capsule by mouth once daily, Normal      orlistat (XENICAL) 120 MG capsule Take 1 capsule (120 mg total) by mouth 3 (three) times a day with meals, Starting Mon 4/1/2024, Until Wed 5/1/2024, Normal           No discharge procedures on file.       ED Provider  Electronically Signed by     Reyes Flores MD  04/14/24 8087

## 2024-04-12 NOTE — ED NOTES
Contacted Marya LAUREANO.  Family is an active client.   is Ladarius Aguilar ph: [phone]  [phone]  He is not available.  Supervisor is Susy López  ph:  [phone]     She was not at her desk, left message
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
## 2024-04-17 ENCOUNTER — TELEPHONE (OUTPATIENT)
Dept: PSYCHIATRY | Facility: CLINIC | Age: 39
End: 2024-04-17
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
## 2024-04-17 NOTE — TELEPHONE ENCOUNTER
Geno came to the office to ask what to do about her medications.  States she was in the ER and they changed her medications.  Informed her that I would notify provider to review her medication list and I will call her if he makes any changes.  Also scheduled her for first available appointment with provider on 6/6.  While Geno was here she was having some chest pain and I instructed her to go to the ER but she refused.  States that it comes and goes.  Reiterated the importance of ruling out cardiac concerns that require an evaluation including an EKG and blood work to check cardiac enzymes.  I requested that she at least go to an Urgent Care center to get checked due to chest pain being a medical emergency.  While present, I took her vitals and BP was 116/72 and HR was 108 at rest.  Asked if she wants me to call 911 but she once again refused.  Provided her with information on closest urgent care centers and told her to get checked ASAP if this persists.  Virtual appointment scheduled for today with Marlyn.    Will refer to Dr Payton for review.

## 2024-04-18 DIAGNOSIS — F90.8 ATTENTION DEFICIT HYPERACTIVITY DISORDER (ADHD), OTHER TYPE: ICD-10-CM

## 2024-04-18 DIAGNOSIS — J45.909 ASTHMA, UNSPECIFIED ASTHMA SEVERITY, UNSPECIFIED WHETHER COMPLICATED, UNSPECIFIED WHETHER PERSISTENT: ICD-10-CM

## 2024-04-18 RX ORDER — CYCLOBENZAPRINE HCL 10 MG
TABLET ORAL
Qty: 90 TABLET | Refills: 3 | Status: SHIPPED | OUTPATIENT
Start: 2024-04-18

## 2024-04-18 RX ORDER — ATOMOXETINE 40 MG/1
40 CAPSULE ORAL DAILY
Qty: 30 CAPSULE | Refills: 2 | Status: SHIPPED | OUTPATIENT
Start: 2024-04-18

## 2024-04-18 NOTE — TELEPHONE ENCOUNTER
Follow up call to Geno.  Informed her that provider sent in a script for Strattera at a decreased dose of 40 MG.  She is in agreement with decrease.  However she did get a script for Zyprexa filled and wants to know if she should start taking it.  She will wait until she hears back from our office.    Will refer to Dr Payton for review.

## 2024-04-19 ENCOUNTER — TELEMEDICINE (OUTPATIENT)
Dept: BEHAVIORAL/MENTAL HEALTH CLINIC | Facility: CLINIC | Age: 39
End: 2024-04-19

## 2024-04-19 DIAGNOSIS — F90.8 ATTENTION DEFICIT HYPERACTIVITY DISORDER (ADHD), OTHER TYPE: Primary | ICD-10-CM

## 2024-04-19 DIAGNOSIS — F31.9 BIPOLAR DISORDER WITH DEPRESSION (HCC): ICD-10-CM

## 2024-04-19 NOTE — TELEPHONE ENCOUNTER
Called Geno and informed her as per Dr Payton, no need to start Olanzapine.  Instructed her to call the office if anything changes and/or she feels manic in any way.  She has a follow up appointment with provider on Monday. Nothing further needed at this time.
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
## 2024-04-22 ENCOUNTER — TELEPHONE (OUTPATIENT)
Dept: PSYCHIATRY | Facility: CLINIC | Age: 39
End: 2024-04-22

## 2024-04-22 ENCOUNTER — OFFICE VISIT (OUTPATIENT)
Dept: PSYCHIATRY | Facility: CLINIC | Age: 39
End: 2024-04-22
Payer: COMMERCIAL

## 2024-04-22 VITALS
BODY MASS INDEX: 31.58 KG/M2 | DIASTOLIC BLOOD PRESSURE: 70 MMHG | WEIGHT: 185 LBS | HEIGHT: 64 IN | SYSTOLIC BLOOD PRESSURE: 113 MMHG | HEART RATE: 98 BPM

## 2024-04-22 DIAGNOSIS — F31.4 BIPOLAR DISORDER, CURRENT EPISODE DEPRESSED, SEVERE, WITHOUT PSYCHOTIC FEATURES (HCC): ICD-10-CM

## 2024-04-22 DIAGNOSIS — Z72.0 SMOKING TRYING TO QUIT: ICD-10-CM

## 2024-04-22 DIAGNOSIS — F41.9 ANXIETY: ICD-10-CM

## 2024-04-22 DIAGNOSIS — F32.A DEPRESSION, UNSPECIFIED DEPRESSION TYPE: ICD-10-CM

## 2024-04-22 DIAGNOSIS — F41.1 GAD (GENERALIZED ANXIETY DISORDER): ICD-10-CM

## 2024-04-22 DIAGNOSIS — Z87.898 HISTORY OF SUBSTANCE USE: Primary | ICD-10-CM

## 2024-04-22 DIAGNOSIS — M79.2 NEUROPATHIC PAIN: ICD-10-CM

## 2024-04-22 PROCEDURE — 90833 PSYTX W PT W E/M 30 MIN: CPT | Performed by: PSYCHIATRY & NEUROLOGY

## 2024-04-22 PROCEDURE — 99214 OFFICE O/P EST MOD 30 MIN: CPT | Performed by: PSYCHIATRY & NEUROLOGY

## 2024-04-22 RX ORDER — DULOXETIN HYDROCHLORIDE 30 MG/1
30 CAPSULE, DELAYED RELEASE ORAL DAILY
Qty: 30 CAPSULE | Refills: 4 | Status: SHIPPED | OUTPATIENT
Start: 2024-04-22

## 2024-04-22 RX ORDER — HYDROXYZINE HYDROCHLORIDE 10 MG/1
10 TABLET, FILM COATED ORAL
Qty: 30 TABLET | Refills: 3 | Status: SHIPPED | OUTPATIENT
Start: 2024-04-22

## 2024-04-22 RX ORDER — BUPROPION HYDROCHLORIDE 150 MG/1
150 TABLET ORAL DAILY
Qty: 30 TABLET | Refills: 2 | Status: SHIPPED | OUTPATIENT
Start: 2024-04-22

## 2024-04-22 RX ORDER — OLANZAPINE 5 MG/1
5 TABLET ORAL
COMMUNITY
Start: 2024-04-12

## 2024-04-22 RX ORDER — GABAPENTIN 300 MG/1
300 CAPSULE ORAL 2 TIMES DAILY
Qty: 60 CAPSULE | Refills: 1 | Status: SHIPPED | OUTPATIENT
Start: 2024-04-22

## 2024-04-22 NOTE — TELEPHONE ENCOUNTER
[facility] Representative Ladarius Aguilar made outreach to confirm patient's participation within the SHARE office. Representative was able to provide signed ALOK via email, signed by patient.   This CM was able to confirm patient's recent attendance to appointment with Psychiatrist as well transitioning to mental health therapy from dual diagnosis due to longevity of sobriety.   Representative asked if patient experienced a recurrence of use to which this CM was unaware, but it was documented that patient has submitted UDS today, results not yet finalized.

## 2024-04-22 NOTE — TELEPHONE ENCOUNTER
Spoke to patient and/or parent/guardian to make aware of the Psych Encounter form needing to be signed from recent completed appointment(s).   Patient said she is coming in for an appt today so she will sign it when she comes in.

## 2024-04-22 NOTE — PSYCH
"[facility] Program    Progress Note  Geno Lopez 38 y.o. female MRN: [id number]   Encounter: [id number]      Visit Time    Visit Start Time: 11:30AM  Visit Stop Time: 12PM  Total Visit Duration: 30 min    SUBJECTIVE: \"Pt was under stress because of her 7 yo son was acting behaviorally and asked police to help him. They took me to the ER instead\".       INTERVAL HISTORY: The patient came for his follow-up appointment after a stressful episode with hers son, and legal fight for custody with  his paternal grandmother.  The patient had some fears that the grandmother may know more than \"she can \", had concerns about her grandmother hired a .  The patient has some low-grade paranoid type of ideations however some of them might be well based on reality.  At the same time the patient did not endorse any clear-cut paranoid delusions, she did not feel unsafe continuation of outpatient treatment as most appropriate treatment.  She takes her medications as prescribed including mood stabilizing antipsychotic Vraylar.  Patient's complained about panic attacks in association with such an episode however her panic attacks which also led to chest pain do not usually last more than 3 minutes.  The patient denies shortness of breath for pain or pain in her left arm.  She stated that as a result of stress, she has leg pain and back pain that propagated to the upper back and Neurontin at low-dose 100 mg was not helpful.      Today the patient has a meeting with C&Y services.  We discussed about this may be de-stressing factor because C&Y  would see her being in the sound mind and not in an emotional distress.     Review Of Systems:    sleep changes: decreased  appetite changes: decreased  energy/anergy: decreased    Support Services  The patient is actively engaged with the [facility] Program Certified Addiction Counselor’s psychotherapy plan: Yes        PDMP reviewed:     PDMP Review         Value Time User    PDMP " Reviewed  Yes 4/22/2024  8:55 AM Trell Payton MD              Drug cravings: none  Motivation to continue treatment: high       Current Outpatient Medications:     OLANZapine (ZyPREXA) 5 mg tablet, 5 mg, Disp: , Rfl:     albuterol (PROVENTIL HFA,VENTOLIN HFA) 90 mcg/act inhaler, Inhale 2 puffs every 4 (four) hours as needed for wheezing, Disp: 18 g, Rfl: 5    atomoxetine (STRATTERA) 40 mg capsule, Take 1 capsule (40 mg total) by mouth daily, Disp: 30 capsule, Rfl: 2    Buprenorphine ER (Sublocade) 100 MG/0.5ML, Inject 100 mg under the skin every 30 (thirty) days, Disp: , Rfl:     buPROPion (WELLBUTRIN XL) 150 mg 24 hr tablet, take 1 tablet by mouth once daily, Disp: 30 tablet, Rfl: 2    cariprazine (VRAYLAR) 1.5 MG capsule, Take 1 capsule (1.5 mg total) by mouth daily, Disp: 30 capsule, Rfl: 5    cetirizine (ZyrTEC) 10 mg tablet, take 1 tablet by mouth once daily, Disp: 30 tablet, Rfl: 0    cholecalciferol (VITAMIN D3) 1,000 units tablet, take 1 tablet by mouth once daily (Patient not taking: Reported on 11/22/2023), Disp: 30 tablet, Rfl: 0    clonazePAM (KlonoPIN) 1 mg tablet, Take 1 tablet (1 mg total) by mouth 3 (three) times a day Do not start before February 2, 2024., Disp: 90 tablet, Rfl: 2    cyclobenzaprine (FLEXERIL) 10 mg tablet, take 1 tablet by mouth three times a day if needed for muscle spasm, Disp: 90 tablet, Rfl: 3    dextromethorphan-guaifenesin (MUCINEX DM)  MG per 12 hr tablet, Take 1 tablet by mouth every 12 (twelve) hours, Disp: 20 tablet, Rfl: 0    DULoxetine (Cymbalta) 30 mg delayed release capsule, Take 1 capsule (30 mg total) by mouth daily, Disp: 30 capsule, Rfl: 4    fluconazole (DIFLUCAN) 150 mg tablet, take 1 tablet by mouth ONCE FOR 1 DOSE ONCE YOU COMPLETED FLAGYL AND DOXYCYCLINE COURSES (Patient not taking: Reported on 4/12/2024), Disp: , Rfl:     fluticasone (FLONASE) 50 mcg/act nasal spray, 1 spray into each nostril daily, Disp: 16 g, Rfl: 2    gabapentin  (NEURONTIN) 100 mg capsule, Take 1 capsule (100 mg total) by mouth 3 (three) times a day, Disp: 90 capsule, Rfl: 4    hydrOXYzine HCL (ATARAX) 10 mg tablet, Take 1 tablet (10 mg total) by mouth daily at bedtime as needed for anxiety, Disp: 30 tablet, Rfl: 3    ibuprofen (MOTRIN) 800 mg tablet, Take 1 tablet (800 mg total) by mouth every 8 (eight) hours as needed for mild pain (Patient not taking: Reported on 6/29/2023), Disp: 90 tablet, Rfl: 3    levothyroxine 50 mcg tablet, take 1 tablet by mouth once daily, Disp: 30 tablet, Rfl: 5    lidocaine (LIDODERM) 5 %, apply 1 patch TO THE AFFECTED AREA DAILY. LEAVE ON FOR 12 HOURS AND THEN OFF FOR 12 HOURS. (Patient not taking: Reported on 11/22/2023), Disp: , Rfl:     NARCAN 4 MG/0.1ML LIQD, ADMINISTER A SINGLE spray INTO ONE NOSTRIL CALL 911 MAY REPEAT ONCE (Patient not taking: No sig reported), Disp: , Rfl: 0    OLANZapine (ZyPREXA ZYDIS) 5 mg dispersible tablet, Take 1 tablet (5 mg total) by mouth daily at bedtime, Disp: 30 tablet, Rfl: 0    omeprazole (PriLOSEC) 20 mg delayed release capsule, take 1 capsule by mouth once daily (Patient not taking: Reported on 11/22/2023), Disp: 90 capsule, Rfl: 3    orlistat (XENICAL) 120 MG capsule, Take 1 capsule (120 mg total) by mouth 3 (three) times a day with meals (Patient not taking: Reported on 4/12/2024), Disp: 90 capsule, Rfl: 0    topiramate (TOPAMAX) 100 mg tablet, take 1 tablet by mouth twice a day, Disp: 90 tablet, Rfl: 3    Objective     There were no vitals filed for this visit.        Mental Status Evaluation: The patient is that logical and linear, she could express his concerns and fears.  Was not in any distress.  There was no evidence of significant mood disturbance, no symptoms of auditory hallucinations or paranoid delusions besides some probably based on reality paranoid like ideations, memory was normal.  Insight and judgment were limited    Lab Results:   Results from last 6 Months   Lab Units 04/12/24  3231  "  WBC Thousand/uL 6.84   HEMOGLOBIN g/dL 13.7   HEMATOCRIT % 40.4   PLATELETS Thousands/uL 301      Results from last 6 Months   Lab Units 04/12/24  1156   POTASSIUM mmol/L 3.2*   CHLORIDE mmol/L 103   CO2 mmol/L 25   BUN mg/dL 20   CREATININE mg/dL 1.15   CALCIUM mg/dL 9.3   ALBUMIN g/dL 4.6   ALK PHOS U/L 38   ALT U/L 14   AST U/L 15                           Diagnoses and all orders for this visit:    History of substance use  -     [facility] All Prescribed Meds and Special Instructions  -     Amphetamines, Methamphetamines  -     Butalbital  -     Phenobarbital  -     Secobarbital  -     Alprazolam  -     Clonazepam  -     Diazepam, Temazepam, Oxazepam  -     Lorazepam  -     Gabapentin  -     Pregabalin  -     Cocaine  -     Heroin  -     Buprenorphine  -     Levorphanol  -     Meperidine  -     Naltrexone  -     Fentanyl  -     Methadone  -     Oxycodone  -     Tapentadol  -     THC  -     Tramadol  -     Codeine, Hydrocodone, Hydropmorphone, Morphine  -     Bath Salts  -     Ethyl Glucuronide/Ethyl Sulfate  -     Kratom  -     Spice  -     Methylphenidate  -     Phentermine  -     Validity Oxidant  -     Validity Creatinine  -     Validity pH  -     Validity Specific  -     Xylazine Definitive Test    Other orders  -     OLANZapine (ZyPREXA) 5 mg tablet; 5 mg       At this point of time and decision was made to lower the patient's Strattera because a higher dose might lead to some additional anxiety, in addition to social stressors.  We will not make any other medication adjustment and reviewed with the patient her current mental state, Zyprexa is not indicated at this point of time as augmentation of the patient's mood stabilizer Vraylar.     The patient stated that she stopped here Sublocade because she \"hates drugs\", she stated that she has been working on her recovery, and did not use any drugs in recent time, and also did not have any withdrawal symptoms after Sublocade was stopped.  So writer discussed " the fact that when patient stopped taking Suboxone as maintenance therapy, the risk of relapse may increase even after some prolonged.  Time in recovery, and the risk of relapse on the window by overdose is elevated.  The patient expressed his understanding but still believes that recovery capital is strong enough to stay in recovery and she stated he role as a mother was also strong and helpful.    Despite of the patient's chronic pain alternative to Suboxone such as Vivitrol may still be an option, further discussion is needed if the patient would consider that option.        Risks / Benefits of Treatment:    Risks, benefits, and possible side effects of medications explained to patient including risk of parkinsonian symptoms, Tardive Dyskinesia and metabolic syndrome related to treatment with antipsychotic medications. The patient verbalizes understanding and agreement for treatment.      This note was not shared with the patient due to this is a psychotherapy note so writer provided supportive and motivational therapy that lasted about 20 minutes of our visit today.    Counseling / Coordination of Care  Total time spent today 30 minutes. Greater than 50% of total time was spent with the patient and / or family counseling and / or coordination of care.     Trell Payton MD

## 2024-04-24 ENCOUNTER — TELEPHONE (OUTPATIENT)
Dept: PSYCHIATRY | Facility: CLINIC | Age: 39
End: 2024-04-24

## 2024-04-25 LAB
4OH-XYLAZINE UR QL CFM: NEGATIVE NG/ML
6MAM UR QL CFM: NEGATIVE NG/ML
7AMINOCLONAZEPAM UR QL CFM: NORMAL NG/ML
A-OH ALPRAZ UR QL CFM: NEGATIVE NG/ML
ACCEPTABLE CREAT UR QL: NORMAL MG/DL
ACCEPTIBLE SP GR UR QL: NORMAL
AMPHET UR QL CFM: NEGATIVE NG/ML
BUPRENORPHINE UR QL CFM: NORMAL NG/ML
BUTALBITAL UR QL CFM: NEGATIVE NG/ML
BZE UR QL CFM: NEGATIVE NG/ML
CODEINE UR QL CFM: NEGATIVE NG/ML
EDDP UR QL CFM: NEGATIVE NG/ML
ETHYL GLUCURONIDE UR QL CFM: NEGATIVE NG/ML
ETHYL SULFATE UR QL SCN: NEGATIVE NG/ML
EUTYLONE UR QL: NEGATIVE NG/ML
FENTANYL UR QL CFM: NEGATIVE NG/ML
GLIADIN IGG SER IA-ACNC: NEGATIVE NG/ML
HYDROCODONE UR QL CFM: NEGATIVE NG/ML
HYDROMORPHONE UR QL CFM: NEGATIVE NG/ML
LORAZEPAM UR QL CFM: NEGATIVE NG/ML
ME-PHENIDATE UR QL CFM: NEGATIVE NG/ML
MEPERIDINE UR QL CFM: NEGATIVE NG/ML
METHADONE UR QL CFM: NEGATIVE NG/ML
METHAMPHET UR QL CFM: NEGATIVE NG/ML
MORPHINE UR QL CFM: NEGATIVE NG/ML
NALTREXONE UR QL CFM: NEGATIVE NG/ML
NITRITE UR QL: NORMAL UG/ML
NORBUPRENORPHINE UR QL CFM: NORMAL NG/ML
NORDIAZEPAM UR QL CFM: NEGATIVE NG/ML
NORFENTANYL UR QL CFM: NEGATIVE NG/ML
NORHYDROCODONE UR QL CFM: NEGATIVE NG/ML
NORMEPERIDINE UR QL CFM: NEGATIVE NG/ML
NOROXYCODONE UR QL CFM: NEGATIVE NG/ML
OXAZEPAM UR QL CFM: NEGATIVE NG/ML
OXYCODONE UR QL CFM: NEGATIVE NG/ML
OXYMORPHONE UR QL CFM: NEGATIVE NG/ML
PARA-FLUOROFENTANYL QUANTIFICATION: NORMAL NG/ML
PHENOBARB UR QL CFM: NEGATIVE NG/ML
RESULT ALL_PRESCRIBED MEDS AND SPECIAL INSTRUCTIONS: NORMAL
SECOBARBITAL UR QL CFM: NEGATIVE NG/ML
SL AMB 4-ANPP QUANTIFICATION: NORMAL NG/ML
SL AMB 5F-ADB-M7 METABOLITE QUANTIFICATION: NEGATIVE NG/ML
SL AMB 7-OH-MITRAGYNINE (KRATOM ALKALOID) QUANTIFICATION: NEGATIVE NG/ML
SL AMB AB-FUBINACA-M3 METABOLITE QUANTIFICATION: NEGATIVE NG/ML
SL AMB ACETYL FENTANYL QUANTIFICATION: NORMAL NG/ML
SL AMB ACETYL NORFENTANYL QUANTIFICATION: NORMAL NG/ML
SL AMB ACRYL FENTANYL QUANTIFICATION: NORMAL NG/ML
SL AMB CARFENTANIL QUANTIFICATION: NORMAL NG/ML
SL AMB CTHC (MARIJUANA METABOLITE) QUANTIFICATION: ABNORMAL NG/ML
SL AMB DEXTRORPHAN (DEXTROMETHORPHAN METABOLITE) QUANT: NEGATIVE NG/ML
SL AMB GABAPENTIN QUANTIFICATION: NORMAL
SL AMB JWH018 METABOLITE QUANTIFICATION: NEGATIVE NG/ML
SL AMB JWH073 METABOLITE QUANTIFICATION: NEGATIVE NG/ML
SL AMB MDMB-FUBINACA-M1 METABOLITE QUANTIFICATION: NEGATIVE NG/ML
SL AMB METHYLONE QUANTIFICATION: NEGATIVE NG/ML
SL AMB N-DESMETHYL-TRAMADOL QUANTIFICATION: NEGATIVE NG/ML
SL AMB PHENTERMINE QUANTIFICATION: NEGATIVE NG/ML
SL AMB PREGABALIN QUANTIFICATION: NEGATIVE
SL AMB RCS4 METABOLITE QUANTIFICATION: NEGATIVE NG/ML
SL AMB RITALINIC ACID QUANTIFICATION: NEGATIVE NG/ML
SMOOTH MUSCLE AB TITR SER IF: NEGATIVE NG/ML
SPECIMEN DRAWN SERPL: NEGATIVE NG/ML
SPECIMEN PH ACCEPTABLE UR: NORMAL
TAPENTADOL UR QL CFM: NEGATIVE NG/ML
TEMAZEPAM UR QL CFM: NEGATIVE NG/ML
TRAMADOL UR QL CFM: NEGATIVE NG/ML
URATE/CREAT 24H UR: NEGATIVE NG/ML
XYLAZINE UR QL CFM: NEGATIVE NG/ML

## 2024-04-26 ENCOUNTER — TELEPHONE (OUTPATIENT)
Dept: PSYCHIATRY | Facility: CLINIC | Age: 39
End: 2024-04-26

## 2024-04-26 ENCOUNTER — TELEPHONE (OUTPATIENT)
Dept: OTHER | Age: 39
End: 2024-04-26

## 2024-04-26 NOTE — TELEPHONE ENCOUNTER
Ladarius Aguilar from [facility] called the MAT office requesting the latest lab results for Geno.  Informed Ladarius that I would return his call after speaking to the [facility] Program Clinical Coordinator. After conferring with Katlin MURILLO, [facility] Program Clinical Coordinator, Ladarius must request the results from the [facility] Medical Records Dept.    Called Ladarius back and left a VM, informing Ladarius that he must notify the Medical Records Dept for his request.  Medical Records Dept phone number provided.

## 2024-04-26 NOTE — TELEPHONE ENCOUNTER
Prior Authorization for Vraylar 1.5 MG capsule faxed to [facility] via [employer].  Decision pending.

## 2024-04-29 ENCOUNTER — APPOINTMENT (EMERGENCY)
Dept: RADIOLOGY | Facility: HOSPITAL | Age: 39
End: 2024-04-29
Payer: COMMERCIAL

## 2024-04-29 ENCOUNTER — HOSPITAL ENCOUNTER (EMERGENCY)
Facility: HOSPITAL | Age: 39
Discharge: HOME | End: 2024-04-29
Attending: EMERGENCY MEDICINE
Payer: COMMERCIAL

## 2024-04-29 VITALS
RESPIRATION RATE: 18 BRPM | WEIGHT: 194.22 LBS | TEMPERATURE: 98.5 F | HEART RATE: 96 BPM | SYSTOLIC BLOOD PRESSURE: 115 MMHG | DIASTOLIC BLOOD PRESSURE: 79 MMHG | OXYGEN SATURATION: 97 % | BODY MASS INDEX: 33.34 KG/M2

## 2024-04-29 DIAGNOSIS — M25.561 ACUTE PAIN OF RIGHT KNEE: ICD-10-CM

## 2024-04-29 DIAGNOSIS — R07.89 NON-CARDIAC CHEST PAIN: ICD-10-CM

## 2024-04-29 DIAGNOSIS — M25.551 RIGHT HIP PAIN: Primary | ICD-10-CM

## 2024-04-29 LAB
ANION GAP SERPL CALCULATED.3IONS-SCNC: 5 MMOL/L (ref 4–13)
ATRIAL RATE: 90 BPM
BUN SERPL-MCNC: 15 MG/DL (ref 5–25)
CALCIUM SERPL-MCNC: 8.3 MG/DL (ref 8.4–10.2)
CARDIAC TROPONIN I PNL SERPL HS: <2 NG/L
CHLORIDE SERPL-SCNC: 110 MMOL/L (ref 96–108)
CO2 SERPL-SCNC: 24 MMOL/L (ref 21–32)
CREAT SERPL-MCNC: 0.85 MG/DL (ref 0.6–1.3)
GFR SERPL CREATININE-BSD FRML MDRD: 87 ML/MIN/1.73SQ M
GLUCOSE SERPL-MCNC: 94 MG/DL (ref 65–140)
P AXIS: 48 DEGREES
POTASSIUM SERPL-SCNC: 4 MMOL/L (ref 3.5–5.3)
PR INTERVAL: 136 MS
QRS AXIS: 20 DEGREES
QRSD INTERVAL: 80 MS
QT INTERVAL: 360 MS
QTC INTERVAL: 440 MS
SODIUM SERPL-SCNC: 139 MMOL/L (ref 135–147)
T WAVE AXIS: 6 DEGREES
VENTRICULAR RATE: 90 BPM

## 2024-04-29 PROCEDURE — 84484 ASSAY OF TROPONIN QUANT: CPT

## 2024-04-29 PROCEDURE — 73564 X-RAY EXAM KNEE 4 OR MORE: CPT

## 2024-04-29 PROCEDURE — 36415 COLL VENOUS BLD VENIPUNCTURE: CPT

## 2024-04-29 PROCEDURE — 96372 THER/PROPH/DIAG INJ SC/IM: CPT

## 2024-04-29 PROCEDURE — 93005 ELECTROCARDIOGRAM TRACING: CPT

## 2024-04-29 PROCEDURE — 73502 X-RAY EXAM HIP UNI 2-3 VIEWS: CPT

## 2024-04-29 PROCEDURE — 80048 BASIC METABOLIC PNL TOTAL CA: CPT

## 2024-04-29 PROCEDURE — 93010 ELECTROCARDIOGRAM REPORT: CPT | Performed by: INTERNAL MEDICINE

## 2024-04-29 PROCEDURE — 99285 EMERGENCY DEPT VISIT HI MDM: CPT

## 2024-04-29 RX ORDER — ACETAMINOPHEN 325 MG/1
650 TABLET ORAL EVERY 6 HOURS PRN
Qty: 60 TABLET | Refills: 0 | Status: SHIPPED | OUTPATIENT
Start: 2024-04-29

## 2024-04-29 RX ORDER — KETOROLAC TROMETHAMINE 30 MG/ML
15 INJECTION, SOLUTION INTRAMUSCULAR; INTRAVENOUS ONCE
Status: COMPLETED | OUTPATIENT
Start: 2024-04-29 | End: 2024-04-29

## 2024-04-29 RX ORDER — IBUPROFEN 600 MG/1
600 TABLET ORAL EVERY 6 HOURS PRN
Qty: 60 TABLET | Refills: 0 | Status: SHIPPED | OUTPATIENT
Start: 2024-04-29 | End: 2024-05-14

## 2024-04-29 RX ORDER — ACETAMINOPHEN 325 MG/1
975 TABLET ORAL ONCE
Status: COMPLETED | OUTPATIENT
Start: 2024-04-29 | End: 2024-04-29

## 2024-04-29 RX ADMIN — ACETAMINOPHEN 975 MG: 325 TABLET ORAL at 14:50

## 2024-04-29 RX ADMIN — KETOROLAC TROMETHAMINE 15 MG: 30 INJECTION, SOLUTION INTRAMUSCULAR; INTRAVENOUS at 14:50
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
## 2024-04-29 NOTE — DISCHARGE INSTRUCTIONS
Rotate Tylenol and Motrin every 3 hours for best relief. For example, take Motrin then in 3 hours take Tylenol then 3 hours Motrin, repeat. Maximum Tylenol daily: 4,000 mg. Maximum ibuprofen daily: 3,600 mg.  Please do not use the crutches unless needed.   Warm compresses  Light stretching  Follow up with your PCP

## 2024-04-30 ENCOUNTER — SOCIAL WORK (OUTPATIENT)
Dept: BEHAVIORAL/MENTAL HEALTH CLINIC | Facility: CLINIC | Age: 39
End: 2024-04-30

## 2024-04-30 DIAGNOSIS — F31.9 BIPOLAR DISORDER WITH DEPRESSION (HCC): Primary | ICD-10-CM
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
## 2024-04-30 NOTE — TELEPHONE ENCOUNTER
Fax from [facility] with [place] approval for Vraylar 1.5 MG capsule 4/26/24 - 4/26/25.    Called pharmacy and updated them with approval.    Called Geno and informed her of approval.

## 2024-05-02 ENCOUNTER — OFFICE VISIT (OUTPATIENT)
Dept: FAMILY MEDICINE CLINIC | Facility: CLINIC | Age: 39
End: 2024-05-02

## 2024-05-02 ENCOUNTER — TELEPHONE (OUTPATIENT)
Dept: FAMILY MEDICINE CLINIC | Facility: CLINIC | Age: 39
End: 2024-05-02

## 2024-05-02 VITALS
SYSTOLIC BLOOD PRESSURE: 119 MMHG | OXYGEN SATURATION: 97 % | RESPIRATION RATE: 19 BRPM | HEART RATE: 81 BPM | WEIGHT: 186 LBS | BODY MASS INDEX: 31.76 KG/M2 | HEIGHT: 64 IN | DIASTOLIC BLOOD PRESSURE: 81 MMHG | TEMPERATURE: 97.6 F

## 2024-05-02 DIAGNOSIS — E66.09 CLASS 1 OBESITY DUE TO EXCESS CALORIES WITHOUT SERIOUS COMORBIDITY WITH BODY MASS INDEX (BMI) OF 32.0 TO 32.9 IN ADULT: ICD-10-CM

## 2024-05-02 DIAGNOSIS — M25.561 CHRONIC PAIN OF RIGHT KNEE: Primary | ICD-10-CM

## 2024-05-02 DIAGNOSIS — G89.29 CHRONIC PAIN OF RIGHT KNEE: Primary | ICD-10-CM

## 2024-05-02 PROCEDURE — 99213 OFFICE O/P EST LOW 20 MIN: CPT | Performed by: FAMILY MEDICINE
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
## 2024-05-02 NOTE — ASSESSMENT & PLAN NOTE
BMI Counseling: Body mass index is 31.76 kg/m². The BMI is above normal. Nutrition recommendations include decreasing portion sizes, encouraging healthy choices of fruits and vegetables, decreasing fast food intake, consuming healthier snacks and limiting drinks that contain sugar. Exercise recommendations include moderate physical activity 150 minutes/week, vigorous physical activity 75 minutes/week and exercising 3-5 times per week. Pharmacotherapy was ordered to help aid in weight loss. Patient referred to nutritionist and PCP. Rationale for BMI follow-up plan is due to patient being overweight or obese.     Failed 6 months of conservative treatment with lifestyle and exercise.   Will start Orlistat 120 mg PO x3 daily ordered. Prior Auth sign and fax to patient insurance.   Last Hct negative 2 months ago  Recheck HCG prior to starting Orlistat  Follow up with PCP
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
## 2024-05-02 NOTE — TELEPHONE ENCOUNTER
Prior Auth for Orlistat was filled out by pcp during visit and faxed at check out. Patient informed we received confirmation back. Scanned into chart.

## 2024-05-13 ENCOUNTER — TELEPHONE (OUTPATIENT)
Dept: PSYCHIATRY | Facility: CLINIC | Age: 39
End: 2024-05-13

## 2024-05-13 NOTE — TELEPHONE ENCOUNTER
Writer called and left message to cancel and reschedule appointment that was scheduled for today with Keenan Vasquez.

## 2024-05-15 ENCOUNTER — OFFICE VISIT (OUTPATIENT)
Dept: PSYCHIATRY | Facility: CLINIC | Age: 39
End: 2024-05-15
Payer: COMMERCIAL

## 2024-05-15 DIAGNOSIS — F11.21 OPIOID USE DISORDER, MODERATE, IN EARLY REMISSION, ON MAINTENANCE THERAPY, DEPENDENCE (HCC): Primary | ICD-10-CM

## 2024-05-15 PROCEDURE — H0006 ALCOHOL AND/OR DRUG SERVICES: HCPCS

## 2024-05-20 ENCOUNTER — TELEMEDICINE (OUTPATIENT)
Dept: BEHAVIORAL/MENTAL HEALTH CLINIC | Facility: CLINIC | Age: 39
End: 2024-05-20

## 2024-05-20 DIAGNOSIS — F31.9 BIPOLAR DISORDER WITH DEPRESSION (HCC): Primary | ICD-10-CM
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
## 2024-05-21 ENCOUNTER — OFFICE VISIT (OUTPATIENT)
Dept: PSYCHIATRY | Facility: CLINIC | Age: 39
End: 2024-05-21

## 2024-05-21 DIAGNOSIS — F11.21 OPIOID USE DISORDER, MODERATE, IN EARLY REMISSION, ON MAINTENANCE THERAPY, DEPENDENCE (HCC): Primary | ICD-10-CM

## 2024-05-22 DIAGNOSIS — F41.9 ANXIETY: ICD-10-CM

## 2024-05-22 DIAGNOSIS — F31.4 BIPOLAR DISORDER, CURRENT EPISODE DEPRESSED, SEVERE, WITHOUT PSYCHOTIC FEATURES (HCC): ICD-10-CM

## 2024-05-22 RX ORDER — CLONAZEPAM 1 MG/1
1 TABLET ORAL 3 TIMES DAILY
Qty: 90 TABLET | Refills: 1 | Status: SHIPPED | OUTPATIENT
Start: 2024-05-22

## 2024-05-22 NOTE — PSYCH
Geno Lopez  05/22/24  Start Time: 1010  Stop Time: 1110  Total Visit Time: 60 minutes    Visit type: In person    Recovery needs addressed during session: Basic Needs    Notes (DAP Format)  DATA: Patient presented for in-person, scheduled visit with this CM.     Patient and this CM completed the forms required for patient to attend appointment with Social Security Disability. Patient has an appointment June 13th, 2024.    ASSESSMENT: Patient presented well groomed, somewhat flat affect. Patient does still feel to be hacked by neighbors. Does feel at times to be paranoid, but does not like referring to current feelings by that term.   Patient did not present as a danger to self or others, not concern of SI/HI    PLAN: Patient is to remain on a recurring schedule with this CM, alternating between psychotherapist.     Referrals made during this visit: [employer]    Recovery connections: Psychotherapy, Psychiatry     Next appointment: follow up in two weeks

## 2024-05-25 DIAGNOSIS — F31.81 BIPOLAR 2 DISORDER (HCC): ICD-10-CM

## 2024-05-28 ENCOUNTER — TELEPHONE (OUTPATIENT)
Dept: PSYCHIATRY | Facility: CLINIC | Age: 39
End: 2024-05-28

## 2024-05-28 RX ORDER — TOPIRAMATE 100 MG/1
TABLET, FILM COATED ORAL
Qty: 90 TABLET | Refills: 3 | Status: SHIPPED | OUTPATIENT
Start: 2024-05-28

## 2024-05-28 NOTE — TELEPHONE ENCOUNTER
Unable to leave voicemail informing patient of the Psych Encounter form needing to be signed as a requirement from the insurance company for billing purposes. If patients contacts office, please make patient aware that the form can be accessed via [employer] to sign electronically and must be signed for each visit as a requirement to continue future visits with provider.

## 2024-06-04 ENCOUNTER — OFFICE VISIT (OUTPATIENT)
Dept: PSYCHIATRY | Facility: CLINIC | Age: 39
End: 2024-06-04

## 2024-06-04 DIAGNOSIS — F11.20 OPIOID DEPENDENCE ON AGONIST THERAPY (HCC): Primary | ICD-10-CM

## 2024-06-04 NOTE — PSYCH
Geno Lopez  06/04/24  Start Time: 1005  Stop Time: 1100  Total Visit Time: 55 minutes    Visit type: In person    Recovery needs addressed during session: Basic Needs    Notes (DAP Format)  DATA: Patient presented for in-person, scheduled visit with this CM.     Patient advised this CM of emails continuing to be hacked as patient does not have all paperwork that was given from courts from custody hearings.   Patient and this CM discussed that if paperwork is ever needed, this CM can assist in outreaching necessary [facility] courts to obtain.     Patient stated the desire to present to [facility] tomorrow in order to file for custody modification.    ASSESSMENT: Patient presented well-groomed, flat affect.   Patient did express feelings of paranoia due to neighbors constantly knowing patient's everyday behavior or feeling as though neighbors are logging in too patient's personal information  Patient does feel the stress of paternal grandmother has taken a toll on patient's overall mental health. Patient does not express concern of SI/HI    PLAN: This CM did offer to clean out patient's email, patient did decline,    Patient will continue to meet with this CM biweekly as well as assigned treatment team, including [facility] as well as Psychiatrist     Referrals made during this visit: No formal referrals made during this session     Recovery connections: Psychiatry, Psychotherapy    Next appointment: follow up in two weeks

## 2024-06-07 ENCOUNTER — OFFICE VISIT (OUTPATIENT)
Dept: PSYCHIATRY | Facility: CLINIC | Age: 39
End: 2024-06-07

## 2024-06-07 VITALS
DIASTOLIC BLOOD PRESSURE: 82 MMHG | SYSTOLIC BLOOD PRESSURE: 132 MMHG | HEART RATE: 80 BPM | WEIGHT: 193 LBS | HEIGHT: 64 IN | BODY MASS INDEX: 32.95 KG/M2

## 2024-06-07 DIAGNOSIS — F11.91 OPIOID USE DISORDER IN REMISSION: ICD-10-CM

## 2024-06-07 DIAGNOSIS — Z72.0 SMOKING TRYING TO QUIT: ICD-10-CM

## 2024-06-07 DIAGNOSIS — F41.1 GAD (GENERALIZED ANXIETY DISORDER): ICD-10-CM

## 2024-06-07 DIAGNOSIS — M79.2 NEUROPATHIC PAIN: ICD-10-CM

## 2024-06-07 DIAGNOSIS — F90.8 ATTENTION DEFICIT HYPERACTIVITY DISORDER (ADHD), OTHER TYPE: ICD-10-CM

## 2024-06-07 DIAGNOSIS — F41.9 ANXIETY: ICD-10-CM

## 2024-06-07 DIAGNOSIS — F32.A DEPRESSION, UNSPECIFIED DEPRESSION TYPE: ICD-10-CM

## 2024-06-07 DIAGNOSIS — F31.4 BIPOLAR DISORDER, CURRENT EPISODE DEPRESSED, SEVERE, WITHOUT PSYCHOTIC FEATURES (HCC): Primary | ICD-10-CM

## 2024-06-07 RX ORDER — ATOMOXETINE 18 MG/1
18 CAPSULE ORAL DAILY
Qty: 30 CAPSULE | Refills: 1 | Status: SHIPPED | OUTPATIENT
Start: 2024-06-07

## 2024-06-07 RX ORDER — BUPROPION HYDROCHLORIDE 150 MG/1
150 TABLET ORAL DAILY
Qty: 30 TABLET | Refills: 2 | Status: SHIPPED | OUTPATIENT
Start: 2024-06-07

## 2024-06-07 RX ORDER — DULOXETIN HYDROCHLORIDE 30 MG/1
30 CAPSULE, DELAYED RELEASE ORAL DAILY
Qty: 30 CAPSULE | Refills: 4 | Status: SHIPPED | OUTPATIENT
Start: 2024-06-07

## 2024-06-07 RX ORDER — GABAPENTIN 300 MG/1
300 CAPSULE ORAL 2 TIMES DAILY
Qty: 60 CAPSULE | Refills: 1 | Status: SHIPPED | OUTPATIENT
Start: 2024-06-07

## 2024-06-07 RX ORDER — CLONAZEPAM 1 MG/1
1 TABLET ORAL 3 TIMES DAILY
Qty: 90 TABLET | Refills: 1 | Status: SHIPPED | OUTPATIENT
Start: 2024-06-07

## 2024-06-07 RX ORDER — HYDROXYZINE HYDROCHLORIDE 10 MG/1
10 TABLET, FILM COATED ORAL
Qty: 30 TABLET | Refills: 3 | Status: SHIPPED | OUTPATIENT
Start: 2024-06-07
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
## 2024-06-11 ENCOUNTER — TELEPHONE (OUTPATIENT)
Dept: SPEECH THERAPY | Facility: REHABILITATION | Age: 39
End: 2024-06-11

## 2024-06-11 NOTE — TELEPHONE ENCOUNTER
Left message with patient to confirm upcoming appointment scheduled 6/13/24. Due to history of cancellations and missed visit, requesting patient call to confirm appointment in order to keep scheduled appointment.

## 2024-06-12 LAB
4OH-XYLAZINE UR QL CFM: NEGATIVE NG/ML
6MAM UR QL CFM: NEGATIVE NG/ML
7AMINOCLONAZEPAM UR QL CFM: NORMAL NG/ML
A-OH ALPRAZ UR QL CFM: NEGATIVE NG/ML
ACCEPTABLE CREAT UR QL: NORMAL MG/DL
ACCEPTIBLE SP GR UR QL: NORMAL
AMPHET UR QL CFM: NEGATIVE NG/ML
BUPRENORPHINE UR QL CFM: NORMAL NG/ML
BUTALBITAL UR QL CFM: NEGATIVE NG/ML
BZE UR QL CFM: NEGATIVE NG/ML
CODEINE UR QL CFM: NEGATIVE NG/ML
EDDP UR QL CFM: NEGATIVE NG/ML
ETHYL GLUCURONIDE UR QL CFM: NEGATIVE NG/ML
ETHYL SULFATE UR QL SCN: NEGATIVE NG/ML
EUTYLONE UR QL: NEGATIVE NG/ML
FENTANYL UR QL CFM: NEGATIVE NG/ML
GLIADIN IGG SER IA-ACNC: NEGATIVE NG/ML
HYDROCODONE UR QL CFM: NEGATIVE NG/ML
HYDROMORPHONE UR QL CFM: NEGATIVE NG/ML
LORAZEPAM UR QL CFM: NEGATIVE NG/ML
ME-PHENIDATE UR QL CFM: NEGATIVE NG/ML
MEPERIDINE UR QL CFM: NEGATIVE NG/ML
METHADONE UR QL CFM: NEGATIVE NG/ML
METHAMPHET UR QL CFM: NEGATIVE NG/ML
MORPHINE UR QL CFM: NEGATIVE NG/ML
NALTREXONE UR QL CFM: NEGATIVE NG/ML
NITRITE UR QL: NORMAL UG/ML
NORBUPRENORPHINE UR QL CFM: NORMAL NG/ML
NORDIAZEPAM UR QL CFM: NEGATIVE NG/ML
NORFENTANYL UR QL CFM: NEGATIVE NG/ML
NORHYDROCODONE UR QL CFM: NEGATIVE NG/ML
NORMEPERIDINE UR QL CFM: NEGATIVE NG/ML
NOROXYCODONE UR QL CFM: NEGATIVE NG/ML
OXAZEPAM UR QL CFM: NEGATIVE NG/ML
OXYCODONE UR QL CFM: NEGATIVE NG/ML
OXYMORPHONE UR QL CFM: NEGATIVE NG/ML
PARA-FLUOROFENTANYL QUANTIFICATION: NORMAL NG/ML
PHENOBARB UR QL CFM: NEGATIVE NG/ML
RESULT ALL_PRESCRIBED MEDS AND SPECIAL INSTRUCTIONS: NORMAL
SECOBARBITAL UR QL CFM: NEGATIVE NG/ML
SL AMB 4-ANPP QUANTIFICATION: NORMAL NG/ML
SL AMB 5F-ADB-M7 METABOLITE QUANTIFICATION: NEGATIVE NG/ML
SL AMB 7-OH-MITRAGYNINE (KRATOM ALKALOID) QUANTIFICATION: NEGATIVE NG/ML
SL AMB AB-FUBINACA-M3 METABOLITE QUANTIFICATION: NEGATIVE NG/ML
SL AMB ACETYL FENTANYL QUANTIFICATION: NORMAL NG/ML
SL AMB ACETYL NORFENTANYL QUANTIFICATION: NORMAL NG/ML
SL AMB ACRYL FENTANYL QUANTIFICATION: NORMAL NG/ML
SL AMB CARFENTANIL QUANTIFICATION: NORMAL NG/ML
SL AMB CTHC (MARIJUANA METABOLITE) QUANTIFICATION: ABNORMAL NG/ML
SL AMB DEXTRORPHAN (DEXTROMETHORPHAN METABOLITE) QUANT: NEGATIVE NG/ML
SL AMB GABAPENTIN QUANTIFICATION: NORMAL NG/ML
SL AMB JWH018 METABOLITE QUANTIFICATION: NEGATIVE NG/ML
SL AMB JWH073 METABOLITE QUANTIFICATION: NEGATIVE NG/ML
SL AMB MDMB-FUBINACA-M1 METABOLITE QUANTIFICATION: NEGATIVE NG/ML
SL AMB METHYLONE QUANTIFICATION: NEGATIVE NG/ML
SL AMB N-DESMETHYL-TRAMADOL QUANTIFICATION: NEGATIVE NG/ML
SL AMB PHENTERMINE QUANTIFICATION: NEGATIVE NG/ML
SL AMB PREGABALIN QUANTIFICATION: NEGATIVE NG/ML
SL AMB RCS4 METABOLITE QUANTIFICATION: NEGATIVE NG/ML
SL AMB RITALINIC ACID QUANTIFICATION: NEGATIVE NG/ML
SMOOTH MUSCLE AB TITR SER IF: NEGATIVE NG/ML
SPECIMEN DRAWN SERPL: NEGATIVE NG/ML
SPECIMEN PH ACCEPTABLE UR: NORMAL
TAPENTADOL UR QL CFM: NEGATIVE NG/ML
TEMAZEPAM UR QL CFM: NEGATIVE NG/ML
TRAMADOL UR QL CFM: NEGATIVE NG/ML
URATE/CREAT 24H UR: NEGATIVE NG/ML
XYLAZINE UR QL CFM: NEGATIVE NG/ML

## 2024-06-13 ENCOUNTER — TELEPHONE (OUTPATIENT)
Dept: PSYCHIATRY | Facility: CLINIC | Age: 39
End: 2024-06-13

## 2024-06-14 ENCOUNTER — TELEMEDICINE (OUTPATIENT)
Dept: BEHAVIORAL/MENTAL HEALTH CLINIC | Facility: CLINIC | Age: 39
End: 2024-06-14

## 2024-06-14 DIAGNOSIS — F31.9 BIPOLAR DISORDER WITH DEPRESSION (HCC): Primary | ICD-10-CM
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
## 2024-06-18 ENCOUNTER — HOSPITAL ENCOUNTER (EMERGENCY)
Facility: HOSPITAL | Age: 39
Discharge: HOME | End: 2024-06-18
Payer: COMMERCIAL

## 2024-06-18 ENCOUNTER — OFFICE VISIT (OUTPATIENT)
Dept: PSYCHIATRY | Facility: CLINIC | Age: 39
End: 2024-06-18
Payer: COMMERCIAL

## 2024-06-18 VITALS
OXYGEN SATURATION: 97 % | TEMPERATURE: 98.6 F | HEART RATE: 83 BPM | WEIGHT: 194 LBS | DIASTOLIC BLOOD PRESSURE: 79 MMHG | SYSTOLIC BLOOD PRESSURE: 102 MMHG | RESPIRATION RATE: 16 BRPM | BODY MASS INDEX: 33.3 KG/M2

## 2024-06-18 DIAGNOSIS — F11.91 OPIOID USE DISORDER IN REMISSION: Primary | ICD-10-CM

## 2024-06-18 DIAGNOSIS — S61.216A LACERATION OF RIGHT LITTLE FINGER: Primary | ICD-10-CM

## 2024-06-18 PROCEDURE — 12001 RPR S/N/AX/GEN/TRNK 2.5CM/<: CPT

## 2024-06-18 PROCEDURE — 99282 EMERGENCY DEPT VISIT SF MDM: CPT

## 2024-06-18 PROCEDURE — 99284 EMERGENCY DEPT VISIT MOD MDM: CPT

## 2024-06-18 PROCEDURE — H0006 ALCOHOL AND/OR DRUG SERVICES: HCPCS

## 2024-06-18 RX ORDER — LIDOCAINE HYDROCHLORIDE AND EPINEPHRINE 10; 10 MG/ML; UG/ML
10 INJECTION, SOLUTION INFILTRATION; PERINEURAL ONCE
Status: COMPLETED | OUTPATIENT
Start: 2024-06-18 | End: 2024-06-18

## 2024-06-18 RX ADMIN — LIDOCAINE HYDROCHLORIDE,EPINEPHRINE BITARTRATE 10 ML: 10; .01 INJECTION, SOLUTION INFILTRATION; PERINEURAL at 11:02

## 2024-06-18 NOTE — DISCHARGE INSTRUCTIONS
- Wash wound with soap and water only  - Pat dry  - Do not use topical alcohols, peroxide, or iodine  - Apply a thin layer of petroleum jelly to wound  - May cover with gauze  - Repeat all of the above steps twice daily  - Suture removal in 7 days (3 stitches in place)  - Use sunscreen over the scar for the next year to minimize scar formation  - If scar becomes hypertrophied, may use silicone scar gel BID
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
## 2024-06-19 ENCOUNTER — TELEPHONE (OUTPATIENT)
Dept: PSYCHIATRY | Facility: CLINIC | Age: 39
End: 2024-06-19

## 2024-06-19 NOTE — TELEPHONE ENCOUNTER
Left voicemail informing patient and/or parent/guardian of the Psych Encounter form needing to be signed as a requirement from the insurance company for billing purposes. Patient can access form via AIM and sign electronically.     Please make patient aware this form must be signed for each visit as a requirement to continue future visits with provider.

## 2024-06-24 ENCOUNTER — TELEPHONE (OUTPATIENT)
Dept: PSYCHIATRY | Facility: CLINIC | Age: 39
End: 2024-06-24

## 2024-06-25 ENCOUNTER — TELEPHONE (OUTPATIENT)
Dept: OBGYN CLINIC | Facility: MEDICAL CENTER | Age: 39
End: 2024-06-25
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
## 2024-07-03 ENCOUNTER — TELEPHONE (OUTPATIENT)
Dept: BEHAVIORAL/MENTAL HEALTH CLINIC | Facility: CLINIC | Age: 39
End: 2024-07-03

## 2024-07-03 NOTE — TELEPHONE ENCOUNTER
Called and spoke with patient regarding the medicaid renewal packet that was sent to her.  Patient stated she had already completed the renewal packet and sent it back.

## 2024-07-05 ENCOUNTER — TELEPHONE (OUTPATIENT)
Dept: PSYCHIATRY | Facility: CLINIC | Age: 39
End: 2024-07-05

## 2024-07-09 ENCOUNTER — TELEMEDICINE (OUTPATIENT)
Dept: BEHAVIORAL/MENTAL HEALTH CLINIC | Facility: CLINIC | Age: 39
End: 2024-07-09
Payer: COMMERCIAL

## 2024-07-09 DIAGNOSIS — F31.9 BIPOLAR DISORDER WITH DEPRESSION (HCC): Primary | ICD-10-CM

## 2024-07-09 PROCEDURE — 90837 PSYTX W PT 60 MINUTES: CPT | Performed by: COUNSELOR
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
## 2024-07-15 ENCOUNTER — TELEPHONE (OUTPATIENT)
Dept: PSYCHIATRY | Facility: CLINIC | Age: 39
End: 2024-07-15

## 2024-07-15 NOTE — TELEPHONE ENCOUNTER
Left voicemail informing patient and/or parent/guardian of the Psych Encounter form needing to be signed as a requirement from the insurance company for billing purposes. Patient can access form via [employer] and sign electronically.     Please make patient aware this form must be signed for each visit as a requirement to continue future visits with provider.    Encounter form 7/9

## 2024-07-17 ENCOUNTER — OFFICE VISIT (OUTPATIENT)
Dept: PSYCHIATRY | Facility: CLINIC | Age: 39
End: 2024-07-17
Payer: COMMERCIAL

## 2024-07-17 DIAGNOSIS — F11.91 OPIOID USE DISORDER IN REMISSION: Primary | ICD-10-CM

## 2024-07-17 PROCEDURE — H0006 ALCOHOL AND/OR DRUG SERVICES: HCPCS

## 2024-07-22 ENCOUNTER — TELEPHONE (OUTPATIENT)
Dept: PSYCHIATRY | Facility: CLINIC | Age: 39
End: 2024-07-22

## 2024-07-22 NOTE — PSYCH
Geno Lopez  07/22/24  Start Time: 0805  Stop Time: 0858  Total Visit Time: 53 minutes    Visit type: In person    Recovery needs addressed during session: Basic Needs, Family, Legal Status, Family Recovery, and Relationship & Social Support    Notes (DAP Format)  DATA: Patient presented for in-person, scheduled session with this CM.  Patient questioned ongoing sessions with this CM to which this CM made it a point for patient to understand that due to patient missing the last two sessions, the continued, recurring appointments were unable to be scheduled to which patient thought additional appointments had been cancelled. Patient and this CM have now scheduled biweekly appointments in conjunction with psychotherapy appointments through new year.     Patient has been working with  to file a contempt order against paternal grandmother.   This CM did obtain ALOK for Karson Boyd.   This CM spoke with Rodrigo, who is ultimately requesting an updated Psych eval, patient's progress notes from patient's psychiatrist and Psychotherapist as well as all collected UDS within the last six months. Patient is okay with this request to which this CM did complete medical request documention in order for this paperwork to get to Rodrigo.   Patient has yet to see minor child as maternal grandmother has not been compliant with an agreeable supervising adult. Patient did state that paternal grandmother is okay with patient's sister supervising visits, but sister does not want grandmother to know where she lives. This CM understands this condition but does want patient to speak with sister, hoping for sister to better understand the circumstances and come up with a mutual agreement, whether that means paternal grandmother is unable to go into the home, etc.     Patient and significant other have been getting along better.  Patient has been keeping track of log-in information to ensure that patient is not continually signing self  out of apps/programs on phone as patients came to conclusions that most of the thought process behind identity theft was due to patient not remembering passwords.   Patient has one more payment on an additional phone that patient is currently paying for - patient then plans to sell phone in order to gain additional money and reduce overall monthly bill.     ASSESSMENT: Patient did present well groomed, alert and orient x4. Patient was organized during this session, reducing the number of notes and books to only two. Patient has been able to talk self through scenarios, as mentioned above - by understanding that during heighten points of paranoia,' patient will begin to sign self out of apps or use wrong passwords where patient will think someone else is signing into patient's phone, etc.  Patient did not present as a danger to self or others, no symptoms of SI/HI    PLAN: Patient will continue to meet with this CM biweekly as well as psychotherapist and psychiatrist.   This CM will speak with Psychiatrist as  is requesting patient to obtain an updated Psych Eval rather than an update to treatment plan, etc.   Patient does plan to stay with [facility] entirely as patient was also meeting with [facility] [place], but is no longer seeing [facility] there.     Referrals made during this visit No formal referrals    Recovery connections: Psychotherapy - [facility], Psychiatry    Next appointment two weeks

## 2024-07-22 NOTE — TELEPHONE ENCOUNTER
A medial records request was received 7/22/24 from  Rodrigo Boyd. The date range requested was 1/1/24-present.

## 2024-07-23 ENCOUNTER — SOCIAL WORK (OUTPATIENT)
Dept: BEHAVIORAL/MENTAL HEALTH CLINIC | Facility: CLINIC | Age: 39
End: 2024-07-23
Payer: COMMERCIAL

## 2024-07-23 DIAGNOSIS — F31.9 BIPOLAR DISORDER WITH DEPRESSION (HCC): Primary | ICD-10-CM

## 2024-07-23 PROCEDURE — 90834 PSYTX W PT 45 MINUTES: CPT | Performed by: COUNSELOR

## 2024-07-23 NOTE — PSYCH
"Behavioral Health Psychotherapy Progress Note    Psychotherapy Provided: Individual Psychotherapy     1. Bipolar disorder with depression (HCC)            Goals addressed in session: Goal 1     DATA: Geno and clinician discussed the progress she's had with her  and obtaining the information to get her child back. We discussed the progress she's had with practicing her self care routine. Clinician commended her being proactive and intentionally utilizing exercise and other activities to help with maintaining her mood. Clinician highlighted how these exercises have affected her mood in a positive way. We then began to discuss trauma, clinician and Geno processed what is trauma and how it affects us. We then began completing her trauma timeline. During this session, this clinician used the following therapeutic modalities: Engagement Strategies, Client-centered Therapy, Cognitive Processing Therapy, Mindfulness-based Strategies, Motivational Interviewing, Solution-Focused Therapy, and Supportive Psychotherapy    Substance Abuse was addressed during this session. If the client is diagnosed with a co-occurring substance use disorder, please indicate any changes in the frequency or amount of use: Geno denies any current substance abuse. Stage of change for addressing substance use diagnoses: Maintenance    ASSESSMENT:  Geno Lopez presents with a Euthymic/ normal and Labile mood.     her affect is Normal range and intensity and Tearful, which is congruent, with her mood and the content of the session. The client has made progress on their goals.    Geno denies suicidal/homicidal ideations or self-injurious behaviors.  Geno Lopez presents with a none risk of suicide, none risk of self-harm, and none risk of harm to others.    For any risk assessment that surpasses a \"low\" rating, a safety plan must be developed.    A safety plan was indicated: no  If yes, describe in detail safety " plan not indicated.    PLAN: Between sessions, Geno Lopez will continue utilizing coping skills and take time write any memories of her past she'd like to discuss in session. At the next session, the therapist will use Engagement Strategies, Client-centered Therapy, Cognitive Behavioral Therapy, Cognitive Processing Therapy, Dialectical Behavior Therapy, Mindfulness-based Strategies, Motivational Interviewing, Solution-Focused Therapy, and Supportive Psychotherapy to continue working on her trauma timeline.    Behavioral Health Treatment Plan and Discharge Planning: Geno Lopez is aware of and agrees to continue to work on their treatment plan. They have identified and are working toward their discharge goals. yes    Visit start and stop times:    07/23/24  Start Time: 1300  Stop Time: 1352  Total Visit Time: 52 minutes

## 2024-07-30 ENCOUNTER — TELEPHONE (OUTPATIENT)
Dept: PSYCHIATRY | Facility: CLINIC | Age: 39
End: 2024-07-30

## 2024-07-31 ENCOUNTER — TELEMEDICINE (OUTPATIENT)
Dept: PSYCHIATRY | Facility: CLINIC | Age: 39
End: 2024-07-31
Payer: COMMERCIAL

## 2024-07-31 DIAGNOSIS — F11.21 OPIOID USE DISORDER, MODERATE, IN EARLY REMISSION, ON MAINTENANCE THERAPY, DEPENDENCE (HCC): Primary | Chronic | ICD-10-CM

## 2024-07-31 PROCEDURE — H0006 ALCOHOL AND/OR DRUG SERVICES: HCPCS

## 2024-08-02 NOTE — PSYCH
Geno Lopez  07/31/2024  Start Time: 1400  Stop Time: 1430  Total Visit Time: 30 minutes    Visit type: Virtual    Recovery needs addressed during session: Legal Status    Notes (DAP Format)  DATA:   ' Telemedicine consent    Patient: Geno Lopez  Provider: Marlyn Pena  [facility]  [address]  [facility] [id number] [phone]    The patient was identified by name and date of birth. Geno Lopez was informed that this is a telemedicine visit and that the visit is being conducted through the Epic Embedded platform. She agrees to proceed..  My office door was closed. No one else was in the room.  She acknowledged consent and understanding of privacy and security of the video platform. The patient has agreed to participate and understands they can discontinue the visit at any time.    Patient is aware this is a billable service.     I spent 30 minutes with the patient during this visit.'    Patient and this CM discussed rescheduling previous visit as this CM was OOO. Patient requested this visit be virtual as patient does not do well with afternoon visits following afternoon medication.   Patient and this CM discussed necessary paperwork being sent to .   Patient states paternal grandmother is requesting sister and mother obtain background checks prior to supervising visits with minor child of patient which is not written in custody agreement. Patient has been experiencing higher stress due to the inability to spend time with minor child and paternal grandmother not abiding by current custody agreement.     ASSESSMENT: Patient did present in home during this visit. Patient did present drowsy, relating this to afternoon medication.   Patient did not present as a concern to this CM, low risk SI/HI.     PLAN: Patient will meet with this CM in-person during next scheduled visit.    Patient will continue to meet with assigned treatment team .  Patient and this CM discussed patient missing scheduled visit with Dr. PARK and the importance of keeping visit to which patient was understanding and upset with self for missing visit.   This CM will keep eye out for cancellation as patient will not be able to be seen until October     Referrals made during this visit: No formal referrals made during this visit     Recovery connections: Psychiatry, Psychotherapy     Next appointment two weeks

## 2024-08-03 DIAGNOSIS — F90.8 ATTENTION DEFICIT HYPERACTIVITY DISORDER (ADHD), OTHER TYPE: ICD-10-CM

## 2024-08-05 ENCOUNTER — TELEPHONE (OUTPATIENT)
Dept: PSYCHIATRY | Facility: CLINIC | Age: 39
End: 2024-08-05

## 2024-08-05 RX ORDER — ATOMOXETINE 18 MG/1
18 CAPSULE ORAL DAILY
Qty: 30 CAPSULE | Refills: 1 | Status: SHIPPED | OUTPATIENT
Start: 2024-08-05 | End: 2024-08-13 | Stop reason: SDUPTHER

## 2024-08-05 NOTE — TELEPHONE ENCOUNTER
Please call Geno to schedule an appointment with Dr. Payton so I can send rx request to a covering provider, thanks!

## 2024-08-06 ENCOUNTER — TELEMEDICINE (OUTPATIENT)
Dept: BEHAVIORAL/MENTAL HEALTH CLINIC | Facility: CLINIC | Age: 39
End: 2024-08-06
Payer: COMMERCIAL

## 2024-08-06 DIAGNOSIS — F31.9 BIPOLAR DISORDER WITH DEPRESSION (HCC): Primary | ICD-10-CM

## 2024-08-06 PROCEDURE — 90834 PSYTX W PT 45 MINUTES: CPT | Performed by: COUNSELOR
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
## 2024-08-13 ENCOUNTER — OFFICE VISIT (OUTPATIENT)
Dept: PSYCHIATRY | Facility: CLINIC | Age: 39
End: 2024-08-13
Payer: COMMERCIAL

## 2024-08-13 VITALS
BODY MASS INDEX: 33.12 KG/M2 | DIASTOLIC BLOOD PRESSURE: 70 MMHG | WEIGHT: 194 LBS | SYSTOLIC BLOOD PRESSURE: 106 MMHG | HEIGHT: 64 IN | HEART RATE: 78 BPM

## 2024-08-13 DIAGNOSIS — F31.4 BIPOLAR DISORDER, CURRENT EPISODE DEPRESSED, SEVERE, WITHOUT PSYCHOTIC FEATURES (HCC): Primary | ICD-10-CM

## 2024-08-13 DIAGNOSIS — F41.9 ANXIETY: ICD-10-CM

## 2024-08-13 DIAGNOSIS — M79.2 NEUROPATHIC PAIN: ICD-10-CM

## 2024-08-13 DIAGNOSIS — Z72.0 SMOKING TRYING TO QUIT: ICD-10-CM

## 2024-08-13 DIAGNOSIS — F32.A DEPRESSION, UNSPECIFIED DEPRESSION TYPE: ICD-10-CM

## 2024-08-13 DIAGNOSIS — F11.21 OPIOID USE DISORDER, MODERATE, IN EARLY REMISSION, ON MAINTENANCE THERAPY, DEPENDENCE (HCC): Primary | Chronic | ICD-10-CM

## 2024-08-13 DIAGNOSIS — F41.1 GAD (GENERALIZED ANXIETY DISORDER): ICD-10-CM

## 2024-08-13 DIAGNOSIS — J45.909 ASTHMA, UNSPECIFIED ASTHMA SEVERITY, UNSPECIFIED WHETHER COMPLICATED, UNSPECIFIED WHETHER PERSISTENT: ICD-10-CM

## 2024-08-13 DIAGNOSIS — G47.00 INSOMNIA, UNSPECIFIED TYPE: ICD-10-CM

## 2024-08-13 DIAGNOSIS — F90.8 ATTENTION DEFICIT HYPERACTIVITY DISORDER (ADHD), OTHER TYPE: ICD-10-CM

## 2024-08-13 PROCEDURE — 99214 OFFICE O/P EST MOD 30 MIN: CPT | Performed by: PSYCHIATRY & NEUROLOGY

## 2024-08-13 PROCEDURE — 90833 PSYTX W PT W E/M 30 MIN: CPT | Performed by: PSYCHIATRY & NEUROLOGY

## 2024-08-13 PROCEDURE — H0006 ALCOHOL AND/OR DRUG SERVICES: HCPCS

## 2024-08-13 RX ORDER — CYCLOBENZAPRINE HCL 10 MG
TABLET ORAL
Qty: 90 TABLET | Refills: 3 | OUTPATIENT
Start: 2024-08-13

## 2024-08-13 RX ORDER — BUPROPION HYDROCHLORIDE 300 MG/1
300 TABLET ORAL DAILY
Qty: 30 TABLET | Refills: 4 | Status: SHIPPED | OUTPATIENT
Start: 2024-08-13

## 2024-08-13 RX ORDER — CLONAZEPAM 1 MG/1
TABLET ORAL
Qty: 75 TABLET | Refills: 2 | Status: SHIPPED | OUTPATIENT
Start: 2024-08-13

## 2024-08-13 RX ORDER — ATOMOXETINE 18 MG/1
18 CAPSULE ORAL DAILY
Qty: 30 CAPSULE | Refills: 4 | Status: SHIPPED | OUTPATIENT
Start: 2024-08-13

## 2024-08-13 RX ORDER — TRAZODONE HYDROCHLORIDE 50 MG/1
50 TABLET, FILM COATED ORAL
Qty: 45 TABLET | Refills: 4 | Status: SHIPPED | OUTPATIENT
Start: 2024-08-13

## 2024-08-13 RX ORDER — GABAPENTIN 300 MG/1
300 CAPSULE ORAL 2 TIMES DAILY
Qty: 60 CAPSULE | Refills: 1 | Status: SHIPPED | OUTPATIENT
Start: 2024-08-13

## 2024-08-13 RX ORDER — DULOXETIN HYDROCHLORIDE 30 MG/1
30 CAPSULE, DELAYED RELEASE ORAL DAILY
Qty: 30 CAPSULE | Refills: 4 | Status: SHIPPED | OUTPATIENT
Start: 2024-08-13
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
## 2024-08-14 NOTE — BH TREATMENT PLAN
TREATMENT PLAN (Medication Management Only)        [facility]    Name and Date of Birth:  Geno Lopez 38 y.o. 1985  Date of Treatment Plan: August 13, 2024  Diagnosis/Diagnoses:    1. Insomnia, unspecified type    2. Smoking trying to quit    3. Depression, unspecified depression type    4. Bipolar disorder, current episode depressed, severe, without psychotic features (HCC)    5. Attention deficit hyperactivity disorder (ADHD), other type    6. Anxiety    7. SHAILA (generalized anxiety disorder)    8. Neuropathic pain      Strengths/Personal Resources for Self-Care: taking medications as prescribed.  Area/Areas of need (in own words): anxiety symptoms  1. Long Term Goal: improve control of acceptable anxiety level.  Target Date:6 months - 2/13/2025  Person/Persons responsible for completion of goal: Geno  2.  Short Term Objective (s) - How will we reach this goal?:   A. Provider new recommended medication/dosage changes and/or continue medication(s): continue current medications as prescribed.  B. N/A.  C. N/A.  Target Date:6 months - 2/13/2025  Person/Persons Responsible for Completion of Goal: Geno  Progress Towards Goals: continuing treatment  Treatment Modality: medication education at every visit  Review due 180 days from date of this plan: 6 months - 2/13/2025  Expected length of service: ongoing treatment  My Physician/PA/NP and I have developed this plan together and I agree to work on the goals and objectives. I understand the treatment goals that were developed for my treatment.

## 2024-08-19 ENCOUNTER — TELEPHONE (OUTPATIENT)
Dept: PSYCHIATRY | Facility: CLINIC | Age: 39
End: 2024-08-19

## 2024-08-19 NOTE — TELEPHONE ENCOUNTER
Left voicemail informing patient and/or parent/guardian of the Psych Encounter form needing to be signed as a requirement from the insurance company for billing purposes. Patient can access form via [email or web address] and sign electronically.     Please make patient aware this form must be signed for each visit as a requirement to continue future visits with provider.

## 2024-08-19 NOTE — PSYCH
Geno Lopez  08/13/2024  Start Time: 1015  Stop Time: 1055  Total Visit Time: 40 minutes    Visit type: In person    Recovery needs addressed during session: Basic Needs, Emotional/Mental Health, Legal Status, Family Recovery, and Relationship & Social Support    Notes (DAP Format)  DATA: Patient presented for in-person, scheduled visit with this CM.   Patient presented to this CM - credit reports to which patient wanted to review together to ensure patient's identity has not been stolen since 2022. Patient and this CM carefully reviewed each credit report requested through [employer], and [employer] to which each address and point of contact was able to be connect to patient as well as each hard inquiry.   Patient was unfamiliar with ' soft inquiries,' to which patient was under the impression that numerous accounts were being opened, but was able to understand that current accounts ran background checks on patient's current credit to ensure good standing which led to a ' soft inquiry,' on credit. Patient felt a sense of relief to have a better understanding of credit history as well as knowing idendity has, in fact not been stolen.     ASSESSMENT: Patient did present with a euthymic mood. Patient was able to discuss current stressors at home to which patient was able to identify current significant other as a main stressor as patient does not feel to have a sense of trust with significant other. Patient feels significant other continues to take belongings throughout the house. Patient and this CM discussed patient regaining a relationship with mother with the idea of relocating back to mother's house and reintegrating within that area.  Patient did not present as a concern to this CM. Patient did present Low risk SI/HI    PLAN: Patient will continue meeting with this CM as well as assigned treatment team.   This CM was able to reschedule patient's missed psychiatry appt for same day in order to address needs  for patient's custody mediation.   Patient will continue to meet with Psychotherapy    Referrals made during this visit: No formal referrals made during this session     Recovery connections: Psychiatry, Psychotherapy    Next appointment: two weeks

## 2024-08-27 ENCOUNTER — OFFICE VISIT (OUTPATIENT)
Dept: PSYCHIATRY | Facility: CLINIC | Age: 39
End: 2024-08-27
Payer: COMMERCIAL

## 2024-08-27 DIAGNOSIS — F11.21 OPIOID USE DISORDER, MODERATE, IN EARLY REMISSION, ON MAINTENANCE THERAPY, DEPENDENCE (HCC): Primary | Chronic | ICD-10-CM

## 2024-08-27 PROCEDURE — H0006 ALCOHOL AND/OR DRUG SERVICES: HCPCS

## 2024-08-29 NOTE — PSYCH
Geno Lopez  08/27/2024  Start Time: 1015  Stop Time: 1100  Total Visit Time: 45 minutes    Visit type: In person    Recovery needs addressed during session: Basic Needs, Emotional/Mental Health, and Living & Financial Millry    Notes (DAP Format)  DATA: Patient presented for in-person, scheduled visit with this CM.   Patient and this CM discussed patient's recent participation in therapy, or [id number] to which patient mentioned it was due to timing within the day, but did feel the desire to stay within therapy sessions with therapist.   Patient and this CM discussed the idea to move case management sessions to same day as therapy as patient seems to commit to CM quite often which would then gain support to patient with therapy. Patient was agreeable.     ASSESSMENT: Patient did present well groomed, alert and oriented.   Patient discussed current relationship and the struggle to maintain positive outlook on overall relationship as significant other seems to play a large role within patients paranoia.   Patient also began discussing recent telephone call with minor child where child stated to be happy where child was living to which patient stated this transition may have been a good decision. This CM did mention to patient that though this is temporary, patient must still look to at this optimistic viewpoints during this time.   Patient did not present as a concern to this CM, low risk SI/HI    Referrals made during this visit: no formal referrals made during this session     Recovery connections: Psychiatry, Psychotherapy     Next appointment: 09/17/2024

## 2024-09-14 DIAGNOSIS — J45.909 ASTHMA, UNSPECIFIED ASTHMA SEVERITY, UNSPECIFIED WHETHER COMPLICATED, UNSPECIFIED WHETHER PERSISTENT: ICD-10-CM

## 2024-09-14 RX ORDER — CYCLOBENZAPRINE HCL 10 MG
TABLET ORAL
Qty: 90 TABLET | Refills: 3 | Status: SHIPPED | OUTPATIENT
Start: 2024-09-14

## 2024-09-15 DIAGNOSIS — F43.10 PTSD (POST-TRAUMATIC STRESS DISORDER): ICD-10-CM

## 2024-09-16 ENCOUNTER — TELEPHONE (OUTPATIENT)
Age: 39
End: 2024-09-16

## 2024-09-16 NOTE — TELEPHONE ENCOUNTER
Reason for call:   [] Refill   [x] Prior Auth  [] Other: Pharmacy called stating medication needs prior auth    Office:   [] PCP/Provider -   [x] Specialty/Provider - [facility] V[facility]NY Payton MD     Medication: atoMOXetine (STRATTERA) 18 mg capsule      Dose/Frequency: Take 1 capsule (18 mg total) by mouth daily     Quantity: 30 capsules    Pharmacy: [id number] - [place] - [address] [place] [id number]  Phone: [phone]  Fax: [phone]

## 2024-09-16 NOTE — TELEPHONE ENCOUNTER
PA for atoMOXetine (STRATTERA) 18 mg capsule SUBMITTED     via    []CMM-KEY:   [x][employer]orry  []Faxed to plan   []Other website   []Phone call Case ID #     Office notes sent, clinical questions answered. Awaiting determination    Turnaround time for your insurance to make a decision on your Prior Authorization can take 7-21 business days.

## 2024-09-17 ENCOUNTER — SOCIAL WORK (OUTPATIENT)
Dept: BEHAVIORAL/MENTAL HEALTH CLINIC | Facility: CLINIC | Age: 39
End: 2024-09-17
Payer: COMMERCIAL

## 2024-09-17 ENCOUNTER — OFFICE VISIT (OUTPATIENT)
Dept: PSYCHIATRY | Facility: CLINIC | Age: 39
End: 2024-09-17
Payer: COMMERCIAL

## 2024-09-17 DIAGNOSIS — F31.9 BIPOLAR DISORDER WITH DEPRESSION (HCC): Primary | ICD-10-CM

## 2024-09-17 DIAGNOSIS — F11.21 OPIOID USE DISORDER, MODERATE, IN EARLY REMISSION, ON MAINTENANCE THERAPY, DEPENDENCE (HCC): Primary | Chronic | ICD-10-CM

## 2024-09-17 PROCEDURE — H0006 ALCOHOL AND/OR DRUG SERVICES: HCPCS

## 2024-09-17 PROCEDURE — 90837 PSYTX W PT 60 MINUTES: CPT | Performed by: COUNSELOR

## 2024-09-17 RX ORDER — LEVOTHYROXINE SODIUM 50 UG/1
TABLET ORAL
Qty: 30 TABLET | Refills: 0 | Status: SHIPPED | OUTPATIENT
Start: 2024-09-17

## 2024-09-17 NOTE — TELEPHONE ENCOUNTER
PA for atoMOXetine (STRATTERA) 18 mg capsule APPROVED     Date(s) approved 9/16/2024 to 9/16/2025    Case #: [id number]     Patient advised by          []MyChart Message  []Phone call   []LMOM  [x]L/M to call office as no active Communication consent on file  []Unable to leave detailed message as VM not approved on Communication consent       Pharmacy advised by    []Fax  [x]Phone call-pharmacist received paid claim, can be refill on 9/18/24    Approval letter scanned into [employer] Yes

## 2024-09-17 NOTE — BH TREATMENT PLAN
"Outpatient Behavioral Health Psychotherapy Treatment Plan    Geno Muñizalfredo  1985     Date of Initial Psychotherapy Assessment: 7/19/2022  Date of Current Treatment Plan: 09/17/24  Treatment Plan Target Date: 3/17/25  Treatment Plan Expiration Date: TBD    Diagnosis:   1. Bipolar disorder with depression (HCC)            Area(s) of Need: anxiety, opioid use disorder, depression    Long Term Goal 1 (in the client's own words): Amy states,  \"Stop focusing on issues related to my son's grandparent\" \" I need to stop worrying about what she ( son's grandparent) thinks about me\"    Stage of Change: Action    Target Date for completion: 3/17/25     Anticipated therapeutic modalities: Engagement Strategies, Client-centered Therapy, Cognitive Behavioral Therapy, Cognitive Processing Therapy, Mindfulness-based Strategies, Motivational Interviewing, Solution-Focused Therapy, Supportive Psychotherapy, and Psycho-Education     People identified to complete this goal: Geno Courtney and Psychotherapist - Keenan Vasquez, MS, LPC, NCC, CCTP      Objective 1: (identify the means of measuring success in meeting the objective): I will stop allowing what my son's grandparent  make me feel worse about myself      Objective 2: (identify the means of measuring success in meeting the objective): I will learn how to communicate with her without feeling bad about it.     Objective 2: (identify the means of measuring success in meeting the objective): I will make sure to adhere to treatment.       Long Term Goal 2 (in the client's own words): Emy Pittman states,  \"I want to be able to recognize my successes and I don't want to feel bad or anxious about it.\"      Stage of Change: Action    Target Date for completion: 3/17/25     Anticipated therapeutic modalities: Engagement Strategies, Client-centered Therapy, Cognitive Behavioral Therapy, Cognitive Processing Therapy, Mindfulness-based Strategies, Motivational Interviewing, " Solution-Focused Therapy, Supportive Psychotherapy, and Psycho-Education     People identified to complete this goal: Geno Courtney and Psychotherapist - Keenan Vasquez, MS, LPC, NCC, CCTP      Objective 1: (identify the means of measuring success in meeting the objective): I would be able to feel successful and tell others about my acheivements      Objective 2: (identify the means of measuring success in meeting the objective): I will find a support network that helps me celebrate my successes       I am currently under the care of a [facility] psychiatric provider: yes    My [facility] psychiatric provider is: Dr. Payton    I am currently taking psychiatric medications: Yes, as prescribed    I feel that I will be ready for discharge from mental health care when I reach the following (measurable goal/objective): increased self esteem and I feel better.    For children and adults who have a legal guardian:   Has there been any change to custody orders and/or guardianship status? NA. If yes, attach updated documentation.    I have updated my Crisis Plan and have been offered a copy of this plan    Behavioral Health Treatment Plan [facility]: Diagnosis and Treatment Plan explained to Geno Courtney acknowledges an understanding of their diagnosis. Geno Courtney does not agree to this treatment plan.    I have been offered a copy of this Treatment Plan. yes

## 2024-09-17 NOTE — PSYCH
Behavioral Health Psychotherapy Progress Note    Psychotherapy Provided: Individual Psychotherapy     1. Bipolar disorder with depression (HCC)            Goals addressed in session: Goal 1     DATA:  Emy pittman appeared to be positive and optimistic about treatment. Clinician discussed the improvement in her demeanor. She apologized for her behavior at our last session. We discussed our concern for her safety and stability. Clinician validated her feelings and processed how her behaviors are connected to her emotions and thoughts. Clinician and Emy Pittman reviewed the handout about cognitive distortions. Clinician also shared steps to help with challenging the distortion or errors she experiences. We explored forms in which she can utilize the interventions to help with making a healthy change in her decision making and responses. Clinician also encouraged her to utilize coping skills to help with grounding her anxiety and depression. We discussed the importance of her psychiatrist and  communicating with her therapist in order to provide the best treatment. We reviewed her objectives and goals and updated her treatment plan.       Clinician also shared that she will soon be transferring over to a new department. Clinician shared their last day will be October 11th, 2024. We discussed termination and the option of being transferred to a new provider. Clinician provided the dates of their upcoming appointments. We will continue to discuss termination upon the following sessions.  During this session, this clinician used the following therapeutic modalities: Engagement Strategies, Client-centered Therapy, Cognitive Behavioral Therapy, Cognitive Processing Therapy, Mindfulness-based Strategies, Motivational Interviewing, Solution-Focused Therapy, and Supportive Psychotherapy    Substance Abuse was not addressed during this session. If the client is diagnosed with a co-occurring substance use disorder,  "please indicate any changes in the frequency or amount of use: Geno denies substance use and continues to remain sober. Stage of change for addressing substance use diagnoses: Maintenance    ASSESSMENT:  Geno Lopez presents with a Euthymic/ normal mood.     her affect is Normal range and intensity, which is congruent, with her mood and the content of the session. The client has made progress on their goals.    Geno Lopez presents with a none risk of suicide, none risk of self-harm, and none risk of harm to others.    For any risk assessment that surpasses a \"low\" rating, a safety plan must be developed.    A safety plan was indicated: no  If yes, describe in detail safety plan not indicated.    PLAN: Between sessions, Geno Lopez will review the error of thinking handout and sign up to the [facility]. At the next session, the therapist will use Engagement Strategies, Client-centered Therapy, Cognitive Behavioral Therapy, Cognitive Processing Therapy, Mindfulness-based Strategies, Motivational Interviewing, Solution-Focused Therapy, and Supportive Psychotherapy to address termination.    Behavioral Health Treatment Plan and Discharge Planning: Geno Lopez is aware of and agrees to continue to work on their treatment plan. They have identified and are working toward their discharge goals. yes    Visit start and stop times:    09/17/24  Start Time: 1001  Stop Time: 1058  Total Visit Time: 57 minutes  "

## 2024-09-17 NOTE — TELEPHONE ENCOUNTER
Med filled just this once. Not seen since May 2024. Please schedule her with any available provider in 2-3 weeks. Thanks

## 2024-09-17 NOTE — PSYCH
Geno Lopez  09/17/24  Start Time: 0900  Stop Time: 0957  Total Visit Time: 57 minutes    Visit type: In person    Recovery needs addressed during session: Basic Needs, Emotional/Mental Health, Family, and Legal Status    Notes (DAP Format)  DATA: Patient presented for in-person, scheduled visit with this CM.   Patient advised this CM of lack of communication with , but did receive email during this session advising patient of the need for sister to complete a background check in order for patient to begin visitation with minor child.   Patient and this CM discussed why sister has yet to complete this form to which patient stated sister does not have a way of printing this form out. This CM was able to print form and mail on patient's behalf to sister's address.   Patient and this CM also discussed patient wanting to file a contempt towards paternal grandmother as patient has not been able to see minor child via zoom or phone call. This CM did print out the necessary documentation if patient wished to move forward, but agreed to remain on track with .     ASSESSMENT: Patient presented well groomed, alert and oriented.   Patient did not present as a concern to this CM, low risk SI/HI    PLAN: Patient will continue to meet with this CM biweekly.   Patient will follow up with psychotherapist upon completion of this session    Referrals made during this visit: no formal referrals made during this session     Recovery connections: Psychotherapy, Psychiatry     Next appointment: two weeks

## 2024-09-18 ENCOUNTER — DOCUMENTATION (OUTPATIENT)
Dept: PSYCHIATRY | Facility: CLINIC | Age: 39
End: 2024-09-18

## 2024-09-18 DIAGNOSIS — F31.9 BIPOLAR DISORDER WITH DEPRESSION (HCC): Primary | ICD-10-CM

## 2024-09-18 NOTE — PROGRESS NOTES
TRANSFER SUMMARY    [facility]    Patient Name Geno Lopez     Date of Birth: 38 y.o. 1985      MRN: [id number]    Admission Date:  7/19/2022    Date of Transfer: 12/28/2023    Admission Diagnosis:     Bipolar Disorder, type I, depressed, moderate    Current Diagnosis:     1. Bipolar disorder with depression (HCC)            Reason for Admission: Geno presented for treatment due to depression and hx of opiate dependence . Primary complaints included DEPRESSIVE SYMPTOMS: depressed mood, difficulty with decision making, poor concentration, negative thoughts, mood swings, weight gain.     Progress in Treatment: Geno was seen for Individual Couseling. During the course of treatment she made steady progress.    Episodes of Higher Level of Care: No    Transfer request Initiated by: Psychiatrist: Dr. Trell Payton Therapist: Chris Araujo    Reason for Transfer Request: clinician request due to client's continued progress and lack of need for therapist speciality    Does this individual need a clinician with specialized training/expertise?: No    Is this client working with any other [facility] Providers/Therapists? Psychiatrist: None Therapist: None    Other pertinent issues: None    Are there any specific individuals who would be a “best fit” or who have already agreed to accept this transfer request?      Psychiatrist: Dr. Trell Payton   Therapist:   Keenan Vasquez LPC     Rationale: Better fit    Attempts to maintain the current therapeutic relationship: Not Applicable    Transfer request routed to    for input and/or approval.     Comments from other involved providers and/or clinical coordinator : None    Chris Araujo09/18/24

## 2024-09-18 NOTE — TELEPHONE ENCOUNTER
Called patient to schedule an appt, patient answered and scheduled an appt with Dr. Mayberry on 09/27/24.

## 2024-10-05 DIAGNOSIS — G47.00 INSOMNIA, UNSPECIFIED TYPE: ICD-10-CM

## 2024-10-07 RX ORDER — TRAZODONE HYDROCHLORIDE 50 MG/1
TABLET, FILM COATED ORAL
Qty: 135 TABLET | Refills: 4 | Status: SHIPPED | OUTPATIENT
Start: 2024-10-07

## 2024-10-10 ENCOUNTER — DOCUMENTATION (OUTPATIENT)
Dept: BEHAVIORAL/MENTAL HEALTH CLINIC | Facility: CLINIC | Age: 39
End: 2024-10-10

## 2024-10-10 DIAGNOSIS — M79.2 NEUROPATHIC PAIN: ICD-10-CM

## 2024-10-10 DIAGNOSIS — F41.9 ANXIETY: ICD-10-CM

## 2024-10-10 RX ORDER — GABAPENTIN 300 MG/1
300 CAPSULE ORAL 2 TIMES DAILY
Qty: 60 CAPSULE | Refills: 1 | Status: SHIPPED | OUTPATIENT
Start: 2024-10-10

## 2024-10-10 NOTE — PROGRESS NOTES
TRANSFER SUMMARY    [facility]    Patient Name Geno Lopez     Date of Birth: 38 y.o. 1985      MRN: [id number]    Admission Date:  12/28/2023    Date of Transfer: October 10, 2024    Admission Diagnosis:      Bipolar Disorder, type I, depressed, moderate     Current Diagnosis:      1. Bipolar disorder with depression (HCC)                Reason for Admission: Geno presented for treatment due to depression and hx of opiate dependence . Primary complaints included DEPRESSIVE SYMPTOMS: depressed mood, difficulty with decision making, poor concentration, negative thoughts, mood swings, weight gain.      Progress in Treatment: Geno was seen for Individual Couseling. During the course of treatment she made steady progress.    Episodes of Higher Level of Care: No    Transfer request Initiated by: Psychiatrist: None Therapist:  Keenan Vasquez MS, LPC, NCC, CCTP    Reason for Transfer Request: clinician leaving practice    Does this individual need a clinician with specialized training/expertise?: No    [facility] this client [facility]ists? Psychiatrist: Dr. Trell Payton Therapist: None    Other pertinent issues: None    Are there any specific individuals who would be a “best fit” or who have already agreed to accept this transfer request?      Psychiatrist: Dr. Trell Payton   Therapist:  TBD  Rationale: Not Applicable    Attempts to maintain the current therapeutic relationship: Not Applicable    Transfer request routed to Clinical Coordinator and Clinical Supervisor for input and/or approval.         Keenan Vasquez LPC10/10/24

## 2024-10-13 DIAGNOSIS — F43.10 PTSD (POST-TRAUMATIC STRESS DISORDER): ICD-10-CM

## 2024-10-14 RX ORDER — LEVOTHYROXINE SODIUM 50 UG/1
TABLET ORAL
Qty: 30 TABLET | Refills: 0 | OUTPATIENT
Start: 2024-10-14

## 2024-10-18 DIAGNOSIS — F43.10 PTSD (POST-TRAUMATIC STRESS DISORDER): ICD-10-CM

## 2024-10-18 RX ORDER — LEVOTHYROXINE SODIUM 50 UG/1
TABLET ORAL
Qty: 30 TABLET | Refills: 0 | Status: SHIPPED | OUTPATIENT
Start: 2024-10-18

## 2024-10-18 NOTE — TELEPHONE ENCOUNTER
Patient call requesting a refill for this medication:      levothyroxine 50 mcg tablet     Has an appointment on 10/30

## 2024-11-01 DIAGNOSIS — F32.A DEPRESSION, UNSPECIFIED DEPRESSION TYPE: ICD-10-CM

## 2024-11-01 DIAGNOSIS — Z72.0 SMOKING TRYING TO QUIT: ICD-10-CM

## 2024-11-01 RX ORDER — BUPROPION HYDROCHLORIDE 300 MG/1
300 TABLET ORAL DAILY
Qty: 90 TABLET | Refills: 4 | Status: SHIPPED | OUTPATIENT
Start: 2024-11-01 | End: 2024-11-07 | Stop reason: SDUPTHER

## 2024-11-04 ENCOUNTER — OFFICE VISIT (OUTPATIENT)
Dept: FAMILY MEDICINE CLINIC | Facility: CLINIC | Age: 39
End: 2024-11-04

## 2024-11-04 ENCOUNTER — APPOINTMENT (OUTPATIENT)
Dept: LAB | Facility: CLINIC | Age: 39
End: 2024-11-04
Payer: COMMERCIAL

## 2024-11-04 VITALS
BODY MASS INDEX: 31.69 KG/M2 | HEIGHT: 64 IN | RESPIRATION RATE: 16 BRPM | SYSTOLIC BLOOD PRESSURE: 120 MMHG | TEMPERATURE: 97.4 F | WEIGHT: 185.6 LBS | HEART RATE: 95 BPM | OXYGEN SATURATION: 100 % | DIASTOLIC BLOOD PRESSURE: 60 MMHG

## 2024-11-04 DIAGNOSIS — E03.9 HYPOTHYROIDISM, UNSPECIFIED TYPE: Primary | ICD-10-CM

## 2024-11-04 DIAGNOSIS — E03.9 HYPOTHYROIDISM, UNSPECIFIED TYPE: ICD-10-CM

## 2024-11-04 DIAGNOSIS — G43.909 MIGRAINE WITHOUT STATUS MIGRAINOSUS, NOT INTRACTABLE, UNSPECIFIED MIGRAINE TYPE: ICD-10-CM

## 2024-11-04 LAB — TSH SERPL DL<=0.05 MIU/L-ACNC: 2.11 UIU/ML (ref 0.45–4.5)

## 2024-11-04 PROCEDURE — 99213 OFFICE O/P EST LOW 20 MIN: CPT | Performed by: FAMILY MEDICINE

## 2024-11-04 PROCEDURE — 36415 COLL VENOUS BLD VENIPUNCTURE: CPT

## 2024-11-04 PROCEDURE — 84443 ASSAY THYROID STIM HORMONE: CPT

## 2024-11-04 RX ORDER — LEVOTHYROXINE SODIUM 50 UG/1
TABLET ORAL
Qty: 30 TABLET | Refills: 0 | Status: SHIPPED | OUTPATIENT
Start: 2024-11-04

## 2024-11-04 NOTE — PROGRESS NOTES
Ambulatory Visit  Name: Geno Lopez      : 1985      MRN: [id number]  Encounter Provider: Bing Reese MD  Encounter Date: 2024   Encounter department: [facility]    Assessment & Plan  Hypothyroidism, unspecified type   TSH WNL    PLAN:  TSH  Continue levothyroxine 50 mcg daily    Orders:    levothyroxine 50 mcg tablet; take 1 tablet by mouth once daily    TSH, 3rd generation with Free T4 reflex; Future    Migraine without status migrainosus, not intractable, unspecified migraine type  Follow up in 2 weeks with migraine diary  Continue topiramate  Consider adding sumatriptan            History of Present Illness     Patient presented today to establish care, she is doing well following up with psychiatry for PTSD, smoking cessarion, opioid use disorder , she needed re-fill on levothyroxine for hypothyroidism, patient has been following healthy diet and c/o of not being able to lose weight. We will re-check TSH. Will consider adding medication for weight loss.  Patient also c/o topiramte might not be enough for migraines, she has been taking tylenol and ibuprofen when gets migraine and recently not helping, will consider adding sumatriptan follow up in 2 weeks with migraine diary.          Review of Systems   Constitutional:  Negative for chills and fever.   HENT:  Negative for ear pain and sore throat.    Eyes:  Negative for pain and visual disturbance.   Respiratory:  Negative for cough and shortness of breath.    Cardiovascular:  Negative for chest pain and palpitations.   Gastrointestinal:  Negative for abdominal pain and vomiting.   Genitourinary:  Negative for dysuria and hematuria.   Musculoskeletal:  Negative for arthralgias and back pain.   Skin:  Negative for color change and rash.   Neurological:  Negative for seizures and syncope.   All other systems reviewed and are negative.        Objective     /60 (BP Location: Left arm, Patient  "Position: Sitting, Cuff Size: Standard)   Pulse 95   Temp (!) 97.4 °F (36.3 °C) (Temporal)   Resp 16   Ht 5' 4\" (1.626 m)   Wt 84.2 kg (185 lb 9.6 oz)   SpO2 100%   BMI 31.86 kg/m²     Physical Exam  Vitals and nursing note reviewed.   Constitutional:       General: She is not in acute distress.     Appearance: Normal appearance. She is well-developed. She is obese.   HENT:      Head: Normocephalic and atraumatic.      Right Ear: Tympanic membrane normal.      Left Ear: Tympanic membrane normal.      Ears:      Comments: Erythematous ear canal        Nose: Nose normal. No congestion or rhinorrhea.      Mouth/Throat:      Mouth: Mucous membranes are moist.      Pharynx: No oropharyngeal exudate or posterior oropharyngeal erythema.   Eyes:      Extraocular Movements: Extraocular movements intact.      Conjunctiva/sclera: Conjunctivae normal.      Pupils: Pupils are equal, round, and reactive to light.   Cardiovascular:      Rate and Rhythm: Normal rate and regular rhythm.      Heart sounds: No murmur heard.  Pulmonary:      Effort: Pulmonary effort is normal. No respiratory distress.      Breath sounds: Normal breath sounds.   Abdominal:      General: Abdomen is flat. Bowel sounds are normal.      Palpations: Abdomen is soft.      Tenderness: There is no abdominal tenderness.   Musculoskeletal:         General: No swelling. Normal range of motion.      Cervical back: Normal range of motion and neck supple.   Skin:     General: Skin is warm and dry.      Capillary Refill: Capillary refill takes less than 2 seconds.   Neurological:      General: No focal deficit present.      Mental Status: She is alert and oriented to person, place, and time.   Psychiatric:         Mood and Affect: Mood normal.         Behavior: Behavior normal.         "

## 2024-11-04 NOTE — ASSESSMENT & PLAN NOTE
04/24 TSH WNL    PLAN:  TSH  Continue levothyroxine 50 mcg daily    Orders:    levothyroxine 50 mcg tablet; take 1 tablet by mouth once daily    TSH, 3rd generation with Free T4 reflex; Future

## 2024-11-07 ENCOUNTER — TELEPHONE (OUTPATIENT)
Age: 39
End: 2024-11-07

## 2024-11-07 ENCOUNTER — OFFICE VISIT (OUTPATIENT)
Dept: PSYCHIATRY | Facility: CLINIC | Age: 39
End: 2024-11-07
Payer: COMMERCIAL

## 2024-11-07 DIAGNOSIS — F90.8 OTHER SPECIFIED ATTENTION DEFICIT HYPERACTIVITY DISORDER (ADHD): ICD-10-CM

## 2024-11-07 DIAGNOSIS — G47.00 INSOMNIA, UNSPECIFIED TYPE: ICD-10-CM

## 2024-11-07 DIAGNOSIS — F31.4 BIPOLAR DISORDER, CURRENT EPISODE DEPRESSED, SEVERE, WITHOUT PSYCHOTIC FEATURES (HCC): ICD-10-CM

## 2024-11-07 DIAGNOSIS — F41.1 GENERALIZED ANXIETY DISORDER: ICD-10-CM

## 2024-11-07 DIAGNOSIS — F11.21 OPIOID USE DISORDER, MODERATE, IN EARLY REMISSION, ON MAINTENANCE THERAPY, DEPENDENCE (HCC): ICD-10-CM

## 2024-11-07 DIAGNOSIS — F41.1 GENERALIZED ANXIETY DISORDER: Primary | ICD-10-CM

## 2024-11-07 DIAGNOSIS — F41.1 GAD (GENERALIZED ANXIETY DISORDER): ICD-10-CM

## 2024-11-07 DIAGNOSIS — Z72.0 SMOKING TRYING TO QUIT: ICD-10-CM

## 2024-11-07 DIAGNOSIS — F32.A DEPRESSION, UNSPECIFIED DEPRESSION TYPE: ICD-10-CM

## 2024-11-07 PROCEDURE — 90833 PSYTX W PT W E/M 30 MIN: CPT | Performed by: PSYCHIATRY & NEUROLOGY

## 2024-11-07 PROCEDURE — 99214 OFFICE O/P EST MOD 30 MIN: CPT | Performed by: PSYCHIATRY & NEUROLOGY

## 2024-11-07 RX ORDER — BUPROPION HYDROCHLORIDE 300 MG/1
300 TABLET ORAL DAILY
Qty: 90 TABLET | Refills: 4 | Status: SHIPPED | OUTPATIENT
Start: 2024-11-07

## 2024-11-07 RX ORDER — TRAZODONE HYDROCHLORIDE 50 MG/1
50 TABLET, FILM COATED ORAL
Qty: 135 TABLET | Refills: 4 | Status: SHIPPED | OUTPATIENT
Start: 2024-11-07

## 2024-11-07 RX ORDER — LORAZEPAM 1 MG/1
1 TABLET ORAL EVERY 8 HOURS PRN
Qty: 90 TABLET | Refills: 1 | Status: SHIPPED | OUTPATIENT
Start: 2024-11-07

## 2024-11-07 RX ORDER — BUPRENORPHINE AND NALOXONE 2; .5 MG/1; MG/1
2 FILM, SOLUBLE BUCCAL; SUBLINGUAL DAILY
Qty: 30 FILM | Refills: 3 | Status: SHIPPED | OUTPATIENT
Start: 2024-11-07

## 2024-11-07 RX ORDER — LORAZEPAM 1 MG/1
1 TABLET ORAL EVERY 8 HOURS PRN
Qty: 90 TABLET | Refills: 1 | Status: SHIPPED | OUTPATIENT
Start: 2024-11-07 | End: 2024-11-07

## 2024-11-07 RX ORDER — BUPRENORPHINE AND NALOXONE 2; .5 MG/1; MG/1
2 FILM, SOLUBLE BUCCAL; SUBLINGUAL DAILY
Qty: 30 FILM | Refills: 3 | Status: SHIPPED | OUTPATIENT
Start: 2024-11-07 | End: 2024-11-07

## 2024-11-07 RX ORDER — ATOMOXETINE 18 MG/1
18 CAPSULE ORAL DAILY
Qty: 30 CAPSULE | Refills: 4 | Status: SHIPPED | OUTPATIENT
Start: 2024-11-07

## 2024-11-07 RX ORDER — DULOXETIN HYDROCHLORIDE 30 MG/1
30 CAPSULE, DELAYED RELEASE ORAL DAILY
Qty: 30 CAPSULE | Refills: 4 | Status: SHIPPED | OUTPATIENT
Start: 2024-11-07

## 2024-11-07 NOTE — TELEPHONE ENCOUNTER
Patient called in regard to changing pharmacy for medication that she needs to be filled today. Patient would like Suboxone and Lorazapam medication sent to [facility] pharmacy [address] [place] [address], [place] due to the pharmacy it was originally sent to does not have the medication.

## 2024-11-07 NOTE — ASSESSMENT & PLAN NOTE
Orders:    buprenorphine-naloxone (Suboxone) 2-0.5 mg; Place 1 Film (2 mg total) under the tongue daily    The patient stopped taking Sublocade about 4 months ago.  She did not have any withdrawal symptoms but now she stated that sometimes her stress leads to return of cravings.  The patient is proud that she has not been using any opiates in the 5 years, and want to continue working on her sobriety.  At the same time returning of cravings although mild, is concerned, and the patient wanted to start low dose of Suboxone to address her cravings which she believes related to anxiety and her stressful times.  There is still unsolved court keller between the patient and grandmother of his child, who submitted PFA against patient's fiancé the patient did not see her child in about 6 months
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
## 2024-11-19 ENCOUNTER — TELEPHONE (OUTPATIENT)
Age: 39
End: 2024-11-19

## 2024-11-19 NOTE — TELEPHONE ENCOUNTER
Patient called in to reschedule their missed appointment with their therapist.    Writer stated that a message would be sent to the therapist and that they could be transferred to the office to assist further with possible scheduling.    Patient states that they have been missing their appointments due to not getting the reminder messages.

## 2024-11-27 DIAGNOSIS — F31.81 BIPOLAR 2 DISORDER (HCC): ICD-10-CM

## 2024-11-29 DIAGNOSIS — F41.1 GAD (GENERALIZED ANXIETY DISORDER): ICD-10-CM

## 2024-11-29 RX ORDER — TOPIRAMATE 100 MG/1
100 TABLET, FILM COATED ORAL 2 TIMES DAILY
Qty: 90 TABLET | Refills: 1 | Status: SHIPPED | OUTPATIENT
Start: 2024-11-29

## 2024-11-29 RX ORDER — DULOXETIN HYDROCHLORIDE 30 MG/1
30 CAPSULE, DELAYED RELEASE ORAL DAILY
Qty: 90 CAPSULE | Refills: 4 | Status: SHIPPED | OUTPATIENT
Start: 2024-11-29

## 2024-12-02 ENCOUNTER — TELEPHONE (OUTPATIENT)
Dept: FAMILY MEDICINE CLINIC | Facility: CLINIC | Age: 39
End: 2024-12-02

## 2024-12-02 NOTE — TELEPHONE ENCOUNTER
PCP SIGNATURE NEEDED FOR [facility] Prescription Change Request FORM RECEIVED VIA FAX AND PLACED IN PCP FOLDER TO BE DELIVERED AT ASSIGNED TIMES.  .  Topiramate 100mg

## 2024-12-03 ENCOUNTER — APPOINTMENT (OUTPATIENT)
Dept: LAB | Facility: CLINIC | Age: 39
End: 2024-12-03
Payer: COMMERCIAL

## 2024-12-03 ENCOUNTER — ANNUAL EXAM (OUTPATIENT)
Dept: OBGYN CLINIC | Facility: CLINIC | Age: 39
End: 2024-12-03

## 2024-12-03 VITALS
BODY MASS INDEX: 31.76 KG/M2 | HEART RATE: 99 BPM | DIASTOLIC BLOOD PRESSURE: 76 MMHG | WEIGHT: 185 LBS | SYSTOLIC BLOOD PRESSURE: 115 MMHG

## 2024-12-03 DIAGNOSIS — B37.31 VULVOVAGINAL CANDIDIASIS: ICD-10-CM

## 2024-12-03 DIAGNOSIS — Z11.3 SCREEN FOR STD (SEXUALLY TRANSMITTED DISEASE): ICD-10-CM

## 2024-12-03 DIAGNOSIS — B96.89 BV (BACTERIAL VAGINOSIS): ICD-10-CM

## 2024-12-03 DIAGNOSIS — Z76.89 ENCOUNTER FOR WEIGHT MANAGEMENT: ICD-10-CM

## 2024-12-03 DIAGNOSIS — N76.0 BV (BACTERIAL VAGINOSIS): ICD-10-CM

## 2024-12-03 DIAGNOSIS — N89.8 VAGINAL DISCHARGE: ICD-10-CM

## 2024-12-03 DIAGNOSIS — Z01.419 ROUTINE GYNECOLOGICAL EXAMINATION: Primary | ICD-10-CM

## 2024-12-03 DIAGNOSIS — K59.00 CONSTIPATION, UNSPECIFIED CONSTIPATION TYPE: ICD-10-CM

## 2024-12-03 LAB — SL AMB POCT URINE HCG: NEGATIVE

## 2024-12-03 PROCEDURE — 90651 9VHPV VACCINE 2/3 DOSE IM: CPT | Performed by: OBSTETRICS & GYNECOLOGY

## 2024-12-03 PROCEDURE — 86803 HEPATITIS C AB TEST: CPT

## 2024-12-03 PROCEDURE — 87591 N.GONORRHOEAE DNA AMP PROB: CPT | Performed by: OBSTETRICS & GYNECOLOGY

## 2024-12-03 PROCEDURE — 36415 COLL VENOUS BLD VENIPUNCTURE: CPT

## 2024-12-03 PROCEDURE — 87210 SMEAR WET MOUNT SALINE/INK: CPT | Performed by: OBSTETRICS & GYNECOLOGY

## 2024-12-03 PROCEDURE — 87491 CHLMYD TRACH DNA AMP PROBE: CPT | Performed by: OBSTETRICS & GYNECOLOGY

## 2024-12-03 PROCEDURE — 90471 IMMUNIZATION ADMIN: CPT | Performed by: OBSTETRICS & GYNECOLOGY

## 2024-12-03 PROCEDURE — 81025 URINE PREGNANCY TEST: CPT | Performed by: OBSTETRICS & GYNECOLOGY

## 2024-12-03 PROCEDURE — 87389 HIV-1 AG W/HIV-1&-2 AB AG IA: CPT

## 2024-12-03 PROCEDURE — 86695 HERPES SIMPLEX TYPE 1 TEST: CPT

## 2024-12-03 PROCEDURE — 84703 CHORIONIC GONADOTROPIN ASSAY: CPT

## 2024-12-03 PROCEDURE — 86696 HERPES SIMPLEX TYPE 2 TEST: CPT

## 2024-12-03 PROCEDURE — 86706 HEP B SURFACE ANTIBODY: CPT

## 2024-12-03 PROCEDURE — 99395 PREV VISIT EST AGE 18-39: CPT | Performed by: OBSTETRICS & GYNECOLOGY

## 2024-12-03 PROCEDURE — 86780 TREPONEMA PALLIDUM: CPT

## 2024-12-03 RX ORDER — METRONIDAZOLE 500 MG/1
500 TABLET ORAL EVERY 12 HOURS SCHEDULED
Qty: 14 TABLET | Refills: 0 | Status: SHIPPED | OUTPATIENT
Start: 2024-12-03 | End: 2024-12-10

## 2024-12-03 RX ORDER — NORETHINDRONE ACETATE AND ETHINYL ESTRADIOL .02; 1 MG/1; MG/1
1 TABLET ORAL DAILY
Qty: 28 TABLET | Refills: 11 | Status: SHIPPED | OUTPATIENT
Start: 2024-12-03

## 2024-12-03 RX ORDER — FLUCONAZOLE 150 MG/1
150 TABLET ORAL ONCE
Qty: 1 TABLET | Refills: 0 | Status: SHIPPED | OUTPATIENT
Start: 2024-12-03 | End: 2024-12-03
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
## 2024-12-04 LAB
C TRACH DNA SPEC QL NAA+PROBE: NEGATIVE
HBV SURFACE AB SER-ACNC: >500 MIU/ML
HCG SERPL QL: NEGATIVE
HCV AB SER QL: REACTIVE
HIV 1+2 AB+HIV1 P24 AG SERPL QL IA: NORMAL
HIV 2 AB SERPL QL IA: NORMAL
HIV1 AB SERPL QL IA: NORMAL
HIV1 P24 AG SERPL QL IA: NORMAL
HSV2 IGG SERPL QL IA: NEGATIVE
HSV2 IGG SERPL QL IA: NEGATIVE
N GONORRHOEA DNA SPEC QL NAA+PROBE: NEGATIVE
TREPONEMA PALLIDUM IGG+IGM AB [PRESENCE] IN SERUM OR PLASMA BY IMMUNOASSAY: NORMAL

## 2024-12-06 ENCOUNTER — OFFICE VISIT (OUTPATIENT)
Dept: FAMILY MEDICINE CLINIC | Facility: CLINIC | Age: 39
End: 2024-12-06

## 2024-12-06 VITALS
DIASTOLIC BLOOD PRESSURE: 68 MMHG | OXYGEN SATURATION: 97 % | TEMPERATURE: 97.9 F | BODY MASS INDEX: 32.4 KG/M2 | HEIGHT: 64 IN | HEART RATE: 96 BPM | RESPIRATION RATE: 18 BRPM | SYSTOLIC BLOOD PRESSURE: 106 MMHG | WEIGHT: 189.8 LBS

## 2024-12-06 DIAGNOSIS — F41.9 ANXIETY: ICD-10-CM

## 2024-12-06 DIAGNOSIS — E66.811 CLASS 1 OBESITY DUE TO EXCESS CALORIES WITHOUT SERIOUS COMORBIDITY WITH BODY MASS INDEX (BMI) OF 32.0 TO 32.9 IN ADULT: ICD-10-CM

## 2024-12-06 DIAGNOSIS — M79.2 NEUROPATHIC PAIN: ICD-10-CM

## 2024-12-06 DIAGNOSIS — E66.09 CLASS 1 OBESITY DUE TO EXCESS CALORIES WITHOUT SERIOUS COMORBIDITY WITH BODY MASS INDEX (BMI) OF 32.0 TO 32.9 IN ADULT: ICD-10-CM

## 2024-12-06 DIAGNOSIS — Z76.89 ENCOUNTER FOR WEIGHT MANAGEMENT: Primary | ICD-10-CM

## 2024-12-08 NOTE — PROGRESS NOTES
"Name: Geno Lopez      : 1985      MRN: [id number]  Encounter Provider: Bing Reese MD  Encounter Date: 2024   Encounter department: [facility]  :  Assessment & Plan  Encounter for weight management  BMI 32  Patient following healthy diet; exercise 6-7 days a week with mild improvement on weight    PLAN:  Calorie deficit diet explained  Calculated calorie deficit diet in patient  Continue cardiovascular exercise  Wegovy ; patient aware pending insurance authorization       BMI 32.0-32.9,adult    Orders:    Semaglutide-Weight Management (WEGOVY) 0.25 MG/0.5ML; Inject 0.5 mL (0.25 mg total) under the skin once a week    Class 1 obesity due to excess calories without serious comorbidity with body mass index (BMI) of 32.0 to 32.9 in adult                  History of Present Illness     Patient presented to the office today for weight loss management, diet and exercise explained , wegovy to aid with weight loss.  Follow up in 2-3 weeks to assess for side effects.      Review of Systems   Constitutional:  Negative for chills and fever.   HENT:  Negative for ear pain and sore throat.    Eyes:  Negative for pain and visual disturbance.   Respiratory:  Negative for cough and shortness of breath.    Cardiovascular:  Negative for chest pain and palpitations.   Gastrointestinal:  Negative for abdominal pain and vomiting.   Genitourinary:  Negative for dysuria and hematuria.   Musculoskeletal:  Negative for arthralgias and back pain.   Skin:  Negative for color change and rash.   Neurological:  Negative for seizures and syncope.   All other systems reviewed and are negative.         Objective   /68 (BP Location: Left arm, Patient Position: Sitting, Cuff Size: Standard)   Pulse 96   Temp 97.9 °F (36.6 °C) (Temporal)   Resp 18   Ht 5' 4\" (1.626 m)   Wt 86.1 kg (189 lb 12.8 oz)   LMP 11/10/2024 (Approximate)   SpO2 97%   BMI 32.58 kg/m²      Physical " Exam  Vitals and nursing note reviewed.   Constitutional:       General: She is not in acute distress.     Appearance: She is well-developed.   HENT:      Head: Normocephalic and atraumatic.   Eyes:      Conjunctiva/sclera: Conjunctivae normal.   Cardiovascular:      Rate and Rhythm: Normal rate and regular rhythm.      Heart sounds: No murmur heard.  Pulmonary:      Effort: Pulmonary effort is normal. No respiratory distress.      Breath sounds: Normal breath sounds.   Abdominal:      Palpations: Abdomen is soft.      Tenderness: There is no abdominal tenderness.   Musculoskeletal:         General: No swelling.      Cervical back: Neck supple.   Skin:     General: Skin is warm and dry.      Capillary Refill: Capillary refill takes less than 2 seconds.   Neurological:      Mental Status: She is alert.   Psychiatric:         Mood and Affect: Mood normal.

## 2024-12-08 NOTE — ASSESSMENT & PLAN NOTE
BMI 32  Patient following healthy diet; exercise 6-7 days a week with mild improvement on weight    PLAN:  Calorie deficit diet explained  Calculated calorie deficit diet in patient  Continue cardiovascular exercise  Wegovy ; patient aware pending insurance authorization

## 2024-12-09 ENCOUNTER — OFFICE VISIT (OUTPATIENT)
Dept: PSYCHIATRY | Facility: CLINIC | Age: 39
End: 2024-12-09
Payer: COMMERCIAL

## 2024-12-09 DIAGNOSIS — F11.20 OPIOID DEPENDENCE ON AGONIST THERAPY (HCC): Primary | ICD-10-CM

## 2024-12-09 PROCEDURE — H0006 ALCOHOL AND/OR DRUG SERVICES: HCPCS

## 2024-12-10 DIAGNOSIS — E03.9 HYPOTHYROIDISM, UNSPECIFIED TYPE: ICD-10-CM

## 2024-12-10 RX ORDER — LEVOTHYROXINE SODIUM 50 UG/1
50 TABLET ORAL DAILY
Qty: 30 TABLET | Refills: 0 | Status: SHIPPED | OUTPATIENT
Start: 2024-12-10 | End: 2024-12-20 | Stop reason: SDUPTHER
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
## 2024-12-20 ENCOUNTER — DOCUMENTATION (OUTPATIENT)
Dept: FAMILY MEDICINE CLINIC | Facility: CLINIC | Age: 39
End: 2024-12-20

## 2024-12-20 DIAGNOSIS — E03.9 HYPOTHYROIDISM, UNSPECIFIED TYPE: Primary | ICD-10-CM

## 2024-12-20 RX ORDER — GABAPENTIN 300 MG/1
300 CAPSULE ORAL 2 TIMES DAILY
Qty: 60 CAPSULE | Refills: 1 | Status: SHIPPED | OUTPATIENT
Start: 2024-12-20

## 2024-12-20 RX ORDER — LEVOTHYROXINE SODIUM 50 UG/1
50 TABLET ORAL DAILY
Qty: 30 TABLET | Refills: 0 | Status: SHIPPED | OUTPATIENT
Start: 2024-12-20

## 2025-01-02 DIAGNOSIS — J45.909 ASTHMA, UNSPECIFIED ASTHMA SEVERITY, UNSPECIFIED WHETHER COMPLICATED, UNSPECIFIED WHETHER PERSISTENT: ICD-10-CM

## 2025-01-06 RX ORDER — CYCLOBENZAPRINE HCL 10 MG
10 TABLET ORAL 3 TIMES DAILY PRN
Qty: 90 TABLET | Refills: 1 | Status: ON HOLD | OUTPATIENT
Start: 2025-01-06

## 2025-01-11 ENCOUNTER — HOSPITAL ENCOUNTER (INPATIENT)
Facility: HOSPITAL | Age: 40
LOS: 12 days | Discharge: HOME | DRG: 753 | End: 2025-01-23
Attending: EMERGENCY MEDICINE | Admitting: PSYCHIATRY & NEUROLOGY
Payer: COMMERCIAL

## 2025-01-11 DIAGNOSIS — K59.00 CONSTIPATION: ICD-10-CM

## 2025-01-11 DIAGNOSIS — F29 PSYCHOSIS (HCC): ICD-10-CM

## 2025-01-11 DIAGNOSIS — F31.81 BIPOLAR 2 DISORDER (HCC): ICD-10-CM

## 2025-01-11 DIAGNOSIS — F11.21 OPIOID USE DISORDER, MODERATE, IN EARLY REMISSION, ON MAINTENANCE THERAPY, DEPENDENCE (HCC): ICD-10-CM

## 2025-01-11 DIAGNOSIS — E03.9 HYPOTHYROID: ICD-10-CM

## 2025-01-11 DIAGNOSIS — E78.5 HLD (HYPERLIPIDEMIA): ICD-10-CM

## 2025-01-11 DIAGNOSIS — E55.9 VITAMIN D INSUFFICIENCY: ICD-10-CM

## 2025-01-11 DIAGNOSIS — G47.00 INSOMNIA, UNSPECIFIED TYPE: ICD-10-CM

## 2025-01-11 DIAGNOSIS — F39 UNSPECIFIED MOOD (AFFECTIVE) DISORDER (HCC): Primary | ICD-10-CM

## 2025-01-11 DIAGNOSIS — Z00.8 MEDICAL CLEARANCE FOR PSYCHIATRIC ADMISSION: ICD-10-CM

## 2025-01-11 DIAGNOSIS — F41.1 GAD (GENERALIZED ANXIETY DISORDER): ICD-10-CM

## 2025-01-11 LAB
AMPHETAMINES SERPL QL SCN: NEGATIVE
BARBITURATES UR QL: NEGATIVE
BENZODIAZ UR QL: NEGATIVE
COCAINE UR QL: NEGATIVE
ETHANOL EXG-MCNC: 0 MG/DL
EXT PREGNANCY TEST URINE: NEGATIVE
EXT. CONTROL: NORMAL
FENTANYL UR QL SCN: NEGATIVE
HYDROCODONE UR QL SCN: NEGATIVE
METHADONE UR QL: NEGATIVE
OPIATES UR QL SCN: NEGATIVE
OXYCODONE+OXYMORPHONE UR QL SCN: NEGATIVE
PCP UR QL: NEGATIVE
THC UR QL: POSITIVE
TSH SERPL DL<=0.05 MIU/L-ACNC: 0.94 UIU/ML (ref 0.45–4.5)

## 2025-01-11 PROCEDURE — 99284 EMERGENCY DEPT VISIT MOD MDM: CPT

## 2025-01-11 PROCEDURE — 99285 EMERGENCY DEPT VISIT HI MDM: CPT | Performed by: EMERGENCY MEDICINE

## 2025-01-11 PROCEDURE — 81025 URINE PREGNANCY TEST: CPT | Performed by: EMERGENCY MEDICINE

## 2025-01-11 PROCEDURE — 36415 COLL VENOUS BLD VENIPUNCTURE: CPT | Performed by: EMERGENCY MEDICINE

## 2025-01-11 PROCEDURE — 80307 DRUG TEST PRSMV CHEM ANLYZR: CPT | Performed by: EMERGENCY MEDICINE

## 2025-01-11 PROCEDURE — 82075 ASSAY OF BREATH ETHANOL: CPT | Performed by: EMERGENCY MEDICINE

## 2025-01-11 PROCEDURE — 84443 ASSAY THYROID STIM HORMONE: CPT | Performed by: EMERGENCY MEDICINE

## 2025-01-11 RX ORDER — HYDROXYZINE HYDROCHLORIDE 50 MG/1
100 TABLET, FILM COATED ORAL
Status: DISCONTINUED | OUTPATIENT
Start: 2025-01-11 | End: 2025-01-23 | Stop reason: HOSPADM

## 2025-01-11 RX ORDER — IBUPROFEN 400 MG/1
800 TABLET, FILM COATED ORAL EVERY 8 HOURS PRN
Status: DISCONTINUED | OUTPATIENT
Start: 2025-01-11 | End: 2025-01-23 | Stop reason: HOSPADM

## 2025-01-11 RX ORDER — HALOPERIDOL 5 MG/ML
2.5 INJECTION INTRAMUSCULAR
Status: DISCONTINUED | OUTPATIENT
Start: 2025-01-11 | End: 2025-01-23 | Stop reason: HOSPADM

## 2025-01-11 RX ORDER — PROPRANOLOL HYDROCHLORIDE 10 MG/1
10 TABLET ORAL EVERY 8 HOURS PRN
Status: DISCONTINUED | OUTPATIENT
Start: 2025-01-11 | End: 2025-01-19

## 2025-01-11 RX ORDER — HALOPERIDOL 1 MG/1
1 TABLET ORAL EVERY 6 HOURS PRN
Status: DISCONTINUED | OUTPATIENT
Start: 2025-01-11 | End: 2025-01-23 | Stop reason: HOSPADM

## 2025-01-11 RX ORDER — BISACODYL 10 MG
10 SUPPOSITORY, RECTAL RECTAL DAILY PRN
Status: DISCONTINUED | OUTPATIENT
Start: 2025-01-11 | End: 2025-01-23 | Stop reason: HOSPADM

## 2025-01-11 RX ORDER — TRAZODONE HYDROCHLORIDE 50 MG/1
50 TABLET, FILM COATED ORAL
Status: DISCONTINUED | OUTPATIENT
Start: 2025-01-11 | End: 2025-01-12

## 2025-01-11 RX ORDER — BENZTROPINE MESYLATE 1 MG/1
1 TABLET ORAL
Status: DISCONTINUED | OUTPATIENT
Start: 2025-01-11 | End: 2025-01-23 | Stop reason: HOSPADM

## 2025-01-11 RX ORDER — POLYETHYLENE GLYCOL 3350 17 G/17G
17 POWDER, FOR SOLUTION ORAL DAILY PRN
Status: DISCONTINUED | OUTPATIENT
Start: 2025-01-11 | End: 2025-01-23 | Stop reason: HOSPADM

## 2025-01-11 RX ORDER — AMOXICILLIN 250 MG
1 CAPSULE ORAL DAILY PRN
Status: DISCONTINUED | OUTPATIENT
Start: 2025-01-11 | End: 2025-01-23 | Stop reason: HOSPADM

## 2025-01-11 RX ORDER — LORAZEPAM 2 MG/ML
1 INJECTION INTRAMUSCULAR
Status: DISCONTINUED | OUTPATIENT
Start: 2025-01-11 | End: 2025-01-23 | Stop reason: HOSPADM

## 2025-01-11 RX ORDER — LORAZEPAM 2 MG/ML
2 INJECTION INTRAMUSCULAR
Status: DISCONTINUED | OUTPATIENT
Start: 2025-01-11 | End: 2025-01-23 | Stop reason: HOSPADM

## 2025-01-11 RX ORDER — HALOPERIDOL 5 MG/ML
5 INJECTION INTRAMUSCULAR
Status: DISCONTINUED | OUTPATIENT
Start: 2025-01-11 | End: 2025-01-23 | Stop reason: HOSPADM

## 2025-01-11 RX ORDER — HALOPERIDOL 5 MG/1
5 TABLET ORAL
Status: DISCONTINUED | OUTPATIENT
Start: 2025-01-11 | End: 2025-01-23 | Stop reason: HOSPADM

## 2025-01-11 RX ORDER — IBUPROFEN 600 MG/1
600 TABLET, FILM COATED ORAL EVERY 6 HOURS PRN
Status: DISCONTINUED | OUTPATIENT
Start: 2025-01-11 | End: 2025-01-23 | Stop reason: HOSPADM

## 2025-01-11 RX ORDER — LORAZEPAM 2 MG/ML
2 INJECTION INTRAMUSCULAR EVERY 6 HOURS PRN
Status: DISCONTINUED | OUTPATIENT
Start: 2025-01-11 | End: 2025-01-23 | Stop reason: HOSPADM

## 2025-01-11 RX ORDER — ALBUTEROL SULFATE 90 UG/1
2 INHALANT RESPIRATORY (INHALATION) EVERY 4 HOURS PRN
Status: DISCONTINUED | OUTPATIENT
Start: 2025-01-11 | End: 2025-01-23 | Stop reason: HOSPADM

## 2025-01-11 RX ORDER — HYDROXYZINE HYDROCHLORIDE 50 MG/1
50 TABLET, FILM COATED ORAL
Status: DISCONTINUED | OUTPATIENT
Start: 2025-01-11 | End: 2025-01-23 | Stop reason: HOSPADM

## 2025-01-11 RX ORDER — BENZTROPINE MESYLATE 1 MG/ML
0.5 INJECTION, SOLUTION INTRAMUSCULAR; INTRAVENOUS
Status: DISCONTINUED | OUTPATIENT
Start: 2025-01-11 | End: 2025-01-23 | Stop reason: HOSPADM

## 2025-01-11 RX ORDER — HYDROXYZINE HYDROCHLORIDE 25 MG/1
25 TABLET, FILM COATED ORAL
Status: DISCONTINUED | OUTPATIENT
Start: 2025-01-11 | End: 2025-01-23 | Stop reason: HOSPADM

## 2025-01-11 RX ORDER — DIPHENHYDRAMINE HYDROCHLORIDE 50 MG/ML
50 INJECTION INTRAMUSCULAR; INTRAVENOUS EVERY 6 HOURS PRN
Status: DISCONTINUED | OUTPATIENT
Start: 2025-01-11 | End: 2025-01-23 | Stop reason: HOSPADM

## 2025-01-11 RX ORDER — BENZTROPINE MESYLATE 1 MG/ML
1 INJECTION, SOLUTION INTRAMUSCULAR; INTRAVENOUS
Status: DISCONTINUED | OUTPATIENT
Start: 2025-01-11 | End: 2025-01-23 | Stop reason: HOSPADM

## 2025-01-11 RX ORDER — BISACODYL 10 MG
10 SUPPOSITORY, RECTAL RECTAL DAILY PRN
Status: DISCONTINUED | OUTPATIENT
Start: 2025-01-11 | End: 2025-01-11

## 2025-01-11 RX ORDER — MAGNESIUM HYDROXIDE/ALUMINUM HYDROXICE/SIMETHICONE 120; 1200; 1200 MG/30ML; MG/30ML; MG/30ML
30 SUSPENSION ORAL EVERY 4 HOURS PRN
Status: DISCONTINUED | OUTPATIENT
Start: 2025-01-11 | End: 2025-01-23 | Stop reason: HOSPADM

## 2025-01-11 RX ORDER — IBUPROFEN 400 MG/1
400 TABLET, FILM COATED ORAL EVERY 4 HOURS PRN
Status: DISCONTINUED | OUTPATIENT
Start: 2025-01-11 | End: 2025-01-23 | Stop reason: HOSPADM

## 2025-01-11 NOTE — ED NOTES
Patient given dinner, warm blankets, TV remote, and ice water     Carlee Macdonald RN  01/11/25 2493
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
## 2025-01-11 NOTE — ED NOTES
"Patient cooperative with blood work, when asked if she had stopped taking her medications, patient states \"yes, I don't know why\" When asked if she wants to take them again, she answers \"yes\" and is very tearful. She apologizes for \"how I might smell\" Patient is reassured and offered drink, snack etc. Patient declines at this time. Virtual monitoring continues at bedside.     Carlee Macdonald RN  01/11/25 7613    "

## 2025-01-12 PROBLEM — F90.8 OTHER SPECIFIED ATTENTION DEFICIT HYPERACTIVITY DISORDER (ADHD): Status: ACTIVE | Noted: 2025-01-12

## 2025-01-12 PROBLEM — F39 UNSPECIFIED MOOD (AFFECTIVE) DISORDER (HCC): Status: ACTIVE | Noted: 2025-01-12

## 2025-01-12 PROBLEM — E78.00 HYPERCHOLESTEREMIA: Status: ACTIVE | Noted: 2025-01-12

## 2025-01-12 PROBLEM — F41.1 GENERALIZED ANXIETY DISORDER: Status: ACTIVE | Noted: 2025-01-12

## 2025-01-12 PROBLEM — G47.00 INSOMNIA: Status: ACTIVE | Noted: 2025-01-12

## 2025-01-12 PROBLEM — Z00.8 MEDICAL CLEARANCE FOR PSYCHIATRIC ADMISSION: Status: ACTIVE | Noted: 2025-01-12

## 2025-01-12 LAB
25(OH)D3 SERPL-MCNC: 26.8 NG/ML (ref 30–100)
ALBUMIN SERPL BCG-MCNC: 4.5 G/DL (ref 3.5–5)
ALP SERPL-CCNC: 48 U/L (ref 34–104)
ALT SERPL W P-5'-P-CCNC: 7 U/L (ref 7–52)
ANION GAP SERPL CALCULATED.3IONS-SCNC: 14 MMOL/L (ref 4–13)
AST SERPL W P-5'-P-CCNC: 8 U/L (ref 13–39)
BASOPHILS # BLD AUTO: 0.05 THOUSANDS/ΜL (ref 0–0.1)
BASOPHILS NFR BLD AUTO: 1 % (ref 0–1)
BILIRUB SERPL-MCNC: 0.5 MG/DL (ref 0.2–1)
BUN SERPL-MCNC: 18 MG/DL (ref 5–25)
CALCIUM SERPL-MCNC: 9.7 MG/DL (ref 8.4–10.2)
CHLORIDE SERPL-SCNC: 99 MMOL/L (ref 96–108)
CHOLEST SERPL-MCNC: 288 MG/DL (ref ?–200)
CO2 SERPL-SCNC: 25 MMOL/L (ref 21–32)
CREAT SERPL-MCNC: 0.8 MG/DL (ref 0.6–1.3)
EOSINOPHIL # BLD AUTO: 0.12 THOUSAND/ΜL (ref 0–0.61)
EOSINOPHIL NFR BLD AUTO: 2 % (ref 0–6)
ERYTHROCYTE [DISTWIDTH] IN BLOOD BY AUTOMATED COUNT: 12.5 % (ref 11.6–15.1)
FOLATE SERPL-MCNC: 16.5 NG/ML
GFR SERPL CREATININE-BSD FRML MDRD: 93 ML/MIN/1.73SQ M
GLUCOSE P FAST SERPL-MCNC: 106 MG/DL (ref 65–99)
GLUCOSE SERPL-MCNC: 106 MG/DL (ref 65–140)
HCG SERPL QL: NEGATIVE
HCT VFR BLD AUTO: 45.3 % (ref 34.8–46.1)
HDLC SERPL-MCNC: 18 MG/DL
HGB BLD-MCNC: 14.9 G/DL (ref 11.5–15.4)
IMM GRANULOCYTES # BLD AUTO: 0.04 THOUSAND/UL (ref 0–0.2)
IMM GRANULOCYTES NFR BLD AUTO: 1 % (ref 0–2)
LDLC SERPL CALC-MCNC: 242 MG/DL (ref 0–100)
LYMPHOCYTES # BLD AUTO: 1.78 THOUSANDS/ΜL (ref 0.6–4.47)
LYMPHOCYTES NFR BLD AUTO: 25 % (ref 14–44)
MCH RBC QN AUTO: 29.4 PG (ref 26.8–34.3)
MCHC RBC AUTO-ENTMCNC: 32.9 G/DL (ref 31.4–37.4)
MCV RBC AUTO: 90 FL (ref 82–98)
MONOCYTES # BLD AUTO: 0.47 THOUSAND/ΜL (ref 0.17–1.22)
MONOCYTES NFR BLD AUTO: 7 % (ref 4–12)
NEUTROPHILS # BLD AUTO: 4.62 THOUSANDS/ΜL (ref 1.85–7.62)
NEUTS SEG NFR BLD AUTO: 64 % (ref 43–75)
NONHDLC SERPL-MCNC: 270 MG/DL
NRBC BLD AUTO-RTO: 0 /100 WBCS
PLATELET # BLD AUTO: 338 THOUSANDS/UL (ref 149–390)
PMV BLD AUTO: 10.9 FL (ref 8.9–12.7)
POTASSIUM SERPL-SCNC: 3 MMOL/L (ref 3.5–5.3)
PROT SERPL-MCNC: 7.4 G/DL (ref 6.4–8.4)
RBC # BLD AUTO: 5.06 MILLION/UL (ref 3.81–5.12)
SODIUM SERPL-SCNC: 138 MMOL/L (ref 135–147)
TRIGL SERPL-MCNC: 138 MG/DL (ref ?–150)
TSH SERPL DL<=0.05 MIU/L-ACNC: 0.8 UIU/ML (ref 0.45–4.5)
VIT B12 SERPL-MCNC: 876 PG/ML (ref 180–914)
WBC # BLD AUTO: 7.08 THOUSAND/UL (ref 4.31–10.16)

## 2025-01-12 PROCEDURE — 86780 TREPONEMA PALLIDUM: CPT | Performed by: PSYCHIATRY & NEUROLOGY

## 2025-01-12 PROCEDURE — 84703 CHORIONIC GONADOTROPIN ASSAY: CPT | Performed by: PSYCHIATRY & NEUROLOGY

## 2025-01-12 PROCEDURE — 80053 COMPREHEN METABOLIC PANEL: CPT | Performed by: PSYCHIATRY & NEUROLOGY

## 2025-01-12 PROCEDURE — 82607 VITAMIN B-12: CPT | Performed by: PSYCHIATRY & NEUROLOGY

## 2025-01-12 PROCEDURE — 82306 VITAMIN D 25 HYDROXY: CPT | Performed by: PSYCHIATRY & NEUROLOGY

## 2025-01-12 PROCEDURE — 99223 1ST HOSP IP/OBS HIGH 75: CPT | Performed by: PSYCHIATRY & NEUROLOGY

## 2025-01-12 PROCEDURE — 85025 COMPLETE CBC W/AUTO DIFF WBC: CPT | Performed by: PSYCHIATRY & NEUROLOGY

## 2025-01-12 PROCEDURE — 99222 1ST HOSP IP/OBS MODERATE 55: CPT

## 2025-01-12 PROCEDURE — 82746 ASSAY OF FOLIC ACID SERUM: CPT | Performed by: PSYCHIATRY & NEUROLOGY

## 2025-01-12 PROCEDURE — 84443 ASSAY THYROID STIM HORMONE: CPT | Performed by: PSYCHIATRY & NEUROLOGY

## 2025-01-12 PROCEDURE — 80061 LIPID PANEL: CPT | Performed by: PSYCHIATRY & NEUROLOGY

## 2025-01-12 RX ORDER — BUPRENORPHINE AND NALOXONE 2; .5 MG/1; MG/1
2 FILM, SOLUBLE BUCCAL; SUBLINGUAL DAILY
Status: DISCONTINUED | OUTPATIENT
Start: 2025-01-12 | End: 2025-01-23 | Stop reason: HOSPADM

## 2025-01-12 RX ORDER — BUPROPION HYDROCHLORIDE 300 MG/1
300 TABLET ORAL DAILY
Status: DISCONTINUED | OUTPATIENT
Start: 2025-01-12 | End: 2025-01-15

## 2025-01-12 RX ORDER — DULOXETIN HYDROCHLORIDE 30 MG/1
30 CAPSULE, DELAYED RELEASE ORAL DAILY
Status: DISCONTINUED | OUTPATIENT
Start: 2025-01-12 | End: 2025-01-16

## 2025-01-12 RX ORDER — ATOMOXETINE 25 MG/1
25 CAPSULE ORAL DAILY
Status: DISCONTINUED | OUTPATIENT
Start: 2025-01-12 | End: 2025-01-12

## 2025-01-12 RX ORDER — POTASSIUM CHLORIDE 1500 MG/1
40 TABLET, EXTENDED RELEASE ORAL ONCE
Status: COMPLETED | OUTPATIENT
Start: 2025-01-12 | End: 2025-01-12

## 2025-01-12 RX ORDER — TRAZODONE HYDROCHLORIDE 50 MG/1
50 TABLET, FILM COATED ORAL
Status: DISCONTINUED | OUTPATIENT
Start: 2025-01-12 | End: 2025-01-23 | Stop reason: HOSPADM

## 2025-01-12 RX ORDER — TOPIRAMATE 100 MG/1
100 TABLET, FILM COATED ORAL 2 TIMES DAILY
Status: DISCONTINUED | OUTPATIENT
Start: 2025-01-12 | End: 2025-01-23 | Stop reason: HOSPADM

## 2025-01-12 RX ORDER — ATOMOXETINE 18 MG/1
18 CAPSULE ORAL DAILY
Status: DISCONTINUED | OUTPATIENT
Start: 2025-01-12 | End: 2025-01-12 | Stop reason: SDUPTHER

## 2025-01-12 RX ORDER — GABAPENTIN 300 MG/1
300 CAPSULE ORAL 2 TIMES DAILY
Status: DISCONTINUED | OUTPATIENT
Start: 2025-01-12 | End: 2025-01-16

## 2025-01-12 RX ADMIN — CARIPRAZINE 3 MG: 3 CAPSULE, GELATIN COATED ORAL at 13:34

## 2025-01-12 RX ADMIN — GABAPENTIN 300 MG: 300 CAPSULE ORAL at 17:19

## 2025-01-12 RX ADMIN — DULOXETINE HYDROCHLORIDE 30 MG: 30 CAPSULE, DELAYED RELEASE ORAL at 13:34

## 2025-01-12 RX ADMIN — TRAZODONE HYDROCHLORIDE 50 MG: 50 TABLET ORAL at 21:25

## 2025-01-12 RX ADMIN — POTASSIUM CHLORIDE 40 MEQ: 1500 TABLET, EXTENDED RELEASE ORAL at 10:23

## 2025-01-12 RX ADMIN — TOPIRAMATE 100 MG: 100 TABLET, FILM COATED ORAL at 17:19

## 2025-01-12 RX ADMIN — BUPROPION HYDROCHLORIDE 300 MG: 300 TABLET, EXTENDED RELEASE ORAL at 13:34

## 2025-01-12 RX ADMIN — BUPRENORPHINE AND NALOXONE 2 MG: 2; .5 FILM BUCCAL; SUBLINGUAL at 13:34

## 2025-01-12 NOTE — ED NOTES
Patient is accepted at  3B  Patient is accepted by     Patient may go to the floor at 2130.          Nurse report is to be called to [phone] prior to patient transfer.

## 2025-01-12 NOTE — ASSESSMENT & PLAN NOTE
Patient has a history of MDD versus bipolar disorder  Patient was being treated in the [facility] program for mood with Vraylar 3 mg once daily, Wellbutrin  mg once daily, Topamax 100 mg twice daily, duloxetine DR 30 mg once daily  Secondary to patient's lack of cooperativity during initial evaluation today this writer was unable to obtain history and ROS to distinguish whether or not patient needs to be treated for unipolar depression or bipolar    PLAN:  Continue to evaluate the patient for MDD versus bipolar disorder  Restart home Vraylar 3 mg once daily for mood stabilization  Restart home duloxetine DR 30 mg once daily for depressive symptoms  Restart home Topamax 100 mg twice daily for mood stabilization  Restart home Wellbutrin  mg once daily for depressive symptoms
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
## 2025-01-12 NOTE — ED NOTES
Insurance   Eligibility Verification System checked - ([phone]).  Online system / automated system indicates: Marya Hodges ID # [id number]

## 2025-01-12 NOTE — ASSESSMENT & PLAN NOTE
"Patient has a recent history of daily vaping which she says she stopped approximately 4 days ago secondary to fear that \"street drugs\" were in her vape.  Patient was being treated in the [facility] program for tobacco cessation with Wellbutrin    PLAN:  Restart home Wellbutrin  mg once daily for tobacco cessation as well as depressive symptoms  "

## 2025-01-12 NOTE — ASSESSMENT & PLAN NOTE
Patient is a 39-year-old female with past medical history significant but not limited to migraines, hypercholesterolemia, psychiatric issues presented to the ED after her boyfriend sent due to bizarre behavior from not taking her medications.      -Medicine was consulted for medical clearance for inpatient psych eval

## 2025-01-12 NOTE — ASSESSMENT & PLAN NOTE
Per record review patient has a history of ADHD  Patient was being treated in the [facility] program for ADHD with Strattera 18 mg once daily  Patient told nursing that she does not want to restart Strattera at this time    PLAN:  Do not restart home Strattera while admitted to inpatient psychiatric unit

## 2025-01-12 NOTE — ASSESSMENT & PLAN NOTE
Patient has a history of insomnia  Patient was being treated in the [facility] program for insomnia with trazodone 50 mg nightly    PLAN:  Discontinue melatonin 3 mg nightly which was started on admission  Restart patient's home trazodone 50 mg nightly for insomnia

## 2025-01-12 NOTE — TREATMENT PLAN
TREATMENT PLAN REVIEW - Behavioral Health Geno Lopez 39 y.o. 1985 female MRN: [id number]    [facility] 3B BEHAVIORAL [facility] Room / Bed: [facility] 341/[facility] 341-01 Encounter: [id number]        Admit Date/Time:  1/11/2025  3:00 PM    Treatment Team:   MD Anabell Franks MD Kathleen Bain, RAMY Gatica DO Elana Roman Maria Lamura    Diagnosis: Principal Problem:    Unspecified mood disorder (rule out MDD vs bipolar disorder)  Active Problems:    Opioid use disorder, moderate, in early remission, on maintenance therapy, dependence (HCC)    Hypokalemia    Tobacco use    Migraine    Hypercholesteremia    Medical clearance for psychiatric admission    Insomnia    Other specified attention deficit hyperactivity disorder (ADHD)    Generalized anxiety disorder      Patient Strengths/Assets: negotiates basic needs, patient is on a voluntary commitment, patient is willing to restart home medications       Patient Barriers/Limitations: chronic mental illness, lack of social/family support, lack of stable employment, limited family ties, limited insight, low self esteem, relationship issues, noncompliant with medication, noncompliant with treatment, lack of cooperativity for initial evaluation    Short Term Goals: decrease in psychotic symptoms, improvement in insight, improvement in reality testing, improvement in reasoning ability, improvement in self care, tolerate medications, mood stabilization, improvement in overall cooperation with providers and staff on unit    Long Term Goals: stabilization of mood, resolution of psychotic symptoms, improved reality testing, improved reasoning ability, improved insight, adequate self care, stable living arrangements upon discharge, establishment of family support upon discharge    Progress Towards Goals: restarting psychiatric medications as prescribed    Recommended  Treatment: medication management, patient medication education, group therapy, milieu therapy, continued Behavioral Health psychiatric evaluation/assessment process     Treatment Frequency: daily medication monitoring, group and milieu therapy daily, monitoring through interdisciplinary rounds, monitoring through weekly patient care conferences    Expected Discharge Date: 7 days - 1/19/2025    Discharge Plan: TBD    Treatment Plan Created/Updated By: Meche Ponce DO 01/12/25

## 2025-01-12 NOTE — NURSING NOTE
Patient is isolative to bedroom, minimally engaging in conversation. Denies SI, HI, SIB, auditory and visual hallucinations. Patient is med compliant. Appears scared, hiding her face when walking through unit. Encouraged increased interactions with peers. Denies any unmet needs at this time. Maintaining Q15 safety checks.

## 2025-01-12 NOTE — ED NOTES
Insurance Authorization for admission:   Phone call placed to Marya Hodges  Phone number: [phone]  Spoke to Stephanie    5 days approved.  1/11-1/15/25  Level of care: inpatient mental health .  Review on 1/16  Authorization # **.    To be given on admission

## 2025-01-12 NOTE — ASSESSMENT & PLAN NOTE
No Patient has a history per record review generalized anxiety disorder  Patient was being treated in [facility] for [facility] with duloxetine 30 mg once daily,  Ativan 1 mg PRN as needed for anxiety    PLAN:  Restart home duloxetine DR capsule 30 mg once daily for anxiety  Continue inpatient Ativan PRN orders for anxiety   Continue inpatient atarax PRN orders for anxiety
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
## 2025-01-12 NOTE — NURSING NOTE
Received fax from [facility] pharmacy with all of the following information:    Gabapentin 300mg PO BID last filled 12/20/24  Junel Fe 1/20 OD last filled 12/20  Vraylar 3mg QD last filled 12/12/24  Buprenorphine-Nalox 2-0.5mg QD last filled 12/11/24  Lorazepam 1mg Q8h PRN last filled 12/11/24  Amoxetine 18mg QD last filled 12/10/24  Cyclobenzaprine 10mg TID PRN last filled 12/6/24  Duloxetine 30mg QD last filled 12/2/24  Topiramate 100mg BID last filled 11/30/24  Bupropion XL 30mg QD last filled 11/29/24  Hydroxizine 10mg QD PRN last filled 11/10/24    Form was copied and given to resident psychiatrist Dr. Ponce for review.

## 2025-01-12 NOTE — ED NOTES
Patient is a 39 yr old female, sent to the ED by her boyfriend Parth due to bizarre behavior from not taking her medication. Upon coming to the Ed she states that someone told her to stop taking her medications. Upon interview she said that she stopped her meds due to someone switching them with street drugs. She presents as very withdrawn paranoid and somewhat confused what is going on. Mood is depressed. Eye contact is poor and she is hiding under the blanket. She is asking to get back on her meds. She is on a virtual 1:1 which is making her feel very paranoid. She wants to start her medication again and signed a 201.     Patient is followed by [facility]ton [facility].  She does take suboxone.  She thinks that she took it 2 days ago, but answers and delayed and she seems confused about the facts. She could not remember when she was hospitalized last.     Dede KENNEDY

## 2025-01-12 NOTE — ASSESSMENT & PLAN NOTE
Patient has a history of opioid use disorder in early remission on MAT  Patient was being treated in the [facility] for opioid use disorder with Suboxone 2 mg once daily  Dose of Suboxone was confirmed by patient's outpatient pharmacy, [facility], and PDMP    PLAN:  Restart patient's home Suboxone at 2 mg sublingual once daily for opioid dependence

## 2025-01-12 NOTE — ASSESSMENT & PLAN NOTE
Patient noted to have   cholesterol 288, triglycerides 138, HDL 18, non-, LDL calculated to 42  Would recommend lifestyle modifications and dietary modification.

## 2025-01-12 NOTE — ASSESSMENT & PLAN NOTE
Noted to have potassium of 3.0   Will replete with 40 meq po kdur at this time   And recheck in the a.m. and replenish as needed

## 2025-01-12 NOTE — PLAN OF CARE
Problem: Alteration in Thoughts and Perception  Goal: Verbalize thoughts and feelings  Description: Interventions:  - Promote a nonjudgmental and trusting relationship with the patient through active listening and therapeutic communication  - Assess patient's level of functioning, behavior and potential for risk  - Engage patient in 1 on 1 interactions  - Encourage patient to express fears, feelings, frustrations, and discuss symptoms    - [place] patient to reality, help patient recognize reality-based thinking   - Administer medications as ordered and assess for potential side effects  - Provide the patient education related to the signs and symptoms of the illness and desired effects of prescribed medications  Outcome: Progressing     Problem: Depression  Goal: Refrain from harming self  Description: Interventions:  - Monitor patient closely, per order   - Supervise medication ingestion, monitor effects and side effects   Outcome: Progressing  Goal: Refrain from self-neglect  Outcome: Progressing     Problem: Alteration in Thoughts and Perception  Goal: Treatment Goal: Gain control of psychotic behaviors/thinking, reduce/eliminate presenting symptoms and demonstrate improved reality functioning upon discharge  Outcome: Not Progressing  Goal: Agree to be compliant with medication regime, as prescribed and report medication side effects  Description: Interventions:  - Offer appropriate PRN medication and supervise ingestion; conduct AIMS, as needed   Outcome: Not Progressing  Goal: Recognize dysfunctional thoughts, communicate reality-based thoughts at the time of discharge  Description: Interventions:  - Provide medication and psycho-education to assist patient in compliance and developing insight into his/her illness   Outcome: Not Progressing     Problem: Depression  Goal: Treatment Goal: Demonstrate behavioral control of depressive symptoms, verbalize feelings of improved mood/affect, and adopt new coping  skills prior to discharge  Outcome: Not Progressing  Goal: Attend and participate in unit activities, including therapeutic, recreational, and educational groups  Description: Interventions:  - Provide therapeutic and educational activities daily, encourage attendance and participation, and document same in the medical record   Outcome: Not Progressing
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
## 2025-01-13 PROBLEM — R82.90 FOUL SMELLING URINE: Status: ACTIVE | Noted: 2025-01-13

## 2025-01-13 PROBLEM — E55.9 VITAMIN D INSUFFICIENCY: Status: ACTIVE | Noted: 2025-01-13

## 2025-01-13 LAB
ATRIAL RATE: 94 BPM
BACTERIA UR QL AUTO: ABNORMAL /HPF
BILIRUB UR QL STRIP: ABNORMAL
CLARITY UR: ABNORMAL
COLOR UR: ABNORMAL
GLUCOSE UR STRIP-MCNC: NEGATIVE MG/DL
HGB UR QL STRIP.AUTO: NEGATIVE
KETONES UR STRIP-MCNC: ABNORMAL MG/DL
LEUKOCYTE ESTERASE UR QL STRIP: 100
MUCOUS THREADS UR QL AUTO: ABNORMAL
NITRITE UR QL STRIP: NEGATIVE
NON-SQ EPI CELLS URNS QL MICRO: ABNORMAL /HPF
P AXIS: 73 DEGREES
PH UR STRIP.AUTO: 6 [PH]
PR INTERVAL: 114 MS
PROT UR STRIP-MCNC: ABNORMAL MG/DL
QRS AXIS: 65 DEGREES
QRSD INTERVAL: 80 MS
QT INTERVAL: 350 MS
QTC INTERVAL: 438 MS
RBC #/AREA URNS AUTO: ABNORMAL /HPF
SP GR UR STRIP.AUTO: 1.02 (ref 1–1.04)
T WAVE AXIS: 26 DEGREES
TREPONEMA PALLIDUM IGG+IGM AB [PRESENCE] IN SERUM OR PLASMA BY IMMUNOASSAY: NORMAL
URINE COMMENT: ABNORMAL
UROBILINOGEN UA: 1 MG/DL
VENTRICULAR RATE: 94 BPM
WBC #/AREA URNS AUTO: ABNORMAL /HPF

## 2025-01-13 PROCEDURE — 93005 ELECTROCARDIOGRAM TRACING: CPT

## 2025-01-13 PROCEDURE — 99232 SBSQ HOSP IP/OBS MODERATE 35: CPT

## 2025-01-13 PROCEDURE — 81003 URINALYSIS AUTO W/O SCOPE: CPT | Performed by: NURSE PRACTITIONER

## 2025-01-13 PROCEDURE — NC001 PR NO CHARGE: Performed by: STUDENT IN AN ORGANIZED HEALTH CARE EDUCATION/TRAINING PROGRAM

## 2025-01-13 PROCEDURE — 81001 URINALYSIS AUTO W/SCOPE: CPT | Performed by: NURSE PRACTITIONER

## 2025-01-13 PROCEDURE — 93010 ELECTROCARDIOGRAM REPORT: CPT

## 2025-01-13 RX ORDER — POLYETHYLENE GLYCOL 3350 17 G/17G
17 POWDER, FOR SOLUTION ORAL DAILY
Status: DISCONTINUED | OUTPATIENT
Start: 2025-01-13 | End: 2025-01-23 | Stop reason: HOSPADM

## 2025-01-13 RX ORDER — SENNOSIDES 8.6 MG
1 TABLET ORAL
Status: DISCONTINUED | OUTPATIENT
Start: 2025-01-13 | End: 2025-01-23 | Stop reason: HOSPADM

## 2025-01-13 RX ORDER — LEVOTHYROXINE SODIUM 50 UG/1
50 TABLET ORAL
Status: DISCONTINUED | OUTPATIENT
Start: 2025-01-14 | End: 2025-01-23 | Stop reason: HOSPADM

## 2025-01-13 RX ORDER — ATORVASTATIN CALCIUM 40 MG/1
40 TABLET, FILM COATED ORAL
Status: DISCONTINUED | OUTPATIENT
Start: 2025-01-13 | End: 2025-01-23 | Stop reason: HOSPADM

## 2025-01-13 RX ADMIN — TOPIRAMATE 100 MG: 100 TABLET, FILM COATED ORAL at 08:10

## 2025-01-13 RX ADMIN — BUPROPION HYDROCHLORIDE 300 MG: 300 TABLET, EXTENDED RELEASE ORAL at 08:10

## 2025-01-13 RX ADMIN — GABAPENTIN 300 MG: 300 CAPSULE ORAL at 08:10

## 2025-01-13 RX ADMIN — ATORVASTATIN CALCIUM 40 MG: 40 TABLET, FILM COATED ORAL at 17:23

## 2025-01-13 RX ADMIN — SENNOSIDES 8.6 MG: 8.6 TABLET, FILM COATED ORAL at 21:12

## 2025-01-13 RX ADMIN — CARIPRAZINE 3 MG: 3 CAPSULE, GELATIN COATED ORAL at 08:10

## 2025-01-13 RX ADMIN — BUPRENORPHINE AND NALOXONE 2 MG: 2; .5 FILM BUCCAL; SUBLINGUAL at 10:21

## 2025-01-13 RX ADMIN — TRAZODONE HYDROCHLORIDE 50 MG: 50 TABLET ORAL at 21:12

## 2025-01-13 RX ADMIN — Medication 1000 UNITS: at 17:24

## 2025-01-13 RX ADMIN — GABAPENTIN 300 MG: 300 CAPSULE ORAL at 17:23

## 2025-01-13 RX ADMIN — TOPIRAMATE 100 MG: 100 TABLET, FILM COATED ORAL at 17:23

## 2025-01-13 RX ADMIN — DULOXETINE HYDROCHLORIDE 30 MG: 30 CAPSULE, DELAYED RELEASE ORAL at 08:10

## 2025-01-13 RX ADMIN — POLYETHYLENE GLYCOL 3350 17 G: 17 POWDER, FOR SOLUTION ORAL at 13:08

## 2025-01-13 NOTE — ASSESSMENT & PLAN NOTE
Patient has a history per record review generalized anxiety disorder  Patient was being treated in [facility] for SHAILA with duloxetine 30 mg once daily,  Ativan 1 mg PRN as needed for anxiety    PLAN:  Continue home duloxetine DR capsule 30 mg once daily for anxiety  Continue inpatient Ativan PRN orders for anxiety   Continue inpatient atarax PRN orders for anxiety

## 2025-01-13 NOTE — ASSESSMENT & PLAN NOTE
"Patient has a recent history of daily vaping which she says she stopped approximately 4 days ago secondary to fear that \"street drugs\" were in her vape.  Patient was being [facility] in [facility] for tobacco cessation with Wellbutrin    PLAN:  Continue home Wellbutrin  mg once daily for tobacco cessation as well as depressive symptoms  "

## 2025-01-13 NOTE — ASSESSMENT & PLAN NOTE
Patient has a history of MDD versus bipolar disorder  Patient was being treated in [facility] for mood with Vraylar 3 mg once daily, Wellbutrin  mg once daily, Topamax 100 mg twice daily, duloxetine DR 30 mg once daily  Secondary to patient's lack of cooperativity during initial evaluation today this writer was unable to obtain history and ROS to distinguish whether or not patient needs to be treated for unipolar depression or bipolar    PLAN:  Continue to evaluate the patient for MDD versus bipolar disorder  Continue home Vraylar 3 mg once daily for mood stabilization  Continue home duloxetine DR 30 mg once daily for depressive symptoms  continue home Topamax 100 mg twice daily for mood stabilization  Continue home Wellbutrin  mg once daily for depressive symptoms

## 2025-01-13 NOTE — QUICK NOTE
Notified by nursing that patient felt like she had a UTI.  UA ordered.  Upon chart review, it appears the patient actively follows with [facility].  I reached out to the family practice service, and they are amenable to taking over her care.

## 2025-01-13 NOTE — ED NOTES
Insurance Authorization for admission:   Phone call received from Carroll Morris to Julius HERZOG     5 days approved.  Level of care: [facility]  Review on 1/16 with Roma  Authorization # [id number]

## 2025-01-13 NOTE — CONSULTS
Acceptance/Transfer Note - [facility]    Assessment and plan:    Patient Active Problem List   Diagnosis    Pulmonary nodule    PTSD (post-traumatic stress disorder)    Aftercare following surgery for injury or trauma    Lumbar strain    Other specified hypothyroidism    Anxiety    Obesity due to excess calories without serious comorbidity    Opioid use disorder, moderate, in early remission, on maintenance therapy, dependence (HCC)    GERD (gastroesophageal reflux disease)    Hypokalemia    Tobacco use    Migraine    Bipolar disorder with depression (HCC)    Primary insomnia    Bipolar 2 disorder (HCC)    Opioid dependence on agonist therapy (HCC)    Seasonal allergic rhinitis due to pollen    Encounter for weight management    Nicotine dependence, uncomplicated    Chronic pain of right knee    Hypercholesteremia    Medical clearance for psychiatric admission    Unspecified mood disorder (rule out MDD vs bipolar disorder)    Insomnia    Other specified attention deficit hyperactivity disorder (ADHD)    Generalized anxiety disorder    Vitamin D insufficiency    Foul smelling urine        * Medical clearance for psychiatric admission  Assessment & Plan  Cleared for admission to [facility]    Unspecified mood disorder (rule out MDD vs bipolar disorder)  Assessment & Plan  Plan per primary team     Vitamin D insufficiency  Assessment & Plan  Vitamin D found to be 26.8     Plan:   Vitamin D supplementation 1,000 U daily     Hypercholesteremia  Assessment & Plan  Lab Results   Component Value Date    CHOLESTEROL 288 (H) 01/12/2025    CHOLESTEROL 178 02/05/2024    CHOLESTEROL 171 09/29/2023     Lab Results   Component Value Date    HDL 18 (L) 01/12/2025    HDL 38 (L) 02/05/2024    HDL 37 (L) 09/29/2023     Lab Results   Component Value Date    TRIG 138 01/12/2025    TRIG 88 02/05/2024    TRIG 54 09/29/2023     Lab Results   Component Value Date    NONHDLC 270 01/12/2025    NONHDLC 140 02/05/2024     "NONHDLC 154 01/26/2022      Plan:   Start atorvastatin 40 mg   Recheck lipid panel in 1 month     Migraine  Assessment & Plan  Home med: topiramate 100 mg daily     Plan:   Continue home med     Hypokalemia  Assessment & Plan  Recent Labs     01/12/25  0621   K 3.0*      S/P  40 meq roney potassium       Plan:   Recheck BMP in AM.     Opioid use disorder, moderate, in early remission, on maintenance therapy, dependence (HCC)  Assessment & Plan  Continue suboxone 2- 5 film daily     Other specified hypothyroidism  Assessment & Plan  Lab Results   Component Value Date    [id number] 0.801 01/12/2025    TSH 1.240 05/29/2020   Currently controlled on home medication levothyroxine 50 mcg     Plan:   Continue home medication            Physical Exam:    Blood Pressure: 138/82 (01/13/25 0804)  Pulse: 105 (01/13/25 0804)  Temperature: 97.5 °F (36.4 °C) (01/13/25 0804)  Temp Source: Temporal (01/13/25 0804)  Respirations: 16 (01/13/25 0804)  Height: 5' 4\" (162.6 cm) (01/11/25 2156)  Weight - Scale: 75.3 kg (166 lb) (01/13/25 0804)  SpO2: 98 % (01/13/25 0804)    Physical Exam  Constitutional:       Appearance: Normal appearance. She is normal weight.   HENT:      Head: Normocephalic and atraumatic.      Right Ear: External ear normal.      Left Ear: External ear normal.      Nose: Nose normal. No congestion.      Mouth/Throat:      Mouth: Mucous membranes are moist.      Pharynx: No oropharyngeal exudate.   Eyes:      General: No scleral icterus.     Extraocular Movements: Extraocular movements intact.      Conjunctiva/sclera: Conjunctivae normal.   Cardiovascular:      Rate and Rhythm: Normal rate and regular rhythm.      Pulses: Normal pulses.      Heart sounds: Normal heart sounds.   Pulmonary:      Effort: Pulmonary effort is normal.      Breath sounds: Normal breath sounds.   Abdominal:      Palpations: Abdomen is soft.      Tenderness: There is no abdominal tenderness.   Musculoskeletal:      Cervical back: Normal range " of motion.      Right lower leg: No edema.      Left lower leg: No edema.   Skin:     General: Skin is warm.      Capillary Refill: Capillary refill takes less than 2 seconds.   Neurological:      Mental Status: She is alert and oriented to person, place, and time.         Transfer Summary:    Geno Lopez is a 39 y.o. female who presents with bizarre behavior is admitted to the [facility] on a 201 basis.     Patient appears anxious with depressed mood on exam, and has some pain and foul smelling urine. When interviewed on other symptoms such as abdominal pain, burning sensation or discharge patient buried her head in her hands, retreated under the blanket and said she was too embarrassed to talk about it.    Patient was originally consulted by [facility] service, and  service will be continuing management.       Lazara Thompson MD  01/13/25  4:33 PM

## 2025-01-13 NOTE — ASSESSMENT & PLAN NOTE
Patient has a history of insomnia  Patient was being [facility] for insomnia with trazodone 50 mg nightly    PLAN:  Discontinue melatonin 3 mg nightly which was started on admission  Continue patient's home trazodone 50 mg nightly for insomnia,  may consider changing to low dose mirtazapine to address poor appetite and insomnia

## 2025-01-13 NOTE — PLAN OF CARE
Problem: Depression  Goal: Refrain from harming self  Description: Interventions:  - Monitor patient closely, per order   - Supervise medication ingestion, monitor effects and side effects   Outcome: Progressing     Problem: Depression  Goal: Refrain from harming self  Description: Interventions:  - Monitor patient closely, per order   - Supervise medication ingestion, monitor effects and side effects   Outcome: Progressing     Problem: Depression  Goal: Refrain from harming self  Description: Interventions:  - Monitor patient closely, per order   - Supervise medication ingestion, monitor effects and side effects   Outcome: Progressing     Problem: Alteration in Thoughts and Perception  Goal: Treatment Goal: Gain control of psychotic behaviors/thinking, reduce/eliminate presenting symptoms and demonstrate improved reality functioning upon discharge  Outcome: Not Progressing  Goal: Verbalize thoughts and feelings  Description: Interventions:  - Promote a nonjudgmental and trusting relationship with the patient through active listening and therapeutic communication  - Assess patient's level of functioning, behavior and potential for risk  - Engage patient in 1 on 1 interactions  - Encourage patient to express fears, feelings, frustrations, and discuss symptoms    - [place] patient to reality, help patient recognize reality-based thinking   - Administer medications as ordered and assess for potential side effects  - Provide the patient education related to the signs and symptoms of the illness and desired effects of prescribed medications  Outcome: Not Progressing  Goal: Agree to be compliant with medication regime, as prescribed and report medication side effects  Description: Interventions:  - Offer appropriate PRN medication and supervise ingestion; conduct AIMS, as needed   Outcome: Not Progressing  Goal: Recognize dysfunctional thoughts, communicate reality-based thoughts at the time of discharge  Description:  Interventions:  - Provide medication and psycho-education to assist patient in compliance and developing insight into his/her illness   Outcome: Not Progressing     Problem: Depression  Goal: Treatment Goal: Demonstrate behavioral control of depressive symptoms, verbalize feelings of improved mood/affect, and adopt new coping skills prior to discharge  Outcome: Not Progressing  Goal: Refrain from self-neglect  Outcome: Not Progressing  Goal: Attend and participate in unit activities, including therapeutic, recreational, and educational groups  Description: Interventions:  - Provide therapeutic and educational activities daily, encourage attendance and participation, and document same in the medical record   Outcome: Not Progressing     Problem: Alteration in Thoughts and Perception  Goal: Treatment Goal: Gain control of psychotic behaviors/thinking, reduce/eliminate presenting symptoms and demonstrate improved reality functioning upon discharge  Outcome: Not Progressing  Goal: Verbalize thoughts and feelings  Description: Interventions:  - Promote a nonjudgmental and trusting relationship with the patient through active listening and therapeutic communication  - Assess patient's level of functioning, behavior and potential for risk  - Engage patient in 1 on 1 interactions  - Encourage patient to express fears, feelings, frustrations, and discuss symptoms    - [place] patient to reality, help patient recognize reality-based thinking   - Administer medications as ordered and assess for potential side effects  - Provide the patient education related to the signs and symptoms of the illness and desired effects of prescribed medications  Outcome: Not Progressing  Goal: Agree to be compliant with medication regime, as prescribed and report medication side effects  Description: Interventions:  - Offer appropriate PRN medication and supervise ingestion; conduct AIMS, as needed   Outcome: Not Progressing  Goal: Recognize  dysfunctional thoughts, communicate reality-based thoughts at the time of discharge  Description: Interventions:  - Provide medication and psycho-education to assist patient in compliance and developing insight into his/her illness   Outcome: Not Progressing     Problem: Depression  Goal: Treatment Goal: Demonstrate behavioral control of depressive symptoms, verbalize feelings of improved mood/affect, and adopt new coping skills prior to discharge  Outcome: Not Progressing  Goal: Refrain from self-neglect  Outcome: Not Progressing  Goal: Attend and participate in unit activities, including therapeutic, recreational, and educational groups  Description: Interventions:  - Provide therapeutic and educational activities daily, encourage attendance and participation, and document same in the medical record   Outcome: Not Progressing     Problem: Depression  Goal: Refrain from harming self  Description: Interventions:  - Monitor patient closely, per order   - Supervise medication ingestion, monitor effects and side effects   Outcome: Progressing     Problem: Alteration in Thoughts and Perception  Goal: Treatment Goal: Gain control of psychotic behaviors/thinking, reduce/eliminate presenting symptoms and demonstrate improved reality functioning upon discharge  Outcome: Not Progressing  Goal: Verbalize thoughts and feelings  Description: Interventions:  - Promote a nonjudgmental and trusting relationship with the patient through active listening and therapeutic communication  - Assess patient's level of functioning, behavior and potential for risk  - Engage patient in 1 on 1 interactions  - Encourage patient to express fears, feelings, frustrations, and discuss symptoms    - [place] patient to reality, help patient recognize reality-based thinking   - Administer medications as ordered and assess for potential side effects  - Provide the patient education related to the signs and symptoms of the illness and desired effects of  prescribed medications  Outcome: Not Progressing  Goal: Agree to be compliant with medication regime, as prescribed and report medication side effects  Description: Interventions:  - Offer appropriate PRN medication and supervise ingestion; conduct AIMS, as needed   Outcome: Not Progressing  Goal: Recognize dysfunctional thoughts, communicate reality-based thoughts at the time of discharge  Description: Interventions:  - Provide medication and psycho-education to assist patient in compliance and developing insight into his/her illness   Outcome: Not Progressing     Problem: Depression  Goal: Treatment Goal: Demonstrate behavioral control of depressive symptoms, verbalize feelings of improved mood/affect, and adopt new coping skills prior to discharge  Outcome: Not Progressing  Goal: Refrain from self-neglect  Outcome: Not Progressing  Goal: Attend and participate in unit activities, including therapeutic, recreational, and educational groups  Description: Interventions:  - Provide therapeutic and educational activities daily, encourage attendance and participation, and document same in the medical record   Outcome: Not Progressing

## 2025-01-13 NOTE — ASSESSMENT & PLAN NOTE
Lab Results   Component Value Date    CHOLESTEROL 288 (H) 01/12/2025    CHOLESTEROL 178 02/05/2024    CHOLESTEROL 171 09/29/2023     Lab Results   Component Value Date    HDL 18 (L) 01/12/2025    HDL 38 (L) 02/05/2024    HDL 37 (L) 09/29/2023     Lab Results   Component Value Date    TRIG 138 01/12/2025    TRIG 88 02/05/2024    TRIG 54 09/29/2023     Lab Results   Component Value Date    NONHDLC 270 01/12/2025    NONHDLC 140 02/05/2024    NONHDLC 154 01/26/2022      Plan:   Start atorvastatin 40 mg   Recheck lipid panel in 1 month

## 2025-01-13 NOTE — NURSING NOTE
Pt tearful when taking scheduled am medications. Pt declined to take her suboxone 2mg, wellbutrin 300mg and cymbalta 30mg for no reason other than she was worried they would make her feel sick. Attempted to give medications at this time again but pt continued to decline reporting she feels sick, medications wasted at this time w/LES [place]. Pt then approached a different nurse asking for medications. New medications pulled, psychiatry made aware and medications administered. Pt requesting ginger ale and saltines. Denies SI/HI/AH/VH but appears tearful and depressed. Withdrawn to self and room.
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
## 2025-01-13 NOTE — NURSING NOTE
Patient has been in her room all evening. She is chatting with her roommate who is being very encouraging towards her. She is a little more verbal with staff also this evening. She is cooperative with HS medication in fact she was asking if she would be receiving any medication tonight. She said she still does not feel like eating however she was offered  a sandwich and accepted it.  She denies S.I.H.I.A/H V/H

## 2025-01-13 NOTE — ASSESSMENT & PLAN NOTE
Lab Results   Component Value Date    [id number] 0.801 01/12/2025    TSH 1.240 05/29/2020   Currently controlled on home medication levothyroxine 50 mcg     Plan:   Continue home medication

## 2025-01-13 NOTE — ASSESSMENT & PLAN NOTE
Recent Labs     01/12/25  0621   K 3.0*      S/P  40 meq roney potassium       Plan:   Recheck BMP in AM.

## 2025-01-13 NOTE — PROGRESS NOTES
01/13/25 0835   Team Meeting   Meeting Type Daily Rounds   Team Members Present   Team Members Present Physician;Nurse;   Physician Team Member Kailyn   Nursing Team Member Candie   Care Management Team Member Rena   Patient/Family Present   Patient Present No   Patient's Family Present No     Pt is a 39 year old female on a 201 coming from the [facility] ED. Pt was not able to provide much information upon admission. Pt denies SI/HI/AVH. Pt is paranoid, anxious, depressed and appears scared. Pt reported she wants to get back onto medication. Pt's readmit score is 28.

## 2025-01-14 LAB
AMORPH URATE CRY URNS QL MICRO: ABNORMAL /HPF
ANION GAP SERPL CALCULATED.3IONS-SCNC: 13 MMOL/L (ref 4–13)
BACTERIA UR QL AUTO: ABNORMAL /HPF
BILIRUB UR QL STRIP: NEGATIVE
BUN SERPL-MCNC: 22 MG/DL (ref 5–25)
CALCIUM SERPL-MCNC: 9.6 MG/DL (ref 8.4–10.2)
CHLORIDE SERPL-SCNC: 97 MMOL/L (ref 96–108)
CLARITY UR: ABNORMAL
CO2 SERPL-SCNC: 31 MMOL/L (ref 21–32)
COLOR UR: ABNORMAL
CREAT SERPL-MCNC: 1.03 MG/DL (ref 0.6–1.3)
GFR SERPL CREATININE-BSD FRML MDRD: 69 ML/MIN/1.73SQ M
GLUCOSE P FAST SERPL-MCNC: 88 MG/DL (ref 65–99)
GLUCOSE SERPL-MCNC: 88 MG/DL (ref 65–140)
GLUCOSE UR STRIP-MCNC: NEGATIVE MG/DL
HGB UR QL STRIP.AUTO: 10
KETONES UR STRIP-MCNC: ABNORMAL MG/DL
LEUKOCYTE ESTERASE UR QL STRIP: 500
MUCOUS THREADS UR QL AUTO: ABNORMAL
NITRITE UR QL STRIP: NEGATIVE
NON-SQ EPI CELLS URNS QL MICRO: ABNORMAL /HPF
PH UR STRIP.AUTO: 6 [PH]
POTASSIUM SERPL-SCNC: 3.1 MMOL/L (ref 3.5–5.3)
PROT UR STRIP-MCNC: ABNORMAL MG/DL
RBC #/AREA URNS AUTO: ABNORMAL /HPF
SODIUM SERPL-SCNC: 141 MMOL/L (ref 135–147)
SP GR UR STRIP.AUTO: 1.02 (ref 1–1.04)
UROBILINOGEN UA: 1 MG/DL
WBC #/AREA URNS AUTO: ABNORMAL /HPF

## 2025-01-14 PROCEDURE — 99232 SBSQ HOSP IP/OBS MODERATE 35: CPT

## 2025-01-14 PROCEDURE — 80048 BASIC METABOLIC PNL TOTAL CA: CPT

## 2025-01-14 PROCEDURE — 81001 URINALYSIS AUTO W/SCOPE: CPT | Performed by: PSYCHIATRY & NEUROLOGY

## 2025-01-14 PROCEDURE — 87086 URINE CULTURE/COLONY COUNT: CPT

## 2025-01-14 RX ORDER — POTASSIUM CHLORIDE 1500 MG/1
40 TABLET, EXTENDED RELEASE ORAL ONCE
Status: COMPLETED | OUTPATIENT
Start: 2025-01-14 | End: 2025-01-14

## 2025-01-14 RX ORDER — NITROFURANTOIN 25; 75 MG/1; MG/1
100 CAPSULE ORAL 2 TIMES DAILY WITH MEALS
Status: DISCONTINUED | OUTPATIENT
Start: 2025-01-14 | End: 2025-01-14

## 2025-01-14 RX ORDER — POTASSIUM CHLORIDE 1500 MG/1
20 TABLET, EXTENDED RELEASE ORAL ONCE
Status: COMPLETED | OUTPATIENT
Start: 2025-01-14 | End: 2025-01-14

## 2025-01-14 RX ORDER — NITROFURANTOIN 25; 75 MG/1; MG/1
100 CAPSULE ORAL 2 TIMES DAILY WITH MEALS
Status: DISPENSED | OUTPATIENT
Start: 2025-01-14 | End: 2025-01-19

## 2025-01-14 RX ADMIN — TRAZODONE HYDROCHLORIDE 50 MG: 50 TABLET ORAL at 21:01

## 2025-01-14 RX ADMIN — TOPIRAMATE 100 MG: 100 TABLET, FILM COATED ORAL at 17:15

## 2025-01-14 RX ADMIN — CARIPRAZINE 3 MG: 3 CAPSULE, GELATIN COATED ORAL at 08:27

## 2025-01-14 RX ADMIN — Medication 1000 UNITS: at 08:27

## 2025-01-14 RX ADMIN — TOPIRAMATE 100 MG: 100 TABLET, FILM COATED ORAL at 08:27

## 2025-01-14 RX ADMIN — POTASSIUM CHLORIDE 40 MEQ: 1500 TABLET, EXTENDED RELEASE ORAL at 10:20

## 2025-01-14 RX ADMIN — GABAPENTIN 300 MG: 300 CAPSULE ORAL at 08:27

## 2025-01-14 RX ADMIN — SENNOSIDES 8.6 MG: 8.6 TABLET, FILM COATED ORAL at 21:01

## 2025-01-14 RX ADMIN — HYDROXYZINE HYDROCHLORIDE 100 MG: 50 TABLET, FILM COATED ORAL at 10:20

## 2025-01-14 RX ADMIN — IBUPROFEN 800 MG: 400 TABLET, FILM COATED ORAL at 09:27

## 2025-01-14 RX ADMIN — BUPROPION HYDROCHLORIDE 300 MG: 300 TABLET, EXTENDED RELEASE ORAL at 08:27

## 2025-01-14 RX ADMIN — DULOXETINE HYDROCHLORIDE 30 MG: 30 CAPSULE, DELAYED RELEASE ORAL at 08:27

## 2025-01-14 RX ADMIN — POTASSIUM CHLORIDE 20 MEQ: 1500 TABLET, EXTENDED RELEASE ORAL at 19:50

## 2025-01-14 RX ADMIN — IBUPROFEN 600 MG: 600 TABLET, FILM COATED ORAL at 19:50

## 2025-01-14 RX ADMIN — ATORVASTATIN CALCIUM 40 MG: 40 TABLET, FILM COATED ORAL at 17:15

## 2025-01-14 RX ADMIN — GABAPENTIN 300 MG: 300 CAPSULE ORAL at 17:15

## 2025-01-14 RX ADMIN — LEVOTHYROXINE SODIUM 50 MCG: 0.05 TABLET ORAL at 05:35

## 2025-01-14 RX ADMIN — BUPRENORPHINE AND NALOXONE 2 MG: 2; .5 FILM BUCCAL; SUBLINGUAL at 08:27

## 2025-01-14 NOTE — ASSESSMENT & PLAN NOTE
Patient has a history of opioid use disorder in early remission on MAT  Patient was being treated [facility] for opioid use disorder with Suboxone 2 mg once daily  Dose of Suboxone was confirmed by patient's outpatient pharmacy, [facility], and PDMP    PLAN:  Continue patient's home Suboxone at 2 mg sublingual once daily for opioid dependence

## 2025-01-14 NOTE — ASSESSMENT & PLAN NOTE
Family medicine was consulted yesterday and was notified today of patient's urinary issue/UA. Patient appears to be starting Nitrofurantoin later today for this.

## 2025-01-14 NOTE — ASSESSMENT & PLAN NOTE
Patient has a history of MDD versus bipolar disorder  Patient was being treated in the [facility] for mood with Vraylar 3 mg once daily, Wellbutrin  mg once daily, Topamax 100 mg twice daily, duloxetine DR 30 mg once daily  Secondary to patient's lack of cooperativity during initial evaluation today this writer was unable to obtain history and ROS to distinguish whether or not patient needs to be treated for unipolar depression or bipolar    PLAN:  Continue to evaluate the patient for MDD versus bipolar disorder  Continue home Vraylar 3 mg once daily for mood stabilization  Continue home duloxetine DR 30 mg once daily for depressive symptoms  continue home Topamax 100 mg twice daily for mood stabilization  Continue home Wellbutrin  mg once daily for depressive symptoms  Consider decrease in Wellbutrin to 150mg PO QD, anxious appearing

## 2025-01-14 NOTE — PLAN OF CARE
Problem: Alteration in Thoughts and Perception  Goal: Agree to be compliant with medication regime, as prescribed and report medication side effects  Description: Interventions:  - Offer appropriate PRN medication and supervise ingestion; conduct AIMS, as needed   Outcome: Progressing     Problem: Depression  Goal: Refrain from harming self  Description: Interventions:  - Monitor patient closely, per order   - Supervise medication ingestion, monitor effects and side effects   Outcome: Progressing     Problem: DISCHARGE PLANNING - CARE MANAGEMENT  Goal: Discharge to post-acute care or home with appropriate resources  Description: INTERVENTIONS:  - Conduct assessment to determine patient/family and health care team treatment goals, and need for post-acute services based on payer coverage, community resources, and patient preferences, and barriers to discharge  - Address psychosocial, clinical, and financial barriers to discharge as identified in assessment in conjunction with the patient/family and health care team  - Arrange appropriate level of post-acute services according to patient’s   needs and preference and payer coverage in collaboration with the physician and health care team  - Communicate with and update the patient/family, physician, and health care team regarding progress on the discharge plan  - Arrange appropriate transportation to post-acute venues  Outcome: Progressing     Problem: Alteration in Thoughts and Perception  Goal: Treatment Goal: Gain control of psychotic behaviors/thinking, reduce/eliminate presenting symptoms and demonstrate improved reality functioning upon discharge  Outcome: Not Progressing  Goal: Verbalize thoughts and feelings  Description: Interventions:  - Promote a nonjudgmental and trusting relationship with the patient through active listening and therapeutic communication  - Assess patient's level of functioning, behavior and potential for risk  - Engage patient in 1 on 1  interactions  - Encourage patient to express fears, feelings, frustrations, and discuss symptoms    - [place] patient to reality, help patient recognize reality-based thinking   - Administer medications as ordered and assess for potential side effects  - Provide the patient education related to the signs and symptoms of the illness and desired effects of prescribed medications  Outcome: Not Progressing  Goal: Recognize dysfunctional thoughts, communicate reality-based thoughts at the time of discharge  Description: Interventions:  - Provide medication and psycho-education to assist patient in compliance and developing insight into his/her illness   Outcome: Not Progressing     Problem: Depression  Goal: Treatment Goal: Demonstrate behavioral control of depressive symptoms, verbalize feelings of improved mood/affect, and adopt new coping skills prior to discharge  Outcome: Not Progressing  Goal: Refrain from self-neglect  Outcome: Not Progressing  Goal: Attend and participate in unit activities, including therapeutic, recreational, and educational groups  Description: Interventions:  - Provide therapeutic and educational activities daily, encourage attendance and participation, and document same in the medical record   Outcome: Not Progressing     Problem: Ineffective Coping  Goal: Participates in unit activities  Description: Interventions:  - Provide therapeutic environment   - Provide required programming   - Redirect inappropriate behaviors   Outcome: Not Progressing

## 2025-01-14 NOTE — NURSING NOTE
"Pt anxious and tearful on approach. Speaks very soft in conversation. Pt c/o headache, given PRN motrin 800mg po @0927-not effective. Denies SI/HI/AH/VH but appears paranoid regarding medications. Pt stating, \"I don't think I can take them, I feel like they are making me worse.\" Pt extremely apologetic for crying. Pt's fiance called and pt did not want to speak with him originally stating \"What was his name? I don't have a  and I'm not sure if I have a fiance.\" Pt later spoke with him directly but has yet to sign an ALOK. Pt became hysterical not able to describe what she was feeling. When asked if she's anxious pt stated, \"I don't know, just too much.\" HAM score 25. Pt given PRN atarax 100mg po for severe anxiety @1020- effective. Pt reported, \"I guess I do feel better.\" Pt needs a lot of education and reassurance before she will take medication but will take them with much encouragement. Pt has been withdrawn to room most of the day. Poor appetite due to paranoia thinking food and drinks \"don't taste right.\" Given toiletries to take a shower and bed linens changed. Pt appreciative. No further needs at this time.   "

## 2025-01-14 NOTE — PROGRESS NOTES
Progress Note - Behavioral Health   Name: Geno Lopez 39 y.o. female I MRN: [id number]  Unit/Bed#: -01 I Date of Admission: 1/11/2025   Date of Service: 1/14/2025 I Hospital Day: 3     Assessment & Plan  Unspecified mood disorder (rule out MDD vs bipolar disorder)  Patient has a history of MDD versus bipolar disorder  Patient was being treated in the [facility] program for mood with Vraylar 3 mg once daily, Wellbutrin  mg once daily, Topamax 100 mg twice daily, duloxetine DR 30 mg once daily  Secondary to patient's lack of cooperativity during initial evaluation today this writer was unable to obtain history and ROS to distinguish whether or not patient needs to be treated for unipolar depression or bipolar    PLAN:  Continue to evaluate the patient for MDD versus bipolar disorder  Continue home Vraylar 3 mg once daily for mood stabilization  Continue home duloxetine DR 30 mg once daily for depressive symptoms  continue home Topamax 100 mg twice daily for mood stabilization  Continue home Wellbutrin  mg once daily for depressive symptoms  Consider decrease in Wellbutrin to 150mg PO QD, anxious appearing    Opioid use disorder, moderate, in early remission, on maintenance therapy, dependence (HCC)  Patient has a history of opioid use disorder in early remission on MAT  Patient was being treated in the [facility] program for opioid use disorder with Suboxone 2 mg once daily  Dose of Suboxone was confirmed by patient's outpatient pharmacy, [facility], and PDMP    PLAN:  Continue patient's home Suboxone at 2 mg sublingual once daily for opioid dependence    Generalized anxiety disorder  Patient has a history per record review generalized anxiety disorder  Patient was being treated in the [facility] for SHAILA with duloxetine 30 mg once daily,  Ativan 1 mg PRN as needed for anxiety    PLAN:  Continue home duloxetine DR capsule 30 mg once daily for anxiety  Continue inpatient Ativan PRN orders for anxiety  "  Continue inpatient atarax PRN orders for anxiety   Other specified attention deficit hyperactivity disorder (ADHD)  Per record review patient has a history of ADHD  Patient was being treated in the [facility] for ADHD with Strattera 18 mg once daily  Patient told nursing that she does not want to restart Strattera at this time    PLAN:  Do not restart home Strattera while admitted to inpatient psychiatric unit  Insomnia  Patient has a history of insomnia  Patient was being treated in the [facility] for insomnia with trazodone 50 mg nightly    PLAN:  Discontinue melatonin 3 mg nightly which was started on admission  Continue patient's home trazodone 50 mg nightly for insomnia, consider changing to low dose mirtazapine to address poor appetite and insomnia.  Tobacco use  Patient has a recent history of daily vaping which she says she stopped approximately 4 days ago secondary to fear that \"street drugs\" were in her vape.  Patient was being treated in the [facility] program for tobacco cessation with Wellbutrin    PLAN:  Continue home Wellbutrin  mg once daily for tobacco cessation as well as depressive symptoms  Other specified hypothyroidism  Per medical  Vitamin D insufficiency  Per medical  Foul smelling urine  Family medicine was consulted yesterday and was notified today of patient's urinary issue/UA. Patient appears to be starting Nitrofurantoin later today for this.  Patient with low potassium of 3.1 today, reached out to family [facility] for repletion.  Planned medication and treatment changes:    All current active medications have been reviewed  Encourage group therapy, milieu therapy and occupational therapy  Behavioral Health checks for safety monitoring  Continue current medications:    Behavior over the last 24 hours: unchanged.     Geno is seen today for psychiatric follow up. Per nursing notes, calm and cooperative, denies SI/HI/AVH, appears anxious and endorses depression. Med " "compliant.  Patient remains in behavioral control. No psych prns in last 24 hours.     Today Geno is seen sitting in bed with her face in her hands and poor eye contact. She appears anxious and reports social situations can exacerbate her anxiety. She appears paranoid and is not forthcoming with information without reassurance. When asked about hallucinations, she pauses and then denies them, however appears internally preoccupied. She denies suicidal and homicidal ideations. Patient notes \"okay\" sleep and poor appetite, but did eat 25% of breakfast and lunch today. Considering switching Trazodone to Remeron for dysphoric mood and to address poor appetite/sleep. Appears withdrawn to room/self this morning.  Patient complained of a mild headache today, however states that it went away after she was eating, will continue to monitor. Also asked this writer for an antibiotic but said she was \"too embarrassed\" to further elaborate, however medical is starting an antibiotic. Patient reported that she would prefer to not further speak with this writer and terminated assessment.     Sleep:  \"okay\"  Appetite: decreased  Medication side effects: No   ROS: no complaints    Mental Status Evaluation:    Appearance:  disheveled, marginal hygiene, age appropriate, has her face in her hands   Behavior:  calm, guarded, evasive, withdrawn, poor eye contact   Speech:  scant, soft   Mood:  depressed, anxious   Affect:  constricted, anxious appearing   Thought Process:  decreased rate of thoughts, concrete   Associations: concrete associations   Thought Content:  mild paranoia, negative thoughts, ruminations   Perceptual Disturbances: denies auditory hallucinations when asked, denies visual hallucinations when asked, appears distracted, appears preoccupied   Risk Potential: Suicidal ideation - None at present, contracts for safety on the unit, would talk to staff if not feeling safe on the unit  Homicidal ideation - None  Potential " for aggression - No   Sensorium:  oriented to person, place, and time/date   Memory:  recent memory grossly intact   Consciousness:  alert and awake   Attention/Concentration: attention span and concentration appear shorter than expected for age   Insight:  poor   Judgment: limited   Gait/Station: Seen in bed   Motor Activity: no abnormal movements     Vital signs in last 24 hours:    Temp:  [97.7 °F (36.5 °C)-97.9 °F (36.6 °C)] 97.9 °F (36.6 °C)  HR:  [] 122  BP: (105-145)/(68-95) 145/95  Resp:  [16] 16  SpO2:  [96 %-98 %] 98 %  O2 Device: None (Room air)    Laboratory results: I have personally reviewed all pertinent laboratory/tests results    Results from the past 24 hours:   Recent Results (from the past 24 hours)   UA w Reflex to Microscopic w Reflex to Culture    Collection Time: 01/13/25  5:31 PM    Specimen: Urine, Clean Catch   Result Value Ref Range    Color, UA Stella (A) Straw, Yellow, Pale Yellow    Clarity, UA Slightly Cloudy (A) Clear, Other    Specific Gravity, UA 1.025 1.003 - 1.040    pH, UA 6.0 4.5, 5.0, 5.5, 6.0, 6.5, 7.0, 7.5, 8.0    Leukocytes, .0 (A) Negative    Nitrite, UA Negative Negative    Protein, UA 30 (1+) (A) Negative mg/dl    Glucose, UA Negative Negative mg/dl    Ketones, UA >=150 (3+) (A) Negative mg/dl    Bilirubin, UA 1 mg/dL (A) Negative    Occult Blood, UA Negative Negative    UROBILINOGEN UA 1.0 1.0, Negative mg/dL   Urine Microscopic    Collection Time: 01/13/25  5:31 PM   Result Value Ref Range    RBC, UA None Seen None Seen, 0-1, 1-2, 2-4, 0-5 /hpf    WBC, UA 2-4 None Seen, 0-1, 1-2, 0-5, 2-4 /hpf    Epithelial Cells Occasional None Seen, Occasional /hpf    Bacteria, UA Moderate (A) None Seen, Occasional /hpf    MUCUS THREADS Moderate (A) None Seen    URINE COMMENT Occasional bilirubin crystals seen    Basic metabolic panel    Collection Time: 01/14/25  5:23 AM   Result Value Ref Range    Sodium 141 135 - 147 mmol/L    Potassium 3.1 (L) 3.5 - 5.3 mmol/L     Chloride 97 96 - 108 mmol/L    CO2 31 21 - 32 mmol/L    ANION GAP 13 4 - 13 mmol/L    BUN 22 5 - 25 mg/dL    Creatinine 1.03 0.60 - 1.30 mg/dL    Glucose 88 65 - 140 mg/dL    Glucose, Fasting 88 65 - 99 mg/dL    Calcium 9.6 8.4 - 10.2 mg/dL    eGFR 69 ml/min/1.73sq m       Progress Toward Goals: Limited improvement, eating some of meals today, limited interaction, poor eye contact, slept okay last night, appears dysphoric/anxious, paranoid and guarded    Assessment & Plan   Principal Problem:    Medical clearance for psychiatric admission  Active Problems:    Other specified hypothyroidism    Opioid use disorder, moderate, in early remission, on maintenance therapy, dependence (HCC)    Hypokalemia    Tobacco use    Migraine    Hypercholesteremia    Unspecified mood disorder (rule out MDD vs bipolar disorder)    Insomnia    Other specified attention deficit hyperactivity disorder (ADHD)    Generalized anxiety disorder    Vitamin D insufficiency    Foul smelling urine        Current Facility-Administered Medications   Medication Dose Route Frequency Provider Last Rate    albuterol  2 puff Inhalation Q4H PRN Desmond Piper MD      aluminum-magnesium hydroxide-simethicone  30 mL Oral Q4H PRN Sarah Houston MD      atorvastatin  40 mg Oral Daily With Dinner Lazara Thompson MD      haloperidol lactate  2.5 mg Intramuscular Q4H PRN Max 4/day Sarah Houston MD      And    LORazepam  1 mg Intramuscular Q4H PRN Max 4/day Sarah Houston MD      And    benztropine  0.5 mg Intramuscular Q4H PRN Max 4/day Sarah Houston MD      haloperidol lactate  5 mg Intramuscular Q4H PRN Max 4/day Sarah Houston MD      And    LORazepam  2 mg Intramuscular Q4H PRN Max 4/day Sarah Houston MD      And    benztropine  1 mg Intramuscular Q4H PRN Max 4/day Sarah Houston MD      benztropine  1 mg Intramuscular Q4H PRN Max 6/day Sarah Houston MD      benztropine  1 mg Oral Q4H PRN Max 6/day Sarah Houston MD       bisacodyl  10 mg Rectal Daily PRN Sarah Houston MD      buprenorphine-naloxone  2 mg Sublingual Daily [facility], DO      buPROPion  300 mg Oral Daily [facility], DO      cariprazine  3 mg Oral Daily [facility], DO      cholecalciferol  1,000 Units Oral Daily Lazara Thompson MD      hydrOXYzine HCL  50 mg Oral Q6H PRN Max 4/day Sarah Houston MD      Or    diphenhydrAMINE  50 mg Intramuscular Q6H PRN Sarah Houston MD      DULoxetine  30 mg Oral Daily [facility], DO      gabapentin  300 mg Oral BID [facility], DO      haloperidol  1 mg Oral Q6H PRN Sarah Houston MD      haloperidol  2.5 mg Oral Q4H PRN Max 4/day Sarah Houston MD      haloperidol  5 mg Oral Q4H PRN Max 4/day Sarah Houston MD      hydrOXYzine HCL  100 mg Oral Q6H PRN Max 4/day Sarah Houston MD      Or    LORazepam  2 mg Intramuscular Q6H PRN Sarah Houston MD      hydrOXYzine HCL  25 mg Oral Q6H PRN Max 4/day Sarah Houston MD      ibuprofen  400 mg Oral Q4H PRN Sarah Houston MD      ibuprofen  600 mg Oral Q6H PRN Sarah Houston MD      ibuprofen  800 mg Oral Q8H PRN Sarah Houston MD      levothyroxine  50 mcg Oral Early Morning Lazara Thompson MD      melatonin  3 mg Oral HS PRN [facility], DO      nicotine polacrilex  4 mg Oral Q2H PRN Sarah Houston MD      nitrofurantoin  100 mg Oral BID With Meals Mariya Vasquez MD      polyethylene glycol  17 g Oral Daily PRN Sarah Houston MD      polyethylene glycol  17 g Oral Daily CHRISTOPHER Lees      potassium chloride  20 mEq Oral Once Mariya Vasquez MD      propranolol  10 mg Oral Q8H PRN Sarah Houston MD      senna  1 tablet Oral HS CHRISTOPHER Lees      senna-docusate sodium  1 tablet Oral Daily PRN Sarah Houston MD      topiramate  100 mg Oral BID [facility], DO      traZODone  50 mg Oral HS Meche Ponce DO       Risks / Benefits of Treatment:    Risks, benefits, and  possible side effects of medications explained to patient and patient verbalizes understanding and agreement for treatment.    Counseling / Coordination of Care:    Patient's progress discussed with staff in treatment team meeting.  Medication changes discussed with patient.  Medication education provided to patient.  Educated on importance of medication and treatment compliance.  Reassurance and supportive therapy provided.  Group attendance encouraged.    Dez Crowder PA-C 01/14/25

## 2025-01-14 NOTE — NURSING NOTE
PT received and on observed in bed, cooperative and calm on approach.  PT denies SI/HI/VH/AH, endorse depression, appears anxious even though denies it.  Compliant with scheduled meds, no PRN needed, Q15 minutes checks on going at this time for safety.

## 2025-01-14 NOTE — ASSESSMENT & PLAN NOTE
Patient has a history of insomnia  Patient was being treated in the [facility] for insomnia with trazodone 50 mg nightly    PLAN:  Discontinue melatonin 3 mg nightly which was started on admission  Continue patient's home trazodone 50 mg nightly for insomnia, consider changing to low dose mirtazapine to address poor appetite and insomnia.

## 2025-01-15 LAB
ANION GAP SERPL CALCULATED.3IONS-SCNC: 10 MMOL/L (ref 4–13)
BUN SERPL-MCNC: 19 MG/DL (ref 5–25)
CALCIUM SERPL-MCNC: 9.7 MG/DL (ref 8.4–10.2)
CHLORIDE SERPL-SCNC: 98 MMOL/L (ref 96–108)
CO2 SERPL-SCNC: 32 MMOL/L (ref 21–32)
CREAT SERPL-MCNC: 0.98 MG/DL (ref 0.6–1.3)
GFR SERPL CREATININE-BSD FRML MDRD: 73 ML/MIN/1.73SQ M
GLUCOSE P FAST SERPL-MCNC: 117 MG/DL (ref 65–99)
GLUCOSE SERPL-MCNC: 117 MG/DL (ref 65–140)
POTASSIUM SERPL-SCNC: 3.6 MMOL/L (ref 3.5–5.3)
SODIUM SERPL-SCNC: 140 MMOL/L (ref 135–147)

## 2025-01-15 PROCEDURE — 80048 BASIC METABOLIC PNL TOTAL CA: CPT

## 2025-01-15 PROCEDURE — 99232 SBSQ HOSP IP/OBS MODERATE 35: CPT | Performed by: PSYCHIATRY & NEUROLOGY

## 2025-01-15 RX ORDER — BUPROPION HYDROCHLORIDE 150 MG/1
150 TABLET ORAL DAILY
Status: COMPLETED | OUTPATIENT
Start: 2025-01-16 | End: 2025-01-18

## 2025-01-15 RX ADMIN — GABAPENTIN 300 MG: 300 CAPSULE ORAL at 17:05

## 2025-01-15 RX ADMIN — NITROFURANTOIN (MONOHYDRATE/MACROCRYSTALS) 100 MG: 25; 75 CAPSULE ORAL at 17:05

## 2025-01-15 RX ADMIN — BUPRENORPHINE AND NALOXONE 2 MG: 2; .5 FILM BUCCAL; SUBLINGUAL at 08:28

## 2025-01-15 RX ADMIN — Medication 1000 UNITS: at 08:28

## 2025-01-15 RX ADMIN — ATORVASTATIN CALCIUM 40 MG: 40 TABLET, FILM COATED ORAL at 17:05

## 2025-01-15 RX ADMIN — LEVOTHYROXINE SODIUM 50 MCG: 0.05 TABLET ORAL at 06:09

## 2025-01-15 RX ADMIN — IBUPROFEN 800 MG: 400 TABLET, FILM COATED ORAL at 11:46

## 2025-01-15 RX ADMIN — CARIPRAZINE 3 MG: 3 CAPSULE, GELATIN COATED ORAL at 08:28

## 2025-01-15 RX ADMIN — NITROFURANTOIN (MONOHYDRATE/MACROCRYSTALS) 100 MG: 25; 75 CAPSULE ORAL at 08:28

## 2025-01-15 RX ADMIN — DULOXETINE HYDROCHLORIDE 30 MG: 30 CAPSULE, DELAYED RELEASE ORAL at 08:28

## 2025-01-15 RX ADMIN — TOPIRAMATE 100 MG: 100 TABLET, FILM COATED ORAL at 08:28

## 2025-01-15 RX ADMIN — BUPROPION HYDROCHLORIDE 300 MG: 300 TABLET, EXTENDED RELEASE ORAL at 08:28

## 2025-01-15 RX ADMIN — PROPRANOLOL HYDROCHLORIDE 10 MG: 10 TABLET ORAL at 14:00

## 2025-01-15 RX ADMIN — GABAPENTIN 300 MG: 300 CAPSULE ORAL at 08:28

## 2025-01-15 RX ADMIN — TOPIRAMATE 100 MG: 100 TABLET, FILM COATED ORAL at 17:05

## 2025-01-15 RX ADMIN — HYDROXYZINE HYDROCHLORIDE 100 MG: 50 TABLET, FILM COATED ORAL at 11:46

## 2025-01-15 RX ADMIN — TRAZODONE HYDROCHLORIDE 50 MG: 50 TABLET ORAL at 21:05

## 2025-01-15 NOTE — ASSESSMENT & PLAN NOTE
Patient has a history of MDD versus bipolar disorder  Patient was being treated in the [facility] program for mood with Vraylar 3 mg once daily, Wellbutrin  mg once daily, Topamax 100 mg twice daily, duloxetine DR 30 mg once daily  Secondary to patient's lack of cooperativity during initial evaluation today this writer was unable to obtain history and ROS to distinguish whether or not patient needs to be treated for unipolar depression or bipolar      At this time patient continues to report depression and anxiety and continues to look depressed.  Will make medication adjustments to better target her symptoms and decrease her Wellbutrin with potentially discontinuing it altogether and increase Vraylar for better mood stabilization.  Will also consider increasing titration of Cymbalta.    PLAN:  Continue to evaluate the patient for MDD versus bipolar disorder  Increase Vraylar to 4.5 mg once daily for mood stabilization  Continue home duloxetine DR 30 mg once daily for depressive symptoms t/c increase in titration  continue home Topamax 100 mg twice daily for mood stabilization  Plan to taper Wellbutrin to 150 mg and assess whether or not this will need to be continued or tapered off completely.

## 2025-01-15 NOTE — ASSESSMENT & PLAN NOTE
Patient has a history per record review generalized anxiety disorder  Patient was being treated in the [facility] for SHAILA with duloxetine 30 mg once daily,  Ativan 1 mg PRN as needed for anxiety    PLAN:  Continue home duloxetine DR capsule 30 mg once daily for anxiety, t/c increase to 60  Continue inpatient Ativan PRN orders for anxiety   Continue inpatient atarax PRN orders for anxiety

## 2025-01-15 NOTE — PROGRESS NOTES
Progress Note - Behavioral Health   Name: Geno Lopez 39 y.o. female I MRN: [id number]  Unit/Bed#: -01 I Date of Admission: 1/11/2025   Date of Service: 1/15/2025 I Hospital Day: 4     Assessment & Plan  Unspecified mood disorder (rule out MDD vs bipolar disorder)  Patient has a history of MDD versus bipolar disorder  Patient was being treated in the [facility] program for mood with Vraylar 3 mg once daily, Wellbutrin  mg once daily, Topamax 100 mg twice daily, duloxetine DR 30 mg once daily  Secondary to patient's lack of cooperativity during initial evaluation today this writer was unable to obtain history and ROS to distinguish whether or not patient needs to be treated for unipolar depression or bipolar      At this time patient continues to report depression and anxiety and continues to look depressed.  Will make medication adjustments to better target her symptoms and decrease her Wellbutrin with potentially discontinuing it altogether and increase Vraylar for better mood stabilization.  Will also consider increasing titration of Cymbalta.    PLAN:  Continue to evaluate the patient for MDD versus bipolar disorder  Increase Vraylar to 4.5 mg once daily for mood stabilization  Continue home duloxetine DR 30 mg once daily for depressive symptoms t/c increase in titration  continue home Topamax 100 mg twice daily for mood stabilization  Plan to taper Wellbutrin to 150 mg and assess whether or not this will need to be continued or tapered off completely.  Opioid use disorder, moderate, in early remission, on maintenance therapy, dependence (HCC)  Patient has a history of opioid use disorder in early remission on MAT  Patient was being treated in the [facility] program for opioid use disorder with Suboxone 2 mg once daily  Dose of Suboxone was confirmed by patient's outpatient pharmacy, [facility], and PDMP    PLAN:  Continue patient's home Suboxone at 2 mg sublingual once daily for opioid  "dependence    Generalized anxiety disorder  Patient has a history per record review generalized anxiety disorder  Patient was being treated in the [facility] program for SHAILA with duloxetine 30 mg once daily,  Ativan 1 mg PRN as needed for anxiety    PLAN:  Continue home duloxetine DR capsule 30 mg once daily for anxiety, t/c increase to 60  Continue inpatient Ativan PRN orders for anxiety   Continue inpatient atarax PRN orders for anxiety   Other specified attention deficit hyperactivity disorder (ADHD)  Per record review patient has a history of ADHD  Patient was being treated in the [facility] program for ADHD with Strattera 18 mg once daily  Patient told nursing that she does not want to restart Strattera at this time    PLAN:  Do not restart home Strattera while admitted to inpatient psychiatric unit  Insomnia  Patient has a history of insomnia  Patient was being treated in the [facility] program for insomnia with trazodone 50 mg nightly    PLAN:  Discontinue melatonin 3 mg nightly which was started on admission  Continue patient's home trazodone 50 mg nightly for insomnia, consider changing to low dose mirtazapine to address poor appetite and insomnia.  Tobacco use  Patient has a recent history of daily vaping which she says she stopped approximately 4 days ago secondary to fear that \"street drugs\" were in her vape.  Patient was being treated in the [facility] program for tobacco cessation with Wellbutrin    PLAN:  Tapering pt off of Wellbutrin given concern for increased anxiety  Other specified hypothyroidism  Per medical  Vitamin D insufficiency  Per medical  Foul smelling urine  Family medicine was consulted yesterday and was notified today of patient's urinary issue/UA. Patient appears to be starting Nitrofurantoin later today for this.      Plan     Recommended Treatment:    - Encourage early mobility and having a structured day  - Provide frequent re-orientation, and cognitive stimulation  - Ensure assistive devices are in " proper working order (eye-glasses, hearing aids)  - Encourage adequate hydration, nutrition and monitor bowel movements  - Maintain sleep-wake cycle: Uninterrupted sleep time; low-level lighting at night  - Fall precaution  - f/u [facility] recs regarding the medical problems   - Continue medication titration and treatment plan; adjust medication to optimize treatment response and as clinically indicated. .   - Observation: routine            - VS: as per unit protocol  - Diet: Regular diet  - Encourage group attendance and milieu therapy  - Dispo: To be determined     Scheduled medications:  Current Facility-Administered Medications   Medication Dose Route Frequency Provider Last Rate    albuterol  2 puff Inhalation Q4H PRN Desmond Piper MD      aluminum-magnesium hydroxide-simethicone  30 mL Oral Q4H PRN Sarah Houston MD      atorvastatin  40 mg Oral Daily With Dinner Lazara Thompson MD      haloperidol lactate  2.5 mg Intramuscular Q4H PRN Max 4/day Sarah Houston MD      And    LORazepam  1 mg Intramuscular Q4H PRN Max 4/day Sarah Houston MD      And    benztropine  0.5 mg Intramuscular Q4H PRN Max 4/day Sarah Houston MD      haloperidol lactate  5 mg Intramuscular Q4H PRN Max 4/day Sarah Houston MD      And    LORazepam  2 mg Intramuscular Q4H PRN Max 4/day Sarah Houston MD      And    benztropine  1 mg Intramuscular Q4H PRN Max 4/day Sarah Houston MD      benztropine  1 mg Intramuscular Q4H PRN Max 6/day Sarah Houston MD      benztropine  1 mg Oral Q4H PRN Max 6/day Sarah Houston MD      bisacodyl  10 mg Rectal Daily PRN Sarah Houston MD      buprenorphine-naloxone  2 mg Sublingual Daily Meche Ponce DO      [START ON 1/16/2025] buPROPion  150 mg Oral Daily Nitish Crouch MD      [START ON 1/16/2025] cariprazine  4.5 mg Oral Daily Nitish Crouch MD      cholecalciferol  1,000 Units Oral Daily Lazara Thompson MD      hydrOXYzine HCL  50 mg Oral Q6H PRN Max 4/day Sarah HORTON  MD Rosemarie      Or    diphenhydrAMINE  50 mg Intramuscular Q6H PRN Sarah Houston MD      DULoxetine  30 mg Oral Daily [facility], DO      gabapentin  300 mg Oral BID [facility], DO      haloperidol  1 mg Oral Q6H PRN Sarah Houston MD      haloperidol  2.5 mg Oral Q4H PRN Max 4/day Sarah Houston MD      haloperidol  5 mg Oral Q4H PRN Max 4/day Sarah Houston MD      hydrOXYzine HCL  100 mg Oral Q6H PRN Max 4/day Sarah Houston MD      Or    LORazepam  2 mg Intramuscular Q6H PRN Sarah Houston MD      hydrOXYzine HCL  25 mg Oral Q6H PRN Max 4/day Sarah Houston MD      ibuprofen  400 mg Oral Q4H PRN Sarah Houston MD      ibuprofen  600 mg Oral Q6H PRN Sarah Houston MD      ibuprofen  800 mg Oral Q8H PRN Sarah Houston MD      levothyroxine  50 mcg Oral Early Morning Lazara Thompson MD      melatonin  3 mg Oral HS PRN [facility], DO      nicotine polacrilex  4 mg Oral Q2H PRN Sarah Houston MD      nitrofurantoin  100 mg Oral BID With Meals Mariya Vasquez MD      polyethylene glycol  17 g Oral Daily PRN Sarah Houston MD      polyethylene glycol  17 g Oral Daily CHRISTOPHER Lees      propranolol  10 mg Oral Q8H PRN Sarah Houston MD      senna  1 tablet Oral HS CHRISTOPHER Lees      senna-docusate sodium  1 tablet Oral Daily PRN Sarah Houston MD      topiramate  100 mg Oral BID [facility], DO      traZODone  50 mg Oral HS [facility], DO        PRN:    albuterol    aluminum-magnesium hydroxide-simethicone    haloperidol lactate **AND** LORazepam **AND** benztropine    haloperidol lactate **AND** LORazepam **AND** benztropine    benztropine    benztropine    bisacodyl    hydrOXYzine HCL **OR** diphenhydrAMINE    haloperidol    haloperidol    haloperidol    hydrOXYzine HCL **OR** LORazepam    hydrOXYzine HCL    ibuprofen    ibuprofen    ibuprofen    melatonin    nicotine polacrilex    polyethylene glycol     "propranolol    senna-docusate sodium       Subjective     Patient was visited on unit for continuing care; chart reviewed and discussed with multidisciplinary treatment team.  On approach, the patient was calm and cooperative.  Continues to report anxiety and symptoms of depression.  He notes that after her discharge in 2022 she continued to take Zyprexa briefly but did not feel it to be of significant benefit and was changed by her outpatient doctor Dr. Payton to Vraylar.  She does not know any significant efficacy on the 3 mg and is amenable to dose adjustment.  Also given her anxiety I discussed with her about gradual discontinuation of her Wellbutrin which can worsen anxiety and patient is amenable to this.  Medicine is following for what appears to be probable UTI.  Patient was started on nitrofurantoin with the recommended 5-day course.  No problem initiating and maintaining sleep.  Denied A/VH currently.  Denied SI/HI, intent or plan upon direct inquiry at this time.    Patient continues to be withdrawn with minimal interactions with staff and peers. No reports of aggression or self-injurious behavior on unit. No PRN medications used in the past 24 hours.    Patient accepted all offered medications and no adverse effects of medications noted or reported.    Objective    Current Mental Status Evaluation:  Mental Status Exam  Appearance: casually dressed, consistent with stated age  Motor: no psychomotor retardation, no gait abnormalities  Behavior: superficially cooperative, answers questions appropriately, poor eye contact  Speech: soft, normal rhythm  Mood: \"ok\"  Affect: constricted, depressed-appearing  Thought Process: concrete  Thought Content: denies delusions or paranoia  Risk Potential: denies suicidal ideation, plan, or intent. Denies homicidal ideation  Perceptions: denies auditory hallucinations, denies visual hallucinations, Some internal preoccupation appreciated  Sensorium: Oriented to " person, place, time, and situation  Cognition: cognitive ability appears intact but was not quantitatively tested  Consciousness: alert and awake  Attention: intact, able to focus without difficulty  Insight: limited  Judgement: limited            Vital signs in last 24 hours:    Temp:  [97.1 °F (36.2 °C)-97.7 °F (36.5 °C)] 97.1 °F (36.2 °C)  HR:  [82-99] 99  BP: (120-127)/(71-89) 120/89  Resp:  [16] 16  SpO2:  [97 %-98 %] 97 %  O2 Device: None (Room air)    Psychiatric Review of Systems:  Medication adverse effects: none  Sleep: unchanged  Appetite: unchanged  Behaviors over the past 24 hours: unchanged    Laboratory results:    I have personally reviewed all pertinent laboratory/tests results  Recent Results (from the past 48 hours)   ECG 12 lead    Collection Time: 01/13/25 11:47 AM   Result Value Ref Range    Ventricular Rate 94 BPM    Atrial Rate 94 BPM    AL Interval 114 ms    QRSD Interval 80 ms    QT Interval 350 ms    QTC Interval 438 ms    P Axis 73 degrees    QRS Axis 65 degrees    T Wave Axis 26 degrees   UA w Reflex to Microscopic w Reflex to Culture    Collection Time: 01/13/25  5:31 PM    Specimen: Urine, Clean Catch   Result Value Ref Range    Color, UA Stella (A) Straw, Yellow, Pale Yellow    Clarity, UA Slightly Cloudy (A) Clear, Other    Specific Gravity, UA 1.025 1.003 - 1.040    pH, UA 6.0 4.5, 5.0, 5.5, 6.0, 6.5, 7.0, 7.5, 8.0    Leukocytes, .0 (A) Negative    Nitrite, UA Negative Negative    Protein, UA 30 (1+) (A) Negative mg/dl    Glucose, UA Negative Negative mg/dl    Ketones, UA >=150 (3+) (A) Negative mg/dl    Bilirubin, UA 1 mg/dL (A) Negative    Occult Blood, UA Negative Negative    UROBILINOGEN UA 1.0 1.0, Negative mg/dL   Urine Microscopic    Collection Time: 01/13/25  5:31 PM   Result Value Ref Range    RBC, UA None Seen None Seen, 0-1, 1-2, 2-4, 0-5 /hpf    WBC, UA 2-4 None Seen, 0-1, 1-2, 0-5, 2-4 /hpf    Epithelial Cells Occasional None Seen, Occasional /hpf    Bacteria, UA  Moderate (A) None Seen, Occasional /hpf    MUCUS THREADS Moderate (A) None Seen    URINE COMMENT Occasional bilirubin crystals seen    Basic metabolic panel    Collection Time: 01/14/25  5:23 AM   Result Value Ref Range    Sodium 141 135 - 147 mmol/L    Potassium 3.1 (L) 3.5 - 5.3 mmol/L    Chloride 97 96 - 108 mmol/L    CO2 31 21 - 32 mmol/L    ANION GAP 13 4 - 13 mmol/L    BUN 22 5 - 25 mg/dL    Creatinine 1.03 0.60 - 1.30 mg/dL    Glucose 88 65 - 140 mg/dL    Glucose, Fasting 88 65 - 99 mg/dL    Calcium 9.6 8.4 - 10.2 mg/dL    eGFR 69 ml/min/1.73sq m   Urinalysis    Collection Time: 01/14/25  6:49 PM   Result Value Ref Range    Clarity, UA Turbid (A) Clear, Other    Color, UA Brown (A) Straw, Yellow, Pale Yellow    Specific Gravity, UA 1.020 1.003 - 1.040    pH, UA 6.0 4.5, 5.0, 5.5, 6.0, 6.5, 7.0, 7.5, 8.0    Glucose, UA Negative Negative mg/dl    Ketones, UA 15 (1+) (A) Negative mg/dl    Occult Blood, UA 10.0 (A) Negative    Protein, UA 30 (1+) (A) Negative mg/dl    Nitrite, UA Negative Negative    Bilirubin, UA Negative Negative    Leukocytes, .0 (A) Negative    WBC, UA 20-30 (A) None Seen, 2-4, 5-60 /hpf    RBC, UA Field obscured, unable to enumerate (A) None Seen, 2-4 /hpf    Bacteria, UA Moderate (A) None Seen, Occasional /hpf    AMORPH URATES Moderate /hpf    Epithelial Cells Moderate (A) None Seen, Occasional /hpf    MUCUS THREADS Moderate (A) None Seen    UROBILINOGEN UA 1.0 1.0, Negative mg/dL   Basic metabolic panel    Collection Time: 01/15/25  6:07 AM   Result Value Ref Range    Sodium 140 135 - 147 mmol/L    Potassium 3.6 3.5 - 5.3 mmol/L    Chloride 98 96 - 108 mmol/L    CO2 32 21 - 32 mmol/L    ANION GAP 10 4 - 13 mmol/L    BUN 19 5 - 25 mg/dL    Creatinine 0.98 0.60 - 1.30 mg/dL    Glucose 117 65 - 140 mg/dL    Glucose, Fasting 117 (H) 65 - 99 mg/dL    Calcium 9.7 8.4 - 10.2 mg/dL    eGFR 73 ml/min/1.73sq m          Progress Toward Goals & Illness Status:   working on coping skills,  discharge planning, poor insight    Patient is not at goal. They are not yet ready for discharge. The patient's condition currently requires active psychopharmacological medication management, interdisciplinary coordination with case management, and the utilization of adjunctive milieu and group therapy to augment psychopharmacological efficacy. The patient's risk of morbidity, and progression or decompensation of psychiatric disease, is higher without this current treatment.     Next of Kin  Extended Emergency Contact Information  Primary Emergency Contact: Cadence Lopez   [place]  Mobile Phone: [phone]  Relation: Mother    Counseling / Coordination of Care  Patient's progress discussed with staff in treatment team meeting.  Medications, treatment progress and treatment plan reviewed with patient.  Medication education provided to patient.  Educated on importance of medication and treatment compliance.  Supportive therapy provided to patient.  Cognitive techniques utilized during the session.  Reassurance and supportive therapy provided.  Reoriented to reality and reassured.  Encouraged participation in milieu and group therapy on the unit.  Crisis/safety plan discussed with patient.       Nitish rCouch MD  Attending Psychiatrist   [facility]      This note has been constructed using a voice recognition system. There may be translation, syntax, or grammatical errors. If you have any questions, please contact the dictating provider.

## 2025-01-15 NOTE — NURSING NOTE
PT observed visible, cooperative and calm on approach.  PT denies SI/HI/VH/AH, but reports not feeling well unable to express fully how she feels; PT appears anxious and depressed.  Endorse HA at the beginning of this shift, PRN Motrin 600mg given at 1950 which was slightly effective per PT.  Able to verbalized needs, compliant with HS meds no unmet needs at this time.

## 2025-01-15 NOTE — NURSING NOTE
Pt reporting no longer feeling shaky or restless. Pt had snack and juice. Appears less anxious. No outward signs of distress. PRN propranolol 10mg po effective at this time.

## 2025-01-15 NOTE — PLAN OF CARE
Problem: Alteration in Thoughts and Perception  Goal: Treatment Goal: Gain control of psychotic behaviors/thinking, reduce/eliminate presenting symptoms and demonstrate improved reality functioning upon discharge  Outcome: Progressing  Goal: Verbalize thoughts and feelings  Description: Interventions:  - Promote a nonjudgmental and trusting relationship with the patient through active listening and therapeutic communication  - Assess patient's level of functioning, behavior and potential for risk  - Engage patient in 1 on 1 interactions  - Encourage patient to express fears, feelings, frustrations, and discuss symptoms    - [place] patient to reality, help patient recognize reality-based thinking   - Administer medications as ordered and assess for potential side effects  - Provide the patient education related to the signs and symptoms of the illness and desired effects of prescribed medications  Outcome: Progressing  Goal: Agree to be compliant with medication regime, as prescribed and report medication side effects  Description: Interventions:  - Offer appropriate PRN medication and supervise ingestion; conduct AIMS, as needed   Outcome: Progressing  Goal: Recognize dysfunctional thoughts, communicate reality-based thoughts at the time of discharge  Description: Interventions:  - Provide medication and psycho-education to assist patient in compliance and developing insight into his/her illness   Outcome: Progressing     Problem: Depression  Goal: Treatment Goal: Demonstrate behavioral control of depressive symptoms, verbalize feelings of improved mood/affect, and adopt new coping skills prior to discharge  Outcome: Progressing  Goal: Refrain from harming self  Description: Interventions:  - Monitor patient closely, per order   - Supervise medication ingestion, monitor effects and side effects   Outcome: Progressing  Goal: Refrain from self-neglect  Outcome: Progressing  Goal: Attend and participate in unit  activities, including therapeutic, recreational, and educational groups  Description: Interventions:  - Provide therapeutic and educational activities daily, encourage attendance and participation, and document same in the medical record   Outcome: Progressing     Problem: Ineffective Coping  Goal: Participates in unit activities  Description: Interventions:  - Provide therapeutic environment   - Provide required programming   - Redirect inappropriate behaviors   Outcome: Progressing     Problem: DISCHARGE PLANNING - CARE MANAGEMENT  Goal: Discharge to post-acute care or home with appropriate resources  Description: INTERVENTIONS:  - Conduct assessment to determine patient/family and health care team treatment goals, and need for post-acute services based on payer coverage, community resources, and patient preferences, and barriers to discharge  - Address psychosocial, clinical, and financial barriers to discharge as identified in assessment in conjunction with the patient/family and health care team  - Arrange appropriate level of post-acute services according to patient’s   needs and preference and payer coverage in collaboration with the physician and health care team  - Communicate with and update the patient/family, physician, and health care team regarding progress on the discharge plan  - Arrange appropriate transportation to post-acute venues  Outcome: Progressing

## 2025-01-15 NOTE — PLAN OF CARE
Problem: Alteration in Thoughts and Perception  Goal: Treatment Goal: Gain control of psychotic behaviors/thinking, reduce/eliminate presenting symptoms and demonstrate improved reality functioning upon discharge  1/15/2025 0031 by Susy Falcon RN  Outcome: Progressing  1/15/2025 0019 by Susy Falcon RN  Outcome: Progressing  Goal: Verbalize thoughts and feelings  Description: Interventions:  - Promote a nonjudgmental and trusting relationship with the patient through active listening and therapeutic communication  - Assess patient's level of functioning, behavior and potential for risk  - Engage patient in 1 on 1 interactions  - Encourage patient to express fears, feelings, frustrations, and discuss symptoms    - [place] patient to reality, help patient recognize reality-based thinking   - Administer medications as ordered and assess for potential side effects  - Provide the patient education related to the signs and symptoms of the illness and desired effects of prescribed medications  1/15/2025 0031 by Susy Falcon RN  Outcome: Progressing  1/15/2025 0019 by Susy Falcon RN  Outcome: Progressing  Goal: Agree to be compliant with medication regime, as prescribed and report medication side effects  Description: Interventions:  - Offer appropriate PRN medication and supervise ingestion; conduct AIMS, as needed   1/15/2025 0031 by Susy Falcon RN  Outcome: Progressing  1/15/2025 0019 by Susy Falcon RN  Outcome: Progressing  Goal: Recognize dysfunctional thoughts, communicate reality-based thoughts at the time of discharge  Description: Interventions:  - Provide medication and psycho-education to assist patient in compliance and developing insight into his/her illness   1/15/2025 0031 by Susy Falcon RN  Outcome: Progressing  1/15/2025 0019 by Susy Falcon RN  Outcome: Progressing     Problem: Depression  Goal: Treatment Goal: Demonstrate behavioral control of depressive symptoms, verbalize feelings of  improved mood/affect, and adopt new coping skills prior to discharge  1/15/2025 0031 by Susy Falcon RN  Outcome: Progressing  1/15/2025 0019 by Susy Falcon RN  Outcome: Progressing  Goal: Refrain from harming self  Description: Interventions:  - Monitor patient closely, per order   - Supervise medication ingestion, monitor effects and side effects   1/15/2025 0031 by Susy Falcon RN  Outcome: Progressing  1/15/2025 0019 by Susy Falcon RN  Outcome: Progressing  Goal: Refrain from self-neglect  1/15/2025 0031 by Susy Falcon RN  Outcome: Progressing  1/15/2025 0019 by Susy Falcon RN  Outcome: Progressing  Goal: Attend and participate in unit activities, including therapeutic, recreational, and educational groups  Description: Interventions:  - Provide therapeutic and educational activities daily, encourage attendance and participation, and document same in the medical record   1/15/2025 0031 by Susy Falcon RN  Outcome: Progressing  1/15/2025 0019 by Susy Falcon RN  Outcome: Progressing     Problem: Ineffective Coping  Goal: Participates in unit activities  Description: Interventions:  - Provide therapeutic environment   - Provide required programming   - Redirect inappropriate behaviors   1/15/2025 0031 by Susy Falcon RN  Outcome: Progressing  1/15/2025 0019 by Susy Falcon RN  Outcome: Progressing     Problem: DISCHARGE PLANNING - CARE MANAGEMENT  Goal: Discharge to post-acute care or home with appropriate resources  Description: INTERVENTIONS:  - Conduct assessment to determine patient/family and health care team treatment goals, and need for post-acute services based on payer coverage, community resources, and patient preferences, and barriers to discharge  - Address psychosocial, clinical, and financial barriers to discharge as identified in assessment in conjunction with the patient/family and health care team  - Arrange appropriate level of post-acute services according to patient’s   needs and  preference and payer coverage in collaboration with the physician and health care team  - Communicate with and update the patient/family, physician, and health care team regarding progress on the discharge plan  - Arrange appropriate transportation to post-acute venues  1/15/2025 0031 by Susy Falcon RN  Outcome: Progressing  1/15/2025 0019 by Susy Falcon, RN  Outcome: Progressing

## 2025-01-15 NOTE — ASSESSMENT & PLAN NOTE
"Patient has a recent history of daily vaping which she says she stopped approximately 4 days ago secondary to fear that \"street drugs\" were in her vape.  Patient was being treated in the [facility] program for tobacco cessation with Wellbutrin    PLAN:  Tapering pt off of Wellbutrin given concern for increased anxiety  "

## 2025-01-15 NOTE — PROGRESS NOTES
01/15/25 0835   Team Meeting   Meeting Type Daily Rounds   Team Members Present   Team Members Present Physician;Nurse;   Physician Team Member Kailyn   Nursing Team Member January   Care Management Team Member Rena   Patient/Family Present   Patient Present No   Patient's Family Present No     Pt was anxious and tearful yesterday. Pt's Fiance called and Pt refused to speak with him. Pt is bizarre. Pt was given Atarax and reported she felt better. Pt requires education and much encouragement before she takes medications. Pt is some what compliant with meals, poor appetite.

## 2025-01-15 NOTE — QUICK NOTE
Patient was seen at bedside with senior resident. On questioning on her urinary habits, she denied any pain on urination, increased frequency or blood noted in the urine. She states she peed at least twice yesterday, 1 of which was collected by her nurses for UA and culture.  She reports that her lower abdominal pain has improved as compared to when it initially started 2 days ago.  She denies fever, flank pain or unusual vaginal discharge associated. She is currently not on her menses and was unable to remember when her last menstrual period was, although states she has her menses regularly.    On abdominal examination, abdomen was soft, flat and non tender to palpation, no flank pain. No edema. She reported having the urge to pee this morning.    On UA(1/14/2025): Brown, ketones 15 (1+), all blood 10.0, leukocytes 500, WBC 20-30, RBC field obscured unable to enumerate, bacteria moderate      Plan  Continue monitoring for any urinary symptoms or signs of infection  Urine culture still pending  Urine I/O   Continue treating with antibiotics nitrofurantoin 100 mg ER daily Q12H x 5 days  Encourage on oral hydration

## 2025-01-15 NOTE — NURSING NOTE
"Pt cooperative on approach. Continues to be anxious and suspicious of others. Pt more visible on the unit today, withdrawn to self. Pt came to nurses station stating, \"I would like my shoes and phone so I can leave, I can't stay here anymore.\" Pt was educated on the discharge process. Pt then apologetic stating, \"Sorry I'm just upset that this unit is called [facility] and I'm anxious.\" Pt requesting PRN for anxiety and headache. Pt given atarax 100mg and motrin 800mg po @1146- effective. Compliant with scheduled medications. Continues to have UTI symptoms and poor appetite. Encouraging fluids and meals. No further needs at this time.   "

## 2025-01-15 NOTE — NURSING NOTE
"Pt came to this RN c/o restlessness. Pt reporting chest tightness and feeling shaky. BP taken, WDL. Pt hasn't eaten her meals stating \"It doesn't smell right.\" and said she hasn't been drinking as much water as she knows she should be. Pt given snacks and hydration. PRN propranolol 10mg po given. Will continue to monitor.   "

## 2025-01-16 LAB — BACTERIA UR CULT: NORMAL

## 2025-01-16 PROCEDURE — 99232 SBSQ HOSP IP/OBS MODERATE 35: CPT | Performed by: PSYCHIATRY & NEUROLOGY

## 2025-01-16 RX ORDER — GABAPENTIN 300 MG/1
300 CAPSULE ORAL 3 TIMES DAILY
Status: DISCONTINUED | OUTPATIENT
Start: 2025-01-16 | End: 2025-01-20

## 2025-01-16 RX ORDER — DULOXETIN HYDROCHLORIDE 60 MG/1
60 CAPSULE, DELAYED RELEASE ORAL DAILY
Status: DISCONTINUED | OUTPATIENT
Start: 2025-01-17 | End: 2025-01-20

## 2025-01-16 RX ADMIN — CARIPRAZINE 4.5 MG: 4.5 CAPSULE, GELATIN COATED ORAL at 08:11

## 2025-01-16 RX ADMIN — GABAPENTIN 300 MG: 300 CAPSULE ORAL at 17:41

## 2025-01-16 RX ADMIN — GABAPENTIN 300 MG: 300 CAPSULE ORAL at 08:10

## 2025-01-16 RX ADMIN — SENNOSIDES AND DOCUSATE SODIUM 1 TABLET: 50; 8.6 TABLET ORAL at 17:41

## 2025-01-16 RX ADMIN — LEVOTHYROXINE SODIUM 50 MCG: 0.05 TABLET ORAL at 06:34

## 2025-01-16 RX ADMIN — TOPIRAMATE 100 MG: 100 TABLET, FILM COATED ORAL at 17:40

## 2025-01-16 RX ADMIN — TOPIRAMATE 100 MG: 100 TABLET, FILM COATED ORAL at 08:11

## 2025-01-16 RX ADMIN — NITROFURANTOIN (MONOHYDRATE/MACROCRYSTALS) 100 MG: 25; 75 CAPSULE ORAL at 08:11

## 2025-01-16 RX ADMIN — TRAZODONE HYDROCHLORIDE 50 MG: 50 TABLET ORAL at 21:11

## 2025-01-16 RX ADMIN — NITROFURANTOIN (MONOHYDRATE/MACROCRYSTALS) 100 MG: 25; 75 CAPSULE ORAL at 17:41

## 2025-01-16 RX ADMIN — BUPROPION HYDROCHLORIDE 150 MG: 150 TABLET, FILM COATED, EXTENDED RELEASE ORAL at 08:10

## 2025-01-16 RX ADMIN — IBUPROFEN 800 MG: 400 TABLET, FILM COATED ORAL at 14:15

## 2025-01-16 RX ADMIN — GABAPENTIN 300 MG: 300 CAPSULE ORAL at 21:11

## 2025-01-16 RX ADMIN — HYDROXYZINE HYDROCHLORIDE 100 MG: 50 TABLET, FILM COATED ORAL at 10:30

## 2025-01-16 RX ADMIN — BUPRENORPHINE AND NALOXONE 2 MG: 2; .5 FILM BUCCAL; SUBLINGUAL at 08:11

## 2025-01-16 RX ADMIN — DULOXETINE HYDROCHLORIDE 30 MG: 30 CAPSULE, DELAYED RELEASE ORAL at 08:11

## 2025-01-16 RX ADMIN — ATORVASTATIN CALCIUM 40 MG: 40 TABLET, FILM COATED ORAL at 17:41

## 2025-01-16 RX ADMIN — Medication 1000 UNITS: at 08:10

## 2025-01-16 RX ADMIN — SENNOSIDES 8.6 MG: 8.6 TABLET, FILM COATED ORAL at 21:11

## 2025-01-16 NOTE — ASSESSMENT & PLAN NOTE
Patient has a history of insomnia  Patient was being treated in the [facility] for insomnia with trazodone 50 mg nightly    PLAN:  Continue patient's home trazodone 50 mg nightly for insomnia, consider changing to low dose mirtazapine if poor appetite and insomnia persist

## 2025-01-16 NOTE — NURSING NOTE
Patient isolative to room and self, visible for needs, appears anxious, poor eye contact but denies psych symptoms at this time. Refused scheduled senna, but compliant with other schedule medication. Denies any unmet needs at this time.

## 2025-01-16 NOTE — PLAN OF CARE
Problem: Alteration in Thoughts and Perception  Goal: Treatment Goal: Gain control of psychotic behaviors/thinking, reduce/eliminate presenting symptoms and demonstrate improved reality functioning upon discharge  Outcome: Progressing  Goal: Verbalize thoughts and feelings  Description: Interventions:  - Promote a nonjudgmental and trusting relationship with the patient through active listening and therapeutic communication  - Assess patient's level of functioning, behavior and potential for risk  - Engage patient in 1 on 1 interactions  - Encourage patient to express fears, feelings, frustrations, and discuss symptoms    - [place] patient to reality, help patient recognize reality-based thinking   - Administer medications as ordered and assess for potential side effects  - Provide the patient education related to the signs and symptoms of the illness and desired effects of prescribed medications  Outcome: Progressing  Goal: Agree to be compliant with medication regime, as prescribed and report medication side effects  Description: Interventions:  - Offer appropriate PRN medication and supervise ingestion; conduct AIMS, as needed   Outcome: Progressing  Goal: Recognize dysfunctional thoughts, communicate reality-based thoughts at the time of discharge  Description: Interventions:  - Provide medication and psycho-education to assist patient in compliance and developing insight into his/her illness   Outcome: Progressing     Problem: Depression  Goal: Treatment Goal: Demonstrate behavioral control of depressive symptoms, verbalize feelings of improved mood/affect, and adopt new coping skills prior to discharge  Outcome: Progressing  Goal: Refrain from harming self  Description: Interventions:  - Monitor patient closely, per order   - Supervise medication ingestion, monitor effects and side effects   Outcome: Progressing  Goal: Refrain from self-neglect  Outcome: Progressing  Goal: Attend and participate in unit  activities, including therapeutic, recreational, and educational groups  Description: Interventions:  - Provide therapeutic and educational activities daily, encourage attendance and participation, and document same in the medical record   Outcome: Progressing     Problem: Ineffective Coping  Goal: Participates in unit activities  Description: Interventions:  - Provide therapeutic environment   - Provide required programming   - Redirect inappropriate behaviors   Outcome: Progressing

## 2025-01-16 NOTE — NURSING NOTE
Pt more visible on the unit, withdrawn to self. Continues to be paranoid and suspicious. Denies SI/HI/AH/VH. Reporting anxiety, pt given atarax 100mg po @1030- effective. Pt encouraged to shower and eat/hydrate as pt continues to have poor appetite and hygiene. Compliant with scheduled medications but refuses miralax. No further needs at this time.

## 2025-01-16 NOTE — NURSING NOTE
Pt has not had a BM on reassessment. PRN senokot not effective at this time. Pt has reported no BM since admission. Educated pt to use scheduled miralax tomorrow and not refuse it, pt verbalized understanding. Currently resting in bed, no further needs.

## 2025-01-16 NOTE — ASSESSMENT & PLAN NOTE
Patient has a history of MDD versus bipolar disorder  Patient was being treated in the [facility] program for mood with Vraylar 3 mg once daily, Wellbutrin  mg once daily, Topamax 100 mg twice daily, duloxetine DR 30 mg once daily  Secondary to patient's lack of cooperativity during initial evaluation today this writer was unable to obtain history and ROS to distinguish whether or not patient needs to be treated for unipolar depression or bipolar      At this time patient continues to report depression and anxiety and continues to look depressed.  Will make medication adjustments to better target her symptoms and decrease her Wellbutrin with potentially discontinuing it altogether and increase Vraylar for better mood stabilization.  Will also consider increasing titration of Cymbalta.    PLAN:  Continue to evaluate the patient for MDD versus bipolar disorder  Increase Vraylar to 4.5 mg once daily for mood stabilization  Increase home duloxetine DR to 60 mg once daily for depressive symptoms and anxiety symptoms  Continue home Topamax 100 mg twice daily for mood stabilization  Plan to taper Wellbutrin to 150 mg and assess whether or not this will need to be continued or tapered off completely.  Increase patient gabapentin from 300mg BID to TID for anxiety

## 2025-01-16 NOTE — NURSING NOTE
Patient approached nurses station, complaining of anxiety,Ham score 30. Patient reports b/l hip pain of 8 /10, motrin 800 mg po prn given.

## 2025-01-16 NOTE — ASSESSMENT & PLAN NOTE
Patient has a history per record review generalized anxiety disorder  Patient was being treated in the [facility] program for SHAILA with duloxetine 30 mg once daily,  Ativan 1 mg PRN as needed for anxiety    PLAN:  Increasing Duloxetine to 60mg PO for anxiety and depression symptoms  Continue inpatient Ativan PRN orders for anxiety   Continue inpatient atarax PRN orders for anxiety

## 2025-01-16 NOTE — PROGRESS NOTES
Progress Note - Behavioral Health   Name: Geno Lopez 39 y.o. female I MRN: [id number]   Unit/Bed#: -01 I Date of Admission: 1/11/2025   Date of Service: 1/16/2025 I Hospital Day: 5         Assessment & Plan  Unspecified mood disorder (rule out MDD vs bipolar disorder)  Patient has a history of MDD versus bipolar disorder  Patient was being treated in the [place] program for mood with Vraylar 3 mg once daily, Wellbutrin  mg once daily, Topamax 100 mg twice daily, duloxetine DR 30 mg once daily  Secondary to patient's lack of cooperativity during initial evaluation today this writer was unable to obtain history and ROS to distinguish whether or not patient needs to be treated for unipolar depression or bipolar      At this time patient continues to report depression and anxiety and continues to look depressed.  Will make medication adjustments to better target her symptoms and decrease her Wellbutrin with potentially discontinuing it altogether and increase Vraylar for better mood stabilization.  Will also consider increasing titration of Cymbalta.    PLAN:  Continue to evaluate the patient for MDD versus bipolar disorder  Increase Vraylar to 4.5 mg once daily for mood stabilization  Increase home duloxetine DR to 60 mg once daily for depressive symptoms and anxiety symptoms  Continue home Topamax 100 mg twice daily for mood stabilization  Plan to taper Wellbutrin to 150 mg and assess whether or not this will need to be continued or tapered off completely.  Increase patient gabapentin from 300mg BID to TID for anxiety  Opioid use disorder, moderate, in early remission, on maintenance therapy, dependence (HCC)  Patient has a history of opioid use disorder in early remission on MAT  Patient was being treated in the [facility] for opioid use disorder with Suboxone 2 mg once daily  Dose of Suboxone was confirmed by patient's outpatient pharmacy, [facility], and PDMP    PLAN:  Continue patient's home  "Suboxone at 2 mg sublingual once daily for opioid dependence    Generalized anxiety disorder  Patient has a history per record review generalized anxiety disorder  Patient was being treated in the [facility] program for SHAILA with duloxetine 30 mg once daily,  Ativan 1 mg PRN as needed for anxiety    PLAN:  Increasing Duloxetine to 60mg PO for anxiety and depression symptoms  Continue inpatient Ativan PRN orders for anxiety   Continue inpatient atarax PRN orders for anxiety   Other specified attention deficit hyperactivity disorder (ADHD)  Per record review patient has a history of ADHD  Patient was being treated in the [facility] program for ADHD with Strattera 18 mg once daily  Patient told nursing that she does not want to restart Strattera at this time    PLAN:  Do not restart home Strattera while admitted to inpatient psychiatric unit  Insomnia  Patient has a history of insomnia  Patient was being treated in the [facility] for insomnia with trazodone 50 mg nightly    PLAN:  Continue patient's home trazodone 50 mg nightly for insomnia, consider changing to low dose mirtazapine if poor appetite and insomnia persist  Tobacco use  Patient has a recent history of daily vaping which she says she stopped approximately 4 days ago secondary to fear that \"street drugs\" were in her vape.  Patient was being treated in the [facility] program for tobacco cessation with Wellbutrin    PLAN:  Tapering pt off of Wellbutrin given concern for increased anxiety  Other specified hypothyroidism  Per medical  Vitamin D insufficiency  Per medical  Foul smelling urine  Family medicine was consulted yesterday and was notified today of patient's urinary issue/UA. Patient appears to be starting Nitrofurantoin later today for this.        Recommended Treatment:     Treatment plan and medication changes discussed and per the attending physician the plan is:     1.Continue with group therapy, milieu therapy and occupational therapy  2.Behavioral Health " checks every 15 minutes  3.Continue frequent safety checks and vitals per unit protocol  4.Continue with SLIM medical management as indicated  5.Continue with current medication regimen  6.Will review labs in the a.m.  7.Disposition Planning: Discharge planning and efforts remain ongoing     Planned medication and treatment changes:    All current active medications have been reviewed  Encourage group therapy, milieu therapy and occupational therapy  Behavioral Health checks for safety monitoring  Continue treatment with group therapy, milieu therapy and occupational therapy  Discharge planning  -Patient agreeable to rescinding 72 hour notice today  -Increasing Duloxetine to 60mg PO daily for anxiety and depressive symptoms, no evidence of dennis today and no clear history of manic episodes per interview as any euphoric feelings or decreased need for sleep was in setting of substance use.    -Increasing home gabapentin dosing to 300mg TID for anxiety symptoms    Current medications:  Current Facility-Administered Medications   Medication Dose Route Frequency Provider Last Rate    albuterol  2 puff Inhalation Q4H PRN Desmond Piper MD      aluminum-magnesium hydroxide-simethicone  30 mL Oral Q4H PRN Sarah Houston MD      atorvastatin  40 mg Oral Daily With Dinner Lazara Thompson MD      haloperidol lactate  2.5 mg Intramuscular Q4H PRN Max 4/day Sarah Houston MD      And    LORazepam  1 mg Intramuscular Q4H PRN Max 4/day Sarah Houston MD      And    benztropine  0.5 mg Intramuscular Q4H PRN Max 4/day Sarah Houston MD      haloperidol lactate  5 mg Intramuscular Q4H PRN Max 4/day Sarah Houston MD      And    LORazepam  2 mg Intramuscular Q4H PRN Max 4/day Sarah Houston MD      And    benztropine  1 mg Intramuscular Q4H PRN Max 4/day Sarah Houston MD      benztropine  1 mg Intramuscular Q4H PRN Max 6/day Sarah Houston MD      benztropine  1 mg Oral Q4H PRN Max 6/day Sarah Houston MD       bisacodyl  10 mg Rectal Daily PRN Sarah Houston MD      buprenorphine-naloxone  2 mg Sublingual Daily Meche Ponce DO      buPROPion  150 mg Oral Daily Nitish Crouch MD      cariprazine  4.5 mg Oral Daily Nitish Crouch MD      cholecalciferol  1,000 Units Oral Daily Lazara Thompson MD      hydrOXYzine HCL  50 mg Oral Q6H PRN Max 4/day Sarah Houston MD      Or    diphenhydrAMINE  50 mg Intramuscular Q6H PRN Sarah Huoston MD      [START ON 1/17/2025] DULoxetine  60 mg Oral Daily CHRISTOPHER Harmon      gabapentin  300 mg Oral TID Nitish Crouch MD      haloperidol  1 mg Oral Q6H PRN Sarah Houston MD      haloperidol  2.5 mg Oral Q4H PRN Max 4/day Sarah Houston MD      haloperidol  5 mg Oral Q4H PRN Max 4/day Sarah Houston MD      hydrOXYzine HCL  100 mg Oral Q6H PRN Max 4/day Sarah Houston MD      Or    LORazepam  2 mg Intramuscular Q6H PRN Sarah Houston MD      hydrOXYzine HCL  25 mg Oral Q6H PRN Max 4/day Sarah Houston MD      ibuprofen  400 mg Oral Q4H PRN Sarah Houston MD      ibuprofen  600 mg Oral Q6H PRN Sarah Houston MD      ibuprofen  800 mg Oral Q8H PRN Sarah Houston MD      levothyroxine  50 mcg Oral Early Morning Lazara Thompson MD      melatonin  3 mg Oral HS PRN Meche Ponce DO      nicotine polacrilex  4 mg Oral Q2H PRN Sarah Houston MD      nitrofurantoin  100 mg Oral BID With Meals Mariya Vasquez MD      polyethylene glycol  17 g Oral Daily PRN Sarah Houston MD      polyethylene glycol  17 g Oral Daily CHRISTOPHER Lees      propranolol  10 mg Oral Q8H PRN Sarah Houston MD      senna  1 tablet Oral HS CHRISTOPHER Lees      senna-docusate sodium  1 tablet Oral Daily PRN Sarah Houston MD      topiramate  100 mg Oral BID Meche Ponce DO      traZODone  50 mg Oral HS Meche Ponce DO         Risks / Benefits of Treatment:    Risks, benefits, and possible side effects of medications  "explained to patient and patient verbalizes understanding and agreement for treatment.    Subjective:    Behavior over the last 24 hours: some improvement    Per staff, Geno reports continued anxiety symptoms.  She does not like coming out of her room.  She requires a lot of encouragement to leave her room.  She has moments of agitation and requests to leave.  She signed a 72 hour notice yesterday.  She received PRN propanolol yesterday at 1400 which was effective for anxiety symptoms.     Geno was seen today in her room for psychiatric follow up.  She has slightly better eye contact today compared to previous encounter earlier in the week.  She reports she \"doesn't feel right\" but has difficulty articulating why.  She reports continued high anxiety symptoms.  She also reports being scared to shower because she is \"scared\".  She continues to present as paranoid.  She reports feeling \"uncomfortable\" on the unit and that is why she signed a 72 hour notice.  She is agreeable to rescinding as she admits to ongoing uncontrolled mood symptoms, poor appetite and low motivation.  She reports she was not eating prior to admission for an unknown amount of time and feels her appetite is improved only slightly.  When assessing for prior manic episodes patient states she does not remember times when she had decreased need for sleep, feelings of euphoria, impulsive behaviors outside of times she was using substances.  Agreeable to increasing duloxetine today for anxiety and depression symptoms.  Will also increase gabapentin to TID dosing for continued anxiety, if symptoms don't improve can consider using propanolol scheduled in place of gabapentin as appears was effective for her yesterday for PRN dosing. Patient is denying any suicidal or homicidal ideation intent or plan today.  She is denying auditory or visual hallucinations today as well.     Sleep: normal  Appetite: poor  Medication side effects: No   ROS: no " complaints    Mental Status Evaluation:    Appearance:  disheveled, marginal hygiene   Behavior:  calm, guarded, slightly better eye contact   Speech:  slow, soft   Mood:  dysphoric   Affect:  constricted   Thought Process:  slowing of thoughts   Associations: thought blocking   Thought Content:  paranoid ideation, negative thinking   Perceptual Disturbances: denies auditory or visual hallucinations when asked, appears distracted   Risk Potential: Suicidal ideation - None at present, contracts for safety on the unit, would talk to staff if not feeling safe on the unit  Homicidal ideation - None  Potential for aggression - No   Sensorium:  oriented to person, place, and time/date   Memory:  recent memory intact   Consciousness:  alert and awake   Attention/Concentration: attention span and concentration appear shorter than expected for age   Insight:  limited   Judgment: limited   Gait/Station: in bed   Motor Activity: no abnormal movements     Vital signs in last 24 hours:    Temp:  [97.4 °F (36.3 °C)-97.6 °F (36.4 °C)] 97.6 °F (36.4 °C)  HR:  [] 95  BP: (112-135)/(67-76) 112/67  Resp:  [16] 16  SpO2:  [96 %-97 %] 97 %  O2 Device: None (Room air)         Laboratory results: I have personally reviewed all pertinent laboratory/tests results    Results from the past 24 hours: No results found for this or any previous visit (from the past 24 hours).  Most Recent Labs:   Lab Results   Component Value Date    WBC 7.08 01/12/2025    RBC 5.06 01/12/2025    HGB 14.9 01/12/2025    HCT 45.3 01/12/2025     01/12/2025    RDW 12.5 01/12/2025    NEUTROABS 4.62 01/12/2025    TOTANEUTABS 2.72 04/14/2016    SODIUM 140 01/15/2025    K 3.6 01/15/2025    CL 98 01/15/2025    CO2 32 01/15/2025    BUN 19 01/15/2025    CREATININE 0.98 01/15/2025    GLUC 117 01/15/2025    CALCIUM 9.7 01/15/2025    AST 8 (L) 01/12/2025    ALT 7 01/12/2025    ALKPHOS 48 01/12/2025    TP 7.4 01/12/2025    ALB 4.5 01/12/2025    TBILI 0.50 01/12/2025     CHOLESTEROL 288 (H) 01/12/2025    HDL 18 (L) 01/12/2025    TRIG 138 01/12/2025    LDLCALC 242 (H) 01/12/2025    NONHDLC 270 01/12/2025    VXF2YCICGYYV 0.801 01/12/2025    FREET4 5.7 02/10/2017    PREGUR negative 07/21/2022    PREGSERUM Negative 01/12/2025    SYPHILISAB Non-reactive 01/12/2025    HGBA1C 5.1 02/05/2024     02/05/2024       Suicide/Homicide Risk Assessment:    Risk of Harm to Self:   Nursing Suicide Risk Assessment Last 24 hours: C-SSRS Risk (Since Last Contact)  Calculated C-SSRS Risk Score (Since Last Contact): No Risk Indicated  Current Specific Risk Factors include: current depressive symptoms, current anxiety symptoms, current unstable mood, poor self care  Protective Factors: no current suicidal ideation, ability to communicate with staff on the unit, able to contract for safety on the unit, responds to redirection  Based on today's assessment, Geno presents the following risk of harm to self: low    Risk of Harm to Others:  Nursing Homicide Risk Assessment: Violence Risk to Others: Denies within past 6 months  Current Specific Risk Factors include: social difficulties  Protective Factors: no current homicidal ideation, compliant with medications on the unit as ordered, follows staff redirection  Based on today's assessment, Geno presents the following risk of harm to others: low    The following interventions are recommended: Behavioral Health checks for safety monitoring, continued hospitalization on locked unit    Progress Toward Goals: limited improvement, remains depressed and anxious, distracted appearing, paranoid, slightly better eye contact today, taking medications as ordered, rescinding 72 hour notice    Counseling / Coordination of Care:    Total floor / unit time spent today 30 minutes. Greater than 50% of total time was spent with the patient and / or family counseling and / or coordination of care. A description of counseling / coordination of care:  Patient's  progress discussed with staff in treatment team meeting.    Administrative Statements   I have spent a total time of 30 minutes in caring for this patient on the day of the visit/encounter including Diagnostic results, Documenting in the medical record, Reviewing / ordering tests, medicine, procedures  , Obtaining or reviewing history  , and Communicating with other healthcare professionals .    CHRISTOPHER Harmon 01/16/25

## 2025-01-16 NOTE — PROGRESS NOTES
01/16/25 0838   Team Meeting   Meeting Type Daily Rounds   Team Members Present   Team Members Present Physician;Nurse;   Physician Team Member Miko   Nursing Team Member Seun   Care Management Team Member Rena   Patient/Family Present   Patient Present No   Patient's Family Present No     Pt is reporting feeling anxious and does not want to come out of her room. Pt requires a lot of encouragement to leave her room for meals and needs. Pt has moments of being agitated and requesting to leave. Pt denies SI/HI/AVH. Pt signed 72 hour notice yesterday.

## 2025-01-17 PROCEDURE — 99232 SBSQ HOSP IP/OBS MODERATE 35: CPT | Performed by: PSYCHIATRY & NEUROLOGY

## 2025-01-17 RX ORDER — CYCLOBENZAPRINE HCL 10 MG
10 TABLET ORAL 3 TIMES DAILY PRN
Status: DISCONTINUED | OUTPATIENT
Start: 2025-01-17 | End: 2025-01-23 | Stop reason: HOSPADM

## 2025-01-17 RX ADMIN — NITROFURANTOIN (MONOHYDRATE/MACROCRYSTALS) 100 MG: 25; 75 CAPSULE ORAL at 08:05

## 2025-01-17 RX ADMIN — NITROFURANTOIN (MONOHYDRATE/MACROCRYSTALS) 100 MG: 25; 75 CAPSULE ORAL at 15:30

## 2025-01-17 RX ADMIN — PROPRANOLOL HYDROCHLORIDE 10 MG: 10 TABLET ORAL at 08:38

## 2025-01-17 RX ADMIN — HYDROXYZINE HYDROCHLORIDE 100 MG: 50 TABLET, FILM COATED ORAL at 02:20

## 2025-01-17 RX ADMIN — BUPRENORPHINE AND NALOXONE 2 MG: 2; .5 FILM BUCCAL; SUBLINGUAL at 08:00

## 2025-01-17 RX ADMIN — GABAPENTIN 300 MG: 300 CAPSULE ORAL at 20:52

## 2025-01-17 RX ADMIN — BUPROPION HYDROCHLORIDE 150 MG: 150 TABLET, FILM COATED, EXTENDED RELEASE ORAL at 08:05

## 2025-01-17 RX ADMIN — GABAPENTIN 300 MG: 300 CAPSULE ORAL at 15:29

## 2025-01-17 RX ADMIN — CARIPRAZINE 4.5 MG: 4.5 CAPSULE, GELATIN COATED ORAL at 08:02

## 2025-01-17 RX ADMIN — DULOXETINE HYDROCHLORIDE 60 MG: 60 CAPSULE, DELAYED RELEASE ORAL at 08:05

## 2025-01-17 RX ADMIN — CYCLOBENZAPRINE HYDROCHLORIDE 10 MG: 10 TABLET, FILM COATED ORAL at 16:43

## 2025-01-17 RX ADMIN — IBUPROFEN 800 MG: 400 TABLET, FILM COATED ORAL at 02:20

## 2025-01-17 RX ADMIN — HYDROXYZINE HYDROCHLORIDE 100 MG: 50 TABLET, FILM COATED ORAL at 07:57

## 2025-01-17 RX ADMIN — ATORVASTATIN CALCIUM 40 MG: 40 TABLET, FILM COATED ORAL at 15:30

## 2025-01-17 RX ADMIN — TRAZODONE HYDROCHLORIDE 50 MG: 50 TABLET ORAL at 21:00

## 2025-01-17 RX ADMIN — SENNOSIDES 8.6 MG: 8.6 TABLET, FILM COATED ORAL at 21:00

## 2025-01-17 RX ADMIN — TOPIRAMATE 100 MG: 100 TABLET, FILM COATED ORAL at 08:04

## 2025-01-17 RX ADMIN — GABAPENTIN 300 MG: 300 CAPSULE ORAL at 08:04

## 2025-01-17 RX ADMIN — Medication 1000 UNITS: at 08:02

## 2025-01-17 RX ADMIN — CYCLOBENZAPRINE HYDROCHLORIDE 10 MG: 10 TABLET, FILM COATED ORAL at 09:02

## 2025-01-17 RX ADMIN — TOPIRAMATE 100 MG: 100 TABLET, FILM COATED ORAL at 17:18

## 2025-01-17 RX ADMIN — LEVOTHYROXINE SODIUM 50 MCG: 0.05 TABLET ORAL at 06:21

## 2025-01-17 NOTE — NURSING NOTE
PRN Motrin 800mg PO effective, no further complaints voiced at this time. Q 15 minute safety checks maintained.

## 2025-01-17 NOTE — NURSING NOTE
Pt approached nursing station requesting PRN for back spasm. PRN Flexeril 10 mg PO administered at 0902.

## 2025-01-17 NOTE — NURSING NOTE
Pt shailesh called demanding to know if pt is talking to any males on the unit, shailesh stating he should be able to know every thing that she is doing while here. Informed caller there was no ALOK and no information could be shared, caller agitated due to having spoken to pt multiple times today. Pt resting in bed at the time of call and caller demanding staff to wake her up. Informed caller that pt would be made aware he called.

## 2025-01-17 NOTE — PROGRESS NOTES
Progress Note - Behavioral Health   Name: Geno Lopez 39 y.o. female I MRN: [id number]   Unit/Bed#: -01 I Date of Admission: 1/11/2025   Date of Service: 1/17/2025 I Hospital Day: 6         Assessment & Plan  Unspecified mood disorder (rule out MDD vs bipolar disorder)  Patient has a history of MDD versus bipolar disorder  Patient was being [facility] in the [facility] for mood with Vraylar 3 mg once daily, Wellbutrin  mg once daily, Topamax 100 mg twice daily, duloxetine DR 30 mg once daily  Secondary to patient's lack of cooperativity during initial evaluation today this writer was unable to obtain history and ROS to distinguish whether or not patient needs to be treated for unipolar depression or bipolar      At this time patient continues to report depression and anxiety and continues to look depressed.  Will make medication adjustments to better target her symptoms and decrease her Wellbutrin with potentially discontinuing it altogether and increase Vraylar for better mood stabilization.  Will also consider increasing titration of Cymbalta.    PLAN:  Continue to evaluate the patient for MDD versus bipolar disorder  Continue Vraylar to 4.5 mg once daily for mood stabilization  Continue duloxetine DR to 60 mg once daily for depressive symptoms and anxiety symptoms  Continue home Topamax 100 mg twice daily for mood stabilization  Plan to taper off Wellbutrin to 150 mg Saturday last dose  Continue gabapentin from 300mg BID to TID for anxiety  Opioid use disorder, moderate, in early remission, on maintenance therapy, dependence (HCC)  Patient has a history of opioid use disorder in early remission on MAT  Patient was being treated in the [facility] for opioid use disorder with Suboxone 2 mg once daily  Dose of Suboxone was confirmed by patient's outpatient pharmacy, [facility], and PDMP    PLAN:  Continue patient's home Suboxone at 2 mg sublingual once daily for opioid dependence    Generalized  "anxiety disorder  Patient has a history per record review generalized anxiety disorder  Patient was being treated in the [facility] program for SHAILA with duloxetine 30 mg once daily,  Ativan 1 mg PRN as needed for anxiety    PLAN:  Increasing Duloxetine to 60mg PO for anxiety and depression symptoms  Continue inpatient Ativan PRN orders for anxiety   Continue inpatient atarax PRN orders for anxiety   Other specified attention deficit hyperactivity disorder (ADHD)  Per record review patient has a history of ADHD  Patient was being treated in the [facility] program for ADHD with Strattera 18 mg once daily  Patient told nursing that she does not want to restart Strattera at this time    PLAN:  Do not restart home Strattera while admitted to inpatient psychiatric unit  Insomnia  Patient has a history of insomnia  Patient was being treated in the [facility] program for insomnia with trazodone 50 mg nightly    PLAN:  Continue patient's home trazodone 50 mg nightly for insomnia, consider changing to low dose mirtazapine if poor appetite and insomnia persist  Tobacco use  Patient has a recent history of daily vaping which she says she stopped approximately 4 days ago secondary to fear that \"street drugs\" were in her vape.  Patient was being treated in the [facility] program for tobacco cessation with Wellbutrin    PLAN:  Tapering pt off of Wellbutrin given concern for increased anxiety  Other specified hypothyroidism  Per medical  Vitamin D insufficiency  Per medical  Foul smelling urine  Family medicine was consulted yesterday and was notified today of patient's urinary issue/UA. Patient appears to be starting Nitrofurantoin later today for this.        Recommended Treatment:     Treatment plan and medication changes discussed and per the attending physician the plan is:     1.Continue with group therapy, milieu therapy and occupational therapy  2.Behavioral Health checks every 15 minutes  3.Continue frequent safety checks and vitals per unit " protocol  4.Continue with [facility] medical management as indicated  5.Continue with current medication regimen  6.Will review labs in the a.m.  7.Disposition Planning: Discharge planning and efforts remain ongoing     Planned medication and treatment changes:    All current active medications have been reviewed  Encourage group therapy, milieu therapy and occupational therapy  Behavioral Health checks for safety monitoring  Continue treatment with group therapy, milieu therapy and occupational therapy  Discharge planning  -Add Flexeril PRN for muscle spasm, patient home dose confirmed PRN 10mg TID  -Continue gabapentin over the weekend, if no improvement by next week consider increase in dose or changing medication  -Patient agreeable to continue Vraylar at this time  -Taper of Wellbutrin will be complete this weekend    Current medications:  Current Facility-Administered Medications   Medication Dose Route Frequency Provider Last Rate    albuterol  2 puff Inhalation Q4H PRN Desmond Piper MD      aluminum-magnesium hydroxide-simethicone  30 mL Oral Q4H PRN Sarah Houston MD      atorvastatin  40 mg Oral Daily With Dinner Lazara Thompson MD      haloperidol lactate  2.5 mg Intramuscular Q4H PRN Max 4/day Sarah Houston MD      And    LORazepam  1 mg Intramuscular Q4H PRN Max 4/day Sarah Houston MD      And    benztropine  0.5 mg Intramuscular Q4H PRN Max 4/day Sarah Houston MD      haloperidol lactate  5 mg Intramuscular Q4H PRN Max 4/day Sarah Houston MD      And    LORazepam  2 mg Intramuscular Q4H PRN Max 4/day Sarah Houston MD      And    benztropine  1 mg Intramuscular Q4H PRN Max 4/day Sarah Houston MD      benztropine  1 mg Intramuscular Q4H PRN Max 6/day Sarah Houston MD      benztropine  1 mg Oral Q4H PRN Max 6/day Sarah Houston MD      bisacodyl  10 mg Rectal Daily PRN Sarah Houston MD      buprenorphine-naloxone  2 mg Sublingual Daily Meche Ponce DO      buPROPion   150 mg Oral Daily Nitish Crouch MD      cariprazine  4.5 mg Oral Daily Nitish Crouch MD      cholecalciferol  1,000 Units Oral Daily Lazara Thompson MD      cyclobenzaprine  10 mg Oral TID PRN Nitish Crouch MD      hydrOXYzine HCL  50 mg Oral Q6H PRN Max 4/day Sarah Houston MD      Or    diphenhydrAMINE  50 mg Intramuscular Q6H PRN Sarah Houston MD      DULoxetine  60 mg Oral Daily CHRISTOPHER Harmon      gabapentin  300 mg Oral TID Nitish Crouch MD      haloperidol  1 mg Oral Q6H PRN Sarah Houston MD      haloperidol  2.5 mg Oral Q4H PRN Max 4/day Sarah Houston MD      haloperidol  5 mg Oral Q4H PRN Max 4/day Sarah Houston MD      hydrOXYzine HCL  100 mg Oral Q6H PRN Max 4/day Sarah Houston MD      Or    LORazepam  2 mg Intramuscular Q6H PRN Sarah Houston MD      hydrOXYzine HCL  25 mg Oral Q6H PRN Max 4/day Sarah Houston MD      ibuprofen  400 mg Oral Q4H PRN Sarah Houston MD      ibuprofen  600 mg Oral Q6H PRN Sarah Houston MD      ibuprofen  800 mg Oral Q8H PRN Sarah Houston MD      levothyroxine  50 mcg Oral Early Morning Lazara Thompson MD      melatonin  3 mg Oral HS PRN Meche Ponce DO      nicotine polacrilex  4 mg Oral Q2H PRN Sarah Houston MD      nitrofurantoin  100 mg Oral BID With Meals Mariya Vasquez MD      polyethylene glycol  17 g Oral Daily PRN Sarah Houston MD      polyethylene glycol  17 g Oral Daily CHRISTOPHER Lees      propranolol  10 mg Oral Q8H PRN Sarah Houston MD      senna  1 tablet Oral HS CHRISTOPHER Lees      senna-docusate sodium  1 tablet Oral Daily PRN Sarah Houston MD      topiramate  100 mg Oral BID Meche Ponce DO      traZODone  50 mg Oral HS Meche Ponce DO         Risks / Benefits of Treatment:    Risks, benefits, and possible side effects of medications explained to patient and patient verbalizes understanding and agreement for  "treatment.    Subjective:    Behavior over the last 24 hours: slowly improving.     Per staff, Geno refused Vraylar dose this morning. She has shown some improvement, slightly more visible on the unit.  She has been eating more 25-50 percent of her meals.  Appears less paranoid.    Geno was seen in the group room today away from peers.  Geno reports that her anxiety is still high today.  She remains paranoid stating \"I feel like I am the center of attention here\"  She was given reassurance and appeared to respond well.  She reports that she does not feel the Vraylar has been helping, she denies any side effects.  We discussed improvement since beginning of the week including improved appetite, getting out of her room, improved ADLs, she is speaking more and has improved eye contact.  She is agreeable to continue Vraylar and will accept her morning dose.  She is also agreeable to discontinue the Wellbutrin which may be contributing to ongoing anxiety and paranoia.  Plan to complete wean of Wellbutrin this weekend, continue Gabapentin at current dosing for anxiety.  Patient appears to be responding well to PRN propanolol for anxiety symptoms.  If no improvement in anxiety symptoms by Monday consider increasing gabapentin or switching to another anxiolytic medication.  Geno is denying any suicidal or homicidal ideation intent or plan today.  She denies hallucinations but appears distracted at times.  No overt internal preoccupation today.     Sleep: normal  Appetite: poor, fair 25-50 percent of meals, slight improvement since admission  Medication side effects: No   ROS: no complaints    Mental Status Evaluation:    Appearance:  casually dressed, marginal hygiene   Behavior:  cooperative, less guarded, better eye contact   Speech:  normal rate, soft   Mood:  anxious   Affect:  constricted   Thought Process:  slowing of thoughts, negative thinking   Associations: concrete associations   Thought " Content:  paranoid ideation, negative thoughts, ruminating thoughts   Perceptual Disturbances: denies auditory or visual hallucinations when asked, appears distracted   Risk Potential: Suicidal ideation - None  Homicidal ideation - None  Potential for aggression - No   Sensorium:  oriented to person, place, and time/date   Memory:  recent memory intact   Consciousness:  alert and awake   Attention/Concentration: attention span and concentration appear shorter than expected for age   Insight:  limited   Judgment: limited   Gait/Station: normal gait/station   Motor Activity: no abnormal movements     Vital signs in last 24 hours:    Temp:  [97.5 °F (36.4 °C)-97.9 °F (36.6 °C)] 97.5 °F (36.4 °C)  HR:  [] 95  BP: (118-126)/(70-71) 126/71  Resp:  [17] 17  SpO2:  [98 %] 98 %  O2 Device: None (Room air)         Laboratory results: I have personally reviewed all pertinent laboratory/tests results    Results from the past 24 hours: No results found for this or any previous visit (from the past 24 hours).  Most Recent Labs:   Lab Results   Component Value Date    WBC 7.08 01/12/2025    RBC 5.06 01/12/2025    HGB 14.9 01/12/2025    HCT 45.3 01/12/2025     01/12/2025    RDW 12.5 01/12/2025    NEUTROABS 4.62 01/12/2025    TOTANEUTABS 2.72 04/14/2016    SODIUM 140 01/15/2025    K 3.6 01/15/2025    CL 98 01/15/2025    CO2 32 01/15/2025    BUN 19 01/15/2025    CREATININE 0.98 01/15/2025    GLUC 117 01/15/2025    CALCIUM 9.7 01/15/2025    AST 8 (L) 01/12/2025    ALT 7 01/12/2025    ALKPHOS 48 01/12/2025    TP 7.4 01/12/2025    ALB 4.5 01/12/2025    TBILI 0.50 01/12/2025    CHOLESTEROL 288 (H) 01/12/2025    HDL 18 (L) 01/12/2025    TRIG 138 01/12/2025    LDLCALC 242 (H) 01/12/2025    NONHDLC 270 01/12/2025    FWB0KYDXIXJW 0.801 01/12/2025    FREET4 5.7 02/10/2017    PREGUR negative 07/21/2022    PREGSERUM Negative 01/12/2025    SYPHILISAB Non-reactive 01/12/2025    HGBA1C 5.1 02/05/2024     02/05/2024        Suicide/Homicide Risk Assessment:    Risk of Harm to Self:   Nursing Suicide Risk Assessment Last 24 hours: C-SSRS Risk (Since Last Contact)  Calculated C-SSRS Risk Score (Since Last Contact): No Risk Indicated  Current Specific Risk Factors include: current depressive symptoms, current anxiety symptoms, current unstable mood  Protective Factors: no current suicidal ideation, ability to communicate with staff on the unit, able to contract for safety on the unit, taking medications as ordered on the unit, improved depressive symptoms, responds to redirection  Based on today's assessment, Geno presents the following risk of harm to self: low    Risk of Harm to Others:  Nursing Homicide Risk Assessment: Violence Risk to Others: Denies within past 6 months  Current Specific Risk Factors include: social difficulties  Protective Factors: no current homicidal ideation, psychotic symptoms are less prominent, compliant with medications on the unit as ordered, follows staff redirection  Based on today's assessment, Geno presents the following risk of harm to others: low    The following interventions are recommended: Behavioral Health checks for safety monitoring, continued hospitalization on locked unit    Progress Toward Goals: progressing, continues with depressive and anxiety symptoms, continues to be paranoid, taking medications with encouragement, out of her room more, better eye contact.    Counseling / Coordination of Care:    Total floor / unit time spent today 30 minutes. Greater than 50% of total time was spent with the patient and / or family counseling and / or coordination of care. A description of counseling / coordination of care:  Patient's progress discussed with staff in treatment team meeting.    Administrative Statements   I have spent a total time of 30 minutes in caring for this patient on the day of the visit/encounter including Diagnostic results, Documenting in the medical record,  Reviewing / ordering tests, medicine, procedures  , Obtaining or reviewing history  , and Communicating with other healthcare professionals .    CHRISTOPHER Harmon 01/17/25

## 2025-01-17 NOTE — ASSESSMENT & PLAN NOTE
Patient has a history of MDD versus bipolar disorder  Patient was being treated in the [facility] for mood with Vraylar 3 mg once daily, Wellbutrin  mg once daily, Topamax 100 mg twice daily, duloxetine DR 30 mg once daily  Secondary to patient's lack of cooperativity during initial evaluation today this writer was unable to obtain history and ROS to distinguish whether or not patient needs to be treated for unipolar depression or bipolar      At this time patient continues to report depression and anxiety and continues to look depressed.  Will make medication adjustments to better target her symptoms and decrease her Wellbutrin with potentially discontinuing it altogether and increase Vraylar for better mood stabilization.  Will also consider increasing titration of Cymbalta.    PLAN:  Continue to evaluate the patient for MDD versus bipolar disorder  Continue Vraylar to 4.5 mg once daily for mood stabilization  Continue duloxetine DR to 60 mg once daily for depressive symptoms and anxiety symptoms  Continue home Topamax 100 mg twice daily for mood stabilization  Plan to taper off Wellbutrin to 150 mg Saturday last dose  Continue gabapentin from 300mg BID to TID for anxiety

## 2025-01-17 NOTE — NURSING NOTE
Patient received atarax 100mg at 0220 for severe anxiety. Patient is currently resting in bed with no further complaints of anxiety. PRN effective.

## 2025-01-17 NOTE — NURSING NOTE
Pt is visible on the unit but mostly withdrawn to self. Able to make her needs known. Pt initially refused her vraylar stating it does not work for her, but after speaking with the doctor was agreeable to take it. Denies SI/HI/AH/VH at this time but does appear depressed. Improved eye contact. Denies any unmet needs or complaints at this time.

## 2025-01-17 NOTE — PROGRESS NOTES
01/17/25 0831   Team Meeting   Meeting Type Daily Rounds   Team Members Present   Team Members Present Physician;Nurse;   Physician Team Member Miko   Nursing Team Member [facility]   Care Management Team Member Rena   Patient/Family Present   Patient Present No   Patient's Family Present No     Pt refused Vraylar. Pt is reporting it is making her worse. Pt has been observed to be improving. Pt denies SI/HI/AVH. Pt is meal compliant. Pt is less paranoid.

## 2025-01-17 NOTE — PLAN OF CARE
Problem: Alteration in Thoughts and Perception  Goal: Treatment Goal: Gain control of psychotic behaviors/thinking, reduce/eliminate presenting symptoms and demonstrate improved reality functioning upon discharge  Outcome: Progressing  Goal: Verbalize thoughts and feelings  Description: Interventions:  - Promote a nonjudgmental and trusting relationship with the patient through active listening and therapeutic communication  - Assess patient's level of functioning, behavior and potential for risk  - Engage patient in 1 on 1 interactions  - Encourage patient to express fears, feelings, frustrations, and discuss symptoms    - [place] patient to reality, help patient recognize reality-based thinking   - Administer medications as ordered and assess for potential side effects  - Provide the patient education related to the signs and symptoms of the illness and desired effects of prescribed medications  Outcome: Progressing  Goal: Agree to be compliant with medication regime, as prescribed and report medication side effects  Description: Interventions:  - Offer appropriate PRN medication and supervise ingestion; conduct AIMS, as needed   Outcome: Progressing  Goal: Recognize dysfunctional thoughts, communicate reality-based thoughts at the time of discharge  Description: Interventions:  - Provide medication and psycho-education to assist patient in compliance and developing insight into his/her illness   Outcome: Progressing     Problem: Depression  Goal: Treatment Goal: Demonstrate behavioral control of depressive symptoms, verbalize feelings of improved mood/affect, and adopt new coping skills prior to discharge  Outcome: Progressing  Goal: Refrain from harming self  Description: Interventions:  - Monitor patient closely, per order   - Supervise medication ingestion, monitor effects and side effects   Outcome: Progressing  Goal: Refrain from self-neglect  Outcome: Progressing  Goal: Attend and participate in unit  activities, including therapeutic, recreational, and educational groups  Description: Interventions:  - Provide therapeutic and educational activities daily, encourage attendance and participation, and document same in the medical record   Outcome: Progressing     Problem: DISCHARGE PLANNING - CARE MANAGEMENT  Goal: Discharge to post-acute care or home with appropriate resources  Description: INTERVENTIONS:  - Conduct assessment to determine patient/family and health care team treatment goals, and need for post-acute services based on payer coverage, community resources, and patient preferences, and barriers to discharge  - Address psychosocial, clinical, and financial barriers to discharge as identified in assessment in conjunction with the patient/family and health care team  - Arrange appropriate level of post-acute services according to patient’s   needs and preference and payer coverage in collaboration with the physician and health care team  - Communicate with and update the patient/family, physician, and health care team regarding progress on the discharge plan  - Arrange appropriate transportation to post-acute venues  Outcome: Progressing

## 2025-01-17 NOTE — NURSING NOTE
Prn propanolol 10mg given @ 0838 for c/o restlessness effective at this time. Pt resting in bed comfortably.

## 2025-01-17 NOTE — NURSING NOTE
Patient is isolative to room, out for needs.Patient denies all psych symptoms are this time. Patient appears depressed. Patient compliant with scheduled medications. Q15 observation maintained.

## 2025-01-17 NOTE — NURSING NOTE
Pt received atarax 100 mg po @ 0757 for complaints of severe anxiety. Reassessed @ 0857 and pt states it was not that helpful.

## 2025-01-18 PROCEDURE — GZHZZZZ GROUP PSYCHOTHERAPY: ICD-10-PCS | Performed by: PSYCHIATRY & NEUROLOGY

## 2025-01-18 PROCEDURE — NC001 PR NO CHARGE: Performed by: PSYCHIATRY & NEUROLOGY

## 2025-01-18 RX ADMIN — ATORVASTATIN CALCIUM 40 MG: 40 TABLET, FILM COATED ORAL at 16:50

## 2025-01-18 RX ADMIN — NITROFURANTOIN (MONOHYDRATE/MACROCRYSTALS) 100 MG: 25; 75 CAPSULE ORAL at 16:50

## 2025-01-18 RX ADMIN — HYDROXYZINE HYDROCHLORIDE 100 MG: 50 TABLET, FILM COATED ORAL at 21:09

## 2025-01-18 RX ADMIN — GABAPENTIN 300 MG: 300 CAPSULE ORAL at 15:02

## 2025-01-18 RX ADMIN — POLYETHYLENE GLYCOL 3350 17 G: 17 POWDER, FOR SOLUTION ORAL at 08:26

## 2025-01-18 RX ADMIN — PROPRANOLOL HYDROCHLORIDE 10 MG: 10 TABLET ORAL at 08:26

## 2025-01-18 RX ADMIN — GABAPENTIN 300 MG: 300 CAPSULE ORAL at 21:03

## 2025-01-18 RX ADMIN — BUPRENORPHINE AND NALOXONE 2 MG: 2; .5 FILM BUCCAL; SUBLINGUAL at 08:27

## 2025-01-18 RX ADMIN — BUPROPION HYDROCHLORIDE 150 MG: 150 TABLET, FILM COATED, EXTENDED RELEASE ORAL at 08:27

## 2025-01-18 RX ADMIN — CYCLOBENZAPRINE HYDROCHLORIDE 10 MG: 10 TABLET, FILM COATED ORAL at 07:02

## 2025-01-18 RX ADMIN — NITROFURANTOIN (MONOHYDRATE/MACROCRYSTALS) 100 MG: 25; 75 CAPSULE ORAL at 08:26

## 2025-01-18 RX ADMIN — LEVOTHYROXINE SODIUM 50 MCG: 0.05 TABLET ORAL at 06:05

## 2025-01-18 RX ADMIN — TOPIRAMATE 100 MG: 100 TABLET, FILM COATED ORAL at 17:35

## 2025-01-18 RX ADMIN — HYDROXYZINE HYDROCHLORIDE 100 MG: 50 TABLET, FILM COATED ORAL at 06:47

## 2025-01-18 RX ADMIN — DULOXETINE HYDROCHLORIDE 60 MG: 60 CAPSULE, DELAYED RELEASE ORAL at 08:27

## 2025-01-18 RX ADMIN — PROPRANOLOL HYDROCHLORIDE 10 MG: 10 TABLET ORAL at 17:54

## 2025-01-18 RX ADMIN — GABAPENTIN 300 MG: 300 CAPSULE ORAL at 08:27

## 2025-01-18 RX ADMIN — CARIPRAZINE 4.5 MG: 4.5 CAPSULE, GELATIN COATED ORAL at 08:27

## 2025-01-18 RX ADMIN — HYDROXYZINE HYDROCHLORIDE 100 MG: 50 TABLET, FILM COATED ORAL at 14:00

## 2025-01-18 RX ADMIN — Medication 1000 UNITS: at 08:27

## 2025-01-18 RX ADMIN — TRAZODONE HYDROCHLORIDE 50 MG: 50 TABLET ORAL at 21:03

## 2025-01-18 RX ADMIN — TOPIRAMATE 100 MG: 100 TABLET, FILM COATED ORAL at 08:27

## 2025-01-18 RX ADMIN — SENNOSIDES 8.6 MG: 8.6 TABLET, FILM COATED ORAL at 21:03

## 2025-01-18 NOTE — NURSING NOTE
Pt received propanolol 10mg @ 0826 and 1754 today for complaints of restlessness but also reports feeling it helps her anxiety. Pt encouraged to talk to physician tomorrow about having it available for anxiety.

## 2025-01-18 NOTE — ASSESSMENT & PLAN NOTE
Patient has a history per record review generalized anxiety disorder  Patient was being treated in the [facility] for SHAILA with duloxetine 30 mg once daily,  Ativan 1 mg PRN as needed for anxiety    PLAN:  Continue Duloxetine 60mg PO for anxiety and depression symptoms  Continue inpatient Ativan PRN orders for anxiety   Continue inpatient atarax PRN orders for anxiety

## 2025-01-18 NOTE — PROGRESS NOTES
Progress Note - Behavioral Health   Name: Geno Lopez 39 y.o. female I MRN: [id number]  Unit/Bed#: -01 I Date of Admission: 1/11/2025   Date of Service: 1/18/2025 I Hospital Day: 7     Assessment & Plan  Unspecified mood disorder (rule out MDD vs bipolar disorder)  Patient has a history of MDD versus bipolar disorder  Patient was being treated in the [facility] program for mood with Vraylar 3 mg once daily, Wellbutrin  mg once daily, Topamax 100 mg twice daily, duloxetine DR 30 mg once daily. Secondary to patient's lack of cooperativity during initial evaluation, difficult to obtain history and ROS to distinguish whether or not patient needs to be treated for unipolar depression or bipolar. At this time patient continues to report depression and anxiety and continues to look depressed.  Will make medication adjustments to better target her symptoms.     PLAN:  Continue to evaluate the patient for MDD versus bipolar disorder  Continue Vraylar 4.5 mg once daily for mood stabilization  Continue duloxetine DR 60 mg once daily for depressive symptoms and anxiety symptoms  Continue home Topamax 100 mg twice daily for mood stabilization  Wellbutrin discontinued 1/18   Continue Gabapentin 300mg BID TID for anxiety  Opioid use disorder, moderate, in early remission, on maintenance therapy, dependence (HCC)  Patient has a history of opioid use disorder in early remission on MAT  Patient was being treated in the [facility] program for opioid use disorder with Suboxone 2 mg once daily  Dose of Suboxone was confirmed by patient's outpatient pharmacy, [facility], and PDMP    PLAN:  Continue patient's home Suboxone at 2 mg sublingual once daily for opioid dependence    Generalized anxiety disorder  Patient has a history per record review generalized anxiety disorder  Patient was being treated in the [facility] program for SHAILA with duloxetine 30 mg once daily,  Ativan 1 mg PRN as needed for anxiety    PLAN:  Continue Duloxetine  "60mg PO for anxiety and depression symptoms  Continue inpatient Ativan PRN orders for anxiety   Continue inpatient atarax PRN orders for anxiety   Other specified attention deficit hyperactivity disorder (ADHD)  Per record review patient has a history of ADHD  Patient was being treated in the [facility] program for ADHD with Strattera 18 mg once daily  Patient told nursing that she does not want to restart Strattera at this time    PLAN:  Do not restart home Strattera while admitted to inpatient psychiatric unit  Insomnia  Patient has a history of insomnia  Patient was being treated in the [facility] for insomnia with trazodone 50 mg nightly    PLAN:  Continue patient's home trazodone 50 mg nightly for insomnia, consider changing to low dose mirtazapine if poor appetite and insomnia persist  Tobacco use  Patient has a recent history of daily vaping which she says she stopped approximately 4 days ago secondary to fear that \"street drugs\" were in her vape.  Patient was being treated in the [facility] program for tobacco cessation with Wellbutrin    PLAN:  Wellbutrin tapered off 1/18 given concern for increased anxiety  Other specified hypothyroidism  Per medical  Vitamin D insufficiency  Per medical  Foul smelling urine  Continue Nitrofurantoin per SLIM     Subjective: All documentation including nursing notes, medication history to ensure medication adherence on the unit, labs, and vitals were reviewed. Geno was evaluated this morning for continuity of care and no acute distress noted throughout the evaluation. Over the past 24 hours per nursing report, Geno has been cooperative on the unit and compliant with medications. Per nursing yesterday evening, \"A male identifying himself as patient's fiance called nurses station saying he wanted to talk to her. Patient did not wish to come to the telephone. Caller upset that she did not come to the phone and said he does not know what's going on here but she is not " "getting better. He went on to say that he is coming up to this hospital today to get her out of here.\" Per nursing, security was notified of these events. No further events overnight.     Today, Geno is consenting for safety on the unit. Geno reports feeling \"stable.\" Geon notes having adequate sleep. Geno states having a good appetite. Geno has been taking the medications as prescribed and reporting no new side effects.    Geno denies suicidal ideations. Geno denies homicidal ideations. Regarding hallucinations, Geno denies auditory or visual hallucinations. She denies other complaints at this time.     PRNs overnight: Flexeril, Atarax, Inderal, Motrin  VS: Reviewed, within normal limits    Progress Toward Goals: slow improvement    Psychiatric Review of Systems:  Behavior over the last 24 hours:  unchanged  Sleep: normal  Appetite: normal  Medication side effects: No   ROS: no complaints    Vital signs in last 24 hours:  Temp:  [97.4 °F (36.3 °C)-97.8 °F (36.6 °C)] 97.4 °F (36.3 °C)  HR:  [] 102  BP: (118-126)/(73-77) 118/77  Resp:  [16-18] 18  SpO2:  [98 %] 98 %  O2 Device: None (Room air)    Laboratory results:  I have personally reviewed all pertinent laboratory/tests results.  No results found for this or any previous visit (from the past 48 hours).      Mental Status Evaluation:    Appearance:  casually dressed, marginal hygiene, looks older than stated age   Behavior:  cooperative, guarded   Speech:  normal rate, scant, soft   Mood:  \"stable\"   Affect:  constricted   Thought Process:  linear, negative thinking   Associations: concrete associations   Thought Content:  no overt delusions   Perceptual Disturbances: Denies auditory or visual hallucinations and Does not appear to be responding to internal stimuli   Risk Potential: Suicidal ideation - None at present, contracts for safety on the unit, would talk to staff if not feeling safe on the unit  Homicidal " ideation - None at present  Potential for aggression - Not at present   Sensorium:  oriented to person, place, and time/date   Memory:  recent and remote memory grossly intact   Consciousness:  alert and awake   Attention/Concentration: attention span and concentration are age appropriate   Insight:  limited   Judgment: limited   Gait/Station: normal gait/station   Motor Activity: no abnormal movements       Current Medications:  Current Facility-Administered Medications   Medication Dose Route Frequency Provider Last Rate    albuterol  2 puff Inhalation Q4H PRN Desmond Piper MD      aluminum-magnesium hydroxide-simethicone  30 mL Oral Q4H PRN Sarah Houston MD      atorvastatin  40 mg Oral Daily With Dinner Lazara Thompson MD      haloperidol lactate  2.5 mg Intramuscular Q4H PRN Max 4/day Sarah Houston MD      And    LORazepam  1 mg Intramuscular Q4H PRN Max 4/day Sarah Houston MD      And    benztropine  0.5 mg Intramuscular Q4H PRN Max 4/day Sarah Houston MD      haloperidol lactate  5 mg Intramuscular Q4H PRN Max 4/day Sarah Houston MD      And    LORazepam  2 mg Intramuscular Q4H PRN Max 4/day Sarah Houston MD      And    benztropine  1 mg Intramuscular Q4H PRN Max 4/day Sarah Houston MD      benztropine  1 mg Intramuscular Q4H PRN Max 6/day Sarah Houston MD      benztropine  1 mg Oral Q4H PRN Max 6/day Sarah Houston MD      bisacodyl  10 mg Rectal Daily PRN Sarah Houston MD      buprenorphine-naloxone  2 mg Sublingual Daily Meche Ponce DO      cariprazine  4.5 mg Oral Daily Nitish Crouch MD      cholecalciferol  1,000 Units Oral Daily Lazara Thompson MD      cyclobenzaprine  10 mg Oral TID PRN Nitish Crouch MD      hydrOXYzine HCL  50 mg Oral Q6H PRN Max 4/day Sarah Houston MD      Or    diphenhydrAMINE  50 mg Intramuscular Q6H PRN Sarah Houston MD      DULoxetine  60 mg Oral Daily CHRISTOPHER Harmon      gabapentin  300 mg Oral TID Nitish  MD Miko      haloperidol  1 mg Oral Q6H PRN Sarah Houston MD      haloperidol  2.5 mg Oral Q4H PRN Max 4/day Sarah Houston MD      haloperidol  5 mg Oral Q4H PRN Max 4/day Sarah Houston MD      hydrOXYzine HCL  100 mg Oral Q6H PRN Max 4/day Sarah Houston MD      Or    LORazepam  2 mg Intramuscular Q6H PRN Sarah Houston MD      hydrOXYzine HCL  25 mg Oral Q6H PRN Max 4/day Sarah Houston MD      ibuprofen  400 mg Oral Q4H PRN Sarah Houston MD      ibuprofen  600 mg Oral Q6H PRN Sarah Houston MD      ibuprofen  800 mg Oral Q8H PRN Sarah Houston MD      levothyroxine  50 mcg Oral Early Morning Lazara Thompson MD      melatonin  3 mg Oral HS PRN Meche Ponce DO      nicotine polacrilex  4 mg Oral Q2H PRN Sarah Houston MD      nitrofurantoin  100 mg Oral BID With Meals Mariya Vasquez MD      polyethylene glycol  17 g Oral Daily PRN Sarah Houston MD      polyethylene glycol  17 g Oral Daily CHRISTOPHER Lees      propranolol  10 mg Oral Q8H PRN Sarah Houston MD      senna  1 tablet Oral HS CHRISTOPHER Lees      senna-docusate sodium  1 tablet Oral Daily PRN Sarah Houston MD      topiramate  100 mg Oral BID Meche Ponce DO      traZODone  50 mg Oral HS Mecheapril Ponce DO         Behavioral Health Medications: All current active meds have been reviewed. Changes as in plan section above.  Risks, benefits and possible side effects of Medications:   Risks, benefits, and possible side effects of medications explained to patient and patient verbalizes understanding.      Counseling / Coordination of Care:  Patient's progress discussed with staff in treatment team meeting.  Medications, treatment progress and treatment plan reviewed with patient.    Jen Barnett DO  Psychiatry Resident, PGY-2    This note was completed in part utilizing Dragon dictation Software. Grammatical, translation, syntax errors, random word insertions,  spelling mistakes, and incomplete sentences may be an occasional consequence of this system secondary to software limitations with voice recognition, ambient noise, and hardware issues. If you have any questions or concerns about the content, text, or information contained within the body of this dictation, please contact the provider for clarification.

## 2025-01-18 NOTE — NURSING NOTE
Pt is more visible on the unit but remains withdrawn to self. Tearful and apologetic during interactions. Pt reports feeling embarrassed about having to report her urine output the past few days. Pt continues to decline to sign ALOK for fiance. Med and meal compliant with encouragement. Denies SI/HI/AH/VH at this time.

## 2025-01-18 NOTE — ASSESSMENT & PLAN NOTE
Patient has a history of MDD versus bipolar disorder  Patient was being treated in the [facility] for mood with Vraylar 3 mg once daily, Wellbutrin  mg once daily, Topamax 100 mg twice daily, duloxetine DR 30 mg once daily. Secondary to patient's lack of cooperativity during initial evaluation, difficult to obtain history and ROS to distinguish whether or not patient needs to be treated for unipolar depression or bipolar. At this time patient continues to report depression and anxiety and continues to look depressed.  Will make medication adjustments to better target her symptoms.     PLAN:  Continue to evaluate the patient for MDD versus bipolar disorder  Continue Vraylar 4.5 mg once daily for mood stabilization  Continue duloxetine DR 60 mg once daily for depressive symptoms and anxiety symptoms  Continue home Topamax 100 mg twice daily for mood stabilization  Wellbutrin discontinued 1/18   Continue Gabapentin 300mg BID TID for anxiety

## 2025-01-18 NOTE — ASSESSMENT & PLAN NOTE
"Patient has a recent history of daily vaping which she says she stopped approximately 4 days ago secondary to fear that \"street drugs\" were in her vape.  [facility] was being [facility] for tobacco cessation with Wellbutrin    PLAN:  Wellbutrin tapered off 1/18 given concern for increased anxiety  "

## 2025-01-18 NOTE — NURSING NOTE
Pt received atarax 100mg @ 1400 for complaints of severe anxiety. Reassessed @ this time and found to be effective.

## 2025-01-18 NOTE — NURSING NOTE
Patient reports she passed urine in bathroom and did not use measuring tejada. She did not like using the hat she said. She said she passed what felt like a bladder full, without burning, pain or discomfort.

## 2025-01-18 NOTE — NURSING NOTE
PT approached nurse's station requesting for PRNs, for anxiety and muscle spasm, Atarax 100mg given at 0642 and Flexeril 10mg at 0702. Will  monitor for effectiveness.

## 2025-01-18 NOTE — ASSESSMENT & PLAN NOTE
Patient presents with complaints of rectal pain. Patient states while at work, patient accidentally sat on a flathead metal stud and rectum became painful. Denies rectal penetration and bleeding. Denies PMHX.    Per medical

## 2025-01-18 NOTE — NURSING NOTE
A male identifying himself as patient's fiance called nurses station saying he wanted to talk to her. Patient did not wish to come to the telephone. Caller upset that she did not come to the phone and said he does not know what's going on here but she is not getting better. He went on to say that he is coming up to this hospital today to get her out of here.

## 2025-01-18 NOTE — NURSING NOTE
Pt shailesh continues to call the nurses station demanding to know if there are males on the unit. Attempted again to explain that we do not have a release of information and staff will continue to ask if she would like to sign one. At this time pt states she does not want to sign one for her fiance. Pt states he will call repeatedly and come to hospital to get her out of here then hung up on this writer.

## 2025-01-18 NOTE — NURSING NOTE
Patient has been in her room this evening, quiet and withdrawn.  She was cooperative with HS medication. She denies S.I.H.I.A/H V/H  Her fiance called this evening. He said he is going to come up to this hospital tomorrow and get her out of here.

## 2025-01-18 NOTE — NURSING NOTE
Reassessed atarax 100 mg @ 0742 for severe anxiety, pt resting in bed with no visible signs of anxiety. Prn effective. Flexeril 10 mg given @ 0702 reassessed @ this time and pt says it helped. Prn effective.

## 2025-01-18 NOTE — PLAN OF CARE
Problem: Alteration in Thoughts and Perception  Goal: Treatment Goal: Gain control of psychotic behaviors/thinking, reduce/eliminate presenting symptoms and demonstrate improved reality functioning upon discharge  Outcome: Progressing  Goal: Verbalize thoughts and feelings  Description: Interventions:  - Promote a nonjudgmental and trusting relationship with the patient through active listening and therapeutic communication  - Assess patient's level of functioning, behavior and potential for risk  - Engage patient in 1 on 1 interactions  - Encourage patient to express fears, feelings, frustrations, and discuss symptoms    - [place] patient to reality, help patient recognize reality-based thinking   - Administer medications as ordered and assess for potential side effects  - Provide the patient education related to the signs and symptoms of the illness and desired effects of prescribed medications  Outcome: Progressing  Goal: Agree to be compliant with medication regime, as prescribed and report medication side effects  Description: Interventions:  - Offer appropriate PRN medication and supervise ingestion; conduct AIMS, as needed   Outcome: Progressing  Goal: Recognize dysfunctional thoughts, communicate reality-based thoughts at the time of discharge  Description: Interventions:  - Provide medication and psycho-education to assist patient in compliance and developing insight into his/her illness   Outcome: Progressing     Problem: Depression  Goal: Treatment Goal: Demonstrate behavioral control of depressive symptoms, verbalize feelings of improved mood/affect, and adopt new coping skills prior to discharge  Outcome: Progressing  Goal: Refrain from harming self  Description: Interventions:  - Monitor patient closely, per order   - Supervise medication ingestion, monitor effects and side effects   Outcome: Progressing  Goal: Refrain from self-neglect  Outcome: Progressing  Goal: Attend and participate in unit  activities, including therapeutic, recreational, and educational groups  Description: Interventions:  - Provide therapeutic and educational activities daily, encourage attendance and participation, and document same in the medical record   Outcome: Progressing     Problem: Ineffective Coping  Goal: Participates in unit activities  Description: Interventions:  - Provide therapeutic environment   - Provide required programming   - Redirect inappropriate behaviors   Outcome: Progressing     Problem: DISCHARGE PLANNING - CARE MANAGEMENT  Goal: Discharge to post-acute care or home with appropriate resources  Description: INTERVENTIONS:  - Conduct assessment to determine patient/family and health care team treatment goals, and need for post-acute services based on payer coverage, community resources, and patient preferences, and barriers to discharge  - Address psychosocial, clinical, and financial barriers to discharge as identified in assessment in conjunction with the patient/family and health care team  - Arrange appropriate level of post-acute services according to patient’s   needs and preference and payer coverage in collaboration with the physician and health care team  - Communicate with and update the patient/family, physician, and health care team regarding progress on the discharge plan  - Arrange appropriate transportation to post-acute venues  Outcome: Progressing

## 2025-01-19 PROCEDURE — 99232 SBSQ HOSP IP/OBS MODERATE 35: CPT | Performed by: PSYCHIATRY & NEUROLOGY

## 2025-01-19 RX ORDER — PROPRANOLOL HYDROCHLORIDE 10 MG/1
10 TABLET ORAL EVERY 8 HOURS PRN
Status: DISCONTINUED | OUTPATIENT
Start: 2025-01-19 | End: 2025-01-23 | Stop reason: HOSPADM

## 2025-01-19 RX ADMIN — TOPIRAMATE 100 MG: 100 TABLET, FILM COATED ORAL at 08:06

## 2025-01-19 RX ADMIN — GABAPENTIN 300 MG: 300 CAPSULE ORAL at 08:06

## 2025-01-19 RX ADMIN — TOPIRAMATE 100 MG: 100 TABLET, FILM COATED ORAL at 17:53

## 2025-01-19 RX ADMIN — POLYETHYLENE GLYCOL 3350 17 G: 17 POWDER, FOR SOLUTION ORAL at 08:06

## 2025-01-19 RX ADMIN — HYDROXYZINE HYDROCHLORIDE 100 MG: 50 TABLET, FILM COATED ORAL at 11:22

## 2025-01-19 RX ADMIN — LEVOTHYROXINE SODIUM 50 MCG: 0.05 TABLET ORAL at 06:22

## 2025-01-19 RX ADMIN — CYCLOBENZAPRINE HYDROCHLORIDE 10 MG: 10 TABLET, FILM COATED ORAL at 10:59

## 2025-01-19 RX ADMIN — Medication 1000 UNITS: at 08:06

## 2025-01-19 RX ADMIN — ATORVASTATIN CALCIUM 40 MG: 40 TABLET, FILM COATED ORAL at 16:12

## 2025-01-19 RX ADMIN — SENNOSIDES 8.6 MG: 8.6 TABLET, FILM COATED ORAL at 21:01

## 2025-01-19 RX ADMIN — HYDROXYZINE HYDROCHLORIDE 100 MG: 50 TABLET, FILM COATED ORAL at 20:38

## 2025-01-19 RX ADMIN — TRAZODONE HYDROCHLORIDE 50 MG: 50 TABLET ORAL at 21:01

## 2025-01-19 RX ADMIN — GABAPENTIN 300 MG: 300 CAPSULE ORAL at 16:12

## 2025-01-19 RX ADMIN — GABAPENTIN 300 MG: 300 CAPSULE ORAL at 21:01

## 2025-01-19 RX ADMIN — PROPRANOLOL HYDROCHLORIDE 10 MG: 10 TABLET ORAL at 08:06

## 2025-01-19 RX ADMIN — BUPRENORPHINE AND NALOXONE 2 MG: 2; .5 FILM BUCCAL; SUBLINGUAL at 08:06

## 2025-01-19 RX ADMIN — CARIPRAZINE 4.5 MG: 4.5 CAPSULE, GELATIN COATED ORAL at 08:06

## 2025-01-19 RX ADMIN — DULOXETINE HYDROCHLORIDE 60 MG: 60 CAPSULE, DELAYED RELEASE ORAL at 08:06

## 2025-01-19 NOTE — NURSING NOTE
Dr Nair checked in by Secure chat to find out how patient was and if there were any further complaints of chest pain. As per day RN there have been no further c/o chest pain and patient has continued to refuse EKG. This was conveyed to Dr Nair .

## 2025-01-19 NOTE — QUICK NOTE
Received a message from Dr. Barnett that patient was complaining of chest pain earlier today. Went and evaluated patient.     HPI: She states her chest pain occurred this morning without radiation. The pain would go back and forth between her right and left chest wall. She states she thinks this is anxiety and that she doesn't want to be here.     ROS: No current chest pain after AM medications. No shortness of breath and no headaches. No n/v/d    Objective: Vitals stable  Cardiopulmonary exam unremarkable  No chest wall tenderness    Assessment:  -Reviewed EKG and has history of PVC's, most recent EKG on 1/13/25  -Current differentials include anxiety vs. PVC's    PLAN  -At this point in time since chest pain has resolved and I wouldn't recommend any further work-up. It may be beneficial to perform an EKG which was previously ordered by Dr. Dang. Communicated the findings to the psychiatry team and they requested we follow-up tomorrow. I will make day team aware through sign out.

## 2025-01-19 NOTE — PLAN OF CARE
Problem: Alteration in Thoughts and Perception  Goal: Treatment Goal: Gain control of psychotic behaviors/thinking, reduce/eliminate presenting symptoms and demonstrate improved reality functioning upon discharge  Outcome: Progressing  Goal: Verbalize thoughts and feelings  Description: Interventions:  - Promote a nonjudgmental and trusting relationship with the patient through active listening and therapeutic communication  - Assess patient's level of functioning, behavior and potential for risk  - Engage patient in 1 on 1 interactions  - Encourage patient to express fears, feelings, frustrations, and discuss symptoms    - [place] patient to reality, help patient recognize reality-based thinking   - Administer medications as ordered and assess for potential side effects  - Provide the patient education related to the signs and symptoms of the illness and desired effects of prescribed medications  Outcome: Progressing  Goal: Agree to be compliant with medication regime, as prescribed and report medication side effects  Description: Interventions:  - Offer appropriate PRN medication and supervise ingestion; conduct AIMS, as needed   Outcome: Progressing  Goal: Recognize dysfunctional thoughts, communicate reality-based thoughts at the time of discharge  Description: Interventions:  - Provide medication and psycho-education to assist patient in compliance and developing insight into his/her illness   Outcome: Progressing     Problem: Depression  Goal: Treatment Goal: Demonstrate behavioral control of depressive symptoms, verbalize feelings of improved mood/affect, and adopt new coping skills prior to discharge  Outcome: Progressing  Goal: Refrain from harming self  Description: Interventions:  - Monitor patient closely, per order   - Supervise medication ingestion, monitor effects and side effects   Outcome: Progressing  Goal: Refrain from self-neglect  Outcome: Progressing  Goal: Attend and participate in unit  activities, including therapeutic, recreational, and educational groups  Description: Interventions:  - Provide therapeutic and educational activities daily, encourage attendance and participation, and document same in the medical record   Outcome: Progressing     Problem: DISCHARGE PLANNING - CARE MANAGEMENT  Goal: Discharge to post-acute care or home with appropriate resources  Description: INTERVENTIONS:  - Conduct assessment to determine patient/family and health care team treatment goals, and need for post-acute services based on payer coverage, community resources, and patient preferences, and barriers to discharge  - Address psychosocial, clinical, and financial barriers to discharge as identified in assessment in conjunction with the patient/family and health care team  - Arrange appropriate level of post-acute services according to patient’s   needs and preference and payer coverage in collaboration with the physician and health care team  - Communicate with and update the patient/family, physician, and health care team regarding progress on the discharge plan  - Arrange appropriate transportation to post-acute venues  Outcome: Progressing     Problem: Ineffective Coping  Goal: Participates in unit activities  Description: Interventions:  - Provide therapeutic environment   - Provide required programming   - Redirect inappropriate behaviors   Outcome: Progressing

## 2025-01-19 NOTE — NURSING NOTE
Reassessed prn atarax 100mg given @ 1022 for severe anxiety and found to be only somewhat effective. Family medicine on the floor to assess pt for c/o chest pain/tightness. Pt reports being anxious about an upcoming court hearing on 1/27. Informed pt this information will be passed along in trx team and it can be rescheduled if she is still admitted. Assisted pt to use her phone to check her bank account as bills are due soon, phone placed back in pt belongings. Pt remains visible but withdrawn to herself throughout the day. Denies any unmet needs or complaints at this time.

## 2025-01-19 NOTE — NURSING NOTE
"Patient has been in the milieu all evening taking part in coloring in a small group and interacting with peer.  She received 100 mg Atarax po prn anxiety at 2109 and she said the anxiety is because she has been making the effort to be out of her room and taking part in the milieu \"I've been feeling anxious but I've been trying to push through.\" Atarax assessed at this time and she reports it decreased her anxiety.  She denies S.I.H.I.A/H V/H  "

## 2025-01-19 NOTE — PROGRESS NOTES
Progress Note - Behavioral Health   Name: Geno Lopez 39 y.o. female I MRN: [id number]  Unit/Bed#: -01 I Date of Admission: 1/11/2025   Date of Service: 1/19/2025 I Hospital Day: 8     Assessment & Plan  Unspecified mood disorder (rule out MDD vs bipolar disorder)  Patient has a history of MDD versus bipolar disorder  Patient was being treated in the [facility] program for mood with Vraylar 3 mg once daily, Wellbutrin  mg once daily, Topamax 100 mg twice daily, duloxetine DR 30 mg once daily. Secondary to patient's lack of cooperativity during initial evaluation, difficult to obtain history and ROS to distinguish whether or not patient needs to be treated for unipolar depression or bipolar. At this time patient continues to report depression and anxiety and continues to look depressed.  Will make medication adjustments to better target her symptoms.     PLAN:  Continue to evaluate the patient for MDD versus bipolar disorder  Continue Vraylar 4.5 mg once daily for mood stabilization  Continue duloxetine DR 60 mg once daily for depressive symptoms and anxiety symptoms  Continue home Topamax 100 mg twice daily for mood stabilization  Wellbutrin discontinued 1/18   Continue Gabapentin 300mg BID TID for anxiety  Opioid use disorder, moderate, in early remission, on maintenance therapy, dependence (HCC)  Patient has a history of opioid use disorder in early remission on MAT  Patient was being treated in the [facility] program for opioid use disorder with Suboxone 2 mg once daily  Dose of Suboxone was confirmed by patient's outpatient pharmacy, [facility], and PDMP    PLAN:  Continue patient's home Suboxone at 2 mg sublingual once daily for opioid dependence    Generalized anxiety disorder  Patient has a history per record review generalized anxiety disorder  Patient was being treated in the [facility] program for SHAILA with duloxetine 30 mg once daily,  Ativan 1 mg PRN as needed for anxiety    PLAN:  Continue Duloxetine  "60mg PO for anxiety and depression symptoms  Continue inpatient Ativan PRN orders for anxiety   Continue inpatient atarax PRN orders for anxiety   Other specified attention deficit hyperactivity disorder (ADHD)  Per record review patient has a history of ADHD  Patient was being treated in the [facility] program for ADHD with Strattera 18 mg once daily  Patient told nursing that she does not want to restart Strattera at this time    PLAN:  Do not restart home Strattera while admitted to inpatient psychiatric unit  Insomnia  Patient has a history of insomnia  Patient was being treated in the [facility] for insomnia with trazodone 50 mg nightly    PLAN:  Continue patient's home trazodone 50 mg nightly for insomnia, consider changing to low dose mirtazapine if poor appetite and insomnia persist  Tobacco use  Patient has a recent history of daily vaping which she says she stopped approximately 4 days ago secondary to fear that \"street drugs\" were in her vape.  Patient was being treated in the [facility] program for tobacco cessation with Wellbutrin    PLAN:  Wellbutrin tapered off 1/18 given concern for increased anxiety  Other specified hypothyroidism  Per medical  Vitamin D insufficiency  Per medical  Foul smelling urine  Completed course of Nitrofurantoin       Subjective: All documentation including nursing notes, medication history to ensure medication adherence on the unit, labs, and vitals were reviewed. Geno was evaluated this morning for continuity of care and no acute distress noted throughout the evaluation. Over the past 24 hours per nursing report, Geno has been cooperative on the unit and compliant with medications. On evening shift per nursing, patient was noted to be in the milieu, participating in small group, interacting with select peers. She did receive Propanolol at 0826 and 1754 for restlessness which was noted to be effective. She did also require Atarax for anxiety at 2109 which was noted to be " "effective. No acute events overnight.     Today, Geno is consenting for safety on the unit. Geno reports feeling \"anxious.\" States she is upset today as two of her daughters have birthdays this upcoming week. States she does not have contact with her children and states \"I have just been thinking about my life. I feel 10/10 anxiety when I do\". Geno notes having adequate sleep. Geno states having a decreased appetite, however states she is trying to eat all her meals. Geno has been taking the medications as prescribed and reporting no side effects. Patient does ask this writer about possibly adding a scheduled benzodiazepine to medication regimen. Patient is educated on risks/addiction potential of benzodiazepines and advised that they are not indicated at this time.     Geno denies suicidal ideations. Geno denies homicidal ideations. Regarding hallucinations, Geno denies auditory or visual hallucinations.     This AM, patient reported to nursing staff and to this writer that she was experiencing chest pain/tightness. Reports chest pain began few days ago, however worsened today and felt \"different\". She denied radiation of pain, diaphoresis, nausea/vomiting, other associated complaints.She did endorse severe anxiety during this time. Vitals reviewed, AVSS. EKG was ordered, however patient refused to complete EKG. Patient also stated she would not complete any bloodwork at this time. Patient was evaluated by FM service in afternoon, at which time symptoms completely resolved.     PRNs overnight: Atarax, Propanolol    VS: Reviewed, within normal limits    Progress Toward Goals: slow improvement    Psychiatric Review of Systems:  Behavior over the last 24 hours:  unchanged  Sleep: normal  Appetite: decreased  Medication side effects: No   ROS: no complaints    Vital signs in last 24 hours:  Temp:  [98.2 °F (36.8 °C)] 98.2 °F (36.8 °C)  HR:  [86-92] 86  BP: (109-119)/(63-70) " "112/70  Resp:  [16] 16  SpO2:  [96 %-100 %] 100 %  O2 Device: None (Room air)    Laboratory results:  I have personally reviewed all pertinent laboratory/tests results.  No results found for this or any previous visit (from the past 48 hours).      Mental Status Evaluation:    Appearance:  casually dressed, marginal hygiene, looks older than stated age   Behavior:  cooperative, guarded   Speech:  normal rate, scant, soft   Mood:  \"anxious\"   Affect:  mood-congruent   Thought Process:  linear, negative thinking   Associations: concrete associations   Thought Content:  no overt delusions   Perceptual Disturbances: Denies auditory or visual hallucinations and Does not appear to be responding to internal stimuli   Risk Potential: Suicidal ideation - None at present, contracts for safety on the unit, would talk to staff if not feeling safe on the unit  Homicidal ideation - None at present  Potential for aggression - Not at present   Sensorium:  oriented to person, place, and time/date   Memory:  recent and remote memory grossly intact   Consciousness:  alert and awake   Attention/Concentration: attention span and concentration are age appropriate   Insight:  limited   Judgment: limited   Gait/Station: normal gait/station   Motor Activity: no abnormal movements       Current Medications:  Current Facility-Administered Medications   Medication Dose Route Frequency Provider Last Rate    albuterol  2 puff Inhalation Q4H PRN Desmond Piper MD      aluminum-magnesium hydroxide-simethicone  30 mL Oral Q4H PRN Sarah Houston MD      atorvastatin  40 mg Oral Daily With Dinner Lazara Thompson MD      haloperidol lactate  2.5 mg Intramuscular Q4H PRN Max 4/day Sarah Houston MD      And    LORazepam  1 mg Intramuscular Q4H PRN Max 4/day Sarah Houston MD      And    benztropine  0.5 mg Intramuscular Q4H PRN Max 4/day Sarah Houston MD      haloperidol lactate  5 mg Intramuscular Q4H PRN Max 4/day Sarah Houston MD   "    And    LORazepam  2 mg Intramuscular Q4H PRN Max 4/day Sarah Houston MD      And    benztropine  1 mg Intramuscular Q4H PRN Max 4/day Sarah Houston MD      benztropine  1 mg Intramuscular Q4H PRN Max 6/day Sarah Houston MD      benztropine  1 mg Oral Q4H PRN Max 6/day Sarah Houston MD      bisacodyl  10 mg Rectal Daily PRN Sarah Houston MD      buprenorphine-naloxone  2 mg Sublingual Daily Meche Ponce DO      cariprazine  4.5 mg Oral Daily Nitish Crouch MD      cholecalciferol  1,000 Units Oral Daily Lazara Thompson MD      cyclobenzaprine  10 mg Oral TID PRN Nitish Crouch MD      hydrOXYzine HCL  50 mg Oral Q6H PRN Max 4/day Sarah Houston MD      Or    diphenhydrAMINE  50 mg Intramuscular Q6H PRN Sarah Houston MD      DULoxetine  60 mg Oral Daily CHRISTOPHER Harmon      gabapentin  300 mg Oral TID Nitish Crouch MD      haloperidol  1 mg Oral Q6H PRN Sarah Houston MD      haloperidol  2.5 mg Oral Q4H PRN Max 4/day Sarah Houston MD      haloperidol  5 mg Oral Q4H PRN Max 4/day Sarah Houston MD      hydrOXYzine HCL  100 mg Oral Q6H PRN Max 4/day Sarah Houston MD      Or    LORazepam  2 mg Intramuscular Q6H PRN Sarah Houston MD      hydrOXYzine HCL  25 mg Oral Q6H PRN Max 4/day Sarah Houston MD      ibuprofen  400 mg Oral Q4H PRN Sarah Houston MD      ibuprofen  600 mg Oral Q6H PRN Sarah Houston MD      ibuprofen  800 mg Oral Q8H PRN Sarah Houston MD      levothyroxine  50 mcg Oral Early Morning Lazara Thompson MD      melatonin  3 mg Oral HS PRN Meche Ponce DO      nicotine polacrilex  4 mg Oral Q2H PRN Sarah Houston MD      polyethylene glycol  17 g Oral Daily PRN Sarah Houston MD      polyethylene glycol  17 g Oral Daily CHRISTOPHER Lees      propranolol  10 mg Oral Q8H PRN Jen Barnett DO      senna  1 tablet Oral HS CHRISTOPHER Lees      senna-docusate sodium  1 tablet Oral Daily PRN  Sarah Housotn MD      topiramate  100 mg Oral BID Meche Ponce DO      traZODone  50 mg Oral HS Meche Ponce DO         Behavioral Health Medications: All current active meds have been reviewed. Changes as in plan section above.  Risks, benefits and possible side effects of Medications:   Risks, benefits, and possible side effects of medications explained to patient and patient verbalizes understanding.      Counseling / Coordination of Care:  Patient's progress discussed with staff in treatment team meeting.  Medications, treatment progress and treatment plan reviewed with patient.    Jen Barnett DO  Psychiatry Resident, PGY-2    This note was completed in part utilizing Dragon dictation Software. Grammatical, translation, syntax errors, random word insertions, spelling mistakes, and incomplete sentences may be an occasional consequence of this system secondary to software limitations with voice recognition, ambient noise, and hardware issues. If you have any questions or concerns about the content, text, or information contained within the body of this dictation, please contact the provider for clarification.

## 2025-01-19 NOTE — NURSING NOTE
100mg of atarax PRN PO given for severe anxiety. Patient approached nurse's station requesting medication for anxiety. Patient is very fidgety, difficulty maintaining eye contact, and reports feelings of restlessness, appears scared.

## 2025-01-19 NOTE — NURSING NOTE
Pt reporting chest pain/tightness this morning with c/o severe anxiety. Given propanolol 10mg @ 0806 per her preference as it works better for her anxiety. VS taken and SLIM made aware via EPIC secure chat about complaints of chest discomfort. Reassessed @ 0906 and pt no longer complaining of anxiety/chest tightness.

## 2025-01-20 PROCEDURE — 99232 SBSQ HOSP IP/OBS MODERATE 35: CPT | Performed by: PSYCHIATRY & NEUROLOGY

## 2025-01-20 RX ORDER — GABAPENTIN 400 MG/1
400 CAPSULE ORAL 3 TIMES DAILY
Status: DISCONTINUED | OUTPATIENT
Start: 2025-01-20 | End: 2025-01-21

## 2025-01-20 RX ADMIN — GABAPENTIN 400 MG: 400 CAPSULE ORAL at 21:11

## 2025-01-20 RX ADMIN — CYCLOBENZAPRINE HYDROCHLORIDE 10 MG: 10 TABLET, FILM COATED ORAL at 21:14

## 2025-01-20 RX ADMIN — HYDROXYZINE HYDROCHLORIDE 100 MG: 50 TABLET, FILM COATED ORAL at 05:03

## 2025-01-20 RX ADMIN — BUPRENORPHINE AND NALOXONE 2 MG: 2; .5 FILM BUCCAL; SUBLINGUAL at 08:22

## 2025-01-20 RX ADMIN — GABAPENTIN 300 MG: 300 CAPSULE ORAL at 08:22

## 2025-01-20 RX ADMIN — TRAZODONE HYDROCHLORIDE 50 MG: 50 TABLET ORAL at 21:11

## 2025-01-20 RX ADMIN — ATORVASTATIN CALCIUM 40 MG: 40 TABLET, FILM COATED ORAL at 16:12

## 2025-01-20 RX ADMIN — SENNOSIDES 8.6 MG: 8.6 TABLET, FILM COATED ORAL at 21:11

## 2025-01-20 RX ADMIN — GABAPENTIN 400 MG: 400 CAPSULE ORAL at 16:12

## 2025-01-20 RX ADMIN — MELATONIN TAB 3 MG 3 MG: 3 TAB at 00:05

## 2025-01-20 RX ADMIN — HALOPERIDOL 5 MG: 5 TABLET ORAL at 17:14

## 2025-01-20 RX ADMIN — TOPIRAMATE 100 MG: 100 TABLET, FILM COATED ORAL at 08:22

## 2025-01-20 RX ADMIN — Medication 1000 UNITS: at 08:22

## 2025-01-20 RX ADMIN — LEVOTHYROXINE SODIUM 50 MCG: 0.05 TABLET ORAL at 05:03

## 2025-01-20 RX ADMIN — CARIPRAZINE 4.5 MG: 4.5 CAPSULE, GELATIN COATED ORAL at 08:22

## 2025-01-20 RX ADMIN — DULOXETINE HYDROCHLORIDE 60 MG: 60 CAPSULE, DELAYED RELEASE ORAL at 08:22

## 2025-01-20 RX ADMIN — PROPRANOLOL HYDROCHLORIDE 10 MG: 10 TABLET ORAL at 14:08

## 2025-01-20 RX ADMIN — CYCLOBENZAPRINE HYDROCHLORIDE 10 MG: 10 TABLET, FILM COATED ORAL at 05:03

## 2025-01-20 RX ADMIN — TOPIRAMATE 100 MG: 100 TABLET, FILM COATED ORAL at 17:10

## 2025-01-20 RX ADMIN — HYDROXYZINE HYDROCHLORIDE 100 MG: 50 TABLET, FILM COATED ORAL at 11:46

## 2025-01-20 NOTE — ASSESSMENT & PLAN NOTE
Patient has a history per record review generalized anxiety disorder  Patient was being treated in the [facility] for SHAILA with duloxetine 30 mg once daily,  Ativan 1 mg PRN as needed for anxiety    PLAN:  Duloxetine 90 mg PO for anxiety and depression symptoms  Continue inpatient atarax PRN orders for anxiety   Cont propranolol 10 mg PRN Q8 for anxiety

## 2025-01-20 NOTE — QUICK NOTE
[facility]

## 2025-01-20 NOTE — NURSING NOTE
Pt came to nurses station requesting something for anxiety and her muscle discomfort. PRN Atarax 100mg and Flexeril 10mg administered po at 0503.

## 2025-01-20 NOTE — NURSING NOTE
"Patient appears paranoid/suspicious during interaction. States that she doesn't feel safe here one the unit because staff is violating her \"HIPAA\" rights. When asked how she feels staff is doing that, patient stated that she keeps hearing everyone saying her name. \" I hear my name all the time, even when I'm in the bathroom\". Refused her scheduled Miralax this morning despite reporting that she hasn't had a bowel movement. \" I just don't feel comfortable taking it\". Patient would also not report to writer when her last bowel movement was. Compliant with all other morning medications.   "

## 2025-01-20 NOTE — PROGRESS NOTES
Progress Note - Behavioral Health   Name: Geno Lopez 39 y.o. female I MRN: [id number]  Unit/Bed#: -01 I Date of Admission: 1/11/2025   Date of Service: 1/20/2025 I Hospital Day: 9     Assessment & Plan  Unspecified mood disorder (rule out MDD vs bipolar disorder)  Patient has a history of MDD versus bipolar disorder  Patient was being [facility] the [facility] for mood with Vraylar 3 mg once daily, Wellbutrin  mg once daily, Topamax 100 mg twice daily, duloxetine DR 30 mg once daily. Secondary to patient's lack of cooperativity during initial evaluation, difficult to obtain history and ROS to distinguish whether or not patient needs to be treated for unipolar depression or bipolar.    At this time patient continues to endorse anxiety.  I am now more leaning towards a diagnosis of major depressive disorder versus bipolar given lack of expressed prior manic episodes as documented in previous notes.  Will increase patient's Cymbalta as well as gabapentin to help better treat her anxiety.  Will consider partial program upon discharge hopefully by end of the week. Cont to monitor pt for retention.    Was made aware by nursing that pt has signed 72 hr notice at 1008 today    PLAN:  Continue Vraylar 4.5 mg once daily for mood stabilization  Continue duloxetine DR 90 mg once daily for depressive symptoms and anxiety symptoms  Continue home Topamax 100 mg twice daily for mood stabilization  Wellbutrin discontinued 1/18   Gabapentin 400 mg BID TID for anxiety  Opioid use disorder, moderate, in early remission, on maintenance therapy, dependence (HCC)  Patient has a history of opioid use disorder in early remission on MAT  Patient was being treated in the [facility] for opioid use disorder with Suboxone 2 mg once daily  Dose of Suboxone was confirmed by patient's outpatient pharmacy, [facility], and PDMP    PLAN:  Continue patient's home Suboxone at 2 mg sublingual once daily for opioid  "dependence    Generalized anxiety disorder  Patient has a history per record review generalized anxiety disorder  Patient was being treated in the [facility] for SHAILA with duloxetine 30 mg once daily,  Ativan 1 mg PRN as needed for anxiety    PLAN:  Duloxetine 90 mg PO for anxiety and depression symptoms  Continue inpatient atarax PRN orders for anxiety   Cont propranolol 10 mg PRN Q8 for anxiety   Other specified attention deficit hyperactivity disorder (ADHD)  Per record review patient has a history of ADHD  Patient was being treated in the [facility] for ADHD with Strattera 18 mg once daily  Patient told nursing that she does not want to restart Strattera at this time    PLAN:  Do not restart home Strattera while admitted to inpatient psychiatric unit  Insomnia  Patient has a history of insomnia  Patient was being treated in the [facility] for insomnia with trazodone 50 mg nightly    PLAN:  Continue patient's home trazodone 50 mg nightly for insomnia, consider changing to low dose mirtazapine if poor appetite and insomnia persist  Tobacco use  Patient has a recent history of daily vaping which she says she stopped approximately 4 days ago secondary to fear that \"street drugs\" were in her vape.  Patient was being treated in the [facility] program for tobacco cessation with Wellbutrin    PLAN:  Wellbutrin tapered off 1/18 given concern for increased anxiety  Other specified hypothyroidism  Per medical  Vitamin D insufficiency  Per medical  Foul smelling urine  Completed course of Nitrofurantoin       Plan     Recommended Treatment:    - Encourage early mobility and having a structured day  - Provide frequent re-orientation, and cognitive stimulation  - Ensure assistive devices are in proper working order (eye-glasses, hearing aids)  - Encourage adequate hydration, nutrition and monitor bowel movements  - Maintain sleep-wake cycle: Uninterrupted sleep time; low-level lighting at night  - Fall precaution  - f/u " SLIM recs regarding the medical problems   - Continue medication titration and treatment plan; adjust medication to optimize treatment response and as clinically indicated. .   - Observation: routine            - VS: as per unit protocol  - Diet: Regular diet  - Encourage group attendance and milieu therapy  - Dispo: To be determined     Scheduled medications:  Current Facility-Administered Medications   Medication Dose Route Frequency Provider Last Rate    albuterol  2 puff Inhalation Q4H PRN Desmond Piper MD      aluminum-magnesium hydroxide-simethicone  30 mL Oral Q4H PRN Sarah Houston MD      atorvastatin  40 mg Oral Daily With Dinner Lazara Thompson MD      haloperidol lactate  2.5 mg Intramuscular Q4H PRN Max 4/day Sarah Houston MD      And    LORazepam  1 mg Intramuscular Q4H PRN Max 4/day Sarah Houston MD      And    benztropine  0.5 mg Intramuscular Q4H PRN Max 4/day Sarah Houston MD      haloperidol lactate  5 mg Intramuscular Q4H PRN Max 4/day Sarah Houston MD      And    LORazepam  2 mg Intramuscular Q4H PRN Max 4/day Sarah Houston MD      And    benztropine  1 mg Intramuscular Q4H PRN Max 4/day Sarah Houston MD      benztropine  1 mg Intramuscular Q4H PRN Max 6/day Sarah Houston MD      benztropine  1 mg Oral Q4H PRN Max 6/day Sarah Houston MD      bisacodyl  10 mg Rectal Daily PRN Sarah Houston MD      buprenorphine-naloxone  2 mg Sublingual Daily Meche Ponce DO      cariprazine  4.5 mg Oral Daily Nitish Crouch MD      cholecalciferol  1,000 Units Oral Daily Lazara Thompson MD      cyclobenzaprine  10 mg Oral TID PRN Nitish Crouch MD      hydrOXYzine HCL  50 mg Oral Q6H PRN Max 4/day Sarah Houston MD      Or    diphenhydrAMINE  50 mg Intramuscular Q6H PRN Sarah Houston MD      DULoxetine  60 mg Oral Daily CHRISTOPHER Harmon      gabapentin  300 mg Oral TID Nitish Crouch MD      haloperidol  1 mg Oral Q6H PRN Sarah Houston MD       haloperidol  2.5 mg Oral Q4H PRN Max 4/day Sarah Houston MD      haloperidol  5 mg Oral Q4H PRN Max 4/day Sarah Houston MD      hydrOXYzine HCL  100 mg Oral Q6H PRN Max 4/day Sarah Houston MD      Or    LORazepam  2 mg Intramuscular Q6H PRN Sarah Houston MD      hydrOXYzine HCL  25 mg Oral Q6H PRN Max 4/day Sarah Houston MD      ibuprofen  400 mg Oral Q4H PRN Sarah Houston MD      ibuprofen  600 mg Oral Q6H PRN Sarah Houston MD      ibuprofen  800 mg Oral Q8H PRN Sarah Houston MD      levothyroxine  50 mcg Oral Early Morning Lazara Thompson MD      melatonin  3 mg Oral HS PRN Meche Ponce, DO      nicotine polacrilex  4 mg Oral Q2H PRN Sarah Houston MD      polyethylene glycol  17 g Oral Daily PRN Sarah Houston MD      polyethylene glycol  17 g Oral Daily CHRISTOPHER Lees      propranolol  10 mg Oral Q8H PRN Jen Barnett, DO      senna  1 tablet Oral HS CHRISTOPHER Lees      senna-docusate sodium  1 tablet Oral Daily PRN Sarah Houston MD      topiramate  100 mg Oral BID Meche Ponce, DO      traZODone  50 mg Oral HS Meche Ponce, DO        PRN:    albuterol    aluminum-magnesium hydroxide-simethicone    haloperidol lactate **AND** LORazepam **AND** benztropine    haloperidol lactate **AND** LORazepam **AND** benztropine    benztropine    benztropine    bisacodyl    cyclobenzaprine    hydrOXYzine HCL **OR** diphenhydrAMINE    haloperidol    haloperidol    haloperidol    hydrOXYzine HCL **OR** LORazepam    hydrOXYzine HCL    ibuprofen    ibuprofen    ibuprofen    melatonin    nicotine polacrilex    polyethylene glycol    propranolol    senna-docusate sodium       Subjective     Patient was visited on unit for continuing care; chart reviewed and discussed with multidisciplinary treatment team.  On approach, the patient was calm and cooperative. Observed eating breakfast and no reports from nursing that pt is refusing food.  Patient  states that she continues to have significant anxiety and has not noticed very much improvement.  When I point out that patient has been more interactive on the unit, going to groups and better groomed now wearing her own street clothes, patient minimizes or downplays these gains.  She is amenable to further adjustment of her medications namely increasing Cymbalta and gabapentin.  Patient also notes that she feels uncomfortable using the bathroom here due to anxiety.  I informed the patient that she should continue to try to use the restroom and that there is a restroom at the end of the zuniga that she can utilize if she does not feel comfortable using the restroom and her own room.  Patient continues to express hesitancy using the bathroom (refused her miralax this AM and expresses that she would feel more comfortable using her bathroom at home) and I informed the patient that retaining will have significant consequences on her physical health and could potentially further delay discharge which she remains fixated on.  Discussed with patient if she feels safe at home given her declining wanting to talk to her fiancé and not signing an ALOK for him.  Patient states that she does feel safe at home but does not tell me why she has refused talking with her fiancé.  She states that she is unaware that she had to sign an ALOK for him but states that she will sign 1 today with social work.  No problem initiating and maintaining sleep.  Denied A/VH currently.  Denied SI/HI, intent or plan upon direct inquiry at this time.    Patient continues to be selectively interactive with staff and peers. No reports of aggression or self-injurious behavior on unit.  Received multiple doses of Atarax and Inderal has PRNs for anxiety over the weekend.    Patient accepted all offered medications and no adverse effects of medications noted or reported.    Objective    Current Mental Status Evaluation:  Mental Status Exam  Appearance: well  "kempt  Motor: no psychomotor agitation  Behavior: superficially cooperative, calm, limited eye contact  Speech: normal rate, rhythm, and volume  Mood: \"anxious\"  Affect: mood-congruent, anxious appearing  Thought Process: organized and linear  Thought Content: denies delusions or paranoia  Risk Potential: denies suicidal ideation, plan, or intent. Denies homicidal ideation  Perceptions: denies auditory hallucinations, denies visual hallucinations, no IP/RIS  Sensorium: Oriented to person, place, time, and situation  Cognition: cognitive ability appears intact but was not quantitatively tested  Consciousness: alert and awake  Attention: currently intact  Insight: limited  Judgement: limited            Vital signs in last 24 hours:    Temp:  [96.9 °F (36.1 °C)-97.6 °F (36.4 °C)] 96.9 °F (36.1 °C)  HR:  [75-86] 75  BP: (103-106)/(60-65) 104/60  Resp:  [16] 16  SpO2:  [96 %-100 %] 96 %  O2 Device: None (Room air)    Psychiatric Review of Systems:  Medication adverse effects: none  Sleep: unchanged  Appetite: unchanged  Behaviors over the past 24 hours: unchanged    Laboratory results:    I have personally reviewed all pertinent laboratory/tests results  No results found for this or any previous visit (from the past 48 hours).       Progress Toward Goals & Illness Status:   progressing, attends groups slightly more often, mood is stabilizing, working on coping skills    Patient is not at goal. They are not yet ready for discharge. The patient's condition currently requires active psychopharmacological medication management, interdisciplinary coordination with case management, and the utilization of adjunctive milieu and group therapy to augment psychopharmacological efficacy. The patient's risk of morbidity, and progression or decompensation of psychiatric disease, is higher without this current treatment.     Next of Kin  Extended Emergency Contact Information  Primary Emergency Contact: Cadence Lopez   [facility]" [place]  Mobile Phone: [phone]  Relation: Mother    Counseling / Coordination of Care  Patient's progress discussed with staff in treatment team meeting.  Medications, treatment progress and treatment plan reviewed with patient.  Medication education provided to patient.  Educated on importance of medication and treatment compliance.  Supportive therapy provided to patient.  Cognitive techniques utilized during the session.  Reassurance and supportive therapy provided.  Reoriented to reality and reassured.  Encouraged participation in milieu and group therapy on the unit.  Crisis/safety plan discussed with patient.       Nitish Crouch MD  Attending Psychiatrist   [facility]      This note has been constructed using a voice recognition system. There may be translation, syntax, or grammatical errors. If you have any questions, please contact the dictating provider.

## 2025-01-20 NOTE — NURSING NOTE
Pt came to the nurses station with c/o difficulty sleeping. PRN Melatonin 3mg administered po at 0005.

## 2025-01-20 NOTE — PLAN OF CARE
Problem: Alteration in Thoughts and Perception  Goal: Treatment Goal: Gain control of psychotic behaviors/thinking, reduce/eliminate presenting symptoms and demonstrate improved reality functioning upon discharge  Outcome: Progressing  Goal: Verbalize thoughts and feelings  Description: Interventions:  - Promote a nonjudgmental and trusting relationship with the patient through active listening and therapeutic communication  - Assess patient's level of functioning, behavior and potential for risk  - Engage patient in 1 on 1 interactions  - Encourage patient to express fears, feelings, frustrations, and discuss symptoms    - [place] patient to reality, help patient recognize reality-based thinking   - Administer medications as ordered and assess for potential side effects  - Provide the patient education related to the signs and symptoms of the illness and desired effects of prescribed medications  Outcome: Progressing  Goal: Agree to be compliant with medication regime, as prescribed and report medication side effects  Description: Interventions:  - Offer appropriate PRN medication and supervise ingestion; conduct AIMS, as needed   Outcome: Progressing  Goal: Recognize dysfunctional thoughts, communicate reality-based thoughts at the time of discharge  Description: Interventions:  - Provide medication and psycho-education to assist patient in compliance and developing insight into his/her illness   Outcome: Progressing     Problem: Depression  Goal: Treatment Goal: Demonstrate behavioral control of depressive symptoms, verbalize feelings of improved mood/affect, and adopt new coping skills prior to discharge  Outcome: Progressing  Goal: Refrain from harming self  Description: Interventions:  - Monitor patient closely, per order   - Supervise medication ingestion, monitor effects and side effects   Outcome: Progressing  Goal: Refrain from self-neglect  Outcome: Progressing  Goal: Attend and participate in unit  activities, including therapeutic, recreational, and educational groups  Description: Interventions:  - Provide therapeutic and educational activities daily, encourage attendance and participation, and document same in the medical record   Outcome: Progressing     Problem: DISCHARGE PLANNING - CARE MANAGEMENT  Goal: Discharge to post-acute care or home with appropriate resources  Description: INTERVENTIONS:  - Conduct assessment to determine patient/family and health care team treatment goals, and need for post-acute services based on payer coverage, community resources, and patient preferences, and barriers to discharge  - Address psychosocial, clinical, and financial barriers to discharge as identified in assessment in conjunction with the patient/family and health care team  - Arrange appropriate level of post-acute services according to patient’s   needs and preference and payer coverage in collaboration with the physician and health care team  - Communicate with and update the patient/family, physician, and health care team regarding progress on the discharge plan  - Arrange appropriate transportation to post-acute venues  Outcome: Progressing     Problem: Ineffective Coping  Goal: Participates in unit activities  Description: Interventions:  - Provide therapeutic environment   - Provide required programming   - Redirect inappropriate behaviors   Outcome: Progressing

## 2025-01-20 NOTE — NURSING NOTE
Patient requested Inderal prn earlier this evening for anxiety but her vital signs did not meet parameters. She was offered and accepted Atarax 100mgs po prn at 2038 and when reassessed at 2138 she was asleep.

## 2025-01-20 NOTE — NURSING NOTE
Pt came up to nurses station c/o anxiety. HAM score 25. Pt given PRN atarax 100mg po. Will monitor for effectiveness.

## 2025-01-20 NOTE — ASSESSMENT & PLAN NOTE
Patient has a history of MDD versus bipolar disorder  Patient was being treated in the [facility] for mood with Vraylar 3 mg once daily, Wellbutrin  mg once daily, Topamax 100 mg twice daily, duloxetine DR 30 mg once daily. Secondary to patient's lack of cooperativity during initial evaluation, difficult to obtain history and ROS to distinguish whether or not patient needs to be treated for unipolar depression or bipolar.    At this time patient continues to endorse anxiety.  I am now more leaning towards a diagnosis of major depressive disorder versus bipolar given lack of expressed prior manic episodes as documented in previous notes.  Will increase patient's Cymbalta as well as gabapentin to help better treat her anxiety.  Will consider partial program upon discharge hopefully by end of the week. Cont to monitor pt for retention.    Was made aware by nursing that pt has signed 72 hr notice at 1008 today    PLAN:  Continue Vraylar 4.5 mg once daily for mood stabilization  Continue duloxetine DR 90 mg once daily for depressive symptoms and anxiety symptoms  Continue home Topamax 100 mg twice daily for mood stabilization  Wellbutrin discontinued 1/18   Gabapentin 400 mg BID TID for anxiety

## 2025-01-20 NOTE — PROGRESS NOTES
01/20/25 0837   Team Meeting   Meeting Type Daily Rounds   Team Members Present   Team Members Present Physician;Nurse;   Physician Team Member Miko   [facility]   [facility] Team Member Rena   Patient/Family Present   Patient Present No   Patient's Family Present No     Pt signed an ALOK for sister. Pt's 72 hour was rescinded. Pt was given Atarax for anxiety and that was affective. Pt's Fiance calls repeatedly and asking if there are males on the unit. Pt continues to decline an ALOK for Fiance. Pt received another PRN for Atarax. An EKG was order, Pt refused. Pt had difficulty sleeping last night and received melatonin. This morning Pt received Atarax for anxiety. Pt is fixated on discharge.

## 2025-01-21 LAB
ANION GAP SERPL CALCULATED.3IONS-SCNC: 8 MMOL/L (ref 4–13)
ATRIAL RATE: 81 BPM
ATRIAL RATE: 82 BPM
BUN SERPL-MCNC: 12 MG/DL (ref 5–25)
CALCIUM SERPL-MCNC: 9.5 MG/DL (ref 8.4–10.2)
CHLORIDE SERPL-SCNC: 103 MMOL/L (ref 96–108)
CO2 SERPL-SCNC: 32 MMOL/L (ref 21–32)
CREAT SERPL-MCNC: 1.01 MG/DL (ref 0.6–1.3)
GFR SERPL CREATININE-BSD FRML MDRD: 70 ML/MIN/1.73SQ M
GLUCOSE P FAST SERPL-MCNC: 94 MG/DL (ref 65–99)
GLUCOSE SERPL-MCNC: 94 MG/DL (ref 65–140)
P AXIS: 10 DEGREES
P AXIS: 58 DEGREES
POTASSIUM SERPL-SCNC: 3.7 MMOL/L (ref 3.5–5.3)
PR INTERVAL: 136 MS
PR INTERVAL: 140 MS
QRS AXIS: 51 DEGREES
QRS AXIS: 54 DEGREES
QRSD INTERVAL: 74 MS
QRSD INTERVAL: 74 MS
QT INTERVAL: 382 MS
QT INTERVAL: 392 MS
QTC INTERVAL: 444 MS
QTC INTERVAL: 458 MS
SODIUM SERPL-SCNC: 143 MMOL/L (ref 135–147)
T WAVE AXIS: -3 DEGREES
T WAVE AXIS: 4 DEGREES
VENTRICULAR RATE: 81 BPM
VENTRICULAR RATE: 82 BPM

## 2025-01-21 PROCEDURE — 93010 ELECTROCARDIOGRAM REPORT: CPT

## 2025-01-21 PROCEDURE — 80048 BASIC METABOLIC PNL TOTAL CA: CPT | Performed by: PSYCHIATRY & NEUROLOGY

## 2025-01-21 PROCEDURE — 99232 SBSQ HOSP IP/OBS MODERATE 35: CPT | Performed by: PSYCHIATRY & NEUROLOGY

## 2025-01-21 PROCEDURE — 93005 ELECTROCARDIOGRAM TRACING: CPT

## 2025-01-21 RX ORDER — GABAPENTIN 300 MG/1
600 CAPSULE ORAL 3 TIMES DAILY
Status: DISCONTINUED | OUTPATIENT
Start: 2025-01-21 | End: 2025-01-23 | Stop reason: HOSPADM

## 2025-01-21 RX ADMIN — BUPRENORPHINE AND NALOXONE 2 MG: 2; .5 FILM BUCCAL; SUBLINGUAL at 08:29

## 2025-01-21 RX ADMIN — HALOPERIDOL 5 MG: 5 TABLET ORAL at 06:00

## 2025-01-21 RX ADMIN — CYCLOBENZAPRINE HYDROCHLORIDE 10 MG: 10 TABLET, FILM COATED ORAL at 08:29

## 2025-01-21 RX ADMIN — HYDROXYZINE HYDROCHLORIDE 100 MG: 50 TABLET, FILM COATED ORAL at 17:24

## 2025-01-21 RX ADMIN — TRAZODONE HYDROCHLORIDE 50 MG: 50 TABLET ORAL at 21:13

## 2025-01-21 RX ADMIN — Medication 1000 UNITS: at 08:16

## 2025-01-21 RX ADMIN — GABAPENTIN 600 MG: 300 CAPSULE ORAL at 21:13

## 2025-01-21 RX ADMIN — DULOXETINE HYDROCHLORIDE 90 MG: 30 CAPSULE, DELAYED RELEASE ORAL at 08:17

## 2025-01-21 RX ADMIN — PROPRANOLOL HYDROCHLORIDE 10 MG: 10 TABLET ORAL at 00:35

## 2025-01-21 RX ADMIN — PROPRANOLOL HYDROCHLORIDE 10 MG: 10 TABLET ORAL at 13:15

## 2025-01-21 RX ADMIN — GABAPENTIN 400 MG: 400 CAPSULE ORAL at 08:17

## 2025-01-21 RX ADMIN — HYDROXYZINE HYDROCHLORIDE 100 MG: 50 TABLET, FILM COATED ORAL at 03:13

## 2025-01-21 RX ADMIN — GABAPENTIN 600 MG: 300 CAPSULE ORAL at 15:24

## 2025-01-21 RX ADMIN — LEVOTHYROXINE SODIUM 50 MCG: 0.05 TABLET ORAL at 05:56

## 2025-01-21 RX ADMIN — CARIPRAZINE 4.5 MG: 4.5 CAPSULE, GELATIN COATED ORAL at 08:16

## 2025-01-21 RX ADMIN — ATORVASTATIN CALCIUM 40 MG: 40 TABLET, FILM COATED ORAL at 17:24

## 2025-01-21 RX ADMIN — TOPIRAMATE 100 MG: 100 TABLET, FILM COATED ORAL at 17:24

## 2025-01-21 RX ADMIN — CYCLOBENZAPRINE HYDROCHLORIDE 10 MG: 10 TABLET, FILM COATED ORAL at 15:24

## 2025-01-21 RX ADMIN — SENNOSIDES 8.6 MG: 8.6 TABLET, FILM COATED ORAL at 21:13

## 2025-01-21 RX ADMIN — TOPIRAMATE 100 MG: 100 TABLET, FILM COATED ORAL at 08:17

## 2025-01-21 RX ADMIN — HYDROXYZINE HYDROCHLORIDE 50 MG: 50 TABLET, FILM COATED ORAL at 11:51

## 2025-01-21 NOTE — NURSING NOTE
Patient has been about the unit. Interacting with select peers. No irritability during interaction but continues to appear suspicious. Scant. Denies symptoms and the need to be here. Refused scheduled Miralax but compliant with all other meds.

## 2025-01-21 NOTE — SOCIAL WORK
"Cm spoke with Ahsan. Ahsan reported he has been able to speak with Pt on the phone. Ahsan reported that Pt sounds like her normal self. Ahsan reported Pt sounds \"a lot better than how she was when she came to the hospital\".   Ahsan reported that when Pt is doing well she cleans the house, cooks, and attends appointments regularly.   Ahsan reported that when she is not doing well when Pt does not consistently taking medication, and she reports that she is hearing people say her name and talk about her.   Ahsan reported that Pt is good to come home. Cm informed that Pt is being discharged at 10 am Thursday. Pt prefers medications to be filled here at  pharmacy and given to Pt upon discharge.   Cristian is afraid of Pt relapsing and has some concerns of Pt relapsing due to to the risky neighborhood. Cm informed Ahsan that Pt will have OP appointments set in place with [facility].   Ahsan reported he does have  knives but plans on putting them away in a secure area. Ahsan reported he does have fire arms in the home that are locked in a safe and Pt does not have the key or the combination.       "

## 2025-01-21 NOTE — NURSING NOTE
"Atarax 100mg PO given PRN at 0313 moderately effective. Pt states feeling \"a little better\" but continues to appear anxious and pace about unit. Safety checks ongoing.  "

## 2025-01-21 NOTE — SOCIAL WORK
Pt signed ALOK for Fiance. Pt is very fixated on discharge. Pt reported she cannot shower or use the bathroom in the hospital because she is not comfortable. Pt reported she does not like the medications she is on and she reported she is feeling very anxious, and depressed. Pt reported that she wants to go home. Pt reported she would like to return to the [facility] and resume her OP Tx. Pt reported she will be returning home and living with Fiance.

## 2025-01-21 NOTE — NURSING NOTE
"Pt continues anxious and becoming irritable and agitated. Pt with some paranoia. Pt requested more medication for anxiety and raises voice and asked, \"what am I supposed to do? I was on Ativan? They took me off of it.\" Pt offered Haldol and she questions if it is really haldol \"b/c it looked like suboxone when I got it yesterday. I'll take it if it works and is really Haldol\" Pt showed wrapper and accepted medication. Encouraged to ask to see wrappers if she needs to to feel safe taking meds.   "

## 2025-01-21 NOTE — PROGRESS NOTES
01/21/25 0833   Team Meeting   Meeting Type Daily Rounds   Team Members Present   Team Members Present Physician;Nurse;   Physician Team Member Miko   [facility]   Care Management Team Member Rena   Patient/Family Present   Patient Present No   Patient's Family Present No     Pt continues to refuse Miralax. Haldol PO and Propanalol for agitation and restlessness. Pt signed 72 hour notice yesterday. Pt signed ALOK for Fiance. Pt's discharge is pending Wednesday with PHP.

## 2025-01-21 NOTE — PROGRESS NOTES
Progress Note - Behavioral Health   Name: Geno Lopez 39 y.o. female I MRN: [id number]  Unit/Bed#: -01 I Date of Admission: 1/11/2025   Date of Service: 1/21/2025 I Hospital Day: 10     Assessment & Plan  Unspecified mood disorder (rule out MDD vs bipolar disorder)  Patient has a history of MDD versus bipolar disorder  Patient was [facility] for mood with Vraylar 3 mg once daily, Wellbutrin  mg once daily, Topamax 100 mg twice daily, duloxetine DR 30 mg once daily. Secondary to patient's lack of cooperativity during initial evaluation, difficult to obtain history and ROS to distinguish whether or not patient needs to be treated for unipolar depression or bipolar.    Patient signed 72-hour notice yesterday.  Patient indicates that she would prefer to resume normal outpatient with the [facility] and continue to see her psychiatrist and therapist there.  Will inform social work as plan had been to send referral for PHP. Patient still has anxiety and some low-level paranoia, she has been in behavioral control, she has gone to groups and has been seen engaging in the milieu, and demonstrates future oriented thinking and planning regarding her follow-up care.  No acute psych safety concerns expressed or exhibited.  At this time I do not have any grounds for 302 and will plan to make adjustment to gabapentin today and d/c Thursday prior to expiration of 72 hr notice.     PLAN:  Continue Vraylar 4.5 mg once daily for mood stabilization  Continue duloxetine DR 90 mg once daily for depressive symptoms and anxiety symptoms  Continue home Topamax 100 mg twice daily for mood stabilization  Wellbutrin discontinued 1/18   Gabapentin 600 mg TID for anxiety  SW to f/u w/ fiance today regarding d/c now that pt has signed ALOK  Opioid use disorder, moderate, in early remission, on maintenance therapy, dependence (HCC)  Patient has a history of opioid use disorder in early remission on  "MAT  Patient was being treated in the [facility] for opioid use disorder with Suboxone 2 mg once daily  Dose of Suboxone was confirmed by patient's outpatient pharmacy, [facility], and PDMP    PLAN:  Continue patient's home Suboxone at 2 mg sublingual once daily for opioid dependence    Generalized anxiety disorder  Patient has a history per record review generalized anxiety disorder  Patient was being treated in the [facility] for SHAILA with duloxetine 30 mg once daily,  Ativan 1 mg PRN as needed for anxiety    PLAN:  Duloxetine 90 mg PO for anxiety and depression symptoms  Continue inpatient atarax PRN orders for anxiety   Cont propranolol 10 mg PRN Q8 for anxiety   Other specified attention deficit hyperactivity disorder (ADHD)  Per record review patient has a history of ADHD  Patient was being treated in the [facility] for ADHD with Strattera 18 mg once daily  Patient told nursing that she does not want to restart Strattera at this time    PLAN:  Do not restart home Strattera while admitted to inpatient psychiatric unit  Insomnia  Patient has a history of insomnia  Patient was being treated in the [facility] for insomnia with trazodone 50 mg nightly    PLAN:  Continue patient's home trazodone 50 mg nightly for insomnia  Tobacco use  Patient has a recent history of daily vaping which she says she stopped approximately 4 days prior to admit secondary to fear that \"street drugs\" were in her vape.  Patient was being treated in the [facility] for tobacco cessation with Wellbutrin    PLAN:  Wellbutrin tapered off 1/18 given concern for increased anxiety  Other specified hypothyroidism  Per medical  Vitamin D insufficiency  Per medical  Foul smelling urine  Completed course of Nitrofurantoin       Plan     Recommended Treatment:    - Encourage early mobility and having a structured day  - Provide frequent re-orientation, and cognitive stimulation  - Ensure assistive devices are in proper working order " (eye-glasses, hearing aids)  - Encourage adequate hydration, nutrition and monitor bowel movements  - Maintain sleep-wake cycle: Uninterrupted sleep time; low-level lighting at night  - Fall precaution  - f/u [facility] recs regarding the medical problems   - Continue medication titration and treatment plan; adjust medication to optimize treatment response and as clinically indicated. .   - Observation: routine            - VS: as per unit protocol  - Diet: Regular diet  - Encourage group attendance and milieu therapy  - Dispo: To be determined     Scheduled medications:  Current Facility-Administered Medications   Medication Dose Route Frequency Provider Last Rate    albuterol  2 puff Inhalation Q4H PRN Desmond Piper MD      aluminum-magnesium hydroxide-simethicone  30 mL Oral Q4H PRN Sarah Houston MD      atorvastatin  40 mg Oral Daily With Dinner Lazara Thompson MD      haloperidol lactate  2.5 mg Intramuscular Q4H PRN Max 4/day Sarah oHuston MD      And    LORazepam  1 mg Intramuscular Q4H PRN Max 4/day Sarah Houston MD      And    benztropine  0.5 mg Intramuscular Q4H PRN Max 4/day Sarah Houston MD      haloperidol lactate  5 mg Intramuscular Q4H PRN Max 4/day Sarah Houston MD      And    LORazepam  2 mg Intramuscular Q4H PRN Max 4/day Sarah Houston MD      And    benztropine  1 mg Intramuscular Q4H PRN Max 4/day Sarah Houston MD      benztropine  1 mg Intramuscular Q4H PRN Max 6/day Sarah Houston MD      benztropine  1 mg Oral Q4H PRN Max 6/day Sarah Houston MD      bisacodyl  10 mg Rectal Daily PRN Sarah Houston MD      buprenorphine-naloxone  2 mg Sublingual Daily Meche Ponce DO      cariprazine  4.5 mg Oral Daily Nitish Crouch MD      cholecalciferol  1,000 Units Oral Daily Lazara Thompson MD      cyclobenzaprine  10 mg Oral TID PRN Nitish Crouch MD      hydrOXYzine HCL  50 mg Oral Q6H PRN Max 4/day Sarah Houston MD      Or    diphenhydrAMINE  50 mg  Intramuscular Q6H PRN Sarah Houston MD      DULoxetine  90 mg Oral Daily Nitish Crouch MD      gabapentin  600 mg Oral TID Nitish Crouch MD      haloperidol  1 mg Oral Q6H PRN Sarah Houston MD      haloperidol  2.5 mg Oral Q4H PRN Max 4/day Sarah Houston MD      haloperidol  5 mg Oral Q4H PRN Max 4/day Sarah Houston MD      hydrOXYzine HCL  100 mg Oral Q6H PRN Max 4/day Sarah Houston MD      Or    LORazepam  2 mg Intramuscular Q6H PRN Sarah Houston MD      hydrOXYzine HCL  25 mg Oral Q6H PRN Max 4/day Sarah Houston MD      ibuprofen  400 mg Oral Q4H PRN Sarah Houston MD      ibuprofen  600 mg Oral Q6H PRN Sarah Houston MD      ibuprofen  800 mg Oral Q8H PRN Sarah Houston MD      levothyroxine  50 mcg Oral Early Morning Lazara Thompson MD      melatonin  3 mg Oral HS PRN [facility], DO      nicotine polacrilex  4 mg Oral Q2H PRN Sarah Houston MD      polyethylene glycol  17 g Oral Daily PRN Sarah Houston MD      polyethylene glycol  17 g Oral Daily CHRISTOPHER Lees      propranolol  10 mg Oral Q8H PRN Jen Barnett, DO      senna  1 tablet Oral HS CHRISTOPHER Lees      senna-docusate sodium  1 tablet Oral Daily PRN Sarah Houston MD      topiramate  100 mg Oral BID Meche Ponce, DO      traZODone  50 mg Oral HS Meche Ponce, DO        PRN:    albuterol    aluminum-magnesium hydroxide-simethicone    haloperidol lactate **AND** LORazepam **AND** benztropine    haloperidol lactate **AND** LORazepam **AND** benztropine    benztropine    benztropine    bisacodyl    cyclobenzaprine    hydrOXYzine HCL **OR** diphenhydrAMINE    haloperidol    haloperidol    haloperidol    hydrOXYzine HCL **OR** LORazepam    hydrOXYzine HCL    ibuprofen    ibuprofen    ibuprofen    melatonin    nicotine polacrilex    polyethylene glycol    propranolol    senna-docusate sodium       Subjective     Patient was visited on unit for continuing care;  "chart reviewed and discussed with multidisciplinary treatment team.  On approach, the patient was calm and superficially cooperative with some irritability noted.  Patient continues to be focused on discharge.  She has signed a 72-hour notice.  She continues to endorse anxiety at this time which she is contributing to being here on the unit and having bills to pay at home.  In discussion with the patient, she is amenable to increase in gabapentin to 600 TID.  She continues to endorse not liking the Vraylar as it is \"doing the opposite of what it supposed to do\".  I discussed with the patient whether she would want to potentially switch to an alternative agent such as Risperdal given some of her endorsed paranoia.  However patient declines wanting to take anything different.  She expresses that at this time she would prefer to continue to follow-up outpatient with the [facility] and see the therapist and psychiatrist that she has been following w/.  Reported poor sleep last night but states has been sleeping well overall.  Denied A/VH currently.  Denied SI/HI, intent or plan upon direct inquiry at this time.    Patient continues to be selectively interactive with staff and peers. Was seen socializing in the milieu with patient's.  Has also been well-groomed and able to make her needs known.  No reports of aggression or self-injurious behavior on unit.  Received inderal, atarax and haldol for anxiety/agitation.    Patient accepted all offered medications and no adverse effects of medications noted or reported.    Objective    Current Mental Status Evaluation:  Appearance: well kempt  Motor: no psychomotor agitation  Behavior: superficially cooperative, somewhat guarded, fair eye contact  Speech: normal rate, rhythm, and volume  Mood: \"anxious\"  Affect: mood-congruent, blunted  Thought Process: organized and linear  Thought Content: denies delusions, some paranoia  Risk Potential: denies suicidal ideation, plan, or " intent. Denies homicidal ideation  Perceptions: denies auditory hallucinations, denies visual hallucinations, no IP/RIS  Sensorium: Oriented to person, place, time, and situation  Cognition: cognitive ability appears intact but was not quantitatively tested  Consciousness: alert and awake  Attention: currently intact  Insight: limited  Judgement: fair          Vital signs in last 24 hours:    Temp:  [97.2 °F (36.2 °C)-97.4 °F (36.3 °C)] 97.4 °F (36.3 °C)  HR:  [77-87] 78  BP: (102-122)/(61-73) 106/62  Resp:  [16] 16  SpO2:  [97 %-100 %] 98 %  O2 Device: None (Room air)    Psychiatric Review of Systems:  Medication adverse effects: none  Sleep: unchanged  Appetite: unchanged  Behaviors over the past 24 hours: unchanged    Laboratory results:    I have personally reviewed all pertinent laboratory/tests results  Recent Results (from the past 48 hours)   Basic metabolic panel    Collection Time: 01/21/25  5:32 AM   Result Value Ref Range    Sodium 143 135 - 147 mmol/L    Potassium 3.7 3.5 - 5.3 mmol/L    Chloride 103 96 - 108 mmol/L    CO2 32 21 - 32 mmol/L    ANION GAP 8 4 - 13 mmol/L    BUN 12 5 - 25 mg/dL    Creatinine 1.01 0.60 - 1.30 mg/dL    Glucose 94 65 - 140 mg/dL    Glucose, Fasting 94 65 - 99 mg/dL    Calcium 9.5 8.4 - 10.2 mg/dL    eGFR 70 ml/min/1.73sq m          Progress Toward Goals & Illness Status:   progressing, working on coping skills, discharge planning    Patient is not at goal. They are not yet ready for discharge. The patient's condition currently requires active psychopharmacological medication management, interdisciplinary coordination with case management, and the utilization of adjunctive milieu and group therapy to augment psychopharmacological efficacy. The patient's risk of morbidity, and progression or decompensation of psychiatric disease, is higher without this current treatment.     Next of Kin  Extended Emergency Contact Information  Primary Emergency Contact: Cadence Lopez  [facility]  Mobile Phone: [phone]  Relation: Mother    Counseling / Coordination of Care  Patient's progress discussed with staff in treatment team meeting.  Medications, treatment progress and treatment plan reviewed with patient.  Medication education provided to patient.  Educated on importance of medication and treatment compliance.  Supportive therapy provided to patient.  Cognitive techniques utilized during the session.  Reassurance and supportive therapy provided.  Reoriented to reality and reassured.  Encouraged participation in milieu and group therapy on the unit.  Crisis/safety plan discussed with patient.       Nitish Crouch MD  Attending Psychiatrist   [facility]      This note has been constructed using a voice recognition system. There may be translation, syntax, or grammatical errors. If you have any questions, please contact the dictating provider.

## 2025-01-21 NOTE — NURSING NOTE
"Patient is irritable and agitated upon approach. Patient stated, \"I just want the meds I brought in with me, and people keep calling me by the wrong name and I'm getting mad.\" Patient reassured. Patient remained in behavioral control. Patient denies Si/HI/AH/VH. Patient compliant with meds and meals.     1714- Patient reported severe agitation. Broset score 3. PRN Haldol 5 mg PO for severe agitation given.       1814- Patient visible on unit, socializing with peers. No signs of agitation. PRN Haldol 5 mg PO for severe agitation effective at this time.   "

## 2025-01-21 NOTE — NURSING NOTE
Patient c/o muscle spasms in her back. Given PRN of Flexeril 10 mg po. Will monitor effectiveness.

## 2025-01-21 NOTE — NURSING NOTE
2114- Patient reported muscle spasms. PRN Flexeril 10 mg PO tab given.     2214- Patient visible laying down. No signs of discomfort. PRN Flexeril 10 mg PO for muscle spasms, effective.

## 2025-01-21 NOTE — NURSING NOTE
Patient appears to be withdrawn,guarded and suspicious. Patient reports feeling anxious HAM score 28.Prn atarax 100 mg po given. Denies SI/HI/AH/VH at this time. Compliant  with medications and routine vitals. Patient had poor meal completion 0% for dinner.

## 2025-01-21 NOTE — NURSING NOTE
Patient expressed concerns about her belongings at her home as well as her bank account. Said that she doesn't trust the people who have access to them. Would not elaborate. C/o restlessness due to thinking about these things. Requested PRN. Given PRN of Propanolol 10 mg po. Will monitor effectiveness.

## 2025-01-21 NOTE — ASSESSMENT & PLAN NOTE
Patient has a history of insomnia  Patient was being treated in the [facility] for insomnia with trazodone 50 mg nightly    PLAN:  Continue patient's home trazodone 50 mg nightly for insomnia

## 2025-01-21 NOTE — ASSESSMENT & PLAN NOTE
"Patient has a recent history of daily vaping which she says she stopped approximately 4 days prior to admit secondary to fear that \"street drugs\" were in her vape.  Patient was being treated in the [facility] program for tobacco cessation with Wellbutrin    PLAN:  Wellbutrin tapered off 1/18 given concern for increased anxiety  "

## 2025-01-21 NOTE — PLAN OF CARE
Problem: Alteration in Thoughts and Perception  Goal: Treatment Goal: Gain control of psychotic behaviors/thinking, reduce/eliminate presenting symptoms and demonstrate improved reality functioning upon discharge  Outcome: Progressing  Goal: Verbalize thoughts and feelings  Description: Interventions:  - Promote a nonjudgmental and trusting relationship with the patient through active listening and therapeutic communication  - Assess patient's level of functioning, behavior and potential for risk  - Engage patient in 1 on 1 interactions  - Encourage patient to express fears, feelings, frustrations, and discuss symptoms    - [place] patient to reality, help patient recognize reality-based thinking   - Administer medications as ordered and assess for potential side effects  - Provide the patient education related to the signs and symptoms of the illness and desired effects of prescribed medications  Outcome: Progressing  Goal: Agree to be compliant with medication regime, as prescribed and report medication side effects  Description: Interventions:  - Offer appropriate PRN medication and supervise ingestion; conduct AIMS, as needed   Outcome: Progressing  Goal: Recognize dysfunctional thoughts, communicate reality-based thoughts at the time of discharge  Description: Interventions:  - Provide medication and psycho-education to assist patient in compliance and developing insight into his/her illness   Outcome: Progressing     Problem: Depression  Goal: Treatment Goal: Demonstrate behavioral control of depressive symptoms, verbalize feelings of improved mood/affect, and adopt new coping skills prior to discharge  Outcome: Progressing  Goal: Refrain from harming self  Description: Interventions:  - Monitor patient closely, per order   - Supervise medication ingestion, monitor effects and side effects   Outcome: Progressing  Goal: Refrain from self-neglect  Outcome: Progressing  Goal: Attend and participate in unit  activities, including therapeutic, recreational, and educational groups  Description: Interventions:  - Provide therapeutic and educational activities daily, encourage attendance and participation, and document same in the medical record   Outcome: Progressing     Problem: Ineffective Coping  Goal: Participates in unit activities  Description: Interventions:  - Provide therapeutic environment   - Provide required programming   - Redirect inappropriate behaviors   Outcome: Progressing

## 2025-01-21 NOTE — ASSESSMENT & PLAN NOTE
Patient has a history per record review generalized anxiety disorder  Patient was being treated in the [facility] for SHAILA with duloxetine 30 mg once daily,  Ativan 1 mg PRN as needed for anxiety    PLAN:  Duloxetine 90 mg PO for anxiety and depression symptoms  Continue inpatient atarax PRN orders for anxiety   Cont propranolol 10 mg PRN Q8 for anxiety    done

## 2025-01-21 NOTE — ASSESSMENT & PLAN NOTE
Patient has a history of MDD versus bipolar disorder  Patient was being treated in the [facility] for mood with Vraylar 3 mg once daily, Wellbutrin  mg once daily, Topamax 100 mg twice daily, duloxetine DR 30 mg once daily. Secondary to patient's lack of cooperativity during initial evaluation, difficult to obtain history and ROS to distinguish whether or not patient needs to be treated for unipolar depression or bipolar.    Patient signed 72-hour notice yesterday.  Patient indicates that she would prefer to resume normal outpatient with the [facility] program and continue to see her psychiatrist and therapist there.  Will inform social work as plan had been to send referral for PHP. Patient still has anxiety and some low-level paranoia, she has been in behavioral control, she has gone to groups and has been seen engaging in the milieu, and demonstrates future oriented thinking and planning regarding her follow-up care.  No acute psych safety concerns expressed or exhibited.  At this time I do not have any grounds for 302 and will plan to make adjustment to gabapentin today and d/c Thursday prior to expiration of 72 hr notice.     PLAN:  Continue Vraylar 4.5 mg once daily for mood stabilization  Continue duloxetine DR 90 mg once daily for depressive symptoms and anxiety symptoms  Continue home Topamax 100 mg twice daily for mood stabilization  Wellbutrin discontinued 1/18   Gabapentin 600 mg TID for anxiety  SW to f/u w/ shailesh today regarding d/c now that pt has signed ALOK

## 2025-01-21 NOTE — NURSING NOTE
Patient woke up feeling restless as was given inderal 10 po PRN at 0035. Will follow up for effectiveness.

## 2025-01-22 PROCEDURE — 99232 SBSQ HOSP IP/OBS MODERATE 35: CPT | Performed by: PSYCHIATRY & NEUROLOGY

## 2025-01-22 RX ORDER — BUPRENORPHINE AND NALOXONE 2; .5 MG/1; MG/1
2 FILM, SOLUBLE BUCCAL; SUBLINGUAL DAILY
Qty: 10 FILM | Refills: 0 | Status: SHIPPED | OUTPATIENT
Start: 2025-01-22 | End: 2025-02-01

## 2025-01-22 RX ORDER — LEVOTHYROXINE SODIUM 50 UG/1
50 TABLET ORAL
Qty: 30 TABLET | Refills: 0 | Status: SHIPPED | OUTPATIENT
Start: 2025-01-23

## 2025-01-22 RX ORDER — ACETAMINOPHEN 325 MG/1
975 TABLET ORAL EVERY 8 HOURS PRN
Status: DISCONTINUED | OUTPATIENT
Start: 2025-01-22 | End: 2025-01-22

## 2025-01-22 RX ORDER — ATORVASTATIN CALCIUM 40 MG/1
40 TABLET, FILM COATED ORAL
Qty: 30 TABLET | Refills: 0 | Status: SHIPPED | OUTPATIENT
Start: 2025-01-22

## 2025-01-22 RX ORDER — ACETAMINOPHEN 325 MG/1
975 TABLET ORAL EVERY 8 HOURS PRN
Status: DISCONTINUED | OUTPATIENT
Start: 2025-01-22 | End: 2025-01-23 | Stop reason: HOSPADM

## 2025-01-22 RX ORDER — POLYETHYLENE GLYCOL 3350 17 G/17G
17 POWDER, FOR SOLUTION ORAL DAILY
Qty: 510 G | Refills: 0 | Status: SHIPPED | OUTPATIENT
Start: 2025-01-23

## 2025-01-22 RX ORDER — DULOXETIN HYDROCHLORIDE 30 MG/1
90 CAPSULE, DELAYED RELEASE ORAL DAILY
Qty: 90 CAPSULE | Refills: 0 | Status: SHIPPED | OUTPATIENT
Start: 2025-01-23 | End: 2025-01-22

## 2025-01-22 RX ORDER — TOPIRAMATE 100 MG/1
100 TABLET, FILM COATED ORAL 2 TIMES DAILY
Qty: 60 TABLET | Refills: 0 | Status: SHIPPED | OUTPATIENT
Start: 2025-01-22 | End: 2025-02-21

## 2025-01-22 RX ORDER — GABAPENTIN 300 MG/1
600 CAPSULE ORAL 3 TIMES DAILY
Qty: 180 CAPSULE | Refills: 0 | Status: SHIPPED | OUTPATIENT
Start: 2025-01-22 | End: 2025-02-21

## 2025-01-22 RX ORDER — TRAZODONE HYDROCHLORIDE 50 MG/1
50 TABLET, FILM COATED ORAL
Qty: 30 TABLET | Refills: 0 | Status: SHIPPED | OUTPATIENT
Start: 2025-01-22 | End: 2025-02-21

## 2025-01-22 RX ORDER — DULOXETIN HYDROCHLORIDE 60 MG/1
60 CAPSULE, DELAYED RELEASE ORAL DAILY
Qty: 30 CAPSULE | Refills: 0 | Status: SHIPPED | OUTPATIENT
Start: 2025-01-22

## 2025-01-22 RX ORDER — PROPRANOLOL HYDROCHLORIDE 10 MG/1
10 TABLET ORAL 2 TIMES DAILY PRN
Qty: 14 TABLET | Refills: 0 | Status: SHIPPED | OUTPATIENT
Start: 2025-01-22

## 2025-01-22 RX ORDER — SENNOSIDES 8.6 MG
8.6 TABLET ORAL
Qty: 30 TABLET | Refills: 0 | Status: SHIPPED | OUTPATIENT
Start: 2025-01-22

## 2025-01-22 RX ORDER — DULOXETIN HYDROCHLORIDE 30 MG/1
30 CAPSULE, DELAYED RELEASE ORAL DAILY
Qty: 30 CAPSULE | Refills: 0 | Status: SHIPPED | OUTPATIENT
Start: 2025-01-23 | End: 2025-02-22

## 2025-01-22 RX ADMIN — BUPRENORPHINE AND NALOXONE 2 MG: 2; .5 FILM BUCCAL; SUBLINGUAL at 08:10

## 2025-01-22 RX ADMIN — GABAPENTIN 600 MG: 300 CAPSULE ORAL at 21:32

## 2025-01-22 RX ADMIN — TOPIRAMATE 100 MG: 100 TABLET, FILM COATED ORAL at 08:10

## 2025-01-22 RX ADMIN — Medication 1000 UNITS: at 08:10

## 2025-01-22 RX ADMIN — DULOXETINE HYDROCHLORIDE 90 MG: 30 CAPSULE, DELAYED RELEASE ORAL at 08:10

## 2025-01-22 RX ADMIN — CYCLOBENZAPRINE HYDROCHLORIDE 10 MG: 10 TABLET, FILM COATED ORAL at 07:30

## 2025-01-22 RX ADMIN — IBUPROFEN 600 MG: 600 TABLET, FILM COATED ORAL at 07:30

## 2025-01-22 RX ADMIN — ACETAMINOPHEN 975 MG: 325 TABLET, FILM COATED ORAL at 18:02

## 2025-01-22 RX ADMIN — TOPIRAMATE 100 MG: 100 TABLET, FILM COATED ORAL at 17:37

## 2025-01-22 RX ADMIN — GABAPENTIN 600 MG: 300 CAPSULE ORAL at 08:10

## 2025-01-22 RX ADMIN — GABAPENTIN 600 MG: 300 CAPSULE ORAL at 15:34

## 2025-01-22 RX ADMIN — TRAZODONE HYDROCHLORIDE 50 MG: 50 TABLET ORAL at 21:32

## 2025-01-22 RX ADMIN — PROPRANOLOL HYDROCHLORIDE 10 MG: 10 TABLET ORAL at 02:19

## 2025-01-22 RX ADMIN — ATORVASTATIN CALCIUM 40 MG: 40 TABLET, FILM COATED ORAL at 17:37

## 2025-01-22 RX ADMIN — ACETAMINOPHEN 975 MG: 325 TABLET, FILM COATED ORAL at 10:33

## 2025-01-22 RX ADMIN — HYDROXYZINE HYDROCHLORIDE 100 MG: 50 TABLET, FILM COATED ORAL at 07:30

## 2025-01-22 RX ADMIN — SENNOSIDES 8.6 MG: 8.6 TABLET, FILM COATED ORAL at 21:32

## 2025-01-22 RX ADMIN — CARIPRAZINE 4.5 MG: 4.5 CAPSULE, GELATIN COATED ORAL at 08:10

## 2025-01-22 RX ADMIN — CYCLOBENZAPRINE HYDROCHLORIDE 10 MG: 10 TABLET, FILM COATED ORAL at 15:34

## 2025-01-22 RX ADMIN — LEVOTHYROXINE SODIUM 50 MCG: 0.05 TABLET ORAL at 05:25

## 2025-01-22 NOTE — PROGRESS NOTES
01/22/25 0837   Team Meeting   Meeting Type Daily Rounds   Team Members Present   Team Members Present Physician;Nurse;   Physician Team Member Miko   Nursing Team Member [facility] Management Team Member Rena   Patient/Family Present   Patient Present No   Patient's Family Present No     Pt denies SI/HI/AVH. Pt is medication and meal compliant. Pt required several PRNs. Pt reports anxiety. Pt is observed being suspicious. Pt refused miralax. Pt's discharge is pending for tomorrow.

## 2025-01-22 NOTE — NURSING NOTE
Wandering about the unit. Quiet and to herself. Guarded, poor eye contact. At the nurses station frequently with requests/questions. Denying all symptoms. Refused Miralax but compliant with all other medications.

## 2025-01-22 NOTE — NURSING NOTE
No further c/o anxiety or muscle spasms in her back. PRN of Atarax 100 mg po has been effective as well Flexeril 10 mg po.

## 2025-01-22 NOTE — PROGRESS NOTES
01/22/25 1520   Activity/Group Checklist   Group Admission/Discharge  (relapse prevention plan)   Attendance Refused     Attempted to complete relapse prevention plan with pt. Pt in her room sitting at the edge of her bed. Pt did appear guarded and suspicious. Pt did ask what the plan was about, writer elaborated on purpose and contents of plan. Pt refused to complete the plan, explaining she does not want to fill it out.

## 2025-01-22 NOTE — NURSING NOTE
Patient c/o muscle spasms in her back as well as anxiety. Given PRN of Flexeril 10 mg po as well as Atarax 100 mg po for severe anxiety. Will monitor effectiveness.

## 2025-01-22 NOTE — PLAN OF CARE
Problem: Alteration in Thoughts and Perception  Goal: Treatment Goal: Gain control of psychotic behaviors/thinking, reduce/eliminate presenting symptoms and demonstrate improved reality functioning upon discharge  Outcome: Progressing  Goal: Verbalize thoughts and feelings  Description: Interventions:  - Promote a nonjudgmental and trusting relationship with the patient through active listening and therapeutic communication  - Assess patient's level of functioning, behavior and potential for risk  - Engage patient in 1 on 1 interactions  - Encourage patient to express fears, feelings, frustrations, and discuss symptoms    - [place] patient to reality, help patient recognize reality-based thinking   - Administer medications as ordered and assess for potential side effects  - Provide the patient education related to the signs and symptoms of the illness and desired effects of prescribed medications  Outcome: Progressing  Goal: Agree to be compliant with medication regime, as prescribed and report medication side effects  Description: Interventions:  - Offer appropriate PRN medication and supervise ingestion; conduct AIMS, as needed   Outcome: Progressing  Goal: Recognize dysfunctional thoughts, communicate reality-based thoughts at the time of discharge  Description: Interventions:  - Provide medication and psycho-education to assist patient in compliance and developing insight into his/her illness   Outcome: Progressing     Problem: Depression  Goal: Treatment Goal: Demonstrate behavioral control of depressive symptoms, verbalize feelings of improved mood/affect, and adopt new coping skills prior to discharge  Outcome: Progressing  Goal: Refrain from harming self  Description: Interventions:  - Monitor patient closely, per order   - Supervise medication ingestion, monitor effects and side effects   Outcome: Progressing  Goal: Refrain from self-neglect  Outcome: Progressing  Goal: Attend and participate in unit  activities, including therapeutic, recreational, and educational groups  Description: Interventions:  - Provide therapeutic and educational activities daily, encourage attendance and participation, and document same in the medical record   Outcome: Progressing     Problem: Ineffective Coping  Goal: Participates in unit activities  Description: Interventions:  - Provide therapeutic environment   - Provide required programming   - Redirect inappropriate behaviors   Outcome: Progressing     Problem: DISCHARGE PLANNING - CARE MANAGEMENT  Goal: Discharge to post-acute care or home with appropriate resources  Description: INTERVENTIONS:  - Conduct assessment to determine patient/family and health care team treatment goals, and need for post-acute services based on payer coverage, community resources, and patient preferences, and barriers to discharge  - Address psychosocial, clinical, and financial barriers to discharge as identified in assessment in conjunction with the patient/family and health care team  - Arrange appropriate level of post-acute services according to patient’s   needs and preference and payer coverage in collaboration with the physician and health care team  - Communicate with and update the patient/family, physician, and health care team regarding progress on the discharge plan  - Arrange appropriate transportation to post-acute venues  Outcome: Not Progressing

## 2025-01-22 NOTE — ASSESSMENT & PLAN NOTE
Patient has a history of MDD versus bipolar disorder  Patient was being [facility] the [facility] for mood with Vraylar 3 mg once daily, Wellbutrin  mg once daily, Topamax 100 mg twice daily, duloxetine DR 30 mg once daily. Secondary to patient's lack of cooperativity during initial evaluation, difficult to obtain history and ROS to distinguish whether or not patient needs to be treated for unipolar depression or bipolar.    Patient signed 72 hour notice. Patient indicates that she would prefer to resume normal outpatient with the [facility] and continue to see her psychiatrist and therapist there.  Will inform social work as plan had been to send referral for PHP. Patient still has anxiety and some low-level paranoia, she has been in behavioral control, she has gone to groups and has been seen engaging in the milieu, and demonstrates future oriented thinking and planning regarding her follow-up care.  No acute psych safety concerns expressed or exhibited.  At this time I do not have any grounds for 302 and will plan to make adjustment to gabapentin today and d/c Thursday prior to expiration of 72 hr notice.     PLAN:  Continue Vraylar 4.5 mg once daily for mood stabilization  Continue duloxetine DR 90 mg once daily for depressive symptoms and anxiety symptoms  Continue home Topamax 100 mg twice daily for mood stabilization  Wellbutrin discontinued 1/18   Gabapentin 600 mg TID for anxiety  SW to f/u w/ shailesh today regarding d/c now that pt has signed ALOK

## 2025-01-22 NOTE — PROGRESS NOTES
Progress Note - Behavioral Health     Geno Lopez 39 y.o. female MRN: [id number]   Unit/Bed#: [facility] 341-01 Encounter: [id number]  Assessment & Plan  Unspecified mood disorder (rule out MDD vs bipolar disorder)  Patient has a history of MDD versus bipolar disorder  Patient was being treated in the [facility] program for mood with Vraylar 3 mg once daily, Wellbutrin  mg once daily, Topamax 100 mg twice daily, duloxetine DR 30 mg once daily. Secondary to patient's lack of cooperativity during initial evaluation, difficult to obtain history and ROS to distinguish whether or not patient needs to be treated for unipolar depression or bipolar.    Patient signed 72 hour notice. Patient indicates that she would prefer to resume normal outpatient with the [facility] and continue to see her psychiatrist and therapist there.  Will inform social work as plan had been to send referral for PHP. Patient still has anxiety and some low-level paranoia, she has been in behavioral control, she has gone to groups and has been seen engaging in the milieu, and demonstrates future oriented thinking and planning regarding her follow-up care.  No acute psych safety concerns expressed or exhibited.  At this time I do not have any grounds for 302 and will plan to make adjustment to gabapentin today and d/c Thursday prior to expiration of 72 hr notice.     PLAN:  Continue Vraylar 4.5 mg once daily for mood stabilization  Continue duloxetine DR 90 mg once daily for depressive symptoms and anxiety symptoms  Continue home Topamax 100 mg twice daily for mood stabilization  Wellbutrin discontinued 1/18   Gabapentin 600 mg TID for anxiety  SW to f/u w/ fiance today regarding d/c now that pt has signed ALOK  Opioid use disorder, moderate, in early remission, on maintenance therapy, dependence (HCC)  Patient has a history of opioid use disorder in early remission on MAT  Patient was being treated in the [facility] for opioid use disorder with  "Suboxone 2 mg once daily  Dose of Suboxone was confirmed by patient's outpatient pharmacy, [facility], and PDMP    PLAN:  Continue patient's home Suboxone at 2 mg sublingual once daily for opioid dependence    Generalized anxiety disorder  Patient has a history per record review generalized anxiety disorder  Patient was being treated in the [facility] for SHAILA with duloxetine 30 mg once daily,  Ativan 1 mg PRN as needed for anxiety    PLAN:  Duloxetine 90 mg PO for anxiety and depression symptoms  Continue inpatient atarax PRN orders for anxiety   Cont propranolol 10 mg PRN Q8 for anxiety   Other specified attention deficit hyperactivity disorder (ADHD)  Per record review patient has a history of ADHD  Patient was being treated in the [facility] for ADHD with Strattera 18 mg once daily  Patient told nursing that she does not want to restart Strattera at this time    PLAN:  Do not restart home Strattera while admitted to inpatient psychiatric unit  Insomnia  Patient has a history of insomnia  Patient was being treated in the [facility] for insomnia with trazodone 50 mg nightly    PLAN:  Continue patient's home trazodone 50 mg nightly for insomnia  Tobacco use  Patient has a recent history of daily vaping which she says she stopped approximately 4 days prior to admit secondary to fear that \"street drugs\" were in her vape.  Patient was being treated in the [facility] program for tobacco cessation with Wellbutrin    PLAN:  Wellbutrin tapered off 1/18 given concern for increased anxiety  Other specified hypothyroidism  Per medical  Vitamin D insufficiency  Per medical  Foul smelling urine  Completed course of Nitrofurantoin        Recommended Treatment:     Planned medication and treatment changes:    All current active medications have been reviewed  Encourage group therapy, milieu therapy and occupational therapy  Behavioral Health checks for safety monitoring      Current medications:  Current Facility-Administered " Medications   Medication Dose Route Frequency Provider Last Rate    albuterol  2 puff Inhalation Q4H PRN Desmond Piper MD      aluminum-magnesium hydroxide-simethicone  30 mL Oral Q4H PRN Sarah Houston MD      atorvastatin  40 mg Oral Daily With Dinner Lazara Thompson MD      haloperidol lactate  2.5 mg Intramuscular Q4H PRN Max 4/day Sarah Houston MD      And    LORazepam  1 mg Intramuscular Q4H PRN Max 4/day Sarah Houston MD      And    benztropine  0.5 mg Intramuscular Q4H PRN Max 4/day Sarah Houston MD      haloperidol lactate  5 mg Intramuscular Q4H PRN Max 4/day Sarah Houston MD      And    LORazepam  2 mg Intramuscular Q4H PRN Max 4/day Sarah Houston MD      And    benztropine  1 mg Intramuscular Q4H PRN Max 4/day Sarah Houston MD      benztropine  1 mg Intramuscular Q4H PRN Max 6/day Sarah Houston MD      benztropine  1 mg Oral Q4H PRN Max 6/day Sarah Houston MD      bisacodyl  10 mg Rectal Daily PRN Sarah Houston MD      buprenorphine-naloxone  2 mg Sublingual Daily Meche Ponce DO      cariprazine  4.5 mg Oral Daily Nitish Crouch MD      cholecalciferol  1,000 Units Oral Daily Lazara Thompson MD      cyclobenzaprine  10 mg Oral TID PRN Nitish Crouch MD      hydrOXYzine HCL  50 mg Oral Q6H PRN Max 4/day Sarah Houston MD      Or    diphenhydrAMINE  50 mg Intramuscular Q6H PRN Sarah Houston MD      DULoxetine  90 mg Oral Daily Nitish Crouch MD      gabapentin  600 mg Oral TID Nitish Coruch MD      haloperidol  1 mg Oral Q6H PRN Sarah Houston MD      haloperidol  2.5 mg Oral Q4H PRN Max 4/day Sarah Houston MD      haloperidol  5 mg Oral Q4H PRN Max 4/day Sarah Houston MD      hydrOXYzine HCL  100 mg Oral Q6H PRN Max 4/day Sarah Houston MD      Or    LORazepam  2 mg Intramuscular Q6H PRN Sarah Houston MD      hydrOXYzine HCL  25 mg Oral Q6H PRN Max 4/day Sarah Houston MD      ibuprofen  400 mg Oral Q4H PRN Sarah Houston MD    "   ibuprofen  600 mg Oral Q6H PRN Sarah Houston MD      ibuprofen  800 mg Oral Q8H PRN Sarah Houston MD      levothyroxine  50 mcg Oral Early Morning Lazara Thompson MD      melatonin  3 mg Oral HS PRN Meche Ponce, DO      nicotine polacrilex  4 mg Oral Q2H PRN Sarah Houston MD      polyethylene glycol  17 g Oral Daily PRN Sarah Houston MD      polyethylene glycol  17 g Oral Daily CHRISTOPHER Lees      propranolol  10 mg Oral Q8H PRN Jen Barnett, DO      senna  1 tablet Oral HS CHRISTOPHER Lees      senna-docusate sodium  1 tablet Oral Daily PRN Sarah Houston MD      topiramate  100 mg Oral BID Meche Ponce,       traZODone  50 mg Oral HS Meche Ponce DO         Risks / Benefits of Treatment:    Risks, benefits, and possible side effects of medications explained to patient and patient verbalizes understanding and agreement for treatment.    Subjective:    Behavior over the last 24 hours: unchanged.     Geno is seen today in psychiatric follow-up. Per staff, accepted several PRNs last evening due to complaints of anxiety and restlessness. She is seen today at bedside, mostly guarded and minimal with conversation, anxious and suspicious appearing. She states she is doing, \"better\" after being restarted on medications. Able to endorse importance of compliance and agrees to continue with current regimen.  Reports fair conversations with fiance and is looking forward to going home tomorrow. She reports improvement with increase in gabapentin and denies any side effects. Presently, will discharge at expiration of 72 hour notice as she is not a risk to herself or others. She has been more visible in the milieu and attending select groups. Self-care is fair. She denies SI/HI/AVH.      Sleep: slept off and on, difficulty falling asleep  Appetite: normal  Medication side effects: No   ROS: no complaints    Mental Status Evaluation:    Appearance:  age " appropriate, casually dressed   Behavior:  guarded, limited cooperation, limited eye contact   Speech:  soft, decreased volume   Mood:  euthymic   Affect:  blunted   Thought Process:  perseverative   Associations: concrete associations   Thought Content:  some paranoia   Perceptual Disturbances: no auditory hallucinations, no visual hallucinations, does not appear responding to internal stimuli   Risk Potential: Suicidal ideation - None at present  Homicidal ideation - None at present  Potential for aggression - No   Sensorium:  oriented to person, place, and time/date   Memory:  recent and remote memory grossly intact   Consciousness:  alert and awake   Attention/Concentration: attention span and concentration appear shorter than expected for age   Insight:  limited   Judgment: limited   Gait/Station: normal gait/station   Motor Activity: no abnormal movements     Vital signs in last 24 hours:    Temp:  [97.3 °F (36.3 °C)-97.9 °F (36.6 °C)] 97.3 °F (36.3 °C)  HR:  [78-84] 78  BP: (107-133)/(56-98) 110/71  Resp:  [18] 18  SpO2:  [99 %] 99 %  O2 Device: None (Room air)    Laboratory results: I have personally reviewed all pertinent laboratory/tests results    Results from the past 24 hours:   Recent Results (from the past 24 hours)   ECG 12 lead    Collection Time: 01/21/25 12:24 PM   Result Value Ref Range    Ventricular Rate 81 BPM    Atrial Rate 81 BPM    DE Interval 136 ms    QRSD Interval 74 ms    QT Interval 382 ms    QTC Interval 444 ms    P Axis 58 degrees    QRS Axis 54 degrees    T Wave Axis 4 degrees   ECG 12 lead    Collection Time: 01/21/25 12:24 PM   Result Value Ref Range    Ventricular Rate 82 BPM    Atrial Rate 82 BPM    DE Interval 140 ms    QRSD Interval 74 ms    QT Interval 392 ms    QTC Interval 458 ms    P [place] 10 degrees    QRS Axis 51 degrees    T Wave Axis -3 degrees       Suicide/Homicide Risk Assessment:    Risk of Harm to Self:   Nursing Suicide Risk Assessment Last 24 hours: C-SSRS Risk  (Since Last Contact)  Calculated C-SSRS Risk Score (Since Last Contact): No Risk Indicated    Risk of Harm to Others:  Nursing Homicide Risk Assessment: Violence Risk to Others: Denies within past 6 months    The following interventions are recommended: Behavioral Health checks for safety monitoring, continued hospitalization on locked unit    Progress Toward Goals: progressing    Counseling / Coordination of Care:    Total floor / unit time spent today 25 minutes. Greater than 50% of total time was spent with the patient and / or family counseling and / or coordination of care. A description of counseling / coordination of care:    Kathy Ring PA-C 01/22/25

## 2025-01-22 NOTE — PLAN OF CARE
Pt has started attending groups intermittently here and there. Pt typically quiet, does not interact much, but appropriate.

## 2025-01-22 NOTE — NURSING NOTE
Patient rescinded the time on her 72 hour notice that she signed on 1-20-25 until 11 am and is agreeable to stay until then.

## 2025-01-22 NOTE — NURSING NOTE
PT reports severe anxiety that is causing her insomnia, requesting PRN  Inderal, meds given at 0219 for severe anxiety, will monitor for effectiveness in an hour's time.      0319  On re-assessment PT remain anxious pacing the hallway restless, PRN Inderal 10mg was not effective at all.

## 2025-01-23 VITALS
BODY MASS INDEX: 28.95 KG/M2 | WEIGHT: 169.6 LBS | HEIGHT: 64 IN | OXYGEN SATURATION: 94 % | RESPIRATION RATE: 16 BRPM | TEMPERATURE: 96.8 F | DIASTOLIC BLOOD PRESSURE: 63 MMHG | HEART RATE: 98 BPM | SYSTOLIC BLOOD PRESSURE: 111 MMHG

## 2025-01-23 PROCEDURE — 99238 HOSP IP/OBS DSCHRG MGMT 30/<: CPT | Performed by: PSYCHIATRY & NEUROLOGY

## 2025-01-23 RX ADMIN — DULOXETINE HYDROCHLORIDE 90 MG: 30 CAPSULE, DELAYED RELEASE ORAL at 08:10

## 2025-01-23 RX ADMIN — CARIPRAZINE 4.5 MG: 4.5 CAPSULE, GELATIN COATED ORAL at 08:10

## 2025-01-23 RX ADMIN — LEVOTHYROXINE SODIUM 50 MCG: 0.05 TABLET ORAL at 06:11

## 2025-01-23 RX ADMIN — MELATONIN TAB 3 MG 3 MG: 3 TAB at 01:14

## 2025-01-23 RX ADMIN — PROPRANOLOL HYDROCHLORIDE 10 MG: 10 TABLET ORAL at 10:03

## 2025-01-23 RX ADMIN — Medication 1000 UNITS: at 08:10

## 2025-01-23 RX ADMIN — GABAPENTIN 600 MG: 300 CAPSULE ORAL at 08:10

## 2025-01-23 RX ADMIN — TOPIRAMATE 100 MG: 100 TABLET, FILM COATED ORAL at 08:10

## 2025-01-23 RX ADMIN — BUPRENORPHINE AND NALOXONE 2 MG: 2; .5 FILM BUCCAL; SUBLINGUAL at 08:10

## 2025-01-23 RX ADMIN — HYDROXYZINE HYDROCHLORIDE 100 MG: 50 TABLET, FILM COATED ORAL at 01:15

## 2025-01-23 NOTE — DISCHARGE INSTR - OTHER ORDERS
CRISIS INFORMATION  If you are experiencing a mental health emergency, you may call the [facility] Crisis Intervention Office 24 hours a day, 7 days per week at [phone].    In [facility], call [phone].    [facility] is a confidential 24/7 telephone support service manned by trained mental health consumers.  [facility] provides support, a listening ear and can provide information about available services.[facility] specializes in the concerns of mental health consumers, their families and friends.  However, we are also here for anyone who has a mental health concern, is confused about or just doesn't know anything about mental health or where to get information.  To reach [facility], call [phone].    HOW TO GET SUBSTANCE ABUSE HELP:  If you or someone you know has a drug or alcohol problem, there is help:  [facility] Abuse [facility]: [phone]  [facility] Abuse [facility]: [phone]  An assessment is the first step.   In addition to those listed there are other programs available in the area but assessment is best to determine an appropriate level of care.    If you HAVE Medical Assistance, an assessment can be scheduled at one of these providers:  [facility]  [address] [place] [address]  [phone]   [facility]  [address] [place] [address]  [phone]   [facility]  [address], [place] [address]  [phone]  [address] [place] [address], [address], [place] [address] [phone]   [facility]  [address] [place] [address] [place] [address]  [phone]   [facility]  [address] [address]  [phone]   NET ([facility])  [address] [place] [address]  [phone]   [facility]  [address] [place] [address]  [phone]   [facility]  [address] [place] [address] [place] [address]   [phone]   [facility]  [address] [place] [address]  [phone]       From the [facility] website [place]guides[place]opioid-epidemic/#GetNaloxone    How do I get naloxone?  Family members and friends can access naloxone by:    Obtaining a prescription from their family doctor  Using the standing order issued by Acting Physician General Johanne Sanchez. A standing order is a prescription written for the general public, rather than specifically for an individual.  To use the standing order, print it and take it with you to the pharmacy or have the digital version on your phone. Download the standing order from the Department of [facility] (PDF).    If you are unable to print it or use the digital version, the standing order is kept on file at many pharmacies. If a pharmacy does not have it on file, they may have the ability to look it up.    Naloxone prescriptions can be filled at most pharmacies. Although the medication might not be available for same-day pickup, it often can be ordered and available within a day or two.

## 2025-01-23 NOTE — PLAN OF CARE
Problem: Alteration in Thoughts and Perception  Goal: Treatment Goal: Gain control of psychotic behaviors/thinking, reduce/eliminate presenting symptoms and demonstrate improved reality functioning upon discharge  Outcome: Completed  Goal: Verbalize thoughts and feelings  Description: Interventions:  - Promote a nonjudgmental and trusting relationship with the patient through active listening and therapeutic communication  - Assess patient's level of functioning, behavior and potential for risk  - Engage patient in 1 on 1 interactions  - Encourage patient to express fears, feelings, frustrations, and discuss symptoms    - [place] patient to reality, help patient recognize reality-based thinking   - Administer medications as ordered and assess for potential side effects  - Provide the patient education related to the signs and symptoms of the illness and desired effects of prescribed medications  Outcome: Completed  Goal: Agree to be compliant with medication regime, as prescribed and report medication side effects  Description: Interventions:  - Offer appropriate PRN medication and supervise ingestion; conduct AIMS, as needed   Outcome: Completed  Goal: Recognize dysfunctional thoughts, communicate reality-based thoughts at the time of discharge  Description: Interventions:  - Provide medication and psycho-education to assist patient in compliance and developing insight into his/her illness   Outcome: Completed     Problem: Depression  Goal: Treatment Goal: Demonstrate behavioral control of depressive symptoms, verbalize feelings of improved mood/affect, and adopt new coping skills prior to discharge  Outcome: Completed  Goal: Refrain from harming self  Description: Interventions:  - Monitor patient closely, per order   - Supervise medication ingestion, monitor effects and side effects   Outcome: Completed  Goal: Refrain from self-neglect  Outcome: Completed  Goal: Attend and participate in unit activities,  including therapeutic, recreational, and educational groups  Description: Interventions:  - Provide therapeutic and educational activities daily, encourage attendance and participation, and document same in the medical record   Outcome: Completed     Problem: Ineffective Coping  Goal: Participates in unit activities  Description: Interventions:  - Provide therapeutic environment   - Provide required programming   - Redirect inappropriate behaviors   Outcome: Completed     Problem: DISCHARGE PLANNING - CARE MANAGEMENT  Goal: Discharge to post-acute care or home with appropriate resources  Description: INTERVENTIONS:  - Conduct assessment to determine patient/family and health care team treatment goals, and need for post-acute services based on payer coverage, community resources, and patient preferences, and barriers to discharge  - Address psychosocial, clinical, and financial barriers to discharge as identified in assessment in conjunction with the patient/family and health care team  - Arrange appropriate level of post-acute services according to patient’s   needs and preference and payer coverage in collaboration with the physician and health care team  - Communicate with and update the patient/family, physician, and health care team regarding progress on the discharge plan  - Arrange appropriate transportation to post-acute venues  Outcome: Completed

## 2025-01-23 NOTE — NURSING NOTE
Patient withdrawn and secluded to room at this time,offers no complaint of pain or discomfort. Compliant with medications and routine vitals.

## 2025-01-23 NOTE — NURSING NOTE
Outpatient appointments and medications reviewed with patient prior to discharge. Medications delivered to the unit for patient from [facility].

## 2025-01-23 NOTE — BH TRANSITION RECORD
Contact Information: If you have any questions, concerns, pended studies, tests and/or procedures, or emergencies regarding your inpatient behavioral health visit. Please contact [facility] [phone]  and ask to speak to a , nurse or physician. A contact is available 24 hours/ 7 days a week at this number.     Summary of Procedures Performed During your Stay:  Below is a list of major procedures performed during your hospital stay and a summary of results:  - Cardiac Procedures/Studies:  .  Normal sinus rhythm  Normal ECG  Confirmed by Arik Andrews ([id number]) on 1/21/2025 4:44:18 PM    Pending Studies (From admission, onward)       Start     Ordered    02/13/25 0600  Lipid Panel with Direct LDL reflex  Morning draw         01/13/25 0017                  Please follow up on the above pending studies with your PCP and/or referring provider.

## 2025-01-23 NOTE — NURSING NOTE
Patient offers no further complaints of back spasms at this time. Flexeril 10 mg po effective. Patient visible on the unit at this time. Patient makes needs known withdrawn to self. Patient denies SI/HI/AH/VH.

## 2025-01-23 NOTE — NURSING NOTE
PT at the nurse's station requesting for PRN due to insomnia and appears severely anxious.  Melatonin 3mg and Atarax 100mg given to assist with sleep disturbance and severe anxiety at  0115.

## 2025-01-23 NOTE — DISCHARGE INSTR - APPOINTMENTS
Behavioral Health Nurse Navigator, Radha or Rach will be calling you after your discharge, on the phone number that you provided.  They will be available as an additional support, if needed.   If you wish to speak with Radha, you may contact her at [phone].

## 2025-01-23 NOTE — PROGRESS NOTES
01/23/25 0839   Team Meeting   Meeting Type Daily Rounds   Team Members Present   Team Members Present Physician;Nurse;   Physician Team Member Miko   [facility] Keith   Patient/Family Present   Patient Present No   Patient's Family Present No     Discharge today.
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
## 2025-01-23 NOTE — NURSING NOTE
"Patient has been about the unit and doing laps in the hallway. Reports feeling nervous for discharge. \" I don't know if the medications are helping me\". \" I think it's doing the opposite\". When writer encouraged patient to speak with dr about how she was feeling, patient stated that she needs to go home today or her fiance will not let her back home.   "

## 2025-01-23 NOTE — PLAN OF CARE
Problem: Alteration in Thoughts and Perception  Goal: Treatment Goal: Gain control of psychotic behaviors/thinking, reduce/eliminate presenting symptoms and demonstrate improved reality functioning upon discharge  Outcome: Progressing  Goal: Verbalize thoughts and feelings  Description: Interventions:  - Promote a nonjudgmental and trusting relationship with the patient through active listening and therapeutic communication  - Assess patient's level of functioning, behavior and potential for risk  - Engage patient in 1 on 1 interactions  - Encourage patient to express fears, feelings, frustrations, and discuss symptoms    - Zephyr patient to reality, help patient recognize reality-based thinking   - Administer medications as ordered and assess for potential side effects  - Provide the patient education related to the signs and symptoms of the illness and desired effects of prescribed medications  Outcome: Progressing  Goal: Agree to be compliant with medication regime, as prescribed and report medication side effects  Description: Interventions:  - Offer appropriate PRN medication and supervise ingestion; conduct AIMS, as needed   Outcome: Progressing  Goal: Recognize dysfunctional thoughts, communicate reality-based thoughts at the time of discharge  Description: Interventions:  - Provide medication and psycho-education to assist patient in compliance and developing insight into his/her illness   Outcome: Progressing     Problem: Depression  Goal: Treatment Goal: Demonstrate behavioral control of depressive symptoms, verbalize feelings of improved mood/affect, and adopt new coping skills prior to discharge  Outcome: Progressing  Goal: Refrain from harming self  Description: Interventions:  - Monitor patient closely, per order   - Supervise medication ingestion, monitor effects and side effects   Outcome: Progressing  Goal: Refrain from self-neglect  Outcome: Progressing  Goal: Attend and participate in unit  activities, including therapeutic, recreational, and educational groups  Description: Interventions:  - Provide therapeutic and educational activities daily, encourage attendance and participation, and document same in the medical record   Outcome: Progressing     Problem: Ineffective Coping  Goal: Participates in unit activities  Description: Interventions:  - Provide therapeutic environment   - Provide required programming   - Redirect inappropriate behaviors   Outcome: Progressing     Problem: DISCHARGE PLANNING - CARE MANAGEMENT  Goal: Discharge to post-acute care or home with appropriate resources  Description: INTERVENTIONS:  - Conduct assessment to determine patient/family and health care team treatment goals, and need for post-acute services based on payer coverage, community resources, and patient preferences, and barriers to discharge  - Address psychosocial, clinical, and financial barriers to discharge as identified in assessment in conjunction with the patient/family and health care team  - Arrange appropriate level of post-acute services according to patient’s   needs and preference and payer coverage in collaboration with the physician and health care team  - Communicate with and update the patient/family, physician, and health care team regarding progress on the discharge plan  - Arrange appropriate transportation to post-acute venues  Outcome: Not Progressing

## 2025-01-23 NOTE — NURSING NOTE
PT observed sleeping/relaxed with no further concerns at this time of assessment. PRNs Atarax 100mg and melatonin 3mg were effective.

## 2025-02-03 ENCOUNTER — TELEPHONE (OUTPATIENT)
Dept: OBGYN CLINIC | Facility: CLINIC | Age: 40
End: 2025-02-03

## 2025-02-07 ENCOUNTER — TELEPHONE (OUTPATIENT)
Dept: OTHER | Age: 40
End: 2025-02-07

## 2025-02-07 NOTE — TELEPHONE ENCOUNTER
PT called stating she missed an appt with provider and requesting to speak with provider because she was hospitalized. Writer updated phone number and gave provider message

## 2025-02-11 ENCOUNTER — OFFICE VISIT (OUTPATIENT)
Dept: PSYCHIATRY | Facility: CLINIC | Age: 40
End: 2025-02-11
Payer: COMMERCIAL

## 2025-02-11 VITALS
SYSTOLIC BLOOD PRESSURE: 120 MMHG | WEIGHT: 175 LBS | BODY MASS INDEX: 30.04 KG/M2 | DIASTOLIC BLOOD PRESSURE: 80 MMHG | HEART RATE: 76 BPM

## 2025-02-11 DIAGNOSIS — F41.1 GENERALIZED ANXIETY DISORDER: ICD-10-CM

## 2025-02-11 DIAGNOSIS — F31.81 BIPOLAR 2 DISORDER (HCC): Primary | ICD-10-CM

## 2025-02-11 DIAGNOSIS — G47.00 INSOMNIA, UNSPECIFIED TYPE: ICD-10-CM

## 2025-02-11 DIAGNOSIS — Z72.0 CURRENTLY ATTEMPTING TO QUIT SMOKING: ICD-10-CM

## 2025-02-11 DIAGNOSIS — F39 UNSPECIFIED MOOD (AFFECTIVE) DISORDER (HCC): ICD-10-CM

## 2025-02-11 DIAGNOSIS — F90.1 ADHD, PREDOMINANTLY HYPERACTIVE TYPE: ICD-10-CM

## 2025-02-11 DIAGNOSIS — F11.20 OPIOID DEPENDENCE ON AGONIST THERAPY (HCC): ICD-10-CM

## 2025-02-11 PROCEDURE — 99214 OFFICE O/P EST MOD 30 MIN: CPT | Performed by: PSYCHIATRY & NEUROLOGY

## 2025-02-11 PROCEDURE — 90833 PSYTX W PT W E/M 30 MIN: CPT | Performed by: PSYCHIATRY & NEUROLOGY

## 2025-02-11 RX ORDER — ATOMOXETINE 25 MG/1
25 CAPSULE ORAL DAILY
Qty: 30 CAPSULE | Refills: 4 | Status: SHIPPED | OUTPATIENT
Start: 2025-02-11

## 2025-02-11 RX ORDER — BUPROPION HYDROCHLORIDE 150 MG/1
150 TABLET ORAL DAILY
Qty: 30 TABLET | Refills: 4 | Status: SHIPPED | OUTPATIENT
Start: 2025-02-11

## 2025-02-11 RX ORDER — TRAZODONE HYDROCHLORIDE 50 MG/1
50 TABLET, FILM COATED ORAL
Qty: 30 TABLET | Refills: 3 | Status: SHIPPED | OUTPATIENT
Start: 2025-02-11 | End: 2025-03-13

## 2025-02-11 RX ORDER — CLONAZEPAM 1 MG/1
TABLET ORAL
Qty: 45 TABLET | Refills: 2 | Status: SHIPPED | OUTPATIENT
Start: 2025-02-11

## 2025-02-11 RX ORDER — BUPRENORPHINE AND NALOXONE 2; .5 MG/1; MG/1
2 FILM, SOLUBLE BUCCAL; SUBLINGUAL DAILY
Qty: 30 FILM | Refills: 2 | Status: SHIPPED | OUTPATIENT
Start: 2025-02-11

## 2025-02-11 RX ORDER — TOPIRAMATE 100 MG/1
100 TABLET, FILM COATED ORAL 2 TIMES DAILY
Qty: 60 TABLET | Refills: 0 | Status: SHIPPED | OUTPATIENT
Start: 2025-02-11 | End: 2025-03-13

## 2025-02-11 RX ORDER — DULOXETIN HYDROCHLORIDE 60 MG/1
60 CAPSULE, DELAYED RELEASE ORAL DAILY
Qty: 30 CAPSULE | Refills: 3 | Status: SHIPPED | OUTPATIENT
Start: 2025-02-11

## 2025-02-11 RX ORDER — GABAPENTIN 300 MG/1
300 CAPSULE ORAL 3 TIMES DAILY
Qty: 90 CAPSULE | Refills: 3 | Status: SHIPPED | OUTPATIENT
Start: 2025-02-11 | End: 2025-03-13

## 2025-02-11 NOTE — ASSESSMENT & PLAN NOTE
Orders:    cariprazine (VRAYLAR) 4.5 MG capsule; Take 1 capsule (4.5 mg total) by mouth daily    DULoxetine (CYMBALTA) 60 mg delayed release capsule; Take 1 capsule (60 mg total) by mouth daily Take with the 30 mg capsule for a total of 90 mg daily    gabapentin (NEURONTIN) 300 mg capsule; Take 1 capsule (300 mg total) by mouth 3 (three) times a day

## 2025-02-11 NOTE — PSYCH
"[facility]    Progress Note  Geno Lopez 39 y.o. female MRN: [id number]   Encounter: [id number]      Assessment & Plan  Unspecified mood disorder (rule out MDD vs bipolar disorder)    Orders:    cariprazine (VRAYLAR) 4.5 MG capsule; Take 1 capsule (4.5 mg total) by mouth daily    DULoxetine (CYMBALTA) 60 mg delayed release capsule; Take 1 capsule (60 mg total) by mouth daily Take with the 30 mg capsule for a total of 90 mg daily    gabapentin (NEURONTIN) 300 mg capsule; Take 1 capsule (300 mg total) by mouth 3 (three) times a day    Bipolar 2 disorder (HCC)    Orders:    topiramate (TOPAMAX) 100 mg tablet; Take 1 tablet (100 mg total) by mouth 2 (two) times a day    Insomnia, unspecified type    Orders:    traZODone (DESYREL) 50 mg tablet; Take 1 tablet (50 mg total) by mouth daily at bedtime as needed for sleep    Currently attempting to quit smoking    Orders:    buPROPion (Wellbutrin XL) 150 mg 24 hr tablet; Take 1 tablet (150 mg total) by mouth daily    ADHD, predominantly hyperactive type    Orders:    atoMOXetine (STRATTERA) 25 mg capsule; Take 1 capsule (25 mg total) by mouth daily    Opioid dependence on agonist therapy (HCC)    Orders:    buprenorphine-naloxone (Suboxone) 2-0.5 mg; Place 1 Film (2 mg total) under the tongue daily    Generalized anxiety disorder    Orders:    clonazePAM (KlonoPIN) 1 mg tablet; Take 0.5 tablet after lunch and 1 tablet at bedtime      Visit Time    Visit Start Time: 11:30AM  Visit Stop Time: 12PM  Total Visit Duration: 30 min    SUBJECTIVE: \" I was recently inpatient unit when they changed somewhat medications, I feel more anxious\"      INTERVAL HISTORY: The patient came to his scheduled follow-up outpatient appointment after being admitted to inpatient psychiatric unit with worsening of his symptoms of psychiatric disorders.  The patient stated that she had the flu that affected his stomach and ability to take medications, she also ran out of her Cymbalta could not " go to the pharmacy to get refills, and experienced what she described most likely serotonin medication withdrawal symptoms, with shakiness, increased anxiety, confusion.  The patient was admitted to inpatient treatment, he medications such as Vraylar was continued, Neurontin was increased to 600 mg 3 times a day and propranolol was added to help the patient with ongoing severe anxiety, but the patient stated that she felt shaky, anxious, that such increased did not help her and even when Cymbalta was increased to 90 mg she did not feel much improvement of anxiety, however her mood improved her longer feel depressed or angry, and she believes Vraylar helps her to deal with stressors associated with her son taking away.    The patient did not endorse paranoid delusions, did not endorse depressive or suicidal thoughts.  Today she was visibly anxious.  She stated that when her Wellbutrin was discontinued, she started using nicotine again but now she is waving instead of smoking.  Ability to concentrate is also getting worse because her Strattera was not restarted, the patient returned back to take her Strattera which still the patient had at home, and now she feels slightly better in terms of her attention concentration.  She takes Suboxone 2 mg daily sometimes every second day, she does not feel any significant symptoms of withdrawal and she believes that once in 2 days of Suboxone still helps her to address residual cravings.  She wants to continue taking this medication potentially during the every other day.    Review Of Systems:    sleep changes: decreased  appetite changes: no change  energy/anergy: no change    Support Services  The patient is actively engaged with the [facility] Certified Addiction Counselor’s psychotherapy plan: Yes        PDMP reviewed:     PDMP Review         Value Time User    PDMP Reviewed  Yes 2/11/2025 11:27 AM Trell Payton MD              Drug cravings: to  nicotine  Motivation to continue treatment: high       Current Outpatient Medications:     albuterol (PROVENTIL HFA,VENTOLIN HFA) 90 mcg/act inhaler, Inhale 2 puffs every 4 (four) hours as needed for wheezing, Disp: 18 g, Rfl: 5    atorvastatin (LIPITOR) 40 mg tablet, Take 1 tablet (40 mg total) by mouth daily with dinner, Disp: 30 tablet, Rfl: 0    cholecalciferol (VITAMIN D3) 1,000 units tablet, take 1 tablet by mouth once daily, Disp: 30 tablet, Rfl: 0    cyclobenzaprine (FLEXERIL) 10 mg tablet, take 1 tablet by mouth three times a day if needed for muscle spasm, Disp: 90 tablet, Rfl: 1    levothyroxine 50 mcg tablet, Take 1 tablet (50 mcg total) by mouth daily in the early morning, Disp: 30 tablet, Rfl: 0    polyethylene glycol (MIRALAX) 17 g packet, Take 17 g by mouth daily, Disp: 510 g, Rfl: 0    senna (SENOKOT) 8.6 mg, Take 1 tablet (8.6 mg total) by mouth daily at bedtime, Disp: 30 tablet, Rfl: 0    atoMOXetine (STRATTERA) 25 mg capsule, Take 1 capsule (25 mg total) by mouth daily, Disp: 30 capsule, Rfl: 4    buprenorphine-naloxone (Suboxone) 2-0.5 mg, Place 1 Film (2 mg total) under the tongue daily, Disp: 30 Film, Rfl: 2    buPROPion (Wellbutrin XL) 150 mg 24 hr tablet, Take 1 tablet (150 mg total) by mouth daily, Disp: 30 tablet, Rfl: 4    cariprazine (VRAYLAR) 4.5 MG capsule, Take 1 capsule (4.5 mg total) by mouth daily, Disp: 30 capsule, Rfl: 4    clonazePAM (KlonoPIN) 1 mg tablet, Take 0.5 tablet after lunch and 1 tablet at bedtime, Disp: 45 tablet, Rfl: 2    DULoxetine (CYMBALTA) 60 mg delayed release capsule, Take 1 capsule (60 mg total) by mouth daily Take with the 30 mg capsule for a total of 90 mg daily, Disp: 30 capsule, Rfl: 3    gabapentin (NEURONTIN) 300 mg capsule, Take 1 capsule (300 mg total) by mouth 3 (three) times a day, Disp: 90 capsule, Rfl: 3    topiramate (TOPAMAX) 100 mg tablet, Take 1 tablet (100 mg total) by mouth 2 (two) times a day, Disp: 60 tablet, Rfl: 0    traZODone (DESYREL)  50 mg tablet, Take 1 tablet (50 mg total) by mouth daily at bedtime as needed for sleep, Disp: 30 tablet, Rfl: 3    Objective     Vitals:    02/11/25 1039   BP: 120/80   Pulse: 76           Mental Status Evaluation:    Appearance:  dressed appropriately, casually dressed   Mood:  anxious   Affect: normal range and intensity, appropriate    Speech:  normal rate and volume   Thought Content:  normal   Perceptual Disturbances: no auditory hallucinations, no visual hallucinations, denies auditory hallucinations when asked, does not appear responding to internal stimuli   Risk Potential: Suicidal ideation - None  Homicidal ideation - None  Potential for aggression - No   Insight:  fair   Judgment: fair   Motor Activity: no abnormal movements       Lab Results:   Results from last 6 Months   Lab Units 01/12/25  0621   WBC Thousand/uL 7.08   HEMOGLOBIN g/dL 14.9   HEMATOCRIT % 45.3   PLATELETS Thousands/uL 338      Results from last 6 Months   Lab Units 01/21/25  0532 01/14/25  0523 01/12/25  0621   POTASSIUM mmol/L 3.7   < > 3.0*   CHLORIDE mmol/L 103   < > 99   CO2 mmol/L 32   < > 25   BUN mg/dL 12   < > 18   CREATININE mg/dL 1.01   < > 0.80   CALCIUM mg/dL 9.5   < > 9.7   ALBUMIN g/dL  --   --  4.5   ALK PHOS U/L  --   --  48   ALT U/L  --   --  7   AST U/L  --   --  8*    < > = values in this interval not displayed.     Results from last 6 Months   Lab Units 12/03/24  1027   HEP C AB  Reactive*     Results from last 6 Months   Lab Units 12/03/24  1027   HIV-1 P24 ANTIGEN-BillawayLEX  Non-Reactive                   Diagnoses and all orders for this visit:    Bipolar 2 disorder (HCC)  -     topiramate (TOPAMAX) 100 mg tablet; Take 1 tablet (100 mg total) by mouth 2 (two) times a day    Unspecified mood disorder (rule out MDD vs bipolar disorder)  -     cariprazine (VRAYLAR) 4.5 MG capsule; Take 1 capsule (4.5 mg total) by mouth daily  -     DULoxetine (CYMBALTA) 60 mg delayed release capsule; Take 1 capsule (60 mg total) by  mouth daily Take with the 30 mg capsule for a total of 90 mg daily  -     gabapentin (NEURONTIN) 300 mg capsule; Take 1 capsule (300 mg total) by mouth 3 (three) times a day    Insomnia, unspecified type  -     traZODone (DESYREL) 50 mg tablet; Take 1 tablet (50 mg total) by mouth daily at bedtime as needed for sleep    Currently attempting to quit smoking  -     buPROPion (Wellbutrin XL) 150 mg 24 hr tablet; Take 1 tablet (150 mg total) by mouth daily    ADHD, predominantly hyperactive type  -     atoMOXetine (STRATTERA) 25 mg capsule; Take 1 capsule (25 mg total) by mouth daily    Opioid dependence on agonist therapy (HCC)  -     buprenorphine-naloxone (Suboxone) 2-0.5 mg; Place 1 Film (2 mg total) under the tongue daily    Generalized anxiety disorder  -     clonazePAM (KlonoPIN) 1 mg tablet; Take 0.5 tablet after lunch and 1 tablet at bedtime         The writer states that she continues to take Vraylar as prescribed and so writer refilled.  Writer also refilled trazodone and topiramate as prescribed by [facility] but she ran out of medicine and ask for 1 month's refill.  We restarted the Strattera 25 mg the patient used to take before and continued to take after discharge although it was not prescribed by inpatient unit but the patient feels improvement of her attention on taking that medicine.  Wellbutrin was also restarted to address patient's depression and attempt to stop smoking or waiting.  At the same time increase of Cymbalta and Neurontin were not helpful.  The writer and patient agreed that a lower dose were not helpful enough, and Cymbalta was decreased back to 60 mg and Neurontin to 300 mg 3 times a day to somehow decrease polypharmacy.  At the same time clonazepam was prescribed to address patient's persistent anxiety that affects her sleep and daytime functioning.  The writer prescribed a lower dose of clonazepam just 0.5 mg after lunch and 1 mg at bedtime instead of 3 mg daily that the  patient used to take, slowly tapering her clonazepam.  The patient anxiety did not improve during her inpatient treatment on outpatient switch to disability.  Benzodiazepines.  There is no history of patient abusing benzodiazepines and continuation of Suboxone makes taking benzodiazepines by patient with opiate use disorder as a safer option.    The patient was in agreement to follow up with his follow-up appointments and was satisfied with our treatment plan.  She will not take Sublocade shots      Risks / Benefits of Treatment:    Risks, benefits, and possible side effects of medications explained to patient including risk of parkinsonian symptoms, Tardive Dyskinesia and metabolic syndrome related to treatment with antipsychotic medications and risks of misuse, abuse or dependence, sedation and respiratory depression related to treatment with benzodiazepine medications. The patient verbalizes understanding and agreement for treatment.      This note was not shared with the patient due to this is a psychotherapy note     Counseling / Coordination of Care  Total time spent today 30 minutes. Greater than 50% of total time was spent with the patient and / or family counseling and / or coordination of care.     Trell Payton MD      Therapy was consistent with motivational and supportive therapy lasted more than 16 minutes.

## 2025-02-11 NOTE — ASSESSMENT & PLAN NOTE
Orders:    topiramate (TOPAMAX) 100 mg tablet; Take 1 tablet (100 mg total) by mouth 2 (two) times a day

## 2025-02-11 NOTE — ASSESSMENT & PLAN NOTE
Orders:    clonazePAM (KlonoPIN) 1 mg tablet; Take 0.5 tablet after lunch and 1 tablet at bedtime

## 2025-02-11 NOTE — ASSESSMENT & PLAN NOTE
Orders:    buprenorphine-naloxone (Suboxone) 2-0.5 mg; Place 1 Film (2 mg total) under the tongue daily

## 2025-02-17 ENCOUNTER — OFFICE VISIT (OUTPATIENT)
Dept: PSYCHIATRY | Facility: CLINIC | Age: 40
End: 2025-02-17
Payer: COMMERCIAL

## 2025-02-17 DIAGNOSIS — F11.91 OPIOID USE DISORDER IN REMISSION: Primary | ICD-10-CM

## 2025-02-17 PROCEDURE — H0006 ALCOHOL AND/OR DRUG SERVICES: HCPCS

## 2025-02-17 NOTE — PSYCH
Geno Lopez  02/17/25  Start Time: 1010  Stop Time: 1055  Total Visit Time: 45 minutes    Visit type: In person    Recovery needs addressed during session: Basic Needs, Emotional/Mental Health, and Social    Notes (DAP Format)  DATA: Patient presented for in-person, scheduled visit with this CM.   Patient is looking to re-establish care with [facility] following IP stay at [facility].     Patient has remained sober through altered mental status, would like to work towards small goals with this CM such as CRS certification, License reinstatement.   This CM encouraged patient to engage with Annette BRANHAM who was also present during today's session.     ASSESSMENT: Patient did present alert and oriented.  Patient was in high spirits, no concern to this CM.   Patient mentioned the acceptance of previous state of paranoia to which patient has re-engaged with Dr. PARK to update all necessary medications.   Patient otherwise doing well, low risk SI/HI    PLAN:  Patient will schedule with CRS as well as maintain assigned treatment team     Referrals made during this visit: CR[facility]    Recovery connections: [facility]     Next appointment: Follow up in two weeks

## 2025-03-03 ENCOUNTER — OFFICE VISIT (OUTPATIENT)
Dept: PSYCHIATRY | Facility: CLINIC | Age: 40
End: 2025-03-03
Payer: COMMERCIAL

## 2025-03-03 DIAGNOSIS — F11.21 OPIOID USE DISORDER, MODERATE, IN EARLY REMISSION, ON MAINTENANCE THERAPY, DEPENDENCE (HCC): Primary | ICD-10-CM

## 2025-03-03 PROCEDURE — H0006 ALCOHOL AND/OR DRUG SERVICES: HCPCS

## 2025-03-05 ENCOUNTER — OFFICE VISIT (OUTPATIENT)
Dept: OBGYN CLINIC | Facility: CLINIC | Age: 40
End: 2025-03-05

## 2025-03-05 VITALS
WEIGHT: 185.2 LBS | DIASTOLIC BLOOD PRESSURE: 70 MMHG | SYSTOLIC BLOOD PRESSURE: 106 MMHG | BODY MASS INDEX: 31.62 KG/M2 | HEIGHT: 64 IN

## 2025-03-05 DIAGNOSIS — N97.9 FEMALE INFERTILITY: Primary | ICD-10-CM

## 2025-03-05 PROCEDURE — 99213 OFFICE O/P EST LOW 20 MIN: CPT | Performed by: OBSTETRICS & GYNECOLOGY

## 2025-03-05 RX ORDER — CLOMIPHENE CITRATE 50 MG/1
TABLET ORAL
Qty: 5 TABLET | Refills: 2 | Status: SHIPPED | OUTPATIENT
Start: 2025-03-05
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
## 2025-03-06 ENCOUNTER — TELEPHONE (OUTPATIENT)
Dept: PSYCHIATRY | Facility: CLINIC | Age: 40
End: 2025-03-06

## 2025-03-06 NOTE — TELEPHONE ENCOUNTER
Left voicemail informing patient and/or parent/guardian of the Psych Encounter form needing to be signed as a requirement from the insurance company for billing purposes. Patient can access form via [employer] and sign electronically.     Please make patient aware this form must be signed for each visit as a requirement to continue future visits with provider.

## 2025-03-07 ENCOUNTER — TELEPHONE (OUTPATIENT)
Dept: PSYCHIATRY | Facility: CLINIC | Age: 40
End: 2025-03-07

## 2025-03-07 ENCOUNTER — OFFICE VISIT (OUTPATIENT)
Dept: FAMILY MEDICINE CLINIC | Facility: CLINIC | Age: 40
End: 2025-03-07

## 2025-03-07 VITALS
BODY MASS INDEX: 31.19 KG/M2 | HEIGHT: 64 IN | TEMPERATURE: 96.9 F | HEART RATE: 87 BPM | RESPIRATION RATE: 14 BRPM | DIASTOLIC BLOOD PRESSURE: 70 MMHG | WEIGHT: 182.7 LBS | SYSTOLIC BLOOD PRESSURE: 100 MMHG | OXYGEN SATURATION: 97 %

## 2025-03-07 DIAGNOSIS — E66.811 CLASS 1 OBESITY DUE TO EXCESS CALORIES WITHOUT SERIOUS COMORBIDITY WITH BODY MASS INDEX (BMI) OF 31.0 TO 31.9 IN ADULT: Primary | ICD-10-CM

## 2025-03-07 DIAGNOSIS — E66.09 CLASS 1 OBESITY DUE TO EXCESS CALORIES WITHOUT SERIOUS COMORBIDITY WITH BODY MASS INDEX (BMI) OF 31.0 TO 31.9 IN ADULT: Primary | ICD-10-CM

## 2025-03-07 DIAGNOSIS — E03.9 HYPOTHYROID: ICD-10-CM

## 2025-03-07 DIAGNOSIS — M62.838 MUSCLE SPASMS OF NECK: ICD-10-CM

## 2025-03-07 PROCEDURE — 99213 OFFICE O/P EST LOW 20 MIN: CPT

## 2025-03-07 RX ORDER — TIRZEPATIDE 2.5 MG/.5ML
2.5 INJECTION, SOLUTION SUBCUTANEOUS WEEKLY
Qty: 2 ML | Refills: 0 | Status: SHIPPED | OUTPATIENT
Start: 2025-03-07

## 2025-03-07 RX ORDER — LEVOTHYROXINE SODIUM 50 UG/1
50 TABLET ORAL
Qty: 30 TABLET | Refills: 0 | Status: SHIPPED | OUTPATIENT
Start: 2025-03-07

## 2025-03-07 RX ORDER — CYCLOBENZAPRINE HCL 10 MG
10 TABLET ORAL 2 TIMES DAILY PRN
Qty: 90 TABLET | Refills: 1 | Status: SHIPPED | OUTPATIENT
Start: 2025-03-07

## 2025-03-07 NOTE — ASSESSMENT & PLAN NOTE
Prior Authorization Clinical Questions for Weight Management Pharmacotherapy    1. Does the patient have a contrainidcation to medication prescribed for weight management?: No  2. Does the patient have a diagnosis of obesity, confirmed by a BMI greater than or equal to 30 kg/m^2?: Yes  3. Does the patient have a BMI of greater than or equal to 27 kg/m^2 with at least one weight-related comorbidity/risk factor/complication (e.g. diabetes, dyslipidemia, coronary artery disease)?: Yes  4. Weight-related co-morbidities/risk factors: dyslipidemia, asthma/reactive airway disease  5. WEGOVY CVA Indication: Does patient have established documented cardiovascular disease (history of a prior heart attack (myocardial infarction), stroke, or symptomatic peripheral arterial disease (PAD)?: No  6. ZEPBOUND ALEX Indication: Does patient have documented ALEX diagnosed via sleep study (insurance will require copy of sleep study results for approval)?: No  7. Has the patient been on a weight loss regimen of low-calorie diet, increased physical activity, and lifestyle modifications for a minimum of 6 months?: Yes  8. Has the patient completed a comprehensive weight loss program (ie, Weight Watchers, Noom, Bariatrics, other jeremy on phone)? If so, what?: Yes   -- Q8 Further Explanation: Weight loss management team with [facility]   9. Does the patient have a history of type 2 diabetes?: No  10. Has the member tried and failed other weight loss medication within the past 12 months?: No  11. Will the member use requested medication in combination with another GLP agonist or weight loss drug?: No  12. Is the medication a controlled substance?: No     Baseline weight (in pounds): 185 lbs  Current weight (in pounds): 182 lbs  Weight loss percentage: -1.62%       Patient has been attempting weight loss for the past year with the following methods: weight loss management team, strict diet (limiting foods high in fat) and daily exercise (7 days a  week 1 hour a day in the morning with cardio and yoga).  Baseline weight ~200 lbs.  Lowest weight: 170s  Current weight 180s without success in losing further weight with current lifestyle modifications.  Recent BMP wnl.  Prior authorization for Mounjaro completed.  Education on importance of adherence and modifying diet/eating habits to have maximal results while on Mounjaro.  Instruction provided in AVS.  Follow up with PCP in 1 month to monitor response to therapy.    Orders:    Tirzepatide (Mounjaro) 2.5 MG/0.5ML SOAJ; Inject 2.5 mg under the skin once a week
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
## 2025-03-07 NOTE — PATIENT INSTRUCTIONS
Dietary Counseling:   Promote portion control, consumption of whole foods (whole grains, fruits and vegetables, fermented dairy), avoid processed foods, and limiting refined carbohydrates to decrease risk of inflammation and heart disease.  Limit sodium intake to 2400 mg per day, which is equivalent to 1 teaspoon of salt.  Abstain or decrease alcohol intake:  Women drink less than 1 alcoholic drink per day.    Encourage daily exercise for 30 minutes or 150 minutes per week:  Moderate-intensity aerobic exercises: Walking, jogging, running, cycling, and swimming.  Moderate intensity dynamic resistance exercise  Low impact exercises: Yoga/Pilates, golf, cycling.

## 2025-03-07 NOTE — TELEPHONE ENCOUNTER
PT came into office after receiving voicemail from provider in regards to psych encounter form. Office has psych encounter form for 3/3. Provider gone for the day, pt advised office will contact her if needed.

## 2025-03-10 ENCOUNTER — OFFICE VISIT (OUTPATIENT)
Dept: PSYCHIATRY | Facility: CLINIC | Age: 40
End: 2025-03-10

## 2025-03-10 ENCOUNTER — TELEPHONE (OUTPATIENT)
Dept: PSYCHIATRY | Facility: CLINIC | Age: 40
End: 2025-03-10

## 2025-03-10 DIAGNOSIS — F11.91 OPIOID USE DISORDER IN REMISSION: Primary | ICD-10-CM

## 2025-03-10 DIAGNOSIS — F19.90 SUBSTANCE USE DISORDER: ICD-10-CM

## 2025-03-10 NOTE — TELEPHONE ENCOUNTER
CRS called to inform Geno about Recovery [facility] meetings on Monday night close to her home. Geno was excited to go tonight and talk about it at her next session.

## 2025-03-10 NOTE — PSYCH
Geno Lopez  03/10/25  Start Time: 1005  Stop Time: 1105  Total Visit Time: 60 minutes    Visit type: In person    Recovery needs addressed during session: Basic Needs    Notes (DAP Format)  Geno discussed with the CRS her concerns over her budget. CRS shared her thoughts on perhaps addressing why Geno is overspending and what feelings go along with that opposed to looking at numbers. Geno shared about isolating herself to the point of it just being her fiance in her life. eGno shared that she has no friends and is not in close relationships with her family. CRS suggested going to mutual support groups in the area that may get Geno acquainted with other women in recovery. Geno has a goal of being a CRS herself and would like to go to classes in order to attain that goal in the near future. Geno also shared that she and her fiance are talking about having a baby in the next two years. Geno stated that she wants to have a baby with the man she wants to spend her life with that won't be taken away from her. CRS encouraged her to start with smaller personal steps. They will discuss it further in two weeks.    Referrals made during this visit N/A    Recovery connections: N/A    Next appointment 3/24/2025 10AM   Called by Dr. Quintana to attend this primary C/S of 35.1 week twins with premature rupture of membranes and transverse lie born to a 31 year old , A+, GBS unknown, all other pnl negative and immune. Pregnancy complicated by GDMA1, and hypothyroid on synthroid. Male A born with spontaneous cry and good tone. W/D/S/S. Routine resuscitation. Apgars 8,9 for color.

## 2025-03-12 NOTE — PSYCH
Geno Lopez  03/03/2025  Start Time: 1000  Stop Time: 1105  Total Visit Time: 65 minutes    Visit type: In person    Recovery needs addressed during session: Family, Education/Vocation/Life Skills, and Living & Financial [place]    Notes (DAP Format)  Patient presented in-person, scheduled visit     Patient connected with CRS for continued support.   May incorporate therapy as well     Referrals made during this visit CRS Services     Recovery connections: [facility]     Next appointment: two weeks

## 2025-03-17 ENCOUNTER — OFFICE VISIT (OUTPATIENT)
Dept: PSYCHIATRY | Facility: CLINIC | Age: 40
End: 2025-03-17
Payer: COMMERCIAL

## 2025-03-17 DIAGNOSIS — F11.20 OPIOID DEPENDENCE ON AGONIST THERAPY (HCC): Primary | ICD-10-CM

## 2025-03-17 PROCEDURE — H0006 ALCOHOL AND/OR DRUG SERVICES: HCPCS

## 2025-03-17 NOTE — PSYCH
Geno Lopez  03/17/25  Start Time: 1000  Stop Time: 1100  Total Visit Time: 60 minutes    Visit type: In person    Recovery needs addressed during session: Education/Vocation/Life Skills, Substance Use Disorder Recovery, and Other :Driving Privileges     Notes (DAP Format)  DATA:  Patient presented for in-person, scheduled visit with this CM.   Patient has been connected with [facility] CRS, mentioning a good connection.     Patient has been attending [facility] for additional support. Patient would like to gain stable transportation with the assistance of [employer] ride in order to attend visits with minor child.   Patient and this CM reviewed the application process for CRS certification in which patient and this CM will work together in creating a 1000 word essay.   Patient and this CM also looked up potential classes for patient to attend. One in [place] in July. Will work towards enrolling patient for [facility] D&A    Patient would like to work towards obtaining drivers license with [facility], but patient would like to obtain one thing at a time due to finances.     ASSESSMENT: Patient presented alert and oriented.   Patient was organized during this session. States to be taking all necessary medication as prescribed.   Patient did not present as a concern, low risk SI/HI    PLAN: Patient will continue to follow up with this CM as well as assigned treatment team .  Patient and this CM created/updated CM recovery plan     Referrals made during this visit: [facility] - [facility]S Class    Recovery connections: [facility], CRS     Next appointment: two weeks

## 2025-03-17 NOTE — BH RECOVERY SUPPORT
Geno Lopez  [id number]  03/17/25      Substance Use Diagnosis: Opioid Dependence on agonist therapy  Medication Assisted Treatment: yes Buprenorphine-naloxone 2-.05mg.   Plan type: Initial  Patient strengths:   Commitment to long-term, sustained remission  Motivation to continue education  Improvement on consistency with treatment team     Domains  Educational/Vocational : Patient would like to obtain CRS certification    Action Steps:  Review upcoming CRS classes   Apply and attend necessary trainings.  Maintain regular sessions with patient's CRS to assist with lived-in experience   Target Date 09/17/2025  Additional Comments: CM will assist patient in completing additional steps of application/training process     Living & Financial Page: Patient will work on a Budget     Action Steps:  Patient and CM will review spending throughout the month and improve on certain areas   Patient will work towards obtaining license to help offset the cost of transportation   Target Date 09/17/2025  Additional Comments NA    Employment : Patient will work towards obtaining employment    Action Steps:  Gain CRS certification through the [facility] [employer]   Work towards obtaining license with [employer]   Review potential employment opportunities at a part-time basis  Target Date 09/17/2025  Additional Comments NA    Did the patient participate in the development of this plan to meet the patient's recovery needs? yes

## 2025-03-24 ENCOUNTER — OFFICE VISIT (OUTPATIENT)
Dept: PSYCHIATRY | Facility: CLINIC | Age: 40
End: 2025-03-24

## 2025-03-24 DIAGNOSIS — F19.90 SUBSTANCE USE DISORDER: Primary | ICD-10-CM

## 2025-03-24 NOTE — BH RECOVERY SUPPORT
Geno Lopez  03/24/25  Start Time: 1000  Stop Time: 1100  Total Visit Time: 60 minutes    Visit type: In person    Recovery needs addressed during session: Education/Vocation/Life Skills    Notes (DAP Format)  Geno talked with CRS about goals for her future and her connections with [facility] [place] to get more resources. CRS emailed Gisela Verduzco at [facility] to help Geno find courses to attend to become a CRS herself. Geno was extremely hopeful and ready to start making changes to reach her goals. Geno shared with the CRS some obstacles with her boyfriend and his willingness to help, but she is working through them to become self sufficient.    Referrals made during this visit [facility] [place]    Recovery connections: [facility]    Next appointment April 7th 10am

## 2025-03-24 NOTE — PSYCH
Geno Lopez  03/24/25  Start Time: 1000  Stop Time: 1100  Total Visit Time: 60 minutes    Visit type: In person    Recovery needs addressed during session: Education/Vocation/Life Skills    Notes (DAP Format)  Geno talked with CRS about goals for her future and her connections with [facility] [place] to get more resources. CRS emailed Gisela Verduzco at [facility] to help Geno find courses to attend to become a CRS herself. Geno was extremely hopeful and ready to start making changes to reach her goals. Geno shared with the CRS some obstacles with her boyfriend and his willingness to help, but she is working through them to become self sufficient.    Referrals made during this visit [facility] [place]    Recovery connections: [facility]    Next appointment April 7th 10am

## 2025-03-24 NOTE — BH TREATMENT PLAN
Plan Type:  Recovery       Plan Date: March 24, 2025  Service:  [facility]      Note: Recovery Plan    Geno Lopez,39 y.o.,female,[id number]      Substance Use History  Social History     Substance and Sexual Activity   Alcohol Use Not Currently    Comment: 5 years clean.     Social History     Substance and Sexual Activity   Drug Use Yes    Types: Marijuana         Substance History: THC      Medication Assisted Treatment: Yes Suboxone      Readmission in Last 30 Days: No      Plan Type: 60-day update      Patient's Strengths  Motivated, Persistent, positive attitude, goal oriented    Domain    Educational/Vocational  Action Steps Enroll in CRS course

## 2025-04-02 ENCOUNTER — APPOINTMENT (OUTPATIENT)
Dept: LAB | Facility: HOSPITAL | Age: 40
End: 2025-04-02
Payer: COMMERCIAL

## 2025-04-02 ENCOUNTER — OFFICE VISIT (OUTPATIENT)
Dept: OBGYN CLINIC | Facility: CLINIC | Age: 40
End: 2025-04-02

## 2025-04-02 ENCOUNTER — TELEPHONE (OUTPATIENT)
Dept: FAMILY MEDICINE CLINIC | Facility: CLINIC | Age: 40
End: 2025-04-02

## 2025-04-02 VITALS — DIASTOLIC BLOOD PRESSURE: 70 MMHG | BODY MASS INDEX: 31.1 KG/M2 | WEIGHT: 181.2 LBS | SYSTOLIC BLOOD PRESSURE: 110 MMHG

## 2025-04-02 DIAGNOSIS — E03.9 HYPOTHYROID: ICD-10-CM

## 2025-04-02 DIAGNOSIS — T30.0 BURN: ICD-10-CM

## 2025-04-02 DIAGNOSIS — N92.6 MISSED MENSES: Primary | ICD-10-CM

## 2025-04-02 DIAGNOSIS — M62.838 MUSCLE SPASMS OF NECK: ICD-10-CM

## 2025-04-02 DIAGNOSIS — E78.5 HLD (HYPERLIPIDEMIA): ICD-10-CM

## 2025-04-02 LAB
B-HCG SERPL-ACNC: <0.6 MIU/ML (ref 0–5)
SL AMB POCT URINE HCG: NEGATIVE

## 2025-04-02 PROCEDURE — 36415 COLL VENOUS BLD VENIPUNCTURE: CPT | Performed by: OBSTETRICS & GYNECOLOGY

## 2025-04-02 PROCEDURE — 81025 URINE PREGNANCY TEST: CPT | Performed by: OBSTETRICS & GYNECOLOGY

## 2025-04-02 PROCEDURE — 99213 OFFICE O/P EST LOW 20 MIN: CPT | Performed by: OBSTETRICS & GYNECOLOGY

## 2025-04-02 PROCEDURE — 84702 CHORIONIC GONADOTROPIN TEST: CPT | Performed by: OBSTETRICS & GYNECOLOGY

## 2025-04-02 RX ORDER — SILVER SULFADIAZINE 10 MG/G
CREAM TOPICAL DAILY
Qty: 30 G | Refills: 1 | Status: SHIPPED | OUTPATIENT
Start: 2025-04-02
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
## 2025-04-02 NOTE — TELEPHONE ENCOUNTER
Pt is requesting medication refill for   atorvastatin (LIPITOR) 40 mg tablet     cyclobenzaprine (FLEXERIL) 10 mg tablet   levothyroxine 50 mcg tablet     Please send to pharmacy on file

## 2025-04-02 NOTE — TELEPHONE ENCOUNTER
Received [employer] request from  [place] [employer] received on 04/02/25. Request was scanned into chart and faxed to [facility].

## 2025-04-03 ENCOUNTER — OFFICE VISIT (OUTPATIENT)
Dept: PSYCHIATRY | Facility: CLINIC | Age: 40
End: 2025-04-03
Payer: COMMERCIAL

## 2025-04-03 DIAGNOSIS — F11.20 OPIOID DEPENDENCE ON AGONIST THERAPY (HCC): Primary | ICD-10-CM

## 2025-04-03 PROCEDURE — H0006 ALCOHOL AND/OR DRUG SERVICES: HCPCS

## 2025-04-03 RX ORDER — LEVOTHYROXINE SODIUM 50 UG/1
50 TABLET ORAL
Qty: 30 TABLET | Refills: 1 | Status: SHIPPED | OUTPATIENT
Start: 2025-04-03

## 2025-04-03 RX ORDER — CYCLOBENZAPRINE HCL 10 MG
10 TABLET ORAL 2 TIMES DAILY PRN
Qty: 90 TABLET | Refills: 1 | Status: SHIPPED | OUTPATIENT
Start: 2025-04-03

## 2025-04-03 RX ORDER — ATORVASTATIN CALCIUM 40 MG/1
40 TABLET, FILM COATED ORAL
Qty: 30 TABLET | Refills: 1 | Status: SHIPPED | OUTPATIENT
Start: 2025-04-03

## 2025-04-07 ENCOUNTER — OFFICE VISIT (OUTPATIENT)
Dept: PSYCHIATRY | Facility: CLINIC | Age: 40
End: 2025-04-07
Payer: COMMERCIAL

## 2025-04-07 DIAGNOSIS — Z87.898 HISTORY OF SUBSTANCE USE: Primary | ICD-10-CM

## 2025-04-07 PROCEDURE — H0047 ALCOHOL/DRUG ABUSE SVC NOS: HCPCS

## 2025-04-07 NOTE — TELEPHONE ENCOUNTER
Writer called and left message to reschedule appointment for today 3/18 at 11am due to Shanita not being int he office until later.  
within normal limits

## 2025-04-07 NOTE — PSYCH
Geno Lopez  04/07/25  Start Time: 1000  Stop Time: 1100  Total Visit Time: 60 minutes    Visit type: In person    Recovery needs addressed during session: Substance Use Disorder Recovery    Notes (DAP Format)  Geno discussed some new coping skills and meetings that are of interest to her. Geno has been building her supports and has been feeling better because of that. Geno has expressed wanting to go on the sublicade shot and to wean off of that. Geno says that she was feeling more purpose in life and wants to continue building a network within the community with trauma yoga and [facility]. CRS is encouraging Geno to put these things first in her life to build her self esteem and help her to feel empowered.     Referrals made during this visit [facility]    Recovery connections: [facility], [facility], [facility]    Next appointment 4/21/2025 10AM

## 2025-04-14 ENCOUNTER — OFFICE VISIT (OUTPATIENT)
Dept: PSYCHIATRY | Facility: CLINIC | Age: 40
End: 2025-04-14
Payer: COMMERCIAL

## 2025-04-14 DIAGNOSIS — F11.20 OPIOID DEPENDENCE ON AGONIST THERAPY (HCC): Primary | ICD-10-CM

## 2025-04-14 PROCEDURE — H0006 ALCOHOL AND/OR DRUG SERVICES: HCPCS

## 2025-04-16 ENCOUNTER — OFFICE VISIT (OUTPATIENT)
Dept: FAMILY MEDICINE CLINIC | Facility: CLINIC | Age: 40
End: 2025-04-16

## 2025-04-16 VITALS
DIASTOLIC BLOOD PRESSURE: 80 MMHG | OXYGEN SATURATION: 97 % | HEIGHT: 64 IN | RESPIRATION RATE: 16 BRPM | WEIGHT: 178 LBS | TEMPERATURE: 97.6 F | SYSTOLIC BLOOD PRESSURE: 118 MMHG | HEART RATE: 85 BPM | BODY MASS INDEX: 30.39 KG/M2

## 2025-04-16 DIAGNOSIS — E66.811 CLASS 1 OBESITY DUE TO EXCESS CALORIES WITHOUT SERIOUS COMORBIDITY WITH BODY MASS INDEX (BMI) OF 31.0 TO 31.9 IN ADULT: Primary | ICD-10-CM

## 2025-04-16 DIAGNOSIS — E66.09 CLASS 1 OBESITY DUE TO EXCESS CALORIES WITHOUT SERIOUS COMORBIDITY WITH BODY MASS INDEX (BMI) OF 31.0 TO 31.9 IN ADULT: Primary | ICD-10-CM

## 2025-04-16 PROBLEM — G47.00 INSOMNIA: Status: RESOLVED | Noted: 2025-01-12 | Resolved: 2025-04-16

## 2025-04-16 PROBLEM — Z00.8 MEDICAL CLEARANCE FOR PSYCHIATRIC ADMISSION: Status: RESOLVED | Noted: 2025-01-12 | Resolved: 2025-04-16

## 2025-04-16 PROCEDURE — 99213 OFFICE O/P EST LOW 20 MIN: CPT | Performed by: FAMILY MEDICINE

## 2025-04-16 RX ORDER — TIRZEPATIDE 2.5 MG/.5ML
2.5 INJECTION, SOLUTION SUBCUTANEOUS WEEKLY
Qty: 2 ML | Refills: 0 | Status: SHIPPED | OUTPATIENT
Start: 2025-04-16

## 2025-04-16 RX ORDER — TIRZEPATIDE 2.5 MG/.5ML
2.5 INJECTION, SOLUTION SUBCUTANEOUS WEEKLY
Qty: 2 ML | Refills: 0 | Status: SHIPPED | OUTPATIENT
Start: 2025-04-16 | End: 2025-04-16 | Stop reason: ALTCHOICE

## 2025-04-16 NOTE — PSYCH
Geno Lopez  04/03/2025  Start Time: 1415  Stop Time: 1500  Total Visit Time: 45 minutes    Visit type: In person    Recovery needs addressed during session: Basic Needs    Notes (DAP Format)  DATA:   Patient presented for in-person, scheduled visit  Patient requested assistance in completing renewal packet for social security    ASSESSMENT: Patient presented alert and oriented.   Patient did not present as a concern, low risk SI/HI    PLAN:  Patient will continue to meet with this CM as well as assigned treatment team     Referrals made during this visit no formal referrals     Recovery connections: [facility]     Next appointment: two weeks

## 2025-04-16 NOTE — PSYCH
Geno Lopez  04/14/2025  Start Time: 1000  Stop Time: 1056  Total Visit Time: 56 minutes    Visit type: In person    Recovery needs addressed during session: Basic Needs and Education/Vocation/Life Skills    Notes (DAP Format)  DATA: Patient presented for in-person, scheduled visit with this CM.     Patient and this CM discussed patient's continued involved with [facility] to which patient completed ALOK for CRS/.   Patient and this CM discussed the benefits this can have towards patients hopes of regaining guardianship of minor child as  is also from the [employer] program which is connected with child and youth services.     ASSESSMENT: Patient presented alert and oriented.   Patient states home life to be well at this time, significant other and patient have been planting house plants which have a positive affect on patient and overall relationship.   Patient does not present as a concern to this CM, low risk SI/HI    PLAN:   Patient will continue to meet with this CM as well as assigned treatment team.   Patient is continuing to work towards starting CRS courses. Will stay on task on gaining information for free courses. Working with CRS as well to get this information.  Will follow up in two weeks    Referrals made during this visit: No formal referrals made during this session     Recovery connections: [facility]     Next appointment: two weeks

## 2025-04-16 NOTE — ASSESSMENT & PLAN NOTE
Attempting weight loss for the past year -weight loss management referral, calorie deficit diet and daily exercise.  Patient has established healthy habits including diet and exercise with weight loss of approx 15 pounds   Baseline weight ~200 lbs.  Current weight 178s   Goal 170  Patient would benefit from mounjaro to aid on weight loss, we talked about side effects, contraception to avoid pregnancy.      Prior Authorization Clinical Questions for Weight Management Pharmacotherapy    1. Does the patient have a contrainidcation to medication prescribed for weight management?: No  2. Does the patient have a diagnosis of obesity, confirmed by a BMI greater than or equal to 30 kg/m^2?: Yes  3. Does the patient have a BMI of greater than or equal to 27 kg/m^2 with at least one weight-related comorbidity/risk factor/complication (e.g. diabetes, dyslipidemia, coronary artery disease)?: Yes  4. Weight-related co-morbidities/risk factors: depression  5. WEGOVY CVA Indication: Does patient have established documented cardiovascular disease (history of a prior heart attack (myocardial infarction), stroke, or symptomatic peripheral arterial disease (PAD)?: No  6. ZEPBOUND ALEX Indication: Does patient have documented ALEX diagnosed via sleep study (insurance will require copy of sleep study results for approval)?: No  7. Has the patient been on a weight loss regimen of low-calorie diet, increased physical activity, and lifestyle modifications for a minimum of 6 months?: Yes  8. Has the patient completed a comprehensive weight loss program (ie, Weight Watchers, Noom, Bariatrics, other jeremy on phone)? If so, what?: Yes   -- Q8 Further Explanation: weight loss program [facility]   9. Does the patient have a history of type 2 diabetes?: No  10. Has the member tried and failed other weight loss medication within the past 12 months?: No  11. Will the member use requested medication in combination with another GLP agonist or weight loss  drug?: No  12. Is the medication a controlled substance?: No  For renewals: Has the patient had a positive outcome with current weight management medication (i.e., change in body weight of at least 4-5% after 12-16 weeks on maximally tolerated dose)?: No     Baseline weight (in pounds): 185 lbs  Current weight (in pounds): 178 lbs  Weight loss percentage: -3.78%         Orders:    Ambulatory Referral to Weight Management; Future    Tirzepatide (Mounjaro) 2.5 MG/0.5ML SOAJ; Inject 2.5 mg under the skin once a week

## 2025-04-16 NOTE — PROGRESS NOTES
Name: Geno Lopez      : 1985      MRN: [id number]  Encounter Provider: Bing Reese MD  Encounter Date: 2025   Encounter department: [facility]  :  Assessment & Plan  Class 1 obesity due to excess calories without serious comorbidity with body mass index (BMI) of 31.0 to 31.9 in adult  Attempting weight loss for the past year -weight loss management referral, calorie deficit diet and daily exercise.  Patient has established healthy habits including diet and exercise with weight loss of approx 15 pounds   Baseline weight ~200 lbs.  Current weight 178s   Goal 170  Patient would benefit from mounjaro to aid on weight loss, we talked about side effects, contraception to avoid pregnancy.      Prior Authorization Clinical Questions for Weight Management Pharmacotherapy    1. Does the patient have a contrainidcation to medication prescribed for weight management?: No  2. Does the patient have a diagnosis of obesity, confirmed by a BMI greater than or equal to 30 kg/m^2?: Yes  3. Does the patient have a BMI of greater than or equal to 27 kg/m^2 with at least one weight-related comorbidity/risk factor/complication (e.g. diabetes, dyslipidemia, coronary artery disease)?: Yes  4. Weight-related co-morbidities/risk factors: depression  5. WEGOVY CVA Indication: Does patient have established documented cardiovascular disease (history of a prior heart attack (myocardial infarction), stroke, or symptomatic peripheral arterial disease (PAD)?: No  6. ZEPBOUND ALEX Indication: Does patient have documented ALEX diagnosed via sleep study (insurance will require copy of sleep study results for approval)?: No  7. Has the patient been on a weight loss regimen of low-calorie diet, increased physical activity, and lifestyle modifications for a minimum of 6 months?: Yes  8. Has the patient completed a comprehensive weight loss program (ie, Weight Watchers, Noom, Bariatrics, other  jeremy on phone)? If so, what?: Yes   -- Q8 Further Explanation: weight loss program [facility]   9. Does the patient have a history of type 2 diabetes?: No  10. Has the member tried and failed other weight loss medication within the past 12 months?: No  11. Will the member use requested medication in combination with another GLP agonist or weight loss drug?: No  12. Is the medication a controlled substance?: No  For renewals: Has the patient had a positive outcome with current weight management medication (i.e., change in body weight of at least 4-5% after 12-16 weeks on maximally tolerated dose)?: No     Baseline weight (in pounds): 185 lbs  Current weight (in pounds): 178 lbs  Weight loss percentage: -3.78%         Orders:    Ambulatory Referral to Weight Management; Future    Tirzepatide (Mounjaro) 2.5 MG/0.5ML SOAJ; Inject 2.5 mg under the skin once a week         History of Present Illness   Patient presented to the office today to follow up on obesity BMI is 30, she was in the office few months ago saw Dr. Ruiz, who provided great advise for patient in terms of diet and exercise, patient follows low calorie low carb diet, intermittent fasting and exercises daily 40 mins at home , 1hr at the gym - cardiovascular exercise.  Despite developing healthy habits , patient still not at goal, patient requests prescription medication to aid with weight loss, mounjaro was prescribed by Dr. Ruiz on her last appointment, wegovy by me in December but patient had issues with prior authorization/ insurance. We will prescribe mounjaro once again, PA completed, will call the pharmacy.       Review of Systems   Constitutional:  Negative for chills and fever.   HENT:  Negative for ear pain and sore throat.    Eyes:  Negative for pain and visual disturbance.   Respiratory:  Negative for cough and shortness of breath.    Cardiovascular:  Negative for chest pain and palpitations.   Gastrointestinal:  Negative for abdominal pain  "and vomiting.   Genitourinary:  Negative for dysuria and hematuria.   Musculoskeletal:  Negative for arthralgias and back pain.   Skin:  Negative for color change and rash.   Neurological:  Negative for seizures and syncope.   All other systems reviewed and are negative.      Objective   /80 (BP Location: Right arm, Patient Position: Sitting, Cuff Size: Standard)   Pulse 85   Temp 97.6 °F (36.4 °C) (Temporal)   Resp 16   Ht 5' 4\" (1.626 m)   Wt 80.7 kg (178 lb)   LMP 03/01/2025   SpO2 97%   BMI 30.55 kg/m²      Physical Exam  Vitals and nursing note reviewed.   Constitutional:       General: She is not in acute distress.     Appearance: Normal appearance. She is well-developed. She is obese.   HENT:      Head: Normocephalic and atraumatic.      Right Ear: Tympanic membrane, ear canal and external ear normal.      Left Ear: Tympanic membrane, ear canal and external ear normal.      Nose: Nose normal.      Mouth/Throat:      Mouth: Mucous membranes are moist.   Eyes:      Extraocular Movements: Extraocular movements intact.      Conjunctiva/sclera: Conjunctivae normal.      Pupils: Pupils are equal, round, and reactive to light.   Cardiovascular:      Rate and Rhythm: Normal rate and regular rhythm.      Pulses: Normal pulses.      Heart sounds: Normal heart sounds. No murmur heard.  Pulmonary:      Effort: Pulmonary effort is normal. No respiratory distress.      Breath sounds: Normal breath sounds.   Abdominal:      General: Bowel sounds are normal.      Palpations: Abdomen is soft.      Tenderness: There is no abdominal tenderness.   Musculoskeletal:         General: No swelling. Normal range of motion.      Cervical back: Normal range of motion and neck supple.   Skin:     General: Skin is warm and dry.      Capillary Refill: Capillary refill takes less than 2 seconds.   Neurological:      General: No focal deficit present.      Mental Status: She is alert and oriented to person, place, and time. " Mental status is at baseline.      Cranial Nerves: No cranial nerve deficit.      Sensory: No sensory deficit.   Psychiatric:         Mood and Affect: Mood normal.         Behavior: Behavior normal.

## 2025-04-21 ENCOUNTER — APPOINTMENT (EMERGENCY)
Dept: RADIOLOGY | Facility: HOSPITAL | Age: 40
End: 2025-04-21
Payer: COMMERCIAL

## 2025-04-21 ENCOUNTER — HOSPITAL ENCOUNTER (EMERGENCY)
Facility: HOSPITAL | Age: 40
Discharge: HOME | End: 2025-04-21
Attending: EMERGENCY MEDICINE
Payer: COMMERCIAL

## 2025-04-21 ENCOUNTER — OFFICE VISIT (OUTPATIENT)
Dept: PSYCHIATRY | Facility: CLINIC | Age: 40
End: 2025-04-21
Payer: COMMERCIAL

## 2025-04-21 VITALS
OXYGEN SATURATION: 100 % | DIASTOLIC BLOOD PRESSURE: 72 MMHG | WEIGHT: 180.78 LBS | TEMPERATURE: 98.1 F | BODY MASS INDEX: 31.03 KG/M2 | HEART RATE: 83 BPM | SYSTOLIC BLOOD PRESSURE: 108 MMHG | RESPIRATION RATE: 20 BRPM

## 2025-04-21 DIAGNOSIS — E78.00 HYPERCHOLESTEREMIA: ICD-10-CM

## 2025-04-21 DIAGNOSIS — Z87.898 HISTORY OF SUBSTANCE USE: Primary | ICD-10-CM

## 2025-04-21 DIAGNOSIS — R07.89 ATYPICAL CHEST PAIN: Primary | ICD-10-CM

## 2025-04-21 DIAGNOSIS — I49.3 PREMATURE VENTRICULAR CONTRACTIONS: ICD-10-CM

## 2025-04-21 LAB
ALBUMIN SERPL BCG-MCNC: 4.3 G/DL (ref 3.5–5)
ALP SERPL-CCNC: 43 U/L (ref 34–104)
ALT SERPL W P-5'-P-CCNC: 9 U/L (ref 7–52)
ANION GAP SERPL CALCULATED.3IONS-SCNC: 7 MMOL/L (ref 4–13)
AST SERPL W P-5'-P-CCNC: 11 U/L (ref 13–39)
ATRIAL RATE: 80 BPM
BASOPHILS # BLD AUTO: 0.03 THOUSANDS/ÂΜL (ref 0–0.1)
BASOPHILS NFR BLD AUTO: 0 % (ref 0–1)
BILIRUB SERPL-MCNC: 0.18 MG/DL (ref 0.2–1)
BUN SERPL-MCNC: 16 MG/DL (ref 5–25)
CALCIUM SERPL-MCNC: 8.5 MG/DL (ref 8.4–10.2)
CARDIAC TROPONIN I PNL SERPL HS: <2 NG/L (ref ?–50)
CHLORIDE SERPL-SCNC: 109 MMOL/L (ref 96–108)
CO2 SERPL-SCNC: 24 MMOL/L (ref 21–32)
CREAT SERPL-MCNC: 0.97 MG/DL (ref 0.6–1.3)
EOSINOPHIL # BLD AUTO: 0.08 THOUSAND/ÂΜL (ref 0–0.61)
EOSINOPHIL NFR BLD AUTO: 1 % (ref 0–6)
ERYTHROCYTE [DISTWIDTH] IN BLOOD BY AUTOMATED COUNT: 13 % (ref 11.6–15.1)
GFR SERPL CREATININE-BSD FRML MDRD: 74 ML/MIN/1.73SQ M
GLUCOSE SERPL-MCNC: 92 MG/DL (ref 65–140)
HCT VFR BLD AUTO: 38.9 % (ref 34.8–46.1)
HGB BLD-MCNC: 12.3 G/DL (ref 11.5–15.4)
IMM GRANULOCYTES # BLD AUTO: 0.05 THOUSAND/UL (ref 0–0.2)
IMM GRANULOCYTES NFR BLD AUTO: 1 % (ref 0–2)
LYMPHOCYTES # BLD AUTO: 2.59 THOUSANDS/ÂΜL (ref 0.6–4.47)
LYMPHOCYTES NFR BLD AUTO: 36 % (ref 14–44)
MAGNESIUM SERPL-MCNC: 2.2 MG/DL (ref 1.9–2.7)
MCH RBC QN AUTO: 29.4 PG (ref 26.8–34.3)
MCHC RBC AUTO-ENTMCNC: 31.6 G/DL (ref 31.4–37.4)
MCV RBC AUTO: 93 FL (ref 82–98)
MONOCYTES # BLD AUTO: 0.34 THOUSAND/ÂΜL (ref 0.17–1.22)
MONOCYTES NFR BLD AUTO: 5 % (ref 4–12)
NEUTROPHILS # BLD AUTO: 4.09 THOUSANDS/ÂΜL (ref 1.85–7.62)
NEUTS SEG NFR BLD AUTO: 57 % (ref 43–75)
NRBC BLD AUTO-RTO: 0 /100 WBCS
P AXIS: 48 DEGREES
PHOSPHATE SERPL-MCNC: 2.9 MG/DL (ref 2.7–4.5)
PLATELET # BLD AUTO: 244 THOUSANDS/UL (ref 149–390)
PMV BLD AUTO: 11.2 FL (ref 8.9–12.7)
POTASSIUM SERPL-SCNC: 3.9 MMOL/L (ref 3.5–5.3)
PR INTERVAL: 138 MS
PROT SERPL-MCNC: 6.7 G/DL (ref 6.4–8.4)
QRS AXIS: 48 DEGREES
QRSD INTERVAL: 84 MS
QT INTERVAL: 376 MS
QTC INTERVAL: 434 MS
RBC # BLD AUTO: 4.19 MILLION/UL (ref 3.81–5.12)
SODIUM SERPL-SCNC: 140 MMOL/L (ref 135–147)
T WAVE AXIS: 25 DEGREES
TSH SERPL DL<=0.05 MIU/L-ACNC: 1.03 UIU/ML (ref 0.45–4.5)
VENTRICULAR RATE: 80 BPM
WBC # BLD AUTO: 7.18 THOUSAND/UL (ref 4.31–10.16)

## 2025-04-21 PROCEDURE — 83735 ASSAY OF MAGNESIUM: CPT | Performed by: EMERGENCY MEDICINE

## 2025-04-21 PROCEDURE — 85025 COMPLETE CBC W/AUTO DIFF WBC: CPT | Performed by: EMERGENCY MEDICINE

## 2025-04-21 PROCEDURE — 84443 ASSAY THYROID STIM HORMONE: CPT | Performed by: EMERGENCY MEDICINE

## 2025-04-21 PROCEDURE — 99284 EMERGENCY DEPT VISIT MOD MDM: CPT | Performed by: EMERGENCY MEDICINE

## 2025-04-21 PROCEDURE — 36415 COLL VENOUS BLD VENIPUNCTURE: CPT | Performed by: EMERGENCY MEDICINE

## 2025-04-21 PROCEDURE — 84484 ASSAY OF TROPONIN QUANT: CPT | Performed by: EMERGENCY MEDICINE

## 2025-04-21 PROCEDURE — 93005 ELECTROCARDIOGRAM TRACING: CPT

## 2025-04-21 PROCEDURE — H0047 ALCOHOL/DRUG ABUSE SVC NOS: HCPCS

## 2025-04-21 PROCEDURE — 71046 X-RAY EXAM CHEST 2 VIEWS: CPT

## 2025-04-21 PROCEDURE — 80053 COMPREHEN METABOLIC PANEL: CPT | Performed by: EMERGENCY MEDICINE

## 2025-04-21 PROCEDURE — 93010 ELECTROCARDIOGRAM REPORT: CPT | Performed by: INTERNAL MEDICINE

## 2025-04-21 PROCEDURE — 84100 ASSAY OF PHOSPHORUS: CPT | Performed by: EMERGENCY MEDICINE

## 2025-04-21 PROCEDURE — 99285 EMERGENCY DEPT VISIT HI MDM: CPT

## 2025-04-21 NOTE — DISCHARGE INSTRUCTIONS
Please schedule an appointment for your echocardiogram. Our cardiology/heart station will reach out to you about the heart monitor (Holter monitor or Zio patch).  Please follow up with primary care clinic for further evaluation of your symptoms and also for a cholesterol recheck. Perhaps your dose of Lipitor can be decreased.

## 2025-04-21 NOTE — PSYCH
Geno Lopez  04/21/25  Start Time: 1004  Stop Time: 1100  Total Visit Time: 56 minutes    Visit type: In person    Recovery needs addressed during session: Substance Use Disorder Recovery    Notes (DAP Format)  CRS and Geno discussed an upcoming court appointment for her youngest son and his grandmother. Geno is worried about possible lies that will be spread around and how that may affect her time spent with her son. Geno also shared with CRS her excitement over taking a CRS course herself this summer and the goal of being self supporting and on her own. Geno would like to get out of her current relationship because she feels that its unhealthy for her. CRS shared with Geno some courses of action she can take towards meeting those goals and that they will work on a new treatment plan in two weeks to get the ball rolling.    Referrals made during this visit [facility]    Recovery connections: [facility]    Next appointment May 5th 10AM

## 2025-04-21 NOTE — ED NOTES
Vitals:    04/21/25 1122 04/21/25 1233 04/21/25 1235 04/21/25 1236   BP: 125/71 110/61 106/65 108/72   Pulse: 81 78 77 83   Patient Position - Orthostatic VS: Sitting Lying - Orthostatic VS Sitting - Orthostatic VS Standing - Orthostatic VS         Dejah Layton  04/21/25 1253
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
## 2025-04-25 DIAGNOSIS — E78.5 HLD (HYPERLIPIDEMIA): ICD-10-CM

## 2025-04-25 RX ORDER — ATORVASTATIN CALCIUM 40 MG/1
40 TABLET, FILM COATED ORAL
Qty: 100 TABLET | Refills: 1 | Status: SHIPPED | OUTPATIENT
Start: 2025-04-25

## 2025-04-26 DIAGNOSIS — Z72.0 CURRENTLY ATTEMPTING TO QUIT SMOKING: ICD-10-CM

## 2025-04-28 ENCOUNTER — OFFICE VISIT (OUTPATIENT)
Dept: PSYCHIATRY | Facility: CLINIC | Age: 40
End: 2025-04-28
Payer: COMMERCIAL

## 2025-04-28 DIAGNOSIS — F11.91 OPIOID USE DISORDER IN REMISSION: Primary | ICD-10-CM

## 2025-04-28 PROCEDURE — H0006 ALCOHOL AND/OR DRUG SERVICES: HCPCS

## 2025-04-28 RX ORDER — BUPROPION HYDROCHLORIDE 150 MG/1
150 TABLET ORAL DAILY
Qty: 100 TABLET | Refills: 4 | Status: SHIPPED | OUTPATIENT
Start: 2025-04-28

## 2025-05-02 ENCOUNTER — OFFICE VISIT (OUTPATIENT)
Dept: PSYCHIATRY | Facility: CLINIC | Age: 40
End: 2025-05-02
Payer: COMMERCIAL

## 2025-05-02 DIAGNOSIS — Z87.898 HISTORY OF SUBSTANCE USE: Primary | ICD-10-CM

## 2025-05-02 PROCEDURE — H0047 ALCOHOL/DRUG ABUSE SVC NOS: HCPCS

## 2025-05-02 NOTE — PSYCH
Geno Lopez  05/02/25  Start Time: 0955  Stop Time: 1052  Total Visit Time: 57 minutes    Visit type: In person    Recovery needs addressed during session: Substance Use Disorder Recovery    Notes (DAP Format)  Geno shared with CARROL that she was able to successfully enroll in college courses at [employer]. 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 CRS is encouraging her to continue making and reaching for goals despite any negative influences from her significant other. CARROL and Geno went over paperwork for her to get a discounted bus pass and Geno will bring it back to be filled out by Dr PARK at their visit next week. Geno is showing a lot of improvement in her self esteem and self motivation at this time in her process and CARROL is hoping to contiue supporting her in these endeavors.     Referrals made during this visit [facility] [address]    Recovery connections: [facility] [address]    Next appointment 5/19/25 10AM

## 2025-05-05 NOTE — PSYCH
Geno Lopez  04/28/2025  Start Time: 0958  Stop Time: 1043  Total Visit Time: 45 minutes    Visit type: In person    Recovery needs addressed during session: Education/Vocation/Life Skills    Notes (DAP Format)  Patient presented for in-person, scheduled visit with this CM.   Patient advised this CM of schooling opportunity to which patient has recently enrolled in bachelors degree with [employer]. Patient and this CM reviewed tuition and degree schedule to which patient will ultimately pay out over $90,000 in which this CM felt may be too much money. Patient and this CM discussed alternatives while still progressing towards patient's desire to expand education where this CM assisted patient in enrolling in [facility] which is local to patient and only a fraction of the cost, which may be covered by financial aid.     Patient presented alert and oriented, patient did not present as a concern to this CM, low risk SI/HI    Patient will continue to follow up with this CM as well as assigned treatment team.     Referrals made during this visit [facility]    Recovery connections: [facility], Psychiatry    Next appointment two weeks

## 2025-05-08 DIAGNOSIS — F41.1 GENERALIZED ANXIETY DISORDER: ICD-10-CM

## 2025-05-08 NOTE — TELEPHONE ENCOUNTER
Reason for call:   [x] Refill   [] Prior Auth  [] Other:     Office:   [] PCP/Provider -   [x] Specialty/Provider - psych, Vedeiapin    Medication:   Clonzepam 1.5 qd, 45      Pharmacy:   [facility]    [facility]   Does the patient have enough for 3 days?   [x] Yes   [] No - Send as HP to POD    [facility] Away Pharmacy   Does the patient have enough for 10 days?   [] Yes   [] No - Send as HP to POD

## 2025-05-09 RX ORDER — CLONAZEPAM 1 MG/1
TABLET ORAL
Qty: 45 TABLET | Refills: 1 | Status: SHIPPED | OUTPATIENT
Start: 2025-05-09

## 2025-05-09 NOTE — TELEPHONE ENCOUNTER
Refill must be reviewed and completed by the office or provider. The refill is unable to be approved or denied by the medication management team.  Medication cannot be delegated.    [id number] ** 04/08/2025 02/11/2025 clonazePAM (Tablet) 45.0 30 1 MG NA RED VEDENIAENRIQUE  [id number] ** 03/11/2025 02/11/2025 clonazePAM (Tablet) 45.0 30 1 MG NA RED VEDENIAPIN  [id number] ** 02/11/2025 02/11/2025 clonazePAM (Tablet) 45.0 30 1 MG NA RED VEDENIAPIN

## 2025-05-12 ENCOUNTER — OFFICE VISIT (OUTPATIENT)
Dept: PSYCHIATRY | Facility: CLINIC | Age: 40
End: 2025-05-12
Payer: COMMERCIAL

## 2025-05-12 DIAGNOSIS — F11.20 OPIOID DEPENDENCE ON AGONIST THERAPY (HCC): Primary | ICD-10-CM

## 2025-05-12 DIAGNOSIS — F11.20 OPIOID DEPENDENCE ON AGONIST THERAPY (HCC): ICD-10-CM

## 2025-05-12 PROCEDURE — H0006 ALCOHOL AND/OR DRUG SERVICES: HCPCS

## 2025-05-12 RX ORDER — BUPRENORPHINE AND NALOXONE 2; .5 MG/1; MG/1
2 FILM, SOLUBLE BUCCAL; SUBLINGUAL DAILY
Qty: 30 FILM | Refills: 2 | Status: SHIPPED | OUTPATIENT
Start: 2025-05-12

## 2025-05-12 NOTE — TELEPHONE ENCOUNTER
Patient had session with this CM.     Reviewed needed medication with this CM.     Vraylar - Patient is stating a prior auth is needed, Dania or Ric, are you able to help with that?    Suboxone, requesting refill.     Will send to provider for review.
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
## 2025-05-13 ENCOUNTER — TELEPHONE (OUTPATIENT)
Dept: PSYCHIATRY | Facility: CLINIC | Age: 40
End: 2025-05-13

## 2025-05-13 NOTE — TELEPHONE ENCOUNTER
Message received from Marlyn HORTON CM, that the Vraylar 4.5 mg cap requires a PA. Called and verified with the [facility] pharmacist that Vraylar does require a PA.     Called [facility] and completed a verbal PA for the Vraylar 4.5 mg caps with Laila, in Pharmacy Services at [facility]. Marked PA as URGENT. Per Laila, need to send any pertinent information to assist tin the PA approval. Faxed previous approved Vraylar 1.5 mg PA, last office visit with Dr. Payton, and hospital visit when Vraylar was titrated up to 4.5 mg caps.  Laila states that we should receive an answer within 24 hours.    Called and spoke to Geno, informing her of the Vraylar 4.5 mg cap verbal PA submission. Will call her back when response is received.    For your review.

## 2025-05-14 NOTE — TELEPHONE ENCOUNTER
Fax received form [facility] approving the Vraylar 4.5 mg caps PA from 5/13/25-5/13/26. Called the [facility] pharmacist and pharmacist states that she is aware of approval and that Geno already picked up the prescription.    For your review.    Will scan PA to [facility].

## 2025-05-19 ENCOUNTER — OFFICE VISIT (OUTPATIENT)
Dept: PSYCHIATRY | Facility: CLINIC | Age: 40
End: 2025-05-19
Payer: COMMERCIAL

## 2025-05-19 DIAGNOSIS — Z87.898 HISTORY OF SUBSTANCE USE: Primary | ICD-10-CM

## 2025-05-19 PROCEDURE — H0047 ALCOHOL/DRUG ABUSE SVC NOS: HCPCS
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
## 2025-05-20 NOTE — BH RECOVERY SUPPORT
Geno Lopez  [id number]  05/20/25         Substance Use Diagnosis: Opioid Use Disorder  Medication Assisted Treatment: yes suboxone  Plan type: Update/Review  Patient strengths: 1) Committed to treatment 2) Motivated to better herself 3) Family is a support    Domains  Emotional/Mental Health    Action Steps 1) Geno will make sure she is taking her medications as prescribed 2)Geno will meet with a therapist 3)Geno will take suggestions from the professionals  Target Date 90 Days  Additional Comments CRS will help Geno get scheduled with the therapist and follow up    Substance Use Disorder Recovery    Action Steps 1) Geno will attend a [facility] meeting and make connections 2)Geno will attend group at [facility]  Target Date 90 Days  Additional Comments CRS will encourage and remind Geno of groups    Employment    Action Steps 1) Geno will complete her training for her new job 2) Geno will follow through with her employment process  Target Date 90 Days  Additional Comments CRS will continue too encourage Geno that she is very capable of holding a job and prepare to support herself    Did the patient participate in the development of this plan to meet the patient's recovery needs? yes   Strong peripheral pulses

## 2025-05-21 ENCOUNTER — TELEPHONE (OUTPATIENT)
Dept: PSYCHIATRY | Facility: CLINIC | Age: 40
End: 2025-05-21

## 2025-05-21 NOTE — TELEPHONE ENCOUNTER
CRS called Geno to make sure she was scheduled with the therapist and to remind Geno to sign her recovery plan on [facility]

## 2025-05-23 ENCOUNTER — TELEPHONE (OUTPATIENT)
Dept: OTHER | Age: 40
End: 2025-05-23

## 2025-05-23 NOTE — TELEPHONE ENCOUNTER
Emy called stating that she cannot make it to the group today but she will be her for her next appt with CARROL Mcneill. Emy states that Annette can call her if needed.    For your review.

## 2025-05-24 DIAGNOSIS — F41.1 GAD (GENERALIZED ANXIETY DISORDER): ICD-10-CM

## 2025-05-24 DIAGNOSIS — E03.9 HYPOTHYROID: ICD-10-CM

## 2025-05-24 DIAGNOSIS — F39 UNSPECIFIED MOOD (AFFECTIVE) DISORDER (HCC): ICD-10-CM

## 2025-05-27 RX ORDER — LEVOTHYROXINE SODIUM 50 UG/1
50 TABLET ORAL
Qty: 30 TABLET | Refills: 1 | Status: SHIPPED | OUTPATIENT
Start: 2025-05-27

## 2025-05-30 ENCOUNTER — OFFICE VISIT (OUTPATIENT)
Dept: PSYCHIATRY | Facility: CLINIC | Age: 40
End: 2025-05-30
Payer: COMMERCIAL

## 2025-05-30 DIAGNOSIS — Z87.898 HISTORY OF SUBSTANCE USE: Primary | ICD-10-CM

## 2025-05-30 PROCEDURE — H0047 ALCOHOL/DRUG ABUSE SVC NOS: HCPCS

## 2025-06-02 ENCOUNTER — TELEPHONE (OUTPATIENT)
Dept: BEHAVIORAL/MENTAL HEALTH CLINIC | Facility: CLINIC | Age: 40
End: 2025-06-02

## 2025-06-02 ENCOUNTER — OFFICE VISIT (OUTPATIENT)
Dept: PSYCHIATRY | Facility: CLINIC | Age: 40
End: 2025-06-02
Payer: COMMERCIAL

## 2025-06-02 ENCOUNTER — OFFICE VISIT (OUTPATIENT)
Dept: BEHAVIORAL/MENTAL HEALTH CLINIC | Facility: CLINIC | Age: 40
End: 2025-06-02
Payer: COMMERCIAL

## 2025-06-02 DIAGNOSIS — F31.4 BIPOLAR DISORDER, CURRENT EPISODE DEPRESSED, SEVERE, WITHOUT PSYCHOTIC FEATURES (HCC): Primary | ICD-10-CM

## 2025-06-02 DIAGNOSIS — F41.1 GENERALIZED ANXIETY DISORDER: ICD-10-CM

## 2025-06-02 DIAGNOSIS — Z87.898 HISTORY OF SUBSTANCE USE: Primary | ICD-10-CM

## 2025-06-02 DIAGNOSIS — F90.9 ATTENTION DEFICIT HYPERACTIVITY DISORDER (ADHD), UNSPECIFIED ADHD TYPE: ICD-10-CM

## 2025-06-02 PROCEDURE — 90791 PSYCH DIAGNOSTIC EVALUATION: CPT

## 2025-06-02 PROCEDURE — H0047 ALCOHOL/DRUG ABUSE SVC NOS: HCPCS

## 2025-06-02 RX ORDER — DULOXETIN HYDROCHLORIDE 60 MG/1
CAPSULE, DELAYED RELEASE ORAL
Qty: 90 CAPSULE | Refills: 3 | Status: SHIPPED | OUTPATIENT
Start: 2025-06-02

## 2025-06-02 NOTE — PSYCH
" Behavioral Health Psychotherapy Assessment    Date of Initial Psychotherapy Assessment: 06/02/25  Referral Source: Edith Craven, Annette Cline  Has a release of information been signed for the referral source? Yes    Preferred Name: Geno Lopez  Preferred Pronouns: She/her  YOB: 1985 Age: 39 y.o.  Sex assigned at birth: female   Gender Identity: female  Race:   Preferred Language: English    Emergency Contact:  Full Name: Myron Echavarria  Relationship to Client: Ahsan   Contact information: [phone]    Primary Care Physician:  Bing Reese MD  [address] [place] [address]  [phone]  Has a release of information been signed? Yes    Physical Health History:  Past surgical procedures: Teeth removed, Umbilical surgery removed hernia  Do you have a history of any of the following: thyroid disease and high cholesterol  Do you have any mobility issues? No  Developmental History: No    Relevant Family History:  Mother- Anxiety & Depression  Father-Anxiety & Depression    Presenting Problem (What brings you in?)  \"I needed someone to talk to, I can't keep talking to Marlyn, I have my resources, I have to get out of my head\". \"As a child was diagnosed with ADHD and depression\".  \"[facility], I was [age] when my got me diagnosed there unsure if my mother took me there for therapy, I went back when I was 25 years old\". \"Currently on Klonopin 1 mg\". \"Prescribed Xanax at age 16 stopped when prescriber switched\". \"Currently I have ahsan who doesn't understand mental health and addiction, [facility], [facility] which is a [facility] as resources\".  Looking to have therapist to work on mental health.     Mental Health Advance Directive:  Do you currently have a Mental Health Advance Directive?no    Diagnosis:   Diagnosis ICD-10-CM Associated Orders   1. Bipolar disorder, current episode depressed, severe, without psychotic features (HCC)  " "F31.4       2. Generalized anxiety disorder  F41.1       3. Attention deficit hyperactivity disorder (ADHD), unspecified ADHD type  F90.9           Initial Assessment:     Current Mental Status:    Appearance: appropriate and neat      Behavior/Manner: cooperative and guarded      Affect/Mood:  Happy and hopeful    Speech:  Normal    Sleep:  Insomnia    Oriented to: oriented to self, oriented to place and oriented to time       Clinical Symptoms    Depression: yes      Anxiety: yes      Depression Symptoms: depressed mood, restlessness and social isolation      Anxiety Symptoms: excessive worry, nervous/anxious and restlessness      Have you ever been assaultive to others or the environment: No      Have you ever been self-injurious: No      Counseling History:  Previous Counseling or Treatment  (Mental Health or Drug & Alcohol): Yes    Previous Counseling Details:  [facility] age 7 with [facility] / Depression.  [facility]  D & A 2 times  [facility] [place] 1/2020  Inpatient / IOP / OP    Have you previously taken psychiatric medications: Yes    Previous Medications Attempted:  Xanax,  \"See Chart\".    Suicide Risk Assessment  Have you ever had a suicide attempt: No    Have you had incidents of suicidal ideation: No    Are you currently experiencing suicidal thoughts: No      Substance Abuse/Addiction Assessment:  Alcohol: Yes    Age of First Use:  16  Amount:  Liquor  Last use:  \"Don't remember date\"  Heroin: Yes    Age of First Use:  35  Age of regular use:  35  Amount:  \"Don't remember\"  Last Use:  1/2020  Fentanyl: Yes    Age of First Use:  35  Age of regular use:  35  Amount:  \"Don't remember\"  Last Use:  1/2020  Opiates: Yes    Age of First Use:  35  Age of regular use:  35  Last Use:  1/2020  Cocaine: Yes    Age of First Use:  20  Age of regular use:  20  Amount:  'I don't remember\"  Method:  Nasal/snort  Amphetamines: Yes    Age of First Use:  35  Age of regular use:  35  Last Use:  1/2020  Hallucinogens: No  " "  Club Drugs: No    Marijuana: Yes    Age of First Use:  15  Method:  Smoke/pipe  Last Use:  \"Currently have Medical Marijuana Card since 2022\"  Have you experienced blackouts as a result of substance use: No    Have you had any periods of abstinence: No    Have you experienced symptoms of withdrawal: No    Have you ever overdosed on any substances?: No    Are you currently using any Medication Assisted Treatment for Substance Use: No      Compulsive Behaviors:  Compulsive Behavior Information:  None    Social Determinants of Health:    SDOH:  Financial instability, social isolation, stress and unemployment/underemployment    Legal History:    Have you ever been arrested  or had a DUI: No      Are you currently on parole/probation: No      Relationship History:    Relationship History:  Live with fiance, 9 year old son lives with paternal grandmother.     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    Do you need assistance with reading or writing: No      Recommended Treatment:     Psychotherapy:  Individual sessions    Frequency:  1 time    Session frequency:  Weekly        Visit start and stop times:    06/02/25  Start Time: 1102  Stop Time: 1200  Total Visit Time: 58 minutes  "

## 2025-06-02 NOTE — PSYCH
Geno John  06/02/25  Start Time: 1215  Stop Time: 1235  Total Visit Time: 20 minutes    Visit type: In person    Recovery needs addressed during session: Substance Use Disorder Recovery    Notes (DAP Format)  Geno met with CRS after an initial visit with new therapist. Geno seemed more positive today and she is willing to return to group.    Referrals made during this visit N/A    Recovery connections: [facility]    Next appointment 10/16/2025 10AM

## 2025-06-02 NOTE — PSYCH
"    Date of Initial Psychotherapy Assessment: 06/02/25  Referral Source: [facility], Annette Bryant  Has a release of information been signed for the referral source? Yes    Preferred Name: Geno Lopez  Preferred Pronouns: She/her  YOB: 1985 Age: 39 y.o.  Sex assigned at birth: female   Gender Identity: female  Race:   Preferred Language: English    Emergency Contact:  Full Name: Myron Flynn   Relationship to Client: shailesh  Contact information: [phone]    Primary Care Physician:  Bing Reese MD  [address] [place] [address]  [phone]  Has a release of information been signed? Yes    Physical Health History:  Past surgical procedures: Teeth removed, Umbilic surgery to remove hernia.  Do you have a history of any of the following: thyroid disease and high cholesterol  Do you have any mobility issues? No  Developmental History: no    Relevant Family History:  Mother-Anxiety & Depression  Father-Addiction & Depression    Presenting Problem (What brings you in?)  \" I needed someone to talk to, I can't keep talking to Marlyn, I have to use my resources, I have to get out of my head\".  \"As a child was diagnosed with ADD and depression\". \"[facility] called [facility]  I was [age] when mom got me diagnosed there unsure if my mother took me there for therapy. \" I do remember participated in groups  I went back as an adult 25 years old\". \" Currently on Klonopin 1 mg\". \"Prescribed Xanax at age 16 stopped when prescriber switched\". \" Currently  I have shailesh who doesn't understand mental health and addiction, [facility] , [facility] which is a [facility] as resources\".  Looking to have a therapist to work on mental health.     Mental Health Advance Directive:  Do you currently have a Mental Health Advance Directive?no    Diagnosis:          Visit start and stop times:    06/02/25  Start Time: 1102  Stop Time: 1200  Total Visit Time: 58 minutes  "

## 2025-06-02 NOTE — TELEPHONE ENCOUNTER
This writer attempted to contact Geno about follow up appt for today 6/2/25 at 11:00 am and left voicemail.

## 2025-06-02 NOTE — PSYCH
"    Date of Initial Psychotherapy Assessment: 06/02/25  Referral Source: [facility], Annette Bryant  Has a release of information been signed for the referral source? Yes    Preferred Name: Geno Lopez  Preferred Pronouns: She/her  YOB: 1985 Age: 39 y.o.  Sex assigned at birth: female   Gender Identity: female  Race:   Preferred Language: English    Emergency Contact:  Full Name: Myron Flynn   Relationship to Client: shailesh  Contact information: [phone]    Primary Care Physician:  Bing Reese MD  [address] [place] [address]  [phone]  Has a release of information been signed? Yes    Physical Health History:  Past surgical procedures: Teeth removed, Umbilic surgery to remove hernia.  Do you have a history of any of the following: thyroid disease and high cholesterol  Do you have any mobility issues? No  Developmental History: no    Relevant Family History:  Mother-Anxiety & Depression  Father-Addiction & Depression    Presenting Problem (What brings you in?)  \" I needed someone to talk to, I can't keep talking to Marlyn, I have to use my resources, I have to get out of my head\".  \"As a child was diagnosed with ADD and depression\". \"[facility] called [facility]  I was [age] when mom got me diagnosed there unsure if my mother took me there for therapy. \" I do remember participated in groups  I went back as an adult 25 years old\". \" Currently on Klonopin 1 mg\". \"Prescribed Xanax at age 16 stopped when prescriber switched\". \" Currently  I have shailesh who doesn't understand mental health and addiction, [facility] , [facility] which is a part of [facility] as resources\".  Looking to have a therapist to work on mental health.     Mental Health Advance Directive:  Do you currently have a Mental Health Advance Directive?no    Diagnosis: Bipolar Disorder with depression  Generalized Anxiety Disorder  ADHD          Visit start and stop " times:    06/02/25  Start Time: 1102  Stop Time: 1200  Total Visit Time: 58 minutes

## 2025-06-02 NOTE — PSYCH
Geno Lopez    Date of Service: 5/30/2025    Topic Covered During Today's Group: Motivation for Recovery    Duration of Group: 1 Hour    Patient Appeared: anxious, depressed, and sad    Patient's Participation During Group: Geno was not engaged at all in group. Geno didn't do a check in or share. Geno sat with her sunglasses on but she filled in her handout    Additional Comments: CRS sat with Geno after and she said it was overwhelming to walk into the room but she will try to engage at the next group. Geno is still struggling with her mental health    Date of Next Group: 6/13/5025

## 2025-06-05 ENCOUNTER — TELEPHONE (OUTPATIENT)
Dept: PSYCHIATRY | Facility: CLINIC | Age: 40
End: 2025-06-05

## 2025-06-09 ENCOUNTER — OFFICE VISIT (OUTPATIENT)
Dept: PSYCHIATRY | Facility: CLINIC | Age: 40
End: 2025-06-09
Payer: COMMERCIAL

## 2025-06-09 DIAGNOSIS — F11.20 OPIOID DEPENDENCE ON AGONIST THERAPY (HCC): Primary | ICD-10-CM

## 2025-06-09 PROCEDURE — H0006 ALCOHOL AND/OR DRUG SERVICES: HCPCS

## 2025-06-10 ENCOUNTER — OFFICE VISIT (OUTPATIENT)
Dept: BEHAVIORAL/MENTAL HEALTH CLINIC | Facility: CLINIC | Age: 40
End: 2025-06-10
Payer: COMMERCIAL

## 2025-06-10 DIAGNOSIS — F31.4 BIPOLAR DISORDER, CURRENT EPISODE DEPRESSED, SEVERE, WITHOUT PSYCHOTIC FEATURES (HCC): Primary | ICD-10-CM

## 2025-06-10 DIAGNOSIS — F90.9 ATTENTION DEFICIT HYPERACTIVITY DISORDER (ADHD), UNSPECIFIED ADHD TYPE: ICD-10-CM

## 2025-06-10 DIAGNOSIS — F41.1 GENERALIZED ANXIETY DISORDER: ICD-10-CM

## 2025-06-10 PROCEDURE — 90837 PSYTX W PT 60 MINUTES: CPT
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
## 2025-06-16 ENCOUNTER — TELEPHONE (OUTPATIENT)
Dept: BEHAVIORAL/MENTAL HEALTH CLINIC | Facility: CLINIC | Age: 40
End: 2025-06-16

## 2025-06-16 ENCOUNTER — OFFICE VISIT (OUTPATIENT)
Dept: PSYCHIATRY | Facility: CLINIC | Age: 40
End: 2025-06-16
Payer: COMMERCIAL

## 2025-06-16 ENCOUNTER — OFFICE VISIT (OUTPATIENT)
Dept: BEHAVIORAL/MENTAL HEALTH CLINIC | Facility: CLINIC | Age: 40
End: 2025-06-16
Payer: COMMERCIAL

## 2025-06-16 DIAGNOSIS — F31.4 BIPOLAR DISORDER, CURRENT EPISODE DEPRESSED, SEVERE, WITHOUT PSYCHOTIC FEATURES (HCC): Primary | ICD-10-CM

## 2025-06-16 DIAGNOSIS — F11.21 OPIOID USE DISORDER, MODERATE, IN EARLY REMISSION, ON MAINTENANCE THERAPY, DEPENDENCE (HCC): ICD-10-CM

## 2025-06-16 DIAGNOSIS — Z87.898 HISTORY OF SUBSTANCE USE: Primary | ICD-10-CM

## 2025-06-16 DIAGNOSIS — F90.9 ATTENTION DEFICIT HYPERACTIVITY DISORDER (ADHD), UNSPECIFIED ADHD TYPE: ICD-10-CM

## 2025-06-16 DIAGNOSIS — F41.1 GENERALIZED ANXIETY DISORDER: ICD-10-CM

## 2025-06-16 PROCEDURE — H0047 ALCOHOL/DRUG ABUSE SVC NOS: HCPCS

## 2025-06-16 PROCEDURE — 90837 PSYTX W PT 60 MINUTES: CPT

## 2025-06-16 NOTE — PSYCH
"Behavioral Health Psychotherapy Progress Note    Psychotherapy Provided: Individual Psychotherapy     1. Bipolar disorder, current episode depressed, severe, without psychotic features (HCC)        2. Generalized anxiety disorder        3. Attention deficit hyperactivity disorder (ADHD), unspecified ADHD type        4. Opioid use disorder, moderate, in early remission, on maintenance therapy, dependence (HCC)            Goals addressed in session: Goal 1     DATA: Clinician and Geno began session having her share narrative of past week.  Clinician attentively listened to Geno share about school and math work, she is looking forward to studying psychology and learning about people and their behaviors.  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  Clinician had Geno share what is goal for therapy; would like to be sure she keeps all appointments having to do with custody and school.  She would like to work on getting herself organized and establish routine so when she does begin school she can better manage all of her responsibilities. Clinician and Geno created her treatment plan for next 6 months.  Clinician led Geno through a guided meditation \"rosebush\" . Clinician and Geno processed her thoughts and feelings answering set of questions.  Clinician praised and validated Geno's efforts of her work.  Geno plans to display her rosebush on computer to reflect throughout week.  Geno will work on implementing \"To Do List\" this coming week and together will discuss further next session.  Clinician and Geno will continue establishing rapport and therapeutic relationship. Clinician reminded Geno if she needs additional support, contact office and leave message.    During this session, this clinician used the following therapeutic modalities: Engagement Strategies, Client-centered Therapy, Dialectical Behavior Therapy, and " "Supportive Psychotherapy    Substance Abuse was not addressed during this session. If the client is diagnosed with a co-occurring substance use disorder, please indicate any changes in the frequency or amount of use: N/A. Stage of change for addressing substance use diagnoses: Maintenance    ASSESSMENT:  Geno Lopez presents with a Euthymic/ normal mood.     her affect is Normal range and intensity, which is congruent, with her mood and the content of the session. The client has made progress on their goals.    Geno Lopez presents with a none risk of suicide, none risk of self-harm, and none risk of harm to others.    For any risk assessment that surpasses a \"low\" rating, a safety plan must be developed.    A safety plan was indicated: no  If yes, describe in detail N/A    PLAN: Between sessions, Geno Lopez will continue to utilize coping skills to reduce intensity of anxiety and depressive moods. At the next session, the therapist will use Engagement Strategies, Client-centered Therapy, Dialectical Behavior Therapy, Motivational Interviewing, and Supportive Psychotherapy to address anxiety and depression.    Behavioral Health Treatment Plan and Discharge Planning: Geno Lopez is aware of and agrees to continue to work on their treatment plan. They have identified and are working toward their discharge goals. yes    Depression Follow-up Plan Completed: No    Visit start and stop times:    06/16/25  Start Time: 0905  Stop Time: 1000  Total Visit Time: 55 minutes  "

## 2025-06-16 NOTE — PSYCH
Geno Lopez  06/09/2025  Start Time: 1007  Stop Time: 1055  Total Visit Time: 48 minutes    Visit type: In person    Recovery needs addressed during session: Emotional/Mental Health    Notes (DAP Format)  DATA: Patient presented for in-person, scheduled visit with this CM  Patient has upcoming custody hearing of which patient was able to obtain free clothing with the assistance of [facility]    ASSESSMENT: Patient did present alert and oriented.   Patient is still concerned at times of identity being compromised.   Patient and this CM broke down certain scenarios of which patient felt identity was compromised to where patient realized it was not.   Patient felt a sense of relief.   Patient would like to work on license once custody is taken care of.   Low risk SI/HI, of no other concerns    PLAN: Patient will continue to meet with this CM as well as assigned treatment team.   This includes CRS as well as therapist[facility]el with [facility] and Psychiatrist Dr. PARK.     Referrals made during this visit: No formal referrals made during this session     Recovery connections: [facility]    Next appointment: two weeks

## 2025-06-16 NOTE — PSYCH
Geno Lopez  06/16/25  Start Time: 1005  Stop Time: 1059  Total Visit Time: 54 minutes  Annette BRANHAM    Visit type: In person    Recovery needs addressed during session: Substance Use Disorder Recovery    Notes (DAP Format)  Geno and CARROL discussed Geno being on the wait list for a CRS class. The coordinator called Geno to let her know that she is second on the wait list. Geno was disappointed but she is also still working on math tests to start college courses this fall. CARROL and Geno discussed her new therapist and the great connection that Geno feels they have. All positive progress is being made at this time. Geno is still worried that she has identity theft     Referrals made during this visit N/A    Recovery connections: [facility], [facility]    Next appointment 6/30/25 10am and group

## 2025-06-16 NOTE — BH TREATMENT PLAN
"Outpatient Behavioral Health Psychotherapy Treatment Plan    Geno Lopez  1985     Date of Initial Psychotherapy Assessment: 6/05/2025  Date of Current Treatment Plan: 06/16/25  Treatment Plan Target Date: TBD   Treatment Plan Expiration Date: 12/16/2025    Diagnosis:   1. Bipolar disorder, current episode depressed, severe, without psychotic features (HCC)        2. Generalized anxiety disorder        3. Attention deficit hyperactivity disorder (ADHD), unspecified ADHD type            Area(s) of Need: Custody, Organization, Start School    Long Term Goal 1 (in the client's own words): \"To stick with school, managing and maintaining everything\".     Stage of Change: Action    Target Date for completion: TBD     Anticipated therapeutic modalities: CBT, DBT, Motivational Interview, Supportive      People identified to complete this goal: Geon       Objective 1: (identify the means of measuring success in meeting the objective): Keeping all scheduled appointments that have to do with custody and school, will discuss progress in sessions.       Objective 2: (identify the means of measuring success in meeting the objective): Will utilize calendar of chores and To do list to keep task organized, will discuss progress in sessions.          I am currently under the care of a [facility] psychiatric provider: yes    My [facility] psychiatric provider is: Dr. Payton    I am currently taking psychiatric medications: Yes, as prescribed    I feel that I will be ready for discharge from mental health care when I reach the following (measurable goal/objective): When I feel I have accomplished my goals and am able to manage and maintain overall moods.     For children and adults who have a legal guardian:   Has there been any change to custody orders and/or guardianship status? N/A. If yes, attach updated documentation.    I have created my Crisis Plan and have been offered a copy of this plan    Behavioral " Health Treatment Plan [facility]: Diagnosis and Treatment Plan explained to Geno Lopez acknowledges an understanding of their diagnosis. Geno Lopez agrees to this treatment plan.    I have been offered a copy of this Treatment Plan. yes

## 2025-06-23 ENCOUNTER — OFFICE VISIT (OUTPATIENT)
Dept: PSYCHIATRY | Facility: CLINIC | Age: 40
End: 2025-06-23
Payer: COMMERCIAL

## 2025-06-23 DIAGNOSIS — F11.20 OPIOID DEPENDENCE ON AGONIST THERAPY (HCC): Primary | ICD-10-CM

## 2025-06-23 PROCEDURE — H0006 ALCOHOL AND/OR DRUG SERVICES: HCPCS

## 2025-06-24 ENCOUNTER — OFFICE VISIT (OUTPATIENT)
Dept: BEHAVIORAL/MENTAL HEALTH CLINIC | Facility: CLINIC | Age: 40
End: 2025-06-24
Payer: COMMERCIAL

## 2025-06-24 ENCOUNTER — HOSPITAL ENCOUNTER (EMERGENCY)
Facility: HOSPITAL | Age: 40
Discharge: HOME | End: 2025-06-24
Attending: EMERGENCY MEDICINE | Admitting: EMERGENCY MEDICINE
Payer: COMMERCIAL

## 2025-06-24 VITALS
SYSTOLIC BLOOD PRESSURE: 125 MMHG | WEIGHT: 172.84 LBS | RESPIRATION RATE: 18 BRPM | HEART RATE: 88 BPM | BODY MASS INDEX: 29.67 KG/M2 | TEMPERATURE: 98.6 F | DIASTOLIC BLOOD PRESSURE: 80 MMHG | OXYGEN SATURATION: 99 %

## 2025-06-24 DIAGNOSIS — F41.1 GENERALIZED ANXIETY DISORDER: ICD-10-CM

## 2025-06-24 DIAGNOSIS — N89.8 VAGINAL DISCHARGE: ICD-10-CM

## 2025-06-24 DIAGNOSIS — N76.0 BACTERIAL VAGINOSIS: ICD-10-CM

## 2025-06-24 DIAGNOSIS — F31.4 BIPOLAR DISORDER, CURRENT EPISODE DEPRESSED, SEVERE, WITHOUT PSYCHOTIC FEATURES (HCC): Primary | ICD-10-CM

## 2025-06-24 DIAGNOSIS — Z01.419 ENCOUNTER FOR GYNECOLOGICAL EXAMINATION: Primary | ICD-10-CM

## 2025-06-24 DIAGNOSIS — F90.9 ATTENTION DEFICIT HYPERACTIVITY DISORDER (ADHD), UNSPECIFIED ADHD TYPE: ICD-10-CM

## 2025-06-24 DIAGNOSIS — B96.89 BACTERIAL VAGINOSIS: ICD-10-CM

## 2025-06-24 LAB
BACTERIA UR QL AUTO: ABNORMAL /HPF
BILIRUB UR QL STRIP: NEGATIVE
CLARITY UR: CLEAR
COLOR UR: YELLOW
EXT PREGNANCY TEST URINE: NEGATIVE
EXT. CONTROL: NORMAL
GLUCOSE UR STRIP-MCNC: NEGATIVE MG/DL
HGB UR QL STRIP.AUTO: NEGATIVE
KETONES UR STRIP-MCNC: NEGATIVE MG/DL
LEUKOCYTE ESTERASE UR QL STRIP: 100
MUCOUS THREADS UR QL AUTO: ABNORMAL
NITRITE UR QL STRIP: NEGATIVE
NON-SQ EPI CELLS URNS QL MICRO: ABNORMAL /HPF
PH UR STRIP.AUTO: 6 [PH]
PROT UR STRIP-MCNC: NEGATIVE MG/DL
RBC #/AREA URNS AUTO: ABNORMAL /HPF
SP GR UR STRIP.AUTO: 1.02 (ref 1–1.04)
UROBILINOGEN UA: NEGATIVE MG/DL
WBC #/AREA URNS AUTO: ABNORMAL /HPF

## 2025-06-24 PROCEDURE — 81003 URINALYSIS AUTO W/O SCOPE: CPT

## 2025-06-24 PROCEDURE — 81514 NFCT DS BV&VAGINITIS DNA ALG: CPT

## 2025-06-24 PROCEDURE — 90837 PSYTX W PT 60 MINUTES: CPT

## 2025-06-24 PROCEDURE — 99283 EMERGENCY DEPT VISIT LOW MDM: CPT

## 2025-06-24 PROCEDURE — 99284 EMERGENCY DEPT VISIT MOD MDM: CPT

## 2025-06-24 PROCEDURE — 81025 URINE PREGNANCY TEST: CPT

## 2025-06-24 PROCEDURE — 87591 N.GONORRHOEAE DNA AMP PROB: CPT

## 2025-06-24 PROCEDURE — 87491 CHLMYD TRACH DNA AMP PROBE: CPT

## 2025-06-24 PROCEDURE — 81001 URINALYSIS AUTO W/SCOPE: CPT

## 2025-06-24 RX ORDER — FLUCONAZOLE 100 MG/1
200 TABLET ORAL ONCE
Status: COMPLETED | OUTPATIENT
Start: 2025-06-24 | End: 2025-06-24

## 2025-06-24 RX ADMIN — FLUCONAZOLE 200 MG: 100 TABLET ORAL at 12:20
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
## 2025-06-24 NOTE — DISCHARGE INSTRUCTIONS
We will call with any positive outstanding test.  Abstain from all intercourse until your results are back.  I encourage you to have your partner tested for any sexually transmitted infection through primary care or urgent care.    Return to the ER if develop severe abdominal pain, fever, vomiting, severe pelvic pain, weakness, confusion, or lethargy.
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
## 2025-06-25 DIAGNOSIS — G47.00 INSOMNIA, UNSPECIFIED TYPE: ICD-10-CM

## 2025-06-25 DIAGNOSIS — F90.1 ADHD, PREDOMINANTLY HYPERACTIVE TYPE: ICD-10-CM

## 2025-06-25 DIAGNOSIS — Z72.0 CURRENTLY ATTEMPTING TO QUIT SMOKING: ICD-10-CM

## 2025-06-25 DIAGNOSIS — F39 UNSPECIFIED MOOD (AFFECTIVE) DISORDER (HCC): ICD-10-CM

## 2025-06-25 DIAGNOSIS — M62.838 MUSCLE SPASMS OF NECK: ICD-10-CM

## 2025-06-25 RX ORDER — BUPROPION HYDROCHLORIDE 150 MG/1
150 TABLET ORAL DAILY
Qty: 30 TABLET | Refills: 1 | Status: SHIPPED | OUTPATIENT
Start: 2025-06-25

## 2025-06-25 RX ORDER — TRAZODONE HYDROCHLORIDE 50 MG/1
50 TABLET ORAL
Qty: 30 TABLET | Refills: 3 | Status: SHIPPED | OUTPATIENT
Start: 2025-06-25 | End: 2025-06-25

## 2025-06-25 RX ORDER — DULOXETIN HYDROCHLORIDE 60 MG/1
CAPSULE, DELAYED RELEASE ORAL
Qty: 30 CAPSULE | Refills: 1 | Status: SHIPPED | OUTPATIENT
Start: 2025-06-25 | End: 2025-06-25

## 2025-06-25 RX ORDER — ATOMOXETINE 25 MG/1
25 CAPSULE ORAL DAILY
Qty: 30 CAPSULE | Refills: 4 | Status: SHIPPED | OUTPATIENT
Start: 2025-06-25

## 2025-06-25 RX ORDER — TRAZODONE HYDROCHLORIDE 50 MG/1
50 TABLET ORAL
Qty: 90 TABLET | Refills: 3 | Status: SHIPPED | OUTPATIENT
Start: 2025-06-25

## 2025-06-25 RX ORDER — CYCLOBENZAPRINE HCL 10 MG
TABLET ORAL
Qty: 90 TABLET | Refills: 1 | Status: SHIPPED | OUTPATIENT
Start: 2025-06-25

## 2025-06-25 RX ORDER — DULOXETIN HYDROCHLORIDE 60 MG/1
CAPSULE, DELAYED RELEASE ORAL
Qty: 90 CAPSULE | Refills: 1 | Status: SHIPPED | OUTPATIENT
Start: 2025-06-25

## 2025-06-26 ENCOUNTER — TELEPHONE (OUTPATIENT)
Dept: PSYCHIATRY | Facility: CLINIC | Age: 40
End: 2025-06-26

## 2025-06-26 RX ORDER — METRONIDAZOLE 500 MG/1
500 TABLET ORAL EVERY 12 HOURS SCHEDULED
Qty: 14 TABLET | Refills: 0 | Status: SHIPPED | OUTPATIENT
Start: 2025-06-26 | End: 2025-07-03

## 2025-06-26 NOTE — TELEPHONE ENCOUNTER
Left voicemail informing patient and/or parent/guardian of the Psych Encounter form needing to be signed as a requirement from the insurance company for billing purposes. Patient can access form via [employer] and sign electronically.     Please make patient aware this form must be signed for each visit as a requirement to continue future visits with provider.

## 2025-06-27 ENCOUNTER — OFFICE VISIT (OUTPATIENT)
Dept: PSYCHIATRY | Facility: CLINIC | Age: 40
End: 2025-06-27
Payer: COMMERCIAL

## 2025-06-27 DIAGNOSIS — F11.91 OPIOID USE DISORDER IN REMISSION: Primary | ICD-10-CM

## 2025-06-27 PROCEDURE — H0047 ALCOHOL/DRUG ABUSE SVC NOS: HCPCS

## 2025-06-27 NOTE — PSYCH
Geno Lopez  06/27/25  Start Time: 1030  Stop Time: 1130  Total Visit Time: 60 minutes  Annette Capps CRS    Visit type: In person    Recovery needs addressed during session: Substance Use Disorder Recovery    Notes (DAP Format)  Genevieve shared about her fear of being hacked and who may be in her accounts. Genevieve has so much fear and anxiety over credit card fraud and people in her computer that she is struggling with feeling safe> Genevieve was hyper focused on this and CRS tried to help her with resources to get help with feeling safe.    Referrals made during this visit [facility]    Recovery connections: [facility], [facility] [place]    Next appointment 6/30/2025 10AM

## 2025-06-27 NOTE — PSYCH
Geno Lopez  06/23/2025  Start Time: 1000  Stop Time: 1050  Total Visit Time: 50 minutes    Visit type: In person    Recovery needs addressed during session: Emotional/Mental Health    Notes (DAP Format)  DATA: Patient presented for in-person, scheduled visit with this CM.   Patient recently attended custody hearing for minor child of which has been taken to trial stages.     Patient continues to work with CRS, case management from [facility] [place].    Patient continues to work towards productive school environment with [employer]    ASSESSMENT: Patient did present alert and oriented   Patient has been able to verify credit concerns  Patient also feels stable within relationship at this time.   States significant other's birthday is tomorrow, finally able to meet SO's work family.  Otherwise states to be doing well. No concerns. Denies SI/HI.  Patient states to be taking all prescribed medication correctly    PLAN: Patient will continue to meet with this CM as well as assigned treatment team     Referrals made during this visit: no formal referrals made during this session    Recovery connections:  [facility], [facility], [facility]     Next appointment: Two weeks

## 2025-06-30 ENCOUNTER — OFFICE VISIT (OUTPATIENT)
Dept: BEHAVIORAL/MENTAL HEALTH CLINIC | Facility: CLINIC | Age: 40
End: 2025-06-30
Payer: COMMERCIAL

## 2025-06-30 ENCOUNTER — OFFICE VISIT (OUTPATIENT)
Dept: PSYCHIATRY | Facility: CLINIC | Age: 40
End: 2025-06-30
Payer: COMMERCIAL

## 2025-06-30 DIAGNOSIS — F41.1 GENERALIZED ANXIETY DISORDER: ICD-10-CM

## 2025-06-30 DIAGNOSIS — F31.4 BIPOLAR DISORDER, CURRENT EPISODE DEPRESSED, SEVERE, WITHOUT PSYCHOTIC FEATURES (HCC): Primary | ICD-10-CM

## 2025-06-30 DIAGNOSIS — F90.9 ATTENTION DEFICIT HYPERACTIVITY DISORDER (ADHD), UNSPECIFIED ADHD TYPE: ICD-10-CM

## 2025-06-30 DIAGNOSIS — F11.91 OPIOID USE DISORDER IN REMISSION: Primary | ICD-10-CM

## 2025-06-30 PROCEDURE — 90837 PSYTX W PT 60 MINUTES: CPT

## 2025-06-30 PROCEDURE — H0047 ALCOHOL/DRUG ABUSE SVC NOS: HCPCS
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
## 2025-06-30 NOTE — PSYCH
Behavioral Health Psychotherapy Progress Note    Psychotherapy Provided: Individual Psychotherapy     1. Bipolar disorder, current episode depressed, severe, without psychotic features (HCC)        2. Generalized anxiety disorder        3. Attention deficit hyperactivity disorder (ADHD), unspecified ADHD type            Goals addressed in session: Goal 1     DATA: Clinician and Geno began session with check in.  Clinician attentively listened to Geno give narrative of her week since last session.  Geno apologized for last week's session, she went to ED and got care needed.  Geno did take placement test, and will have to take 2 English classes and 1 math, still has to do paperwork but looking forward to starting school in August.  Geno shared more about her custody court date that is coming up in September, there are issues with son's paternal grandmother who has made multiple claims against her and she is hoping things go in her favor. Geno is still having a difficult time with boyfriend and would like things to look different, he is very supportive of her getting son back. Geno admitted she has a difficult time cognitively remembering details of events.  Clinician and Geno processed her thoughts and feelings regarding content shared. Clinician led Geno through discussion of things that are in her control vs thing that are not.  Together discussed how she can help reduce her anxiety by staying in the present.  Clinician encouraged Geno to start writing down things she would like to discuss in session to help organize her thoughts, she agreed. Next session Geno will bring points to discuss, poem she has written and report back how she did with mindfulness practice. Clinician praised and validated Geno's efforts made in session. Clinician reminded Geno to call if she needs additional support in between sessions.     During this session, this clinician  "used the following therapeutic modalities: Engagement Strategies, Client-centered Therapy, Dialectical Behavior Therapy, and Supportive Psychotherapy    Substance Abuse was not addressed during this session. If the client is diagnosed with a co-occurring substance use disorder, please indicate any changes in the frequency or amount of use: N/A. Stage of change for addressing substance use diagnoses: Maintenance    ASSESSMENT:  Geno Lopez presents with a Euthymic/ normal and Anxious mood.     her affect is Normal range and intensity and Bright, which is congruent, with her mood and the content of the session. The client has made progress on their goals, she has been incorporating learned strategies from session doing her best to implement.      Geno Lopez presents with a minimal risk of suicide, minimal risk of self-harm, and minimal risk of harm to others.    For any risk assessment that surpasses a \"low\" rating, a safety plan must be developed.    A safety plan was indicated: no  If yes, describe in detail N/A    PLAN: Between sessions, Geno Lopez will continue utilize strategies discussed in session to help reduce intensity of anxious moods.. At the next session, the therapist will use Engagement Strategies, Client-centered Therapy, Cognitive Behavioral Therapy, Dialectical Behavior Therapy, and Supportive Psychotherapy to address anxious and depressive moods.     Behavioral Health Treatment Plan and Discharge Planning: Geno Lopez is aware of and agrees to continue to work on their treatment plan. They have identified and are working toward their discharge goals. yes    Depression Follow-up Plan Completed: No    Visit start and stop times:    06/30/25  Start Time: 0901  Stop Time: 1004  Total Visit Time: 63 minutes  "

## 2025-07-01 ENCOUNTER — OFFICE VISIT (OUTPATIENT)
Dept: PSYCHIATRY | Facility: CLINIC | Age: 40
End: 2025-07-01
Payer: COMMERCIAL

## 2025-07-01 DIAGNOSIS — F11.91 OPIOID USE DISORDER IN REMISSION: Primary | ICD-10-CM

## 2025-07-01 PROCEDURE — H0047 ALCOHOL/DRUG ABUSE SVC NOS: HCPCS

## 2025-07-01 NOTE — PSYCH
Geno Lopez  07/01/25  Start Time: 1507  Stop Time: 1608  Total Visit Time: 61 minutes  Annette Capps CRS    Visit type: In person    Recovery needs addressed during session: Substance Use Disorder Recovery    Notes (DAP Format)  Geno got into the CRS training that starts next week and CRS helped her fill out her application and register with [facility]. Geno hopes that this makes her more willing to go to meetings. Geno is very excited to take this next step but is also nervous and worried about transportation.     Referrals made during this visit N/A    Recovery connections: [facility], [facility] [place]    Next appointment group on 7/11/2025

## 2025-07-07 ENCOUNTER — TELEPHONE (OUTPATIENT)
Dept: PSYCHIATRY | Facility: CLINIC | Age: 40
End: 2025-07-07

## 2025-07-07 ENCOUNTER — SOCIAL WORK (OUTPATIENT)
Dept: BEHAVIORAL/MENTAL HEALTH CLINIC | Facility: CLINIC | Age: 40
End: 2025-07-07
Payer: COMMERCIAL

## 2025-07-07 DIAGNOSIS — Z72.0 CURRENTLY ATTEMPTING TO QUIT SMOKING: ICD-10-CM

## 2025-07-07 DIAGNOSIS — F31.4 BIPOLAR DISORDER, CURRENT EPISODE DEPRESSED, SEVERE, WITHOUT PSYCHOTIC FEATURES (HCC): Primary | ICD-10-CM

## 2025-07-07 DIAGNOSIS — F11.20 OPIOID DEPENDENCE ON AGONIST THERAPY (HCC): ICD-10-CM

## 2025-07-07 DIAGNOSIS — G47.00 INSOMNIA, UNSPECIFIED TYPE: ICD-10-CM

## 2025-07-07 DIAGNOSIS — F90.9 ATTENTION DEFICIT HYPERACTIVITY DISORDER (ADHD), UNSPECIFIED ADHD TYPE: ICD-10-CM

## 2025-07-07 DIAGNOSIS — F41.1 GENERALIZED ANXIETY DISORDER: ICD-10-CM

## 2025-07-07 DIAGNOSIS — F39 UNSPECIFIED MOOD (AFFECTIVE) DISORDER (HCC): ICD-10-CM

## 2025-07-07 DIAGNOSIS — F90.1 ADHD, PREDOMINANTLY HYPERACTIVE TYPE: ICD-10-CM

## 2025-07-07 PROCEDURE — 90837 PSYTX W PT 60 MINUTES: CPT

## 2025-07-07 RX ORDER — ATOMOXETINE 25 MG/1
25 CAPSULE ORAL DAILY
Qty: 30 CAPSULE | Refills: 2 | Status: SHIPPED | OUTPATIENT
Start: 2025-07-07 | End: 2025-07-31

## 2025-07-07 RX ORDER — CLONAZEPAM 1 MG/1
TABLET ORAL
Qty: 45 TABLET | Refills: 1 | Status: SHIPPED | OUTPATIENT
Start: 2025-07-07 | End: 2025-07-31 | Stop reason: SDUPTHER

## 2025-07-07 RX ORDER — DULOXETIN HYDROCHLORIDE 60 MG/1
60 CAPSULE, DELAYED RELEASE ORAL DAILY
Qty: 30 CAPSULE | Refills: 2 | Status: SHIPPED | OUTPATIENT
Start: 2025-07-07 | End: 2025-07-31 | Stop reason: SDUPTHER

## 2025-07-07 RX ORDER — TRAZODONE HYDROCHLORIDE 50 MG/1
50 TABLET ORAL
Qty: 90 TABLET | Refills: 2 | Status: SHIPPED | OUTPATIENT
Start: 2025-07-07

## 2025-07-07 RX ORDER — GABAPENTIN 300 MG/1
300 CAPSULE ORAL 3 TIMES DAILY
Qty: 90 CAPSULE | Refills: 1 | Status: SHIPPED | OUTPATIENT
Start: 2025-07-07 | End: 2025-07-31

## 2025-07-07 RX ORDER — BUPROPION HYDROCHLORIDE 150 MG/1
150 TABLET ORAL DAILY
Qty: 30 TABLET | Refills: 2 | Status: SHIPPED | OUTPATIENT
Start: 2025-07-07 | End: 2025-07-31

## 2025-07-07 RX ORDER — BUPRENORPHINE AND NALOXONE 2; .5 MG/1; MG/1
2 FILM, SOLUBLE BUCCAL; SUBLINGUAL DAILY
Qty: 30 FILM | Refills: 2 | Status: SHIPPED | OUTPATIENT
Start: 2025-07-07 | End: 2025-07-31 | Stop reason: SDUPTHER

## 2025-07-07 NOTE — TELEPHONE ENCOUNTER
Genevieve visited the office requesting that all of her prescriptions be sent to the  Pharmacy, [address], instead of the [facility] pharmacy as the [facility] is closing. She states that she has not yet picked up any of the 6/5/25 prescriptions, and that she has just one atomoxetine remaining.     Dr. Payton, please advise.

## 2025-07-07 NOTE — PSYCH
"Behavioral Health Psychotherapy Progress Note    Psychotherapy Provided: Individual Psychotherapy     1. Bipolar disorder, current episode depressed, severe, without psychotic features (HCC)        2. Generalized anxiety disorder        3. Attention deficit hyperactivity disorder (ADHD), unspecified ADHD type            Goals addressed in session: Goal 1     DATA: Clinician and Geno started session with check in.  Geno was excited to report she is registering for fall classes at [employer] tomorrow, plans to take three classes.  She will also be starting class to become registered CRS, classes will be held Tues/Thurs.  Geno shared three quotes and affirmations, assigned, read them aloud in session and expressed how meaningful they are right now.  Geno spoke about relationship with boyfriend and how she needs to get organized prior to classes starting.  Clinician led Geno through guided imagery meditation \"Optimal Future Self\".  Geno found the meditation helpful being that she is on new path. Clinician and Geno discussed strategies for getting organized and managing schedule so she's prepared for the transition she is stepping into. Clinician and Geno processed her thoughts and feelings about content discussed in session. Clinician encouraged Geno to get herself started,she plans to get materials needed and will report progress next session. Clinician praised and validated Geno's efforts and hard work made in session. Geno was very confident in herself in session, smiled and remained optimistic through duration of session. Next session will continue building therapeutic relationship and establishing rapport. Clinician reminded Geno if she needs additional support in between sessions she should call office, she agreed to reach out if necessary.     During this session, this clinician used the following therapeutic modalities: Engagement Strategies, Client-centered " "Therapy, Cognitive Behavioral Therapy, Dialectical Behavior Therapy, Mindfulness-based Strategies, Solution-Focused Therapy, and Supportive Psychotherapy    Substance Abuse was not addressed during this session. If the client is diagnosed with a co-occurring substance use disorder, please indicate any changes in the frequency or amount of use: N/A. Stage of change for addressing substance use diagnoses: Maintenance    ASSESSMENT:  Geno Lopez presents with a Euthymic/ normal and Anxious mood.     her affect is Normal range and intensity and Bright, which is congruent, with her mood and the content of the session. The client has made progress on their goals.     Geno Lopez presents with a minimal risk of suicide, minimal risk of self-harm, and minimal risk of harm to others.    For any risk assessment that surpasses a \"low\" rating, a safety plan must be developed.    A safety plan was indicated: no  If yes, describe in detail N/A    PLAN: Between sessions, Geno Lopez will utilize strategies discussed in session, will continue to take medications as prescribed and come back next session to discus progress. At the next session, the therapist will use Engagement Strategies, Client-centered Therapy, Cognitive Behavioral Therapy, Mindfulness-based Strategies, Solution-Focused Therapy, and Supportive Psychotherapy to address symptoms of anxiety and depressive moods.    Behavioral Health Treatment Plan and Discharge Planning: Geno Lopez is aware of and agrees to continue to work on their treatment plan. They have identified and are working toward their discharge goals. yes    Depression Follow-up Plan Completed: No    Visit start and stop times:    07/07/25  Start Time: 0905  Stop Time: 1001  Total Visit Time: 56 minutes  "

## 2025-07-11 ENCOUNTER — TELEPHONE (OUTPATIENT)
Dept: PSYCHIATRY | Facility: CLINIC | Age: 40
End: 2025-07-11

## 2025-07-14 ENCOUNTER — SOCIAL WORK (OUTPATIENT)
Dept: BEHAVIORAL/MENTAL HEALTH CLINIC | Facility: CLINIC | Age: 40
End: 2025-07-14
Payer: COMMERCIAL

## 2025-07-14 ENCOUNTER — OFFICE VISIT (OUTPATIENT)
Dept: PSYCHIATRY | Facility: CLINIC | Age: 40
End: 2025-07-14
Payer: COMMERCIAL

## 2025-07-14 DIAGNOSIS — F11.91 OPIOID USE DISORDER IN REMISSION: Primary | ICD-10-CM

## 2025-07-14 DIAGNOSIS — F31.4 BIPOLAR DISORDER, CURRENT EPISODE DEPRESSED, SEVERE, WITHOUT PSYCHOTIC FEATURES (HCC): Primary | ICD-10-CM

## 2025-07-14 DIAGNOSIS — F90.9 ATTENTION DEFICIT HYPERACTIVITY DISORDER (ADHD), UNSPECIFIED ADHD TYPE: ICD-10-CM

## 2025-07-14 DIAGNOSIS — F41.1 GENERALIZED ANXIETY DISORDER: ICD-10-CM

## 2025-07-14 PROCEDURE — H0047 ALCOHOL/DRUG ABUSE SVC NOS: HCPCS

## 2025-07-14 PROCEDURE — 90837 PSYTX W PT 60 MINUTES: CPT

## 2025-07-14 NOTE — PSYCH
Geno Lopez  07/14/25  Start Time: 1001  Stop Time: 1056  Total Visit Time: 55 minutes  Annette BRANHAM    Visit type: In person    Recovery needs addressed during session: Substance Use Disorder Recovery    Notes (DAP Format)  Geno has started her CRS course and is very happy with it. Geno is making connections there and is getting help with rides. CARROL and Geno worked on a values packet and did some self reflection. CRS is going to work on homework with Geno so that she isn't struggling so much to complete it. [facility] [place] will help Geno with transportation for class.    Referrals made during this visit [facility] [place]    Recovery connections: [facility], [facility] [place]    Next appointment 7/16/2025 8:30am

## 2025-07-14 NOTE — PSYCH
Behavioral Health Psychotherapy Progress Note    Psychotherapy Provided: Individual Psychotherapy     1. Bipolar disorder, current episode depressed, severe, without psychotic features (HCC)        2. Generalized anxiety disorder        3. Attention deficit hyperactivity disorder (ADHD), unspecified ADHD type            Goals addressed in session: Goal 1     DATA: Clinician and Geno began session with update.  Clinician attentively listened to Geno give narrative of her week focusing discussion on CRS class, home life with boyfriend and getting herself organized for school.  Geno attended two classes Tuesday/Thursday and has been interacting with class mates.  Geno wants to do well, has been utilizing high lighter and reading content from class.  Geno did get her folders and notebooks labeled, began journaling wants heart to mend but found it difficult to continue writing. Clinician encouraged Geno to write out statements, rather than sentences and she found feedback helpful.  Clinician led Geno through sleep hygiene discussion with worksheet, provided copy and had her identify what stood out. Geno mentioned she will not be vaping in bed and will do her best not to nap. Clinician and Geno thought it was a good start, will follow up next session. Clinician also provided, Affirmations to read when discourage. Together read each affirmation, Geno plans to place worksheet in binder for school.  Clinician and Geno processed her thoughts and feelings regarding stressors and how she plans to reduce them.  Clinician reminded Geno to continue being kind to herself, she agreed. Clinician praised and validated Geno's efforts made in session.  Clinician reminded Geno to call if she needs additional support in between sessions, she agreed. Next session, will continue  cultivating therapeutic alliance and building rapport.     During this session, this  "clinician used the following therapeutic modalities: Engagement Strategies, Client-centered Therapy, Cognitive Behavioral Therapy, and Supportive Psychotherapy    Substance Abuse was not addressed during this session. If the client is diagnosed with a co-occurring substance use disorder, please indicate any changes in the frequency or amount of use: N/A. Stage of change for addressing substance use diagnoses: Maintenance    ASSESSMENT:  Geno Lopez presents with a Euthymic/ normal and Anxious mood.     her affect is Normal range and intensity and Bright, which is congruent, with her mood and the content of the session. The client has made progress on their goals.     Geno Lopez presents with a minimal risk of suicide, minimal risk of self-harm, and minimal risk of harm to others.    For any risk assessment that surpasses a \"low\" rating, a safety plan must be developed.    A safety plan was indicated: no  If yes, describe in detail N/A    PLAN: Between sessions, Geno Lopez will continue utilizing coping skills, taking medications as prescribed and attending all scheduled appointments & class sessions . At the next session, the therapist will use Engagement Strategies, Client-centered Therapy, and Supportive Psychotherapy to address anxious and depressive moods; dealing with relationships and managing herself.    Behavioral Health Treatment Plan and Discharge Planning: Geno Lopez is aware of and agrees to continue to work on their treatment plan. They have identified and are working toward their discharge goals. yes    Depression Follow-up Plan Completed: Yes    Visit start and stop times:    07/14/25  Start Time: 0902  Stop Time: 1000  Total Visit Time: 58 minutes  "

## 2025-07-21 ENCOUNTER — TELEPHONE (OUTPATIENT)
Dept: FAMILY MEDICINE CLINIC | Facility: CLINIC | Age: 40
End: 2025-07-21

## 2025-07-21 ENCOUNTER — OFFICE VISIT (OUTPATIENT)
Dept: PSYCHIATRY | Facility: CLINIC | Age: 40
End: 2025-07-21
Payer: COMMERCIAL

## 2025-07-21 ENCOUNTER — SOCIAL WORK (OUTPATIENT)
Dept: BEHAVIORAL/MENTAL HEALTH CLINIC | Facility: CLINIC | Age: 40
End: 2025-07-21
Payer: COMMERCIAL

## 2025-07-21 DIAGNOSIS — E03.9 HYPOTHYROID: ICD-10-CM

## 2025-07-21 DIAGNOSIS — M62.838 MUSCLE SPASMS OF NECK: ICD-10-CM

## 2025-07-21 DIAGNOSIS — F11.91 OPIOID USE DISORDER IN REMISSION: Primary | ICD-10-CM

## 2025-07-21 DIAGNOSIS — F31.4 BIPOLAR DISORDER, CURRENT EPISODE DEPRESSED, SEVERE, WITHOUT PSYCHOTIC FEATURES (HCC): Primary | ICD-10-CM

## 2025-07-21 DIAGNOSIS — F90.9 ATTENTION DEFICIT HYPERACTIVITY DISORDER (ADHD), UNSPECIFIED ADHD TYPE: ICD-10-CM

## 2025-07-21 DIAGNOSIS — F41.1 GENERALIZED ANXIETY DISORDER: ICD-10-CM

## 2025-07-21 PROCEDURE — 90837 PSYTX W PT 60 MINUTES: CPT

## 2025-07-21 PROCEDURE — H0047 ALCOHOL/DRUG ABUSE SVC NOS: HCPCS

## 2025-07-21 RX ORDER — CYCLOBENZAPRINE HCL 10 MG
10 TABLET ORAL 2 TIMES DAILY PRN
Qty: 90 TABLET | Refills: 1 | Status: SHIPPED | OUTPATIENT
Start: 2025-07-21

## 2025-07-21 RX ORDER — LEVOTHYROXINE SODIUM 50 UG/1
50 TABLET ORAL
Qty: 30 TABLET | Refills: 1 | Status: SHIPPED | OUTPATIENT
Start: 2025-07-21

## 2025-07-21 NOTE — TELEPHONE ENCOUNTER
Patient came into office today requesting medication refill to updated pharmacy:    levothyroxine 50 mcg tablet    cyclobenzaprine (FLEXERIL) 10 mg tablet     Please advise. Thank you!

## 2025-07-21 NOTE — PSYCH
"Geno Lopez  07/21/25  Start Time: 1007  Stop Time: 1057  Total Visit Time: 50 minutes  Annette Capps CRS    Visit type: In person    Recovery needs addressed during session: Substance Use Disorder Recovery    Notes (DAP Format)  CRS and Geno discussed her desire to get out of her relationship. Geno thinks that she is ready, but when she called [facility] she said \"I can handle it for now, but not much longer\". Geno is still doing well in her CRS class, she's making friends. Geno is feeling hopeful for the future but worried about actually leaving her relationship. Geno was also avoiding talking about leaving by looking at \"deals\" on her phone and also talking about fraud.     Referrals made during this visit [facility]    Recovery connections: [facility], [facility] [place]    Next appointment 7/28/2025 10AM  "

## 2025-07-21 NOTE — PSYCH
"Behavioral Health Psychotherapy Progress Note    Psychotherapy Provided: Individual Psychotherapy     1. Bipolar disorder, current episode depressed, severe, without psychotic features (HCC)        2. Generalized anxiety disorder        3. Attention deficit hyperactivity disorder (ADHD), unspecified ADHD type            Goals addressed in session: Goal 1     DATA: Clinician and Geno began session with check in.  Clinician attentively listened to Geno share narrative of her week as she attends CRS training, attends follow sessions with support team.  Geno had plans last week to spend quality time with boyfrienshay since he was on vacation for the week.  Geno finds herself feeling disappointed, they only went to one place for the day, didn't get to go to beach as she had wanted.  Geno admits she is getting tired of how dismissive he is with things to do with Geno.  She continues to do her best to stay out of his way, nothing she ever does satisfies boyfriend, and she feels stuck.  Boyfrienshay is critical, wants things to go his way; made reference about her seeking mental health services. Geno stated, she is tired of him being critical of her. Clinician and Geno discussed scenarios of why he is behaving in this manner; insecure, doesn't want her to be independent and by discouraging she will quit.  Geno stated, \"I feel stuck\".  Clinician encouraged Geno she is not stuck, there are options when she is ready.  Geno stated she received [facility] contact information and is considering making the call.  Clinician praised and validated Geno for her hard work and effort she is making preparing for school; she now has an organizer, and notebooks to keep herself organized.  Geno has been working on getting quality sleep, and organizing desk to be prepared when her two classes begin in a few weeks. Geno continues to take medications as prescribed and " "exercising daily. Clinician and Geno processed and discussed content of session, Clinician offered Geno a session for Friday, she accepted additional support this week.  Clinician reminded Geno if she feels she needs to talk she should call office and she agreed if needed.    Next session, will continue establishing therapeutic relationship and building rapport.   During this session, this clinician used the following therapeutic modalities: Engagement Strategies, Client-centered Therapy, Solution-Focused Therapy, and Supportive Psychotherapy    Substance Abuse was not addressed during this session. If the client is diagnosed with a co-occurring substance use disorder, please indicate any changes in the frequency or amount of use: N/A. Stage of change for addressing substance use diagnoses: Maintenance    ASSESSMENT:  Geno Lopez presents with a Euthymic/ normal, Anxious, and Depressed mood.     her affect is Normal range and intensity and Bright, which is congruent, with her mood and the content of the session. The client has made progress on their goals.     Geno Lopez presents with a minimal risk of suicide, minimal risk of self-harm, and minimal risk of harm to others.    For any risk assessment that surpasses a \"low\" rating, a safety plan must be developed.    A safety plan was indicated: no  If yes, describe in detail N/A    PLAN: Between sessions, Geno Lopez will continue utilizing coping skills to reduce intensity of anxiety and depressive moods. At the next session, the therapist will use Engagement Strategies, Client-centered Therapy, Cognitive Behavioral Therapy, Solution-Focused Therapy, and Supportive Psychotherapy to address anxious and depressive moods associated with challenging relationships, attending certification program for [employer] [facility] [employer] and preparing to attend college as adult.    Behavioral Health Treatment Plan and Discharge Planning: Geno Lopez is aware of and " agrees to continue to work on their treatment plan. They have identified and are working toward their discharge goals. yes    Depression Follow-up Plan Completed: No    Visit start and stop times:    07/21/25  Start Time: 0905  Stop Time: 1007  Total Visit Time: 62 minutes

## 2025-07-23 RX ORDER — DULOXETIN HYDROCHLORIDE 30 MG/1
30 CAPSULE, DELAYED RELEASE ORAL DAILY
Qty: 30 CAPSULE | Refills: 4 | OUTPATIENT
Start: 2025-07-23

## 2025-07-24 ENCOUNTER — TELEPHONE (OUTPATIENT)
Dept: PSYCHIATRY | Facility: CLINIC | Age: 40
End: 2025-07-24

## 2025-07-25 ENCOUNTER — OFFICE VISIT (OUTPATIENT)
Dept: PSYCHIATRY | Facility: CLINIC | Age: 40
End: 2025-07-25
Payer: COMMERCIAL

## 2025-07-25 ENCOUNTER — SOCIAL WORK (OUTPATIENT)
Dept: BEHAVIORAL/MENTAL HEALTH CLINIC | Facility: CLINIC | Age: 40
End: 2025-07-25
Payer: COMMERCIAL

## 2025-07-25 DIAGNOSIS — F90.9 ATTENTION DEFICIT HYPERACTIVITY DISORDER (ADHD), UNSPECIFIED ADHD TYPE: ICD-10-CM

## 2025-07-25 DIAGNOSIS — F43.10 PTSD (POST-TRAUMATIC STRESS DISORDER): ICD-10-CM

## 2025-07-25 DIAGNOSIS — F31.4 BIPOLAR DISORDER, CURRENT EPISODE DEPRESSED, SEVERE, WITHOUT PSYCHOTIC FEATURES (HCC): Primary | ICD-10-CM

## 2025-07-25 DIAGNOSIS — F41.1 GENERALIZED ANXIETY DISORDER: ICD-10-CM

## 2025-07-25 DIAGNOSIS — F11.91 OPIOID USE DISORDER IN REMISSION: Primary | ICD-10-CM

## 2025-07-25 PROCEDURE — H0047 ALCOHOL/DRUG ABUSE SVC NOS: HCPCS

## 2025-07-25 PROCEDURE — 90837 PSYTX W PT 60 MINUTES: CPT

## 2025-07-25 NOTE — TELEPHONE ENCOUNTER
Patient is calling regarding cancelling an appointment.    Date/Time: 7/25/25 at 8am    Reason: office offered to switch appt to 9am. Appt was switched.     Patient was rescheduled: YES [x] NO []  If yes, when was Patient reschedule for: 7/25/25 at 9am    Patient requesting call back to reschedule: YES [] NO [x]

## 2025-07-25 NOTE — PSYCH
Behavioral Health Psychotherapy Progress Note    Psychotherapy Provided: Individual Psychotherapy     1. Bipolar disorder, current episode depressed, severe, without psychotic features (HCC)        2. Generalized anxiety disorder        3. Attention deficit hyperactivity disorder (ADHD), unspecified ADHD type        4. PTSD (post-traumatic stress disorder)            Goals addressed in session: Goal 1     DATA: Clinician and Geno began session with update. Clinician attentively listened to Geno share she went to CRS class T/Th it went well, had interview on Wednesday and is feeling excited.  Geno stated she called [facility] with assistance of CRS,  and was given 211 number.  Geno shared all the content with boyfriend, he gives her a difficult time and doesn't understand why he is not happy for her.  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  Clinician and Geno processed her thoughts and feeling about her stressor.  Clinician had eGno identify how she feels his behaviors are demonstrating.  Geno was able to identify he is probably feeling insecure, thinking she will work, not need him any longer.  Geno admits she is  not ready to leave yet wants to work on getting her issue with name and identify theft cleared first.  Clinician and Geno discussed importance of safety.   Geno mentioned she has suspensions who it could be;boyfriend or son's grandmother she is currently fighting for custody of son.  Geno agreed to call [facility] if she needs assistance as of now she is staying.  Clinician and processed thoughts and feelings with Geno, encouraged her to call in between sessions if needed. Next session will continue establishing relationship and building rapport.     During this session, this clinician used the following therapeutic modalities: Engagement Strategies, Client-centered Therapy, Mindfulness-based Strategies, and  "Supportive Psychotherapy    Substance Abuse was not addressed during this session. If the client is diagnosed with a co-occurring substance use disorder, please indicate any changes in the frequency or amount of use: N/A. Stage of change for addressing substance use diagnoses: Maintenance    ASSESSMENT:  Geno Lopez presents with a Euthymic/ normal, Anxious, and Depressed mood.     her affect is Normal range and intensity and Blunted, which is congruent, with her mood and the content of the session. The client has made progress on their goals.     Geno Lopez presents with a minimal risk of suicide, minimal risk of self-harm, and minimal risk of harm to others.    For any risk assessment that surpasses a \"low\" rating, a safety plan must be developed.    A safety plan was indicated: no  If yes, describe in detail n/a    PLAN: Between sessions, Geno Lopez will continue to utilize coping skills and follow medication regimen. At the next session, the therapist will use Engagement Strategies, Cognitive Behavioral Therapy, Dialectical Behavior Therapy, and Supportive Psychotherapy to address anxious and depressive moods.    Behavioral Health Treatment Plan and Discharge Planning: Geno Lopez is aware of and agrees to continue to work on their treatment plan. They have identified and are working toward their discharge goals. yes    Depression Follow-up Plan Completed: No    Visit start and stop times:    07/25/25  Start Time: 0902  Stop Time: 1000  Total Visit Time: 58 minutes  "

## 2025-07-28 ENCOUNTER — SOCIAL WORK (OUTPATIENT)
Dept: BEHAVIORAL/MENTAL HEALTH CLINIC | Facility: CLINIC | Age: 40
End: 2025-07-28
Payer: COMMERCIAL

## 2025-07-28 ENCOUNTER — OFFICE VISIT (OUTPATIENT)
Dept: PSYCHIATRY | Facility: CLINIC | Age: 40
End: 2025-07-28
Payer: COMMERCIAL

## 2025-07-28 DIAGNOSIS — F43.10 PTSD (POST-TRAUMATIC STRESS DISORDER): ICD-10-CM

## 2025-07-28 DIAGNOSIS — F31.81 BIPOLAR II DISORDER (HCC): Primary | ICD-10-CM

## 2025-07-28 DIAGNOSIS — F41.1 GAD (GENERALIZED ANXIETY DISORDER): ICD-10-CM

## 2025-07-28 DIAGNOSIS — F11.91 OPIOID USE DISORDER IN REMISSION: Primary | ICD-10-CM

## 2025-07-28 DIAGNOSIS — F90.9 ATTENTION DEFICIT HYPERACTIVITY DISORDER (ADHD), UNSPECIFIED ADHD TYPE: ICD-10-CM

## 2025-07-28 PROCEDURE — H0047 ALCOHOL/DRUG ABUSE SVC NOS: HCPCS

## 2025-07-28 PROCEDURE — 90837 PSYTX W PT 60 MINUTES: CPT

## 2025-07-30 ENCOUNTER — TELEPHONE (OUTPATIENT)
Dept: PSYCHIATRY | Facility: CLINIC | Age: 40
End: 2025-07-30

## 2025-07-31 ENCOUNTER — TELEMEDICINE (OUTPATIENT)
Dept: PSYCHIATRY | Facility: CLINIC | Age: 40
End: 2025-07-31
Payer: COMMERCIAL

## 2025-07-31 DIAGNOSIS — F11.20 OPIOID DEPENDENCE ON AGONIST THERAPY (HCC): ICD-10-CM

## 2025-07-31 DIAGNOSIS — Z72.0 CURRENTLY ATTEMPTING TO QUIT SMOKING: ICD-10-CM

## 2025-07-31 DIAGNOSIS — F41.1 GENERALIZED ANXIETY DISORDER: ICD-10-CM

## 2025-07-31 DIAGNOSIS — F39 UNSPECIFIED MOOD (AFFECTIVE) DISORDER (HCC): ICD-10-CM

## 2025-07-31 DIAGNOSIS — F90.1 ADHD, PREDOMINANTLY HYPERACTIVE TYPE: Primary | ICD-10-CM

## 2025-07-31 PROCEDURE — 99213 OFFICE O/P EST LOW 20 MIN: CPT | Performed by: PSYCHIATRY & NEUROLOGY

## 2025-07-31 RX ORDER — CLONAZEPAM 1 MG/1
TABLET ORAL
Qty: 45 TABLET | Refills: 1 | Status: SHIPPED | OUTPATIENT
Start: 2025-07-31

## 2025-07-31 RX ORDER — ATOMOXETINE 40 MG/1
40 CAPSULE ORAL DAILY
Qty: 30 CAPSULE | Refills: 2 | Status: SHIPPED | OUTPATIENT
Start: 2025-07-31

## 2025-07-31 RX ORDER — BUPRENORPHINE AND NALOXONE 2; .5 MG/1; MG/1
2 FILM, SOLUBLE BUCCAL; SUBLINGUAL DAILY
Qty: 30 FILM | Refills: 2 | Status: SHIPPED | OUTPATIENT
Start: 2025-07-31

## 2025-07-31 RX ORDER — GABAPENTIN 100 MG/1
100 CAPSULE ORAL 3 TIMES DAILY
Qty: 90 CAPSULE | Refills: 2 | Status: SHIPPED | OUTPATIENT
Start: 2025-07-31 | End: 2025-08-30

## 2025-07-31 RX ORDER — BUPROPION HYDROCHLORIDE 300 MG/1
300 TABLET ORAL DAILY
Qty: 30 TABLET | Refills: 2 | Status: SHIPPED | OUTPATIENT
Start: 2025-07-31

## 2025-07-31 RX ORDER — DULOXETIN HYDROCHLORIDE 60 MG/1
60 CAPSULE, DELAYED RELEASE ORAL DAILY
Qty: 90 CAPSULE | Refills: 1 | Status: SHIPPED | OUTPATIENT
Start: 2025-07-31

## 2025-08-01 ENCOUNTER — OFFICE VISIT (OUTPATIENT)
Dept: PSYCHIATRY | Facility: CLINIC | Age: 40
End: 2025-08-01
Payer: COMMERCIAL

## 2025-08-01 DIAGNOSIS — Z87.898 HISTORY OF SUBSTANCE USE: Primary | ICD-10-CM

## 2025-08-01 PROCEDURE — H0047 ALCOHOL/DRUG ABUSE SVC NOS: HCPCS

## 2025-08-04 ENCOUNTER — TELEPHONE (OUTPATIENT)
Dept: FAMILY MEDICINE CLINIC | Facility: CLINIC | Age: 40
End: 2025-08-04

## 2025-08-04 ENCOUNTER — OFFICE VISIT (OUTPATIENT)
Dept: PSYCHIATRY | Facility: CLINIC | Age: 40
End: 2025-08-04
Payer: COMMERCIAL

## 2025-08-04 DIAGNOSIS — F31.81 BIPOLAR 2 DISORDER (HCC): ICD-10-CM

## 2025-08-04 DIAGNOSIS — F11.91 OPIOID USE DISORDER IN REMISSION: Primary | ICD-10-CM

## 2025-08-04 PROCEDURE — H0006 ALCOHOL AND/OR DRUG SERVICES: HCPCS

## 2025-08-04 RX ORDER — TOPIRAMATE 100 MG/1
100 TABLET, FILM COATED ORAL 2 TIMES DAILY
Qty: 60 TABLET | Refills: 0 | Status: SHIPPED | OUTPATIENT
Start: 2025-08-04

## 2025-08-05 ENCOUNTER — TELEPHONE (OUTPATIENT)
Dept: PSYCHIATRY | Facility: CLINIC | Age: 40
End: 2025-08-05

## 2025-08-06 ENCOUNTER — TELEMEDICINE (OUTPATIENT)
Dept: BEHAVIORAL/MENTAL HEALTH CLINIC | Facility: CLINIC | Age: 40
End: 2025-08-06
Payer: COMMERCIAL

## 2025-08-06 DIAGNOSIS — F41.1 GAD (GENERALIZED ANXIETY DISORDER): ICD-10-CM

## 2025-08-06 DIAGNOSIS — F43.10 PTSD (POST-TRAUMATIC STRESS DISORDER): ICD-10-CM

## 2025-08-06 DIAGNOSIS — F90.9 ATTENTION DEFICIT HYPERACTIVITY DISORDER (ADHD), UNSPECIFIED ADHD TYPE: ICD-10-CM

## 2025-08-06 DIAGNOSIS — F31.81 BIPOLAR II DISORDER (HCC): Primary | ICD-10-CM

## 2025-08-06 PROCEDURE — 90834 PSYTX W PT 45 MINUTES: CPT

## 2025-08-08 ENCOUNTER — OFFICE VISIT (OUTPATIENT)
Dept: PSYCHIATRY | Facility: CLINIC | Age: 40
End: 2025-08-08
Payer: COMMERCIAL

## 2025-08-08 ENCOUNTER — SOCIAL WORK (OUTPATIENT)
Dept: BEHAVIORAL/MENTAL HEALTH CLINIC | Facility: CLINIC | Age: 40
End: 2025-08-08
Payer: COMMERCIAL

## 2025-08-08 DIAGNOSIS — F43.10 PTSD (POST-TRAUMATIC STRESS DISORDER): ICD-10-CM

## 2025-08-08 DIAGNOSIS — F31.81 BIPOLAR II DISORDER (HCC): Primary | ICD-10-CM

## 2025-08-08 DIAGNOSIS — F41.1 GAD (GENERALIZED ANXIETY DISORDER): ICD-10-CM

## 2025-08-08 DIAGNOSIS — F90.9 ATTENTION DEFICIT HYPERACTIVITY DISORDER (ADHD), UNSPECIFIED ADHD TYPE: ICD-10-CM

## 2025-08-08 PROCEDURE — 90834 PSYTX W PT 45 MINUTES: CPT

## 2025-08-11 ENCOUNTER — TELEPHONE (OUTPATIENT)
Dept: PSYCHIATRY | Facility: CLINIC | Age: 40
End: 2025-08-11

## 2025-08-13 ENCOUNTER — TELEPHONE (OUTPATIENT)
Dept: PSYCHIATRY | Facility: CLINIC | Age: 40
End: 2025-08-13

## 2025-08-15 ENCOUNTER — SOCIAL WORK (OUTPATIENT)
Dept: BEHAVIORAL/MENTAL HEALTH CLINIC | Facility: CLINIC | Age: 40
End: 2025-08-15
Payer: COMMERCIAL

## 2025-08-15 ENCOUNTER — OFFICE VISIT (OUTPATIENT)
Dept: PSYCHIATRY | Facility: CLINIC | Age: 40
End: 2025-08-15
Payer: COMMERCIAL

## 2025-08-15 DIAGNOSIS — F11.91 OPIOID USE DISORDER IN REMISSION: Primary | ICD-10-CM

## 2025-08-15 PROCEDURE — H0047 ALCOHOL/DRUG ABUSE SVC NOS: HCPCS

## 2025-08-18 ENCOUNTER — SOCIAL WORK (OUTPATIENT)
Dept: BEHAVIORAL/MENTAL HEALTH CLINIC | Facility: CLINIC | Age: 40
End: 2025-08-18
Payer: COMMERCIAL

## 2025-08-18 DIAGNOSIS — F43.10 PTSD (POST-TRAUMATIC STRESS DISORDER): ICD-10-CM

## 2025-08-18 DIAGNOSIS — F90.9 ATTENTION DEFICIT HYPERACTIVITY DISORDER (ADHD), UNSPECIFIED ADHD TYPE: ICD-10-CM

## 2025-08-18 DIAGNOSIS — F41.1 GAD (GENERALIZED ANXIETY DISORDER): ICD-10-CM

## 2025-08-18 DIAGNOSIS — F31.81 BIPOLAR II DISORDER (HCC): Primary | ICD-10-CM

## 2025-08-18 PROCEDURE — 90837 PSYTX W PT 60 MINUTES: CPT

## 2025-08-20 ENCOUNTER — TELEPHONE (OUTPATIENT)
Dept: PSYCHIATRY | Facility: CLINIC | Age: 40
End: 2025-08-20

## (undated) DEVICE — PLUMEPEN PRO 10FT

## (undated) DEVICE — SILVER-COATED ANTIMICROBIAL BARRIER DRESSING: Brand: ACTICOAT   4" X 8"

## (undated) DEVICE — PADDING CAST 4 IN  COTTON STRL

## (undated) DEVICE — ACE WRAP 6 IN UNSTERILE

## (undated) DEVICE — CHLORAPREP HI-LITE 26ML ORANGE

## (undated) DEVICE — IMPERVIOUS STOCKINETTE: Brand: DEROYAL

## (undated) DEVICE — GAUZE SPONGES,16 PLY: Brand: CURITY

## (undated) DEVICE — SUT VICRYL 1 CTB-1 36 IN JB947

## (undated) DEVICE — Device

## (undated) DEVICE — U-DRAPE: Brand: CONVERTORS

## (undated) DEVICE — SUT VICRYL 2-0 CTB-1 36 IN JB945

## (undated) DEVICE — HEWSON SUTURE RETRIEVER: Brand: HEWSON SUTURE RETRIEVER

## (undated) DEVICE — INTENDED FOR TISSUE SEPARATION, AND OTHER PROCEDURES THAT REQUIRE A SHARP SURGICAL BLADE TO PUNCTURE OR CUT.: Brand: BARD-PARKER SAFETY BLADES SIZE 10, STERILE

## (undated) DEVICE — CURITY NON-ADHERENT STRIPS: Brand: CURITY

## (undated) DEVICE — UNIVERSAL MAJOR EXTREMITY,KIT: Brand: CARDINAL HEALTH

## (undated) DEVICE — INTENT TO BE USED WITH SUTURE MATERIAL FOR TISSUE CLOSURE: Brand: RICHARD-ALLAN®  NEEDLE 1/2 CIRCLE REVERSE CUTTING

## (undated) DEVICE — GLOVE SRG BIOGEL ORTHOPEDIC 8.5

## (undated) DEVICE — ABDOMINAL PAD: Brand: DERMACEA

## (undated) DEVICE — GLOVE INDICATOR PI UNDERGLOVE SZ 8.5 BLUE